# Patient Record
Sex: MALE | Race: WHITE | Employment: OTHER | URBAN - NONMETROPOLITAN AREA
[De-identification: names, ages, dates, MRNs, and addresses within clinical notes are randomized per-mention and may not be internally consistent; named-entity substitution may affect disease eponyms.]

---

## 2017-02-06 PROCEDURE — 88305 TISSUE EXAM BY PATHOLOGIST: CPT | Performed by: SURGERY

## 2017-02-07 ENCOUNTER — OFFICE VISIT (OUTPATIENT)
Dept: CARDIOLOGY | Age: 62
End: 2017-02-07
Payer: MEDICARE

## 2017-02-07 VITALS
WEIGHT: 185 LBS | HEIGHT: 66 IN | DIASTOLIC BLOOD PRESSURE: 60 MMHG | HEART RATE: 92 BPM | SYSTOLIC BLOOD PRESSURE: 118 MMHG | BODY MASS INDEX: 29.73 KG/M2

## 2017-02-07 DIAGNOSIS — Z95.5 HISTORY OF CORONARY ARTERY STENT PLACEMENT: ICD-10-CM

## 2017-02-07 DIAGNOSIS — Z95.1 S/P CABG (CORONARY ARTERY BYPASS GRAFT): ICD-10-CM

## 2017-02-07 DIAGNOSIS — I25.10 CORONARY ARTERY DISEASE INVOLVING NATIVE CORONARY ARTERY OF NATIVE HEART WITHOUT ANGINA PECTORIS: Primary | ICD-10-CM

## 2017-02-07 DIAGNOSIS — Z72.0 TOBACCO ABUSE: ICD-10-CM

## 2017-02-07 DIAGNOSIS — I10 ESSENTIAL HYPERTENSION: ICD-10-CM

## 2017-02-07 PROCEDURE — G8419 CALC BMI OUT NRM PARAM NOF/U: HCPCS | Performed by: CLINICAL NURSE SPECIALIST

## 2017-02-07 PROCEDURE — 4004F PT TOBACCO SCREEN RCVD TLK: CPT | Performed by: CLINICAL NURSE SPECIALIST

## 2017-02-07 PROCEDURE — 99214 OFFICE O/P EST MOD 30 MIN: CPT | Performed by: CLINICAL NURSE SPECIALIST

## 2017-02-07 PROCEDURE — 3017F COLORECTAL CA SCREEN DOC REV: CPT | Performed by: CLINICAL NURSE SPECIALIST

## 2017-02-07 PROCEDURE — G8427 DOCREV CUR MEDS BY ELIG CLIN: HCPCS | Performed by: CLINICAL NURSE SPECIALIST

## 2017-02-07 PROCEDURE — G8599 NO ASA/ANTIPLAT THER USE RNG: HCPCS | Performed by: CLINICAL NURSE SPECIALIST

## 2017-02-07 PROCEDURE — 93000 ELECTROCARDIOGRAM COMPLETE: CPT | Performed by: CLINICAL NURSE SPECIALIST

## 2017-02-07 PROCEDURE — 99406 BEHAV CHNG SMOKING 3-10 MIN: CPT | Performed by: CLINICAL NURSE SPECIALIST

## 2017-02-07 PROCEDURE — G8484 FLU IMMUNIZE NO ADMIN: HCPCS | Performed by: CLINICAL NURSE SPECIALIST

## 2017-02-07 RX ORDER — OMEPRAZOLE 20 MG/1
20 CAPSULE, DELAYED RELEASE ORAL DAILY
COMMUNITY
End: 2018-02-07 | Stop reason: ALTCHOICE

## 2017-02-07 RX ORDER — SUCRALFATE 1 G/1
1 TABLET ORAL 4 TIMES DAILY
COMMUNITY
End: 2018-02-07 | Stop reason: ALTCHOICE

## 2017-02-07 RX ORDER — BACLOFEN 20 MG/1
20 TABLET ORAL 2 TIMES DAILY
COMMUNITY
End: 2018-02-07 | Stop reason: ALTCHOICE

## 2017-02-07 RX ORDER — VARENICLINE TARTRATE 25 MG
KIT ORAL
Qty: 1 EACH | Refills: 0 | Status: SHIPPED | OUTPATIENT
Start: 2017-02-07 | End: 2018-02-07 | Stop reason: ALTCHOICE

## 2017-02-07 RX ORDER — VARENICLINE TARTRATE 1 MG/1
1 TABLET, FILM COATED ORAL 2 TIMES DAILY
Qty: 60 TABLET | Refills: 3 | Status: SHIPPED | OUTPATIENT
Start: 2017-02-07 | End: 2018-02-07 | Stop reason: ALTCHOICE

## 2017-02-07 ASSESSMENT — ENCOUNTER SYMPTOMS
HEARTBURN: 0
ORTHOPNEA: 0
NAUSEA: 0
ABDOMINAL PAIN: 1
VOMITING: 0
SHORTNESS OF BREATH: 1
BLURRED VISION: 0
BLOOD IN STOOL: 0
COUGH: 0

## 2017-08-07 ENCOUNTER — TELEPHONE (OUTPATIENT)
Dept: CARDIOLOGY | Age: 62
End: 2017-08-07

## 2017-08-27 PROCEDURE — 88305 TISSUE EXAM BY PATHOLOGIST: CPT | Performed by: SURGERY

## 2017-08-27 PROCEDURE — 88342 IMHCHEM/IMCYTCHM 1ST ANTB: CPT | Performed by: SURGERY

## 2017-08-28 ENCOUNTER — LAB REQUISITION (OUTPATIENT)
Dept: LAB | Facility: HOSPITAL | Age: 62
End: 2017-08-28

## 2017-08-28 DIAGNOSIS — Z00.00 ENCOUNTER FOR GENERAL ADULT MEDICAL EXAMINATION WITHOUT ABNORMAL FINDINGS: ICD-10-CM

## 2017-09-05 ENCOUNTER — TELEPHONE (OUTPATIENT)
Dept: CARDIOLOGY | Age: 62
End: 2017-09-05

## 2017-09-05 LAB
CYTO UR: NORMAL
LAB AP CASE REPORT: NORMAL
LAB AP CLINICAL INFORMATION: NORMAL
Lab: NORMAL
PATH REPORT.FINAL DX SPEC: NORMAL
PATH REPORT.GROSS SPEC: NORMAL

## 2017-09-19 ENCOUNTER — HOSPITAL ENCOUNTER (OUTPATIENT)
Age: 62
Setting detail: SPECIMEN
End: 2017-09-19
Payer: MEDICARE

## 2017-09-24 ENCOUNTER — HOSPITAL ENCOUNTER (EMERGENCY)
Age: 62
Discharge: HOME | End: 2017-09-24
Payer: MEDICARE

## 2017-09-24 ENCOUNTER — APPOINTMENT (OUTPATIENT)
Dept: GENERAL RADIOLOGY | Age: 62
End: 2017-09-24
Payer: MEDICARE

## 2017-09-24 VITALS
BODY MASS INDEX: 24.11 KG/M2 | DIASTOLIC BLOOD PRESSURE: 86 MMHG | WEIGHT: 150 LBS | TEMPERATURE: 98.2 F | HEIGHT: 66 IN | HEART RATE: 88 BPM | RESPIRATION RATE: 17 BRPM | OXYGEN SATURATION: 95 % | SYSTOLIC BLOOD PRESSURE: 139 MMHG

## 2017-09-24 DIAGNOSIS — K59.00 CONSTIPATION, UNSPECIFIED CONSTIPATION TYPE: Primary | ICD-10-CM

## 2017-09-24 LAB
ALBUMIN SERPL-MCNC: 4.1 G/DL (ref 3.5–5.2)
ALP BLD-CCNC: 136 U/L (ref 40–130)
ALT SERPL-CCNC: 52 U/L (ref 5–41)
ANION GAP SERPL CALCULATED.3IONS-SCNC: 14 MMOL/L (ref 7–19)
AST SERPL-CCNC: 42 U/L (ref 5–40)
BILIRUB SERPL-MCNC: 0.5 MG/DL (ref 0.2–1.2)
BUN BLDV-MCNC: 14 MG/DL (ref 8–23)
CALCIUM SERPL-MCNC: 9.5 MG/DL (ref 8.8–10.2)
CHLORIDE BLD-SCNC: 98 MMOL/L (ref 98–111)
CO2: 25 MMOL/L (ref 22–29)
CREAT SERPL-MCNC: 0.7 MG/DL (ref 0.5–1.2)
GFR NON-AFRICAN AMERICAN: >60
GLUCOSE BLD-MCNC: 126 MG/DL (ref 74–109)
HCT VFR BLD CALC: 47.4 % (ref 42–52)
HEMOGLOBIN: 16.1 G/DL (ref 14–18)
MCH RBC QN AUTO: 29.4 PG (ref 27–31)
MCHC RBC AUTO-ENTMCNC: 34 G/DL (ref 33–37)
MCV RBC AUTO: 86.7 FL (ref 80–94)
PDW BLD-RTO: 12.1 % (ref 11.5–14.5)
PLATELET # BLD: 266 K/UL (ref 130–400)
PMV BLD AUTO: 10.4 FL (ref 9.4–12.4)
POTASSIUM SERPL-SCNC: 4.1 MMOL/L (ref 3.5–5)
RBC # BLD: 5.47 M/UL (ref 4.7–6.1)
SODIUM BLD-SCNC: 137 MMOL/L (ref 136–145)
TOTAL PROTEIN: 8 G/DL (ref 6.6–8.7)
WBC # BLD: 7.7 K/UL (ref 4.8–10.8)

## 2017-09-24 PROCEDURE — 80053 COMPREHEN METABOLIC PANEL: CPT

## 2017-09-24 PROCEDURE — 74022 RADEX COMPL AQT ABD SERIES: CPT

## 2017-09-24 PROCEDURE — 99283 EMERGENCY DEPT VISIT LOW MDM: CPT | Performed by: NURSE PRACTITIONER

## 2017-09-24 PROCEDURE — 85027 COMPLETE CBC AUTOMATED: CPT

## 2017-09-24 PROCEDURE — 99284 EMERGENCY DEPT VISIT MOD MDM: CPT

## 2017-09-24 PROCEDURE — 36415 COLL VENOUS BLD VENIPUNCTURE: CPT

## 2017-09-24 PROCEDURE — 6360000002 HC RX W HCPCS: Performed by: NURSE PRACTITIONER

## 2017-09-24 RX ORDER — POLYETHYLENE GLYCOL 3350 17 G/17G
17 POWDER, FOR SOLUTION ORAL DAILY
Qty: 510 G | Refills: 0 | Status: SHIPPED | OUTPATIENT
Start: 2017-09-24 | End: 2017-10-24

## 2017-09-24 RX ORDER — DOCUSATE SODIUM 100 MG/1
100 CAPSULE, LIQUID FILLED ORAL 2 TIMES DAILY
Qty: 30 CAPSULE | Refills: 0 | Status: SHIPPED | OUTPATIENT
Start: 2017-09-24 | End: 2018-02-07 | Stop reason: ALTCHOICE

## 2017-09-24 RX ADMIN — METHYLNALTREXONE BROMIDE 12 MG: 12 INJECTION, SOLUTION SUBCUTANEOUS at 20:21

## 2017-09-24 ASSESSMENT — ENCOUNTER SYMPTOMS
ABDOMINAL PAIN: 1
RESPIRATORY NEGATIVE: 1
CONSTIPATION: 1

## 2017-09-24 ASSESSMENT — PAIN SCALES - GENERAL: PAINLEVEL_OUTOF10: 10

## 2017-09-25 PROBLEM — C10.9 OROPHARYNGEAL CANCER (HCC): Status: ACTIVE | Noted: 2017-08-27

## 2017-09-28 ENCOUNTER — HOSPITAL ENCOUNTER (OUTPATIENT)
Dept: RADIATION ONCOLOGY | Facility: HOSPITAL | Age: 62
Setting detail: RADIATION/ONCOLOGY SERIES
End: 2017-09-28

## 2017-09-28 ENCOUNTER — CONSULT (OUTPATIENT)
Dept: RADIATION ONCOLOGY | Facility: HOSPITAL | Age: 62
End: 2017-09-28

## 2017-09-28 VITALS
SYSTOLIC BLOOD PRESSURE: 120 MMHG | BODY MASS INDEX: 24.91 KG/M2 | WEIGHT: 155 LBS | DIASTOLIC BLOOD PRESSURE: 76 MMHG | HEIGHT: 66 IN

## 2017-09-28 DIAGNOSIS — Z71.9 ENCOUNTER FOR CONSULTATION: ICD-10-CM

## 2017-09-28 DIAGNOSIS — K22.719 BARRETT'S ESOPHAGUS WITH DYSPLASIA: ICD-10-CM

## 2017-09-28 DIAGNOSIS — C10.9 OROPHARYNGEAL CANCER (HCC): Primary | ICD-10-CM

## 2017-09-28 DIAGNOSIS — F17.200 CURRENT SMOKER: ICD-10-CM

## 2017-09-28 PROBLEM — N50.89 PERINEAL MASS IN MALE: Status: ACTIVE | Noted: 2017-09-28

## 2017-09-28 PROBLEM — K08.9 POOR DENTITION: Status: ACTIVE | Noted: 2017-09-28

## 2017-09-28 PROCEDURE — G0463 HOSPITAL OUTPT CLINIC VISIT: HCPCS | Performed by: RADIOLOGY

## 2017-09-28 RX ORDER — TAMSULOSIN HYDROCHLORIDE 0.4 MG/1
1 CAPSULE ORAL NIGHTLY
COMMUNITY
End: 2018-10-11

## 2017-09-28 RX ORDER — ASPIRIN 81 MG/1
81 TABLET, CHEWABLE ORAL DAILY
COMMUNITY
End: 2017-11-03

## 2017-09-28 RX ORDER — SUCRALFATE 1 G/1
1 TABLET ORAL 4 TIMES DAILY
COMMUNITY
End: 2017-12-04

## 2017-09-28 RX ORDER — BACLOFEN 20 MG/1
20 TABLET ORAL 3 TIMES DAILY
COMMUNITY
End: 2017-12-04

## 2017-09-28 RX ORDER — OMEPRAZOLE 20 MG/1
20 CAPSULE, DELAYED RELEASE ORAL DAILY
COMMUNITY
End: 2017-12-04

## 2017-09-28 RX ORDER — METOPROLOL TARTRATE 50 MG/1
50 TABLET, FILM COATED ORAL 2 TIMES DAILY
COMMUNITY
End: 2017-12-04

## 2017-09-28 RX ORDER — INSULIN GLARGINE 100 [IU]/ML
25 INJECTION, SOLUTION SUBCUTANEOUS EVERY MORNING
COMMUNITY
End: 2018-10-11

## 2017-09-28 RX ORDER — FUROSEMIDE 40 MG/1
40 TABLET ORAL 2 TIMES DAILY
COMMUNITY
End: 2017-12-04

## 2017-09-28 RX ORDER — ZOLPIDEM TARTRATE 10 MG/1
10 TABLET ORAL NIGHTLY PRN
COMMUNITY
End: 2018-10-11

## 2017-09-28 RX ORDER — PRASUGREL 10 MG/1
10 TABLET, FILM COATED ORAL DAILY
COMMUNITY
End: 2017-09-28

## 2017-09-28 RX ORDER — LISINOPRIL 10 MG/1
10 TABLET ORAL DAILY
COMMUNITY
End: 2017-12-04

## 2017-09-28 RX ORDER — OXYCODONE AND ACETAMINOPHEN 10; 325 MG/1; MG/1
1 TABLET ORAL EVERY 6 HOURS PRN
COMMUNITY
End: 2017-10-02 | Stop reason: SDUPTHER

## 2017-09-28 RX ORDER — NITROGLYCERIN 400 UG/1
1 SPRAY ORAL
COMMUNITY
End: 2018-05-18 | Stop reason: HOSPADM

## 2017-09-29 ENCOUNTER — HOSPITAL ENCOUNTER (OUTPATIENT)
Dept: NUCLEAR MEDICINE | Age: 62
Discharge: HOME | End: 2017-09-29
Payer: MEDICARE

## 2017-09-29 ENCOUNTER — HOSPITAL ENCOUNTER (OUTPATIENT)
Dept: MRI IMAGING | Age: 62
Discharge: HOME | End: 2017-09-29
Payer: MEDICARE

## 2017-09-29 DIAGNOSIS — C10.9 OROPHARYNGEAL CARCINOMA (HCC): ICD-10-CM

## 2017-09-29 DIAGNOSIS — C10.9 MALIGNANT NEOPLASM OF OROPHARYNX (HCC): ICD-10-CM

## 2017-09-29 LAB
GLUCOSE BLD-MCNC: 78 MG/DL (ref 70–99)
PERFORMED ON: NORMAL

## 2017-09-29 PROCEDURE — 70543 MRI ORBT/FAC/NCK W/O &W/DYE: CPT

## 2017-09-29 PROCEDURE — 78815 PET IMAGE W/CT SKULL-THIGH: CPT

## 2017-09-29 PROCEDURE — 3430000000 HC RX DIAGNOSTIC RADIOPHARMACEUTICAL: Performed by: INTERNAL MEDICINE

## 2017-09-29 PROCEDURE — 6360000004 HC RX CONTRAST MEDICATION: Performed by: INTERNAL MEDICINE

## 2017-09-29 PROCEDURE — A9552 F18 FDG: HCPCS | Performed by: INTERNAL MEDICINE

## 2017-09-29 PROCEDURE — 82948 REAGENT STRIP/BLOOD GLUCOSE: CPT

## 2017-09-29 PROCEDURE — A9577 INJ MULTIHANCE: HCPCS | Performed by: INTERNAL MEDICINE

## 2017-09-29 RX ORDER — FLUDEOXYGLUCOSE F 18 200 MCI/ML
10 INJECTION, SOLUTION INTRAVENOUS
Status: COMPLETED | OUTPATIENT
Start: 2017-09-29 | End: 2017-09-29

## 2017-09-29 RX ADMIN — GADOBENATE DIMEGLUMINE 15 ML: 529 INJECTION, SOLUTION INTRAVENOUS at 11:55

## 2017-09-29 RX ADMIN — FLUDEOXYGLUCOSE F 18 10 MILLICURIE: 200 INJECTION, SOLUTION INTRAVENOUS at 14:42

## 2017-10-02 ENCOUNTER — OFFICE VISIT (OUTPATIENT)
Dept: OTOLARYNGOLOGY | Facility: CLINIC | Age: 62
End: 2017-10-02

## 2017-10-02 VITALS
WEIGHT: 152 LBS | BODY MASS INDEX: 24.43 KG/M2 | DIASTOLIC BLOOD PRESSURE: 88 MMHG | SYSTOLIC BLOOD PRESSURE: 143 MMHG | HEART RATE: 96 BPM | TEMPERATURE: 97.5 F | HEIGHT: 66 IN

## 2017-10-02 DIAGNOSIS — F17.200 CURRENT SMOKER: ICD-10-CM

## 2017-10-02 DIAGNOSIS — C10.9 OROPHARYNGEAL CANCER (HCC): Primary | ICD-10-CM

## 2017-10-02 DIAGNOSIS — Z72.0 TOBACCO USE: ICD-10-CM

## 2017-10-02 DIAGNOSIS — K08.9 POOR DENTITION: ICD-10-CM

## 2017-10-02 PROCEDURE — 31575 DIAGNOSTIC LARYNGOSCOPY: CPT | Performed by: OTOLARYNGOLOGY

## 2017-10-02 PROCEDURE — 99203 OFFICE O/P NEW LOW 30 MIN: CPT | Performed by: OTOLARYNGOLOGY

## 2017-10-02 RX ORDER — OXYCODONE AND ACETAMINOPHEN 10; 325 MG/1; MG/1
1 TABLET ORAL EVERY 6 HOURS PRN
Qty: 40 TABLET | Refills: 0 | Status: SHIPPED | OUTPATIENT
Start: 2017-10-02 | End: 2017-11-01 | Stop reason: SDUPTHER

## 2017-10-02 RX ORDER — PRASUGREL 10 MG/1
TABLET, FILM COATED ORAL
COMMUNITY
Start: 2016-10-27 | End: 2017-10-04 | Stop reason: SINTOL

## 2017-10-02 NOTE — PROGRESS NOTES
Patient Intake Note    Review of Systems  Review of Systems   Constitutional: Positive for appetite change.   HENT:        See HPI   Eyes: Negative.    Respiratory: Positive for cough and choking.    Cardiovascular: Negative.    Gastrointestinal: Negative.    Endocrine: Negative.    Allergic/Immunologic: Negative.    Neurological: Positive for headaches.   Hematological: Bruises/bleeds easily.   Psychiatric/Behavioral: Negative.          Sanjana Sagastume MA  10/2/2017  3:09 PM

## 2017-10-02 NOTE — PROGRESS NOTES
"Patient Care Team:  Christian Castellon MD as PCP - General (Internal Medicine)  Sin Alarcon MD as Referring Physician (General Practice)  Alber Justin MD as Consulting Physician (Otolaryngology)  Kriss Dominguez MD as Referring Physician (Hematology and Oncology)  Hadley Marie III, MD as Consulting Physician (Radiation Oncology)    Chief Complaint   Patient presents with   • Mouth Lesions     SCC pharyngeal       Subjective   HPI   Sameer Tavarez is a  62 y.o. male who complains of pain. The symptoms are localized to the right ear and oropharynx. The patient has had severe symptoms. The symptoms have been relatively constant for the last 3 months. The patient  reports that he has been smoking Cigarettes.  He has a 52.00 pack-year smoking history. His smokeless tobacco use includes Snuff.. He has a history of acid reflux symptoms. He is being treated for acid reflux. There have been no factors that have improved the symptoms. He had an EGD on 2/6/17 by Dr Patel with biopsies showing Barretts esophagus and reflux esophagitis. He had continued dysphagia and underwent another EGD and an oropharyngeal biopsy on 8/27/17 with the oropharyngeal biosies showing \"at least squamous cell carcinoma in situ\". CT scanning showed \"oropharyngeal asymmety without overt enhancing mass\" on 8/28/17.    Review of Systems   Constitutional: Positive for appetite change.   HENT: Positive for ear pain and sore throat.         See HPI   Eyes: Negative.    Respiratory: Positive for cough and choking. Negative for stridor.    Cardiovascular: Negative.    Gastrointestinal: Negative.    Endocrine: Negative.    Allergic/Immunologic: Negative.    Neurological: Positive for headaches.   Hematological: Bruises/bleeds easily.   Psychiatric/Behavioral: Negative.        Past History:  Past Medical History:   Diagnosis Date   • Coronary artery disease    • Diabetes mellitus    • GERD (gastroesophageal reflux disease)    • " Hypertension    • Oropharyngeal cancer 08/27/2017   • Severe esophageal dysplasia      Past Surgical History:   Procedure Laterality Date   • CHOLECYSTECTOMY     • CORONARY ARTERY BYPASS GRAFT     • HERNIA REPAIR       Family History   Problem Relation Age of Onset   • Stroke Mother    • Heart disease Father    • Heart disease Brother    • Cancer Brother      Social History   Substance Use Topics   • Smoking status: Current Every Day Smoker     Packs/day: 1.00     Years: 52.00     Types: Cigarettes   • Smokeless tobacco: Current User     Types: Snuff      Comment: I've tried and tried   • Alcohol use Yes      Comment: occasionally       Current Outpatient Prescriptions:   •  aspirin 81 MG chewable tablet, Chew 81 mg Daily., Disp: , Rfl:   •  baclofen (LIORESAL) 20 MG tablet, Take 20 mg by mouth 3 (Three) Times a Day., Disp: , Rfl:   •  furosemide (LASIX) 40 MG tablet, Take 40 mg by mouth 2 (Two) Times a Day., Disp: , Rfl:   •  insulin glargine (LANTUS) 100 UNIT/ML injection, Inject 70 Units under the skin Daily., Disp: , Rfl:   •  insulin lispro (humaLOG) 100 UNIT/ML injection, Inject  under the skin 3 (Three) Times a Day Before Meals. Sliding scale, Disp: , Rfl:   •  lisinopril (PRINIVIL,ZESTRIL) 10 MG tablet, Take 10 mg by mouth Daily., Disp: , Rfl:   •  metFORMIN (GLUCOPHAGE) 1000 MG tablet, Take  by mouth., Disp: , Rfl:   •  metoprolol tartrate (LOPRESSOR) 50 MG tablet, Take 50 mg by mouth 2 (Two) Times a Day., Disp: , Rfl:   •  nitroglycerin (NITROLINGUAL) 0.4 MG/SPRAY spray, Place 1 spray under the tongue Every 5 (Five) Minutes As Needed for Chest Pain., Disp: , Rfl:   •  omeprazole (priLOSEC) 20 MG capsule, Take 20 mg by mouth Daily., Disp: , Rfl:   •  oxyCODONE-acetaminophen (PERCOCET)  MG per tablet, Take 1 tablet by mouth Every 6 (Six) Hours As Needed for Moderate Pain ., Disp: , Rfl:   •  sucralfate (CARAFATE) 1 g tablet, Take 1 g by mouth 4 (Four) Times a Day., Disp: , Rfl:   •  tamsulosin (FLOMAX)  0.4 MG capsule 24 hr capsule, Take 1 capsule by mouth Every Night., Disp: , Rfl:   •  zolpidem (AMBIEN) 10 MG tablet, Take 10 mg by mouth At Night As Needed for Sleep., Disp: , Rfl:   •  prasugrel (EFFIENT) 10 MG tablet, Take  by mouth., Disp: , Rfl: (patient has not been taking Effient for the last 3 months or so)  Allergies:  Codeine    Objective     Vital Signs:  Temp:  [97.5 °F (36.4 °C)] 97.5 °F (36.4 °C)  Heart Rate:  [96] 96  BP: (143)/(88) 143/88    Physical Exam  CONSTITUTIONAL: well nourished, well-developed, alert, oriented for age, in no acute distress  heavy tobacco odor present,  COMMUNICATION AND VOICE: mild muffled voice present,  HEAD: normocephalic, no lesions, atraumatic, no tenderness, no masses   FACE: appearance normal, no lesions, no tenderness, no deformities, facial motion symmetric  SALIVARY GLANDS: parotid glands with no tenderness, no swelling, no masses, submandibular glands with normal size, nontender  EYES: ocular motility normal, eyelids normal, orbits normal, no proptosis, conjunctiva normal , pupils equal, round  HEARING: response to conversational voice normal bilaterally   EXTERNAL EARS: auricles without lesions  EXTERNAL EAR CANALS: normal ear canals without stenosis or significant cerumen  TYMPANIC MEMBRANES: tympanic membrane appearance normal, no lesions, no perforation, normal mobility, no fluid  EXTERNAL NOSE: structure normal, no tenderness on palpation, no nasal discharge, no lesions, no evidence of trauma, nostrils patent  INTRANASAL EXAM: nasal mucosa normal, vestibule within normal limits, inferior turbinate normal,  nasal septum without overt anterior deviation  NASOPHARYNX: nasopharyngeal mucosa, adenoids within normal limits  LIPS: structure normal, no tenderness on palpation, no lesions, no evidence of trauma  TEETH: diffusely carious  GUMS: gingivae healthy  ORAL MUCOSA: oral mucosa normal, no mucosal lesions  FLOOR OF MOUTH: Warthin’s duct patent, mucosa  normal  TONGUE: right base of tongue fullness and firmness to palpation extending from the tonsillar fossa to the midline of the base of tongue  PALATE: soft and hard palates with normal mucosa and structure  OROPHARYNX: there is a large ulcerated mass of the right tonsillar fossa that has mild exudate and is tender and firm to palpation  HYPOPHARYNX: hypopharyngeal mucosa normal  LARYNX: thick secretions, epiglottis and arytenoid cartilage within normal limits, vocal cord mucosa normal with normal mobility   NECK: neck appearance normal, no masses or tenderness  THYROID: no overt thyromegaly, no tenderness, nodules or mass present on palpation, position midline   LYMPH NODES: no lymphadenopathy  CHEST/RESPIRATORY: respiratory effort normal, normal breath sounds  CARDIOVASCULAR: rate and rhythm normal, extremities without cyanosis or edema, no overt jugulovenous distension present  NEUROLOGIC/PSYCHIATRIC: oriented appropriately for age, mood normal, affect appropriate, cranial nerves intact grossly unless specifically mentioned above     Procedure Note    Anesthesia: topical 4% tetracaine and oxymetazoline mix    Endoscopy Type: Flexible Laryngoscopy    Indications for Procedure: Lesion partially identified by mirror examination - needing further exam    Procedure Details:    The patient was placed in the sitting position.  After topical anesthesia and decongestion, the 4 mm laryngoscope was passed.  The nasal cavities, nasopharynx, oropharynx, hypopharynx, and larynx were all examined.  Vocal cords were examined during respiration and phonation.  The following findings were noted:    Findings: as per above    Condition:  Stable.  Patient tolerated procedure well.    Complications:  None    RESULTS REVIEW:    I have personally reviewed the patient's mri of the neck. There is a large mass extending from the tonsillar fossa to the midline of the base of tongue.  It has anterior extension into the lingual musculature  of the tongue.  I do not see any lymphadenopathy.  Bulky enhancing right oropharyngeal soft tissue mass measuring approximately 6.7 x 4.6 x 4.6 cm. Involvement of the right side of the base of the tongue.  I have personally reviewed the patient's PET scan.  There is intense uptake at the area of the mass seen on the MRI scan.   PATHOLOGY:  Oropharynx, biopsy: At least squamous cell carcinoma in situ.    I discussed this case with Dr. Marie who agrees this is an obvious invasive squamous cell carcinoma clinically.  I discussed the pathology with Dr. Kassy Handley who really looked at the slides but still was unable to call this an invasive carcinoma.                  Assessment   1. Oropharyngeal cancer    2. Poor dentition    3. Current smoker    4. 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  However, the biopsies were superficial and they cannot: Invasive carcinoma.  Medical oncology requires a definitive diagnosis of invasion.  Therefore I will put him on the schedule for later this week to have a biopsy.  I am concerned about the size of this mass and that the anesthesia may aggravate obstruction and therefore we had a long discussion on the possibility of tracheostomy.    Lex report run    The patient/family was instructed on the proper use of scheduled prescription drugs including their impact on driving and work safety and their potential effects during pregnancy. The potential for overdose and the appropriate response to an overdose was covered as well as their safe storage and proper disposal. The website www.[place] or [place]health which contains other materials in this regard was offered      New Medications Ordered This Visit   Medications   • oxyCODONE-acetaminophen (PERCOCET)  MG per tablet     Sig: Take 1 tablet by mouth Every 6 (Six) Hours As Needed for Moderate  Pain .     Dispense:  40 tablet     Refill:  0     -----SURGERY SCHEDULING:-----  Schedule direct laryngoscopy with biopsy     ---INFORMED CONSENT DISCUSSION:---  DIRECT LARYNGOSCOPY WITH BIOPSY: The risks and benefits were explained including but not limited to pain, bleeding, infection, (including possible mediastinitis), the risks of the general anesthesia, pain, temporary or permanent hoarseness, airway loss, possible need for tracheostomy and/or tooth injury. Questions were answered. No guarantees were made or implied.       ---PREOPERATIVE WORKUP:---  CBC  CMP  Chest X-Ray  EKG     Follow up  postoperatively    Alber Justin MD  10/02/17  4:44 PM

## 2017-10-02 NOTE — PATIENT INSTRUCTIONS
IF YOU SMOKE OR USE TOBACCO PLEASE READ THE FOLLOWING:    Why is smoking bad for me?  Smoking increases the risk of heart disease, lung disease, vascular disease, stroke, and cancer.     If you smoke, STOP!    If you would like more information on quitting smoking, please visit the [employer] website: [email or web address]   This link will provide additional resources including the QUIT line and the Beat the Pack support groups.     For more information:    Quit Now [facility]  1-800-QUIT-NOW  https://[place].quitlogix.org/en-[place]/

## 2017-10-04 ENCOUNTER — APPOINTMENT (OUTPATIENT)
Dept: PREADMISSION TESTING | Facility: HOSPITAL | Age: 62
End: 2017-10-04

## 2017-10-04 ENCOUNTER — HOSPITAL ENCOUNTER (OUTPATIENT)
Dept: GENERAL RADIOLOGY | Facility: HOSPITAL | Age: 62
Discharge: HOME | End: 2017-10-04
Admitting: OTOLARYNGOLOGY

## 2017-10-04 VITALS
DIASTOLIC BLOOD PRESSURE: 98 MMHG | RESPIRATION RATE: 17 BRPM | OXYGEN SATURATION: 99 % | HEIGHT: 66 IN | SYSTOLIC BLOOD PRESSURE: 169 MMHG | WEIGHT: 153.6 LBS | HEART RATE: 103 BPM | BODY MASS INDEX: 24.68 KG/M2

## 2017-10-04 DIAGNOSIS — C10.9 OROPHARYNGEAL CANCER (HCC): ICD-10-CM

## 2017-10-04 LAB
ALBUMIN SERPL-MCNC: 4.6 G/DL (ref 3.5–5)
ALBUMIN/GLOB SERPL: 1.3 G/DL (ref 1.1–2.5)
ALP SERPL-CCNC: 172 U/L (ref 24–120)
ALT SERPL W P-5'-P-CCNC: 109 U/L (ref 0–54)
ANION GAP SERPL CALCULATED.3IONS-SCNC: 16 MMOL/L (ref 4–13)
AST SERPL-CCNC: 66 U/L (ref 7–45)
BILIRUB SERPL-MCNC: 0.7 MG/DL (ref 0.1–1)
BUN BLD-MCNC: 10 MG/DL (ref 5–21)
BUN/CREAT SERPL: 15.2 (ref 7–25)
CALCIUM SPEC-SCNC: 9.4 MG/DL (ref 8.4–10.4)
CHLORIDE SERPL-SCNC: 100 MMOL/L (ref 98–110)
CO2 SERPL-SCNC: 23 MMOL/L (ref 24–31)
CREAT BLD-MCNC: 0.66 MG/DL (ref 0.5–1.4)
DEPRECATED RDW RBC AUTO: 39.2 FL (ref 40–54)
ERYTHROCYTE [DISTWIDTH] IN BLOOD BY AUTOMATED COUNT: 12.7 % (ref 12–15)
GFR SERPL CREATININE-BSD FRML MDRD: 122 ML/MIN/1.73
GLOBULIN UR ELPH-MCNC: 3.6 GM/DL
GLUCOSE BLD-MCNC: 210 MG/DL (ref 70–100)
HCT VFR BLD AUTO: 41.9 % (ref 40–52)
HGB BLD-MCNC: 14.4 G/DL (ref 14–18)
MCH RBC QN AUTO: 29.1 PG (ref 28–32)
MCHC RBC AUTO-ENTMCNC: 34.4 G/DL (ref 33–36)
MCV RBC AUTO: 84.8 FL (ref 82–95)
PLATELET # BLD AUTO: 221 10*3/MM3 (ref 130–400)
PMV BLD AUTO: 11.5 FL (ref 6–12)
POTASSIUM BLD-SCNC: 4.1 MMOL/L (ref 3.5–5.3)
PROT SERPL-MCNC: 8.2 G/DL (ref 6.3–8.7)
RBC # BLD AUTO: 4.94 10*6/MM3 (ref 4.8–5.9)
SODIUM BLD-SCNC: 139 MMOL/L (ref 135–145)
WBC NRBC COR # BLD: 8.98 10*3/MM3 (ref 4.8–10.8)

## 2017-10-04 PROCEDURE — 93010 ELECTROCARDIOGRAM REPORT: CPT | Performed by: INTERNAL MEDICINE

## 2017-10-04 RX ORDER — FEXOFENADINE HCL AND PSEUDOEPHEDRINE HCI 60; 120 MG/1; MG/1
1 TABLET, EXTENDED RELEASE ORAL 2 TIMES DAILY
COMMUNITY
End: 2017-12-04

## 2017-10-04 NOTE — DISCHARGE INSTRUCTIONS
DAY OF SURGERY INSTRUCTIONS        YOUR SURGEON: dr janneth flaherty    PROCEDURE: right direct laryngoscopy with biopsy    DATE OF SURGERY: October 5, 2017    ARRIVAL TIME: AS DIRECTED BY OFFICE    DAY OF SURGERY TAKE ONLY THESE MEDICATIONS: metoprolol              BEFORE YOU COME TO THE HOSPITAL  (Pre-op instructions)  • Do not eat, drink, smoke or chew gum after midnight the night before surgery.  This also includes no mints.  • Morning of surgery take only the medicines you have been instructed with a sip of water unless otherwise instructed  by your physician.  • Do not shave, wear makeup or dark nail polish.  • Remove all jewelry including rings.  • Leave anything you consider valuable at home.  • Leave your suitcase in the car until after your surgery.  • Bring the following with you if applicable:  o Picture ID and insurance, Medicare or Medicaid cards  o Co-pay/deductible required by insurance (cash, check, credit card)  o Copy of advance directive, living will or power-of- documents if not brought to PAT  o CPAP or BIPAP mask and tubing  o Relaxation aids (MP3 player, book, magazine)  • On the day of surgery check in at registration located at the main entrance of the hospital.       Outpatient Surgery Guidelines, Adult  Outpatient procedures are those for which the person having the procedure is allowed to go home the same day as the procedure. Various procedures are done on an outpatient basis. You should follow some general guidelines if you will be having an outpatient procedure.  LET YOUR HEALTH CARE PROVIDER KNOW ABOUT:  · Any allergies you have.  · All medicines you are taking, including vitamins, herbs, eye drops, creams, and over-the-counter medicines.  · Previous problems you or members of your family have had with the use of anesthetics.  · Any blood disorders you have.  · Previous surgeries you have had.  · Medical conditions you have.  RISKS AND COMPLICATIONS  Your health care provider  will discuss possible risks and complications with you before surgery. Common risks and complications include:    · Problems due to the use of anesthetics.  · Blood loss and replacement (does not apply to minor surgical procedures).  · Temporary increase in pain due to surgery.  · Uncorrected pain or problems that the surgery was meant to correct.  · Infection.  · New damage.  BEFORE THE PROCEDURE  · Ask your health care provider about changing or stopping your regular medicines. You may need to stop taking certain medicines in the days or weeks before the procedure.  · Stop smoking at least 2 weeks before surgery. This lowers your risk for complications during and after surgery. Ask your health care provider for help with this if needed.  · Eat your usual meals and a light supper the day before surgery. Continue fluid intake. Do not drink alcohol.  · Do not eat or drink after midnight the night before your surgery.   · Arrange for someone to take you home and to stay with you for 24 hours after the procedure. Medicine given for your procedure may affect your ability to drive or to care for yourself.  · Call your health care provider's office if you develop an illness or problem that may prevent you from safely having your procedure.  AFTER THE PROCEDURE  After surgery, you will be taken to a recovery area, where your progress will be monitored. If there are no complications, you will be allowed to go home when you are awake, stable, and taking fluids well. You may have numbness around the surgical site. Healing will take some time. You will have tenderness at the surgical site and may have some swelling and bruising. You may also have some nausea.  HOME CARE INSTRUCTIONS  · Do not drive for 24 hours, or as directed by your health care provider. Do not drive while taking prescription pain medicines.  · Do not drink alcohol for 24 hours.  · Do not make important decisions or sign legal documents for 24 hours.  · You  may resume a normal diet and activities as directed.  · Do not lift anything heavier than 10 pounds (4.5 kg) or play contact sports until your health care provider says it is okay.  · Change your bandages (dressings) as directed.  · Only take over-the-counter or prescription medicines as directed by your health care provider.  · Follow up with your health care provider as directed.  SEEK MEDICAL CARE IF:  · You have increased bleeding (more than a small spot) from the surgical site.  · You have redness, swelling, or increasing pain in the wound.  · You see pus coming from the wound.  · You have a fever.  · You notice a bad smell coming from the wound or dressing.  · You feel lightheaded or faint.  · You develop a rash.  · You have trouble breathing.  · You develop allergies.  MAKE SURE YOU:  · Understand these instructions.  · Will watch your condition.  · Will get help right away if you are not doing well or get worse.     This information is not intended to replace advice given to you by your health care provider. Make sure you discuss any questions you have with your health care provider.     Document Released: 09/12/2002 Document Revised: 05/03/2016 Document Reviewed: 05/22/2014  [employer] Interactive Patient Education ©2016 [employer]       Fall Prevention in Hospitals, Adult  As a hospital patient, your condition and the treatments you receive can increase your risk for falls. Some additional risk factors for falls in a hospital include:  · Being in an unfamiliar environment.  · Being on bed rest.  · Your surgery.  · Taking certain medicines.  · Your tubing requirements, such as intravenous (IV) therapy or catheters.  It is important that you learn how to decrease fall risks while at the hospital. Below are important tips that can help prevent falls.  SAFETY TIPS FOR PREVENTING FALLS  Talk about your risk of falling.  · Ask your health care provider why you are at risk for falling. Is it your medicine,  illness, tubing placement, or something else?  · Make a plan with your health care provider to keep you safe from falls.  · Ask your health care provider or pharmacist about side effects of your medicines. Some medicines can make you dizzy or affect your coordination.  Ask for help.  · Ask for help before getting out of bed. You may need to press your call button.  · Ask for assistance in getting safely to the toilet.  · Ask for a walker or cane to be put at your bedside. Ask that most of the side rails on your bed be placed up before your health care provider leaves the room.  · Ask family or friends to sit with you.  · Ask for things that are out of your reach, such as your glasses, hearing aids, telephone, bedside table, or call button.  Follow these tips to avoid falling:  · Stay lying or seated, rather than standing, while waiting for help.  · Wear rubber-soled slippers or shoes whenever you walk in the hospital.  · Avoid quick, sudden movements.  ¨ Change positions slowly.  ¨ Sit on the side of your bed before standing.  ¨ Stand up slowly and wait before you start to walk.  · Let your health care provider know if there is a spill on the floor.  · Pay careful attention to the medical equipment, electrical cords, and tubes around you.  · When you need help, use your call button by your bed or in the bathroom. Wait for one of your health care providers to help you.  · If you feel dizzy or unsure of your footing, return to bed and wait for assistance.  · Avoid being distracted by the TV, telephone, or another person in your room.  · Do not lean or support yourself on rolling objects, such as IV poles or bedside tables.     This information is not intended to replace advice given to you by your health care provider. Make sure you discuss any questions you have with your health care provider.     Document Released: 12/15/2001 Document Revised: 01/08/2016 Document Reviewed: 08/25/2013  [employer] Patient  Education ©2016 [employer]       Surgical Site Infections FAQs  What is a Surgical Site Infection (SSI)?  A surgical site infection is an infection that occurs after surgery in the part of the body where the surgery took place. Most patients who have surgery do not develop an infection. However, infections develop in about 1 to 3 out of every 100 patients who have surgery.  Some of the common symptoms of a surgical site infection are:  · Redness and pain around the area where you had surgery  · Drainage of cloudy fluid from your surgical wound  · Fever  Can SSIs be treated?  Yes. Most surgical site infections can be treated with antibiotics. The antibiotic given to you depends on the bacteria (germs) causing the infection. Sometimes patients with SSIs also need another surgery to treat the infection.  What are some of the things that hospitals are doing to prevent SSIs?  To prevent SSIs, doctors, nurses, and other healthcare providers:  · Clean their hands and arms up to their elbows with an antiseptic agent just before the surgery.  · Clean their hands with soap and water or an alcohol-based hand rub before and after caring for each patient.  · May remove some of your hair immediately before your surgery using electric clippers if the hair is in the same area where the procedure will occur. They should not shave you with a razor.  · Wear special hair covers, masks, gowns, and gloves during surgery to keep the surgery area clean.  · Give you antibiotics before your surgery starts. In most cases, you should get antibiotics within 60 minutes before the surgery starts and the antibiotics should be stopped within 24 hours after surgery.  · Clean the skin at the site of your surgery with a special soap that kills germs.  What can I do to help prevent SSIs?  Before your surgery:  · Tell your doctor about other medical problems you may have. Health problems such as allergies, diabetes, and obesity could affect your  surgery and your treatment.  · Quit smoking. Patients who smoke get more infections. Talk to your doctor about how you can quit before your surgery.  · Do not shave near where you will have surgery. Shaving with a razor can irritate your skin and make it easier to develop an infection.  At the time of your surgery:  · Speak up if someone tries to shave you with a razor before surgery. Ask why you need to be shaved and talk with your surgeon if you have any concerns.  · Ask if you will get antibiotics before surgery.  After your surgery:  · Make sure that your healthcare providers clean their hands before examining you, either with soap and water or an alcohol-based hand rub.  · If you do not see your providers clean their hands, please ask them to do so.  · Family and friends who visit you should not touch the surgical wound or dressings.  · Family and friends should clean their hands with soap and water or an alcohol-based hand rub before and after visiting you. If you do not see them clean their hands, ask them to clean their hands.  What do I need to do when I go home from the hospital?  · Before you go home, your doctor or nurse should explain everything you need to know about taking care of your wound. Make sure you understand how to care for your wound before you leave the hospital.  · Always clean your hands before and after caring for your wound.  · Before you go home, make sure you know who to contact if you have questions or problems after you get home.  · If you have any symptoms of an infection, such as redness and pain at the surgery site, drainage, or fever, call your doctor immediately.  If you have additional questions, please ask your doctor or nurse.  Developed and co-sponsored by The Society for Healthcare Epidemiology of [place] (SHEA); Infectious Diseases Society of [place] (IDSA); American Hospital Association; Association for Professionals in Infection Control and Epidemiology (APIC); [facility]  for Disease Control and Prevention (CDC); and The Joint Commission.     This information is not intended to replace advice given to you by your health care provider. Make sure you discuss any questions you have with your health care provider.     Document Released: 12/23/2014 Document Revised: 01/08/2016 Document Reviewed: 03/02/2016  [employer] Interactive Patient Education ©2016 [employer]     PATIENT/FAMILY/RESPONSIBLE PARTY VERBALIZES UNDERSTANDING OF ABOVE EDUCATION.  COPY OF PAIN SCALE GIVEN AND REVIEWED WITH VERBALIZED UNDERSTANDING.

## 2017-10-05 ENCOUNTER — ANESTHESIA (OUTPATIENT)
Dept: PERIOP | Facility: HOSPITAL | Age: 62
End: 2017-10-05

## 2017-10-05 ENCOUNTER — APPOINTMENT (OUTPATIENT)
Dept: GENERAL RADIOLOGY | Facility: HOSPITAL | Age: 62
End: 2017-10-05

## 2017-10-05 ENCOUNTER — ANESTHESIA EVENT (OUTPATIENT)
Dept: PERIOP | Facility: HOSPITAL | Age: 62
End: 2017-10-05

## 2017-10-05 ENCOUNTER — HOSPITAL ENCOUNTER (INPATIENT)
Facility: HOSPITAL | Age: 62
LOS: 8 days | Discharge: HOME HEALTH | End: 2017-10-13
Attending: OTOLARYNGOLOGY | Admitting: OTOLARYNGOLOGY

## 2017-10-05 DIAGNOSIS — Z72.0 TOBACCO USE: ICD-10-CM

## 2017-10-05 DIAGNOSIS — K08.9 POOR DENTITION: ICD-10-CM

## 2017-10-05 DIAGNOSIS — Z74.09 IMPAIRED MOBILITY: Primary | ICD-10-CM

## 2017-10-05 DIAGNOSIS — F17.200 CURRENT SMOKER: ICD-10-CM

## 2017-10-05 DIAGNOSIS — R49.1 VOICE ABSENCE: ICD-10-CM

## 2017-10-05 DIAGNOSIS — C10.9 OROPHARYNGEAL CANCER (HCC): ICD-10-CM

## 2017-10-05 LAB
GLUCOSE BLDC GLUCOMTR-MCNC: 175 MG/DL (ref 70–130)
GLUCOSE BLDC GLUCOMTR-MCNC: 60 MG/DL (ref 70–130)
GLUCOSE BLDC GLUCOMTR-MCNC: 62 MG/DL (ref 70–130)
GLUCOSE BLDC GLUCOMTR-MCNC: 65 MG/DL (ref 70–130)
GLUCOSE BLDC GLUCOMTR-MCNC: 67 MG/DL (ref 70–130)
GLUCOSE BLDC GLUCOMTR-MCNC: 78 MG/DL (ref 70–130)

## 2017-10-05 PROCEDURE — 88342 IMHCHEM/IMCYTCHM 1ST ANTB: CPT | Performed by: OTOLARYNGOLOGY

## 2017-10-05 PROCEDURE — 25010000002 HYDROMORPHONE PER 4 MG: Performed by: OTOLARYNGOLOGY

## 2017-10-05 PROCEDURE — 25010000002 FENTANYL CITRATE (PF) 100 MCG/2ML SOLUTION: Performed by: ANESTHESIOLOGY

## 2017-10-05 PROCEDURE — 25810000003 SODIUM CHLORIDE 0.9 % WITH KCL 20 MEQ 20-0.9 MEQ/L-% SOLUTION: Performed by: OTOLARYNGOLOGY

## 2017-10-05 PROCEDURE — 25010000002 PROPOFOL 10 MG/ML EMULSION: Performed by: NURSE ANESTHETIST, CERTIFIED REGISTERED

## 2017-10-05 PROCEDURE — 71010 HC CHEST PA OR AP: CPT

## 2017-10-05 PROCEDURE — 0CBM8ZX EXCISION OF PHARYNX, VIA NATURAL OR ARTIFICIAL OPENING ENDOSCOPIC, DIAGNOSTIC: ICD-10-PCS | Performed by: OTOLARYNGOLOGY

## 2017-10-05 PROCEDURE — 88305 TISSUE EXAM BY PATHOLOGIST: CPT | Performed by: OTOLARYNGOLOGY

## 2017-10-05 PROCEDURE — 25010000002 MIDAZOLAM PER 1 MG: Performed by: NURSE ANESTHETIST, CERTIFIED REGISTERED

## 2017-10-05 PROCEDURE — 25010000002 FENTANYL CITRATE (PF) 100 MCG/2ML SOLUTION: Performed by: NURSE ANESTHETIST, CERTIFIED REGISTERED

## 2017-10-05 PROCEDURE — 31600 PLANNED TRACHEOSTOMY: CPT | Performed by: OTOLARYNGOLOGY

## 2017-10-05 PROCEDURE — 25010000003 CEFAZOLIN PER 500 MG: Performed by: OTOLARYNGOLOGY

## 2017-10-05 PROCEDURE — 0B110F4 BYPASS TRACHEA TO CUTANEOUS WITH TRACHEOSTOMY DEVICE, OPEN APPROACH: ICD-10-PCS | Performed by: OTOLARYNGOLOGY

## 2017-10-05 PROCEDURE — 82962 GLUCOSE BLOOD TEST: CPT

## 2017-10-05 PROCEDURE — 94799 UNLISTED PULMONARY SVC/PX: CPT

## 2017-10-05 RX ORDER — DIPHENHYDRAMINE HCL 25 MG
25 CAPSULE ORAL EVERY 6 HOURS PRN
Status: DISCONTINUED | OUTPATIENT
Start: 2017-10-05 | End: 2017-10-11

## 2017-10-05 RX ORDER — ASPIRIN 81 MG/1
81 TABLET, CHEWABLE ORAL DAILY
Status: DISCONTINUED | OUTPATIENT
Start: 2017-10-05 | End: 2017-10-11

## 2017-10-05 RX ORDER — ZOLPIDEM TARTRATE 5 MG/1
10 TABLET ORAL NIGHTLY PRN
Status: DISCONTINUED | OUTPATIENT
Start: 2017-10-05 | End: 2017-10-13 | Stop reason: HOSPADM

## 2017-10-05 RX ORDER — LIDOCAINE HYDROCHLORIDE AND EPINEPHRINE 10; 10 MG/ML; UG/ML
INJECTION, SOLUTION INFILTRATION; PERINEURAL AS NEEDED
Status: DISCONTINUED | OUTPATIENT
Start: 2017-10-05 | End: 2017-10-05 | Stop reason: HOSPADM

## 2017-10-05 RX ORDER — PANTOPRAZOLE SODIUM 40 MG/1
40 TABLET, DELAYED RELEASE ORAL EVERY MORNING
Status: DISCONTINUED | OUTPATIENT
Start: 2017-10-06 | End: 2017-10-08

## 2017-10-05 RX ORDER — KETAMINE HYDROCHLORIDE 50 MG/ML
INJECTION, SOLUTION, CONCENTRATE INTRAMUSCULAR; INTRAVENOUS AS NEEDED
Status: DISCONTINUED | OUTPATIENT
Start: 2017-10-05 | End: 2017-10-05 | Stop reason: SURG

## 2017-10-05 RX ORDER — GLYCOPYRROLATE 0.2 MG/ML
INJECTION INTRAMUSCULAR; INTRAVENOUS AS NEEDED
Status: DISCONTINUED | OUTPATIENT
Start: 2017-10-05 | End: 2017-10-05 | Stop reason: SURG

## 2017-10-05 RX ORDER — NITROGLYCERIN 400 UG/1
1 SPRAY ORAL
Status: DISCONTINUED | OUTPATIENT
Start: 2017-10-05 | End: 2017-10-13 | Stop reason: HOSPADM

## 2017-10-05 RX ORDER — NALOXONE HCL 0.4 MG/ML
0.4 VIAL (ML) INJECTION AS NEEDED
Status: DISCONTINUED | OUTPATIENT
Start: 2017-10-05 | End: 2017-10-05 | Stop reason: HOSPADM

## 2017-10-05 RX ORDER — MORPHINE SULFATE 2 MG/ML
2 INJECTION, SOLUTION INTRAMUSCULAR; INTRAVENOUS
Status: DISCONTINUED | OUTPATIENT
Start: 2017-10-05 | End: 2017-10-05 | Stop reason: HOSPADM

## 2017-10-05 RX ORDER — SUCRALFATE 1 G/1
1 TABLET ORAL
Status: DISCONTINUED | OUTPATIENT
Start: 2017-10-05 | End: 2017-10-09

## 2017-10-05 RX ORDER — NALOXONE HCL 0.4 MG/ML
0.1 VIAL (ML) INJECTION
Status: DISCONTINUED | OUTPATIENT
Start: 2017-10-05 | End: 2017-10-06

## 2017-10-05 RX ORDER — SODIUM CHLORIDE, SODIUM LACTATE, POTASSIUM CHLORIDE, CALCIUM CHLORIDE 600; 310; 30; 20 MG/100ML; MG/100ML; MG/100ML; MG/100ML
9 INJECTION, SOLUTION INTRAVENOUS CONTINUOUS
Status: DISCONTINUED | OUTPATIENT
Start: 2017-10-05 | End: 2017-10-05 | Stop reason: HOSPADM

## 2017-10-05 RX ORDER — TAMSULOSIN HYDROCHLORIDE 0.4 MG/1
0.4 CAPSULE ORAL NIGHTLY
Status: DISCONTINUED | OUTPATIENT
Start: 2017-10-05 | End: 2017-10-13 | Stop reason: HOSPADM

## 2017-10-05 RX ORDER — PROPOFOL 10 MG/ML
VIAL (ML) INTRAVENOUS AS NEEDED
Status: DISCONTINUED | OUTPATIENT
Start: 2017-10-05 | End: 2017-10-05 | Stop reason: SURG

## 2017-10-05 RX ORDER — IPRATROPIUM BROMIDE AND ALBUTEROL SULFATE 2.5; .5 MG/3ML; MG/3ML
3 SOLUTION RESPIRATORY (INHALATION) ONCE AS NEEDED
Status: DISCONTINUED | OUTPATIENT
Start: 2017-10-05 | End: 2017-10-05 | Stop reason: HOSPADM

## 2017-10-05 RX ORDER — FENTANYL CITRATE 50 UG/ML
25 INJECTION, SOLUTION INTRAMUSCULAR; INTRAVENOUS
Status: DISCONTINUED | OUTPATIENT
Start: 2017-10-05 | End: 2017-10-05 | Stop reason: HOSPADM

## 2017-10-05 RX ORDER — NICOTINE POLACRILEX 4 MG
15 LOZENGE BUCCAL
Status: DISCONTINUED | OUTPATIENT
Start: 2017-10-05 | End: 2017-10-13 | Stop reason: HOSPADM

## 2017-10-05 RX ORDER — OXYCODONE AND ACETAMINOPHEN 10; 325 MG/1; MG/1
1 TABLET ORAL EVERY 6 HOURS PRN
Status: DISCONTINUED | OUTPATIENT
Start: 2017-10-05 | End: 2017-10-06

## 2017-10-05 RX ORDER — DEXTROSE MONOHYDRATE 25 G/50ML
25 INJECTION, SOLUTION INTRAVENOUS
Status: DISCONTINUED | OUTPATIENT
Start: 2017-10-05 | End: 2017-10-13 | Stop reason: HOSPADM

## 2017-10-05 RX ORDER — MIDAZOLAM HYDROCHLORIDE 1 MG/ML
INJECTION INTRAMUSCULAR; INTRAVENOUS AS NEEDED
Status: DISCONTINUED | OUTPATIENT
Start: 2017-10-05 | End: 2017-10-05 | Stop reason: SURG

## 2017-10-05 RX ORDER — BACLOFEN 10 MG/1
20 TABLET ORAL 3 TIMES DAILY
Status: DISCONTINUED | OUTPATIENT
Start: 2017-10-05 | End: 2017-10-13 | Stop reason: HOSPADM

## 2017-10-05 RX ORDER — OXYMETAZOLINE HYDROCHLORIDE 0.05 G/100ML
SPRAY NASAL AS NEEDED
Status: DISCONTINUED | OUTPATIENT
Start: 2017-10-05 | End: 2017-10-05 | Stop reason: HOSPADM

## 2017-10-05 RX ORDER — LIDOCAINE HYDROCHLORIDE 40 MG/ML
5 INJECTION, SOLUTION RETROBULBAR; TOPICAL ONCE
Status: COMPLETED | OUTPATIENT
Start: 2017-10-05 | End: 2017-10-05

## 2017-10-05 RX ORDER — SODIUM CHLORIDE AND POTASSIUM CHLORIDE 150; 900 MG/100ML; MG/100ML
100 INJECTION, SOLUTION INTRAVENOUS CONTINUOUS
Status: DISCONTINUED | OUTPATIENT
Start: 2017-10-05 | End: 2017-10-05 | Stop reason: SDUPTHER

## 2017-10-05 RX ORDER — MEPERIDINE HYDROCHLORIDE 25 MG/ML
12.5 INJECTION INTRAMUSCULAR; INTRAVENOUS; SUBCUTANEOUS
Status: DISCONTINUED | OUTPATIENT
Start: 2017-10-05 | End: 2017-10-05 | Stop reason: HOSPADM

## 2017-10-05 RX ORDER — SODIUM CHLORIDE 0.9 % (FLUSH) 0.9 %
3 SYRINGE (ML) INJECTION AS NEEDED
Status: DISCONTINUED | OUTPATIENT
Start: 2017-10-05 | End: 2017-10-05 | Stop reason: HOSPADM

## 2017-10-05 RX ORDER — SODIUM CHLORIDE 0.9 % (FLUSH) 0.9 %
1-10 SYRINGE (ML) INJECTION AS NEEDED
Status: DISCONTINUED | OUTPATIENT
Start: 2017-10-05 | End: 2017-10-05 | Stop reason: HOSPADM

## 2017-10-05 RX ORDER — DEXTROSE, SODIUM CHLORIDE, AND POTASSIUM CHLORIDE 5; .45; .15 G/100ML; G/100ML; G/100ML
100 INJECTION INTRAVENOUS CONTINUOUS
Status: DISCONTINUED | OUTPATIENT
Start: 2017-10-05 | End: 2017-10-05 | Stop reason: SDUPTHER

## 2017-10-05 RX ORDER — ONDANSETRON 2 MG/ML
4 INJECTION INTRAMUSCULAR; INTRAVENOUS ONCE AS NEEDED
Status: DISCONTINUED | OUTPATIENT
Start: 2017-10-05 | End: 2017-10-05 | Stop reason: HOSPADM

## 2017-10-05 RX ORDER — METOPROLOL TARTRATE 50 MG/1
50 TABLET, FILM COATED ORAL 2 TIMES DAILY
Status: DISCONTINUED | OUTPATIENT
Start: 2017-10-05 | End: 2017-10-13 | Stop reason: HOSPADM

## 2017-10-05 RX ORDER — VASOPRESSIN 20 U/ML
INJECTION PARENTERAL AS NEEDED
Status: DISCONTINUED | OUTPATIENT
Start: 2017-10-05 | End: 2017-10-05 | Stop reason: SURG

## 2017-10-05 RX ORDER — DEXTROSE, SODIUM CHLORIDE, AND POTASSIUM CHLORIDE 5; .45; .15 G/100ML; G/100ML; G/100ML
75 INJECTION INTRAVENOUS CONTINUOUS
Status: DISCONTINUED | OUTPATIENT
Start: 2017-10-05 | End: 2017-10-06

## 2017-10-05 RX ORDER — ONDANSETRON 2 MG/ML
4 INJECTION INTRAMUSCULAR; INTRAVENOUS ONCE AS NEEDED
Status: COMPLETED | OUTPATIENT
Start: 2017-10-05 | End: 2017-10-11

## 2017-10-05 RX ORDER — LABETALOL HYDROCHLORIDE 5 MG/ML
5 INJECTION, SOLUTION INTRAVENOUS
Status: DISCONTINUED | OUTPATIENT
Start: 2017-10-05 | End: 2017-10-05 | Stop reason: HOSPADM

## 2017-10-05 RX ORDER — PHENYLEPHRINE HCL IN 0.9% NACL 0.8MG/10ML
SYRINGE (ML) INTRAVENOUS AS NEEDED
Status: DISCONTINUED | OUTPATIENT
Start: 2017-10-05 | End: 2017-10-05 | Stop reason: SURG

## 2017-10-05 RX ORDER — FUROSEMIDE 40 MG/1
40 TABLET ORAL 2 TIMES DAILY
Status: DISCONTINUED | OUTPATIENT
Start: 2017-10-05 | End: 2017-10-13 | Stop reason: HOSPADM

## 2017-10-05 RX ORDER — LISINOPRIL 10 MG/1
10 TABLET ORAL DAILY
Status: DISCONTINUED | OUTPATIENT
Start: 2017-10-05 | End: 2017-10-10

## 2017-10-05 RX ORDER — FENTANYL CITRATE 50 UG/ML
INJECTION, SOLUTION INTRAMUSCULAR; INTRAVENOUS AS NEEDED
Status: DISCONTINUED | OUTPATIENT
Start: 2017-10-05 | End: 2017-10-05 | Stop reason: SURG

## 2017-10-05 RX ORDER — HYDRALAZINE HYDROCHLORIDE 20 MG/ML
5 INJECTION INTRAMUSCULAR; INTRAVENOUS
Status: DISCONTINUED | OUTPATIENT
Start: 2017-10-05 | End: 2017-10-05 | Stop reason: HOSPADM

## 2017-10-05 RX ORDER — DEXTROSE MONOHYDRATE 25 G/50ML
12.5 INJECTION, SOLUTION INTRAVENOUS AS NEEDED
Status: DISCONTINUED | OUTPATIENT
Start: 2017-10-05 | End: 2017-10-05 | Stop reason: HOSPADM

## 2017-10-05 RX ORDER — DIPHENHYDRAMINE HYDROCHLORIDE 50 MG/ML
12.5 INJECTION INTRAMUSCULAR; INTRAVENOUS
Status: DISCONTINUED | OUTPATIENT
Start: 2017-10-05 | End: 2017-10-05 | Stop reason: HOSPADM

## 2017-10-05 RX ORDER — MAGNESIUM HYDROXIDE 1200 MG/15ML
LIQUID ORAL AS NEEDED
Status: DISCONTINUED | OUTPATIENT
Start: 2017-10-05 | End: 2017-10-05 | Stop reason: HOSPADM

## 2017-10-05 RX ORDER — SODIUM CHLORIDE, SODIUM LACTATE, POTASSIUM CHLORIDE, CALCIUM CHLORIDE 600; 310; 30; 20 MG/100ML; MG/100ML; MG/100ML; MG/100ML
1000 INJECTION, SOLUTION INTRAVENOUS CONTINUOUS
Status: DISCONTINUED | OUTPATIENT
Start: 2017-10-05 | End: 2017-10-05 | Stop reason: HOSPADM

## 2017-10-05 RX ADMIN — FENTANYL CITRATE 25 MCG: 50 INJECTION, SOLUTION INTRAMUSCULAR; INTRAVENOUS at 11:51

## 2017-10-05 RX ADMIN — HYDROMORPHONE HYDROCHLORIDE 1 MG: 1 INJECTION, SOLUTION INTRAMUSCULAR; INTRAVENOUS; SUBCUTANEOUS at 17:02

## 2017-10-05 RX ADMIN — MIDAZOLAM HYDROCHLORIDE 2 MG: 1 INJECTION, SOLUTION INTRAMUSCULAR; INTRAVENOUS at 13:05

## 2017-10-05 RX ADMIN — FENTANYL CITRATE 100 MCG: 50 INJECTION, SOLUTION INTRAMUSCULAR; INTRAVENOUS at 13:13

## 2017-10-05 RX ADMIN — POTASSIUM CHLORIDE AND SODIUM CHLORIDE 100 ML/HR: 900; 150 INJECTION, SOLUTION INTRAVENOUS at 18:05

## 2017-10-05 RX ADMIN — POTASSIUM CHLORIDE, DEXTROSE MONOHYDRATE AND SODIUM CHLORIDE 75 ML/HR: 150; 5; 450 INJECTION, SOLUTION INTRAVENOUS at 20:46

## 2017-10-05 RX ADMIN — HYDROMORPHONE HYDROCHLORIDE 1 MG: 1 INJECTION, SOLUTION INTRAMUSCULAR; INTRAVENOUS; SUBCUTANEOUS at 20:43

## 2017-10-05 RX ADMIN — SODIUM CHLORIDE, POTASSIUM CHLORIDE, SODIUM LACTATE AND CALCIUM CHLORIDE 1000 ML: 600; 310; 30; 20 INJECTION, SOLUTION INTRAVENOUS at 09:25

## 2017-10-05 RX ADMIN — Medication 160 MCG: at 13:18

## 2017-10-05 RX ADMIN — LIDOCAINE HYDROCHLORIDE 0.5 ML: 10 INJECTION, SOLUTION EPIDURAL; INFILTRATION; INTRACAUDAL; PERINEURAL at 09:25

## 2017-10-05 RX ADMIN — FENTANYL CITRATE 25 MCG: 50 INJECTION, SOLUTION INTRAMUSCULAR; INTRAVENOUS at 11:39

## 2017-10-05 RX ADMIN — SODIUM CHLORIDE, POTASSIUM CHLORIDE, SODIUM LACTATE AND CALCIUM CHLORIDE 9 ML/HR: 600; 310; 30; 20 INJECTION, SOLUTION INTRAVENOUS at 14:04

## 2017-10-05 RX ADMIN — VASOPRESSIN 2 UNITS: 20 INJECTION INTRAVENOUS at 13:24

## 2017-10-05 RX ADMIN — DEXTROSE 15 G: 15 GEL ORAL at 19:55

## 2017-10-05 RX ADMIN — KETAMINE HYDROCHLORIDE 50 MG: 50 INJECTION, SOLUTION, CONCENTRATE INTRAMUSCULAR; INTRAVENOUS at 13:05

## 2017-10-05 RX ADMIN — CEFAZOLIN SODIUM 2 G: 2 SOLUTION INTRAVENOUS at 18:05

## 2017-10-05 RX ADMIN — Medication 160 MCG: at 13:23

## 2017-10-05 RX ADMIN — GLYCOPYRROLATE 0.2 MG: 0.2 INJECTION, SOLUTION INTRAMUSCULAR; INTRAVENOUS at 12:43

## 2017-10-05 RX ADMIN — LIDOCAINE HYDROCHLORIDE 5 ML: 40 INJECTION, SOLUTION RETROBULBAR; TOPICAL at 11:52

## 2017-10-05 RX ADMIN — PROPOFOL 200 MG: 10 INJECTION, EMULSION INTRAVENOUS at 13:12

## 2017-10-05 NOTE — PLAN OF CARE
Problem: Patient Care Overview (Adult)  Goal: Plan of Care Review  Outcome: Ongoing (interventions implemented as appropriate)    10/05/17 3813   Coping/Psychosocial Response Interventions   Plan Of Care Reviewed With patient;spouse   Patient Care Overview   Progress improving   Outcome Evaluation   Outcome Summary/Follow up Plan Good O2 sat. c/o pain, dilaudid given.          Problem: Perioperative Period (Adult)  Goal: Signs and Symptoms of Listed Potential Problems Will be Absent or Manageable (Perioperative Period)  Outcome: Ongoing (interventions implemented as appropriate)

## 2017-10-05 NOTE — ANESTHESIA PROCEDURE NOTES
Airway    Final Airway Details  Final airway type: surgical airway  Successful airway: tracheostomy

## 2017-10-05 NOTE — PLAN OF CARE
Problem: Perioperative Period (Adult)  Goal: Signs and Symptoms of Listed Potential Problems Will be Absent or Manageable (Perioperative Period)  Outcome: Ongoing (interventions implemented as appropriate)    10/05/17 5575   Perioperative Period   Problems Assessed (Perioperative Period) pain;perioperative injury;infection;situational response   Problems Present (Perioperative Period) situational response;pain

## 2017-10-05 NOTE — INTERVAL H&P NOTE
H&P updated. The patient was examined and the following changes are noted:        I discussed the case with oncology and radiation oncology as well as oral surgery.  There is some concern both from myself and oral surgery about the stability of his airway especially with the upcoming dental extraction and potential other procedures including a port and if needed a G-tube.  Due to these concerns, I discussed with Mr. Rosales the option of plan tracheostomy to keep during this operation but also during the treatment to maintain a good patent airway.  I have in great detail gone over the ins and outs of tracheostomy and he is agreeable to proceed with this as a planned procedure.  Therefore, we will proceed with awake tracheostomy first and then do the direct laryngoscopy with biopsy.  I discussed that he will need to be in the hospital for several days for the recovery.  I discussed that I will be away starting tomorrow on vacation and will discussed the case with Dr. Neal who is covering in my absence.  In the hospital, we will consult oncology and also consider planned PEG tube placement due to his severe dysphagia with tumor.

## 2017-10-05 NOTE — PLAN OF CARE
Problem: Patient Care Overview (Adult)  Goal: Plan of Care Review  Outcome: Ongoing (interventions implemented as appropriate)    10/05/17 1105   Coping/Psychosocial Response Interventions   Plan Of Care Reviewed With patient   Patient Care Overview   Progress no change

## 2017-10-05 NOTE — CONSULTS
"[facility]  Initial Inpatient Consult Note    Referring Provider: Alber Justin MD    Reason for Consultation: nutrition, dysphagia    Subjective     History of present illness:         62-year-old male with recent diagnosis of squamous cell cancer of the oropharynx.  Gastroenterology has been asked to see the patient for possible PEG tube placement.  He had a tracheostomy today by Dr. Justin with additional biopsies of the oropharyngeal mass.  He has already seen Dr. Dominguez as well as Dr. Marie, and have discussed chemotherapy as well as radiation treatments.  However he will need to have dental extraction before starting treatment.  He has an appointment in [place] 10/18/2017 to discuss dental extraction.  He has history of Nissen fundoplication one year ago by Dr. Patel.  He also has history of Recio's esophagus and had ablation of Recio's by Dr. Patel one year ago as well.  Last upper endoscopy was 08/27/2017 by Dr. Patel, the wife states that he had \"ulcers\".  Has been on Protonix as well as Carafate.  I do not have these reports available.    Past Medical History:  Past Medical History:   Diagnosis Date   • Arthritis    • Coronary artery disease    • Depression    • Diabetes mellitus    • Enlarged prostate    • GERD (gastroesophageal reflux disease)    • History of transfusion    • Hypertension    • Oropharyngeal cancer 08/27/2017   • Seizure     diabetic   • Severe esophageal dysplasia    • Stroke    • TB (pulmonary tuberculosis) 2004       Past Surgical History:  Past Surgical History:   Procedure Laterality Date   • CARDIAC SURGERY     • CHOLECYSTECTOMY     • CORONARY ARTERY BYPASS GRAFT     • FRACTURE SURGERY     • HERNIA REPAIR     • NISSEN FUNDOPLICATION LAPAROSCOPIC     • SKIN BIOPSY     • TESTICLE SURGERY      tubes implanted for drainage   • TONSILLECTOMY          Social History:   Social History   Substance Use Topics   • Smoking status: Current Every Day " Smoker     Packs/day: 1.00     Years: 52.00     Types: Cigarettes   • Smokeless tobacco: Current User     Types: Snuff      Comment: I've tried and tried   • Alcohol use Yes      Comment: occasionally        Family History:  Family History   Problem Relation Age of Onset   • Stroke Mother    • Heart disease Father    • Heart disease Brother    • Cancer Brother        Home Meds:  Prescriptions Prior to Admission   Medication Sig Dispense Refill Last Dose   • aspirin 81 MG chewable tablet Chew 81 mg Daily.   10/4/2017 at 1200   • fexofenadine-pseudoephedrine (ALLEGRA-D)  MG per 12 hr tablet Take 1 tablet by mouth 2 (Two) Times a Day.   10/4/2017 at 0900   • furosemide (LASIX) 40 MG tablet Take 40 mg by mouth 2 (Two) Times a Day.   10/4/2017 at 0900   • insulin glargine (LANTUS) 100 UNIT/ML injection Inject 70 Units under the skin Daily.   10/5/2017 at 0700   • lisinopril (PRINIVIL,ZESTRIL) 10 MG tablet Take 10 mg by mouth Daily.   10/4/2017 at 1430   • metoprolol tartrate (LOPRESSOR) 50 MG tablet Take 50 mg by mouth 2 (Two) Times a Day.   10/4/2017 at 1430   • omeprazole (priLOSEC) 20 MG capsule Take 20 mg by mouth Daily.   Past Week at Unknown time   • oxyCODONE-acetaminophen (PERCOCET)  MG per tablet Take 1 tablet by mouth Every 6 (Six) Hours As Needed for Moderate Pain . 40 tablet 0 10/5/2017 at 0400   • tamsulosin (FLOMAX) 0.4 MG capsule 24 hr capsule Take 1 capsule by mouth Every Night.   10/4/2017 at 1430   • zolpidem (AMBIEN) 10 MG tablet Take 10 mg by mouth At Night As Needed for Sleep.   10/4/2017 at 2000   • baclofen (LIORESAL) 20 MG tablet Take 20 mg by mouth 3 (Three) Times a Day.   More than a month at Unknown time   • nitroglycerin (NITROLINGUAL) 0.4 MG/SPRAY spray Place 1 spray under the tongue Every 5 (Five) Minutes As Needed for Chest Pain.   More than a month at Unknown time   • sucralfate (CARAFATE) 1 g tablet Take 1 g by mouth 4 (Four) Times a Day.   Taking       Current Meds:  Current  Facility-Administered Medications   Medication Dose Route Frequency Provider Last Rate Last Dose   • aspirin chewable tablet 81 mg  81 mg Oral Daily Alber Justin MD       • baclofen (LIORESAL) tablet 20 mg  20 mg Oral TID Alber Justin MD       • ceFAZolin (ANCEF) IVPB (duplex) 2 g  2 g Intravenous Q8H Alber Justin MD       • dextrose (D50W) solution 25 g  25 g Intravenous Q15 Min PRN Alber Justin MD       • dextrose (GLUTOSE) oral gel 15 g  15 g Oral Q15 Min PRN Alber Justin MD       • diphenhydrAMINE (BENADRYL) capsule 25 mg  25 mg Oral Q6H PRN Alber Justin MD       • furosemide (LASIX) tablet 40 mg  40 mg Oral BID Alber Justin MD       • glucagon (human recombinant) (GLUCAGEN DIAGNOSTIC) injection 1 mg  1 mg Subcutaneous Q15 Min PRN Alber Justin MD       • HYDROmorphone (DILAUDID) injection 1 mg  1 mg Intravenous Q4H PRN Alber Justin MD        And   • naloxone (NARCAN) injection 0.1 mg  0.1 mg Intravenous Q5 Min PRN Alber Justin MD       • Influenza Vac Subunit Quad (FLUCELVAX) injection 0.5 mL  0.5 mL Intramuscular During Hospitalization Alber Justin MD       • insulin lispro (humaLOG) injection 0-9 Units  0-9 Units Subcutaneous 4x Daily With Meals & Nightly Alber Justin MD       • lisinopril (PRINIVIL,ZESTRIL) tablet 10 mg  10 mg Oral Daily Alber Justin MD       • metoprolol tartrate (LOPRESSOR) tablet 50 mg  50 mg Oral BID Alber Justin MD       • nitroglycerin (NITROLINGUAL) 0.4 MG/SPRAY spray 1 spray  1 spray Sublingual Q5 Min PRN Alber Justin MD       • ondansetron (ZOFRAN) injection 4 mg  4 mg Intravenous Once PRN Alber Justin MD       • oxyCODONE-acetaminophen (PERCOCET)  MG per tablet 1 tablet  1 tablet Oral Q6H PRN Alber Justin MD       • [START ON 10/6/2017] pantoprazole (PROTONIX) EC tablet 40 mg  40 mg Oral QAM Alber Justin MD       • sodium chloride 0.9  % with KCl 20 mEq/L infusion  100 mL/hr Intravenous Continuous Alber Justin MD       • sucralfate (CARAFATE) tablet 1 g  1 g Oral 4x Daily AC & at Bedtime Alber Justin MD       • tamsulosin (FLOMAX) 24 hr capsule 0.4 mg  0.4 mg Oral Nightly Alber Justin MD       • zolpidem (AMBIEN) tablet 10 mg  10 mg Oral Nightly PRN Alber Justin MD           Allergies:  Allergies   Allergen Reactions   • Codeine Hives       Review of Systems   Review of Systems   Constitutional: Negative for chills and fever.   HENT: Positive for trouble swallowing.    Respiratory: Negative for cough, shortness of breath and wheezing.    Cardiovascular: Negative for chest pain and palpitations.   Gastrointestinal: Negative for abdominal distention, abdominal pain, anal bleeding, blood in stool, constipation, diarrhea, nausea, rectal pain and vomiting.        Objective     Vital Signs  Temp:  [97.5 °F (36.4 °C)-98.7 °F (37.1 °C)] 97.7 °F (36.5 °C)  Heart Rate:  [] 76  Resp:  [10-20] 20  BP: ()/(54-78) 134/78    Physical Exam:   Physical Exam   Constitutional: He appears well-developed and well-nourished. No distress.   Eyes: No scleral icterus.   Neck:   Trach noted.    Cardiovascular: Normal rate, regular rhythm and normal heart sounds.    Pulmonary/Chest: Effort normal and breath sounds normal.   Abdominal: Soft. Bowel sounds are normal. He exhibits no distension and no mass. There is no tenderness. There is no rebound and no guarding.   Musculoskeletal: He exhibits no edema.   Neurological: He is alert.   He is awake and alert. Communicating by writing notes   Skin: Skin is warm and dry.   Psychiatric: He has a normal mood and affect. His behavior is normal.   Vitals reviewed.      Results Review:   I reviewed the patient's new clinical results.  I reviewed the patient's new imaging results and agree with the interpretation.  I reviewed the patient's other test results and agree with the  interpretation    Lab Results (most recent)     Procedure Component Value Units Date/Time    POC Glucose Fingerstick [[id number]]  (Abnormal) Collected:  10/05/17 0921    Specimen:  Blood Updated:  10/05/17 0932     Glucose 175 (H) mg/dL       : [id number] Good AshleynMeter ID: [id number]       POC Glucose Fingerstick [[id number]]  (Normal) Collected:  10/05/17 1357    Specimen:  Blood Updated:  10/05/17 1410     Glucose 78 mg/dL       : [id number] Arthur Ortizeter ID: [id number]             Radiology:  Imaging Results (last 24 hours)     ** No results found for the last 24 hours. **            Assessment/Plan     Active Problems:    Oropharyngeal cancer      Gastroenterology consult to see the patient for possible PEG tube placement.  However with history of Nissen fundoplication that is a contraindication for PEG tube placement.  I have discussed this with the family.  Have also discussed with the nursing staff and they are going to contact Dr. Justin.  Feeding tube would have to be surgically placed due to history of Nissen fundoplication.  We will defer surgical consult to Dr. Justin.       I discussed the patients findings and my recommendations with patient and family.       EMR Dragon/transcription disclaimer:  Much of this encounter note is electronic transcription/translation of spoken language to printed text.  The electronic translation of spoken language may be erroneous, or at times, nonsensical words or phrases may be inadvertently transcribed.  Although I have reviewed the note for such errors, some may still exist.    Anna Marie Neal APRN 4:42 PM    LAURY Celeste  10/05/17  4:47 PM

## 2017-10-05 NOTE — ANESTHESIA PREPROCEDURE EVALUATION
Anesthesia Evaluation     Patient summary reviewed   NPO Solid Status: > 8 hours       Airway   Mallampati: IV  TM distance: >3 FB  Neck ROM: limited  Dental    (+) poor dentition        Pulmonary    (+) a smoker,   (-) COPD, asthma, sleep apnea  Cardiovascular   Exercise tolerance: good (4-7 METS)    ECG reviewed  Patient on routine beta blocker and Beta blocker given within 24 hours of surgery    (+) hypertension, CAD, CABG (2009), cardiac stents (10/2016)   (-) pacemaker, angina      Neuro/Psych  (-) seizures, TIA, CVA  GI/Hepatic/Renal/Endo    (+)  GERD, diabetes mellitus using insulin,   (-) liver disease, no renal disease    Musculoskeletal     Abdominal    Substance History      OB/GYN          Other      history of cancer (OP cancer )                                    Anesthesia Plan    ASA 3     general   (Awake trach then geta)  intravenous induction   Anesthetic plan and risks discussed with patient.

## 2017-10-05 NOTE — OP NOTE
TRACHEOSTOMY  AND DIRECT LARYNGOSCOPY WITH BIOPSY Procedure Note  Sameer Tavarez  10/5/2017    Pre-op Diagnosis:   Oropharyngeal cancer [C10.9]    Post-op Diagnosis:     Post-Op Diagnosis Codes:     * Oropharyngeal cancer [C10.9]     * Airway compromise [J98.8]    Procedure/CPT® Codes:  AK TRACHEOSTOMY, PLANNED [88214]  AK LARYNGOSCOPY,DIRCT,OP,BIOPSY [77633]    Procedure(s):  Awake tracheostomy; direct laryngoscopy with biopsy of the right tonsil and base of tongue    Surgeon(s):  Alber Justin MD    Anesthesia:   General    Staff:   Circulator: Jerica Lees RN  Scrub Person: Jaylyn Benito; Hussein Khoury    Estimated Blood Loss:   minimal    Specimens:                Right tonsil and base of tongue biopsies      Drains:   None      Findings:   There is a large ulcerative lesion that was in the oral pharynx.  It extended from the right lateral aspect of the uvula and extended to the posterior rim of the soft palate extending to the tonsillar fossa and deeply penetrating into it.  It involved the right base of tongue and extended at least to the midline of the base of tongue.  It extended forward into the lingual tongue muscular approximately 2 cm.                  Complications:   none    Reason for the Operation:  Sameer Tavarez is a 62 y.o. male who presented to my office after a recent EGD and biopsy of the oropharynx showed a growth in the oral pharynx.  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  Further workup showed a large 7 cm oropharyngeal mass at the tongue and tonsillar area.  This lit up brightly on PET scanning.  Further biopsies were requested to get the diagnosis of an invasive squamous cell carcinoma.  I felt that due to the patient's symptoms of choking and inability to lay flat as well as the size and magnitude of the lesion on examination, that his airway was at risk.  Therefore we discussed awake tracheostomy as a planned procedure to  allow for safety during this procedure and upcoming procedures to further workup is carcinoma.  Risks benefits and alternatives were discussed and he wished to proceed with the procedure    Procedure Description:  The patient was taken back to the operating room and placed in a supine position.  A time out was performed to confirm the patient and the procedure. Due to the degree of obstruction of the patients airway, it was felt that intubation would pose too great of risk for airway loss. Therefore, the tracheostomy was performed under local anesthesia until the airway was secured.  A shoulder roll was placed and the site was marked and injected with 1:100,000 parts epinephrine. The patient was prepped and draped with the usual manner and the operation was begun. A horizontal incision was made 1-2 fingerbredths above the sternal notch and taken down to the subcutaneous tissue. A small amount of subcutaneous fat was removed with a bovie cautery to help with the exposure. Retraction was accomplished with dura hooks. Dissection was carried down to the strap muscles which were divided at midline. The thyroid was divided with the cautery..  The anterior face of the trachea was exposed. The trachea was entered through the third and fourth cartilaginous interspace A Vinny inferiorly based cartilaginous flap was created with a sissors. This was secured to the dermis of the lower skin edge with a 3-0 vicryl suture. Hemostasis was acheived with bipolar cautery. The endotracheal tube was then backed up to just above the tracheostomy site by the anesthesia team. Then, a #6 cuffed Shiley tracheotomy tube was placed in the tracheal opening without difficulty. The anesthesia circut was then attatched to the tracheotomy tube and proper ventilation as acheived. The dura hooks were removed. The tracheotomy tube was then secured with 2-0 silk sutures and a trachotomy tie was placed. A sponge dressing was placed under the tracheotomy  tube. The table was turned 90° to facilitate axis to the head.  The patient was prepped and draped in the standard laryngoscopy fashion.  A Nahum-Espinoza mouthgag was inserted to expose the oral pharynx.  The above-mentioned findings were noted.  I then removed the Nahum-Espinoza mouthgag and used direct laryngoscopy to evaluate the deeper tissue.  A tooth guard was placed to protect the teeth.  Direct laryngoscopy was carried out with the anterior commissure laryngoscope systematically examining all the structures of the oropharynx, supraglottic larynx, true vocal cords, and hypopharynx. There was a large oropharyngeal lesion as described above.  Biopsies were then taken with cup forceps of the right tonsillar fossa and the right base of tongue.  These were sent in formalin with several deep and superficial bites for permanent section.  Afrin-soaked pledgets were placed for hemostasis.  The patient was then turned over to the anesthesia team in a stable condition.   The patient was then turned over to the anesthesia team and allowed to wake from anesthesia.       Alber Justin MD     Date: 10/5/2017  Time: 3:41 PM

## 2017-10-05 NOTE — PLAN OF CARE
Problem: Patient Care Overview (Adult)  Goal: Plan of Care Review  Outcome: Ongoing (interventions implemented as appropriate)    10/05/17 1420   Coping/Psychosocial Response Interventions   Plan Of Care Reviewed With patient   Patient Care Overview   Progress improving   Outcome Evaluation   Outcome Summary/Follow up Plan met pacu dc criteria         Problem: Perioperative Period (Adult)  Goal: Signs and Symptoms of Listed Potential Problems Will be Absent or Manageable (Perioperative Period)  Outcome: Ongoing (interventions implemented as appropriate)

## 2017-10-05 NOTE — ANESTHESIA POSTPROCEDURE EVALUATION
Patient: Sameer Tavarez    Procedure Summary     Date Anesthesia Start Anesthesia Stop Room / Location    10/05/17 [id number]  [facility] OR 03 / [facility]       Procedure Diagnosis Surgeon Provider    Awake tracheostomy; DIRECT LARYNGOSCOPY WITH BIOPSY (N/A Neck) Oropharyngeal cancer  (Oropharyngeal cancer [C10.9]) MD JUANITA Metcalf CRNA          Anesthesia Type: general  Last vitals  BP   104/55 (10/05/17 1423)    Temp   97.7 °F (36.5 °C) (10/05/17 1500)    Pulse   84 (10/05/17 1509)   Resp   20 (10/05/17 1509)    SpO2   99 % (10/05/17 1509)      Post Anesthesia Care and Evaluation    Patient location during evaluation: PACU  Patient participation: complete - patient participated  Level of consciousness: awake and alert  Pain management: adequate  Airway patency: patent  Anesthetic complications: No anesthetic complications    Cardiovascular status: acceptable  Respiratory status: acceptable  Hydration status: acceptable

## 2017-10-06 ENCOUNTER — ANESTHESIA (OUTPATIENT)
Dept: PERIOP | Facility: HOSPITAL | Age: 62
End: 2017-10-06

## 2017-10-06 ENCOUNTER — APPOINTMENT (OUTPATIENT)
Dept: GENERAL RADIOLOGY | Facility: HOSPITAL | Age: 62
End: 2017-10-06

## 2017-10-06 ENCOUNTER — ANESTHESIA EVENT (OUTPATIENT)
Dept: PERIOP | Facility: HOSPITAL | Age: 62
End: 2017-10-06

## 2017-10-06 PROBLEM — Z93.0 TRACHEOSTOMY DEPENDENCE (HCC): Status: ACTIVE | Noted: 2017-10-06

## 2017-10-06 PROBLEM — G89.18 POSTOPERATIVE PAIN: Status: ACTIVE | Noted: 2017-10-06

## 2017-10-06 LAB
ANION GAP SERPL CALCULATED.3IONS-SCNC: 10 MMOL/L (ref 4–13)
BUN BLD-MCNC: 10 MG/DL (ref 5–21)
BUN/CREAT SERPL: 17.5 (ref 7–25)
CALCIUM SPEC-SCNC: 9 MG/DL (ref 8.4–10.4)
CHLORIDE SERPL-SCNC: 103 MMOL/L (ref 98–110)
CO2 SERPL-SCNC: 26 MMOL/L (ref 24–31)
CREAT BLD-MCNC: 0.57 MG/DL (ref 0.5–1.4)
DEPRECATED RDW RBC AUTO: 39.5 FL (ref 40–54)
ERYTHROCYTE [DISTWIDTH] IN BLOOD BY AUTOMATED COUNT: 12.7 % (ref 12–15)
GFR SERPL CREATININE-BSD FRML MDRD: 145 ML/MIN/1.73
GLUCOSE BLD-MCNC: 114 MG/DL (ref 70–100)
GLUCOSE BLDC GLUCOMTR-MCNC: 142 MG/DL (ref 70–130)
GLUCOSE BLDC GLUCOMTR-MCNC: 156 MG/DL (ref 70–130)
GLUCOSE BLDC GLUCOMTR-MCNC: 158 MG/DL (ref 70–130)
GLUCOSE BLDC GLUCOMTR-MCNC: 169 MG/DL (ref 70–130)
HBA1C MFR BLD: 8.7 %
HCT VFR BLD AUTO: 39.8 % (ref 40–52)
HGB BLD-MCNC: 14.1 G/DL (ref 14–18)
MCH RBC QN AUTO: 30.3 PG (ref 28–32)
MCHC RBC AUTO-ENTMCNC: 35.4 G/DL (ref 33–36)
MCV RBC AUTO: 85.4 FL (ref 82–95)
PLATELET # BLD AUTO: 177 10*3/MM3 (ref 130–400)
PMV BLD AUTO: 11.5 FL (ref 6–12)
POTASSIUM BLD-SCNC: 4 MMOL/L (ref 3.5–5.3)
RBC # BLD AUTO: 4.66 10*6/MM3 (ref 4.8–5.9)
SODIUM BLD-SCNC: 139 MMOL/L (ref 135–145)
TSH SERPL DL<=0.05 MIU/L-ACNC: 0.16 MIU/ML (ref 0.47–4.68)
WBC NRBC COR # BLD: 10.37 10*3/MM3 (ref 4.8–10.8)

## 2017-10-06 PROCEDURE — 25010000002 HEPARIN LOCK FLUSH 10 UNIT/ML SOLUTION: Performed by: SPECIALIST

## 2017-10-06 PROCEDURE — 25010000002 FENTANYL CITRATE (PF) 100 MCG/2ML SOLUTION: Performed by: NURSE ANESTHETIST, CERTIFIED REGISTERED

## 2017-10-06 PROCEDURE — 99232 SBSQ HOSP IP/OBS MODERATE 35: CPT | Performed by: NURSE PRACTITIONER

## 2017-10-06 PROCEDURE — 25010000003 CEFAZOLIN PER 500 MG: Performed by: SPECIALIST

## 2017-10-06 PROCEDURE — 25010000002 LORAZEPAM PER 2 MG: Performed by: FAMILY MEDICINE

## 2017-10-06 PROCEDURE — 25010000002 ONDANSETRON PER 1 MG: Performed by: NURSE ANESTHETIST, CERTIFIED REGISTERED

## 2017-10-06 PROCEDURE — 82962 GLUCOSE BLOOD TEST: CPT

## 2017-10-06 PROCEDURE — 25010000002 HYDROMORPHONE PER 4 MG: Performed by: OTOLARYNGOLOGY

## 2017-10-06 PROCEDURE — 25010000002 NEOSTIGMINE PER 0.5 MG: Performed by: NURSE ANESTHETIST, CERTIFIED REGISTERED

## 2017-10-06 PROCEDURE — 0JH63WZ INSERTION OF TOTALLY IMPLANTABLE VASCULAR ACCESS DEVICE INTO CHEST SUBCUTANEOUS TISSUE AND FASCIA, PERCUTANEOUS APPROACH: ICD-10-PCS | Performed by: SPECIALIST

## 2017-10-06 PROCEDURE — 25010000003 CEFAZOLIN PER 500 MG: Performed by: OTOLARYNGOLOGY

## 2017-10-06 PROCEDURE — 25010000002 HYDROMORPHONE PER 4 MG: Performed by: FAMILY MEDICINE

## 2017-10-06 PROCEDURE — 0DH60UZ INSERTION OF FEEDING DEVICE INTO STOMACH, OPEN APPROACH: ICD-10-PCS | Performed by: SPECIALIST

## 2017-10-06 PROCEDURE — 84443 ASSAY THYROID STIM HORMONE: CPT | Performed by: INTERNAL MEDICINE

## 2017-10-06 PROCEDURE — 94799 UNLISTED PULMONARY SVC/PX: CPT

## 2017-10-06 PROCEDURE — 83036 HEMOGLOBIN GLYCOSYLATED A1C: CPT | Performed by: INTERNAL MEDICINE

## 2017-10-06 PROCEDURE — 80048 BASIC METABOLIC PNL TOTAL CA: CPT | Performed by: INTERNAL MEDICINE

## 2017-10-06 PROCEDURE — 63710000001 INSULIN LISPRO (HUMAN) PER 5 UNITS: Performed by: OTOLARYNGOLOGY

## 2017-10-06 PROCEDURE — 76000 FLUOROSCOPY <1 HR PHYS/QHP: CPT

## 2017-10-06 PROCEDURE — 71010 HC CHEST PA OR AP: CPT

## 2017-10-06 PROCEDURE — 02HV33Z INSERTION OF INFUSION DEVICE INTO SUPERIOR VENA CAVA, PERCUTANEOUS APPROACH: ICD-10-PCS | Performed by: SPECIALIST

## 2017-10-06 PROCEDURE — C1788 PORT, INDWELLING, IMP: HCPCS | Performed by: SPECIALIST

## 2017-10-06 PROCEDURE — 25010000002 PROPOFOL 10 MG/ML EMULSION: Performed by: NURSE ANESTHETIST, CERTIFIED REGISTERED

## 2017-10-06 PROCEDURE — 85027 COMPLETE CBC AUTOMATED: CPT | Performed by: INTERNAL MEDICINE

## 2017-10-06 DEVICE — POWERPORT M.R.I. IMPLANTABLE PORT WITH ATTACHABLE 9.6F OPEN-ENDED SINGLE-LUMEN VENOUS CATHETER INTERMEDIATE KIT (WITH SUTURE PLUGS)
Type: IMPLANTABLE DEVICE | Status: FUNCTIONAL
Brand: POWERPORT M.R.I.

## 2017-10-06 RX ORDER — MEPERIDINE HYDROCHLORIDE 25 MG/ML
12.5 INJECTION INTRAMUSCULAR; INTRAVENOUS; SUBCUTANEOUS
Status: DISCONTINUED | OUTPATIENT
Start: 2017-10-06 | End: 2017-10-06 | Stop reason: HOSPADM

## 2017-10-06 RX ORDER — IPRATROPIUM BROMIDE AND ALBUTEROL SULFATE 2.5; .5 MG/3ML; MG/3ML
3 SOLUTION RESPIRATORY (INHALATION) EVERY 4 HOURS PRN
Status: DISCONTINUED | OUTPATIENT
Start: 2017-10-06 | End: 2017-10-13 | Stop reason: HOSPADM

## 2017-10-06 RX ORDER — ONDANSETRON 2 MG/ML
INJECTION INTRAMUSCULAR; INTRAVENOUS AS NEEDED
Status: DISCONTINUED | OUTPATIENT
Start: 2017-10-06 | End: 2017-10-06 | Stop reason: SURG

## 2017-10-06 RX ORDER — METOCLOPRAMIDE HYDROCHLORIDE 5 MG/ML
5 INJECTION INTRAMUSCULAR; INTRAVENOUS
Status: DISCONTINUED | OUTPATIENT
Start: 2017-10-06 | End: 2017-10-06 | Stop reason: HOSPADM

## 2017-10-06 RX ORDER — IPRATROPIUM BROMIDE AND ALBUTEROL SULFATE 2.5; .5 MG/3ML; MG/3ML
3 SOLUTION RESPIRATORY (INHALATION) ONCE
Status: COMPLETED | OUTPATIENT
Start: 2017-10-06 | End: 2017-10-06

## 2017-10-06 RX ORDER — SODIUM CHLORIDE, SODIUM LACTATE, POTASSIUM CHLORIDE, CALCIUM CHLORIDE 600; 310; 30; 20 MG/100ML; MG/100ML; MG/100ML; MG/100ML
100 INJECTION, SOLUTION INTRAVENOUS CONTINUOUS
Status: DISCONTINUED | OUTPATIENT
Start: 2017-10-06 | End: 2017-10-06

## 2017-10-06 RX ORDER — GLYCOPYRROLATE 0.2 MG/ML
INJECTION INTRAMUSCULAR; INTRAVENOUS AS NEEDED
Status: DISCONTINUED | OUTPATIENT
Start: 2017-10-06 | End: 2017-10-06 | Stop reason: SURG

## 2017-10-06 RX ORDER — PROPOFOL 10 MG/ML
VIAL (ML) INTRAVENOUS AS NEEDED
Status: DISCONTINUED | OUTPATIENT
Start: 2017-10-06 | End: 2017-10-06 | Stop reason: SURG

## 2017-10-06 RX ORDER — SODIUM CHLORIDE 9 MG/ML
75 INJECTION, SOLUTION INTRAVENOUS CONTINUOUS
Status: DISCONTINUED | OUTPATIENT
Start: 2017-10-06 | End: 2017-10-09

## 2017-10-06 RX ORDER — ONDANSETRON 2 MG/ML
4 INJECTION INTRAMUSCULAR; INTRAVENOUS AS NEEDED
Status: DISCONTINUED | OUTPATIENT
Start: 2017-10-06 | End: 2017-10-06 | Stop reason: HOSPADM

## 2017-10-06 RX ORDER — NALOXONE HCL 0.4 MG/ML
0.04 VIAL (ML) INJECTION AS NEEDED
Status: DISCONTINUED | OUTPATIENT
Start: 2017-10-06 | End: 2017-10-06 | Stop reason: HOSPADM

## 2017-10-06 RX ORDER — ONDANSETRON 2 MG/ML
4 INJECTION INTRAMUSCULAR; INTRAVENOUS EVERY 4 HOURS PRN
Status: DISCONTINUED | OUTPATIENT
Start: 2017-10-06 | End: 2017-10-13 | Stop reason: HOSPADM

## 2017-10-06 RX ORDER — VASOPRESSIN 20 U/ML
INJECTION PARENTERAL AS NEEDED
Status: DISCONTINUED | OUTPATIENT
Start: 2017-10-06 | End: 2017-10-06 | Stop reason: SURG

## 2017-10-06 RX ORDER — SODIUM CHLORIDE 0.9 % (FLUSH) 0.9 %
1-10 SYRINGE (ML) INJECTION AS NEEDED
Status: DISCONTINUED | OUTPATIENT
Start: 2017-10-06 | End: 2017-10-06 | Stop reason: HOSPADM

## 2017-10-06 RX ORDER — LIDOCAINE HYDROCHLORIDE AND EPINEPHRINE 10; 10 MG/ML; UG/ML
INJECTION, SOLUTION INFILTRATION; PERINEURAL AS NEEDED
Status: DISCONTINUED | OUTPATIENT
Start: 2017-10-06 | End: 2017-10-06 | Stop reason: HOSPADM

## 2017-10-06 RX ORDER — IPRATROPIUM BROMIDE AND ALBUTEROL SULFATE 2.5; .5 MG/3ML; MG/3ML
3 SOLUTION RESPIRATORY (INHALATION) ONCE AS NEEDED
Status: DISCONTINUED | OUTPATIENT
Start: 2017-10-06 | End: 2017-10-06 | Stop reason: HOSPADM

## 2017-10-06 RX ORDER — FENTANYL CITRATE 50 UG/ML
INJECTION, SOLUTION INTRAMUSCULAR; INTRAVENOUS AS NEEDED
Status: DISCONTINUED | OUTPATIENT
Start: 2017-10-06 | End: 2017-10-06 | Stop reason: SURG

## 2017-10-06 RX ORDER — HYDRALAZINE HYDROCHLORIDE 20 MG/ML
5 INJECTION INTRAMUSCULAR; INTRAVENOUS
Status: DISCONTINUED | OUTPATIENT
Start: 2017-10-06 | End: 2017-10-06 | Stop reason: HOSPADM

## 2017-10-06 RX ORDER — METOPROLOL TARTRATE 5 MG/5ML
2 INJECTION INTRAVENOUS ONCE AS NEEDED
Status: COMPLETED | OUTPATIENT
Start: 2017-10-06 | End: 2017-10-06

## 2017-10-06 RX ORDER — NALOXONE HCL 0.4 MG/ML
0.1 VIAL (ML) INJECTION
Status: DISCONTINUED | OUTPATIENT
Start: 2017-10-06 | End: 2017-10-10

## 2017-10-06 RX ORDER — LORAZEPAM 2 MG/ML
1 INJECTION INTRAMUSCULAR EVERY 6 HOURS PRN
Status: DISCONTINUED | OUTPATIENT
Start: 2017-10-06 | End: 2017-10-13 | Stop reason: HOSPADM

## 2017-10-06 RX ORDER — MAGNESIUM HYDROXIDE 1200 MG/15ML
LIQUID ORAL AS NEEDED
Status: DISCONTINUED | OUTPATIENT
Start: 2017-10-06 | End: 2017-10-06 | Stop reason: HOSPADM

## 2017-10-06 RX ORDER — HEPARIN SODIUM,PORCINE 10 UNIT/ML
VIAL (ML) INTRAVENOUS AS NEEDED
Status: DISCONTINUED | OUTPATIENT
Start: 2017-10-06 | End: 2017-10-06 | Stop reason: HOSPADM

## 2017-10-06 RX ORDER — MORPHINE SULFATE 2 MG/ML
2 INJECTION, SOLUTION INTRAMUSCULAR; INTRAVENOUS AS NEEDED
Status: DISCONTINUED | OUTPATIENT
Start: 2017-10-06 | End: 2017-10-06

## 2017-10-06 RX ORDER — FLUMAZENIL 0.1 MG/ML
0.2 INJECTION INTRAVENOUS AS NEEDED
Status: DISCONTINUED | OUTPATIENT
Start: 2017-10-06 | End: 2017-10-06 | Stop reason: HOSPADM

## 2017-10-06 RX ORDER — ROCURONIUM BROMIDE 10 MG/ML
INJECTION, SOLUTION INTRAVENOUS AS NEEDED
Status: DISCONTINUED | OUTPATIENT
Start: 2017-10-06 | End: 2017-10-06 | Stop reason: SURG

## 2017-10-06 RX ORDER — LABETALOL HYDROCHLORIDE 5 MG/ML
5 INJECTION, SOLUTION INTRAVENOUS
Status: DISCONTINUED | OUTPATIENT
Start: 2017-10-06 | End: 2017-10-06 | Stop reason: HOSPADM

## 2017-10-06 RX ADMIN — INSULIN LISPRO 2 UNITS: 100 INJECTION, SOLUTION INTRAVENOUS; SUBCUTANEOUS at 21:17

## 2017-10-06 RX ADMIN — CEFAZOLIN SODIUM 2 G: 2 SOLUTION INTRAVENOUS at 13:45

## 2017-10-06 RX ADMIN — FENTANYL CITRATE 100 MCG: 50 INJECTION, SOLUTION INTRAMUSCULAR; INTRAVENOUS at 13:50

## 2017-10-06 RX ADMIN — Medication 3 MG: at 14:37

## 2017-10-06 RX ADMIN — PROPOFOL 100 MG: 10 INJECTION, EMULSION INTRAVENOUS at 13:38

## 2017-10-06 RX ADMIN — HYDROMORPHONE HYDROCHLORIDE 1 MG: 1 INJECTION, SOLUTION INTRAMUSCULAR; INTRAVENOUS; SUBCUTANEOUS at 16:32

## 2017-10-06 RX ADMIN — CEFAZOLIN SODIUM 2 G: 2 SOLUTION INTRAVENOUS at 01:08

## 2017-10-06 RX ADMIN — HYDROMORPHONE HYDROCHLORIDE 1 MG: 1 INJECTION, SOLUTION INTRAMUSCULAR; INTRAVENOUS; SUBCUTANEOUS at 20:32

## 2017-10-06 RX ADMIN — GLYCOPYRROLATE 0.2 MG: 0.2 INJECTION, SOLUTION INTRAMUSCULAR; INTRAVENOUS at 14:37

## 2017-10-06 RX ADMIN — ROCURONIUM BROMIDE 15 MG: 10 INJECTION INTRAVENOUS at 13:49

## 2017-10-06 RX ADMIN — IPRATROPIUM BROMIDE AND ALBUTEROL SULFATE 3 ML: .5; 3 SOLUTION RESPIRATORY (INHALATION) at 12:35

## 2017-10-06 RX ADMIN — SODIUM CHLORIDE, POTASSIUM CHLORIDE, SODIUM LACTATE AND CALCIUM CHLORIDE 100 ML/HR: 600; 310; 30; 20 INJECTION, SOLUTION INTRAVENOUS at 12:32

## 2017-10-06 RX ADMIN — HYDROMORPHONE HYDROCHLORIDE 1 MG: 1 INJECTION, SOLUTION INTRAMUSCULAR; INTRAVENOUS; SUBCUTANEOUS at 01:32

## 2017-10-06 RX ADMIN — INSULIN LISPRO 2 UNITS: 100 INJECTION, SOLUTION INTRAVENOUS; SUBCUTANEOUS at 11:42

## 2017-10-06 RX ADMIN — POTASSIUM CHLORIDE, DEXTROSE MONOHYDRATE AND SODIUM CHLORIDE 75 ML/HR: 150; 5; 450 INJECTION, SOLUTION INTRAVENOUS at 09:51

## 2017-10-06 RX ADMIN — INSULIN LISPRO 2 UNITS: 100 INJECTION, SOLUTION INTRAVENOUS; SUBCUTANEOUS at 08:37

## 2017-10-06 RX ADMIN — ROCURONIUM BROMIDE 15 MG: 10 INJECTION INTRAVENOUS at 14:16

## 2017-10-06 RX ADMIN — VASOPRESSIN 1 UNITS: 20 INJECTION INTRAVENOUS at 13:53

## 2017-10-06 RX ADMIN — SODIUM CHLORIDE 75 ML/HR: 9 INJECTION, SOLUTION INTRAVENOUS at 16:18

## 2017-10-06 RX ADMIN — METOPROLOL TARTRATE 2 MG: 5 INJECTION INTRAVENOUS at 12:35

## 2017-10-06 RX ADMIN — HYDROMORPHONE HYDROCHLORIDE 1 MG: 1 INJECTION, SOLUTION INTRAMUSCULAR; INTRAVENOUS; SUBCUTANEOUS at 05:01

## 2017-10-06 RX ADMIN — HYDROMORPHONE HYDROCHLORIDE 1 MG: 1 INJECTION, SOLUTION INTRAMUSCULAR; INTRAVENOUS; SUBCUTANEOUS at 08:55

## 2017-10-06 RX ADMIN — FENTANYL CITRATE 100 MCG: 50 INJECTION, SOLUTION INTRAMUSCULAR; INTRAVENOUS at 14:15

## 2017-10-06 RX ADMIN — LORAZEPAM 1 MG: 2 INJECTION INTRAMUSCULAR; INTRAVENOUS at 21:36

## 2017-10-06 RX ADMIN — SODIUM CHLORIDE 75 ML/HR: 9 INJECTION, SOLUTION INTRAVENOUS at 11:33

## 2017-10-06 RX ADMIN — LORAZEPAM 1 MG: 2 INJECTION INTRAMUSCULAR; INTRAVENOUS at 11:34

## 2017-10-06 RX ADMIN — ONDANSETRON HYDROCHLORIDE 4 MG: 2 SOLUTION INTRAMUSCULAR; INTRAVENOUS at 13:50

## 2017-10-06 NOTE — CONSULTS
Jayne Phillips MD FACS Consult Note    Referring Provider: Alber Justin MD    Patient Care Team:  Christian Castellon MD as PCP - General (Internal Medicine)  Sin Alarcon MD as Referring Physician (General Practice)  Alber Justin MD as Consulting Physician (Otolaryngology)  Kriss Dominguez MD as Referring Physician (Hematology and Oncology)  Hadley Marie III, MD as Consulting Physician (Radiation Oncology)    Chief complaint request for port and g tube placement    Subjective .     History of present illness:  The patient is a 62-year-old male who has been diagnosed with oropharyngeal cancer.  Surgical consultation has been requested for gastrostomy tube placement and port placement.    Review of Systems  All systems were reviewed and negative for    Constitution:chills, fevers, night sweats and weight loss, weight gain  Eyes:  double vision, blurriness and loss of vision  ENT:  earaches, hearing loss and hoarseness  Respiratory:  cough, dry, cough, productive, hemoptysis and shortness of air  Cardiovascular:  chest pressure / pain, at rest, chest pressure / pain, on exertion, irregular pulse and palpitations  Gastrointestinal: bright red blood per rectum, change in bowel habits, constipation, diarrhea, heartburn, hematemesis, melena, nausea, pain and vomiting  Genitourinary:  difficulty / inability to void, pain, blood in urine and painful urination  Integument:  itching, rash, redness and swelling  Breast:  lump / mass, nipple discharge and pain  Hematologic / Lymphatic: easy bruising and lymphadenopathy  Musculoskeletal: joint pain, muscle pain and muscle weakness  Neurological: dizziness, loss of consciousness, numbness, vertigo and weakness  Behavioral/Psych: anxiety and depression  Endocrine: diabetes, thyroid disorder      History  Past Medical History:   Diagnosis Date   • Arthritis    • Coronary artery disease    • Depression    • Diabetes mellitus    • Enlarged prostate     • GERD (gastroesophageal reflux disease)    • History of transfusion    • Hypertension    • Oropharyngeal cancer 08/27/2017   • Seizure     diabetic   • Severe esophageal dysplasia    • Stroke    • TB (pulmonary tuberculosis) 2004   ,   Past Surgical History:   Procedure Laterality Date   • CARDIAC SURGERY     • CHOLECYSTECTOMY     • CORONARY ARTERY BYPASS GRAFT     • FRACTURE SURGERY     • HERNIA REPAIR     • NISSEN FUNDOPLICATION LAPAROSCOPIC     • SKIN BIOPSY     • TESTICLE SURGERY      tubes implanted for drainage   • TONSILLECTOMY     • TRACHEOSTOMY N/A 10/5/2017    Procedure: Awake tracheostomy; DIRECT LARYNGOSCOPY WITH BIOPSY;  Surgeon: Alber Justin MD;  Location: [facility];  Service:    ,   Family History   Problem Relation Age of Onset   • Stroke Mother    • Heart disease Father    • Heart disease Brother    • Cancer Brother    ,   Social History   Substance Use Topics   • Smoking status: Current Every Day Smoker     Packs/day: 1.00     Years: 52.00     Types: Cigarettes   • Smokeless tobacco: Current User     Types: Snuff      Comment: I've tried and tried   • Alcohol use Yes      Comment: occasionally   ,   Prescriptions Prior to Admission   Medication Sig Dispense Refill Last Dose   • aspirin 81 MG chewable tablet Chew 81 mg Daily.   10/4/2017 at 1200   • fexofenadine-pseudoephedrine (ALLEGRA-D)  MG per 12 hr tablet Take 1 tablet by mouth 2 (Two) Times a Day.   10/4/2017 at 0900   • furosemide (LASIX) 40 MG tablet Take 40 mg by mouth 2 (Two) Times a Day.   10/4/2017 at 0900   • insulin glargine (LANTUS) 100 UNIT/ML injection Inject 70 Units under the skin Daily.   10/5/2017 at 0700   • lisinopril (PRINIVIL,ZESTRIL) 10 MG tablet Take 10 mg by mouth Daily.   10/4/2017 at 1430   • metoprolol tartrate (LOPRESSOR) 50 MG tablet Take 50 mg by mouth 2 (Two) Times a Day.   10/4/2017 at 1430   • omeprazole (priLOSEC) 20 MG capsule Take 20 mg by mouth Daily.   Past Week at Unknown time   •  oxyCODONE-acetaminophen (PERCOCET)  MG per tablet Take 1 tablet by mouth Every 6 (Six) Hours As Needed for Moderate Pain . 40 tablet 0 10/5/2017 at 0400   • tamsulosin (FLOMAX) 0.4 MG capsule 24 hr capsule Take 1 capsule by mouth Every Night.   10/4/2017 at 1430   • zolpidem (AMBIEN) 10 MG tablet Take 10 mg by mouth At Night As Needed for Sleep.   10/4/2017 at 2000   • baclofen (LIORESAL) 20 MG tablet Take 20 mg by mouth 3 (Three) Times a Day.   More than a month at Unknown time   • nitroglycerin (NITROLINGUAL) 0.4 MG/SPRAY spray Place 1 spray under the tongue Every 5 (Five) Minutes As Needed for Chest Pain.   More than a month at Unknown time   • sucralfate (CARAFATE) 1 g tablet Take 1 g by mouth 4 (Four) Times a Day.   Taking    and Allergies:  Codeine    Current Facility-Administered Medications:   •  aspirin chewable tablet 81 mg, 81 mg, Oral, Daily, Alber Justin MD  •  baclofen (LIORESAL) tablet 20 mg, 20 mg, Oral, TID, Alber Justin MD  •  dextrose (D50W) solution 25 g, 25 g, Intravenous, Q15 Min PRN, Alber Justin MD  •  dextrose (GLUTOSE) oral gel 15 g, 15 g, Oral, Q15 Min PRN, Alber Justin MD, 15 g at 10/1955  •  diphenhydrAMINE (BENADRYL) capsule 25 mg, 25 mg, Oral, Q6H PRN, Alber Justin MD  •  furosemide (LASIX) tablet 40 mg, 40 mg, Oral, BID, Alber Justin MD  •  glucagon (human recombinant) (GLUCAGEN DIAGNOSTIC) injection 1 mg, 1 mg, Subcutaneous, Q15 Min PRN, Alber Justin MD  •  HYDROmorphone (DILAUDID) injection 1 mg, 1 mg, Intravenous, Q3H PRN **AND** naloxone (NARCAN) injection 0.1 mg, 0.1 mg, Intravenous, Q5 Min PRN, Curt Juarez DO  •  Influenza Vac Subunit Quad (FLUCELVAX) injection 0.5 mL, 0.5 mL, Intramuscular, During Hospitalization, Alber Justin MD  •  insulin lispro (humaLOG) injection 0-9 Units, 0-9 Units, Subcutaneous, 4x Daily With Meals & Nightly, Alber Justin MD, 2 Units at 10/06/17 0837  •   ipratropium-albuterol (DUO-NEB) nebulizer solution 3 mL, 3 mL, Nebulization, Q4H PRN, Fly Stanford MD  •  lisinopril (PRINIVIL,ZESTRIL) tablet 10 mg, 10 mg, Oral, Daily, Alber Justin MD  •  LORazepam (ATIVAN) injection 1 mg, 1 mg, Intravenous, Q6H PRN, Curt Juarez DO  •  metoprolol tartrate (LOPRESSOR) tablet 50 mg, 50 mg, Oral, BID, Alber Justin MD  •  nitroglycerin (NITROLINGUAL) 0.4 MG/SPRAY spray 1 spray, 1 spray, Sublingual, Q5 Min PRN, Alber Justin MD  •  ondansetron (ZOFRAN) injection 4 mg, 4 mg, Intravenous, Once PRN, Alber Justin MD  •  oxyCODONE-acetaminophen (PERCOCET)  MG per tablet 1 tablet, 1 tablet, Oral, Q6H PRN, Alber Justin MD  •  pantoprazole (PROTONIX) EC tablet 40 mg, 40 mg, Oral, QAM, Alber Justin MD  •  sodium chloride 0.9 % infusion, 75 mL/hr, Intravenous, Continuous, Curt Juarez DO  •  sucralfate (CARAFATE) tablet 1 g, 1 g, Oral, 4x Daily AC & at Bedtime, Alber Justin MD  •  tamsulosin (FLOMAX) 24 hr capsule 0.4 mg, 0.4 mg, Oral, Nightly, Alber Justin MD  •  zolpidem (AMBIEN) tablet 10 mg, 10 mg, Oral, Nightly PRN, Alber Justin MD    Objective     Vital Signs   Temp:  [97.5 °F (36.4 °C)-98.2 °F (36.8 °C)] 98.2 °F (36.8 °C)  Heart Rate:  [] 80  Resp:  [10-20] 16  BP: ()/(54-98) 151/98    Physical Exam:  General appearance - alert, well appearing, and in no distress  Mental status - alert, oriented to person, place, and time  Neck - supple, no significant adenopathy  Chest - clear to auscultation, no wheezes, rales or rhonchi, symmetric air entry  Heart - normal rate, regular rhythm, normal S1, S2, no murmurs, rubs, clicks or gallops  Abdomen - soft, nontender, nondistended, no masses or organomegaly  Neurological - alert, oriented, normal speech, no focal findings or movement disorder noted  Musculoskeletal - no joint tenderness, deformity or swelling  Extremities - peripheral pulses  normal, no pedal edema, no clubbing or cyanosis    Results Review:    Lab Results (last 24 hours)     Procedure Component Value Units Date/Time    POC Glucose Fingerstick [[id number]]  (Normal) Collected:  10/05/17 1357    Specimen:  Blood Updated:  10/05/17 1410     Glucose 78 mg/dL       : [id number] Arthur BethMeter ID: [id number]       POC Glucose Fingerstick [[id number]]  (Abnormal) Collected:  10/05/17 1821    Specimen:  Blood Updated:  10/05/17 1842     Glucose 65 (L) mg/dL       : [id number] Santos ChelseyMeter ID: [id number]       POC Glucose Fingerstick [[id number]]  (Abnormal) Collected:  10/05/17 1949    Specimen:  Blood Updated:  10/05/17 2010     Glucose 60 (L) mg/dL       : [id number] Flores MalloryMeter ID: [id number]       POC Glucose Fingerstick [[id number]]  (Abnormal) Collected:  10/05/17 2019    Specimen:  Blood Updated:  10/05/17 2031     Glucose 62 (L) mg/dL       : [id number] Zac AmandaMeter ID: [id number]       POC Glucose Fingerstick [[id number]]  (Abnormal) Collected:  10/05/17 2338    Specimen:  Blood Updated:  10/05/17 2351     Glucose 67 (L) mg/dL       : [id number] Flores MalloryMeter ID: [id number]       CBC (No Diff) [[id number]]  (Abnormal) Collected:  10/06/17 0243    Specimen:  Blood Updated:  10/06/17 0305     WBC 10.37 10*3/mm3      RBC 4.66 (L) 10*6/mm3      Hemoglobin 14.1 g/dL      Hematocrit 39.8 (L) %      MCV 85.4 fL      MCH 30.3 pg      MCHC 35.4 g/dL      RDW 12.7 %      RDW-SD 39.5 (L) fl      MPV 11.5 fL      Platelets 177 10*3/mm3     Basic Metabolic Panel [[id number]]  (Abnormal) Collected:  10/06/17 0243    Specimen:  Blood Updated:  10/06/17 0311     Glucose 114 (H) mg/dL      BUN 10 mg/dL      Creatinine 0.57 mg/dL      Sodium 139 mmol/L      Potassium 4.0 mmol/L      Chloride 103 mmol/L      CO2 26.0 mmol/L      Calcium 9.0 mg/dL      eGFR Non African Amer 145 mL/min/1.73      BUN/Creatinine Ratio 17.5     Anion Gap 10.0 mmol/L     Narrative:        GFR Normal >60  Chronic Kidney Disease <60  Kidney Failure <15    TSH [[id number]]  (Abnormal) Collected:  10/06/17 0243    Specimen:  Blood Updated:  10/06/17 0341     TSH 0.161 (L) mIU/mL     Hemoglobin A1c [[id number]] Collected:  10/06/17 0243    Specimen:  Blood Updated:  10/06/17 0543     Hemoglobin A1C 8.7 %     Narrative:       Less than 6.0           Non-Diabetic Range  6.0-7.0                 ADA Therapeutic Target  Greater than 7.0        Action Suggested    Tissue Pathology Exam - Tissue, Tonsil, Right [[id number]] Collected:  10/05/17 1324    Specimen:  Tissue from Tonsil, Right; Tissue from Tongue Updated:  10/06/17 0825    POC Glucose Fingerstick [[id number]]  (Abnormal) Collected:  10/06/17 0827    Specimen:  Blood Updated:  10/06/17 0904     Glucose 156 (H) mg/dL       : [id number] Pedraza CarrieMeter ID: [id number]           Imaging Results (last 24 hours)     Procedure Component Value Units Date/Time    XR Chest 1 View [[id number]] Collected:  10/05/17 1853     Updated:  10/05/17 1857    Narrative:       HISTORY: Trach placement     CXR: Frontal view the chest performed.     COMPARISON: 10/04/2017     FINDINGS: Tracheostomy tube is in place. The tip is positioned  appropriately above the bola. There is evidence of prior mediastinal  surgery. The left coronary artery stents. There is a diminished level of  inspiration. Prominent pulmonary vessels likely due to vascular  crowding. Mild venous congestion considered. No pleural effusion or  pneumothorax is visualized.       Impression:       1. Appropriate positioning of the tracheostomy tube.  2. Diminished level of inspiration with probable vascular crowding. Mild  venous congestion considered. Left basilar atelectasis.  3. Prior mediastinal surgery. Coronary artery stents.  This report was finalized on 10/05/2017 18:54 by Dr. Christen Obrien MD.                Assessment/Plan       Oropharyngeal cancer;  The patient will undergo open  gastrostomy tube placement and port placement.  The risks, benefits, complications, and possible alternatives were discussed with the patient who agreed to proceed.      Jayne Phillips MD  10/06/17  11:25 AM

## 2017-10-06 NOTE — PLAN OF CARE
Problem: Patient Care Overview (Adult)  Goal: Plan of Care Review  Outcome: Ongoing (interventions implemented as appropriate)    10/06/17 1201   Coping/Psychosocial Response Interventions   Plan Of Care Reviewed With patient;spouse   Patient Care Overview   Progress no change   Outcome Evaluation   Outcome Summary/Follow up Plan Pt NPO, await peg tube placement for enteral nutrition. Recommend Glucerna 1.2 at 20 ml/hr for 12 hrs then advance by 10 ml/hr every 6 hrs as tolerated to goal rate of 75 ml/hr. Auto flush of 25 ml/hr. Status post peg placmeent per protocol. cont to follow         Problem: Feeding Dysfunction/Swallowing Impairment (Pediatric)  Goal: Identify Related Risk Factors and Signs and Symptoms  Outcome: Ongoing (interventions implemented as appropriate)

## 2017-10-06 NOTE — DISCHARGE PLACEMENT REQUEST
"To: [facility] (Attn: Kika)    From: Chelsi Yvon [phone]        Sameer Sosa (62 y.o. Male)     Date of Birth Social Security Number Address Home Phone MRN    1955  [id number] [phone] [id number]    Taoism Marital Status          Alevism        Admission Date Admission Type Admitting Provider Attending Provider Department, Room/Bed    10/5/17 Elective Alber Justin MD Resser, John Randall, MD [facility] INTENSIVE CARE, I007/1    Discharge Date Discharge Disposition Discharge Destination                      Attending Provider: Alber Justin MD     Allergies:  Codeine    Isolation:  None   Infection:  None   Code Status:  FULL    Ht:  66\" (167.6 cm)   Wt:  168 lb 6.4 oz (76.4 kg)    Admission Cmt:  None   Principal Problem:  None                Active Insurance as of 10/5/2017     Primary Coverage     Payor Plan Insurance Group Employer/Plan Group    MEDICARE MEDICARE A & B      Payor Plan Address Payor Plan Phone Number Effective From Effective To    [address] [phone] 1/1/2008     [place] [address]       Subscriber Name Subscriber Birth Date Member ID       SAMEER SOSA 1955 [id number]                 Emergency Contacts      (Rel.) Home Phone Work Phone Mobile Phone    Janett Duffy (Spouse) [phone] -- --               History & Physical      Alber Justin MD at 10/2/2017  3:00 PM          Patient Care Team:  Christian Castellon MD as PCP - General (Internal Medicine)  Sin Alarcon MD as Referring Physician (General Practice)  Alber Justin MD as Consulting Physician (Otolaryngology)  Kriss Dominguez MD as Referring Physician (Hematology and Oncology)  Hadley Marie III, MD as Consulting Physician (Radiation Oncology)    Chief Complaint   Patient presents with   • Mouth Lesions     SCC pharyngeal       Subjective   HPI   Sameer Sosa is a  62 y.o. male who complains of " "pain. The symptoms are localized to the right ear and oropharynx. The patient has had severe symptoms. The symptoms have been relatively constant for the last 3 months. The patient  reports that he has been smoking Cigarettes.  He has a 52.00 pack-year smoking history. His smokeless tobacco use includes Snuff.. He has a history of acid reflux symptoms. He is being treated for acid reflux. There have been no factors that have improved the symptoms. He had an EGD on 2/6/17 by Dr Patel with biopsies showing Barretts esophagus and reflux esophagitis. He had continued dysphagia and underwent another EGD and an oropharyngeal biopsy on 8/27/17 with the oropharyngeal biosies showing \"at least squamous cell carcinoma in situ\". CT scanning showed \"oropharyngeal asymmety without overt enhancing mass\" on 8/28/17.    Review of Systems   Constitutional: Positive for appetite change.   HENT: Positive for ear pain and sore throat.         See HPI   Eyes: Negative.    Respiratory: Positive for cough and choking. Negative for stridor.    Cardiovascular: Negative.    Gastrointestinal: Negative.    Endocrine: Negative.    Allergic/Immunologic: Negative.    Neurological: Positive for headaches.   Hematological: Bruises/bleeds easily.   Psychiatric/Behavioral: Negative.        Past History:  Past Medical History:   Diagnosis Date   • Coronary artery disease    • Diabetes mellitus    • GERD (gastroesophageal reflux disease)    • Hypertension    • Oropharyngeal cancer 08/27/2017   • Severe esophageal dysplasia      Past Surgical History:   Procedure Laterality Date   • CHOLECYSTECTOMY     • CORONARY ARTERY BYPASS GRAFT     • HERNIA REPAIR       Family History   Problem Relation Age of Onset   • Stroke Mother    • Heart disease Father    • Heart disease Brother    • Cancer Brother      Social History   Substance Use Topics   • Smoking status: Current Every Day Smoker     Packs/day: 1.00     Years: 52.00     Types: Cigarettes   • Smokeless " tobacco: Current User     Types: Snuff      Comment: I've tried and tried   • Alcohol use Yes      Comment: occasionally       Current Outpatient Prescriptions:   •  aspirin 81 MG chewable tablet, Chew 81 mg Daily., Disp: , Rfl:   •  baclofen (LIORESAL) 20 MG tablet, Take 20 mg by mouth 3 (Three) Times a Day., Disp: , Rfl:   •  furosemide (LASIX) 40 MG tablet, Take 40 mg by mouth 2 (Two) Times a Day., Disp: , Rfl:   •  insulin glargine (LANTUS) 100 UNIT/ML injection, Inject 70 Units under the skin Daily., Disp: , Rfl:   •  insulin lispro (humaLOG) 100 UNIT/ML injection, Inject  under the skin 3 (Three) Times a Day Before Meals. Sliding scale, Disp: , Rfl:   •  lisinopril (PRINIVIL,ZESTRIL) 10 MG tablet, Take 10 mg by mouth Daily., Disp: , Rfl:   •  metFORMIN (GLUCOPHAGE) 1000 MG tablet, Take  by mouth., Disp: , Rfl:   •  metoprolol tartrate (LOPRESSOR) 50 MG tablet, Take 50 mg by mouth 2 (Two) Times a Day., Disp: , Rfl:   •  nitroglycerin (NITROLINGUAL) 0.4 MG/SPRAY spray, Place 1 spray under the tongue Every 5 (Five) Minutes As Needed for Chest Pain., Disp: , Rfl:   •  omeprazole (priLOSEC) 20 MG capsule, Take 20 mg by mouth Daily., Disp: , Rfl:   •  oxyCODONE-acetaminophen (PERCOCET)  MG per tablet, Take 1 tablet by mouth Every 6 (Six) Hours As Needed for Moderate Pain ., Disp: , Rfl:   •  sucralfate (CARAFATE) 1 g tablet, Take 1 g by mouth 4 (Four) Times a Day., Disp: , Rfl:   •  tamsulosin (FLOMAX) 0.4 MG capsule 24 hr capsule, Take 1 capsule by mouth Every Night., Disp: , Rfl:   •  zolpidem (AMBIEN) 10 MG tablet, Take 10 mg by mouth At Night As Needed for Sleep., Disp: , Rfl:   •  prasugrel (EFFIENT) 10 MG tablet, Take  by mouth., Disp: , Rfl: (patient has not been taking Effient for the last 3 months or so)  Allergies:  Codeine    Objective     Vital Signs:  Temp:  [97.5 °F (36.4 °C)] 97.5 °F (36.4 °C)  Heart Rate:  [96] 96  BP: (143)/(88) 143/88    Physical Exam  CONSTITUTIONAL: well nourished,  well-developed, alert, oriented for age, in no acute distress  heavy tobacco odor present,  COMMUNICATION AND VOICE: mild muffled voice present,  HEAD: normocephalic, no lesions, atraumatic, no tenderness, no masses   FACE: appearance normal, no lesions, no tenderness, no deformities, facial motion symmetric  SALIVARY GLANDS: parotid glands with no tenderness, no swelling, no masses, submandibular glands with normal size, nontender  EYES: ocular motility normal, eyelids normal, orbits normal, no proptosis, conjunctiva normal , pupils equal, round  HEARING: response to conversational voice normal bilaterally   EXTERNAL EARS: auricles without lesions  EXTERNAL EAR CANALS: normal ear canals without stenosis or significant cerumen  TYMPANIC MEMBRANES: tympanic membrane appearance normal, no lesions, no perforation, normal mobility, no fluid  EXTERNAL NOSE: structure normal, no tenderness on palpation, no nasal discharge, no lesions, no evidence of trauma, nostrils patent  INTRANASAL EXAM: nasal mucosa normal, vestibule within normal limits, inferior turbinate normal,  nasal septum without overt anterior deviation  NASOPHARYNX: nasopharyngeal mucosa, adenoids within normal limits  LIPS: structure normal, no tenderness on palpation, no lesions, no evidence of trauma  TEETH: diffusely carious  GUMS: gingivae healthy  ORAL MUCOSA: oral mucosa normal, no mucosal lesions  FLOOR OF MOUTH: Warthin’s duct patent, mucosa normal  TONGUE: right base of tongue fullness and firmness to palpation extending from the tonsillar fossa to the midline of the base of tongue  PALATE: soft and hard palates with normal mucosa and structure  OROPHARYNX: there is a large ulcerated mass of the right tonsillar fossa that has mild exudate and is tender and firm to palpation  HYPOPHARYNX: hypopharyngeal mucosa normal  LARYNX: thick secretions, epiglottis and arytenoid cartilage within normal limits, vocal cord mucosa normal with normal mobility    NECK: neck appearance normal, no masses or tenderness  THYROID: no overt thyromegaly, no tenderness, nodules or mass present on palpation, position midline   LYMPH NODES: no lymphadenopathy  CHEST/RESPIRATORY: respiratory effort normal, normal breath sounds  CARDIOVASCULAR: rate and rhythm normal, extremities without cyanosis or edema, no overt jugulovenous distension present  NEUROLOGIC/PSYCHIATRIC: oriented appropriately for age, mood normal, affect appropriate, cranial nerves intact grossly unless specifically mentioned above     Procedure Note    Anesthesia: topical 4% tetracaine and oxymetazoline mix    Endoscopy Type: Flexible Laryngoscopy    Indications for Procedure: Lesion partially identified by mirror examination - needing further exam    Procedure Details:    The patient was placed in the sitting position.  After topical anesthesia and decongestion, the 4 mm laryngoscope was passed.  The nasal cavities, nasopharynx, oropharynx, hypopharynx, and larynx were all examined.  Vocal cords were examined during respiration and phonation.  The following findings were noted:    Findings: as per above    Condition:  Stable.  Patient tolerated procedure well.    Complications:  None    RESULTS REVIEW:    I have personally reviewed the patient's mri of the neck. There is a large mass extending from the tonsillar fossa to the midline of the base of tongue.  It has anterior extension into the lingual musculature of the tongue.  I do not see any lymphadenopathy.  Bulky enhancing right oropharyngeal soft tissue mass measuring approximately 6.7 x 4.6 x 4.6 cm. Involvement of the right side of the base of the tongue.  I have personally reviewed the patient's PET scan.  There is intense uptake at the area of the mass seen on the MRI scan.   PATHOLOGY:  Oropharynx, biopsy: At least squamous cell carcinoma in situ.    I discussed this case with Dr. Marie who agrees this is an obvious invasive squamous cell carcinoma  clinically.  I discussed the pathology with Dr. Kassy Handley who really looked at the slides but still was unable to call this an invasive carcinoma.                  Assessment   1. Oropharyngeal cancer    2. Poor dentition    3. Current smoker    4. 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  However, the biopsies were superficial and they cannot: Invasive carcinoma.  Medical oncology requires a definitive diagnosis of invasion.  Therefore I will put him on the schedule for later this week to have a biopsy.  I am concerned about the size of this mass and that the anesthesia may aggravate obstruction and therefore we had a long discussion on the possibility of tracheostomy.    Lex report run    The patient/family was instructed on the proper use of scheduled prescription drugs including their impact on driving and work safety and their potential effects during pregnancy. The potential for overdose and the appropriate response to an overdose was covered as well as their safe storage and proper disposal. The website www.[place] or [place].gov/health which contains other materials in this regard was offered      New Medications Ordered This Visit   Medications   • oxyCODONE-acetaminophen (PERCOCET)  MG per tablet     Sig: Take 1 tablet by mouth Every 6 (Six) Hours As Needed for Moderate Pain .     Dispense:  40 tablet     Refill:  0     -----SURGERY SCHEDULING:-----  Schedule direct laryngoscopy with biopsy     ---INFORMED CONSENT DISCUSSION:---  DIRECT LARYNGOSCOPY WITH BIOPSY: The risks and benefits were explained including but not limited to pain, bleeding, infection, (including possible mediastinitis), the risks of the general anesthesia, pain, temporary or permanent hoarseness, airway loss, possible need for tracheostomy and/or tooth injury. Questions were answered. No guarantees  were made or implied.       ---PREOPERATIVE WORKUP:---  CBC  CMP  Chest X-Ray  EKG     Follow up  postoperatively    Alber Justin MD  10/02/17  4:44 PM       Electronically signed by Alber Justin MD at 10/2/2017  5:00 PM      Alber Justin MD at 10/5/2017 12:34 PM            H&P updated. The patient was examined and the following changes are noted:        I discussed the case with oncology and radiation oncology as well as oral surgery.  There is some concern both from myself and oral surgery about the stability of his airway especially with the upcoming dental extraction and potential other procedures including a port and if needed a G-tube.  Due to these concerns, I discussed with Mr. Rosales the option of plan tracheostomy to keep during this operation but also during the treatment to maintain a good patent airway.  I have in great detail gone over the ins and outs of tracheostomy and he is agreeable to proceed with this as a planned procedure.  Therefore, we will proceed with awake tracheostomy first and then do the direct laryngoscopy with biopsy.  I discussed that he will need to be in the hospital for several days for the recovery.  I discussed that I will be away starting tomorrow on vacation and will discussed the case with Dr. Neal who is covering in my absence.  In the hospital, we will consult oncology and also consider planned PEG tube placement due to his severe dysphagia with tumor.         Electronically signed by Alber Justin MD at 10/5/2017 12:37 PM    Source Note             Patient Care Team:  Christian Castellon MD as PCP - General (Internal Medicine)  Sin Alarcon MD as Referring Physician (General Practice)  Alber Justin MD as Consulting Physician (Otolaryngology)  Kriss Dominguez MD as Referring Physician (Hematology and Oncology)  Hadley Marie III, MD as Consulting Physician (Radiation Oncology)    Chief Complaint   Patient presents with   •  "Mouth Lesions     SCC pharyngeal       Subjective   HPI   Sameer Tavarez is a  62 y.o. male who complains of pain. The symptoms are localized to the right ear and oropharynx. The patient has had severe symptoms. The symptoms have been relatively constant for the last 3 months. The patient  reports that he has been smoking Cigarettes.  He has a 52.00 pack-year smoking history. His smokeless tobacco use includes Snuff.. He has a history of acid reflux symptoms. He is being treated for acid reflux. There have been no factors that have improved the symptoms. He had an EGD on 2/6/17 by Dr Patel with biopsies showing Barretts esophagus and reflux esophagitis. He had continued dysphagia and underwent another EGD and an oropharyngeal biopsy on 8/27/17 with the oropharyngeal biosies showing \"at least squamous cell carcinoma in situ\". CT scanning showed \"oropharyngeal asymmety without overt enhancing mass\" on 8/28/17.    Review of Systems   Constitutional: Positive for appetite change.   HENT: Positive for ear pain and sore throat.         See HPI   Eyes: Negative.    Respiratory: Positive for cough and choking. Negative for stridor.    Cardiovascular: Negative.    Gastrointestinal: Negative.    Endocrine: Negative.    Allergic/Immunologic: Negative.    Neurological: Positive for headaches.   Hematological: Bruises/bleeds easily.   Psychiatric/Behavioral: Negative.        Past History:  Past Medical History:   Diagnosis Date   • Coronary artery disease    • Diabetes mellitus    • GERD (gastroesophageal reflux disease)    • Hypertension    • Oropharyngeal cancer 08/27/2017   • Severe esophageal dysplasia      Past Surgical History:   Procedure Laterality Date   • CHOLECYSTECTOMY     • CORONARY ARTERY BYPASS GRAFT     • HERNIA REPAIR       Family History   Problem Relation Age of Onset   • Stroke Mother    • Heart disease Father    • Heart disease Brother    • Cancer Brother      Social History   Substance Use Topics   • " Smoking status: Current Every Day Smoker     Packs/day: 1.00     Years: 52.00     Types: Cigarettes   • Smokeless tobacco: Current User     Types: Snuff      Comment: I've tried and tried   • Alcohol use Yes      Comment: occasionally       Current Outpatient Prescriptions:   •  aspirin 81 MG chewable tablet, Chew 81 mg Daily., Disp: , Rfl:   •  baclofen (LIORESAL) 20 MG tablet, Take 20 mg by mouth 3 (Three) Times a Day., Disp: , Rfl:   •  furosemide (LASIX) 40 MG tablet, Take 40 mg by mouth 2 (Two) Times a Day., Disp: , Rfl:   •  insulin glargine (LANTUS) 100 UNIT/ML injection, Inject 70 Units under the skin Daily., Disp: , Rfl:   •  insulin lispro (humaLOG) 100 UNIT/ML injection, Inject  under the skin 3 (Three) Times a Day Before Meals. Sliding scale, Disp: , Rfl:   •  lisinopril (PRINIVIL,ZESTRIL) 10 MG tablet, Take 10 mg by mouth Daily., Disp: , Rfl:   •  metFORMIN (GLUCOPHAGE) 1000 MG tablet, Take  by mouth., Disp: , Rfl:   •  metoprolol tartrate (LOPRESSOR) 50 MG tablet, Take 50 mg by mouth 2 (Two) Times a Day., Disp: , Rfl:   •  nitroglycerin (NITROLINGUAL) 0.4 MG/SPRAY spray, Place 1 spray under the tongue Every 5 (Five) Minutes As Needed for Chest Pain., Disp: , Rfl:   •  omeprazole (priLOSEC) 20 MG capsule, Take 20 mg by mouth Daily., Disp: , Rfl:   •  oxyCODONE-acetaminophen (PERCOCET)  MG per tablet, Take 1 tablet by mouth Every 6 (Six) Hours As Needed for Moderate Pain ., Disp: , Rfl:   •  sucralfate (CARAFATE) 1 g tablet, Take 1 g by mouth 4 (Four) Times a Day., Disp: , Rfl:   •  tamsulosin (FLOMAX) 0.4 MG capsule 24 hr capsule, Take 1 capsule by mouth Every Night., Disp: , Rfl:   •  zolpidem (AMBIEN) 10 MG tablet, Take 10 mg by mouth At Night As Needed for Sleep., Disp: , Rfl:   •  prasugrel (EFFIENT) 10 MG tablet, Take  by mouth., Disp: , Rfl: (patient has not been taking Effient for the last 3 months or so)  Allergies:  Codeine    Objective     Vital Signs:  Temp:  [97.5 °F (36.4 °C)] 97.5 °F  (36.4 °C)  Heart Rate:  [96] 96  BP: (143)/(88) 143/88    Physical Exam  CONSTITUTIONAL: well nourished, well-developed, alert, oriented for age, in no acute distress  heavy tobacco odor present,  COMMUNICATION AND VOICE: mild muffled voice present,  HEAD: normocephalic, no lesions, atraumatic, no tenderness, no masses   FACE: appearance normal, no lesions, no tenderness, no deformities, facial motion symmetric  SALIVARY GLANDS: parotid glands with no tenderness, no swelling, no masses, submandibular glands with normal size, nontender  EYES: ocular motility normal, eyelids normal, orbits normal, no proptosis, conjunctiva normal , pupils equal, round  HEARING: response to conversational voice normal bilaterally   EXTERNAL EARS: auricles without lesions  EXTERNAL EAR CANALS: normal ear canals without stenosis or significant cerumen  TYMPANIC MEMBRANES: tympanic membrane appearance normal, no lesions, no perforation, normal mobility, no fluid  EXTERNAL NOSE: structure normal, no tenderness on palpation, no nasal discharge, no lesions, no evidence of trauma, nostrils patent  INTRANASAL EXAM: nasal mucosa normal, vestibule within normal limits, inferior turbinate normal,  nasal septum without overt anterior deviation  NASOPHARYNX: nasopharyngeal mucosa, adenoids within normal limits  LIPS: structure normal, no tenderness on palpation, no lesions, no evidence of trauma  TEETH: diffusely carious  GUMS: gingivae healthy  ORAL MUCOSA: oral mucosa normal, no mucosal lesions  FLOOR OF MOUTH: Warthin’s duct patent, mucosa normal  TONGUE: right base of tongue fullness and firmness to palpation extending from the tonsillar fossa to the midline of the base of tongue  PALATE: soft and hard palates with normal mucosa and structure  OROPHARYNX: there is a large ulcerated mass of the right tonsillar fossa that has mild exudate and is tender and firm to palpation  HYPOPHARYNX: hypopharyngeal mucosa normal  LARYNX: thick secretions,  epiglottis and arytenoid cartilage within normal limits, vocal cord mucosa normal with normal mobility   NECK: neck appearance normal, no masses or tenderness  THYROID: no overt thyromegaly, no tenderness, nodules or mass present on palpation, position midline   LYMPH NODES: no lymphadenopathy  CHEST/RESPIRATORY: respiratory effort normal, normal breath sounds  CARDIOVASCULAR: rate and rhythm normal, extremities without cyanosis or edema, no overt jugulovenous distension present  NEUROLOGIC/PSYCHIATRIC: oriented appropriately for age, mood normal, affect appropriate, cranial nerves intact grossly unless specifically mentioned above     Procedure Note    Anesthesia: topical 4% tetracaine and oxymetazoline mix    Endoscopy Type: Flexible Laryngoscopy    Indications for Procedure: Lesion partially identified by mirror examination - needing further exam    Procedure Details:    The patient was placed in the sitting position.  After topical anesthesia and decongestion, the 4 mm laryngoscope was passed.  The nasal cavities, nasopharynx, oropharynx, hypopharynx, and larynx were all examined.  Vocal cords were examined during respiration and phonation.  The following findings were noted:    Findings: as per above    Condition:  Stable.  Patient tolerated procedure well.    Complications:  None    RESULTS REVIEW:    I have personally reviewed the patient's mri of the neck. There is a large mass extending from the tonsillar fossa to the midline of the base of tongue.  It has anterior extension into the lingual musculature of the tongue.  I do not see any lymphadenopathy.  Bulky enhancing right oropharyngeal soft tissue mass measuring approximately 6.7 x 4.6 x 4.6 cm. Involvement of the right side of the base of the tongue.  I have personally reviewed the patient's PET scan.  There is intense uptake at the area of the mass seen on the MRI scan.   PATHOLOGY:  Oropharynx, biopsy: At least squamous cell carcinoma in situ.    I  discussed this case with Dr. Marie who agrees this is an obvious invasive squamous cell carcinoma clinically.  I discussed the pathology with Dr. Kassy Handley who really looked at the slides but still was unable to call this an invasive carcinoma.                  Assessment   1. Oropharyngeal cancer    2. Poor dentition    3. Current smoker    4. 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  However, the biopsies were superficial and they cannot: Invasive carcinoma.  Medical oncology requires a definitive diagnosis of invasion.  Therefore I will put him on the schedule for later this week to have a biopsy.  I am concerned about the size of this mass and that the anesthesia may aggravate obstruction and therefore we had a long discussion on the possibility of tracheostomy.    Lex report run    The patient/family was instructed on the proper use of scheduled prescription drugs including their impact on driving and work safety and their potential effects during pregnancy. The potential for overdose and the appropriate response to an overdose was covered as well as their safe storage and proper disposal. The website www.[place].gov or www.[place].gov/health which contains other materials in this regard was offered      New Medications Ordered This Visit   Medications   • oxyCODONE-acetaminophen (PERCOCET)  MG per tablet     Sig: Take 1 tablet by mouth Every 6 (Six) Hours As Needed for Moderate Pain .     Dispense:  40 tablet     Refill:  0     -----SURGERY SCHEDULING:-----  Schedule direct laryngoscopy with biopsy     ---INFORMED CONSENT DISCUSSION:---  DIRECT LARYNGOSCOPY WITH BIOPSY: The risks and benefits were explained including but not limited to pain, bleeding, infection, (including possible mediastinitis), the risks of the general anesthesia, pain, temporary or permanent hoarseness,  airway loss, possible need for tracheostomy and/or tooth injury. Questions were answered. No guarantees were made or implied.       ---PREOPERATIVE WORKUP:---  CBC  CMP  Chest X-Ray  EKG     Follow up  postoperatively    Alber Justin MD  10/02/17  4:44 PM        Electronically signed by Alber Justin MD at 10/2/2017  5:00 PM                 Operative/Procedure Notes (all)      Alber Justin MD at 10/5/2017  3:41 PM  Version 1 of 1         TRACHEOSTOMY  AND DIRECT LARYNGOSCOPY WITH BIOPSY Procedure Note  Sameer Tavarez  10/5/2017    Pre-op Diagnosis:   Oropharyngeal cancer [C10.9]    Post-op Diagnosis:     Post-Op Diagnosis Codes:     * Oropharyngeal cancer [C10.9]     * Airway compromise [J98.8]    Procedure/CPT® Codes:  NE TRACHEOSTOMY, PLANNED [[id number]]  NE LARYNGOSCOPY,DIRCT,OP,BIOPSY [[id number]]    Procedure(s):  Awake tracheostomy; direct laryngoscopy with biopsy of the right tonsil and base of tongue    Surgeon(s):  Alber Justin MD    Anesthesia:   General    Staff:   Circulator: Jerica Lees RN  Scrub Person: Jaylyn Benito; Hussein Khoury    Estimated Blood Loss:   minimal    Specimens:                Right tonsil and base of tongue biopsies      Drains:   None      Findings:   There is a large ulcerative lesion that was in the oral pharynx.  It extended from the right lateral aspect of the uvula and extended to the posterior rim of the soft palate extending to the tonsillar fossa and deeply penetrating into it.  It involved the right base of tongue and extended at least to the midline of the base of tongue.  It extended forward into the lingual tongue muscular approximately 2 cm.                  Complications:   none    Reason for the Operation:  Sameer Tavarez is a 62 y.o. male who presented to my office after a recent EGD and biopsy of the oropharynx showed a growth in the oral pharynx.  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   Further workup showed a large 7 cm oropharyngeal mass at the tongue and tonsillar area.  This lit up brightly on PET scanning.  Further biopsies were requested to get the diagnosis of an invasive squamous cell carcinoma.  I felt that due to the patient's symptoms of choking and inability to lay flat as well as the size and magnitude of the lesion on examination, that his airway was at risk.  Therefore we discussed awake tracheostomy as a planned procedure to allow for safety during this procedure and upcoming procedures to further workup is carcinoma.  Risks benefits and alternatives were discussed and he wished to proceed with the procedure    Procedure Description:  The patient was taken back to the operating room and placed in a supine position.  A time out was performed to confirm the patient and the procedure. Due to the degree of obstruction of the patients airway, it was felt that intubation would pose too great of risk for airway loss. Therefore, the tracheostomy was performed under local anesthesia until the airway was secured.  A shoulder roll was placed and the site was marked and injected with 1:100,000 parts epinephrine. The patient was prepped and draped with the usual manner and the operation was begun. A horizontal incision was made 1-2 fingerbredths above the sternal notch and taken down to the subcutaneous tissue. A small amount of subcutaneous fat was removed with a bovie cautery to help with the exposure. Retraction was accomplished with dura hooks. Dissection was carried down to the strap muscles which were divided at midline. The thyroid was divided with the cautery..  The anterior face of the trachea was exposed. The trachea was entered through the third and fourth cartilaginous interspace A Vinny inferiorly based cartilaginous flap was created with a sissors. This was secured to the dermis of the lower skin edge with a 3-0 vicryl suture. Hemostasis was acheived with bipolar cautery. The  endotracheal tube was then backed up to just above the tracheostomy site by the anesthesia team. Then, a #6 cuffed Shiley tracheotomy tube was placed in the tracheal opening without difficulty. The anesthesia circut was then attatched to the tracheotomy tube and proper ventilation as acheived. The dura hooks were removed. The tracheotomy tube was then secured with 2-0 silk sutures and a trachotomy tie was placed. A sponge dressing was placed under the tracheotomy tube. The table was turned 90° to facilitate axis to the head.  The patient was prepped and draped in the standard laryngoscopy fashion.  A Nahum-Espinoza mouthgag was inserted to expose the oral pharynx.  The above-mentioned findings were noted.  I then removed the Nahum-Espinoza mouthgag and used direct laryngoscopy to evaluate the deeper tissue.  A tooth guard was placed to protect the teeth.  Direct laryngoscopy was carried out with the anterior commissure laryngoscope systematically examining all the structures of the oropharynx, supraglottic larynx, true vocal cords, and hypopharynx. There was a large oropharyngeal lesion as described above.  Biopsies were then taken with cup forceps of the right tonsillar fossa and the right base of tongue.  These were sent in formalin with several deep and superficial bites for permanent section.  Afrin-soaked pledgets were placed for hemostasis.  The patient was then turned over to the anesthesia team in a stable condition.   The patient was then turned over to the anesthesia team and allowed to wake from anesthesia.       Alber Justin MD     Date: 10/5/2017  Time: 3:41 PM       Electronically signed by Alber Justin MD at 10/5/2017  3:48 PM              Inpatient Consult to Case Management  [[id number]] (Order [id number])   Consult    Date: 10/5/2017   Department: [facility] INTENSIVE CARE   Released By/Authorizing: Alber Justin MD (auto-released)         Order History   Inpatient     Date/Time Action Taken User Additional Information     10/05/17 1353 Release Alber Justin MD (auto-released) From Order: [id number]       Order Details      Frequency Duration Priority Order Class     Once 1  occurrence Routine Hospital Performed       Comments      Patient will need 14 Lebanese suction catheters, bedside portable suction, humidified trach collar, 7 inner cannulas per week specified for trach size and fenestration, O2 per trach collar if indicated.       Order Questions      Question Answer Comment     Reason for Consult? evaluate for home health and transition for home with tracheostomy care/management        Consult Order Info      ID Description Priority Start Date Start Time     [id number] Inpatient Consult to Case Management  Routine 10/05/2017  1:53 PM         Provider Specialty Referred to     ______________________________________ _____________________________________       Acknowledgement Info      For At Acknowledged By Acknowledged On     Placing Order 10/05/17 1354 Jo Ann Graham RN 10/05/17 1521       Reprint Order Requisition      Inpatient Consult to Case Management  (Order #[id number]) on 10/5/17       Encounter      View Encounter           Order Provider Info          Office phone Pager E-mail     Ordering User Alber Justin [phone] --     Authorizing Provider Alber Justin [phone] -- --     Attending Provider Alber Justin [phone] -- --       Order Transmittal Tracking      Inpatient Consult to Case Management  (Order #[id number]) on 10/5/17

## 2017-10-06 NOTE — PROGRESS NOTES
[facility] Medicine Services  INPATIENT PROGRESS NOTE    Length of Stay: 1  Date of Admission: 10/5/2017  Primary Care Physician: Christian Castellon MD    Subjective     Chief Complaint:     Medical management    HPI     The patient seems to be doing as expected after trach placement.  The patient continues to have secretions that prevent trach cuff deflation.  Dr. Jayne Phillips has been consulted for PEG tube placement.  Patient has been mildly hypoglycemic without symptoms.  His long-acting insulin is on hold and he is currently on a sliding scale insulin regimen.  Potassium is within normal limits and the patient is currently on D5 half normal saline.  The patient is a Type II diabetic, likely with significant insulin resistance and continuous infusion of D5 half normal saline may, in fact, result in recurrent hypoglycemia after initial glucose correction by feedback suppression of gluconeogenesis.  I will change his IV fluids to normal saline and continue when necessary sliding scale insulin.  The patient has agreed to PEG and port placement.      Review of Systems   Unable to obtain secondary to presence of trach  All pertinent negatives and positives are as above. All other systems have been reviewed and are negative unless otherwise stated.     Objective    Temp:  [97.5 °F (36.4 °C)-98.2 °F (36.8 °C)] 98.2 °F (36.8 °C)  Heart Rate:  [] 82  Resp:  [10-20] 17  BP: ()/(54-92) 141/90    Lab Results (last 24 hours)     Procedure Component Value Units Date/Time    POC Glucose Fingerstick [[id number]]  (Normal) Collected:  10/05/17 1357    Specimen:  Blood Updated:  10/05/17 1410     Glucose 78 mg/dL       : [id number] Arthur Packer ID: [id number]       POC Glucose Fingerstick [[id number]]  (Abnormal) Collected:  10/05/17 1821    Specimen:  Blood Updated:  10/05/17 1842     Glucose 65 (L) mg/dL       : [id number] Santos CherryMetdev ID: [id number]       POC  Glucose Fingerstick [[id number]]  (Abnormal) Collected:  10/05/17 1949    Specimen:  Blood Updated:  10/05/17 2010     Glucose 60 (L) mg/dL       : [id number] Flores MalloryMeter ID: [id number]       POC Glucose Fingerstick [[id number]]  (Abnormal) Collected:  10/05/17 2019    Specimen:  Blood Updated:  10/05/17 2031     Glucose 62 (L) mg/dL       : [id number] Zac AmandaMeter ID: [id number]       POC Glucose Fingerstick [[id number]]  (Abnormal) Collected:  10/05/17 2338    Specimen:  Blood Updated:  10/05/17 2351     Glucose 67 (L) mg/dL       : [id number] Flores MalloryMeter ID: [id number]       CBC (No Diff) [[id number]]  (Abnormal) Collected:  10/06/17 0243    Specimen:  Blood Updated:  10/06/17 0305     WBC 10.37 10*3/mm3      RBC 4.66 (L) 10*6/mm3      Hemoglobin 14.1 g/dL      Hematocrit 39.8 (L) %      MCV 85.4 fL      MCH 30.3 pg      MCHC 35.4 g/dL      RDW 12.7 %      RDW-SD 39.5 (L) fl      MPV 11.5 fL      Platelets 177 10*3/mm3     Basic Metabolic Panel [[id number]]  (Abnormal) Collected:  10/06/17 0243    Specimen:  Blood Updated:  10/06/17 0311     Glucose 114 (H) mg/dL      BUN 10 mg/dL      Creatinine 0.57 mg/dL      Sodium 139 mmol/L      Potassium 4.0 mmol/L      Chloride 103 mmol/L      CO2 26.0 mmol/L      Calcium 9.0 mg/dL      eGFR Non African Amer 145 mL/min/1.73      BUN/Creatinine Ratio 17.5     Anion Gap 10.0 mmol/L     TSH [[id number]]  (Abnormal) Collected:  10/06/17 0243    Specimen:  Blood Updated:  10/06/17 0341     TSH 0.161 (L) mIU/mL     Hemoglobin A1c [[id number]] Collected:  10/06/17 0243    Specimen:  Blood Updated:  10/06/17 0543     Hemoglobin A1C 8.7 %     Tissue Pathology Exam - Tissue, Tonsil, Right [[id number]] Collected:  10/05/17 1324    Specimen:  Tissue from Tonsil, Right; Tissue from Tongue Updated:  10/06/17 0825    POC Glucose Fingerstick [[id number]]  (Abnormal) Collected:  10/06/17 0827    Specimen:  Blood Updated:  10/06/17 0904     Glucose 156 (H) mg/dL        : [id number] Milo Cooper ID: [id number]             Imaging Results (last 24 hours)     Procedure Component Value Units Date/Time    XR Chest 1 View [[id number]] Collected:  10/05/17 1853     Updated:  10/05/17 1857    Narrative:       HISTORY: Trach placement     CXR: Frontal view the chest performed.     COMPARISON: 10/04/2017     FINDINGS: Tracheostomy tube is in place. The tip is positioned  appropriately above the bola. There is evidence of prior mediastinal  surgery. The left coronary artery stents. There is a diminished level of  inspiration. Prominent pulmonary vessels likely due to vascular  crowding. Mild venous congestion considered. No pleural effusion or  pneumothorax is visualized.       Impression:       1. Appropriate positioning of the tracheostomy tube.  2. Diminished level of inspiration with probable vascular crowding. Mild  venous congestion considered. Left basilar atelectasis.  3. Prior mediastinal surgery. Coronary artery stents.  This report was finalized on 10/05/2017 18:54 by Dr. Christen Obrien MD.             Intake/Output Summary (Last 24 hours) at 10/06/17 1009  Last data filed at 10/06/17 0800   Gross per 24 hour   Intake             1847 ml   Output             1025 ml   Net              822 ml       Physical Exam  Constitutional: He is oriented to person, place, and time. No distress.   HENT:   Head: Normocephalic and atraumatic.   Nose: Nose normal.   Trach is in place.   Eyes: Right eye exhibits no discharge. Left eye exhibits no discharge. No scleral icterus.   Neck: Neck supple.   Cardiovascular: Normal rate, regular rhythm and normal heart sounds.  Exam reveals no gallop and no friction rub.    No murmur heard.  Pulmonary/Chest: Effort normal and breath sounds normal. No stridor. No respiratory distress. He has no wheezes. He has no rales.   Abdominal: Soft. Bowel sounds are normal. He exhibits no distension. There is no tenderness. There is no guarding.    Musculoskeletal: He exhibits no edema, tenderness or deformity.   Neurological: He is alert and oriented to person, place, and time. No cranial nerve deficit. He exhibits normal muscle tone. Coordination normal.   Skin: Skin is warm and dry. No rash noted. He is not diaphoretic. No erythema. No pallor.   Psychiatric: He has a normal mood and affect. His behavior is normal. Judgment and thought content normal.     Results Review:  I have reviewed the labs, radiology results, and diagnostic studies since my last progress note and made treatment changes reflective of the results.   I have reviewed the current medications.    Assessment/Plan     Hospital Problem List     Oropharyngeal cancer    Tracheostomy dependence    Postoperative pain          PLAN:  Discontinue D5 half normal saline in favor of normal saline at 75 mL per hour.  Surgery has been consulted for evaluation and PEG tube placement    Curt Juarez DO   10/06/17   10:09 AM

## 2017-10-06 NOTE — CONSULTS
[facility] Medicine Consult    Date of Admission: 10/5/2017    Date of Consult: 10/06/17    Primary Care Physician: Christian Castellon MD    Referring Physician: Alber Justin MD    Reason for Consultation:  Medical management    History of Present Illness:  Patient is a 62-year-old  male who was admitted to [facility] earlier today to undergo trach placement due to dysphagia secondary to oropharyngeal cancer.  The hospitalist service has been asked to assist with medical management.  Earlier in the evening the patient was found to be hypoglycemic.  He has a known history of diabetes mellitus.  Presently he is awake and alert.  He has no specific complaints.  A trach is in place however he is able to communicate by writing notes.  His wife is present at the bedside.      Review of Systems:  Cannot be performed due to the patient's status.      Past Medical History:  has a past medical history of Arthritis; Coronary artery disease; Depression; Diabetes mellitus; Enlarged prostate; GERD (gastroesophageal reflux disease); History of transfusion; Hypertension; Oropharyngeal cancer (08/27/2017); Seizure; Severe esophageal dysplasia; Stroke; and TB (pulmonary tuberculosis) (2004).      Past Surgical History:  has a past surgical history that includes Coronary artery bypass graft; Hernia repair; Cholecystectomy; Laparoscopic Nissen fundoplication; Testicle surgery; Skin biopsy; Fracture surgery; Cardiac surgery; and Tonsillectomy.      Allergies:   Allergies   Allergen Reactions   • Codeine Hives         Social History:  The patient is a resident of [place], [place].  He is .  He has no children.  He is disabled.  He has a long history of smoking.  He drinks alcohol on occasions but not regularly.  He has no history of illicit drug use.  He is Christian.      Family History:  The patient has a brother and sister are both reported to be in apparent good health.   He has little contact with his brother.  His father is  due to heart disease.  His mother is  due to heart disease and she also had a history of alcohol abuse.    Code Status and Healthcare Surrogate:  The patient is a full code and his wife will serve as a surrogate for healthcare matters should such become necessary.      Medications: Scheduled Meds:  aspirin 81 mg Oral Daily   baclofen 20 mg Oral TID   ceFAZolin 2 g Intravenous Q8H   furosemide 40 mg Oral BID   insulin lispro 0-9 Units Subcutaneous 4x Daily With Meals & Nightly   lisinopril 10 mg Oral Daily   metoprolol tartrate 50 mg Oral BID   pantoprazole 40 mg Oral QAM   sucralfate 1 g Oral 4x Daily AC & at Bedtime   tamsulosin 0.4 mg Oral Nightly     Continuous Infusions:  dextrose 5 % and sodium chloride 0.45 % with KCl 20 mEq/L 75 mL/hr Last Rate: 75 mL/hr (10/05/17 2046)     PRN Meds:.dextrose  •  dextrose  •  diphenhydrAMINE  •  glucagon (human recombinant)  •  HYDROmorphone **AND** naloxone  •  influenza vaccine  •  nitroglycerin  •  ondansetron  •  oxyCODONE-acetaminophen  •  zolpidem      Examination:   Temp:  [97.5 °F (36.4 °C)-98.7 °F (37.1 °C)] 98.2 °F (36.8 °C)  Heart Rate:  [] 73  Resp:  [10-20] 20  BP: ()/(54-84) 136/84    Physical Exam   Constitutional: He is oriented to person, place, and time. No distress.   HENT:   Head: Normocephalic and atraumatic.   Nose: Nose normal.   Trach is in place.   Eyes: Right eye exhibits no discharge. Left eye exhibits no discharge. No scleral icterus.   Neck: Neck supple.   Cardiovascular: Normal rate, regular rhythm and normal heart sounds.  Exam reveals no gallop and no friction rub.    No murmur heard.  Pulmonary/Chest: Effort normal and breath sounds normal. No stridor. No respiratory distress. He has no wheezes. He has no rales.   Abdominal: Soft. Bowel sounds are normal. He exhibits no distension. There is no tenderness. There is no guarding.   Musculoskeletal: He exhibits no  edema, tenderness or deformity.   Neurological: He is alert and oriented to person, place, and time. No cranial nerve deficit. He exhibits normal muscle tone. Coordination normal.   Skin: Skin is warm and dry. No rash noted. He is not diaphoretic. No erythema. No pallor.   Psychiatric: He has a normal mood and affect. His behavior is normal. Judgment and thought content normal.         Labs:  [unfilled]    Lab Results   Component Value Date    GLUCOSE 210 (H) 10/04/2017    BUN 10 10/04/2017    CREATININE 0.66 10/04/2017    EGFRIFNONA 122 10/04/2017    BCR 15.2 10/04/2017    K 4.1 10/04/2017    CO2 23.0 (L) 10/04/2017    CALCIUM 9.4 10/04/2017    ALBUMIN 4.60 10/04/2017    LABIL2 1.3 10/04/2017    AST 66 (H) 10/04/2017     (H) 10/04/2017       Impression:  Hypertension  Hyperlipidemia  Diabetes mellitus type II  Hypoglycemia  Dysphagia / Odynophagia  Oral pharyngeal cancer (s/p Trach 10/5)  COPD             Plan:                                                                                                                      The patient will be continued on his present medications at the current dose and schedule.     His IV fluids will be transitioned to D5 half-normal saline with 20 mEq per liter of potassium chloride infused at 75 mL per hour.    I will obtain follow-up laboratory studies the morning.      The nursing staff may call should they have any questions or concerns.    Fly Stanford MD  10/06/17  12:05 AM

## 2017-10-06 NOTE — DISCHARGE PLACEMENT REQUEST
"To: Alta PONCE  (attn: Nirmala)    From: Chelsi Yvon [phone]          Sameer Sosa (62 y.o. Male)     Date of Birth Social Security Number Address Home Phone MRN    1955  [id number] [facility] [phone] [id number]    Episcopalian Marital Status          Taoist        Admission Date Admission Type Admitting Provider Attending Provider Department, Room/Bed    10/5/17 Elective Alber Justin MD Resser, John Randall, MD [facility] INTENSIVE CARE, I007/1    Discharge Date Discharge Disposition Discharge Destination                      Attending Provider: Alber Justin MD     Allergies:  Codeine    Isolation:  None   Infection:  None   Code Status:  FULL    Ht:  66\" (167.6 cm)   Wt:  168 lb 6.4 oz (76.4 kg)    Admission Cmt:  None   Principal Problem:  None                Active Insurance as of 10/5/2017     Primary Coverage     Payor Plan Insurance Group Employer/Plan Group    MEDICARE MEDICARE A & B      Payor Plan Address Payor Plan Phone Number Effective From Effective To    [address] [phone] 1/1/2008     [place] [address]       Subscriber Name Subscriber Birth Date Member ID       SAMEER SOSA 1955 [id number]                 Emergency Contacts      (Rel.) Home Phone Work Phone Mobile Phone    Janett Duffy (Spouse) [phone] -- --               History & Physical      Alber Justin MD at 10/2/2017  3:00 PM          Patient Care Team:  Christian Castellon MD as PCP - General (Internal Medicine)  Sin Alarcon MD as Referring Physician (General Practice)  Alber Justin MD as Consulting Physician (Otolaryngology)  Kriss Dominguez MD as Referring Physician (Hematology and Oncology)  Hadley Marie III, MD as Consulting Physician (Radiation Oncology)    Chief Complaint   Patient presents with   • Mouth Lesions     SCC pharyngeal       Subjective   HPI   Sameer Sosa is a  62 y.o. male who " "complains of pain. The symptoms are localized to the right ear and oropharynx. The patient has had severe symptoms. The symptoms have been relatively constant for the last 3 months. The patient  reports that he has been smoking Cigarettes.  He has a 52.00 pack-year smoking history. His smokeless tobacco use includes Snuff.. He has a history of acid reflux symptoms. He is being treated for acid reflux. There have been no factors that have improved the symptoms. He had an EGD on 2/6/17 by Dr Patel with biopsies showing Barretts esophagus and reflux esophagitis. He had continued dysphagia and underwent another EGD and an oropharyngeal biopsy on 8/27/17 with the oropharyngeal biosies showing \"at least squamous cell carcinoma in situ\". CT scanning showed \"oropharyngeal asymmety without overt enhancing mass\" on 8/28/17.    Review of Systems   Constitutional: Positive for appetite change.   HENT: Positive for ear pain and sore throat.         See HPI   Eyes: Negative.    Respiratory: Positive for cough and choking. Negative for stridor.    Cardiovascular: Negative.    Gastrointestinal: Negative.    Endocrine: Negative.    Allergic/Immunologic: Negative.    Neurological: Positive for headaches.   Hematological: Bruises/bleeds easily.   Psychiatric/Behavioral: Negative.        Past History:  Past Medical History:   Diagnosis Date   • Coronary artery disease    • Diabetes mellitus    • GERD (gastroesophageal reflux disease)    • Hypertension    • Oropharyngeal cancer 08/27/2017   • Severe esophageal dysplasia      Past Surgical History:   Procedure Laterality Date   • CHOLECYSTECTOMY     • CORONARY ARTERY BYPASS GRAFT     • HERNIA REPAIR       Family History   Problem Relation Age of Onset   • Stroke Mother    • Heart disease Father    • Heart disease Brother    • Cancer Brother      Social History   Substance Use Topics   • Smoking status: Current Every Day Smoker     Packs/day: 1.00     Years: 52.00     Types: Cigarettes "   • Smokeless tobacco: Current User     Types: Snuff      Comment: I've tried and tried   • Alcohol use Yes      Comment: occasionally       Current Outpatient Prescriptions:   •  aspirin 81 MG chewable tablet, Chew 81 mg Daily., Disp: , Rfl:   •  baclofen (LIORESAL) 20 MG tablet, Take 20 mg by mouth 3 (Three) Times a Day., Disp: , Rfl:   •  furosemide (LASIX) 40 MG tablet, Take 40 mg by mouth 2 (Two) Times a Day., Disp: , Rfl:   •  insulin glargine (LANTUS) 100 UNIT/ML injection, Inject 70 Units under the skin Daily., Disp: , Rfl:   •  insulin lispro (humaLOG) 100 UNIT/ML injection, Inject  under the skin 3 (Three) Times a Day Before Meals. Sliding scale, Disp: , Rfl:   •  lisinopril (PRINIVIL,ZESTRIL) 10 MG tablet, Take 10 mg by mouth Daily., Disp: , Rfl:   •  metFORMIN (GLUCOPHAGE) 1000 MG tablet, Take  by mouth., Disp: , Rfl:   •  metoprolol tartrate (LOPRESSOR) 50 MG tablet, Take 50 mg by mouth 2 (Two) Times a Day., Disp: , Rfl:   •  nitroglycerin (NITROLINGUAL) 0.4 MG/SPRAY spray, Place 1 spray under the tongue Every 5 (Five) Minutes As Needed for Chest Pain., Disp: , Rfl:   •  omeprazole (priLOSEC) 20 MG capsule, Take 20 mg by mouth Daily., Disp: , Rfl:   •  oxyCODONE-acetaminophen (PERCOCET)  MG per tablet, Take 1 tablet by mouth Every 6 (Six) Hours As Needed for Moderate Pain ., Disp: , Rfl:   •  sucralfate (CARAFATE) 1 g tablet, Take 1 g by mouth 4 (Four) Times a Day., Disp: , Rfl:   •  tamsulosin (FLOMAX) 0.4 MG capsule 24 hr capsule, Take 1 capsule by mouth Every Night., Disp: , Rfl:   •  zolpidem (AMBIEN) 10 MG tablet, Take 10 mg by mouth At Night As Needed for Sleep., Disp: , Rfl:   •  prasugrel (EFFIENT) 10 MG tablet, Take  by mouth., Disp: , Rfl: (patient has not been taking Effient for the last 3 months or so)  Allergies:  Codeine    Objective     Vital Signs:  Temp:  [97.5 °F (36.4 °C)] 97.5 °F (36.4 °C)  Heart Rate:  [96] 96  BP: (143)/(88) 143/88    Physical Exam  CONSTITUTIONAL: well  nourished, well-developed, alert, oriented for age, in no acute distress  heavy tobacco odor present,  COMMUNICATION AND VOICE: mild muffled voice present,  HEAD: normocephalic, no lesions, atraumatic, no tenderness, no masses   FACE: appearance normal, no lesions, no tenderness, no deformities, facial motion symmetric  SALIVARY GLANDS: parotid glands with no tenderness, no swelling, no masses, submandibular glands with normal size, nontender  EYES: ocular motility normal, eyelids normal, orbits normal, no proptosis, conjunctiva normal , pupils equal, round  HEARING: response to conversational voice normal bilaterally   EXTERNAL EARS: auricles without lesions  EXTERNAL EAR CANALS: normal ear canals without stenosis or significant cerumen  TYMPANIC MEMBRANES: tympanic membrane appearance normal, no lesions, no perforation, normal mobility, no fluid  EXTERNAL NOSE: structure normal, no tenderness on palpation, no nasal discharge, no lesions, no evidence of trauma, nostrils patent  INTRANASAL EXAM: nasal mucosa normal, vestibule within normal limits, inferior turbinate normal,  nasal septum without overt anterior deviation  NASOPHARYNX: nasopharyngeal mucosa, adenoids within normal limits  LIPS: structure normal, no tenderness on palpation, no lesions, no evidence of trauma  TEETH: diffusely carious  GUMS: gingivae healthy  ORAL MUCOSA: oral mucosa normal, no mucosal lesions  FLOOR OF MOUTH: Warthin’s duct patent, mucosa normal  TONGUE: right base of tongue fullness and firmness to palpation extending from the tonsillar fossa to the midline of the base of tongue  PALATE: soft and hard palates with normal mucosa and structure  OROPHARYNX: there is a large ulcerated mass of the right tonsillar fossa that has mild exudate and is tender and firm to palpation  HYPOPHARYNX: hypopharyngeal mucosa normal  LARYNX: thick secretions, epiglottis and arytenoid cartilage within normal limits, vocal cord mucosa normal with normal  mobility   NECK: neck appearance normal, no masses or tenderness  THYROID: no overt thyromegaly, no tenderness, nodules or mass present on palpation, position midline   LYMPH NODES: no lymphadenopathy  CHEST/RESPIRATORY: respiratory effort normal, normal breath sounds  CARDIOVASCULAR: rate and rhythm normal, extremities without cyanosis or edema, no overt jugulovenous distension present  NEUROLOGIC/PSYCHIATRIC: oriented appropriately for age, mood normal, affect appropriate, cranial nerves intact grossly unless specifically mentioned above     Procedure Note    Anesthesia: topical 4% tetracaine and oxymetazoline mix    Endoscopy Type: Flexible Laryngoscopy    Indications for Procedure: Lesion partially identified by mirror examination - needing further exam    Procedure Details:    The patient was placed in the sitting position.  After topical anesthesia and decongestion, the 4 mm laryngoscope was passed.  The nasal cavities, nasopharynx, oropharynx, hypopharynx, and larynx were all examined.  Vocal cords were examined during respiration and phonation.  The following findings were noted:    Findings: as per above    Condition:  Stable.  Patient tolerated procedure well.    Complications:  None    RESULTS REVIEW:    I have personally reviewed the patient's mri of the neck. There is a large mass extending from the tonsillar fossa to the midline of the base of tongue.  It has anterior extension into the lingual musculature of the tongue.  I do not see any lymphadenopathy.  Bulky enhancing right oropharyngeal soft tissue mass measuring approximately 6.7 x 4.6 x 4.6 cm. Involvement of the right side of the base of the tongue.  I have personally reviewed the patient's PET scan.  There is intense uptake at the area of the mass seen on the MRI scan.   PATHOLOGY:  Oropharynx, biopsy: At least squamous cell carcinoma in situ.    I discussed this case with Dr. Marie who agrees this is an obvious invasive squamous cell  carcinoma clinically.  I discussed the pathology with Dr. Kassy Handley who really looked at the slides but still was unable to call this an invasive carcinoma.                  Assessment   1. Oropharyngeal cancer    2. Poor dentition    3. Current smoker    4. 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  However, the biopsies were superficial and they cannot: Invasive carcinoma.  Medical oncology requires a definitive diagnosis of invasion.  Therefore I will put him on the schedule for later this week to have a biopsy.  I am concerned about the size of this mass and that the anesthesia may aggravate obstruction and therefore we had a long discussion on the possibility of tracheostomy.    Lex report run    The patient/family was instructed on the proper use of scheduled prescription drugs including their impact on driving and work safety and their potential effects during pregnancy. The potential for overdose and the appropriate response to an overdose was covered as well as their safe storage and proper disposal. The website www.[employer].[place].gov or [place].gov/health which contains other materials in this regard was offered      New Medications Ordered This Visit   Medications   • oxyCODONE-acetaminophen (PERCOCET)  MG per tablet     Sig: Take 1 tablet by mouth Every 6 (Six) Hours As Needed for Moderate Pain .     Dispense:  40 tablet     Refill:  0     -----SURGERY SCHEDULING:-----  Schedule direct laryngoscopy with biopsy     ---INFORMED CONSENT DISCUSSION:---  DIRECT LARYNGOSCOPY WITH BIOPSY: The risks and benefits were explained including but not limited to pain, bleeding, infection, (including possible mediastinitis), the risks of the general anesthesia, pain, temporary or permanent hoarseness, airway loss, possible need for tracheostomy and/or tooth injury. Questions were answered. No  guarantees were made or implied.       ---PREOPERATIVE WORKUP:---  CBC  CMP  Chest X-Ray  EKG     Follow up  postoperatively    Alber Justin MD  10/02/17  4:44 PM       Electronically signed by Alber Justin MD at 10/2/2017  5:00 PM      Alber Justin MD at 10/5/2017 12:34 PM            H&P updated. The patient was examined and the following changes are noted:        I discussed the case with oncology and radiation oncology as well as oral surgery.  There is some concern both from myself and oral surgery about the stability of his airway especially with the upcoming dental extraction and potential other procedures including a port and if needed a G-tube.  Due to these concerns, I discussed with Mr. Rosales the option of plan tracheostomy to keep during this operation but also during the treatment to maintain a good patent airway.  I have in great detail gone over the ins and outs of tracheostomy and he is agreeable to proceed with this as a planned procedure.  Therefore, we will proceed with awake tracheostomy first and then do the direct laryngoscopy with biopsy.  I discussed that he will need to be in the hospital for several days for the recovery.  I discussed that I will be away starting tomorrow on vacation and will discussed the case with Dr. Neal who is covering in my absence.  In the hospital, we will consult oncology and also consider planned PEG tube placement due to his severe dysphagia with tumor.         Electronically signed by Alber Justin MD at 10/5/2017 12:37 PM    Source Note             Patient Care Team:  Christian Castellon MD as PCP - General (Internal Medicine)  Sin Alarcon MD as Referring Physician (General Practice)  Alber Justin MD as Consulting Physician (Otolaryngology)  Kriss Dominguez MD as Referring Physician (Hematology and Oncology)  Hadley Marie III, MD as Consulting Physician (Radiation Oncology)    Chief Complaint   Patient  "presents with   • Mouth Lesions     SCC pharyngeal       Subjective   HPI   Sameer Tavarez is a  62 y.o. male who complains of pain. The symptoms are localized to the right ear and oropharynx. The patient has had severe symptoms. The symptoms have been relatively constant for the last 3 months. The patient  reports that he has been smoking Cigarettes.  He has a 52.00 pack-year smoking history. His smokeless tobacco use includes Snuff.. He has a history of acid reflux symptoms. He is being treated for acid reflux. There have been no factors that have improved the symptoms. He had an EGD on 2/6/17 by Dr Patel with biopsies showing Barretts esophagus and reflux esophagitis. He had continued dysphagia and underwent another EGD and an oropharyngeal biopsy on 8/27/17 with the oropharyngeal biosies showing \"at least squamous cell carcinoma in situ\". CT scanning showed \"oropharyngeal asymmety without overt enhancing mass\" on 8/28/17.    Review of Systems   Constitutional: Positive for appetite change.   HENT: Positive for ear pain and sore throat.         See HPI   Eyes: Negative.    Respiratory: Positive for cough and choking. Negative for stridor.    Cardiovascular: Negative.    Gastrointestinal: Negative.    Endocrine: Negative.    Allergic/Immunologic: Negative.    Neurological: Positive for headaches.   Hematological: Bruises/bleeds easily.   Psychiatric/Behavioral: Negative.        Past History:  Past Medical History:   Diagnosis Date   • Coronary artery disease    • Diabetes mellitus    • GERD (gastroesophageal reflux disease)    • Hypertension    • Oropharyngeal cancer 08/27/2017   • Severe esophageal dysplasia      Past Surgical History:   Procedure Laterality Date   • CHOLECYSTECTOMY     • CORONARY ARTERY BYPASS GRAFT     • HERNIA REPAIR       Family History   Problem Relation Age of Onset   • Stroke Mother    • Heart disease Father    • Heart disease Brother    • Cancer Brother      Social History   Substance " Use Topics   • Smoking status: Current Every Day Smoker     Packs/day: 1.00     Years: 52.00     Types: Cigarettes   • Smokeless tobacco: Current User     Types: Snuff      Comment: I've tried and tried   • Alcohol use Yes      Comment: occasionally       Current Outpatient Prescriptions:   •  aspirin 81 MG chewable tablet, Chew 81 mg Daily., Disp: , Rfl:   •  baclofen (LIORESAL) 20 MG tablet, Take 20 mg by mouth 3 (Three) Times a Day., Disp: , Rfl:   •  furosemide (LASIX) 40 MG tablet, Take 40 mg by mouth 2 (Two) Times a Day., Disp: , Rfl:   •  insulin glargine (LANTUS) 100 UNIT/ML injection, Inject 70 Units under the skin Daily., Disp: , Rfl:   •  insulin lispro (humaLOG) 100 UNIT/ML injection, Inject  under the skin 3 (Three) Times a Day Before Meals. Sliding scale, Disp: , Rfl:   •  lisinopril (PRINIVIL,ZESTRIL) 10 MG tablet, Take 10 mg by mouth Daily., Disp: , Rfl:   •  metFORMIN (GLUCOPHAGE) 1000 MG tablet, Take  by mouth., Disp: , Rfl:   •  metoprolol tartrate (LOPRESSOR) 50 MG tablet, Take 50 mg by mouth 2 (Two) Times a Day., Disp: , Rfl:   •  nitroglycerin (NITROLINGUAL) 0.4 MG/SPRAY spray, Place 1 spray under the tongue Every 5 (Five) Minutes As Needed for Chest Pain., Disp: , Rfl:   •  omeprazole (priLOSEC) 20 MG capsule, Take 20 mg by mouth Daily., Disp: , Rfl:   •  oxyCODONE-acetaminophen (PERCOCET)  MG per tablet, Take 1 tablet by mouth Every 6 (Six) Hours As Needed for Moderate Pain ., Disp: , Rfl:   •  sucralfate (CARAFATE) 1 g tablet, Take 1 g by mouth 4 (Four) Times a Day., Disp: , Rfl:   •  tamsulosin (FLOMAX) 0.4 MG capsule 24 hr capsule, Take 1 capsule by mouth Every Night., Disp: , Rfl:   •  zolpidem (AMBIEN) 10 MG tablet, Take 10 mg by mouth At Night As Needed for Sleep., Disp: , Rfl:   •  prasugrel (EFFIENT) 10 MG tablet, Take  by mouth., Disp: , Rfl: (patient has not been taking Effient for the last 3 months or so)  Allergies:  Codeine    Objective     Vital Signs:  Temp:  [97.5 °F  (36.4 °C)] 97.5 °F (36.4 °C)  Heart Rate:  [96] 96  BP: (143)/(88) 143/88    Physical Exam  CONSTITUTIONAL: well nourished, well-developed, alert, oriented for age, in no acute distress  heavy tobacco odor present,  COMMUNICATION AND VOICE: mild muffled voice present,  HEAD: normocephalic, no lesions, atraumatic, no tenderness, no masses   FACE: appearance normal, no lesions, no tenderness, no deformities, facial motion symmetric  SALIVARY GLANDS: parotid glands with no tenderness, no swelling, no masses, submandibular glands with normal size, nontender  EYES: ocular motility normal, eyelids normal, orbits normal, no proptosis, conjunctiva normal , pupils equal, round  HEARING: response to conversational voice normal bilaterally   EXTERNAL EARS: auricles without lesions  EXTERNAL EAR CANALS: normal ear canals without stenosis or significant cerumen  TYMPANIC MEMBRANES: tympanic membrane appearance normal, no lesions, no perforation, normal mobility, no fluid  EXTERNAL NOSE: structure normal, no tenderness on palpation, no nasal discharge, no lesions, no evidence of trauma, nostrils patent  INTRANASAL EXAM: nasal mucosa normal, vestibule within normal limits, inferior turbinate normal,  nasal septum without overt anterior deviation  NASOPHARYNX: nasopharyngeal mucosa, adenoids within normal limits  LIPS: structure normal, no tenderness on palpation, no lesions, no evidence of trauma  TEETH: diffusely carious  GUMS: gingivae healthy  ORAL MUCOSA: oral mucosa normal, no mucosal lesions  FLOOR OF MOUTH: Warthin’s duct patent, mucosa normal  TONGUE: right base of tongue fullness and firmness to palpation extending from the tonsillar fossa to the midline of the base of tongue  PALATE: soft and hard palates with normal mucosa and structure  OROPHARYNX: there is a large ulcerated mass of the right tonsillar fossa that has mild exudate and is tender and firm to palpation  HYPOPHARYNX: hypopharyngeal mucosa normal  LARYNX:  thick secretions, epiglottis and arytenoid cartilage within normal limits, vocal cord mucosa normal with normal mobility   NECK: neck appearance normal, no masses or tenderness  THYROID: no overt thyromegaly, no tenderness, nodules or mass present on palpation, position midline   LYMPH NODES: no lymphadenopathy  CHEST/RESPIRATORY: respiratory effort normal, normal breath sounds  CARDIOVASCULAR: rate and rhythm normal, extremities without cyanosis or edema, no overt jugulovenous distension present  NEUROLOGIC/PSYCHIATRIC: oriented appropriately for age, mood normal, affect appropriate, cranial nerves intact grossly unless specifically mentioned above     Procedure Note    Anesthesia: topical 4% tetracaine and oxymetazoline mix    Endoscopy Type: Flexible Laryngoscopy    Indications for Procedure: Lesion partially identified by mirror examination - needing further exam    Procedure Details:    The patient was placed in the sitting position.  After topical anesthesia and decongestion, the 4 mm laryngoscope was passed.  The nasal cavities, nasopharynx, oropharynx, hypopharynx, and larynx were all examined.  Vocal cords were examined during respiration and phonation.  The following findings were noted:    Findings: as per above    Condition:  Stable.  Patient tolerated procedure well.    Complications:  None    RESULTS REVIEW:    I have personally reviewed the patient's mri of the neck. There is a large mass extending from the tonsillar fossa to the midline of the base of tongue.  It has anterior extension into the lingual musculature of the tongue.  I do not see any lymphadenopathy.  Bulky enhancing right oropharyngeal soft tissue mass measuring approximately 6.7 x 4.6 x 4.6 cm. Involvement of the right side of the base of the tongue.  I have personally reviewed the patient's PET scan.  There is intense uptake at the area of the mass seen on the MRI scan.   PATHOLOGY:  Oropharynx, biopsy: At least squamous cell  carcinoma in situ.    I discussed this case with Dr. aMrie who agrees this is an obvious invasive squamous cell carcinoma clinically.  I discussed the pathology with Dr. Kassy Handley who really looked at the slides but still was unable to call this an invasive carcinoma.                  Assessment   1. Oropharyngeal cancer    2. Poor dentition    3. Current smoker    4. 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  However, the biopsies were superficial and they cannot: Invasive carcinoma.  Medical oncology requires a definitive diagnosis of invasion.  Therefore I will put him on the schedule for later this week to have a biopsy.  I am concerned about the size of this mass and that the anesthesia may aggravate obstruction and therefore we had a long discussion on the possibility of tracheostomy.    Lex report run    The patient/family was instructed on the proper use of scheduled prescription drugs including their impact on driving and work safety and their potential effects during pregnancy. The potential for overdose and the appropriate response to an overdose was covered as well as their safe storage and proper disposal. The website www.[employer].[place].gov or [place].gov/health which contains other materials in this regard was offered      New Medications Ordered This Visit   Medications   • oxyCODONE-acetaminophen (PERCOCET)  MG per tablet     Sig: Take 1 tablet by mouth Every 6 (Six) Hours As Needed for Moderate Pain .     Dispense:  40 tablet     Refill:  0     -----SURGERY SCHEDULING:-----  Schedule direct laryngoscopy with biopsy     ---INFORMED CONSENT DISCUSSION:---  DIRECT LARYNGOSCOPY WITH BIOPSY: The risks and benefits were explained including but not limited to pain, bleeding, infection, (including possible mediastinitis), the risks of the general anesthesia, pain, temporary or  permanent hoarseness, airway loss, possible need for tracheostomy and/or tooth injury. Questions were answered. No guarantees were made or implied.       ---PREOPERATIVE WORKUP:---  CBC  CMP  Chest X-Ray  EKG     Follow up  postoperatively    Alber Justin MD  10/02/17  4:44 PM        Electronically signed by Alber Justin MD at 10/2/2017  5:00 PM              Hospital Medications (active)       Dose Frequency Start End    aspirin chewable tablet 81 mg 81 mg Daily 10/5/2017     Sig - Route: Chew 1 tablet Daily. - Oral    baclofen (LIORESAL) tablet 20 mg 20 mg 3 Times Daily 10/5/2017     Sig - Route: Take 2 tablets by mouth 3 (Three) Times a Day. - Oral    ceFAZolin (ANCEF) IVPB (duplex) 2 g 2 g Every 8 Hours 10/5/2017 10/6/2017    Sig - Route: Infuse 2,000 mg into a venous catheter Every 8 (Eight) Hours. - Intravenous    dextrose (D50W) solution 25 g 25 g Every 15 Minutes PRN 10/5/2017     Sig - Route: Infuse 50 mL into a venous catheter Every 15 (Fifteen) Minutes As Needed for Low Blood Sugar (Blood Sugar Less Than 70, Patient Has IV Access - Unresponsive, NPO or Unable To Safely Swallow). - Intravenous    dextrose (GLUTOSE) oral gel 15 g 15 g Every 15 Minutes PRN 10/5/2017     Sig - Route: Take 15 g by mouth Every 15 (Fifteen) Minutes As Needed for Low Blood Sugar (Blood Sugar Less Than 70, Patient Alert, Is Not NPO & Can Safely Swallow). - Oral    dextrose 5 % and sodium chloride 0.45 % with KCl 20 mEq/L infusion 75 mL/hr Continuous 10/5/2017     Sig - Route: Infuse 75 mL/hr into a venous catheter Continuous. - Intravenous    diphenhydrAMINE (BENADRYL) capsule 25 mg 25 mg Every 6 Hours PRN 10/5/2017     Sig - Route: Take 1 capsule by mouth Every 6 (Six) Hours As Needed for Itching or Sleep. - Oral    furosemide (LASIX) tablet 40 mg 40 mg 2 Times Daily 10/5/2017     Sig - Route: Take 1 tablet by mouth 2 (Two) Times a Day. - Oral    glucagon (human recombinant) (GLUCAGEN DIAGNOSTIC) injection 1 mg 1  "mg Every 15 Minutes PRN 10/5/2017     Sig - Route: Inject 1 mg under the skin Every 15 (Fifteen) Minutes As Needed (Blood Glucose Less Than 70 - Patient Without IV Access - Unresponsive, NPO or Unable To Safely Swallow). - Subcutaneous    HYDROmorphone (DILAUDID) injection 1 mg 1 mg Every 4 Hours PRN 10/5/2017 10/15/2017    Sig - Route: Infuse 1 mL into a venous catheter Every 4 (Four) Hours As Needed for Severe Pain . - Intravenous    Linked Group 1:  \"And\" Linked Group Details        Influenza Vac Subunit Quad (FLUCELVAX) injection 0.5 mL 0.5 mL During Hospitalization 10/5/2017     Sig - Route: Inject 0.5 mL into the shoulder, thigh, or buttocks During Hospitalization for Immunization. - Intramuscular    Cosign for Ordering: Accepted by Alber Justin MD on 10/5/2017  3:30 PM    insulin lispro (humaLOG) injection 0-9 Units 0-9 Units 4 Times Daily With Meals & Nightly 10/5/2017     Sig - Route: Inject 0-9 Units under the skin 4 (Four) Times a Day With Meals & at Bedtime. - Subcutaneous    ipratropium-albuterol (DUO-NEB) nebulizer solution 3 mL 3 mL Every 4 Hours PRN 10/6/2017     Sig - Route: Take 3 mL by nebulization Every 4 (Four) Hours As Needed for Wheezing or Shortness of Air. - Nebulization    lidocaine (XYLOCAINE) 4 % nebulizer solution 5 mL 5 mL Once 10/5/2017 10/5/2017    Sig - Route: Take 5 mL by nebulization 1 (One) Time. - Nebulization    lisinopril (PRINIVIL,ZESTRIL) tablet 10 mg 10 mg Daily 10/5/2017     Sig - Route: Take 1 tablet by mouth Daily. - Oral    metoprolol tartrate (LOPRESSOR) tablet 50 mg 50 mg 2 Times Daily 10/5/2017     Sig - Route: Take 1 tablet by mouth 2 (Two) Times a Day. - Oral    naloxone (NARCAN) injection 0.1 mg 0.1 mg Every 5 Minutes PRN 10/5/2017     Sig - Route: Infuse 0.25 mL into a venous catheter Every 5 (Five) Minutes As Needed for Respiratory Depression. - Intravenous    Linked Group 1:  \"And\" Linked Group Details        nitroglycerin (NITROLINGUAL) 0.4 MG/SPRAY " spray 1 spray 1 spray Every 5 Minutes PRN 10/5/2017     Sig - Route: Place 1 spray under the tongue Every 5 (Five) Minutes As Needed for Chest Pain. - Sublingual    ondansetron (ZOFRAN) injection 4 mg 4 mg Once As Needed 10/5/2017     Sig - Route: Infuse 2 mL into a venous catheter 1 (One) Time As Needed for Nausea or Vomiting (If Phenergan Ineffective). - Intravenous    oxyCODONE-acetaminophen (PERCOCET)  MG per tablet 1 tablet 1 tablet Every 6 Hours PRN 10/5/2017     Sig - Route: Take 1 tablet by mouth Every 6 (Six) Hours As Needed for Moderate Pain . - Oral    pantoprazole (PROTONIX) EC tablet 40 mg 40 mg Every Morning 10/6/2017     Sig - Route: Take 1 tablet by mouth Every Morning. - Oral    sucralfate (CARAFATE) tablet 1 g 1 g 4 Times Daily Before Meals & Nightly 10/5/2017     Sig - Route: Take 1 tablet by mouth 4 (Four) Times a Day Before Meals & at Bedtime. - Oral    tamsulosin (FLOMAX) 24 hr capsule 0.4 mg 0.4 mg Nightly 10/5/2017     Sig - Route: Take 1 capsule by mouth Every Night. - Oral    zolpidem (AMBIEN) tablet 10 mg 10 mg Nightly PRN 10/5/2017     Sig - Route: Take 2 tablets by mouth At Night As Needed for Sleep. - Oral    atropine sulfate injection 0.5 mg (Discontinued) 0.5 mg Once As Needed 10/5/2017 10/5/2017    Sig - Route: Infuse 5 mL into a venous catheter 1 (One) Time As Needed for Bradycardia (symptomatic bradycardia (hypotension, dizziness, confusion). Notify attending anesthesiologist.). - Intravenous    Reason for Discontinue: Patient Transfer    buffered lidocaine syringe 0.5 mL (Discontinued) 0.5 mL Once As Needed 10/5/2017 10/5/2017    Sig - Route: Inject 0.5 mL as directed 1 (One) Time As Needed (IV Start). - Injection    Reason for Discontinue: Patient Transfer    dextrose (D50W) solution 12.5 g (Discontinued) 12.5 g As Needed 10/5/2017 10/5/2017    Sig - Route: Infuse 25 mL into a venous catheter As Needed for Low Blood Sugar (for blood sugar less than 60). - Intravenous     Reason for Discontinue: Patient Transfer    dextrose 5 % and sodium chloride 0.45 % with KCl 20 mEq/L infusion (Discontinued) 100 mL/hr Continuous 10/5/2017 10/5/2017    Sig - Route: Infuse 100 mL/hr into a venous catheter Continuous. - Intravenous    Reason for Discontinue: Duplicate order    diphenhydrAMINE (BENADRYL) injection 12.5 mg (Discontinued) 12.5 mg Every 15 Minutes PRN 10/5/2017 10/5/2017    Sig - Route: Infuse 0.25 mL into a venous catheter Every 15 (Fifteen) Minutes As Needed for Itching (May repeat x 1). - Intravenous    Reason for Discontinue: Patient Transfer    fentaNYL citrate (PF) (SUBLIMAZE) injection 25 mcg (Discontinued) 25 mcg Every 5 Minutes PRN 10/5/2017 10/5/2017    Sig - Route: Infuse 0.5 mL into a venous catheter Every 5 (Five) Minutes As Needed for Severe Pain . - Intravenous    Reason for Discontinue: Patient Transfer    FentaNYL Citrate (PF) (SUBLIMAZE) injection (Discontinued)  As Needed 10/5/2017 10/5/2017    Sig: As Needed for Severe Pain .    Reason for Discontinue: Anesthesia Stop    glycopyrrolate (ROBINUL) injection (Discontinued)  As Needed 10/5/2017 10/5/2017    Sig - Route: Infuse  into a venous catheter As Needed for excess secretions. - Intravenous    Reason for Discontinue: Anesthesia Stop    hydrALAZINE (APRESOLINE) injection 5 mg (Discontinued) 5 mg Every 10 Minutes PRN 10/5/2017 10/5/2017    Sig - Route: Infuse 0.25 mL into a venous catheter Every 10 (Ten) Minutes As Needed for High Blood Pressure (for systolic blood pressure greater than 180 mmHg or diastolic blood pressure greater than 105 mmHg and heart rate less than 60). - Intravenous    Reason for Discontinue: Patient Transfer    HYDROmorphone (DILAUDID) injection 0.5 mg (Discontinued) 0.5 mg Every 5 Minutes PRN 10/5/2017 10/5/2017    Sig - Route: Infuse 0.5 mL into a venous catheter Every 5 (Five) Minutes As Needed for Severe Pain . - Intravenous    Reason for Discontinue: Patient Transfer     ipratropium-albuterol (DUO-NEB) nebulizer solution 3 mL (Discontinued) 3 mL Once As Needed 10/5/2017 10/5/2017    Sig - Route: Take 3 mL by nebulization 1 (One) Time As Needed for Wheezing or Shortness of Air (bronchospasm). - Nebulization    Reason for Discontinue: Patient Transfer    ketamine (KETALAR) injection (Discontinued)  As Needed 10/5/2017 10/5/2017    Sig: As Needed.    Reason for Discontinue: Anesthesia Stop    labetalol (NORMODYNE,TRANDATE) injection 5 mg (Discontinued) 5 mg Every 5 Minutes PRN 10/5/2017 10/5/2017    Sig - Route: Infuse 1 mL into a venous catheter Every 5 (Five) Minutes As Needed for High Blood Pressure (for systolic blood pressure greater than 180 mmHg or diastolic blood pressure greater than 105 mmHg). - Intravenous    Reason for Discontinue: Patient Transfer    lactated ringers infusion 1,000 mL (Discontinued) 1,000 mL Continuous 10/5/2017 10/5/2017    Sig - Route: Infuse 1,000 mL into a venous catheter Continuous. - Intravenous    Reason for Discontinue: Patient Transfer    lactated ringers infusion (Discontinued) 9 mL/hr Continuous 10/5/2017 10/5/2017    Sig - Route: Infuse 9 mL/hr into a venous catheter Continuous. - Intravenous    Reason for Discontinue: Patient Transfer    lidocaine-EPINEPHrine (XYLOCAINE W/EPI) 1 %-1:905618 injection (Discontinued)  As Needed 10/5/2017 10/5/2017    Sig: As Needed.    Reason for Discontinue: Patient Discharge    meperidine (DEMEROL) injection 12.5 mg (Discontinued) 12.5 mg Every 5 Minutes PRN 10/5/2017 10/5/2017    Sig - Route: Infuse 0.5 mL into a venous catheter Every 5 (Five) Minutes As Needed for Shivering (May repeat x 1). - Intravenous    Reason for Discontinue: Patient Transfer    midazolam (VERSED) injection (Discontinued)  As Needed 10/5/2017 10/5/2017    Sig - Route: Infuse  into a venous catheter As Needed. - Intravenous    Reason for Discontinue: Anesthesia Stop    Morphine sulfate (PF) injection 2 mg (Discontinued) 2 mg Every 5  Minutes PRN 10/5/2017 10/5/2017    Sig - Route: Infuse 1 mL into a venous catheter Every 5 (Five) Minutes As Needed for Moderate Pain . - Intravenous    Reason for Discontinue: Patient Transfer    naloxone (NARCAN) injection 0.4 mg (Discontinued) 0.4 mg As Needed 10/5/2017 10/5/2017    Sig - Route: Infuse 1 mL into a venous catheter As Needed for Opioid Reversal (unresponsiveness, decrease oxygen saturation). - Intravenous    Reason for Discontinue: Patient Transfer    ondansetron (ZOFRAN) injection 4 mg (Discontinued) 4 mg Once As Needed 10/5/2017 10/5/2017    Sig - Route: Infuse 2 mL into a venous catheter 1 (One) Time As Needed for Nausea or Vomiting. - Intravenous    Reason for Discontinue: Patient Transfer    oxymetazoline (AFRIN) nasal spray (Discontinued)  As Needed 10/5/2017 10/5/2017    Sig: As Needed.    Reason for Discontinue: Patient Discharge    Phenylephrine HCl-NaCl (PF) 0.8-0.9 MG/10ML-% syringe solution prefilled syringe (Discontinued)  As Needed 10/5/2017 10/5/2017    Sig - Route: Infuse  into a venous catheter As Needed. - Intravenous    Reason for Discontinue: Anesthesia Stop    Propofol (DIPRIVAN) injection (Discontinued)  As Needed 10/5/2017 10/5/2017    Sig: As Needed.    Reason for Discontinue: Anesthesia Stop    sodium chloride (NS) irrigation solution (Discontinued)  As Needed 10/5/2017 10/5/2017    Sig: As Needed.    Reason for Discontinue: Patient Discharge    sodium chloride 0.9 % flush 1-10 mL (Discontinued) 1-10 mL As Needed 10/5/2017 10/5/2017    Sig - Route: Infuse 1-10 mL into a venous catheter As Needed for Line Care. - Intravenous    Reason for Discontinue: Patient Transfer    sodium chloride 0.9 % flush 3 mL (Discontinued) 3 mL As Needed 10/5/2017 10/5/2017    Sig - Route: Infuse 3 mL into a venous catheter As Needed for Line Care. - Intravenous    Reason for Discontinue: Patient Transfer    sodium chloride 0.9 % with KCl 20 mEq/L infusion (Discontinued) 100 mL/hr Continuous  10/5/2017 10/5/2017    Sig - Route: Infuse 100 mL/hr into a venous catheter Continuous. - Intravenous    Reason for Discontinue: Duplicate order    Vasopressin solution (Discontinued)  As Needed 10/5/2017 10/5/2017    Sig - Route: Infuse  into a venous catheter As Needed. 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.9]    Post-op Diagnosis:     Post-Op Diagnosis Codes:     * Oropharyngeal cancer [C10.9]     * Airway compromise [J98.8]    Procedure/CPT® Codes:  DE TRACHEOSTOMY, PLANNED [28491]  DE LARYNGOSCOPY,DIRCT,OP,BIOPSY [33441]    Procedure(s):  Awake tracheostomy; direct laryngoscopy with biopsy of the right tonsil and base of tongue    Surgeon(s):  Alber Justin MD    Anesthesia:   General    Staff:   Circulator: Jerica Lees RN  Scrub Person: Jaylyn Benito; Hussein Khoury    Estimated Blood Loss:   minimal    Specimens:                Right tonsil and base of tongue biopsies      Drains:   None      Findings:   There is a large ulcerative lesion that was in the oral pharynx.  It extended from the right lateral aspect of the uvula and extended to the posterior rim of the soft palate extending to the tonsillar fossa and deeply penetrating into it.  It involved the right base of tongue and extended at least to the midline of the base of tongue.  It extended forward into the lingual tongue muscular approximately 2 cm.                  Complications:   none    Reason for the Operation:  Sameer Tavarez is a 62 y.o. male who presented to my office after a recent EGD and biopsy of the oropharynx showed a growth in the oral pharynx.  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  Further workup  showed a large 7 cm oropharyngeal mass at the tongue and tonsillar area.  This lit up brightly on PET scanning.  Further biopsies were requested to get the diagnosis of an invasive squamous cell carcinoma.  I felt that due to the patient's symptoms of choking and inability to lay flat as well as the size and magnitude of the lesion on examination, that his airway was at risk.  Therefore we discussed awake tracheostomy as a planned procedure to allow for safety during this procedure and upcoming procedures to further workup is carcinoma.  Risks benefits and alternatives were discussed and he wished to proceed with the procedure    Procedure Description:  The patient was taken back to the operating room and placed in a supine position.  A time out was performed to confirm the patient and the procedure. Due to the degree of obstruction of the patients airway, it was felt that intubation would pose too great of risk for airway loss. Therefore, the tracheostomy was performed under local anesthesia until the airway was secured.  A shoulder roll was placed and the site was marked and injected with 1:100,000 parts epinephrine. The patient was prepped and draped with the usual manner and the operation was begun. A horizontal incision was made 1-2 fingerbredths above the sternal notch and taken down to the subcutaneous tissue. A small amount of subcutaneous fat was removed with a bovie cautery to help with the exposure. Retraction was accomplished with dura hooks. Dissection was carried down to the strap muscles which were divided at midline. The thyroid was divided with the cautery..  The anterior face of the trachea was exposed. The trachea was entered through the third and fourth cartilaginous interspace A Vinny inferiorly based cartilaginous flap was created with a sissors. This was secured to the dermis of the lower skin edge with a 3-0 vicryl suture. Hemostasis was acheived with bipolar cautery. The endotracheal tube  was then backed up to just above the tracheostomy site by the anesthesia team. Then, a #6 cuffed Shiley tracheotomy tube was placed in the tracheal opening without difficulty. The anesthesia circut was then attatched to the tracheotomy tube and proper ventilation as acheived. The dura hooks were removed. The tracheotomy tube was then secured with 2-0 silk sutures and a trachotomy tie was placed. A sponge dressing was placed under the tracheotomy tube. The table was turned 90° to facilitate axis to the head.  The patient was prepped and draped in the standard laryngoscopy fashion.  A Nahum-Espinoza mouthgag was inserted to expose the oral pharynx.  The above-mentioned findings were noted.  I then removed the Nahum-Espinoza mouthgag and used direct laryngoscopy to evaluate the deeper tissue.  A tooth guard was placed to protect the teeth.  Direct laryngoscopy was carried out with the anterior commissure laryngoscope systematically examining all the structures of the oropharynx, supraglottic larynx, true vocal cords, and hypopharynx. There was a large oropharyngeal lesion as described above.  Biopsies were then taken with cup forceps of the right tonsillar fossa and the right base of tongue.  These were sent in formalin with several deep and superficial bites for permanent section.  Afrin-soaked pledgets were placed for hemostasis.  The patient was then turned over to the anesthesia team in a stable condition.   The patient was then turned over to the anesthesia team and allowed to wake from anesthesia.       Alber Justin MD     Date: 10/5/2017  Time: 3:41 PM       Electronically signed by Alber Justin MD at 10/5/2017  3:48 PM          Inpatient Consult to Case Management  [[id number]] (Order [id number])   Consult    Date: 10/5/2017   Department: [facility] INTENSIVE CARE   Released By/Authorizing: Alber Justin MD (auto-released)         Order History  Inpatient     Date/Time  Action Taken User Additional Information     10/05/17 1353 Release Alber Justin MD (auto-released) From Order: [id number]       Order Details      Frequency Duration Priority Order Class     Once 1  occurrence Routine Hospital Performed       Comments      Patient will need 14 Irish suction catheters, bedside portable suction, humidified trach collar, 7 inner cannulas per week specified for trach size and fenestration, O2 per trach collar if indicated.       Order Questions      Question Answer Comment     Reason for Consult? evaluate for home health and transition for home with tracheostomy care/management        Consult Order Info      ID Description Priority Start Date Start Time     [id number] Inpatient Consult to Case Management  Routine 10/05/2017  1:53 PM         Provider Specialty Referred to     ______________________________________ _____________________________________       Acknowledgement Info      For At Acknowledged By Acknowledged On     Placing Order 10/05/17 135 Jo Ann Graham RN 10/05/17 152       Reprint Order Requisition      Inpatient Consult to Case Management  (Order #[id number]) on 10/5/17       Encounter      View Encounter           Order Provider Info          Office phone Pager E-mail     Ordering User Alber Justin [phone]     Authorizing Provider Alber Justin [phone] --     Attending Provider Alber Justin [phone] -- --       Order Transmittal Tracking      Inpatient Consult to Case Management  (Order #[id number]) on 10/5/17                Physician Progress Notes (all)      LAURY Wheeler at 10/6/2017  9:11 AM  Version 1 of 1         ENT  [address] (covering for Dr. Justin)  Hospital Progress Note:       LOS: 1 day   Patient Care Team:  Christian Castellon MD as PCP - General (Internal Medicine)  Sin Alarcon MD as Referring Physician (General  Practice)  Alber Justin MD as Consulting Physician (Otolaryngology)  Kriss Dominguez MD as Referring Physician (Hematology and Oncology)  Hadley Marie III, MD as Consulting Physician (Radiation Oncology)    Active consulting complaint: Pain    Subjective     Interval History:     Status of active consulting problem:     History taken from: patient    Review of Systems:    Review of Systems  C/o severe pain    Objective     Vital Signs  Temp:  [97.5 °F (36.4 °C)-98.2 °F (36.8 °C)] 98.2 °F (36.8 °C)  Heart Rate:  [] 78  Resp:  [10-20] 18  BP: ()/(54-92) 152/84    Physical Exam:   Physical Exam     CONSTITUTIONAL: well nourished, alert, oriented, in no acute distress     HEAD: normocephalic, no lesions, atraumatic, no tenderness, no masses     FACE: appearance normal, no lesions, no tenderness, no deformities, facial motion symmetric      EYES: ocular motility normal, eyelids normal, orbits normal, no proptosis, conjunctiva normal , pupils equal, round     EARS:  Hearing: response to conversational voice normal bilaterally   External Ears: auricles without lesions    NOSE:  External Nose: structure normal, no tenderness on palpation, no nasal discharge, no lesions, no evidence of trauma, nostrils patent     ORAL:  Lips: upper and lower lips without lesion   Teeth: dentition within normal limits for age   Gums: gingivae healthy   Oral Mucosa: oral mucosa normal, no mucosal lesions   Floor of Mouth: Warthin’s duct patent, mucosa normal  Tongue: lingual mucosa normal without lesions, normal tongue mobility   Palate: soft and hard palates with normal mucosa and structure  Oropharynx: oropharyngeal mucosa normal    NECK: trach appliance intact with sutures cuff inflated attempted to deflate patient became anxious and upset, mild old blood tinged sputum    THYROID: no overt thyromegaly, no tenderness, nodules or mass present on palpation, position midline     LYMPH NODES: no  lymphadenopathy    CHEST/RESPIRATORY: respiratory effort normal, normal breath sounds     CARDIOVASCULAR: rate and rhythm normal, extremities without cyanosis or edema      NEUROLOGIC/PSYCHIATRIC: oriented to time, place and person, mood normal, affect appropriate, CN II-XII intact grossly    Results Review:       Lab Results (last 24 hours)     Procedure Component Value Units Date/Time    POC Glucose Fingerstick [[id number]]  (Abnormal) Collected:  10/05/17 0921    Specimen:  Blood Updated:  10/05/17 0932     Glucose 175 (H) mg/dL       : [id number] Good DawnMeter ID: [id number]       POC Glucose Fingerstick [[id number]]  (Normal) Collected:  10/05/17 1357    Specimen:  Blood Updated:  10/05/17 1410     Glucose 78 mg/dL       : [id number] Arthur BethMeter ID: [id number]       POC Glucose Fingerstick [[id number]]  (Abnormal) Collected:  10/05/17 1821    Specimen:  Blood Updated:  10/05/17 1842     Glucose 65 (L) mg/dL       : [id number] Santos ArrietaseyMeter ID: [id number]       POC Glucose Fingerstick [[id number]]  (Abnormal) Collected:  10/05/17 1949    Specimen:  Blood Updated:  10/05/17 2010     Glucose 60 (L) mg/dL       : [id number] Mark MalloryMeter ID: [id number]       POC Glucose Fingerstick [[id number]]  (Abnormal) Collected:  10/05/17 2019    Specimen:  Blood Updated:  10/05/17 2031     Glucose 62 (L) mg/dL       : [id number] Zac ReichandaMeter ID: [id number]       POC Glucose Fingerstick [[id number]]  (Abnormal) Collected:  10/05/17 2338    Specimen:  Blood Updated:  10/05/17 2351     Glucose 67 (L) mg/dL       : [id number] Flores MalloryMeter ID: [id number]       CBC (No Diff) [[id number]]  (Abnormal) Collected:  10/06/17 0243    Specimen:  Blood Updated:  10/06/17 0305     WBC 10.37 10*3/mm3      RBC 4.66 (L) 10*6/mm3      Hemoglobin 14.1 g/dL      Hematocrit 39.8 (L) %      MCV 85.4 fL      MCH 30.3 pg      MCHC 35.4 g/dL      RDW 12.7 %      RDW-SD 39.5 (L) fl      MPV 11.5 fL       Platelets 177 10*3/mm3     Basic Metabolic Panel [[id number]]  (Abnormal) Collected:  10/06/17 0243    Specimen:  Blood Updated:  10/06/17 0311     Glucose 114 (H) mg/dL      BUN 10 mg/dL      Creatinine 0.57 mg/dL      Sodium 139 mmol/L      Potassium 4.0 mmol/L      Chloride 103 mmol/L      CO2 26.0 mmol/L      Calcium 9.0 mg/dL      eGFR Non African Amer 145 mL/min/1.73      BUN/Creatinine Ratio 17.5     Anion Gap 10.0 mmol/L     Narrative:       GFR Normal >60  Chronic Kidney Disease <60  Kidney Failure <15    TSH [[id number]]  (Abnormal) Collected:  10/06/17 0243    Specimen:  Blood Updated:  10/06/17 0341     TSH 0.161 (L) mIU/mL     Hemoglobin A1c [[id number]] Collected:  10/06/17 0243    Specimen:  Blood Updated:  10/06/17 0543     Hemoglobin A1C 8.7 %     Narrative:       Less than 6.0           Non-Diabetic Range  6.0-7.0                 ADA Therapeutic Target  Greater than 7.0        Action Suggested    Tissue Pathology Exam - Tissue, Tonsil, Right [[id number]] Collected:  10/05/17 1324    Specimen:  Tissue from Tonsil, Right; Tissue from Tongue Updated:  10/06/17 0825    POC Glucose Fingerstick [[id number]]  (Abnormal) Collected:  10/06/17 0827    Specimen:  Blood Updated:  10/06/17 0904     Glucose 156 (H) mg/dL       : [id number] Pedraza [facility]ter ID: [id number]           Imaging Results (last 24 hours)     Procedure Component Value Units Date/Time    XR Chest 1 View [[id number]] Collected:  10/05/17 1853     Updated:  10/05/17 1857    Narrative:       HISTORY: Trach placement     CXR: Frontal view the chest performed.     COMPARISON: 10/04/2017     FINDINGS: Tracheostomy tube is in place. The tip is positioned  appropriately above the bola. There is evidence of prior mediastinal  surgery. The left coronary artery stents. There is a diminished level of  inspiration. Prominent pulmonary vessels likely due to vascular  crowding. Mild venous congestion considered. No pleural effusion or  pneumothorax  is visualized.       Impression:       1. Appropriate positioning of the tracheostomy tube.  2. Diminished level of inspiration with probable vascular crowding. Mild  venous congestion considered. Left basilar atelectasis.  3. Prior mediastinal surgery. Coronary artery stents.  This report was finalized on 10/05/2017 18:54 by Dr. Christen Obrien MD.          Medication Review:     Current Facility-Administered Medications   Medication Dose Route Frequency Provider Last Rate Last Dose   • aspirin chewable tablet 81 mg  81 mg Oral Daily Alber Justin MD       • baclofen (LIORESAL) tablet 20 mg  20 mg Oral TID Alber Justin MD       • dextrose (D50W) solution 25 g  25 g Intravenous Q15 Min PRN Alber Justin MD       • dextrose (GLUTOSE) oral gel 15 g  15 g Oral Q15 Min PRN Alber Justin MD   15 g at 10/1955   • dextrose 5 % and sodium chloride 0.45 % with KCl 20 mEq/L infusion  75 mL/hr Intravenous Continuous Fly Stanford MD 75 mL/hr at 10/05/17 2046 75 mL/hr at 10/05/17 2046   • diphenhydrAMINE (BENADRYL) capsule 25 mg  25 mg Oral Q6H PRN Alber Justin MD       • furosemide (LASIX) tablet 40 mg  40 mg Oral BID Alber Justin MD       • glucagon (human recombinant) (GLUCAGEN DIAGNOSTIC) injection 1 mg  1 mg Subcutaneous Q15 Min PRN Alber Justin MD       • HYDROmorphone (DILAUDID) injection 1 mg  1 mg Intravenous Q4H PRN Alber Justin MD   1 mg at 10/06/17 0855    And   • naloxone (NARCAN) injection 0.1 mg  0.1 mg Intravenous Q5 Min PRN Alber Justin MD       • Influenza Vac Subunit Quad (FLUCELVAX) injection 0.5 mL  0.5 mL Intramuscular During Hospitalization Alber Justin MD       • insulin lispro (humaLOG) injection 0-9 Units  0-9 Units Subcutaneous 4x Daily With Meals & Nightly Alber Justin MD   2 Units at 10/06/17 0837   • ipratropium-albuterol (DUO-NEB) nebulizer solution 3 mL  3 mL Nebulization Q4H PRN Fly Stanford MD       •  lisinopril (PRINIVIL,ZESTRIL) tablet 10 mg  10 mg Oral Daily Alber Justin MD       • metoprolol tartrate (LOPRESSOR) tablet 50 mg  50 mg Oral BID Alber Justin MD       • nitroglycerin (NITROLINGUAL) 0.4 MG/SPRAY spray 1 spray  1 spray Sublingual Q5 Min PRN Alber Justin MD       • ondansetron (ZOFRAN) injection 4 mg  4 mg Intravenous Once PRN Alber Justin MD       • oxyCODONE-acetaminophen (PERCOCET)  MG per tablet 1 tablet  1 tablet Oral Q6H PRN Alber Justin MD       • pantoprazole (PROTONIX) EC tablet 40 mg  40 mg Oral QAM Alber Justin MD       • sucralfate (CARAFATE) tablet 1 g  1 g Oral 4x Daily AC & at Bedtime Alber Justin MD       • tamsulosin (FLOMAX) 24 hr capsule 0.4 mg  0.4 mg Oral Nightly Alber Justin MD       • zolpidem (AMBIEN) tablet 10 mg  10 mg Oral Nightly PRN Alber Justin MD           Assessment/Plan     Active Problems:    Oropharyngeal cancer    Tracheostomy dependence    Postoperative pain      Plan:  Will discuss with Dr. Jayne Phillips plans/date for PEG tube.  Will attempt with SLP at a later time and patient is more relaxed, consideration of deflating cuff and attempting speech.  He has too much secretions at this time.        LAURY Causey  10/06/17  9:17 AM       Electronically signed by LAURY Wheeler at 10/6/2017  9:17 AM

## 2017-10-06 NOTE — ANESTHESIA PREPROCEDURE EVALUATION
Anesthesia Evaluation     Patient summary reviewed   NPO Solid Status: > 8 hours       Airway   Mallampati: IV  TM distance: >3 FB  Neck ROM: limited  Dental    (+) poor dentition    Comment: Trach in place      Pulmonary - normal exam   (+) a smoker,   (-) COPD, asthma, sleep apnea    ROS comment: Trach in place  Cardiovascular   Exercise tolerance: good (4-7 METS)    ECG reviewed  Patient on routine beta blocker and Beta blocker not taken-may be given intraoperatively    (+) hypertension, CAD, CABG, cardiac stents (10/2016)   (-) pacemaker, angina    PE comment: PVCs    Neuro/Psych  (-) seizures, TIA, CVA  GI/Hepatic/Renal/Endo    (+)  GERD, diabetes mellitus using insulin,   (-) liver disease, no renal disease    Musculoskeletal     Abdominal    Substance History      OB/GYN          Other      history of cancer (OP cancer, SCC)                                    Anesthesia Plan    ASA 4     general   (Patient received ativan at 1130 and is sedated at bedside. History obtained from chart review and bedside RN.)  intravenous induction

## 2017-10-06 NOTE — SIGNIFICANT NOTE
Received order for Passy Patric valve, however will wait for confirmation from ENT on whether to complete eval yet or not as pt's trach was placed less than 24 hours ago and respiratory reports pt's cuff has not yet been deflated.     10/06/17 0825   Time Calculation- SLP   SLP Start Time 0813   SLP Stop Time 0824   SLP Time Calculation (min) 11 min   SLP Non-Billable Time (min) 11 min   SLP Received On 10/06/17   Roseann Garcia CCC-SLP 10/6/2017 8:27 AM

## 2017-10-06 NOTE — PLAN OF CARE
Problem: Patient Care Overview (Adult)  Goal: Plan of Care Review  Outcome: Ongoing (interventions implemented as appropriate)    10/06/17 1510   Coping/Psychosocial Response Interventions   Plan Of Care Reviewed With patient   Patient Care Overview   Progress no change   Outcome Evaluation   Outcome Summary/Follow up Plan vital signs stable, pacu ischarge critera met         Problem: Perioperative Period (Adult)  Goal: Signs and Symptoms of Listed Potential Problems Will be Absent or Manageable (Perioperative Period)  Outcome: Ongoing (interventions implemented as appropriate)

## 2017-10-06 NOTE — CONSULTS
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 Justin    History Obtained From:  PATIENT and CHART    HISTORY OF PRESENT ILLNESS:    Mr Sameer Tavarez is a 62 years old male with a recent finding of an oropharyngeal lesion consistent squamous cell carcinoma in situ.  He is presently admitted after having a laryngoscopy with biopsy of the right tonsil and base of tongue and tracheostomy placement on 10/5/2017 by Dr. Justin. The tracheostomy was placed due to concern of the stability of the airway, especially with the upcoming expected dental extractions and potential placement G-tube and Port-A-Cath.    Diagnosis  Squamous cell carcinoma in situ. 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 He had recent complains of weight loss of about 15 pounds and difficulty swallowing.  8/27/2017-he underwent an upper endoscopy for follow-up on a history of Recio's esophagus. A oropharynx lesion was noted and biopsy was consistent with at least squamous cell carcinoma in situ. Status of invasion was indeterminate. P 16 was positive. He was referred to ENT.   9/13/2017-CT scan of the chest/abdomen/pelvis contrast showed a 1.1 cm lymph node adjacent to the distal esophagus. 2 small pulmonary nodules measuring 4 mm and 4.2 mm in diameter located in the right lower lobe and at right middle lobe respectively. Multiple solid noncalcified nodules smaller than 6 mm in diameter. A 4.9 x 2.5 x 3.3 cm abnormal soft tissue appearance in the right perineum of unknown significance. In addition, 1 x 1.5 cm sclerotic lesion in the left iliac wing laterally to the  sacroiliac joints. 7 mm sclerosis the left iliac lateral to the left SI joint. Sclerosis on the medial side of the right sacroiliac joints.   9/19/2017- he was first seen by us. He needs to be seen by ENT and have a biopsy repeated. Referral to Dr. Hadley Marie.   9/29/2017- PET scan, skull base to mid thigh revealed abnormal metabolic activity in th.  No other regions of abnormal metabolic activity were identified.  9/29/2017- MRI orbital face, neck revealed large right oropharyngeal mass measuring 6.7 x 4.6 x 4.6 cm with involvement of the right side of the base of the tongue.  Invasion of the superior margin of the right submandibular gland is noted.  No bony involvement is noted.  Scattered left jugular lymph nodes measure up to 1.2 x 0.6 cm.    Perineal mass-unknow etiology. PET scan to interrogate metabolic activity. We'll follow-up on this.    Multiple pulmonary nodules- according to radiology they are noncalcified nodules less than 6 mm and therefore no need for follow-up.    Smoking cessation counseling-  we have talked about the importance of quitting. Specifically, we discussed about the risks related to tobacco including but not limited to risk of several types of cancer, cardiovascular disease, stroke, bad dentition, financial loss and so on. I advised the patient quitting. I offered the patient resources such as nicotine patches or medications to help with the craving. The patient is contemplative at this time. We will follow-up on this during the next visit and continue to encourage on quitting this habit.     Past Medical History:   Past Medical History:   Diagnosis Date   • Arthritis    • Coronary artery disease    • Depression    • Diabetes mellitus    • Enlarged prostate    • GERD (gastroesophageal reflux disease)    • History of transfusion    • Hypertension    • Oropharyngeal cancer 08/27/2017   • Seizure     diabetic   • Severe esophageal dysplasia    • Stroke    • TB (pulmonary tuberculosis)  2004     Past Surgical History:   Past Surgical History:   Procedure Laterality Date   • CARDIAC SURGERY     • CHOLECYSTECTOMY     • CORONARY ARTERY BYPASS GRAFT     • FRACTURE SURGERY     • HERNIA REPAIR     • NISSEN FUNDOPLICATION LAPAROSCOPIC     • SKIN BIOPSY     • TESTICLE SURGERY      tubes implanted for drainage   • TONSILLECTOMY     • TRACHEOSTOMY N/A 10/5/2017    Procedure: Awake tracheostomy; DIRECT LARYNGOSCOPY WITH BIOPSY;  Surgeon: Alber Justin MD;  Location: [facility];  Service:      Social History:   Social History     Social History   • Marital status:      Spouse name: N/A   • Number of children: N/A   • Years of education: N/A     Occupational History   •       Disabled     Social History Main Topics   • Smoking status: Current Every Day Smoker     Packs/day: 1.00     Years: 52.00     Types: Cigarettes   • Smokeless tobacco: Current User     Types: Snuff      Comment: I've tried and tried   • Alcohol use Yes      Comment: occasionally   • Drug use: No   • Sexual activity: Defer     Other Topics Concern   • Not on file     Social History Narrative     Family History:   Family History   Problem Relation Age of Onset   • Stroke Mother    • Heart disease Father    • Heart disease Brother    • Cancer Brother        Review of Systems   Constitutional: Positive for fatigue. Negative for activity change, appetite change, chills, diaphoresis and fever.   HENT: Positive for sore throat and trouble swallowing. Negative for congestion, ear discharge, ear pain, facial swelling, hearing loss, mouth sores, nosebleeds, postnasal drip, rhinorrhea, sinus pressure, tinnitus and voice change.    Eyes: Negative for photophobia, pain, discharge and visual disturbance.   Respiratory: Positive for cough. Negative for apnea, chest tightness, shortness of breath, wheezing and stridor.    Cardiovascular: Negative for chest pain, palpitations and leg swelling.   Gastrointestinal: Negative for  abdominal distention, abdominal pain, blood in stool, constipation, diarrhea, nausea, rectal pain and vomiting.   Endocrine: Negative for cold intolerance, heat intolerance, polydipsia, polyphagia and polyuria.   Genitourinary: Negative for difficulty urinating, dysuria, flank pain, frequency, hematuria and urgency.   Musculoskeletal: Negative for arthralgias, back pain, gait problem, joint swelling and myalgias.   Skin: Negative for color change, pallor, rash and wound.   Allergic/Immunologic: Negative for environmental allergies, food allergies and immunocompromised state.   Neurological: Positive for weakness. Negative for dizziness, tremors, seizures, syncope, speech difficulty, light-headedness, numbness and headaches.   Hematological: Negative for adenopathy. Does not bruise/bleed easily.   Psychiatric/Behavioral: Negative for agitation, confusion, hallucinations, sleep disturbance and suicidal ideas. The patient is not nervous/anxious.           Medications:    Current Facility-Administered Medications   Medication Dose Route Frequency Provider Last Rate Last Dose   • aspirin chewable tablet 81 mg  81 mg Oral Daily Alber Justin MD       • baclofen (LIORESAL) tablet 20 mg  20 mg Oral TID Alber Justin MD       • dextrose (D50W) solution 25 g  25 g Intravenous Q15 Min PRN Alber Justin MD       • dextrose (GLUTOSE) oral gel 15 g  15 g Oral Q15 Min PRN Alber Justin MD   15 g at 10/1955   • dextrose 5 % and sodium chloride 0.45 % with KCl 20 mEq/L infusion  75 mL/hr Intravenous Continuous Fly Stanford MD 75 mL/hr at 10/06/17 0951 75 mL/hr at 10/06/17 0951   • diphenhydrAMINE (BENADRYL) capsule 25 mg  25 mg Oral Q6H PRN Alber Justin MD       • furosemide (LASIX) tablet 40 mg  40 mg Oral BID Alber Justin MD       • glucagon (human recombinant) (GLUCAGEN DIAGNOSTIC) injection 1 mg  1 mg Subcutaneous Q15 Min PRN Alber Justin MD       • HYDROmorphone  (DILAUDID) injection 1 mg  1 mg Intravenous Q4H PRN Alber Justin MD   1 mg at 10/06/17 0855    And   • naloxone (NARCAN) injection 0.1 mg  0.1 mg Intravenous Q5 Min PRN Alber Justin MD       • Influenza Vac Subunit Quad (FLUCELVAX) injection 0.5 mL  0.5 mL Intramuscular During Hospitalization Alber Justin MD       • insulin lispro (humaLOG) injection 0-9 Units  0-9 Units Subcutaneous 4x Daily With Meals & Nightly Alber Justin MD   2 Units at 10/06/17 0837   • ipratropium-albuterol (DUO-NEB) nebulizer solution 3 mL  3 mL Nebulization Q4H PRN Fly Stanford MD       • lisinopril (PRINIVIL,ZESTRIL) tablet 10 mg  10 mg Oral Daily Alber Justin MD       • metoprolol tartrate (LOPRESSOR) tablet 50 mg  50 mg Oral BID Alber Justin MD       • nitroglycerin (NITROLINGUAL) 0.4 MG/SPRAY spray 1 spray  1 spray Sublingual Q5 Min PRN Alber Justin MD       • ondansetron (ZOFRAN) injection 4 mg  4 mg Intravenous Once PRN Alber Justin MD       • oxyCODONE-acetaminophen (PERCOCET)  MG per tablet 1 tablet  1 tablet Oral Q6H PRN Alber Justin MD       • pantoprazole (PROTONIX) EC tablet 40 mg  40 mg Oral QAM Alber Justin MD       • sucralfate (CARAFATE) tablet 1 g  1 g Oral 4x Daily AC & at Bedtime Alber Justin MD       • tamsulosin (FLOMAX) 24 hr capsule 0.4 mg  0.4 mg Oral Nightly Alber Justin MD       • zolpidem (AMBIEN) tablet 10 mg  10 mg Oral Nightly PRN Alber Justin MD           ALLERGIES:    Allergies   Allergen Reactions   • Codeine Hives       Objective      Vitals:    10/06/17 0711 10/06/17 0800 10/06/17 0805 10/06/17 0905   BP:   152/84 141/90   BP Location:       Patient Position:       Pulse: 90 79 78 82   Resp: 18 18  17   Temp:  98.2 °F (36.8 °C)     TempSrc:  Axillary     SpO2: 100% 99% 100% 98%   Weight:       Height:           Lab Results:    Lab Results (last 72 hours)     Procedure Component Value Units  Date/Time    POC Glucose Fingerstick [[id number]]  (Abnormal) Collected:  10/05/17 0921    Specimen:  Blood Updated:  10/05/17 0932     Glucose 175 (H) mg/dL       : [id number] Funk DawnMeter ID: [id number]       POC Glucose Fingerstick [[id number]]  (Normal) Collected:  10/05/17 1357    Specimen:  Blood Updated:  10/05/17 1410     Glucose 78 mg/dL       : [id number] Arthur BethMeter ID: [id number]       POC Glucose Fingerstick [[id number]]  (Abnormal) Collected:  10/05/17 1821    Specimen:  Blood Updated:  10/05/17 1842     Glucose 65 (L) mg/dL       : [id number] Santos ChelseyMeter ID: [id number]       POC Glucose Fingerstick [[id number]]  (Abnormal) Collected:  10/05/17 1949    Specimen:  Blood Updated:  10/05/17 2010     Glucose 60 (L) mg/dL       : [id number] Flores MalloryMeter ID: [id number]       POC Glucose Fingerstick [[id number]]  (Abnormal) Collected:  10/05/17 2019    Specimen:  Blood Updated:  10/05/17 2031     Glucose 62 (L) mg/dL       : [id number] Zac AmandaMeter ID: [id number]       POC Glucose Fingerstick [[id number]]  (Abnormal) Collected:  10/05/17 2338    Specimen:  Blood Updated:  10/05/17 2351     Glucose 67 (L) mg/dL       : [id number] Flores MalloryMeter ID: [id number]       CBC (No Diff) [[id number]]  (Abnormal) Collected:  10/06/17 0243    Specimen:  Blood Updated:  10/06/17 0305     WBC 10.37 10*3/mm3      RBC 4.66 (L) 10*6/mm3      Hemoglobin 14.1 g/dL      Hematocrit 39.8 (L) %      MCV 85.4 fL      MCH 30.3 pg      MCHC 35.4 g/dL      RDW 12.7 %      RDW-SD 39.5 (L) fl      MPV 11.5 fL      Platelets 177 10*3/mm3     Basic Metabolic Panel [[id number]]  (Abnormal) Collected:  10/06/17 0243    Specimen:  Blood Updated:  10/06/17 0311     Glucose 114 (H) mg/dL      BUN 10 mg/dL      Creatinine 0.57 mg/dL      Sodium 139 mmol/L      Potassium 4.0 mmol/L      Chloride 103 mmol/L      CO2 26.0 mmol/L      Calcium 9.0 mg/dL      eGFR Non African Amer 145 mL/min/1.73       BUN/Creatinine Ratio 17.5     Anion Gap 10.0 mmol/L     Narrative:       GFR Normal >60  Chronic Kidney Disease <60  Kidney Failure <15    TSH [[id number]]  (Abnormal) Collected:  10/06/17 0243    Specimen:  Blood Updated:  10/06/17 0341     TSH 0.161 (L) mIU/mL     Hemoglobin A1c [[id number]] Collected:  10/06/17 0243    Specimen:  Blood Updated:  10/06/17 0543     Hemoglobin A1C 8.7 %     Narrative:       Less than 6.0           Non-Diabetic Range  6.0-7.0                 ADA Therapeutic Target  Greater than 7.0        Action Suggested          Radiology Results:    Imaging Results (last 72 hours)     Procedure Component Value Units Date/Time    XR Chest 1 View [[id number]] Collected:  10/05/17 1853     Updated:  10/05/17 1857    Narrative:       HISTORY: Trach placement     CXR: Frontal view the chest performed.     COMPARISON: 10/04/2017     FINDINGS: Tracheostomy tube is in place. The tip is positioned  appropriately above the bola. There is evidence of prior mediastinal  surgery. The left coronary artery stents. There is a diminished level of  inspiration. Prominent pulmonary vessels likely due to vascular  crowding. Mild venous congestion considered. No pleural effusion or  pneumothorax is visualized.       Impression:       1. Appropriate positioning of the tracheostomy tube.  2. Diminished level of inspiration with probable vascular crowding. Mild  venous congestion considered. Left basilar atelectasis.  3. Prior mediastinal surgery. Coronary artery stents.  This report was finalized on 10/05/2017 18:54 by Dr. Christen Obrien MD.            Physical Exam   Constitutional: He is oriented to person, place, and time. Vital signs are normal. He is cooperative. He appears ill.   HENT:   Head: Normocephalic and atraumatic.   Nose: Nose normal.   Mouth/Throat: Oropharynx is clear and moist. Mucous membranes are dry.   right base of tongue fullness    Eyes: Conjunctivae, EOM and lids are normal. Pupils are equal,  round, and reactive to light.   Neck: Trachea normal and normal range of motion. Neck supple.   tracheostomy   Cardiovascular: Normal rate, regular rhythm and normal heart sounds.    Abdominal: Soft. Normal appearance and bowel sounds are normal.   Musculoskeletal: Normal range of motion.   Lymphadenopathy:     He has no cervical adenopathy.     He has no axillary adenopathy.        Left: No supraclavicular adenopathy present.   Neurological: He is alert and oriented to person, place, and time. He has normal strength. No cranial nerve deficit or sensory deficit.   Skin: Skin is warm, dry and intact. No petechiae and no rash noted. No cyanosis or erythema. Nails show no clubbing.   Psychiatric: He has a normal mood and affect. His behavior is normal. Judgment and thought content normal. Cognition and memory are normal.   Communicates with writing   Nursing note and vitals reviewed.        Active Problems:    Oropharyngeal cancer    Tracheostomy dependence    Postoperative pain     ASSESSMENT/PLAN:    Sameer has a recent finding of an oropharyngeal lesion consistent squamous cell carcinoma in situ.  He is presently admitted after having a laryngoscopy with biopsy of the right tonsil and base of tongue and tracheostomy placement on 10/5/2017 by Dr. Justin. The tracheostomy was placed due to concern of the stability of the airway, especially with the upcoming expected dental extractions and potential placement G-tube and Port-A-Cath.      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  - MRI of neck on 9/29/2017 revealed right oropharyngeal soft tissue mass measuring approximately 6.7 x 4.6 4.6 cm with involvement of the right side of  the base of tongue and invasion of the superior margin of the right submandibular gland.    2. Dysphagia secondary to tumor  - considering PEG placement     Disposition: Continue with current plan of care. Encouraged placement of PEG tube during this hospitalization due to nutritional deficit and upcoming anticipated treatment. Anticipating teeth extraction prior to initiation of radiation therapy. Await pathology results for further oncology recommendations.    LAURY Keith    10/6/2017    9:54 AM      The above documentation occurred with a 30 minute face to face interview and review of medical records per LAURY Keith. Time spent on this consultation was 45 minutes.     Thank you for this consultation and opportunity to participate in this patient's care.    Agrees to surgical Gtube placement  Will notify Dr Phillips     I personally saw and examined this patient, performing a face-to-face diagnostic evaluation with plan of care reviewed and developed with  JEAN Keith and nursing staff.   I have addended and/or modified the above history of present illness, physical examination, and assessment and plan to reflect my findings and impressions.   Essential elements of the care plan were discussed with  JEAN Keith .   Agree with findings and assessment/plan as documented above.  Questions were encouraged, asked and answered to their understanding and satisfaction.    Sni Tompkins MD  10/6/2017 10:49 AM

## 2017-10-06 NOTE — PROGRESS NOTES
Adult Nutrition  Assessment/PES    Patient Name:  Sameer Tavarez  YOB: 1955  MRN: [id number]  Admit Date:  10/5/2017    Assessment Date:  10/6/2017    Comments:  Pt NPO. To have peg tube placed for enteral ntn and route for medication. When appropriate recommend Glucerna 1.2 at 20 ml/hr for 12 hrs, then advance as tolerated by 10 ml/hr every 6 hrs to goal rate of 75 ml/hr. Auto flush of 25 ml/hr per pump.           Reason for Assessment       10/06/17 0930    Reason for Assessment    Reason For Assessment/Visit identified at risk by screening criteria    Identified At Risk By Screening Criteria MST SCORE 2+;unintentional loss of 10 lbs or more in the past 2 mos;reduced oral intake over the last month    Gastrointestinal GERD/Reflux    Oncology Head/neck cancer   Oropharyngeal cancer    Pulmonary/Critical Care COPD;Other (comment)   s/p new trach with laryngeoscopy bx this admission    Substance Use Tobacco              Nutrition/Diet History       10/06/17 1139    Nutrition/Diet History    Supplemental Drinks/Foods/Additives pt agreeable with peg tube placement.    Reported/Observed By Patient;Family    Appetite Fair   pt and wife report pt has had about 40 lbs wt loss            Anthropometrics       10/06/17 1141    Usual Body Weight (UBW)    Usual Body Weight 86.2 kg (190 lb)    % Usual Body Weight Malnutrition 80-90% - mild deficit   88% UBW    Weight Loss 18.1 kg (40 lb)    % Weight Loss  11.5 %    Weight Loss Time Frame past few months per family      10/06/17 0932    Anthropometrics    RD Documented Current Weight  76.2 kg (168 lb)    Body Mass Index (BMI)    BMI Grade 25 - 29.9 - overweight            Labs/Tests/Procedures/Meds       10/06/17 0932    Labs/Tests/Procedures/Meds    Labs/Tests Review Reviewed;Glucose;Hgb A1C;ALT;AST    Medication Review Reviewed, pertinent;Diuretic;Prokinetic Agent    Significant Vitals reviewed            Physical Findings       10/06/17 0933    Physical  Findings/Assessment    Additional Documentation Physical Appearance (Group)    Physical Appearance    Overall Physical Appearance on oxygen therapy    Skin surgical wound   Duane score 16, surgical incision to neck s/p trach            Estimated/Assessed Needs       10/06/17 0934    Calculation Measurements    Weight Used For Calculations 76.2 kg (168 lb)    Estimated/Assessed Energy Needs    Energy Need Method Kcal/kg    kcal/kg 30    30 Kcal/Kg (kcal) 2286.12    Estimated Kcal Range  7116-1307 kcal/day    Estimated/Assessed Protein Needs    Weight Used for Protein Calculation 64.4 kg (142 lb)    Protein (gm/kg) 1.2    1.2 Gm Protein (gm) 77.29    Estimated Protein Range 75-85 gm/day    Estimated/Assessed Fluid Needs    Fluid Need Method --   6484-4637 ml/day   25ml/kg            Nutrition Prescription Ordered       10/06/17 0936    Nutrition Prescription PO    Current PO Diet NPO            Evaluation of Received Nutrient/Fluid Intake       10/06/17 0937    Evaluation of Received Nutrient/Fluid Intake    Nutrition Delivered Fluid Evaluation    Fluid Intake Evaluation    IV Fluid (mL) 1503   <24 hr intake    Total Fluid Intake (mL) 1503    PO Evaluation    % PO Intake NPO        Problem/Interventions:        Problem 1       10/06/17 0937    Nutrition Diagnoses Problem 1    Problem 1 Needs Alternate Route    Etiology (related to) Medical Diagnosis    Endocrine DM    Gastrointestinal GERD/Reflux;Other (comment)   sore throat, difficulty swallowing per reveiw of records    Oncology Head/neck cancer   Oropharygeal cancer, sp larngeoscopy bx this admission    Pulmonary/Critical Care COPD   s/p new trach    Substance Use Tobacco    Signs/Symptoms (evidenced by) Report of Mnimal PO Intake;NPO;Report/Observation;Unintended Weight Change   may benefit from AMONS if unable to tolerate oral diet    Unintended Weight Change Loss    Number of Pounds Lost 40 lbs    Weight loss time period past few months    Reported/Observed By  Patient;Family    Appetite Fair    Other Comment pt to have peg tube placed per surgery                Intervention Goal       10/06/17 0940    Intervention Goal    General Meet nutritional needs for age/condition;Nutrition support treatment;Disease management/therapy;Reduce/improve symptoms    PO Meet estimated needs    TF/PN Inititiate TF/PN   if unable to start PO diet and tolerate    Weight No significant weight loss            Nutrition Intervention       10/06/17 0940    Nutrition Intervention    RD/Tech Action Follow Tx progress;Care plan reviewd;Recommend/ordered    Recommended/Ordered EN            Nutrition Prescription       10/06/17 0941    Nutrition Prescription EN    Product Glucerna 1.2 amy    TF Delivery Method Continuous    Continuous TF Goal Rate (mL/hr) 75 mL/hr    Continuous TF Starting Rate (mL/hr) 20 mL/hr    Continuous TF Goal Volume (mL) 1650 mL    Continuous TF Starting Volume (mL) 240 mL   20 ml/hr for 12 hrs, then increase as tolerated to goal rate    Water flush (mL)  25 mL    Water Flush Frequency Per hour    New EN Prescription Ordered? No, recommended    EN to Supply    Kcal/Day 1980 Kcal/Day    Kcal/Kg 26 Kcal/Kg    Kcal/Kg Weight Method Actual weight    Protein (gm/day) 99 gm/day    Meet Estimated Kcal Need (%) 90 %    Meet Estimated Protein Need (%) 132 %    TF Free H2O (mL) 1328 mL    Total Free H2O (mL/day) 1878 mL/day    Fiber Per Day (gm/day) 26 gm/day      10/06/17 0940    Nutrition Prescription EN    Enteral Prescription Enteral begin/change;Enteral to supply            Education/Evaluation       10/06/17 0946    Monitor/Evaluation    Monitor Per protocol   d/c ntn needs not known, cont to follow        Electronically signed by:  Tessy Zuluaga MS,RDN,LD  10/06/17 11:47 AM

## 2017-10-06 NOTE — NURSING NOTE
Called the [facility] department of dentistry and explained in detail the patients need for dental extractions. Patient requested prior to surgery that I assist him with  Resources  To obtain needed help in order to prepare for dental extractions to have his chemo theraphy and radiation started as soon as possible. After making arrangements for  A oral surgery consultation on October 18,2017 at 2:30 pm at the [facility] school of Dentistry .  informed that appointment has been made for the patient. . Dr. Justin also informed that this program   Provides assistance to patients that need help and that the fee is minimal. A copy of all the information given to .

## 2017-10-06 NOTE — PLAN OF CARE
Problem: Patient Care Overview (Adult)  Goal: Plan of Care Review  Outcome: Ongoing (interventions implemented as appropriate)    10/06/17 0609   Coping/Psychosocial Response Interventions   Plan Of Care Reviewed With patient;spouse   Patient Care Overview   Progress no change   Outcome Evaluation   Outcome Summary/Follow up Plan O2 sats good. Gave dilaudid every 4 hours for pain. Secrestion from trach are bloody. Adequate urine output.        Goal: Adult Individualization and Mutuality  Outcome: Ongoing (interventions implemented as appropriate)  Goal: Discharge Needs Assessment  Outcome: Ongoing (interventions implemented as appropriate)    Problem: Perioperative Period (Adult)  Goal: Signs and Symptoms of Listed Potential Problems Will be Absent or Manageable (Perioperative Period)  Outcome: Ongoing (interventions implemented as appropriate)    Problem: Fall Risk (Adult)  Goal: Identify Related Risk Factors and Signs and Symptoms  Outcome: Ongoing (interventions implemented as appropriate)  Goal: Absence of Falls  Outcome: Ongoing (interventions implemented as appropriate)    Problem: Feeding Dysfunction/Swallowing Impairment (Pediatric)  Goal: Identify Related Risk Factors and Signs and Symptoms  Outcome: Ongoing (interventions implemented as appropriate)  Goal: Reduced Risk of Aspiration  Outcome: Ongoing (interventions implemented as appropriate)  Goal: Adequate Nutrition and Hydration  Outcome: Ongoing (interventions implemented as appropriate)

## 2017-10-06 NOTE — PROGRESS NOTES
ENT  [address] (covering for Dr. Justin)  Hospital Progress Note:       LOS: 1 day   Patient Care Team:  Christian Castellon MD as PCP - General (Internal Medicine)  Sin Alarcon MD as Referring Physician (General Practice)  Alber Justin MD as Consulting Physician (Otolaryngology)  Kriss Dominguez MD as Referring Physician (Hematology and Oncology)  Hadley Marie III, MD as Consulting Physician (Radiation Oncology)    Active consulting complaint: Pain    Subjective     Interval History:     Status of active consulting problem:     History taken from: patient    Review of Systems:    Review of Systems  C/o severe pain    Objective     Vital Signs  Temp:  [97.5 °F (36.4 °C)-98.2 °F (36.8 °C)] 98.2 °F (36.8 °C)  Heart Rate:  [] 78  Resp:  [10-20] 18  BP: ()/(54-92) 152/84    Physical Exam:   Physical Exam     CONSTITUTIONAL: well nourished, alert, oriented, in no acute distress     HEAD: normocephalic, no lesions, atraumatic, no tenderness, no masses     FACE: appearance normal, no lesions, no tenderness, no deformities, facial motion symmetric      EYES: ocular motility normal, eyelids normal, orbits normal, no proptosis, conjunctiva normal , pupils equal, round     EARS:  Hearing: response to conversational voice normal bilaterally   External Ears: auricles without lesions    NOSE:  External Nose: structure normal, no tenderness on palpation, no nasal discharge, no lesions, no evidence of trauma, nostrils patent     ORAL:  Lips: upper and lower lips without lesion   Teeth: dentition within normal limits for age   Gums: gingivae healthy   Oral Mucosa: oral mucosa normal, no mucosal lesions   Floor of Mouth: Warthin’s duct patent, mucosa normal  Tongue: lingual mucosa normal without lesions, normal tongue mobility   Palate: soft and hard palates with normal mucosa and structure  Oropharynx: oropharyngeal mucosa normal    NECK: trach appliance intact with sutures cuff  inflated attempted to deflate patient became anxious and upset, mild old blood tinged sputum    THYROID: no overt thyromegaly, no tenderness, nodules or mass present on palpation, position midline     LYMPH NODES: no lymphadenopathy    CHEST/RESPIRATORY: respiratory effort normal, normal breath sounds     CARDIOVASCULAR: rate and rhythm normal, extremities without cyanosis or edema      NEUROLOGIC/PSYCHIATRIC: oriented to time, place and person, mood normal, affect appropriate, CN II-XII intact grossly    Results Review:       Lab Results (last 24 hours)     Procedure Component Value Units Date/Time    POC Glucose Fingerstick [[id number]]  (Abnormal) Collected:  10/05/17 0921    Specimen:  Blood Updated:  10/05/17 0932     Glucose 175 (H) mg/dL       : [id number] Funk DawnMeter ID: [id number]       POC Glucose Fingerstick [[id number]]  (Normal) Collected:  10/05/17 1357    Specimen:  Blood Updated:  10/05/17 1410     Glucose 78 mg/dL       : [id number] Erb BethMeter ID: [id number]       POC Glucose Fingerstick [[id number]]  (Abnormal) Collected:  10/05/17 1821    Specimen:  Blood Updated:  10/05/17 1842     Glucose 65 (L) mg/dL       : [id number] Santos ChelseyMeter ID: [id number]       POC Glucose Fingerstick [[id number]]  (Abnormal) Collected:  10/05/17 1949    Specimen:  Blood Updated:  10/05/17 2010     Glucose 60 (L) mg/dL       : [id number] Flores MalloryMeter ID: [id number]       POC Glucose Fingerstick [[id number]]  (Abnormal) Collected:  10/05/17 2019    Specimen:  Blood Updated:  10/05/17 2031     Glucose 62 (L) mg/dL       : [id number] Zac AmandaMeter ID: [id number]       POC Glucose Fingerstick [[id number]]  (Abnormal) Collected:  10/05/17 2338    Specimen:  Blood Updated:  10/05/17 2351     Glucose 67 (L) mg/dL       : [id number] Flores MalloryMeter ID: [id number]       CBC (No Diff) [[id number]]  (Abnormal) Collected:  10/06/17 0243    Specimen:  Blood Updated:  10/06/17 0305      WBC 10.37 10*3/mm3      RBC 4.66 (L) 10*6/mm3      Hemoglobin 14.1 g/dL      Hematocrit 39.8 (L) %      MCV 85.4 fL      MCH 30.3 pg      MCHC 35.4 g/dL      RDW 12.7 %      RDW-SD 39.5 (L) fl      MPV 11.5 fL      Platelets 177 10*3/mm3     Basic Metabolic Panel [[id number]]  (Abnormal) Collected:  10/06/17 0243    Specimen:  Blood Updated:  10/06/17 0311     Glucose 114 (H) mg/dL      BUN 10 mg/dL      Creatinine 0.57 mg/dL      Sodium 139 mmol/L      Potassium 4.0 mmol/L      Chloride 103 mmol/L      CO2 26.0 mmol/L      Calcium 9.0 mg/dL      eGFR Non African Amer 145 mL/min/1.73      BUN/Creatinine Ratio 17.5     Anion Gap 10.0 mmol/L     Narrative:       GFR Normal >60  Chronic Kidney Disease <60  Kidney Failure <15    TSH [[id number]]  (Abnormal) Collected:  10/06/17 0243    Specimen:  Blood Updated:  10/06/17 0341     TSH 0.161 (L) mIU/mL     Hemoglobin A1c [[id number]] Collected:  10/06/17 0243    Specimen:  Blood Updated:  10/06/17 0543     Hemoglobin A1C 8.7 %     Narrative:       Less than 6.0           Non-Diabetic Range  6.0-7.0                 ADA Therapeutic Target  Greater than 7.0        Action Suggested    Tissue Pathology Exam - Tissue, Tonsil, Right [[id number]] Collected:  10/05/17 1324    Specimen:  Tissue from Tonsil, Right; Tissue from Tongue Updated:  10/06/17 0825    POC Glucose Fingerstick [[id number]]  (Abnormal) Collected:  10/06/17 0827    Specimen:  Blood Updated:  10/06/17 0904     Glucose 156 (H) mg/dL       : [id number] Cooper ID: [id number]           Imaging Results (last 24 hours)     Procedure Component Value Units Date/Time    XR Chest 1 View [[id number]] Collected:  10/05/17 1853     Updated:  10/05/17 1857    Narrative:       HISTORY: Trach placement     CXR: Frontal view the chest performed.     COMPARISON: 10/04/2017     FINDINGS: Tracheostomy tube is in place. The tip is positioned  appropriately above the bola. There is evidence of prior  mediastinal  surgery. The left coronary artery stents. There is a diminished level of  inspiration. Prominent pulmonary vessels likely due to vascular  crowding. Mild venous congestion considered. No pleural effusion or  pneumothorax is visualized.       Impression:       1. Appropriate positioning of the tracheostomy tube.  2. Diminished level of inspiration with probable vascular crowding. Mild  venous congestion considered. Left basilar atelectasis.  3. Prior mediastinal surgery. Coronary artery stents.  This report was finalized on 10/05/2017 18:54 by Dr. Christen Obrien MD.          Medication Review:     Current Facility-Administered Medications   Medication Dose Route Frequency Provider Last Rate Last Dose   • aspirin chewable tablet 81 mg  81 mg Oral Daily Alber Justin MD       • baclofen (LIORESAL) tablet 20 mg  20 mg Oral TID Alber Justin MD       • dextrose (D50W) solution 25 g  25 g Intravenous Q15 Min PRN Alber Justin MD       • dextrose (GLUTOSE) oral gel 15 g  15 g Oral Q15 Min PRN Alber Justin MD   15 g at 10/1955   • dextrose 5 % and sodium chloride 0.45 % with KCl 20 mEq/L infusion  75 mL/hr Intravenous Continuous Fly Stanford MD 75 mL/hr at 10/05/17 2046 75 mL/hr at 10/05/17 2046   • diphenhydrAMINE (BENADRYL) capsule 25 mg  25 mg Oral Q6H PRN Alber Justin MD       • furosemide (LASIX) tablet 40 mg  40 mg Oral BID Alber Justin MD       • glucagon (human recombinant) (GLUCAGEN DIAGNOSTIC) injection 1 mg  1 mg Subcutaneous Q15 Min PRN Alber Justin MD       • HYDROmorphone (DILAUDID) injection 1 mg  1 mg Intravenous Q4H PRN Alber Justin MD   1 mg at 10/06/17 0855    And   • naloxone (NARCAN) injection 0.1 mg  0.1 mg Intravenous Q5 Min PRN Alber Justin MD       • Influenza Vac Subunit Quad (FLUCELVAX) injection 0.5 mL  0.5 mL Intramuscular During Hospitalization Alber Justin MD       • insulin lispro (humaLOG)  injection 0-9 Units  0-9 Units Subcutaneous 4x Daily With Meals & Nightly Alber Justin MD   2 Units at 10/06/17 0837   • ipratropium-albuterol (DUO-NEB) nebulizer solution 3 mL  3 mL Nebulization Q4H PRN Fly Stanford MD       • lisinopril (PRINIVIL,ZESTRIL) tablet 10 mg  10 mg Oral Daily Alber Justin MD       • metoprolol tartrate (LOPRESSOR) tablet 50 mg  50 mg Oral BID Alber Justin MD       • nitroglycerin (NITROLINGUAL) 0.4 MG/SPRAY spray 1 spray  1 spray Sublingual Q5 Min PRN Alber Justin MD       • ondansetron (ZOFRAN) injection 4 mg  4 mg Intravenous Once PRN Alber Justin MD       • oxyCODONE-acetaminophen (PERCOCET)  MG per tablet 1 tablet  1 tablet Oral Q6H PRN Alber Justin MD       • pantoprazole (PROTONIX) EC tablet 40 mg  40 mg Oral QAM Alber Justin MD       • sucralfate (CARAFATE) tablet 1 g  1 g Oral 4x Daily AC & at Bedtime Alber Justin MD       • tamsulosin (FLOMAX) 24 hr capsule 0.4 mg  0.4 mg Oral Nightly Alber Justin MD       • zolpidem (AMBIEN) tablet 10 mg  10 mg Oral Nightly PRN Alber Justin MD           Assessment/Plan     Active Problems:    Oropharyngeal cancer    Tracheostomy dependence    Postoperative pain      Plan:  Will discuss with Dr. Jayne Phillips plans/date for PEG tube.  Will attempt with SLP at a later time and patient is more relaxed, consideration of deflating cuff and attempting speech.  He has too much secretions at this time.        Prachi Reardon, APRN  10/06/17  9:17 AM

## 2017-10-06 NOTE — PROGRESS NOTES
Discharge Planning Assessment  [facility]     Patient Name: Sameer Tavarez  MRN: [id number]  Today's Date: 10/6/2017    Admit Date: 10/5/2017          Discharge Needs Assessment       10/06/17 0958    Living Environment    Lives With spouse    Living Arrangements mobile home    Transportation Available car;family or friend will provide    Living Environment    Provides Primary Care For no one    Quality Of Family Relationships supportive    Able to Return to Prior Living Arrangements yes    Discharge Needs Assessment    Concerns To Be Addressed care coordination/care conferences;adjustment to diagnosis/illness concerns;discharge planning concerns    Readmission Within The Last 30 Days no previous admission in last 30 days    Equipment Needed After Discharge oxygen;trach supplies    Discharge Facility/Level Of Care Needs home with home health    Current Discharge Risk chronically ill    Discharge Planning Comments Patient resides at home with spouse.  Patient will return home with spouse at discharge.  Patient has a PCP.  Tracheostomy supply orders sent to [facility] [phone] (phone).  Final order will need to be clarified upon discharge regarding specifics on supplies.  Need for home O2 will need to be determined 24-48 hours prior to discharge.  Patient's HH was arranged with [facility].  Patient's wife states she prefers [facility] as she is employed there.  Will need to notify [facility] of patient's discharge [phone].  Will continue to follow and coordinate discharge.            Discharge Plan     None        Discharge Placement     No information found                Demographic Summary     None            Functional Status     None            Psychosocial     None            Abuse/Neglect     None            Legal     None            Substance Abuse     None            Patient Forms     None          VLADISLAV Maravilla

## 2017-10-06 NOTE — ANESTHESIA POSTPROCEDURE EVALUATION
"Patient: Sameer Tavarez    Procedure Summary     Date Anesthesia Start Anesthesia Stop Room / Location    10/06/17 1134 9904  [facility] OR 09 / [facility] OR       Procedure Diagnosis Surgeon Provider    GASTROSTOMY TUBE REPLACEMENT, port placement (N/A Abdomen); INSERTION VENOUS ACCESS DEVICE (Left Chin to Nipples) Oropharyngeal cancer  (Oropharyngeal cancer [C10.9]) MD Je Escoto CRNA          Anesthesia Type: general  Last vitals  BP   128/71 (10/06/17 1513)    Temp   98.3 °F (36.8 °C) (10/06/17 1453)    Pulse   89 (10/06/17 1513)   Resp   16 (10/06/17 1513)    SpO2   96 % (10/06/17 1513)      Post Anesthesia Care and Evaluation    Patient location during evaluation: PACU  Patient participation: complete - patient participated  Level of consciousness: awake and alert  Pain management: adequate  Airway patency: patent  Anesthetic complications: No anesthetic complications  PONV Status: none  Cardiovascular status: acceptable and hemodynamically stable  Respiratory status: acceptable  Hydration status: acceptable    Comments: Blood pressure 128/71, pulse 89, temperature 98.3 °F (36.8 °C), temperature source Temporal Artery , resp. rate 16, height 66\" (167.6 cm), weight 168 lb 6.4 oz (76.4 kg), SpO2 96 %.    Patient discharged from PACU based upon Jon score. Please see RN notes for further details      "

## 2017-10-06 NOTE — OP NOTE
Preoperative diagnosis:  Oropharyngeal carcinoma  Postoperative diagnosis:  Same  Procedure:  Placement of single lumen power port; open gastrostomy tube placement  Surgeon:  Jayne Phillips MD  Anesthesia:  Get  Ebl:  Minimal  Ivf:  See anesthesia notes  Indications:  The patient is a 62-year-old male who has been diagnosed with oropharyngel carcinoma.  Surgical consultation has been requested for port placement and G tube placement.  The risks, benefits, complications, and possible alternatives were discussed with the patient who agreed to proceed.  Description of procedure:  The patient was laid supine.  The chest and abdomen were prepped and draped.  Venous blood was aspirated from the left subclavian vein.  A 0.035J wire was placed into the vein thru the needle.  Under fluoro, the wire was seen to be in the svc.  The needle was removed.  A pocket was created for placement of the port.  The skin at the wire exit site was slightly enlarged with 11 blade. The tubing was tunneled from the wire to pocket site.  The tubing was sized and anchored to the port with the locking device.  The port was anchored to the subcutaneous tissues with 3-0 prolene x 2.  A dilator thru a break away sheath was placed into the left subclavian vein over the wire.  The wire and the dilator were removed.  The tubing was placed in the the vein thru the breakaway sheath as the sheath was broken away.  Under fluoro, the tip of the tubing was seen to be in the svc.  Venous blood was aspirated from the port and the port was then flushed with heparinized saline.  The skin was closed with 3-0 and 4-0 vicryl.  A midline epigastric incision was created.  The anterior wall of the body of the stomach was grasped with anaid clamps.  Two pursestring sutures were placed with 2-0 silk.  A 22F TANJA tube was placed thru the abdominal wall in the left upper quadrant.  bovie was used to create a gastrotomy and the tube was placed into the stomach.  The  balloon was inflated and the pursestring sutures were tied.  The stomach was anchored to the deep surfaced of the abdominal wall with 2-0 silk.  The fascia was closed with #1 loop PDS x 2.  The skin was closed with 3-0 and 4-0 vicryl.  The sponge, needle, and instrument counts were correct.  Findings;  22F TANJA tube  Complications:  None  Disposition:  Good to pacu

## 2017-10-06 NOTE — PLAN OF CARE
Problem: Patient Care Overview (Adult)  Goal: Plan of Care Review  Outcome: Ongoing (interventions implemented as appropriate)    10/06/17 1248   Coping/Psychosocial Response Interventions   Plan Of Care Reviewed With patient   Patient Care Overview   Progress no change         Problem: Perioperative Period (Adult)  Goal: Signs and Symptoms of Listed Potential Problems Will be Absent or Manageable (Perioperative Period)  Outcome: Ongoing (interventions implemented as appropriate)    10/06/17 1248   Perioperative Period   Problems Assessed (Perioperative Period) hypothermia;physiologic stress response;situational response   Problems Present (Perioperative Period) none

## 2017-10-06 NOTE — PROGRESS NOTES
Malnutrition Severity Assessment    Patient Name:  Sameer Tavarez  YOB: 1955  MRN: [id number]  Admit Date:  10/5/2017    Patient meets criteria for : Moderate malnutrition    Comments: Please attest if in agreement.     Malnutrition Type: Chronic Illness Malnutrition     Malnutrition Type (last 8 hours)      Malnutrition Severity Assessment       10/06/17 1149    Malnutrition Severity Assessment    Malnutrition Type Chronic Illness Malnutrition      10/06/17 1149    Physical Signs of Malnutrition (Chronic)    Muscle Wasting Mild    Fat Loss Mild    Secondary Physical Signs Present (comment)   bilateral muscle wasting;temporal slight depression of temporalis muscle       10/06/17 1149    Weight Status (Chronic)    BMI --   BMI 27    %IBW Mild (<90%)   116% IBW    %UBW Mild (86-90%)   88% of UBW    Weight Loss Mod (10% / 6 mo)   11.5% wt loss past few months per pt's family and pt      10/06/17 1149    Energy Intake Status (Chronic)    Energy Intake Mod (<75% / > or equal to 1 mo)      10/06/17 1149    Criteria Met (Must meet criteria for severity in at least 2 of these categories: M Wasting, Fat Loss, Fluid, Secondary Signs, Wt. Status, Intake)    Patient meets criteria for  Moderate malnutrition          Electronically signed by:  Tessy Zuluaga MS,RDN,LD  10/06/17 11:58 AM

## 2017-10-07 ENCOUNTER — APPOINTMENT (OUTPATIENT)
Dept: GENERAL RADIOLOGY | Facility: HOSPITAL | Age: 62
End: 2017-10-07

## 2017-10-07 LAB
ANION GAP SERPL CALCULATED.3IONS-SCNC: 12 MMOL/L (ref 4–13)
BUN BLD-MCNC: 10 MG/DL (ref 5–21)
BUN/CREAT SERPL: 18.2 (ref 7–25)
CALCIUM SPEC-SCNC: 8.9 MG/DL (ref 8.4–10.4)
CHLORIDE SERPL-SCNC: 99 MMOL/L (ref 98–110)
CO2 SERPL-SCNC: 22 MMOL/L (ref 24–31)
CREAT BLD-MCNC: 0.55 MG/DL (ref 0.5–1.4)
DEPRECATED RDW RBC AUTO: 40.3 FL (ref 40–54)
ERYTHROCYTE [DISTWIDTH] IN BLOOD BY AUTOMATED COUNT: 12.9 % (ref 12–15)
GFR SERPL CREATININE-BSD FRML MDRD: >150 ML/MIN/1.73
GLUCOSE BLD-MCNC: 151 MG/DL (ref 70–100)
GLUCOSE BLDC GLUCOMTR-MCNC: 137 MG/DL (ref 70–130)
GLUCOSE BLDC GLUCOMTR-MCNC: 151 MG/DL (ref 70–130)
GLUCOSE BLDC GLUCOMTR-MCNC: 158 MG/DL (ref 70–130)
GLUCOSE BLDC GLUCOMTR-MCNC: 176 MG/DL (ref 70–130)
HCT VFR BLD AUTO: 40 % (ref 40–52)
HGB BLD-MCNC: 13.9 G/DL (ref 14–18)
MCH RBC QN AUTO: 29.9 PG (ref 28–32)
MCHC RBC AUTO-ENTMCNC: 34.8 G/DL (ref 33–36)
MCV RBC AUTO: 86 FL (ref 82–95)
PLATELET # BLD AUTO: 186 10*3/MM3 (ref 130–400)
PMV BLD AUTO: 12.3 FL (ref 6–12)
POTASSIUM BLD-SCNC: 4.3 MMOL/L (ref 3.5–5.3)
RBC # BLD AUTO: 4.65 10*6/MM3 (ref 4.8–5.9)
SODIUM BLD-SCNC: 133 MMOL/L (ref 135–145)
WBC NRBC COR # BLD: 13.13 10*3/MM3 (ref 4.8–10.8)

## 2017-10-07 PROCEDURE — G8978 MOBILITY CURRENT STATUS: HCPCS

## 2017-10-07 PROCEDURE — 94799 UNLISTED PULMONARY SVC/PX: CPT

## 2017-10-07 PROCEDURE — 71010 HC CHEST PA OR AP: CPT

## 2017-10-07 PROCEDURE — 80048 BASIC METABOLIC PNL TOTAL CA: CPT | Performed by: SPECIALIST

## 2017-10-07 PROCEDURE — 25010000002 ENOXAPARIN PER 10 MG: Performed by: FAMILY MEDICINE

## 2017-10-07 PROCEDURE — 85027 COMPLETE CBC AUTOMATED: CPT | Performed by: SPECIALIST

## 2017-10-07 PROCEDURE — 63710000001 INSULIN LISPRO (HUMAN) PER 5 UNITS: Performed by: OTOLARYNGOLOGY

## 2017-10-07 PROCEDURE — 25010000002 HYDROMORPHONE PER 4 MG: Performed by: FAMILY MEDICINE

## 2017-10-07 PROCEDURE — 82962 GLUCOSE BLOOD TEST: CPT

## 2017-10-07 PROCEDURE — 25010000002 LORAZEPAM PER 2 MG: Performed by: FAMILY MEDICINE

## 2017-10-07 PROCEDURE — 97162 PT EVAL MOD COMPLEX 30 MIN: CPT

## 2017-10-07 PROCEDURE — 99232 SBSQ HOSP IP/OBS MODERATE 35: CPT | Performed by: OTOLARYNGOLOGY

## 2017-10-07 PROCEDURE — G8979 MOBILITY GOAL STATUS: HCPCS

## 2017-10-07 RX ORDER — OXYCODONE AND ACETAMINOPHEN 7.5; 325 MG/1; MG/1
1 TABLET ORAL EVERY 4 HOURS PRN
Status: DISCONTINUED | OUTPATIENT
Start: 2017-10-07 | End: 2017-10-13 | Stop reason: HOSPADM

## 2017-10-07 RX ADMIN — HYDROMORPHONE HYDROCHLORIDE 1 MG: 1 INJECTION, SOLUTION INTRAMUSCULAR; INTRAVENOUS; SUBCUTANEOUS at 07:56

## 2017-10-07 RX ADMIN — INSULIN LISPRO 2 UNITS: 100 INJECTION, SOLUTION INTRAVENOUS; SUBCUTANEOUS at 17:34

## 2017-10-07 RX ADMIN — FUROSEMIDE 40 MG: 40 TABLET ORAL at 17:16

## 2017-10-07 RX ADMIN — SODIUM CHLORIDE 75 ML/HR: 9 INJECTION, SOLUTION INTRAVENOUS at 05:55

## 2017-10-07 RX ADMIN — LORAZEPAM 1 MG: 2 INJECTION INTRAMUSCULAR; INTRAVENOUS at 20:40

## 2017-10-07 RX ADMIN — ENOXAPARIN SODIUM 40 MG: 40 INJECTION SUBCUTANEOUS at 11:22

## 2017-10-07 RX ADMIN — HYDROMORPHONE HYDROCHLORIDE 1 MG: 1 INJECTION, SOLUTION INTRAMUSCULAR; INTRAVENOUS; SUBCUTANEOUS at 11:22

## 2017-10-07 RX ADMIN — LORAZEPAM 1 MG: 2 INJECTION INTRAMUSCULAR; INTRAVENOUS at 11:47

## 2017-10-07 RX ADMIN — LISINOPRIL 10 MG: 10 TABLET ORAL at 08:01

## 2017-10-07 RX ADMIN — OXYCODONE HYDROCHLORIDE AND ACETAMINOPHEN 1 TABLET: 7.5; 325 TABLET ORAL at 17:17

## 2017-10-07 RX ADMIN — BACLOFEN 20 MG: 10 TABLET ORAL at 17:16

## 2017-10-07 RX ADMIN — SUCRALFATE 1 G: 1 TABLET ORAL at 11:47

## 2017-10-07 RX ADMIN — METOPROLOL TARTRATE 50 MG: 50 TABLET, FILM COATED ORAL at 08:01

## 2017-10-07 RX ADMIN — SUCRALFATE 1 G: 1 TABLET ORAL at 17:17

## 2017-10-07 RX ADMIN — SODIUM CHLORIDE 75 ML/HR: 9 INJECTION, SOLUTION INTRAVENOUS at 20:45

## 2017-10-07 RX ADMIN — HYDROMORPHONE HYDROCHLORIDE 1 MG: 1 INJECTION, SOLUTION INTRAMUSCULAR; INTRAVENOUS; SUBCUTANEOUS at 03:53

## 2017-10-07 RX ADMIN — FUROSEMIDE 40 MG: 40 TABLET ORAL at 08:01

## 2017-10-07 RX ADMIN — HYDROMORPHONE HYDROCHLORIDE 1 MG: 1 INJECTION, SOLUTION INTRAMUSCULAR; INTRAVENOUS; SUBCUTANEOUS at 00:17

## 2017-10-07 RX ADMIN — INSULIN LISPRO 2 UNITS: 100 INJECTION, SOLUTION INTRAVENOUS; SUBCUTANEOUS at 08:10

## 2017-10-07 RX ADMIN — METOPROLOL TARTRATE 50 MG: 50 TABLET, FILM COATED ORAL at 17:16

## 2017-10-07 RX ADMIN — Medication 81 MG: at 08:01

## 2017-10-07 RX ADMIN — BACLOFEN 20 MG: 10 TABLET ORAL at 08:01

## 2017-10-07 RX ADMIN — SUCRALFATE 1 G: 1 TABLET ORAL at 20:40

## 2017-10-07 RX ADMIN — TAMSULOSIN HYDROCHLORIDE 0.4 MG: 0.4 CAPSULE ORAL at 20:40

## 2017-10-07 RX ADMIN — SUCRALFATE 1 G: 1 TABLET ORAL at 06:34

## 2017-10-07 RX ADMIN — INSULIN LISPRO 2 UNITS: 100 INJECTION, SOLUTION INTRAVENOUS; SUBCUTANEOUS at 11:59

## 2017-10-07 RX ADMIN — BACLOFEN 20 MG: 10 TABLET ORAL at 20:40

## 2017-10-07 RX ADMIN — PANTOPRAZOLE SODIUM 40 MG: 40 TABLET, DELAYED RELEASE ORAL at 06:34

## 2017-10-07 NOTE — PROGRESS NOTES
[facility] Medicine Services  INPATIENT PROGRESS NOTE    Length of Stay: 2  Date of Admission: 10/5/2017  Primary Care Physician: Christian Castellon MD    Subjective     Chief Complaint:     Medical management    HPI     There has been no further hypoglycemia since Iv fluids were changed.  Chemotherapy port and PEG tube have been placed without complication.  Patient is currently medically stable.  Sodium slightly low at 133 white blood cell count 13,100 with no anemia noted. He has refused SCDs.  TEDs in place. Will start SQ Lovenox.  The patient is tolerating medications through PEG tube appropriately.  IV dilaudid is not lasting longer than 1-2 hours so I will discontinue Dilaudid for moderate pain and start with Percocet 7.5 every 4 hours when necessary pain.      Review of Systems     All pertinent negatives and positives are as above. All other systems have been reviewed and are negative unless otherwise stated.     Objective    Temp:  [98.3 °F (36.8 °C)-99.2 °F (37.3 °C)] 98.7 °F (37.1 °C)  Heart Rate:  [] 77  Resp:  [12-26] 18  BP: (124-213)/() 124/80    Lab Results (last 24 hours)     Procedure Component Value Units Date/Time    POC Glucose Fingerstick [[id number]]  (Abnormal) Collected:  10/06/17 1136    Specimen:  Blood Updated:  10/06/17 1158     Glucose 169 (H) mg/dL       : [id number] Milo CarrieMeter ID: [id number]       POC Glucose Fingerstick [[id number]]  (Abnormal) Collected:  10/06/17 1823    Specimen:  Blood Updated:  10/06/17 1838     Glucose 142 (H) mg/dL       : [id number] Ildarkamp CarrieMeter ID: [id number]       POC Glucose Fingerstick [[id number]]  (Abnormal) Collected:  10/06/17 2108    Specimen:  Blood Updated:  10/06/17 2119     Glucose 158 (H) mg/dL       : [id number] David KeenitlinMeter ID: [id number]       CBC (No Diff) [[id number]]  (Abnormal) Collected:  10/07/17 0225    Specimen:  Blood Updated:  10/07/17 0314     WBC  13.13 (H) 10*3/mm3      RBC 4.65 (L) 10*6/mm3      Hemoglobin 13.9 (L) g/dL      Hematocrit 40.0 %      MCV 86.0 fL      MCH 29.9 pg      MCHC 34.8 g/dL      RDW 12.9 %      RDW-SD 40.3 fl      MPV 12.3 (H) fL      Platelets 186 10*3/mm3     Basic Metabolic Panel [[id number]]  (Abnormal) Collected:  10/07/17 0225    Specimen:  Blood Updated:  10/07/17 0319     Glucose 151 (H) mg/dL      BUN 10 mg/dL      Creatinine 0.55 mg/dL      Sodium 133 (L) mmol/L      Potassium 4.3 mmol/L      Chloride 99 mmol/L      CO2 22.0 (L) mmol/L      Calcium 8.9 mg/dL      eGFR Non African Amer >150 mL/min/1.73      BUN/Creatinine Ratio 18.2     Anion Gap 12.0 mmol/L     POC Glucose Fingerstick [[id number]]  (Abnormal) Collected:  10/07/17 0742    Specimen:  Blood Updated:  10/07/17 0750     Glucose 176 (H) mg/dL       : [id number] Joleen KristinMeter ID: [id number]             Imaging Results (last 24 hours)     Procedure Component Value Units Date/Time    XR Chest 1 View [[id number]] Collected:  10/06/17 1519     Updated:  10/06/17 1552    Narrative:       EXAMINATION:  XR CHEST 1 -  10/6/2017 2:50 PM EDT     HISTORY: Port catheter placement.     COMPARISON: 10/05/2017.     FINDINGS:  There is a new port catheter on the left extending to the  superior vena cava. On the spot images, no complication is seen. There  has been prior median sternotomy. The patient is intubated.     FLUOROSCOPY TIME: The fluoroscopy time was not recorded.       Impression:       Spot images obtained during port catheter placement. No  complication seen.        This report was finalized on 10/06/2017 15:21 by Dr. Maynor Rizo MD.    FL C Arm During Surgery [[id number]] Collected:  10/06/17 1519     Updated:  10/06/17 1552    Narrative:       EXAMINATION:  XR CHEST 1 VW-  10/6/2017 2:50 PM EDT     HISTORY: Port catheter placement.     COMPARISON: 10/05/2017.     FINDINGS:  There is a new port catheter on the left extending to the  superior vena cava. On  the spot images, no complication is seen. There  has been prior median sternotomy. The patient is intubated.     FLUOROSCOPY TIME: The fluoroscopy time was not recorded.       Impression:       Spot images obtained during port catheter placement. No  complication seen.        This report was finalized on 10/06/2017 15:21 by Dr. Maynor Rizo MD.    XR Chest 1 View [[id number]] Collected:  10/07/17 1035     Updated:  10/07/17 1039    Narrative:       EXAMINATION: XR CHEST 1 VW-     10/7/2017 4:15 AM EDT     HISTORY: port placement; C10.9-Malignant neoplasm of oropharynx,  unspecified; K08.9-Disorder of teeth and supporting structures,  unspecified; F17.200-Nicotine dependence, unspecified, uncomplicated;  Z72.0-Tobacco use.     One view chest x-ray compared with 2 days ago.     Interval placement of a left subclavian port which has a normal  appearance with the tip extending into the lower SVC near the cavoatrial  junction.     Mild chronic lung changes with hyperaeration and mild basilar  atelectasis.     Stable heart size.     Unchanged tracheostomy tube.     Summary:  1. Normal appearance of the left subclavian port.  This report was finalized on 10/07/2017 10:36 by Dr. Juan Miguel Colón MD.             Intake/Output Summary (Last 24 hours) at 10/07/17 1041  Last data filed at 10/07/17 0800   Gross per 24 hour   Intake           1778.1 ml   Output             1825 ml   Net            -46.9 ml       Physical Exam  Constitutional: He is oriented to person, place, and time. No distress.   HENT:   Head: Normocephalic and atraumatic.   Nose: Nose normal.   Trach is in place.   Eyes: Right eye exhibits no discharge. Left eye exhibits no discharge. No scleral icterus.   Neck: Neck supple.   Cardiovascular: Normal rate, regular rhythm and normal heart sounds.  Exam reveals no gallop and no friction rub.    No murmur heard.  Pulmonary/Chest: Effort normal and breath sounds normal. No stridor. No respiratory distress. He has no  wheezes. He has no rales.  Left subclavian port in place.  Abdominal: Soft. Bowel sounds are normal. He exhibits no distension. There is no tenderness. There is no guarding.  PEG tube is in place in the upper mid abdomen.   Musculoskeletal: He exhibits no edema, tenderness or deformity.   Neurological: He is alert and oriented to person, place, and time. No cranial nerve deficit. He exhibits normal muscle tone. Coordination normal.   Skin: Skin is warm and dry. No rash noted. He is not diaphoretic. No erythema. No pallor.   Psychiatric: He has a normal mood and affect. His behavior is normal. Judgment and thought content normal.     Results Review:  I have reviewed the labs, radiology results, and diagnostic studies since my last progress note and made treatment changes reflective of the results.   I have reviewed the current medications.    Assessment/Plan     Hospital Problem List     Oropharyngeal cancer    Tracheostomy dependence    Postoperative pain      Hypertension  Hyperlipidemia  Diabetes mellitus type II  Hypoglycemia  Dysphagia / Odynophagia  Oropharyngeal cancer (s/p Trach 10/5)  COPD    PLAN:  Continues sliding scale insulin as ordered.  Increase activity per physical therapy and nursing staff  Start Lovenox 40 mg subcutaneous daily  Percocet 7.5 mg every 4 hours when necessary pain per PEG tube  Discontinue Dilaudid for moderate pain    Curt Juarez DO   10/07/17   10:41 AM

## 2017-10-07 NOTE — PROGRESS NOTES
Pt Name: Sameer Tavarez  MRN: [id number]  [id number]  YOB: 1955  Date of evaluation: 10/7/2017    Chief Compliant: Communicating via hand writing.  No acute distress.    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  He is presently admitted after having a laryngoscopy with biopsy of the right tonsil and base of tongue and tracheostomy placement on 10/5/2017 by Dr. Justin. The tracheostomy was placed due to concern of the stability of the airway, especially with the upcoming expected dental extractions and potential placement G-tube and Port-A-Cath.     Diagnosis  · Squamous cell carcinoma in situ. 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 He had recent complains of weight loss of about 15 pounds and difficulty swallowing.  · 8/27/2017-he underwent an upper endoscopy for follow-up on a history of Recio's esophagus. A oropharynx lesion was noted and biopsy was consistent with at least squamous cell carcinoma in situ. Status of invasion was indeterminate. P 16 was positive. He was referred to ENT.   · 9/13/2017-CT scan of the chest/abdomen/pelvis contrast showed a 1.1 cm lymph node adjacent to the distal esophagus. 2 small pulmonary nodules measuring 4 mm and 4.2 mm in diameter located in the right lower lobe and at right middle lobe respectively. Multiple solid noncalcified nodules smaller than 6 mm in diameter. A 4.9 x 2.5 x 3.3 cm abnormal soft tissue appearance in the right perineum of unknown significance. In addition, 1 x 1.5 cm sclerotic lesion in the left iliac wing laterally to the sacroiliac joints. 7 mm sclerosis the left iliac lateral to the left SI joint. Sclerosis on the medial side of the  right sacroiliac joints.   · 9/19/2017- he was first seen by us. He needs to be seen by ENT and have a biopsy repeated. Referral to Dr. Hadley Marie.   · 9/29/2017- PET scan, skull base to mid thigh revealed abnormal metabolic activity in th.  No other regions of abnormal metabolic activity were identified.  · 9/29/2017- MRI orbital face, neck revealed large right oropharyngeal mass measuring 6.7 x 4.6 x 4.6 cm with involvement of the right side of the base of the tongue.  Invasion of the superior margin of the right submandibular gland is noted.  No bony involvement is noted.  Scattered left jugular lymph nodes measure up to 1.2 x 0.6 cm.     Perineal mass-unknow etiology. PET scan to interrogate metabolic activity. We'll follow-up on this.     Multiple pulmonary nodules- according to radiology they are noncalcified nodules less than 6 mm and therefore no need for follow-up.     Smoking cessation counseling-  we have talked about the importance of quitting. Specifically, we discussed about the risks related to tobacco including but not limited to risk of several types of cancer, cardiovascular disease, stroke, bad dentition, financial loss and so on. I advised the patient quitting. I offered the patient resources such as nicotine patches or medications to help with the craving. The patient is contemplative at this time. We will follow-up on this during the next visit and continue to encourage on quitting this habit.          Past Medical History:   Past Medical History:   Diagnosis Date   • Arthritis    • Coronary artery disease    • Depression    • Diabetes mellitus    • Enlarged prostate    • GERD (gastroesophageal reflux disease)    • History of transfusion    • Hypertension    • Oropharyngeal cancer 08/27/2017   • Seizure     diabetic   • Severe esophageal dysplasia    • Stroke    • TB (pulmonary tuberculosis) 2004     Past Surgical History:   Past Surgical History:   Procedure Laterality Date   • CARDIAC  SURGERY     • CHOLECYSTECTOMY     • CORONARY ARTERY BYPASS GRAFT     • FRACTURE SURGERY     • GTUBE REPLACEMENT N/A 10/6/2017    Procedure: GASTROSTOMY TUBE REPLACEMENT, port placement;  Surgeon: Jayne Phillips MD;  Location: [facility] OR;  Service:    • HERNIA REPAIR     • NISSEN FUNDOPLICATION LAPAROSCOPIC     • DC INSJ TUNNELED CVC W/O SUBQ PORT/ AGE 5 YR/> Left 10/6/2017    Procedure: INSERTION VENOUS ACCESS DEVICE;  Surgeon: Jayne Phillips MD;  Location: [facility] OR;  Service: General   • SKIN BIOPSY     • TESTICLE SURGERY      tubes implanted for drainage   • TONSILLECTOMY     • TRACHEOSTOMY N/A 10/5/2017    Procedure: Awake tracheostomy; DIRECT LARYNGOSCOPY WITH BIOPSY;  Surgeon: Alber Justin MD;  Location: [facility] OR;  Service:      Social History:   Social History     Social History   • Marital status:      Spouse name: N/A   • Number of children: N/A   • Years of education: N/A     Occupational History   •       Disabled     Social History Main Topics   • Smoking status: Current Every Day Smoker     Packs/day: 1.00     Years: 52.00     Types: Cigarettes   • Smokeless tobacco: Current User     Types: Snuff      Comment: I've tried and tried   • Alcohol use Yes      Comment: occasionally   • Drug use: No   • Sexual activity: Defer     Other Topics Concern   • Not on file     Social History Narrative     Family History:   Family History   Problem Relation Age of Onset   • Stroke Mother    • Heart disease Father    • Heart disease Brother    • Cancer Brother        Review of Systems   Constitutional: Positive for fatigue. Negative for activity change, appetite change, chills, diaphoresis and fever.   HENT: Positive for sore throat and trouble swallowing. Negative for congestion, ear discharge, ear pain, facial swelling, hearing loss, mouth sores, nosebleeds, postnasal drip, rhinorrhea, sinus pressure, tinnitus and voice change.    Eyes: Negative for photophobia, pain, discharge and  visual disturbance.   Respiratory: Positive for cough. Negative for apnea, chest tightness, shortness of breath, wheezing and stridor.    Cardiovascular: Negative for chest pain, palpitations and leg swelling.   Gastrointestinal: Negative for abdominal distention, abdominal pain, blood in stool, constipation, diarrhea, nausea, rectal pain and vomiting.   Endocrine: Negative for cold intolerance, heat intolerance, polydipsia, polyphagia and polyuria.   Genitourinary: Negative for difficulty urinating, dysuria, flank pain, frequency, hematuria and urgency.   Musculoskeletal: Negative for arthralgias, back pain, gait problem, joint swelling and myalgias.   Skin: Negative for color change, pallor, rash and wound.   Allergic/Immunologic: Negative for environmental allergies, food allergies and immunocompromised state.   Neurological: Positive for weakness. Negative for dizziness, tremors, seizures, syncope, speech difficulty, light-headedness, numbness and headaches.   Hematological: Negative for adenopathy. Does not bruise/bleed easily.   Psychiatric/Behavioral: Negative for agitation, confusion, hallucinations, sleep disturbance and suicidal ideas. The patient is not nervous/anxious.           Objective      Vitals:    10/07/17 0801 10/07/17 0805 10/07/17 0835 10/07/17 0905   BP: 153/82 153/82 156/85 142/83   BP Location:       Patient Position:       Pulse: 101 100 101 88   Resp: 18      Temp: 98.7 °F (37.1 °C)      TempSrc: Oral      SpO2:  95% 94% 91%   Weight:       Height:         Lab Results:    Lab Results (last 72 hours)     Procedure Component Value Units Date/Time    POC Glucose Fingerstick [[id number]]  (Abnormal) Collected:  10/05/17 0921    Specimen:  Blood Updated:  10/05/17 0932     Glucose 175 (H) mg/dL       : [id number] Porraseter ID: [id number]       POC Glucose Fingerstick [[id number]]  (Normal) Collected:  10/05/17 1357    Specimen:  Blood Updated:  10/05/17 1410     Glucose 78 mg/dL        : [id number] Erb BethMeter ID: [id number]       POC Glucose Fingerstick [[id number]]  (Abnormal) Collected:  10/05/17 1821    Specimen:  Blood Updated:  10/05/17 1842     Glucose 65 (L) mg/dL       : [id number] Santos ChelseyMeter ID: [id number]       POC Glucose Fingerstick [[id number]]  (Abnormal) Collected:  10/05/17 1949    Specimen:  Blood Updated:  10/05/17 2010     Glucose 60 (L) mg/dL       : [id number] Flores MalloryMeter ID: [id number]       POC Glucose Fingerstick [[id number]]  (Abnormal) Collected:  10/05/17 2019    Specimen:  Blood Updated:  10/05/17 2031     Glucose 62 (L) mg/dL       : [id number] Frank AmandaMeter ID: [id number]       POC Glucose Fingerstick [[id number]]  (Abnormal) Collected:  10/05/17 2338    Specimen:  Blood Updated:  10/05/17 2351     Glucose 67 (L) mg/dL       : [id number] Flores MalloryMeter ID: [id number]       CBC (No Diff) [[id number]]  (Abnormal) Collected:  10/06/17 0243    Specimen:  Blood Updated:  10/06/17 0305     WBC 10.37 10*3/mm3      RBC 4.66 (L) 10*6/mm3      Hemoglobin 14.1 g/dL      Hematocrit 39.8 (L) %      MCV 85.4 fL      MCH 30.3 pg      MCHC 35.4 g/dL      RDW 12.7 %      RDW-SD 39.5 (L) fl      MPV 11.5 fL      Platelets 177 10*3/mm3     Basic Metabolic Panel [[id number]]  (Abnormal) Collected:  10/06/17 0243    Specimen:  Blood Updated:  10/06/17 0311     Glucose 114 (H) mg/dL      BUN 10 mg/dL      Creatinine 0.57 mg/dL      Sodium 139 mmol/L      Potassium 4.0 mmol/L      Chloride 103 mmol/L      CO2 26.0 mmol/L      Calcium 9.0 mg/dL      eGFR Non African Amer 145 mL/min/1.73      BUN/Creatinine Ratio 17.5     Anion Gap 10.0 mmol/L     Narrative:       GFR Normal >60  Chronic Kidney Disease <60  Kidney Failure <15    TSH [[id number]]  (Abnormal) Collected:  10/06/17 0243    Specimen:  Blood Updated:  10/06/17 0341     TSH 0.161 (L) mIU/mL     Hemoglobin A1c [[id number]] Collected:  10/06/17 0243    Specimen:  Blood Updated:  10/06/17  0543     Hemoglobin A1C 8.7 %     Narrative:       Less than 6.0           Non-Diabetic Range  6.0-7.0                 ADA Therapeutic Target  Greater than 7.0        Action Suggested    Tissue Pathology Exam - Tissue, Tonsil, Right [[id number]] Collected:  10/05/17 1324    Specimen:  Tissue from Tonsil, Right; Tissue from Tongue Updated:  10/06/17 0825    POC Glucose Fingerstick [[id number]]  (Abnormal) Collected:  10/06/17 0827    Specimen:  Blood Updated:  10/06/17 0904     Glucose 156 (H) mg/dL       : [id number] Leroy BrothersrAppnique CarrieMeter ID: [id number]       POC Glucose Fingerstick [[id number]]  (Abnormal) Collected:  10/06/17 1136    Specimen:  Blood Updated:  10/06/17 1158     Glucose 169 (H) mg/dL       : [id number] Leroy BrothersrAppnique CarrieMeter ID: [id number]       POC Glucose Fingerstick [[id number]]  (Abnormal) Collected:  10/06/17 1823    Specimen:  Blood Updated:  10/06/17 1838     Glucose 142 (H) mg/dL       : [id number] Leroy BrothersrAppnique CarrieMeter ID: [id number]       POC Glucose Fingerstick [[id number]]  (Abnormal) Collected:  10/06/17 2108    Specimen:  Blood Updated:  10/06/17 2119     Glucose 158 (H) mg/dL       : [id number] David BettslinMeter ID: [id number]       CBC (No Diff) [[id number]]  (Abnormal) Collected:  10/07/17 0225    Specimen:  Blood Updated:  10/07/17 0314     WBC 13.13 (H) 10*3/mm3      RBC 4.65 (L) 10*6/mm3      Hemoglobin 13.9 (L) g/dL      Hematocrit 40.0 %      MCV 86.0 fL      MCH 29.9 pg      MCHC 34.8 g/dL      RDW 12.9 %      RDW-SD 40.3 fl      MPV 12.3 (H) fL      Platelets 186 10*3/mm3     Basic Metabolic Panel [[id number]]  (Abnormal) Collected:  10/07/17 0225    Specimen:  Blood Updated:  10/07/17 0319     Glucose 151 (H) mg/dL      BUN 10 mg/dL      Creatinine 0.55 mg/dL      Sodium 133 (L) mmol/L      Potassium 4.3 mmol/L      Chloride 99 mmol/L      CO2 22.0 (L) mmol/L      Calcium 8.9 mg/dL      eGFR Non African Amer >150 mL/min/1.73      BUN/Creatinine Ratio 18.2      Anion Gap 12.0 mmol/L     Narrative:       GFR Normal >60  Chronic Kidney Disease <60  Kidney Failure <15    POC Glucose Fingerstick [[id number]]  (Abnormal) Collected:  10/07/17 0742    Specimen:  Blood Updated:  10/07/17 0750     Glucose 176 (H) mg/dL       : [id number] Arzolaer ID: [id number]             Radiology Results:    Imaging Results (last 72 hours)     Procedure Component Value Units Date/Time    XR Chest 1 View [[id number]] Collected:  10/05/17 1853     Updated:  10/05/17 1857    Narrative:       HISTORY: Trach placement     CXR: Frontal view the chest performed.     COMPARISON: 10/04/2017     FINDINGS: Tracheostomy tube is in place. The tip is positioned  appropriately above the bola. There is evidence of prior mediastinal  surgery. The left coronary artery stents. There is a diminished level of  inspiration. Prominent pulmonary vessels likely due to vascular  crowding. Mild venous congestion considered. No pleural effusion or  pneumothorax is visualized.       Impression:       1. Appropriate positioning of the tracheostomy tube.  2. Diminished level of inspiration with probable vascular crowding. Mild  venous congestion considered. Left basilar atelectasis.  3. Prior mediastinal surgery. Coronary artery stents.  This report was finalized on 10/05/2017 18:54 by Dr. Christen Obrien MD.    XR Chest 1 View [[id number]] Collected:  10/06/17 1519     Updated:  10/06/17 1552    Narrative:       EXAMINATION:  XR CHEST 1 VW-  10/6/2017 2:50 PM EDT     HISTORY: Port catheter placement.     COMPARISON: 10/05/2017.     FINDINGS:  There is a new port catheter on the left extending to the  superior vena cava. On the spot images, no complication is seen. There  has been prior median sternotomy. The patient is intubated.     FLUOROSCOPY TIME: The fluoroscopy time was not recorded.       Impression:       Spot images obtained during port catheter placement. No  complication seen.        This report was  finalized on 10/06/2017 15:21 by Dr. Maynor Rizo MD.    FL C Arm During Surgery [[id number]] Collected:  10/06/17 1519     Updated:  10/06/17 1552    Narrative:       EXAMINATION:  XR CHEST 1 VW-  10/6/2017 2:50 PM EDT     HISTORY: Port catheter placement.     COMPARISON: 10/05/2017.     FINDINGS:  There is a new port catheter on the left extending to the  superior vena cava. On the spot images, no complication is seen. There  has been prior median sternotomy. The patient is intubated.     FLUOROSCOPY TIME: The fluoroscopy time was not recorded.       Impression:       Spot images obtained during port catheter placement. No  complication seen.        This report was finalized on 10/06/2017 15:21 by Dr. Maynor Rizo MD.    XR Chest 1 View [[id number]] Updated:  10/07/17 0352            Physical Exam   Constitutional: Vital signs are normal. He appears well-developed. He is cooperative.   HENT:   Head: Normocephalic and atraumatic.   Nose: Nose normal.   Mouth/Throat: Oropharynx is clear and moist. Mucous membranes are dry.   right base of tongue fullness     Eyes: Conjunctivae, EOM and lids are normal. Pupils are equal, round, and reactive to light.   Neck: Trachea normal and normal range of motion. Neck supple.   tracheostomy   Cardiovascular: Normal rate, regular rhythm and normal heart sounds.    Abdominal: Soft. Normal appearance and bowel sounds are normal.   Musculoskeletal: Normal range of motion.   Lymphadenopathy:     He has no cervical adenopathy.     He has no axillary adenopathy.        Left: No supraclavicular adenopathy present.   Neurological: He is alert. He has normal strength. No cranial nerve deficit or sensory deficit.   Skin: Skin is warm, dry and intact. No petechiae and no rash noted. No cyanosis or erythema. Nails show no clubbing.   Port to left chest wall and PEG to LUQ   Psychiatric: He has a normal mood and affect. His speech is normal and behavior is normal. Judgment and thought  content normal. Cognition and memory are normal.         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  - MRI of neck on 9/29/2017 revealed right oropharyngeal soft tissue mass measuring approximately 6.7 x 4.6 4.6 cm with involvement of the right side of the base of tongue and invasion of the superior margin of the right submandibular gland.    - Dr. Darin Neal will be evaluating options for full mouth extraction Monday with oral surgery locally.     2. Dysphagia secondary to tumor  - PEG placement on 10/6/2017 by Dr. Jayne Phillips     Disposition: Continue with current plan of care. Anticipating teeth extraction prior to initiation of radiation therapy.   Await pathology results for further oncology recommendations.        Prachi Bonilla, LAURY    10/7/2017    9:48 AM     I will follow-up on pathology and reevaluate Mr. Tavarez on 10/09/2017.  Call over the next day or so if needed.  I will be available.  I did discuss all of the plans outlined in the chart with Mr. Tavarez and his wife.      I personally saw and examined this patient, performing a face-to-face diagnostic evaluation with plan of care reviewed and developed with  JEAN Keith and nursing staff.   I have addended and/or modified the above history of present illness, physical examination, and assessment and plan to reflect my findings and impressions.   Essential elements of the care plan were discussed with  JEAN Keith .   Agree with findings and assessment/plan as documented above.  Questions were encouraged, asked and answered to their understanding and satisfaction.    Sin  Shawn Tompkins MD  10/7/2017 10:53 AM

## 2017-10-07 NOTE — PROGRESS NOTES
[facility]  ENT PROGRESS NOTES  10/7/2017      Patient Identification:  Name: Sameer Tavarez  Age: 62 y.o.  Sex: male  :  1955  MRN: [id number]                     Date of Admission: 10/5/2017      CC:    Trach    Subjective   Doing well with no new complaints.  Status post G-tube per Dr. Phillips yesterday.  He is refused to tolerate his SCDs and is being managed by the hospitalist service with Lovenox.  He is scheduled to get out of bed per PT today.  He denies significant pain presently.  HISTORY   HPI, ROS, PMFSHx reviewed:   No changes       Objective     PE:    Temp:  [98.3 °F (36.8 °C)-99.2 °F (37.3 °C)] 98.7 °F (37.1 °C)  Heart Rate:  [] 88  Resp:  [12-26] 18  BP: (125-213)/() 142/83   Body mass index is 27.13 kg/(m^2).     General appearance: ill-appearing.   Ability to Communicate: normal means of communication, clear voice, normal  Hearing   [unfilled]   Ears - bilateral TM's and external ear canals normal.   Nasal exam - normal and patent, no erythema, discharge or polyps.   Oropharyngeal exam - As previously noted in direct laryngoscopy no large right oropharyngeal mass.   Neck exam - supple, no significant adenopathy.  #6 cuffed tracheostomy tube in place.  The wound looks good there is minimal bloody drainage.     CVS exam: normal rate, regular rhythm, normal S1, S2, no murmurs, rubs, clicks or gallops.   Chest: rales noted which are slight and minimally greater on the right than the left.  There are no significant rhonchi.   No lymphadenopathy in the anterior or posterior neck, supraclavicular, axillary or inguinal areas. No hepato-splenomegaly noted.   Neurological exam reveals not examined.    DATA      MEDICATIONS   I have reviewed the current MAR.     LABS AND IMAGING      I have reviewed the labs and imaging data since the patient was last seen.       Assessment     ASSESSMENT     Active Problems:    Oropharyngeal cancer    Tracheostomy dependence    Postoperative  pain      Doing well presently      Plan     PLAN     No significant changes  Out of bed, increasing ambulation  We will need to evaluate options for full mouth extraction Monday when oral surgery is available to discuss options of having this done in [place] versus traveling for this procedure.          Darin Neal MD  10/7/2017  9:46 AM

## 2017-10-07 NOTE — PLAN OF CARE
Problem: Patient Care Overview (Adult)  Goal: Plan of Care Review  Outcome: Ongoing (interventions implemented as appropriate)    10/07/17 0551   Coping/Psychosocial Response Interventions   Plan Of Care Reviewed With patient   Patient Care Overview   Progress improving   Outcome Evaluation   Outcome Summary/Follow up Plan When patient is in bed Vital signs remain stable, BP stays below 160 systolic. When patient gets out of bed, BP jumps to 170s systolic and Heart rate gets up to 120s-130s. Patient was bladder scanned at 0400, over a liter was in the bladder. Patient was in/out catheterized, 1075 mL was obtained.       Goal: Adult Individualization and Mutuality  Outcome: Ongoing (interventions implemented as appropriate)  Goal: Discharge Needs Assessment  Outcome: Ongoing (interventions implemented as appropriate)    Problem: Perioperative Period (Adult)  Goal: Signs and Symptoms of Listed Potential Problems Will be Absent or Manageable (Perioperative Period)  Outcome: Ongoing (interventions implemented as appropriate)    Problem: Fall Risk (Adult)  Goal: Identify Related Risk Factors and Signs and Symptoms  Outcome: Ongoing (interventions implemented as appropriate)  Goal: Absence of Falls  Outcome: Ongoing (interventions implemented as appropriate)    Problem: Feeding Dysfunction/Swallowing Impairment (Pediatric)  Goal: Identify Related Risk Factors and Signs and Symptoms  Outcome: Ongoing (interventions implemented as appropriate)  Goal: Reduced Risk of Aspiration  Outcome: Ongoing (interventions implemented as appropriate)  Goal: Adequate Nutrition and Hydration  Outcome: Ongoing (interventions implemented as appropriate)

## 2017-10-07 NOTE — PLAN OF CARE
Problem: Patient Care Overview (Adult)  Goal: Plan of Care Review  Outcome: Ongoing (interventions implemented as appropriate)    10/07/17 5719   Coping/Psychosocial Response Interventions   Plan Of Care Reviewed With patient;spouse   Patient Care Overview   Progress improving   Outcome Evaluation   Outcome Summary/Follow up Plan Patient has very flat affect and seems to be depressed. Encouraged increasing activity. Little pain relief from IV dilaudid. Percocet added this shift. Resting between care.       Goal: Adult Individualization and Mutuality  Outcome: Ongoing (interventions implemented as appropriate)  Goal: Discharge Needs Assessment  Outcome: Ongoing (interventions implemented as appropriate)    Problem: Perioperative Period (Adult)  Goal: Signs and Symptoms of Listed Potential Problems Will be Absent or Manageable (Perioperative Period)  Outcome: Ongoing (interventions implemented as appropriate)    Problem: Fall Risk (Adult)  Goal: Identify Related Risk Factors and Signs and Symptoms  Outcome: Outcome(s) achieved Date Met:  10/07/17  Goal: Absence of Falls  Outcome: Ongoing (interventions implemented as appropriate)    Problem: Feeding Dysfunction/Swallowing Impairment (Pediatric)  Goal: Identify Related Risk Factors and Signs and Symptoms  Outcome: Outcome(s) achieved Date Met:  10/07/17  Goal: Reduced Risk of Aspiration  Outcome: Ongoing (interventions implemented as appropriate)  Goal: Adequate Nutrition and Hydration  Outcome: Ongoing (interventions implemented as appropriate)

## 2017-10-07 NOTE — PROGRESS NOTES
LOS: 2 days   Patient Care Team:  Christian Castellon MD as PCP - General (Internal Medicine)  Sin Alarcon MD as Referring Physician (General Practice)  Alber Justin MD as Consulting Physician (Otolaryngology)  Kriss Dominguez MD as Referring Physician (Hematology and Oncology)  Hadley Marie III, MD as Consulting Physician (Radiation Oncology)    Chief Complaint:  Postoperative day #2 following placement of a port and also G-tube.  As well as tracheostomy   Subjective     Subjective     G-tube site is clean and dry no sign of any infection the port has a slight hematoma on the inferior aspect of this also some edema of his left hand mainly the hand not  of the arm.    Objective      Objective     Vital Signs  Temp:  [98.3 °F (36.8 °C)-99.2 °F (37.3 °C)] 98.7 °F (37.1 °C)  Heart Rate:  [] 77  Resp:  [12-26] 18  BP: (124-213)/() 124/80    Intake & Output (last 3 days)       10/04 0701 - 10/05 0700 10/05 0701 - 10/06 0700 10/06 0701 - 10/07 0700 10/07 0701 - 10/08 0700    I.V. (mL/kg)  1503 (19.7) 1836.1 (24.1) 286 (3.8)    Total Intake(mL/kg)  1503 (19.7) 1836.1 (24.1) 286 (3.8)    Urine (mL/kg/hr)  775 2075 (1.1)     Total Output   775 2075      Net   +728 -238.9 +286                  Physical Exam:     General Appearance:    Alert, cooperative, in no acute distress   Lungs:     Clear to auscultation,respirations regular, even and                  unlabored    Heart:    Regular rhythm and normal rate, normal S1 and S2, no            murmur, no gallop, no rub, no click   Chest Wall:    No abnormalities observed   Abdomen:    Well-healed incision in the upper abdomen with a G-tube in place without problems the port is in place in the left deltopectoral groove with a slight hematoma present and also some edema of his left hand.        Results Review:     I reviewed the patient's new clinical results.  I reviewed the patient's new imaging results and agree with the  interpretation.      Results from last 7 days  Lab Units 10/07/17  0225 10/06/17  0243 10/04/17  1417   WBC 10*3/mm3 13.13* 10.37 8.98   HEMOGLOBIN g/dL 13.9* 14.1 14.4   HEMATOCRIT % 40.0 39.8* 41.9   PLATELETS 10*3/mm3 186 177 221          Results from last 7 days  Lab Units 10/07/17  0225 10/06/17  0243 10/04/17  1417   SODIUM mmol/L 133* 139 139   POTASSIUM mmol/L 4.3 4.0 4.1   CHLORIDE mmol/L 99 103 100   CO2 mmol/L 22.0* 26.0 23.0*   BUN mg/dL 10 10 10   CREATININE mg/dL 0.55 0.57 0.66   CALCIUM mg/dL 8.9 9.0 9.4   BILIRUBIN mg/dL  --   --  0.7   ALK PHOS U/L  --   --  172*   ALT (SGPT) U/L  --   --  109*   AST (SGOT) U/L  --   --  66*   GLUCOSE mg/dL 151* 114* 210*       Assessment/Plan     Assessment/Plan     Active Problems:    Oropharyngeal cancer    Tracheostomy dependence    Postoperative pain      Currently we will elevate his left hand on pillows, okay with me to get to the floor, follow-up his hand and also the hematoma on Sunday and Monday.    Redd Alfaro MD  10/07/17  11:39 AM      Time: time spent with patient 15 minutes     EMR Dragon/Transcription disclaimer: Much of this encounter note is an electronic transcription/translation of spoken language to printed text. The electronic translation of spoken language may permit erroneous, or at times, nonsensical words or phrases to be inadvertently transcribed; although I have reviewed the note for such errors, some may still exist.

## 2017-10-07 NOTE — PLAN OF CARE
Problem: Patient Care Overview (Adult)  Goal: Plan of Care Review  Outcome: Ongoing (interventions implemented as appropriate)    10/07/17 1104   Coping/Psychosocial Response Interventions   Plan Of Care Reviewed With patient   Outcome Evaluation   Outcome Summary/Follow up Plan PT eval completed. Pt. required min assist to get OOB and to stand. Once standing pt. urinated a small amount in the urinal and completed standing activities such as marching, side stepping, and forward/backward walking. PT will work to progress pt's functional mobility, balance, and strength. Anticipate pt. will d/c home with spouse.          Problem: Inpatient Physical Therapy  Goal: Bed Mobility Goal LTG- PT  Outcome: Ongoing (interventions implemented as appropriate)    10/07/17 1104   Bed Mobility PT LTG   Bed Mobility PT LTG, Date Established 10/07/17   Bed Mobility PT LTG, Time to Achieve by discharge   Bed Mobility PT LTG, Activity Type all bed mobility   Bed Mobility PT LTG, [place] Level supervision required   Bed Mobility PT Goal LTG, Assist Device bed rails       Goal: Transfer Training Goal 1 LTG- PT  Outcome: Ongoing (interventions implemented as appropriate)    10/07/17 1104   Transfer Training PT LTG   Transfer Training PT LTG, Date Established 10/07/17   Transfer Training PT LTG, Time to Achieve by discharge   Transfer Training PT LTG, Activity Type bed to chair /chair to bed;sit to stand/stand to sit   Transfer Training PT LTG, [place] Level supervision required   Transfer Training PT LTG, Assist Device cane, straight       Goal: Gait Training Goal LTG- PT  Outcome: Ongoing (interventions implemented as appropriate)    10/07/17 1104   Gait Training PT LTG   Gait Training Goal PT LTG, Date Established 10/07/17   Gait Training Goal PT LTG, Time to Achieve by discharge   Gait Training Goal PT LTG, [place] Level supervision required   Gait Training Goal PT LTG, Assist Device cane, straight   Gait Training Goal PT  LTG, Distance to Achieve 150       Goal: Stair Training Goal LTG- PT  Outcome: Ongoing (interventions implemented as appropriate)    10/07/17 1104   Stair Training PT LTG   Stair Training Goal PT LTG, Date Established 10/07/17   Stair Training Goal PT LTG, Time to Achieve by discharge   Stair Training Goal PT LTG, Number of Steps 4   Stair Training Goal PT LTG, [place] Level supervision required

## 2017-10-07 NOTE — THERAPY EVALUATION
Acute Care - Physical Therapy Initial Evaluation  [facility]     Patient Name: Sameer Tavarez  : 1955  MRN: [id number]  Today's Date: 10/7/2017   Onset of Illness/Injury or Date of Surgery Date: 10/05/17  Date of Referral to PT: 10/06/17  Referring Physician: Dr. Juarez      Admit Date: 10/5/2017     Visit Dx:    ICD-10-CM ICD-9-CM   1. Impaired mobility Z74.09 799.89   2. Oropharyngeal cancer C10.9 146.9   3. Poor dentition K08.9 525.9   4. Current smoker F17.200 305.1   5. Tobacco use Z72.0 305.1     Patient Active Problem List   Diagnosis   • Oropharyngeal cancer   • Poor dentition   • Current smoker   • Perineal mass in male   • Recio's esophagus with dysplasia   • Encounter for consultation   • Tracheostomy dependence   • Postoperative pain     Past Medical History:   Diagnosis Date   • Arthritis    • Coronary artery disease    • Depression    • Diabetes mellitus    • Enlarged prostate    • GERD (gastroesophageal reflux disease)    • History of transfusion    • Hypertension    • Oropharyngeal cancer 2017   • Seizure     diabetic   • Severe esophageal dysplasia    • Stroke    • TB (pulmonary tuberculosis)      Past Surgical History:   Procedure Laterality Date   • CARDIAC SURGERY     • CHOLECYSTECTOMY     • CORONARY ARTERY BYPASS GRAFT     • FRACTURE SURGERY     • GTUBE REPLACEMENT N/A 10/6/2017    Procedure: GASTROSTOMY TUBE REPLACEMENT, port placement;  Surgeon: Jayne Phillips MD;  Location: [facility];  Service:    • HERNIA REPAIR     • NISSEN FUNDOPLICATION LAPAROSCOPIC     • CA INSJ TUNNELED CVC W/O SUBQ PORT/ AGE 5 YR/> Left 10/6/2017    Procedure: INSERTION VENOUS ACCESS DEVICE;  Surgeon: Jayne Phillips MD;  Location: [facility];  Service: General   • SKIN BIOPSY     • TESTICLE SURGERY      tubes implanted for drainage   • TONSILLECTOMY     • TRACHEOSTOMY N/A 10/5/2017    Procedure: Awake tracheostomy; DIRECT LARYNGOSCOPY WITH BIOPSY;  Surgeon: Alber Justin MD;  Location:   [facility];  Service:           PT ASSESSMENT (last 72 hours)      PT Evaluation       10/07/17 1104 10/06/17 0958    Rehab Evaluation    Document Type evaluation   See MAR  -JOANN     Subjective Information agree to therapy;complains of;weakness;fatigue;pain  -JOANN     General Information    Patient Profile Review yes  -JOANN     Onset of Illness/Injury or Date of Surgery Date 10/05/17  -JOANN     Referring Physician Dr. Juarez  -OJANN     General Observations Fowlers in bed, sleeping. Wife in room.  -JOANN     Pertinent History Of Current Problem Admit for trach. Dx: Oropharyngeal cancer. s/p 10/6 port placement, G-tube placement. s/p 10/5 tracheostomy, laryngoscopy & biopsy R tonsil & base of tongue. 10/7 cxr.   -JOANN     Precautions/Limitations fall precautions;oxygen therapy device and L/min   trach, G-tube, port  -JOANN     Prior Level of Function independent:;all household mobility;ADL's;dressing;bathing  -JOANN     Equipment Currently Used at Home cane, straight  -JOANN     Plans/Goals Discussed With patient;spouse/S.O.;agreed upon  -JOANN     Risks Reviewed patient:;spouse/S.O.:;LOB;nausea/vomiting;dizziness;increased discomfort;change in vital signs;lines disloged  -JOANN     Benefits Reviewed patient:;spouse/S.O.:;improve function;increase independence;increase strength;increase balance;decrease pain;increase knowledge;improve skin integrity  -JOANN     Barriers to Rehab medically complex  -JOANN     Living Environment    Lives With spouse  -JOANN spouse  -CE    Living Arrangements mobile home  -JOANN mobile home  -CE    Home Accessibility bed and bath on same level;stairs to enter home  -JOANN     Number of Stairs to Enter Home 4  -JOANN     Transportation Available  car;family or friend will provide  -CE    Clinical Impression    Date of Referral to PT 10/06/17  -JOANN     PT Diagnosis impaired mobility, weakness  -JOANN     Functional Level At Time Of Evaluation Min assist bed mobility and to stand  -JOANN     Patient/Family Goals Statement Return home  -JOANN      Criteria for Skilled Therapeutic Interventions Met yes;treatment indicated  -JOANN     Impairments Found (describe specific impairments) gait, locomotion, and balance  -JOANN     Rehab Potential good, to achieve stated therapy goals  -JOANN     Predicted Duration of Therapy Intervention (days/wks) until d/c  -JOANN     Vital Signs    Pre Systolic BP Rehab 130  -JOANN     Pre Treatment Diastolic BP 70  -JOANN     Pretreatment Heart Rate (beats/min) 83  -JOANN     Posttreatment Heart Rate (beats/min) 87  -JOANN     Pre SpO2 (%) 97  -JOANN     O2 Delivery Pre Treatment supplemental O2   trach  -JOANN     Post SpO2 (%) 96  -JOANN     O2 Delivery Post Treatment supplemental O2  -JOANN     Pre Patient Position Supine  -JOANN     Intra Patient Position Standing  -JOANN     Post Patient Position Supine  -JOANN     Pain Assessment    Pain Assessment 0-10  -JOANN     Pain Score 10  -JOANN     Pain Type Acute pain;Surgical pain  -JOANN     Pain Location Generalized  -JOANN     Pain Intervention(s) Medication (See MAR);Repositioned;Ambulation/increased activity  -JOANN     Response to Interventions tolerated  -JOANN     Vision Assessment/Intervention    Visual Impairment WFL   per pt  -JOANN     Cognitive Assessment/Intervention    Current Cognitive/Communication Assessment impaired   trach, will whisper and write on paper to communicate  -JOANN     Orientation Status oriented to;person;unable/difficult to assess   trach makes communication difficult  -JOANN     Follows Commands/Answers Questions able to follow multi-step instructions;100% of the time;needs cueing  -JOANN     Personal Safety decreased awareness, need for assist;decreased awareness, need for safety  -JOANN     Personal Safety Interventions fall prevention program maintained;gait belt;muscle strengthening facilitated;nonskid shoes/slippers when out of bed  -JOANN     ROM (Range of Motion)    General ROM Detail B shoulder AROM impaired ~ 50%, B elbow/hand AROM WFL. B LE AROM WFL  -JOANN     MMT (Manual Muscle Testing)    General MMT Assessment  Detail B UE/LE functionally 4-/5  -JOANN     Bed Mobility, Assessment/Treatment    Bed Mobility, Assistive Device head of bed elevated  -JOANN     Bed Mob, Supine to Sit, [place] verbal cues required;minimum assist (75% patient effort)  -JOANN     Bed Mob, Sit to Supine, [place] verbal cues required;minimum assist (75% patient effort)  -JOANN     Bed Mobility, Impairments strength decreased;impaired balance;pain  -JOANN     Transfer Assessment/Treatment    Transfers, Sit-Stand [place] verbal cues required;minimum assist (75% patient effort);upper extremity support  -JOANN     Transfers, Stand-Sit [place] verbal cues required;minimum assist (75% patient effort);upper extremity support  -JOANN     Transfer, Impairments strength decreased;impaired balance;pain  -JOANN     Motor Skills/Interventions    Additional Documentation Balance Skills Training (Group)  -JOANN     Balance Skills Training    Sitting-Level of Assistance Contact guard  -JOANN     Sitting-Balance Support Right upper extremity supported;Left upper extremity supported;Feet supported  -JOANN     Standing-Level of Assistance Minimum assistance  -JOANN     Static Standing Balance Support Right upper extremity supported  -JOANN     Gait Balance-Level of Assistance Minimum assistance  -JOANN     Gait Balance Support Right upper extremity supported  -JOANN     Gait Balance Activities side-stepping;backwards   Marching, few steps as trach tubing allowed  -JOANN     Positioning and Restraints    Pre-Treatment Position in bed  -JOANN     Post Treatment Position bed  -JOANN     In Bed fowlers;call light within reach;encouraged to call for assist;with family/caregiver;with nsg  -JOANN       10/05/17 1514 10/04/17 1436    General Information    Equipment Currently Used at Home cane, straight  -CP     Living Environment    Lives With  spouse  -    Living Arrangements  mobile home  -    Home Accessibility  no concerns  -    Transportation Available  car;family or friend will provide  -      User  Key  (r) = Recorded By, (t) = Taken By, (c) = Cosigned By    Initials Name Provider Type    JOANN Montoya, PT DPT Physical Therapist    RINA Graham, RN Registered Nurse    PAULINO Rea, RN Registered Nurse    ANSHUL RodriguezW           Physical Therapy Education     Title: PT OT SLP Therapies (Active)     Topic: Physical Therapy (Active)     Point: Mobility training (Done)    Learning Progress Summary    Learner Readiness Method Response Comment Documented by Status   Patient Acceptance E VU,NR Educated pt./spouse on progression of PT POC and benefits of activity JOANN 10/07/17 1104 Done   Significant Other Acceptance E VU,NR Educated pt./spouse on progression of PT POC and benefits of activity JOANN 10/07/17 1104 Done                      User Key     Initials Effective Dates Name Provider Type Discipline     08/02/16 -  Josué Montoya, PT DPT Physical Therapist PT                PT Recommendation and Plan  Anticipated Discharge Disposition: home with assist, home with home health, home with /7 care  Planned Therapy Interventions: bed mobility training, transfer training, gait training, balance training, home exercise program, patient/family education, postural re-education, stair training, strengthening  PT Frequency: daily, 2 times/day  Plan of Care Review  Plan Of Care Reviewed With: patient  Outcome Summary/Follow up Plan: PT eval completed. Pt. required min assist to get OOB and to stand. Once standing pt. urinated a small amount in the urinal and completed standing activities such as marching, side stepping, and forward/backward walking. PT will work to progress pt's functional mobility, balance, and strength. Anticipate pt. will d/c home with spouse.           IP PT Goals       10/07/17 1104          Bed Mobility PT LTG    Bed Mobility PT LTG, Date Established 10/07/17  -JOANN      Bed Mobility PT LTG, Time to Achieve by discharge  -JOANN      Bed Mobility PT LTG,  Activity Type all bed mobility  -JOANN      Bed Mobility PT LTG, [place] Level supervision required  -JOANN      Bed Mobility PT Goal  LTG, Assist Device bed rails  -JOANN      Transfer Training PT LTG    Transfer Training PT LTG, Date Established 10/07/17  -JOANN      Transfer Training PT LTG, Time to Achieve by discharge  -JOANN      Transfer Training PT LTG, Activity Type bed to chair /chair to bed;sit to stand/stand to sit  -JOANN      Transfer Training PT LTG, [place] Level supervision required  -JOANN      Transfer Training PT LTG, Assist Device cane, straight  -JOANN      Gait Training PT LTG    Gait Training Goal PT LTG, Date Established 10/07/17  -JOANN      Gait Training Goal PT LTG, Time to Achieve by discharge  -JOANN      Gait Training Goal PT LTG, [place] Level supervision required  -JOANN      Gait Training Goal PT LTG, Assist Device cane, straight  -JOANN      Gait Training Goal PT LTG, Distance to Achieve 150  -JOANN      Stair Training PT LTG    Stair Training Goal PT LTG, Date Established 10/07/17  -JOANN      Stair Training Goal PT LTG, Time to Achieve by discharge  -JOANN      Stair Training Goal PT LTG, Number of Steps 4  -JOANN      Stair Training Goal PT LTG, [place] Level supervision required  -JOANN        User Key  (r) = Recorded By, (t) = Taken By, (c) = Cosigned By    Initials Name Provider Type    JOANN Montoya, PT DPT Physical Therapist                Outcome Measures       10/07/17 1104          How much help from another person do you currently need...    Turning from your back to your side while in flat bed without using bedrails? 3  -JOANN      Moving from lying on back to sitting on the side of a flat bed without bedrails? 3  -JOANN      Moving to and from a bed to a chair (including a wheelchair)? 3  -JOANN      Standing up from a chair using your arms (e.g., wheelchair, bedside chair)? 3  -JOANN      Climbing 3-5 steps with a railing? 2  -JOANN      To walk in hospital room? 2  -JOANN      AM-PAC 6 Clicks Score 16  -JOANN       Functional Assessment    Outcome Measure Options AM-PAC 6 Clicks Basic Mobility (PT)  -JOANN        User Key  (r) = Recorded By, (t) = Taken By, (c) = Cosigned By    Initials Name Provider Type    JOANN Montoya, KARLA DPT Physical Therapist           Time Calculation:         PT Charges       10/07/17 1251          Time Calculation    Start Time 1104  -JOANN      Stop Time 1147  -JOANN      Time Calculation (min) 43 min  -JOANN      PT Received On 10/07/17  -JOANN      PT Goal Re-Cert Due Date 10/17/17  -JOANN        User Key  (r) = Recorded By, (t) = Taken By, (c) = Cosigned By    Initials Name Provider Type    JOANN Montoya, PT DPT Physical Therapist          Therapy Charges for Today     Code Description Service Date Service Provider Modifiers Qty    12427754430 HC PT MOBILITY CURRENT 10/7/2017 Josué Montoya, PT DPT GP, CK 1    45995919698 HC PT MOBILITY PROJECTED 10/7/2017 Josué Montoya, PT DPT GP, CI 1    63495804866 HC PT EVAL MOD COMPLEXITY 3 10/7/2017 Josué Montoya, PT DPT GP 1          PT G-Codes  Outcome Measure Options: AM-PAC 6 Clicks Basic Mobility (PT)  Score: 16  Functional Limitation: Mobility: Walking and moving around  Mobility: Walking and Moving Around Current Status (): At least 40 percent but less than 60 percent impaired, limited or restricted  Mobility: Walking and Moving Around Goal Status (): At least 1 percent but less than 20 percent impaired, limited or restricted      Josué Montoya PT DPT  10/7/2017

## 2017-10-08 LAB
ANION GAP SERPL CALCULATED.3IONS-SCNC: 12 MMOL/L (ref 4–13)
BUN BLD-MCNC: 12 MG/DL (ref 5–21)
BUN/CREAT SERPL: 24 (ref 7–25)
CALCIUM SPEC-SCNC: 8.5 MG/DL (ref 8.4–10.4)
CHLORIDE SERPL-SCNC: 100 MMOL/L (ref 98–110)
CO2 SERPL-SCNC: 25 MMOL/L (ref 24–31)
CREAT BLD-MCNC: 0.5 MG/DL (ref 0.5–1.4)
DEPRECATED RDW RBC AUTO: 38.6 FL (ref 40–54)
ERYTHROCYTE [DISTWIDTH] IN BLOOD BY AUTOMATED COUNT: 12.5 % (ref 12–15)
GFR SERPL CREATININE-BSD FRML MDRD: >150 ML/MIN/1.73
GLUCOSE BLD-MCNC: 168 MG/DL (ref 70–100)
GLUCOSE BLDC GLUCOMTR-MCNC: 148 MG/DL (ref 70–130)
GLUCOSE BLDC GLUCOMTR-MCNC: 166 MG/DL (ref 70–130)
GLUCOSE BLDC GLUCOMTR-MCNC: 169 MG/DL (ref 70–130)
GLUCOSE BLDC GLUCOMTR-MCNC: 171 MG/DL (ref 70–130)
HCT VFR BLD AUTO: 34.7 % (ref 40–52)
HGB BLD-MCNC: 12.2 G/DL (ref 14–18)
MCH RBC QN AUTO: 29.9 PG (ref 28–32)
MCHC RBC AUTO-ENTMCNC: 35.2 G/DL (ref 33–36)
MCV RBC AUTO: 85 FL (ref 82–95)
PLATELET # BLD AUTO: 180 10*3/MM3 (ref 130–400)
PMV BLD AUTO: 11.5 FL (ref 6–12)
POTASSIUM BLD-SCNC: 3.6 MMOL/L (ref 3.5–5.3)
RBC # BLD AUTO: 4.08 10*6/MM3 (ref 4.8–5.9)
SODIUM BLD-SCNC: 137 MMOL/L (ref 135–145)
WBC NRBC COR # BLD: 10.8 10*3/MM3 (ref 4.8–10.8)

## 2017-10-08 PROCEDURE — 85027 COMPLETE CBC AUTOMATED: CPT | Performed by: SPECIALIST

## 2017-10-08 PROCEDURE — 82962 GLUCOSE BLOOD TEST: CPT

## 2017-10-08 PROCEDURE — 63710000001 INSULIN LISPRO (HUMAN) PER 5 UNITS: Performed by: OTOLARYNGOLOGY

## 2017-10-08 PROCEDURE — 25010000002 ENOXAPARIN PER 10 MG: Performed by: FAMILY MEDICINE

## 2017-10-08 PROCEDURE — 80048 BASIC METABOLIC PNL TOTAL CA: CPT | Performed by: SPECIALIST

## 2017-10-08 PROCEDURE — 99232 SBSQ HOSP IP/OBS MODERATE 35: CPT | Performed by: OTOLARYNGOLOGY

## 2017-10-08 PROCEDURE — 25010000002 HYDROMORPHONE PER 4 MG: Performed by: FAMILY MEDICINE

## 2017-10-08 RX ORDER — FAMOTIDINE 40 MG/5ML
20 POWDER, FOR SUSPENSION ORAL 2 TIMES DAILY
Status: DISCONTINUED | OUTPATIENT
Start: 2017-10-08 | End: 2017-10-13 | Stop reason: HOSPADM

## 2017-10-08 RX ORDER — POTASSIUM CHLORIDE 20MEQ/15ML
20 LIQUID (ML) ORAL DAILY
Status: DISCONTINUED | OUTPATIENT
Start: 2017-10-08 | End: 2017-10-13 | Stop reason: HOSPADM

## 2017-10-08 RX ORDER — ACETAMINOPHEN 325 MG/1
650 TABLET ORAL EVERY 6 HOURS PRN
Status: DISCONTINUED | OUTPATIENT
Start: 2017-10-08 | End: 2017-10-13 | Stop reason: HOSPADM

## 2017-10-08 RX ADMIN — METOPROLOL TARTRATE 50 MG: 50 TABLET, FILM COATED ORAL at 08:25

## 2017-10-08 RX ADMIN — FAMOTIDINE 20 MG: 40 POWDER, FOR SUSPENSION ORAL at 18:00

## 2017-10-08 RX ADMIN — TAMSULOSIN HYDROCHLORIDE 0.4 MG: 0.4 CAPSULE ORAL at 20:08

## 2017-10-08 RX ADMIN — ACETAMINOPHEN 650 MG: 325 TABLET ORAL at 20:07

## 2017-10-08 RX ADMIN — Medication 81 MG: at 08:25

## 2017-10-08 RX ADMIN — INSULIN LISPRO 2 UNITS: 100 INJECTION, SOLUTION INTRAVENOUS; SUBCUTANEOUS at 18:21

## 2017-10-08 RX ADMIN — BACLOFEN 20 MG: 10 TABLET ORAL at 08:25

## 2017-10-08 RX ADMIN — BACLOFEN 20 MG: 10 TABLET ORAL at 17:59

## 2017-10-08 RX ADMIN — INSULIN LISPRO 2 UNITS: 100 INJECTION, SOLUTION INTRAVENOUS; SUBCUTANEOUS at 21:41

## 2017-10-08 RX ADMIN — HYDROMORPHONE HYDROCHLORIDE 1 MG: 1 INJECTION, SOLUTION INTRAMUSCULAR; INTRAVENOUS; SUBCUTANEOUS at 21:33

## 2017-10-08 RX ADMIN — OXYCODONE HYDROCHLORIDE AND ACETAMINOPHEN 1 TABLET: 7.5; 325 TABLET ORAL at 03:22

## 2017-10-08 RX ADMIN — SUCRALFATE 1 G: 1 TABLET ORAL at 08:25

## 2017-10-08 RX ADMIN — POTASSIUM CHLORIDE 20 MEQ: 20 SOLUTION ORAL at 11:48

## 2017-10-08 RX ADMIN — LISINOPRIL 10 MG: 10 TABLET ORAL at 08:25

## 2017-10-08 RX ADMIN — SUCRALFATE 1 G: 1 TABLET ORAL at 18:01

## 2017-10-08 RX ADMIN — SODIUM CHLORIDE 75 ML/HR: 9 INJECTION, SOLUTION INTRAVENOUS at 10:27

## 2017-10-08 RX ADMIN — BACLOFEN 20 MG: 10 TABLET ORAL at 20:08

## 2017-10-08 RX ADMIN — INSULIN LISPRO 2 UNITS: 100 INJECTION, SOLUTION INTRAVENOUS; SUBCUTANEOUS at 12:37

## 2017-10-08 RX ADMIN — SUCRALFATE 1 G: 1 TABLET ORAL at 11:49

## 2017-10-08 RX ADMIN — FUROSEMIDE 40 MG: 40 TABLET ORAL at 17:59

## 2017-10-08 RX ADMIN — OXYCODONE HYDROCHLORIDE AND ACETAMINOPHEN 1 TABLET: 7.5; 325 TABLET ORAL at 08:30

## 2017-10-08 RX ADMIN — HYDROMORPHONE HYDROCHLORIDE 1 MG: 1 INJECTION, SOLUTION INTRAMUSCULAR; INTRAVENOUS; SUBCUTANEOUS at 12:37

## 2017-10-08 RX ADMIN — FUROSEMIDE 40 MG: 40 TABLET ORAL at 08:25

## 2017-10-08 RX ADMIN — OXYCODONE HYDROCHLORIDE AND ACETAMINOPHEN 1 TABLET: 7.5; 325 TABLET ORAL at 20:07

## 2017-10-08 RX ADMIN — ENOXAPARIN SODIUM 40 MG: 40 INJECTION SUBCUTANEOUS at 11:48

## 2017-10-08 RX ADMIN — METHYLNALTREXONE BROMIDE 8 MG: 12 INJECTION, SOLUTION SUBCUTANEOUS at 11:48

## 2017-10-08 RX ADMIN — SUCRALFATE 1 G: 1 TABLET ORAL at 20:07

## 2017-10-08 RX ADMIN — METOPROLOL TARTRATE 50 MG: 50 TABLET, FILM COATED ORAL at 17:59

## 2017-10-08 NOTE — PROGRESS NOTES
LOS: 3 days   Patient Care Team:  Christian Castellon MD as PCP - General (Internal Medicine)  Sin Alarcon MD as Referring Physician (General Practice)  Alber Justin MD as Consulting Physician (Otolaryngology)  Kriss Dominguez MD as Referring Physician (Hematology and Oncology)  Hadley Marie III, MD as Consulting Physician (Radiation Oncology)    Chief Complaint:  Oropharyngeal carcinoma status post G-tube and also placement of a single lumen port.    Subjective     Subjective postoperative day #3 status post placement of a G-tube and port as well as tracheostomy he is having some secretions the port is still slightly swollen inferiorly the arm on the left is less edematous the G-tube site is clean and dry without problems  Objective      Objective     Vital Signs  Temp:  [99 °F (37.2 °C)-99.9 °F (37.7 °C)] 99.5 °F (37.5 °C)  Heart Rate:  [] 99  BP: (115-160)/(65-86) 141/76    Intake & Output (last 3 days)       10/05 0701 - 10/06 0700 10/06 0701 - 10/07 0700 10/07 0701 - 10/08 0700 10/08 0701 - 10/09 0700    I.V. (mL/kg) 1503 (19.7) 1836.1 (24.1) 1871.6 (25.9)     Other   270     Total Intake(mL/kg) 1503 (19.7) 1836.1 (24.1) 2141.6 (29.7)     Urine (mL/kg/hr) 775 2075 (1.1) 3275 (1.9)     Total Output 775 2075 3275      Net +728 -238.9 -1133.4                    Physical Exam:     General Appearance:    Alert, cooperative, in no acute distress   Lungs:     Clear to auscultation,respirations regular, even and                  unlaboredPort is present and has some mild bruising present there having a difficult time finding IV access and we will start accessing the port     Heart:    Regular rhythm and normal rate, normal S1 and S2, no            murmur, no gallop, no rub, no click   Chest Wall:    No abnormalities observed   Abdomen:     Flat abdomen, occasional bowel sounds, G-tube site is clean and dry without problems without drainage.          Results Review:     I reviewed the  patient's new clinical results.  I reviewed the patient's new imaging results and agree with the interpretation.      Results from last 7 days  Lab Units 10/07/17  0225 10/06/17  0243 10/04/17  1417   WBC 10*3/mm3 13.13* 10.37 8.98   HEMOGLOBIN g/dL 13.9* 14.1 14.4   HEMATOCRIT % 40.0 39.8* 41.9   PLATELETS 10*3/mm3 186 177 221          Results from last 7 days  Lab Units 10/07/17  0225 10/06/17  0243 10/04/17  1417   SODIUM mmol/L 133* 139 139   POTASSIUM mmol/L 4.3 4.0 4.1   CHLORIDE mmol/L 99 103 100   CO2 mmol/L 22.0* 26.0 23.0*   BUN mg/dL 10 10 10   CREATININE mg/dL 0.55 0.57 0.66   CALCIUM mg/dL 8.9 9.0 9.4   BILIRUBIN mg/dL  --   --  0.7   ALK PHOS U/L  --   --  172*   ALT (SGPT) U/L  --   --  109*   AST (SGOT) U/L  --   --  66*   GLUCOSE mg/dL 151* 114* 210*       Assessment/Plan     Assessment/Plan     Active Problems:    Oropharyngeal cancer    Tracheostomy dependence    Postoperative pain      Okay with me to transfer to the floor, also okay to access the port and begin IV treatments to the port as well as activation and use of the G-tube      Redd Alfaro MD  10/08/17  8:10 AM      Time: time spent with patient 15 minutes     EMR Dragon/Transcription disclaimer: Much of this encounter note is an electronic transcription/translation of spoken language to printed text. The electronic translation of spoken language may permit erroneous, or at times, nonsensical words or phrases to be inadvertently transcribed; although I have reviewed the note for such errors, some may still exist.

## 2017-10-08 NOTE — PLAN OF CARE
Problem: Patient Care Overview (Adult)  Goal: Plan of Care Review    10/08/17 0547   Coping/Psychosocial Response Interventions   Plan Of Care Reviewed With patient   Patient Care Overview   Progress no change         Problem: Fall Risk (Adult)  Goal: Absence of Falls  Outcome: Ongoing (interventions implemented as appropriate)    Problem: Feeding Dysfunction/Swallowing Impairment (Pediatric)  Goal: Reduced Risk of Aspiration  Outcome: Ongoing (interventions implemented as appropriate)  Goal: Adequate Nutrition and Hydration  Outcome: Ongoing (interventions implemented as appropriate)

## 2017-10-08 NOTE — PLAN OF CARE
Problem: Patient Care Overview (Adult)  Goal: Plan of Care Review  Outcome: Ongoing (interventions implemented as appropriate)  Goal: Adult Individualization and Mutuality  Outcome: Ongoing (interventions implemented as appropriate)  Goal: Discharge Needs Assessment  Outcome: Ongoing (interventions implemented as appropriate)    Problem: Perioperative Period (Adult)  Goal: Signs and Symptoms of Listed Potential Problems Will be Absent or Manageable (Perioperative Period)  Outcome: Ongoing (interventions implemented as appropriate)    Problem: Fall Risk (Adult)  Goal: Absence of Falls  Outcome: Ongoing (interventions implemented as appropriate)    Problem: Feeding Dysfunction/Swallowing Impairment (Pediatric)  Goal: Reduced Risk of Aspiration  Outcome: Ongoing (interventions implemented as appropriate)  Goal: Adequate Nutrition and Hydration  Outcome: Ongoing (interventions implemented as appropriate)

## 2017-10-08 NOTE — PROGRESS NOTES
[facility]  ENT PROGRESS NOTES  10/8/2017      Patient Identification:  Name: Sameer Tavarez  Age: 62 y.o.  Sex: male  :  1955  MRN: [id number]                     Date of Admission: 10/5/2017      CC:      Subjective   Doing well - no new complaints today.  Status post G-tube per Dr. Phillips POD 2.  He has still refused SCDs and is being managed by the hospitalist service with Lovenox.  He denies significant pain presently.  Low-grade temp last night with slightly increased secretions.    HISTORY   HPI, ROS, PMFSHx reviewed:   No changes       Objective     PE:    Temp:  [98.3 °F (36.8 °C)-99.9 °F (37.7 °C)] 98.9 °F (37.2 °C)  Heart Rate:  [] 78  BP: (115-160)/(66-90) 148/76   Body mass index is 25.7 kg/(m^2).     General appearance: ill-appearing.   Ability to Communicate: Trach in place, communicating primarily by writing Ears - bilateral TM's and external ear canals normal.   Nasal exam - normal and patent, no erythema, discharge or polyps.   Oropharyngeal exam - As previously noted in direct laryngoscopy no large right oropharyngeal mass..   Neck exam - supple, no significant adenopathy. #6 cuffed tracheostomy tube in place.  The wound looks good there is minimal bloody drainage.     CVS exam: normal rate, regular rhythm, normal S1, S2, no murmurs, rubs, clicks or gallops.   Chest: rales noted which are slight and minimally greater on the right than the left.  There are no significant rhonchi.  Rales slightly worse than yesterday.   No lymphadenopathy in the anterior or posterior neck, supraclavicular, axillary or inguinal areas. No hepato-splenomegaly noted.   Neurological exam reveals not examined.    DATA   CBC (No Diff)   Order: [id number]   Status:  Final result   Visible to patient:  No (Not Released)      Ref Range & Units 9:30 AM     WBC 4.80 - 10.80 10*3/mm3 10.80   RBC 4.80 - 5.90 10*6/mm3 4.08 (L)   Hemoglobin 14.0 - 18.0 g/dL 12.2 (L)   Hematocrit 40.0 - 52.0 % 34.7 (L)   MCV  82.0 - 95.0 fL 85.0   MCH 28.0 - 32.0 pg 29.9   MCHC 33.0 - 36.0 g/dL 35.2   RDW 12.0 - 15.0 % 12.5   RDW-SD 40.0 - 54.0 fl 38.6 (L)   MPV 6.0 - 12.0 fL 11.5   Platelets 130 - 400 10*3/mm3 180   Resulting [facility]      Specimen Collected: 10/08/17  9:30 AM   Last Resulted: 10/08/17  9:45 AM            Basic Metabolic Panel   Order: [id number]   Status:  Final result   Visible to patient:  No (Not Released)      Newer results are available. Click to view them now.            Ref Range & Units 9:30 AM     Glucose 70 - 100 mg/dL 168 (H)   BUN 5 - 21 mg/dL 12   Creatinine 0.50 - 1.40 mg/dL 0.50   Sodium 135 - 145 mmol/L 137   Potassium 3.5 - 5.3 mmol/L 3.6   Chloride 98 - 110 mmol/L 100   CO2 24.0 - 31.0 mmol/L 25.0   Calcium 8.4 - 10.4 mg/dL 8.5   eGFR Non African Amer >60 mL/min/1.73 >150   BUN/Creatinine Ratio 7.0 - 25.0 24.0   Anion Gap 4.0 - 13.0 mmol/L 12.0   Resulting [facility]   Narrative   GFR Normal >60  Chronic Kidney Disease <60  Kidney Failure <15      Specimen Collected: 10/08/17  9:30 AM   Last Resulted: 10/08/17  9:58 AM                   MEDICATIONS   I have reviewed the current MAR.     LABS AND IMAGING      I have reviewed the labs and imaging data since the patient was last seen.       Assessment     ASSESSMENT     Active Problems:    Oropharyngeal cancer    Tracheostomy dependence    Postoperative pain  Probable atelectasis    As above no significant changes noted      Plan     PLAN       Will continue to increase ambulation  Out of bed to chair today  Possible discharge from unit tomorrow  Chest x-ray in a.m.-with normal white count and only a LG temp: not overly concerned at this point regarding possible pneumonia  Evaluate possibilities for full mouth extraction hopefully tomorrow          Darin Neal MD  10/8/2017  12:21 PM

## 2017-10-08 NOTE — PROGRESS NOTES
[facility] Medicine Services  INPATIENT PROGRESS NOTE    Length of Stay: 3  Date of Admission: 10/5/2017  Primary Care Physician: Christian Castellon MD    Subjective     Chief Complaint:     Medical management    HPI     Blood sugar is well controlled with sliding scale insulin. CBC is pending.  Pain is well controlled with pain medications through PEG tube.  He slept well last evening.  Vital signs are stable.  Maximum temperature over the past 24 hours 99.9°.      Review of Systems     All pertinent negatives and positives are as above. All other systems have been reviewed and are negative unless otherwise stated.     Objective    Temp:  [99 °F (37.2 °C)-99.9 °F (37.7 °C)] 99.5 °F (37.5 °C)  Heart Rate:  [] 84  BP: (115-160)/(65-90) 153/75    Lab Results (last 24 hours)     Procedure Component Value Units Date/Time    POC Glucose Fingerstick [[id number]]  (Abnormal) Collected:  10/07/17 1146    Specimen:  Blood Updated:  10/07/17 1201     Glucose 158 (H) mg/dL       : [id number] Jaden MaldonadoieMeter ID: [id number]       POC Glucose Fingerstick [[id number]]  (Abnormal) Collected:  10/07/17 1718    Specimen:  Blood Updated:  10/07/17 1729     Glucose 151 (H) mg/dL       : [id number] Jaden DonaldMeter ID: [id number]       POC Glucose Fingerstick [[id number]]  (Abnormal) Collected:  10/07/17 2119    Specimen:  Blood Updated:  10/07/17 2140     Glucose 137 (H) mg/dL       : [id number] Oniel Parker [facility]: [id number]       POC Glucose Fingerstick [[id number]]  (Abnormal) Collected:  10/08/17 0831    Specimen:  Blood Updated:  10/08/17 0853     Glucose 148 (H) mg/dL       : [id number] Jaden DonaldMeter ID: [id number]       Basic Metabolic Panel [[id number]] Collected:  10/08/17 0930    Specimen:  Blood Updated:  10/08/17 0938    CBC (No Diff) [[id number]] Collected:  10/08/17 0930    Specimen:  Blood Updated:  10/08/17 0939          Imaging Results (last 24 hours)      Procedure Component Value Units Date/Time    XR Chest 1 View [[id number]] Collected:  10/07/17 1035     Updated:  10/07/17 1039    Narrative:       EXAMINATION: XR CHEST 1 VW-     10/7/2017 4:15 AM EDT     HISTORY: port placement; C10.9-Malignant neoplasm of oropharynx,  unspecified; K08.9-Disorder of teeth and supporting structures,  unspecified; F17.200-Nicotine dependence, unspecified, uncomplicated;  Z72.0-Tobacco use.     One view chest x-ray compared with 2 days ago.     Interval placement of a left subclavian port which has a normal  appearance with the tip extending into the lower SVC near the cavoatrial  junction.     Mild chronic lung changes with hyperaeration and mild basilar  atelectasis.     Stable heart size.     Unchanged tracheostomy tube.     Summary:  1. Normal appearance of the left subclavian port.  This report was finalized on 10/07/2017 10:36 by Dr. Juan Miguel Colón MD.             Intake/Output Summary (Last 24 hours) at 10/08/17 0943  Last data filed at 10/08/17 0519   Gross per 24 hour   Intake           1855.6 ml   Output             3275 ml   Net          -1419.4 ml       Physical Exam    Constitutional: He is oriented to person, place, and time. No distress.   HENT:   Head: Normocephalic and atraumatic.   Nose: Nose normal.   Trach is in place.   Eyes: Right eye exhibits no discharge. Left eye exhibits no discharge. No scleral icterus.   Neck: Neck supple.   Cardiovascular: Normal rate, regular rhythm and normal heart sounds.  Exam reveals no gallop and no friction rub.    No murmur heard.  Pulmonary/Chest: Effort normal and breath sounds normal. No stridor. No respiratory distress. He has no wheezes. He has no rales.  Left subclavian port in place.  Abdominal: Soft. Bowel sounds are normal. He exhibits no distension. There is no tenderness. There is no guarding.  PEG tube is in place in the upper mid abdomen.   Musculoskeletal: He exhibits no edema, tenderness or deformity.   Neurological:  He is alert and oriented to person, place, and time. No cranial nerve deficit. He exhibits normal muscle tone. Coordination normal.   Skin: Skin is warm and dry. No rash noted. He is not diaphoretic. No erythema. No pallor.   Psychiatric: He has a normal mood and affect. His behavior is normal. Judgment and thought content normal.     Results Review:  I have reviewed the labs, radiology results, and diagnostic studies since my last progress note and made treatment changes reflective of the results.   I have reviewed the current medications.    Assessment/Plan     Hospital Problem List     Oropharyngeal cancer    Tracheostomy dependence    Postoperative pain      Hypertension  Hyperlipidemia  Diabetes mellitus type II  Hypoglycemia  Dysphagia / Odynophagia  Oropharyngeal cancer   COPD  S/P trach placement 10/5  S/P port and PEG placement 10/6    PLAN:  OK for transfer to the floor.   Continue sliding scale insulin.  Continue physical therapy    Curt Juarez DO   10/08/17   9:43 AM

## 2017-10-09 ENCOUNTER — APPOINTMENT (OUTPATIENT)
Dept: GENERAL RADIOLOGY | Facility: HOSPITAL | Age: 62
End: 2017-10-09

## 2017-10-09 ENCOUNTER — PREP FOR SURGERY (OUTPATIENT)
Dept: OTHER | Facility: HOSPITAL | Age: 62
End: 2017-10-09

## 2017-10-09 DIAGNOSIS — C10.9 OROPHARYNGEAL CANCER (HCC): Primary | ICD-10-CM

## 2017-10-09 LAB
ANION GAP SERPL CALCULATED.3IONS-SCNC: 9 MMOL/L (ref 4–13)
BUN BLD-MCNC: 13 MG/DL (ref 5–21)
BUN/CREAT SERPL: 28.3 (ref 7–25)
CALCIUM SPEC-SCNC: 8.1 MG/DL (ref 8.4–10.4)
CHLORIDE SERPL-SCNC: 102 MMOL/L (ref 98–110)
CO2 SERPL-SCNC: 27 MMOL/L (ref 24–31)
CREAT BLD-MCNC: 0.46 MG/DL (ref 0.5–1.4)
CYTO UR: NORMAL
DEPRECATED RDW RBC AUTO: 39 FL (ref 40–54)
ERYTHROCYTE [DISTWIDTH] IN BLOOD BY AUTOMATED COUNT: 12.7 % (ref 12–15)
GFR SERPL CREATININE-BSD FRML MDRD: >150 ML/MIN/1.73
GLUCOSE BLD-MCNC: 177 MG/DL (ref 70–100)
GLUCOSE BLDC GLUCOMTR-MCNC: 135 MG/DL (ref 70–130)
GLUCOSE BLDC GLUCOMTR-MCNC: 166 MG/DL (ref 70–130)
GLUCOSE BLDC GLUCOMTR-MCNC: 176 MG/DL (ref 70–130)
HCT VFR BLD AUTO: 32.9 % (ref 40–52)
HGB BLD-MCNC: 11.6 G/DL (ref 14–18)
LAB AP CASE REPORT: NORMAL
LAB AP SYNOPTIC CHECKLIST: NORMAL
Lab: NORMAL
MCH RBC QN AUTO: 29.8 PG (ref 28–32)
MCHC RBC AUTO-ENTMCNC: 35.3 G/DL (ref 33–36)
MCV RBC AUTO: 84.6 FL (ref 82–95)
PATH REPORT.FINAL DX SPEC: NORMAL
PATH REPORT.GROSS SPEC: NORMAL
PLATELET # BLD AUTO: 188 10*3/MM3 (ref 130–400)
PMV BLD AUTO: 11.3 FL (ref 6–12)
POTASSIUM BLD-SCNC: 3.4 MMOL/L (ref 3.5–5.3)
RBC # BLD AUTO: 3.89 10*6/MM3 (ref 4.8–5.9)
SODIUM BLD-SCNC: 138 MMOL/L (ref 135–145)
WBC NRBC COR # BLD: 9.03 10*3/MM3 (ref 4.8–10.8)

## 2017-10-09 PROCEDURE — G9174 SPEECH LANG CURRENT STATUS: HCPCS

## 2017-10-09 PROCEDURE — 82962 GLUCOSE BLOOD TEST: CPT

## 2017-10-09 PROCEDURE — 25010000002 ENOXAPARIN PER 10 MG: Performed by: FAMILY MEDICINE

## 2017-10-09 PROCEDURE — 94760 N-INVAS EAR/PLS OXIMETRY 1: CPT

## 2017-10-09 PROCEDURE — 97110 THERAPEUTIC EXERCISES: CPT

## 2017-10-09 PROCEDURE — G9175 SPEECH LANG GOAL STATUS: HCPCS

## 2017-10-09 PROCEDURE — 99232 SBSQ HOSP IP/OBS MODERATE 35: CPT | Performed by: PHYSICIAN ASSISTANT

## 2017-10-09 PROCEDURE — 80048 BASIC METABOLIC PNL TOTAL CA: CPT | Performed by: SPECIALIST

## 2017-10-09 PROCEDURE — 85027 COMPLETE CBC AUTOMATED: CPT | Performed by: SPECIALIST

## 2017-10-09 PROCEDURE — 94799 UNLISTED PULMONARY SVC/PX: CPT

## 2017-10-09 PROCEDURE — 63710000001 INSULIN LISPRO (HUMAN) PER 5 UNITS: Performed by: INTERNAL MEDICINE

## 2017-10-09 PROCEDURE — L8501 TRACHEOSTOMY SPEAKING VALVE: HCPCS

## 2017-10-09 PROCEDURE — 94640 AIRWAY INHALATION TREATMENT: CPT

## 2017-10-09 PROCEDURE — 71010 HC CHEST PA OR AP: CPT

## 2017-10-09 PROCEDURE — 97116 GAIT TRAINING THERAPY: CPT

## 2017-10-09 PROCEDURE — 25010000002 HYDROMORPHONE PER 4 MG: Performed by: FAMILY MEDICINE

## 2017-10-09 PROCEDURE — 92597 ORAL SPEECH DEVICE EVAL: CPT

## 2017-10-09 RX ORDER — MAGNESIUM CARB/ALUMINUM HYDROX 105-160MG
296 TABLET,CHEWABLE ORAL ONCE
Status: COMPLETED | OUTPATIENT
Start: 2017-10-09 | End: 2017-10-09

## 2017-10-09 RX ORDER — POTASSIUM CHLORIDE 1.5 G/1.77G
40 POWDER, FOR SOLUTION ORAL ONCE
Status: COMPLETED | OUTPATIENT
Start: 2017-10-09 | End: 2017-10-09

## 2017-10-09 RX ORDER — IPRATROPIUM BROMIDE AND ALBUTEROL SULFATE 2.5; .5 MG/3ML; MG/3ML
3 SOLUTION RESPIRATORY (INHALATION)
Status: DISCONTINUED | OUTPATIENT
Start: 2017-10-09 | End: 2017-10-13 | Stop reason: HOSPADM

## 2017-10-09 RX ORDER — SUCRALFATE ORAL 1 G/10ML
1 SUSPENSION ORAL EVERY 6 HOURS SCHEDULED
Status: DISCONTINUED | OUTPATIENT
Start: 2017-10-09 | End: 2017-10-13 | Stop reason: HOSPADM

## 2017-10-09 RX ADMIN — METOPROLOL TARTRATE 50 MG: 50 TABLET, FILM COATED ORAL at 09:17

## 2017-10-09 RX ADMIN — FAMOTIDINE 20 MG: 40 POWDER, FOR SUSPENSION ORAL at 17:26

## 2017-10-09 RX ADMIN — HYDROMORPHONE HYDROCHLORIDE 1 MG: 1 INJECTION, SOLUTION INTRAMUSCULAR; INTRAVENOUS; SUBCUTANEOUS at 11:29

## 2017-10-09 RX ADMIN — POTASSIUM CHLORIDE 40 MEQ: 1.5 POWDER, FOR SOLUTION ORAL at 09:19

## 2017-10-09 RX ADMIN — HYDROMORPHONE HYDROCHLORIDE 1 MG: 1 INJECTION, SOLUTION INTRAMUSCULAR; INTRAVENOUS; SUBCUTANEOUS at 17:36

## 2017-10-09 RX ADMIN — OXYCODONE HYDROCHLORIDE AND ACETAMINOPHEN 1 TABLET: 7.5; 325 TABLET ORAL at 09:18

## 2017-10-09 RX ADMIN — HYDROMORPHONE HYDROCHLORIDE 1 MG: 1 INJECTION, SOLUTION INTRAMUSCULAR; INTRAVENOUS; SUBCUTANEOUS at 07:03

## 2017-10-09 RX ADMIN — Medication 81 MG: at 09:16

## 2017-10-09 RX ADMIN — INSULIN LISPRO 2 UNITS: 100 INJECTION, SOLUTION INTRAVENOUS; SUBCUTANEOUS at 15:12

## 2017-10-09 RX ADMIN — HYDROMORPHONE HYDROCHLORIDE 1 MG: 1 INJECTION, SOLUTION INTRAMUSCULAR; INTRAVENOUS; SUBCUTANEOUS at 03:35

## 2017-10-09 RX ADMIN — OXYCODONE HYDROCHLORIDE AND ACETAMINOPHEN 1 TABLET: 7.5; 325 TABLET ORAL at 15:13

## 2017-10-09 RX ADMIN — TAMSULOSIN HYDROCHLORIDE 0.4 MG: 0.4 CAPSULE ORAL at 21:35

## 2017-10-09 RX ADMIN — IPRATROPIUM BROMIDE AND ALBUTEROL SULFATE 3 ML: 2.5; .5 SOLUTION RESPIRATORY (INHALATION) at 11:05

## 2017-10-09 RX ADMIN — BACLOFEN 20 MG: 10 TABLET ORAL at 21:35

## 2017-10-09 RX ADMIN — BACLOFEN 20 MG: 10 TABLET ORAL at 17:26

## 2017-10-09 RX ADMIN — SUCRALFATE 1 G: 1 SUSPENSION ORAL at 17:26

## 2017-10-09 RX ADMIN — ENOXAPARIN SODIUM 40 MG: 40 INJECTION SUBCUTANEOUS at 11:29

## 2017-10-09 RX ADMIN — FUROSEMIDE 40 MG: 40 TABLET ORAL at 17:26

## 2017-10-09 RX ADMIN — HYDROMORPHONE HYDROCHLORIDE 1 MG: 1 INJECTION, SOLUTION INTRAMUSCULAR; INTRAVENOUS; SUBCUTANEOUS at 21:35

## 2017-10-09 RX ADMIN — SODIUM CHLORIDE 75 ML/HR: 9 INJECTION, SOLUTION INTRAVENOUS at 03:35

## 2017-10-09 RX ADMIN — FUROSEMIDE 40 MG: 40 TABLET ORAL at 09:17

## 2017-10-09 RX ADMIN — ZOLPIDEM TARTRATE 10 MG: 5 TABLET, COATED ORAL at 21:35

## 2017-10-09 RX ADMIN — IPRATROPIUM BROMIDE AND ALBUTEROL SULFATE 3 ML: 2.5; .5 SOLUTION RESPIRATORY (INHALATION) at 08:10

## 2017-10-09 RX ADMIN — FAMOTIDINE 20 MG: 40 POWDER, FOR SUSPENSION ORAL at 09:19

## 2017-10-09 RX ADMIN — LISINOPRIL 10 MG: 10 TABLET ORAL at 09:18

## 2017-10-09 RX ADMIN — IPRATROPIUM BROMIDE AND ALBUTEROL SULFATE 3 ML: 2.5; .5 SOLUTION RESPIRATORY (INHALATION) at 15:19

## 2017-10-09 RX ADMIN — SUCRALFATE 1 G: 1 SUSPENSION ORAL at 11:29

## 2017-10-09 RX ADMIN — Medication 296 ML: at 09:39

## 2017-10-09 RX ADMIN — HYDROMORPHONE HYDROCHLORIDE 1 MG: 1 INJECTION, SOLUTION INTRAMUSCULAR; INTRAVENOUS; SUBCUTANEOUS at 00:12

## 2017-10-09 RX ADMIN — METOPROLOL TARTRATE 50 MG: 50 TABLET, FILM COATED ORAL at 17:26

## 2017-10-09 RX ADMIN — IPRATROPIUM BROMIDE AND ALBUTEROL SULFATE 3 ML: 2.5; .5 SOLUTION RESPIRATORY (INHALATION) at 20:13

## 2017-10-09 RX ADMIN — POTASSIUM CHLORIDE 20 MEQ: 20 SOLUTION ORAL at 09:19

## 2017-10-09 RX ADMIN — BACLOFEN 20 MG: 10 TABLET ORAL at 09:17

## 2017-10-09 NOTE — PROGRESS NOTES
Continued Stay Note   Es     Patient Name: Sameer Tavarez  MRN: [id number]  Today's Date: 10/9/2017    Admit Date: 10/5/2017          Discharge Plan       10/09/17 0833    Case Management/Social Work Plan    Plan Home with HH    Patient/Family In Agreement With Plan yes    Additional Comments Patient remains in ICU this am.  Patient now has PEG tube.  Will need physician's orders to arrange home feedings.  Patient's HH is arranged with [facility] .  Patient's trach supplies are arranged with [facility], who will need specific order of size, etc. at discharge.  Will follow and set up home feedings.              Discharge Codes     None            VLADISLAV Maravilla

## 2017-10-09 NOTE — PLAN OF CARE
Problem: Patient Care Overview (Adult)  Goal: Plan of Care Review  Outcome: Ongoing (interventions implemented as appropriate)    10/09/17 1028   Coping/Psychosocial Response Interventions   Plan Of Care Reviewed With patient;caregiver   Outcome Evaluation   Outcome Summary/Follow up Plan [place] valve evaluation completed. Pt's oxygen saturation remained at 97 - 98% however the valve remained in place for only 1 to 2 minutes at a time. His vocal quality was strained and he had increased work of breathing with valve placement. The valve was expelled 1x due to backpressure. Pt does not yet tolerate valve placement. Recommend PMV with SLP only. Pt is also currently NPO and receiving nutrition via PEG. If a swallow evaluation is desired, please have MD place order.         Problem: Inpatient SLP  Goal: SLP Additional Goal 1  Outcome: Ongoing (interventions implemented as appropriate)    10/09/17 1028   SLP- Additional Goal 1   Additional Goal 1- SLP, Date Established 10/09/17   Additional Goal 1- SLP, Time to Achieve by discharge   Additional Goal 1- SLP, Activity Level Patient will tolerate PMV placement for 15 minutes with no s/s of distress, pt will exhibit audible speech with PMV placement at 3 feet with 90% accuracy, pt will independently place and remove valve.   Additional Goal 1- SLP, Outcome goal ongoing

## 2017-10-09 NOTE — PLAN OF CARE
Problem: Patient Care Overview (Adult)  Goal: Plan of Care Review  Outcome: Ongoing (interventions implemented as appropriate)    10/09/17 0905   Coping/Psychosocial Response Interventions   Plan Of Care Reviewed With patient   Patient Care Overview   Progress progress toward functional goals as expected   Outcome Evaluation   Outcome Summary/Follow up Plan Pt. has good bed mobility and transfers. Amb with cane and CGA for 400'. Decreased wgt shift to L. Emphasized to shift wgt. Will benefit from balance activities.

## 2017-10-09 NOTE — THERAPY EVALUATION
Acute Care - Speech Language Pathology Initial Evaluation  [facility]     Patient Name: Sameer Tavarez  : 1955  MRN: [id number]  Today's Date: 10/9/2017  Onset of Illness/Injury or Date of Surgery Date: 10/05/17            Admit Date: 10/5/2017  Speaking Valve Assessment:  Sameer Tavarez was seen on 10/09/17 for evaluation for Speaking Valve Placement. Referral reason: difficulty communicating, difficulty being heard and Inefficient method of Communication. The patient was not orally intubated. Tracheostomy tube was placed on 10/05/2017. Type of trach tube placed: Shiley  cuffed trach, size 6.  The patient currently is suctioned at the trach.  Supplemental Oxygen is provided by Trach Collar  Current methods of communication include mouthing, gestures and writing. Communications attempts are successful.      Physiologic measures:   Baseline During Intervention After Intervention   Heart Rate      Respiratory Rate      Oxygen Saturation         Articulation skills: within normal limits.   The patient was assessed using Finger Occlusion and Trach Speaking Valve.     Phonation with Occlusion Audible at 1'   Vocal Quality on Phonation Noticeably Hoarse      The intervention was not tolerated. The  following contributing factors were noted: [place] Secretions   PMV with SLP only  Roseann Garcia CCC-SLP 10/9/2017 10:35 AM     Visit Dx:    ICD-10-CM ICD-9-CM   1. Impaired mobility Z74.09 799.89   2. Oropharyngeal cancer C10.9 146.9   3. Poor dentition K08.9 525.9   4. Current smoker F17.200 305.1   5. Tobacco use Z72.0 305.1   6. 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   • Recio's esophagus with dysplasia   • Encounter for consultation   • Tracheostomy dependence   • Postoperative pain     Past Medical History:   Diagnosis Date   • Arthritis    • Coronary artery disease    • Depression    •  Diabetes mellitus    • Enlarged prostate    • GERD (gastroesophageal reflux disease)    • History of transfusion    • Hypertension    • Oropharyngeal cancer 08/27/2017   • Seizure     diabetic   • Severe esophageal dysplasia    • Stroke    • TB (pulmonary tuberculosis) 2004     Past Surgical History:   Procedure Laterality Date   • CARDIAC SURGERY     • CHOLECYSTECTOMY     • CORONARY ARTERY BYPASS GRAFT     • FRACTURE SURGERY     • GTUBE REPLACEMENT N/A 10/6/2017    Procedure: GASTROSTOMY TUBE REPLACEMENT, port placement;  Surgeon: Jayne Phillips MD;  Location:  [facility];  Service:    • HERNIA REPAIR     • NISSEN FUNDOPLICATION LAPAROSCOPIC     • VT INSJ TUNNELED CVC W/O SUBQ PORT/ AGE 5 YR/> Left 10/6/2017    Procedure: INSERTION VENOUS ACCESS DEVICE;  Surgeon: Jayne Phillips MD;  Location:  [facility];  Service: General   • SKIN BIOPSY     • TESTICLE SURGERY      tubes implanted for drainage   • TONSILLECTOMY     • TRACHEOSTOMY N/A 10/5/2017    Procedure: Awake tracheostomy; DIRECT LARYNGOSCOPY WITH BIOPSY;  Surgeon: Alber Justin MD;  Location:  [facility] OR;  Service:           SLP EVALUATION (last 72 hours)      SLP Evaluation       10/09/17 0946                Rehab Evaluation    Document Type evaluation  -MB        Subjective Information agree to therapy;complains of;pain  -MB        General Information    Patient Profile Review yes  -MB        Onset of Illness/Injury 10/05/17  -MB        Pertinent History Of Current Problem Oropharyngeal cancer, tracheostomy 10/5/17, PEG 10/6/17  -MB        Current Diet Limitations NPO  -MB        Precautions/Limitations, Vision WFL  -MB        Precautions/Limitations, Hearing WFL  -MB        Prior Level of Function- Communication functional in all spheres  -MB        Prior Level of Function- Swallowing no diet consistency restrictions;other (comment)   but was having difficulty with swallowing  -MB        Plans/Goals Discussed With patient  -MB        Barriers to  Rehab medically complex  -MB        Clinical Impression    SLP Diagnosis Voice absence d/t trach  -MB        Patient's Goals For Discharge patient did not state  -MB        Criteria for Skilled Therapeutic Interventions Met skilled criteria for speech language intervention met  -MB        Rehab Potential good, to achieve stated therapy goals  -MB        Therapy Frequency 3-5 times/wk  -MB        Predicted Duration of Therapy Intervention (days/wks) until discharge  -MB        Expected Duration of Therapy Session (min) 15-30 minutes  -MB        Anticipated Discharge Disposition home with home health  -MB        Cognitive Assessment/Intervention    Current Cognitive/Communication Assessment impaired  -MB        Follows Commands/Answers Questions 100% of the time  -MB        Communication Assessment/Intervention    Additional Documentation Trach/Speaking Valve Asses/Intervention (Group)  -MB        Trach/Speaking Valve Assessment    [place] Speaking Valve Type PMV-2001 (Purple)  -MB        Articulation Skills with Trach/Valve within normal limits  -MB        Phonation with Occlusion audible at 1'  -MB        Vocal Quality on Phonation vocal quality is noticeably hoarse  -MB        Type of Intervention finger occlusion;tracheostomy speaking valve  -MB        Response to Intervention not tolerated  -MB        PMV Placement Recommendations Speech therapy only  -MB        Communication Treatment Objective and Progress    Additional 1 Comm Treatment Objectives Additional 1  -MB        Additional 1 Communication Treatment    Additional 1 Comm Treatment Objective Patient will tolerate PMV placement for 15 minutes with no s/s of distress, pt will exhibit audible speech with PMV placement at 3 feet with 90% accuracy, pt will independently place and remove valve.  -MB        Status: Additional 1 New  -MB          User Key  (r) = Recorded By, (t) = Taken By, (c) = Cosigned By    Initials Name Effective Dates    ANGELIQUE ELLIS  Garcia, CCC-SLP 08/02/16 -            EDUCATION  The patient has been educated in the following areas:   Speaking Valve.    SLP Recommendation and Plan  SLP Diagnosis: Voice absence d/t trach     Rehab Potential: good, to achieve stated therapy goals  Criteria for Skilled Therapeutic Interventions Met: skilled criteria for speech language intervention met  Anticipated Discharge Disposition: home with home health     Therapy Frequency: 3-5 times/wk  Predicted Duration of Therapy Intervention (days/wks): until discharge  Expected Duration of Therapy Session (min): 15-30 minutes    Plan of Care Review  Plan Of Care Reviewed With: patient, caregiver  Outcome Summary/Follow up Plan: [place] valve evaluation completed. Pt's oxygen saturation remained at 97 - 98% however the valve remained in place for only 1 to 2 minutes at a time. His vocal quality was strained and he had increased work of breathing with valve placement. The valve was expelled 1x due to backpressure. Pt does not yet tolerate valve placement. Recommend PMV with SLP only. Pt is also currently NPO and receiving nutrition via PEG. If a swallow evaluation is desired, please have MD place order.          IP SLP Goals       10/09/17 1028          SLP- Additional Goal 1    Additional Goal 1- SLP, Date Established 10/09/17  -MB      Additional Goal 1- SLP, Time to Achieve by discharge  -MB      Additional Goal 1- SLP, Activity Level Patient will tolerate PMV placement for 15 minutes with no s/s of distress, pt will exhibit audible speech with PMV placement at 3 feet with 90% accuracy, pt will independently place and remove valve.  -MB      Additional Goal 1- SLP, Outcome goal ongoing  -MB        User Key  (r) = Recorded By, (t) = Taken By, (c) = Cosigned By    Initials Name Provider Type    ANGELIQUE Garcia CCC-SLP Speech and Language Pathologist             SLP Outcome Measures (last 72 hours)      SLP Outcome Measures       10/09/17 1000          SLP  Outcome Measures    Outcome Measure Used? Adult NOMS  -MB      FCM Scores    FCM Chosen Voice Following Tracheostomy  -MB      Voice Following Tracheostomy FCM Score 3  -MB        User Key  (r) = Recorded By, (t) = Taken By, (c) = Cosigned By    Initials Name Effective Dates    EARL Etienne 08/02/16 -           Time Calculation:         Time Calculation- SLP       10/09/17 1034          Time Calculation- SLP    SLP Start Time 0946  -MB      SLP Stop Time 1034  -MB      SLP Time Calculation (min) 48 min  -MB      SLP Received On 10/09/17  -MB      SLP Goal Re-Cert Due Date 10/19/17  -MB        User Key  (r) = Recorded By, (t) = Taken By, (c) = Cosigned By    Initials Name Provider Type    EARL Etienne Speech and Language Pathologist          Therapy Charges for Today     Code Description Service Date Service Provider Modifiers Qty    71676396947 HC ST OTHER FUNCT LIMIT CURRENT 10/9/2017 EARL Dash GN, CL 1    04191219886 HC ST OTHER FUNCT LIMIT PROJECTED 10/9/2017 EARL Dash GN, CK 1    03172209312 HC ST EVALUATION FIT VOICE PROSTH 3 10/9/2017 EARL Dash  1    81727075248 HC VALVE TRACH PMV SERIES 10/9/2017 EARL Dash  1             ADULT NOMS (last 72 hours)      Adult NOMS       10/09/17 1000                FCM Scores    FCM Chosen Voice Following Tracheostomy  -MB        Voice Following Tracheostomy FCM Score 3  -MB          User Key  (r) = Recorded By, (t) = Taken By, (c) = Cosigned By    Initials Name Effective Dates    EARL Etienne 08/02/16 -         SLP G-Codes  Functional Limitations: Other Speech Language Pathology (Voice following tracheostomy)  Other Speech-Language Pathology Functional Limitation Current Status (): At least 60 percent but less than 80 percent impaired, limited or restricted  Other Speech-Language Pathology Functional Limitation Goal Status (): At least 40 percent  but less than 60 percent impaired, limited or restricted      Roseann Garcia, ALON-SLP  10/9/2017

## 2017-10-09 NOTE — PROGRESS NOTES
Pt Name: Sameer Tavarez  MRN: [id number]  [id number]  YOB: 1955  Date of evaluation: 10/9/2017    Chief Compliant: Communicating via hand writing.  No acute distress.    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  He is presently admitted after having a laryngoscopy with biopsy of the right tonsil and base of tongue and tracheostomy placement on 10/5/2017 by Dr. Justin. The tracheostomy was placed due to concern of the stability of the airway, especially with the upcoming expected dental extractions and potential placement G-tube and Port-A-Cath.     Diagnosis  · Squamous cell carcinoma in situ. 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 He had recent complains of weight loss of about 15 pounds and difficulty swallowing.  · 8/27/2017-he underwent an upper endoscopy for follow-up on a history of Recio's esophagus. A oropharynx lesion was noted and biopsy was consistent with at least squamous cell carcinoma in situ. Status of invasion was indeterminate. P 16 was positive. He was referred to ENT.   · 9/13/2017-CT scan of the chest/abdomen/pelvis contrast showed a 1.1 cm lymph node adjacent to the distal esophagus. 2 small pulmonary nodules measuring 4 mm and 4.2 mm in diameter located in the right lower lobe and at right middle lobe respectively. Multiple solid noncalcified nodules smaller than 6 mm in diameter. A 4.9 x 2.5 x 3.3 cm abnormal soft tissue appearance in the right perineum of unknown significance. In addition, 1 x 1.5 cm sclerotic lesion in the left iliac wing laterally to the sacroiliac joints. 7 mm sclerosis the left iliac lateral to the left SI joint. Sclerosis on the medial side of the  right sacroiliac joints.   · 9/19/2017- he was first seen by us. He needs to be seen by ENT and have a biopsy repeated. Referral to Dr. Hadley Marie.   · 9/29/2017- PET scan, skull base to mid thigh revealed abnormal metabolic activity in th.  No other regions of abnormal metabolic activity were identified.  · 9/29/2017- MRI orbital face, neck revealed large right oropharyngeal mass measuring 6.7 x 4.6 x 4.6 cm with involvement of the right side of the base of the tongue.  Invasion of the superior margin of the right submandibular gland is noted.  No bony involvement is noted.  Scattered left jugular lymph nodes measure up to 1.2 x 0.6 cm.     Perineal mass-unknow etiology. PET scan to interrogate metabolic activity. We'll follow-up on this.     Multiple pulmonary nodules- according to radiology they are noncalcified nodules less than 6 mm and therefore no need for follow-up.     Smoking cessation counseling-  we have talked about the importance of quitting. Specifically, we discussed about the risks related to tobacco including but not limited to risk of several types of cancer, cardiovascular disease, stroke, bad dentition, financial loss and so on. I advised the patient quitting. I offered the patient resources such as nicotine patches or medications to help with the craving. The patient is contemplative at this time. We will follow-up on this during the next visit and continue to encourage on quitting this habit.          Past Medical History:   Past Medical History:   Diagnosis Date   • Arthritis    • Coronary artery disease    • Depression    • Diabetes mellitus    • Enlarged prostate    • GERD (gastroesophageal reflux disease)    • History of transfusion    • Hypertension    • Oropharyngeal cancer 08/27/2017   • Seizure     diabetic   • Severe esophageal dysplasia    • Stroke    • TB (pulmonary tuberculosis) 2004     Past Surgical History:   Past Surgical History:   Procedure Laterality Date   • CARDIAC  SURGERY     • CHOLECYSTECTOMY     • CORONARY ARTERY BYPASS GRAFT     • FRACTURE SURGERY     • GTUBE REPLACEMENT N/A 10/6/2017    Procedure: GASTROSTOMY TUBE REPLACEMENT, port placement;  Surgeon: Jayne Phillips MD;  Location: [facility] OR;  Service:    • HERNIA REPAIR     • NISSEN FUNDOPLICATION LAPAROSCOPIC     • OH INSJ TUNNELED CVC W/O SUBQ PORT/ AGE 5 YR/> Left 10/6/2017    Procedure: INSERTION VENOUS ACCESS DEVICE;  Surgeon: Jayne Phillips MD;  Location: [facility] OR;  Service: General   • SKIN BIOPSY     • TESTICLE SURGERY      tubes implanted for drainage   • TONSILLECTOMY     • TRACHEOSTOMY N/A 10/5/2017    Procedure: Awake tracheostomy; DIRECT LARYNGOSCOPY WITH BIOPSY;  Surgeon: Alber Justin MD;  Location: [facility] OR;  Service:      Social History:   Social History     Social History   • Marital status:      Spouse name: N/A   • Number of children: N/A   • Years of education: N/A     Occupational History   •       Disabled     Social History Main Topics   • Smoking status: Current Every Day Smoker     Packs/day: 1.00     Years: 52.00     Types: Cigarettes   • Smokeless tobacco: Current User     Types: Snuff      Comment: I've tried and tried   • Alcohol use Yes      Comment: occasionally   • Drug use: No   • Sexual activity: Defer     Other Topics Concern   • Not on file     Social History Narrative     Family History:   Family History   Problem Relation Age of Onset   • Stroke Mother    • Heart disease Father    • Heart disease Brother    • Cancer Brother        Review of Systems   Constitutional: Positive for fatigue. Negative for activity change, appetite change, chills, diaphoresis and fever.   HENT: Positive for sore throat and trouble swallowing. Negative for congestion, ear discharge, ear pain, facial swelling, hearing loss, mouth sores, nosebleeds, postnasal drip, rhinorrhea, sinus pressure, tinnitus and voice change.    Eyes: Negative for photophobia, pain, discharge and  visual disturbance.   Respiratory: Positive for cough. Negative for apnea, chest tightness, shortness of breath, wheezing and stridor.    Cardiovascular: Negative for chest pain, palpitations and leg swelling.   Gastrointestinal: Negative for abdominal distention, abdominal pain, blood in stool, constipation, diarrhea, nausea, rectal pain and vomiting.   Endocrine: Negative for cold intolerance, heat intolerance, polydipsia, polyphagia and polyuria.   Genitourinary: Negative for difficulty urinating, dysuria, flank pain, frequency, hematuria and urgency.   Musculoskeletal: Negative for arthralgias, back pain, gait problem, joint swelling and myalgias.   Skin: Negative for color change, pallor, rash and wound.   Allergic/Immunologic: Negative for environmental allergies, food allergies and immunocompromised state.   Neurological: Positive for weakness. Negative for dizziness, tremors, seizures, syncope, speech difficulty, light-headedness, numbness and headaches.   Hematological: Negative for adenopathy. Does not bruise/bleed easily.   Psychiatric/Behavioral: Negative for agitation, confusion, hallucinations, sleep disturbance and suicidal ideas. The patient is not nervous/anxious.           Objective      Vitals:    10/09/17 0503 10/09/17 0602 10/09/17 0702 10/09/17 0810   BP: 116/80 113/69 120/76    BP Location:       Patient Position:       Pulse: 92 84 91 86   Resp:   21 20   Temp:       TempSrc:       SpO2: 96% 96% 97% 95%   Weight:       Height:         Lab Results:    Lab Results (last 72 hours)     Procedure Component Value Units Date/Time    POC Glucose Fingerstick [[id number]]  (Abnormal) Collected:  10/05/17 0921    Specimen:  Blood Updated:  10/05/17 0932     Glucose 175 (H) mg/dL       : [id number] Good DawAlhaji ID: [id number]       POC Glucose Fingerstick [[id number]]  (Normal) Collected:  10/05/17 1357    Specimen:  Blood Updated:  10/05/17 1410     Glucose 78 mg/dL       : [id number] Arthur  JenniferhMeter ID: [id number]       POC Glucose Fingerstick [[id number]]  (Abnormal) Collected:  10/05/17 1821    Specimen:  Blood Updated:  10/05/17 1842     Glucose 65 (L) mg/dL       : [id number] Santos ChelseyMeter ID: [id number]       POC Glucose Fingerstick [[id number]]  (Abnormal) Collected:  10/05/17 1949    Specimen:  Blood Updated:  10/05/17 2010     Glucose 60 (L) mg/dL       : [id number] Flores MalloryMeter ID: [id number]       POC Glucose Fingerstick [[id number]]  (Abnormal) Collected:  10/05/17 2019    Specimen:  Blood Updated:  10/05/17 2031     Glucose 62 (L) mg/dL       : [id number] Frank AmandaMeter ID: [id number]       POC Glucose Fingerstick [[id number]]  (Abnormal) Collected:  10/05/17 2338    Specimen:  Blood Updated:  10/05/17 2351     Glucose 67 (L) mg/dL       : [id number] Flores MalloryMeter ID: [id number]       CBC (No Diff) [[id number]]  (Abnormal) Collected:  10/06/17 0243    Specimen:  Blood Updated:  10/06/17 0305     WBC 10.37 10*3/mm3      RBC 4.66 (L) 10*6/mm3      Hemoglobin 14.1 g/dL      Hematocrit 39.8 (L) %      MCV 85.4 fL      MCH 30.3 pg      MCHC 35.4 g/dL      RDW 12.7 %      RDW-SD 39.5 (L) fl      MPV 11.5 fL      Platelets 177 10*3/mm3     Basic Metabolic Panel [[id number]]  (Abnormal) Collected:  10/06/17 0243    Specimen:  Blood Updated:  10/06/17 0311     Glucose 114 (H) mg/dL      BUN 10 mg/dL      Creatinine 0.57 mg/dL      Sodium 139 mmol/L      Potassium 4.0 mmol/L      Chloride 103 mmol/L      CO2 26.0 mmol/L      Calcium 9.0 mg/dL      eGFR Non African Amer 145 mL/min/1.73      BUN/Creatinine Ratio 17.5     Anion Gap 10.0 mmol/L     Narrative:       GFR Normal >60  Chronic Kidney Disease <60  Kidney Failure <15    TSH [[id number]]  (Abnormal) Collected:  10/06/17 0243    Specimen:  Blood Updated:  10/06/17 0341     TSH 0.161 (L) mIU/mL     Hemoglobin A1c [[id number]] Collected:  10/06/17 0243    Specimen:  Blood Updated:  10/06/17 0543     Hemoglobin A1C  8.7 %     Narrative:       Less than 6.0           Non-Diabetic Range  6.0-7.0                 ADA Therapeutic Target  Greater than 7.0        Action Suggested    Tissue Pathology Exam - Tissue, Tonsil, Right [[id number]] Collected:  10/05/17 1324    Specimen:  Tissue from Tonsil, Right; Tissue from Tongue Updated:  10/06/17 0825    POC Glucose Fingerstick [[id number]]  (Abnormal) Collected:  10/06/17 0827    Specimen:  Blood Updated:  10/06/17 0904     Glucose 156 (H) mg/dL       : [id number] Emerald LogicdimasBalm InnovationseMeter ID: [id number]       POC Glucose Fingerstick [[id number]]  (Abnormal) Collected:  10/06/17 1136    Specimen:  Blood Updated:  10/06/17 1158     Glucose 169 (H) mg/dL       : [id number] Emerald LogicrBalm InnovationseMeter ID: [id number]       POC Glucose Fingerstick [[id number]]  (Abnormal) Collected:  10/06/17 1823    Specimen:  Blood Updated:  10/06/17 1838     Glucose 142 (H) mg/dL       : [id number] Emerald LogicrBalm InnovationseMeter ID: [id number]       POC Glucose Fingerstick [[id number]]  (Abnormal) Collected:  10/06/17 2108    Specimen:  Blood Updated:  10/06/17 2119     Glucose 158 (H) mg/dL       : [id number] David BettslinMeter ID: [id number]       CBC (No Diff) [[id number]]  (Abnormal) Collected:  10/07/17 0225    Specimen:  Blood Updated:  10/07/17 0314     WBC 13.13 (H) 10*3/mm3      RBC 4.65 (L) 10*6/mm3      Hemoglobin 13.9 (L) g/dL      Hematocrit 40.0 %      MCV 86.0 fL      MCH 29.9 pg      MCHC 34.8 g/dL      RDW 12.9 %      RDW-SD 40.3 fl      MPV 12.3 (H) fL      Platelets 186 10*3/mm3     Basic Metabolic Panel [[id number]]  (Abnormal) Collected:  10/07/17 0225    Specimen:  Blood Updated:  10/07/17 0319     Glucose 151 (H) mg/dL      BUN 10 mg/dL      Creatinine 0.55 mg/dL      Sodium 133 (L) mmol/L      Potassium 4.3 mmol/L      Chloride 99 mmol/L      CO2 22.0 (L) mmol/L      Calcium 8.9 mg/dL      eGFR Non African Amer >150 mL/min/1.73      BUN/Creatinine Ratio 18.2     Anion Gap 12.0 mmol/L      Narrative:       GFR Normal >60  Chronic Kidney Disease <60  Kidney Failure <15    POC Glucose Fingerstick [[id number]]  (Abnormal) Collected:  10/07/17 0742    Specimen:  Blood Updated:  10/07/17 0750     Glucose 176 (H) mg/dL       : [id number] Groves ID: [id number]             Radiology Results:    Imaging Results (last 72 hours)     Procedure Component Value Units Date/Time    XR Chest 1 View [[id number]] Collected:  10/05/17 1853     Updated:  10/05/17 1857    Narrative:       HISTORY: Trach placement     CXR: Frontal view the chest performed.     COMPARISON: 10/04/2017     FINDINGS: Tracheostomy tube is in place. The tip is positioned  appropriately above the bola. There is evidence of prior mediastinal  surgery. The left coronary artery stents. There is a diminished level of  inspiration. Prominent pulmonary vessels likely due to vascular  crowding. Mild venous congestion considered. No pleural effusion or  pneumothorax is visualized.       Impression:       1. Appropriate positioning of the tracheostomy tube.  2. Diminished level of inspiration with probable vascular crowding. Mild  venous congestion considered. Left basilar atelectasis.  3. Prior mediastinal surgery. Coronary artery stents.  This report was finalized on 10/05/2017 18:54 by Dr. Christen Obrien MD.    XR Chest 1 View [[id number]] Collected:  10/06/17 1519     Updated:  10/06/17 1552    Narrative:       EXAMINATION:  XR CHEST 1 VW-  10/6/2017 2:50 PM EDT     HISTORY: Port catheter placement.     COMPARISON: 10/05/2017.     FINDINGS:  There is a new port catheter on the left extending to the  superior vena cava. On the spot images, no complication is seen. There  has been prior median sternotomy. The patient is intubated.     FLUOROSCOPY TIME: The fluoroscopy time was not recorded.       Impression:       Spot images obtained during port catheter placement. No  complication seen.        This report was finalized on 10/06/2017  15:21 by Dr. Maynor Rizo MD.    FL C Arm During Surgery [[id number]] Collected:  10/06/17 1519     Updated:  10/06/17 1552    Narrative:       EXAMINATION:  XR CHEST 1 VW-  10/6/2017 2:50 PM EDT     HISTORY: Port catheter placement.     COMPARISON: 10/05/2017.     FINDINGS:  There is a new port catheter on the left extending to the  superior vena cava. On the spot images, no complication is seen. There  has been prior median sternotomy. The patient is intubated.     FLUOROSCOPY TIME: The fluoroscopy time was not recorded.       Impression:       Spot images obtained during port catheter placement. No  complication seen.        This report was finalized on 10/06/2017 15:21 by Dr. Maynor Rizo MD.    XR Chest 1 View [[id number]] Updated:  10/07/17 0352            Physical Exam   Constitutional: Vital signs are normal. He appears well-developed. He is cooperative.   HENT:   Head: Normocephalic and atraumatic.   Nose: Nose normal.   Mouth/Throat: Oropharynx is clear and moist. Mucous membranes are dry.   right base of tongue fullness     Eyes: Conjunctivae, EOM and lids are normal. Pupils are equal, round, and reactive to light.   Neck: Trachea normal and normal range of motion. Neck supple.   tracheostomy   Cardiovascular: Normal rate, regular rhythm and normal heart sounds.    Abdominal: Soft. Normal appearance and bowel sounds are normal.   Musculoskeletal: Normal range of motion.   Lymphadenopathy:     He has no cervical adenopathy.     He has no axillary adenopathy.        Left: No supraclavicular adenopathy present.   Neurological: He is alert. He has normal strength. No cranial nerve deficit or sensory deficit.   Skin: Skin is warm, dry and intact. No petechiae and no rash noted. No cyanosis or erythema. Nails show no clubbing.   Port to left chest wall and PEG to LUQ   Psychiatric: He has a normal mood and affect. His speech is normal and behavior is normal. Judgment and thought content normal. Cognition and  memory are normal.     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 Jayne Phillips    - PET scan on 9/29/2017 revealed only metabolic activity in the base of the ttongue on the right extending into the palatine tonsil with an SUV of 8.8.  - MRI of neck on 9/29/2017 revealed right oropharyngeal soft tissue mass measuring approximately 6.7 x 4.6 4.6 cm with involvement of the right side of the base of tongue and invasion of the superior margin of the right submandibular gland.    - Dr. Darin Neal will be evaluating options for full mouth extraction Monday with oral surgery locally.     2. Dysphagia secondary to tumor  - PEG placement on 10/6/2017 by Dr. Jayne Phillips     Disposition:   Continue with current plan of care.   Anticipating teeth extraction prior to initiation of radiation therapy. Per ENT, Discussed with Jori Howe PAC  Await pathology results for further oncology recommendations. Still Pending this AM      Sin Tompkins MD    10/9/2017    8:50 AM

## 2017-10-09 NOTE — PROGRESS NOTES
Jayne Phillips MD FACS  Progress Note     LOS: 4 days   Patient Care Team:  Christian Castellon MD as PCP - General (Internal Medicine)  Sin Alarcon MD as Referring Physician (General Practice)  Alber Justin MD as Consulting Physician (Otolaryngology)  Kriss Dominguez MD as Referring Physician (Hematology and Oncology)  Hadley Marie III, MD as Consulting Physician (Radiation Oncology)      Subjective     Interval History:      febrile over pm.  Complains of pain all over.  No nausea      Objective     Vital Signs  Temp:  [98.3 °F (36.8 °C)-101.7 °F (38.7 °C)] 99.7 °F (37.6 °C)  Heart Rate:  [68-96] 84  Resp:  [21] 21  BP: (106-160)/() 113/69    Physical Exam:  General appearance - alert, well appearing, and in no distress  Abdomen - port infusing.  abd soft, nondistended, clean, G tube intake without drainage      Results Review:    Lab Results (last 24 hours)     Procedure Component Value Units Date/Time    POC Glucose Fingerstick [[id number]]  (Abnormal) Collected:  10/08/17 0831    Specimen:  Blood Updated:  10/08/17 0853     Glucose 148 (H) mg/dL       : [id number] Jaden DonaldMeter ID: [id number]       CBC (No Diff) [[id number]]  (Abnormal) Collected:  10/08/17 0930    Specimen:  Blood Updated:  10/08/17 0945     WBC 10.80 10*3/mm3      RBC 4.08 (L) 10*6/mm3      Hemoglobin 12.2 (L) g/dL      Hematocrit 34.7 (L) %      MCV 85.0 fL      MCH 29.9 pg      MCHC 35.2 g/dL      RDW 12.5 %      RDW-SD 38.6 (L) fl      MPV 11.5 fL      Platelets 180 10*3/mm3     Basic Metabolic Panel [[id number]]  (Abnormal) Collected:  10/08/17 0930    Specimen:  Blood Updated:  10/08/17 0958     Glucose 168 (H) mg/dL      BUN 12 mg/dL      Creatinine 0.50 mg/dL      Sodium 137 mmol/L      Potassium 3.6 mmol/L      Chloride 100 mmol/L      CO2 25.0 mmol/L      Calcium 8.5 mg/dL      eGFR Non African Amer >150 mL/min/1.73      BUN/Creatinine Ratio 24.0     Anion Gap 12.0 mmol/L     Narrative:       GFR  Normal >60  Chronic Kidney Disease <60  Kidney Failure <15    POC Glucose Fingerstick [[id number]]  (Abnormal) Collected:  10/08/17 1150    Specimen:  Blood Updated:  10/08/17 1201     Glucose 166 (H) mg/dL       : [id number] Jaden MaldonadoieMeter ID: [id number]       POC Glucose Fingerstick [[id number]]  (Abnormal) Collected:  10/08/17 1817    Specimen:  Blood Updated:  10/08/17 1834     Glucose 171 (H) mg/dL       : [id number] Idris VivianMeter ID: [id number]       POC Glucose Fingerstick [[id number]]  (Abnormal) Collected:  10/08/17 2138    Specimen:  Blood Updated:  10/08/17 2150     Glucose 169 (H) mg/dL       : [id number] Oniel Parker CMeter ID: [id number]       CBC (No Diff) [[id number]]  (Abnormal) Collected:  10/09/17 0554    Specimen:  Blood Updated:  10/09/17 0609     WBC 9.03 10*3/mm3      RBC 3.89 (L) 10*6/mm3      Hemoglobin 11.6 (L) g/dL      Hematocrit 32.9 (L) %      MCV 84.6 fL      MCH 29.8 pg      MCHC 35.3 g/dL      RDW 12.7 %      RDW-SD 39.0 (L) fl      MPV 11.3 fL      Platelets 188 10*3/mm3     Basic Metabolic Panel [[id number]]  (Abnormal) Collected:  10/09/17 0554    Specimen:  Blood Updated:  10/09/17 0629     Glucose 177 (H) mg/dL      BUN 13 mg/dL      Creatinine 0.46 (L) mg/dL      Sodium 138 mmol/L      Potassium 3.4 (L) mmol/L      Chloride 102 mmol/L      CO2 27.0 mmol/L      Calcium 8.1 (L) mg/dL      eGFR Non African Amer >150 mL/min/1.73      BUN/Creatinine Ratio 28.3 (H)     Anion Gap 9.0 mmol/L     Narrative:       GFR Normal >60  Chronic Kidney Disease <60  Kidney Failure <15        Imaging Results (last 24 hours)     Procedure Component Value Units Date/Time    XR Chest 1 View [[id number]] Collected:  10/09/17 0701     Updated:  10/09/17 0706    Narrative:       EXAMINATION:   XR CHEST 1 VW-  10/9/2017 7:01 AM CDT     HISTORY: Low-grade temp     Single view the chest obtained. Opacification right lung base is noted.  Is may be either pneumonia or pulmonary edema. This is a  change from  October 7. Tracheostomy tube is present. Cardiac silhouettes upper  limits of normal. Wires are present. Median sternotomy.     IMPRESSION increase in opacification of the right lung base.  Differential considerations include pulmonary edema or pneumonia  This report was finalized on 10/09/2017 07:03 by Dr. Best Spence MD.            Assessment/Plan       Continue tube feeds as tolerated.  Add water flushes.  Add nebs for pulmonary toilet.  Increase activity.  Give Mgcitrate as no BM and hx chronic constipation.  Ok to floor from surgical standpoint.      Jayne Phillips MD  10/09/17  7:22 AM

## 2017-10-09 NOTE — PLAN OF CARE
Problem: Fall Risk (Adult)  Goal: Absence of Falls  Outcome: Ongoing (interventions implemented as appropriate)  This patient has been up with PT, walking around the unit.   Then he has had several trips to the commode for bms. He has stayed alert/oriented. He has required regular pain medications. Vital signs have been wnl.  He still has thick secretions from his trach. Tube feeding is at goal with no residuals.     10/09/17 3150   Fall Risk (Adult)   Absence of Falls making progress toward outcome         Problem: Feeding Dysfunction/Swallowing Impairment (Pediatric)  Goal: Reduced Risk of Aspiration  Outcome: Ongoing (interventions implemented as appropriate)  Goal: Adequate Nutrition and Hydration  Outcome: Ongoing (interventions implemented as appropriate)

## 2017-10-09 NOTE — PROGRESS NOTES
[facility] Medicine Services  INPATIENT PROGRESS NOTE    Length of Stay: 4  Date of Admission: 10/5/2017  Primary Care Physician: Christian Castellon MD    Subjective   Chief Complaint: medical management for HTN, DM  HPI   Had a one time fever last night.     No other events or new complaints.     Review of Systems   All pertinent negatives and positives are as above. All other systems have been reviewed and are negative unless otherwise stated.     Objective    Temp:  [98.7 °F (37.1 °C)-101.7 °F (38.7 °C)] 99.7 °F (37.6 °C)  Heart Rate:  [68-94] 86  Resp:  [20-21] 20  BP: (106-160)/() 120/76  Physical Exam   Constitutional: He is oriented to person, place, and time. He appears well-developed and well-nourished.   Up in bed. Writes to communicate at this point. Seen with his nurse, Leatha.    HENT:   Head: Normocephalic and atraumatic.   Eyes: Conjunctivae and EOM are normal. Pupils are equal, round, and reactive to light.   Neck: Neck supple. No JVD present.   Trach with secretions.    Cardiovascular: Normal rate, regular rhythm, normal heart sounds and intact distal pulses.  Exam reveals no gallop and no friction rub.    No murmur heard.  Left infraclavicular Port.    Pulmonary/Chest: Effort normal and breath sounds normal. No respiratory distress. He has no wheezes. He has no rales. He exhibits no tenderness.   Abdominal: Soft. Bowel sounds are normal. He exhibits no distension. There is no tenderness. There is no rebound and no guarding.   PEG.    Musculoskeletal: Normal range of motion. He exhibits no edema, tenderness or deformity.   Neurological: He is alert and oriented to person, place, and time. He displays normal reflexes. No cranial nerve deficit. He exhibits normal muscle tone.   Skin: Skin is warm and dry. No rash noted.   Psychiatric:   Flat.      Results Review:  I have reviewed the labs, radiology results, and diagnostic studies.    Laboratory Data:     Results  from last 7 days  Lab Units 10/09/17  0554 10/08/17  0930 10/07/17  0225   WBC 10*3/mm3 9.03 10.80 13.13*   HEMOGLOBIN g/dL 11.6* 12.2* 13.9*   HEMATOCRIT % 32.9* 34.7* 40.0   PLATELETS 10*3/mm3 188 180 186       Results from last 7 days  Lab Units 10/09/17  0554 10/08/17  0930 10/07/17  0225  10/04/17  1417   SODIUM mmol/L 138 137 133*  < > 139   POTASSIUM mmol/L 3.4* 3.6 4.3  < > 4.1   CHLORIDE mmol/L 102 100 99  < > 100   CO2 mmol/L 27.0 25.0 22.0*  < > 23.0*   BUN mg/dL 13 12 10  < > 10   CREATININE mg/dL 0.46* 0.50 0.55  < > 0.66   CALCIUM mg/dL 8.1* 8.5 8.9  < > 9.4   BILIRUBIN mg/dL  --   --   --   --  0.7   ALK PHOS U/L  --   --   --   --  172*   ALT (SGPT) U/L  --   --   --   --  109*   AST (SGOT) U/L  --   --   --   --  66*   GLUCOSE mg/dL 177* 168* 151*  < > 210*   < > = values in this interval not displayed.    Most recent Accu-Cheks 137, 166, 169, 177, 176.    Radiology Data:   Imaging Results (last 24 hours)     Procedure Component Value Units Date/Time    XR Chest 1 View [[id number]] Collected:  10/09/17 0701     Updated:  10/09/17 0706    Narrative:       EXAMINATION:   XR CHEST 1 VW-  10/9/2017 7:01 AM CDT     HISTORY: Low-grade temp     Single view the chest obtained. Opacification right lung base is noted.  Is may be either pneumonia or pulmonary edema. This is a change from  October 7. Tracheostomy tube is present. Cardiac silhouettes upper  limits of normal. Wires are present. Median sternotomy.     IMPRESSION increase in opacification of the right lung base.  Differential considerations include pulmonary edema or pneumonia  This report was finalized on 10/09/2017 07:03 by Dr. Best Spence MD.        I have reviewed the patient current medications.     Assessment/Plan   Assessment:   1. Oropharyngeal cancer, squamous cell.  2. Status post tracheostomy placement on 10/5, Dr. Justin.   3. Status post Port and PEG placement on 10/6, Dr. Phillips.   4. Hypertension  5. Hyperlipidemia  6. Type II  diabetes mellitus with hemoglobin A1c of 8.7.    7. Tobacco abuse.  8. Chronic obstructive pulmonary disease.    Plan:   He was febrile last night.  This was a one-time issue.  I would defer any further septic workup her antibiotics to the primary.    He will eventually be for treatment with Dr. Dominguez and Dr. Marie. I discussed the case with Dr. Tompkins on the unit this morning.     His blood pressure is generally acceptable.    His Lantus has recently been on hold given his transitioned to tube feeding.  He will likely require reinitiation of this at some point.  Continue Accu-Cheks and sliding scale insulin for now.    To floor today.     Discharge Planning: Per ENT.    Alber Jain DO   10/09/17   8:41 AM

## 2017-10-09 NOTE — PROGRESS NOTES
[facility]  ENT PROGRESS NOTES  10/9/2017      Patient Identification:  Name: Sameer Tavarez  Age: 62 y.o.  Sex: male  :  1955  MRN: [id number]                     Date of Admission: 10/5/2017      CC:      Subjective   Doing well - having some pain. Trach stable with secretions he has been able to cough up. The patient continued to have a low grade fever. Chest x-ray shows increased right lower lobe opacification.     HISTORY   HPI, ROS, PMFSHx reviewed:   No changes       Objective     PE:    Temp:  [98.7 °F (37.1 °C)-101.7 °F (38.7 °C)] 99.7 °F (37.6 °C)  Heart Rate:  [68-94] 86  Resp:  [20-21] 20  BP: (106-160)/() 120/76   Body mass index is 24.18 kg/(m^2).     General appearance: ill-appearing.   Ability to Communicate: Trach in place, communicating primarily by writing Ears - bilateral TM's and external ear canals normal.   Nasal exam - normal and patent, no erythema, discharge or polyps.      Neck exam - supple, no significant adenopathy. #6 cuffed tracheostomy tube in place.  The wound looks good there is secretions.     CVS exam: normal rate, regular rhythm, normal S1, S2, no murmurs, rubs, clicks or gallops.   Chest: rales noted which are slight and minimally greater on the right than the left.  There are no significant rhonchi.  Rales slightly worse than yesterday.   No lymphadenopathy in the anterior or posterior neck, supraclavicular, axillary or inguinal areas. No hepato-splenomegaly noted.   Neurological exam reveals not examined.    DATA   CBC (No Diff)   Order: [id number]   Status:  Final result   Visible to patient:  No (Not Released)      Ref Range & Units 9:30 AM     WBC 4.80 - 10.80 10*3/mm3 10.80   RBC 4.80 - 5.90 10*6/mm3 4.08 (L)   Hemoglobin 14.0 - 18.0 g/dL 12.2 (L)   Hematocrit 40.0 - 52.0 % 34.7 (L)   MCV 82.0 - 95.0 fL 85.0   MCH 28.0 - 32.0 pg 29.9   MCHC 33.0 - 36.0 g/dL 35.2   RDW 12.0 - 15.0 % 12.5   RDW-SD 40.0 - 54.0 fl 38.6 (L)   MPV 6.0 - 12.0 fL 11.5    Platelets 130 - 400 10*3/mm3 180   Resulting [facility]      Specimen Collected: 10/08/17  9:30 AM   Last Resulted: 10/08/17  9:45 AM            Basic Metabolic Panel   Order: [id number]   Status:  Final result   Visible to patient:  No (Not Released)      Newer results are available. Click to view them now.            Ref Range & Units 9:30 AM     Glucose 70 - 100 mg/dL 168 (H)   BUN 5 - 21 mg/dL 12   Creatinine 0.50 - 1.40 mg/dL 0.50   Sodium 135 - 145 mmol/L 137   Potassium 3.5 - 5.3 mmol/L 3.6   Chloride 98 - 110 mmol/L 100   CO2 24.0 - 31.0 mmol/L 25.0   Calcium 8.4 - 10.4 mg/dL 8.5   eGFR Non African Amer >60 mL/min/1.73 >150   BUN/Creatinine Ratio 7.0 - 25.0 24.0   Anion Gap 4.0 - 13.0 mmol/L 12.0   Resulting [facility]   Narrative   GFR Normal >60  Chronic Kidney Disease <60  Kidney Failure <15      Specimen Collected: 10/08/17  9:30 AM   Last Resulted: 10/08/17  9:58 AM                   MEDICATIONS   I have reviewed the current MAR.     LABS AND IMAGING      I have reviewed the labs and imaging data since the patient was last seen.       Assessment     ASSESSMENT     Active Problems:    Oropharyngeal cancer    Tracheostomy dependence    Postoperative pain  Probable atelectasis    As above no significant changes noted      Plan     PLAN       Will continue to increase ambulation  Out of bed to chair today  OK to transfer to floor from ENT standpoint if hospitalist agrees  Chest x-ray in a.m.-with normal white count and only a LG temp: will let hospitalist know.  Evaluate possibilities for full mouth extraction today  Will remove trach sutures tomorrow.          JEWEL Guardado  10/9/2017  8:48 AM

## 2017-10-09 NOTE — PLAN OF CARE
Problem: Patient Care Overview (Adult)  Goal: Plan of Care Review    10/09/17 0629   Coping/Psychosocial Response Interventions   Plan Of Care Reviewed With patient   Patient Care Overview   Progress improving         Problem: Fall Risk (Adult)  Goal: Absence of Falls  Outcome: Ongoing (interventions implemented as appropriate)    Problem: Feeding Dysfunction/Swallowing Impairment (Pediatric)  Goal: Reduced Risk of Aspiration  Outcome: Ongoing (interventions implemented as appropriate)  Goal: Adequate Nutrition and Hydration  Outcome: Ongoing (interventions implemented as appropriate)

## 2017-10-10 ENCOUNTER — PREP FOR SURGERY (OUTPATIENT)
Dept: OTHER | Facility: HOSPITAL | Age: 62
End: 2017-10-10

## 2017-10-10 LAB
ANION GAP SERPL CALCULATED.3IONS-SCNC: 6 MMOL/L (ref 4–13)
BUN BLD-MCNC: 18 MG/DL (ref 5–21)
BUN/CREAT SERPL: 35.3 (ref 7–25)
CALCIUM SPEC-SCNC: 8.5 MG/DL (ref 8.4–10.4)
CHLORIDE SERPL-SCNC: 102 MMOL/L (ref 98–110)
CO2 SERPL-SCNC: 30 MMOL/L (ref 24–31)
CREAT BLD-MCNC: 0.51 MG/DL (ref 0.5–1.4)
DEPRECATED RDW RBC AUTO: 40.1 FL (ref 40–54)
ERYTHROCYTE [DISTWIDTH] IN BLOOD BY AUTOMATED COUNT: 12.7 % (ref 12–15)
GFR SERPL CREATININE-BSD FRML MDRD: >150 ML/MIN/1.73
GLUCOSE BLD-MCNC: 145 MG/DL (ref 70–100)
GLUCOSE BLDC GLUCOMTR-MCNC: 141 MG/DL (ref 70–130)
GLUCOSE BLDC GLUCOMTR-MCNC: 142 MG/DL (ref 70–130)
GLUCOSE BLDC GLUCOMTR-MCNC: 150 MG/DL (ref 70–130)
GLUCOSE BLDC GLUCOMTR-MCNC: 155 MG/DL (ref 70–130)
HCT VFR BLD AUTO: 32.4 % (ref 40–52)
HGB BLD-MCNC: 11.1 G/DL (ref 14–18)
MCH RBC QN AUTO: 29.5 PG (ref 28–32)
MCHC RBC AUTO-ENTMCNC: 34.3 G/DL (ref 33–36)
MCV RBC AUTO: 86.2 FL (ref 82–95)
PLATELET # BLD AUTO: 209 10*3/MM3 (ref 130–400)
PMV BLD AUTO: 11.3 FL (ref 6–12)
POTASSIUM BLD-SCNC: 3.9 MMOL/L (ref 3.5–5.3)
RBC # BLD AUTO: 3.76 10*6/MM3 (ref 4.8–5.9)
SODIUM BLD-SCNC: 138 MMOL/L (ref 135–145)
WBC NRBC COR # BLD: 9.96 10*3/MM3 (ref 4.8–10.8)

## 2017-10-10 PROCEDURE — 25010000002 ENOXAPARIN PER 10 MG: Performed by: FAMILY MEDICINE

## 2017-10-10 PROCEDURE — 25010000002 HYDROMORPHONE PER 4 MG: Performed by: FAMILY MEDICINE

## 2017-10-10 PROCEDURE — 94799 UNLISTED PULMONARY SVC/PX: CPT

## 2017-10-10 PROCEDURE — 85027 COMPLETE CBC AUTOMATED: CPT | Performed by: SPECIALIST

## 2017-10-10 PROCEDURE — 63710000001 INSULIN DETEMIR PER 5 UNITS: Performed by: INTERNAL MEDICINE

## 2017-10-10 PROCEDURE — 80048 BASIC METABOLIC PNL TOTAL CA: CPT | Performed by: SPECIALIST

## 2017-10-10 PROCEDURE — 99232 SBSQ HOSP IP/OBS MODERATE 35: CPT | Performed by: PHYSICIAN ASSISTANT

## 2017-10-10 PROCEDURE — 63710000001 INSULIN LISPRO (HUMAN) PER 5 UNITS: Performed by: INTERNAL MEDICINE

## 2017-10-10 PROCEDURE — 82962 GLUCOSE BLOOD TEST: CPT

## 2017-10-10 RX ORDER — LISINOPRIL 5 MG/1
5 TABLET ORAL DAILY
Status: DISCONTINUED | OUTPATIENT
Start: 2017-10-11 | End: 2017-10-13

## 2017-10-10 RX ORDER — NALOXONE HCL 0.4 MG/ML
0.1 VIAL (ML) INJECTION
Status: DISCONTINUED | OUTPATIENT
Start: 2017-10-10 | End: 2017-10-13 | Stop reason: HOSPADM

## 2017-10-10 RX ORDER — CARBAMAZEPINE 100 MG/1
100 TABLET, CHEWABLE ORAL EVERY 12 HOURS SCHEDULED
Status: DISCONTINUED | OUTPATIENT
Start: 2017-10-10 | End: 2017-10-12

## 2017-10-10 RX ADMIN — FUROSEMIDE 40 MG: 40 TABLET ORAL at 09:15

## 2017-10-10 RX ADMIN — SUCRALFATE 1 G: 1 SUSPENSION ORAL at 18:35

## 2017-10-10 RX ADMIN — HYDROMORPHONE HYDROCHLORIDE 1 MG: 1 INJECTION, SOLUTION INTRAMUSCULAR; INTRAVENOUS; SUBCUTANEOUS at 14:09

## 2017-10-10 RX ADMIN — Medication 81 MG: at 09:15

## 2017-10-10 RX ADMIN — HYDROMORPHONE HYDROCHLORIDE 1 MG: 1 INJECTION, SOLUTION INTRAMUSCULAR; INTRAVENOUS; SUBCUTANEOUS at 03:30

## 2017-10-10 RX ADMIN — BACLOFEN 20 MG: 10 TABLET ORAL at 16:29

## 2017-10-10 RX ADMIN — HYDROMORPHONE HYDROCHLORIDE 1 MG: 1 INJECTION, SOLUTION INTRAMUSCULAR; INTRAVENOUS; SUBCUTANEOUS at 22:22

## 2017-10-10 RX ADMIN — BACLOFEN 20 MG: 10 TABLET ORAL at 21:22

## 2017-10-10 RX ADMIN — HYDROMORPHONE HYDROCHLORIDE 1 MG: 1 INJECTION, SOLUTION INTRAMUSCULAR; INTRAVENOUS; SUBCUTANEOUS at 10:14

## 2017-10-10 RX ADMIN — LISINOPRIL 10 MG: 10 TABLET ORAL at 09:15

## 2017-10-10 RX ADMIN — OXYCODONE HYDROCHLORIDE AND ACETAMINOPHEN 1 TABLET: 7.5; 325 TABLET ORAL at 12:12

## 2017-10-10 RX ADMIN — HYDROMORPHONE HYDROCHLORIDE 1 MG: 1 INJECTION, SOLUTION INTRAMUSCULAR; INTRAVENOUS; SUBCUTANEOUS at 00:52

## 2017-10-10 RX ADMIN — SUCRALFATE 1 G: 1 SUSPENSION ORAL at 06:14

## 2017-10-10 RX ADMIN — INSULIN LISPRO 2 UNITS: 100 INJECTION, SOLUTION INTRAVENOUS; SUBCUTANEOUS at 21:21

## 2017-10-10 RX ADMIN — BACLOFEN 20 MG: 10 TABLET ORAL at 09:15

## 2017-10-10 RX ADMIN — IPRATROPIUM BROMIDE AND ALBUTEROL SULFATE 3 ML: 2.5; .5 SOLUTION RESPIRATORY (INHALATION) at 15:27

## 2017-10-10 RX ADMIN — FUROSEMIDE 40 MG: 40 TABLET ORAL at 18:35

## 2017-10-10 RX ADMIN — CARBAMAZEPINE 100 MG: 100 TABLET, CHEWABLE ORAL at 12:12

## 2017-10-10 RX ADMIN — IPRATROPIUM BROMIDE AND ALBUTEROL SULFATE 3 ML: 2.5; .5 SOLUTION RESPIRATORY (INHALATION) at 07:00

## 2017-10-10 RX ADMIN — POTASSIUM CHLORIDE 20 MEQ: 20 SOLUTION ORAL at 09:16

## 2017-10-10 RX ADMIN — FAMOTIDINE 20 MG: 40 POWDER, FOR SUSPENSION ORAL at 09:16

## 2017-10-10 RX ADMIN — OXYCODONE HYDROCHLORIDE AND ACETAMINOPHEN 1 TABLET: 7.5; 325 TABLET ORAL at 06:14

## 2017-10-10 RX ADMIN — METOPROLOL TARTRATE 50 MG: 50 TABLET, FILM COATED ORAL at 09:15

## 2017-10-10 RX ADMIN — OXYCODONE HYDROCHLORIDE AND ACETAMINOPHEN 1 TABLET: 7.5; 325 TABLET ORAL at 16:28

## 2017-10-10 RX ADMIN — HYDROMORPHONE HYDROCHLORIDE 1 MG: 1 INJECTION, SOLUTION INTRAMUSCULAR; INTRAVENOUS; SUBCUTANEOUS at 20:00

## 2017-10-10 RX ADMIN — FAMOTIDINE 20 MG: 40 POWDER, FOR SUSPENSION ORAL at 18:35

## 2017-10-10 RX ADMIN — METOPROLOL TARTRATE 50 MG: 50 TABLET, FILM COATED ORAL at 18:35

## 2017-10-10 RX ADMIN — ENOXAPARIN SODIUM 40 MG: 40 INJECTION SUBCUTANEOUS at 12:12

## 2017-10-10 RX ADMIN — HYDROMORPHONE HYDROCHLORIDE 1 MG: 1 INJECTION, SOLUTION INTRAMUSCULAR; INTRAVENOUS; SUBCUTANEOUS at 07:05

## 2017-10-10 RX ADMIN — SUCRALFATE 1 G: 1 SUSPENSION ORAL at 12:12

## 2017-10-10 RX ADMIN — CARBAMAZEPINE 100 MG: 100 TABLET, CHEWABLE ORAL at 21:22

## 2017-10-10 RX ADMIN — SUCRALFATE 1 G: 1 SUSPENSION ORAL at 00:54

## 2017-10-10 RX ADMIN — IPRATROPIUM BROMIDE AND ALBUTEROL SULFATE 3 ML: 2.5; .5 SOLUTION RESPIRATORY (INHALATION) at 10:54

## 2017-10-10 RX ADMIN — INSULIN LISPRO 2 UNITS: 100 INJECTION, SOLUTION INTRAVENOUS; SUBCUTANEOUS at 09:16

## 2017-10-10 RX ADMIN — INSULIN DETEMIR 10 UNITS: 100 INJECTION, SOLUTION SUBCUTANEOUS at 21:22

## 2017-10-10 NOTE — PLAN OF CARE
Problem: Patient Care Overview (Adult)  Goal: Plan of Care Review  Outcome: Ongoing (interventions implemented as appropriate)    10/10/17 1505   Coping/Psychosocial Response Interventions   Plan Of Care Reviewed With patient   Patient Care Overview   Progress no change   Outcome Evaluation   Outcome Summary/Follow up Plan Tube feeding rate at 30 cc/hr at this time. Goal rate of 60. Stop TF at midnight; NPO after midnight for tooth extraction tomorrow. O2 currently on 4L NC. Patient currently refuses trach collar. Medicated for pain.       Goal: Adult Individualization and Mutuality  Outcome: Ongoing (interventions implemented as appropriate)    10/10/17 1505   Individualization   Patient Specific Preferences prefers dilaudid over percocet   Patient Specific Goals reduce pain   Patient Specific Interventions dilaudid for pain, tube feeds   Mutuality/Individual Preferences   What Anxieties, Fears or Concerns Do You Have About Your Health or Care? none   What Questions Do You Have About Your Health or Care? none   What Information Would Help Us Give You More Personalized Care? none       Goal: Discharge Needs Assessment  Outcome: Ongoing (interventions implemented as appropriate)    Problem: Perioperative Period (Adult)  Goal: Signs and Symptoms of Listed Potential Problems Will be Absent or Manageable (Perioperative Period)  Outcome: Ongoing (interventions implemented as appropriate)    10/10/17 1505   Perioperative Period   Problems Assessed (Perioperative Period) all   Problems Present (Perioperative Period) pain;hypoxia/hypoxemia         Problem: Fall Risk (Adult)  Goal: Absence of Falls  Outcome: Ongoing (interventions implemented as appropriate)    10/10/17 1505   Fall Risk (Adult)   Absence of Falls achieves outcome         Problem: Feeding Dysfunction/Swallowing Impairment (Pediatric)  Goal: Reduced Risk of Aspiration  Outcome: Ongoing (interventions implemented as appropriate)    10/10/17 1505   Feeding  Dysfunction/Swallowing Impairment (Pediatric)   Reduced Risk of Aspiration making progress toward outcome       Goal: Adequate Nutrition and Hydration  Outcome: Ongoing (interventions implemented as appropriate)    10/10/17 1505   Feeding Dysfunction/Swallowing Impairment (Pediatric)   Adequate Nutrition and Hydration making progress toward outcome

## 2017-10-10 NOTE — INTERVAL H&P NOTE
H&P reviewed. The patient was examined and there are no changes to the H&P.     Risks benefits and options regarding full mouth extraction were discussed with the patient and his wife.  They understand risks benefits and options and wished to proceed.

## 2017-10-10 NOTE — PROGRESS NOTES
PROGRESS NOTE  Patient name: Sameer Tavarez  Patient : 1955  VISIT # [id number]  MR #[id number]    SUBJECTIVE:  He is resting okay.  He had loose stools last night after mag citrate and declined to take his tube feedings.    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  He is presently admitted after having a laryngoscopy with biopsy of the right tonsil and base of tongue and tracheostomy placement on 10/5/2017 by Dr. Justin. The tracheostomy was placed due to concern of the stability of the airway, especially with the upcoming expected dental extractions and potential placement G-tube and Port-A-Cath.      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 He had recent complains of weight loss of about 15 pounds and difficulty swallowing.  · 2017-he underwent an upper endoscopy for follow-up on a history of Recio's esophagus. A oropharynx lesion was noted and biopsy was consistent with at least squamous cell carcinoma in situ. Status of invasion was indeterminate. P 16 was positive. He was referred to ENT.   · 2017-CT scan of the chest/abdomen/pelvis contrast showed a 1.1 cm lymph node adjacent to the distal esophagus. 2 small pulmonary nodules measuring 4 mm and 4.2 mm in diameter located in the right lower lobe and at right middle lobe respectively. Multiple solid noncalcified nodules smaller than 6 mm in diameter. A 4.9 x 2.5 x 3.3 cm abnormal soft tissue appearance in the right perineum of unknown significance. In addition, 1 x 1.5 cm sclerotic lesion in the left iliac wing laterally to the sacroiliac joints. 7 mm  sclerosis the left iliac lateral to the left SI joint. Sclerosis on the medial side of the right sacroiliac joints.   · 9/19/2017- he was first seen by us. He needs to be seen by ENT and have a biopsy repeated. Referral to Dr. Hadley Marie.   · 9/29/2017- PET scan, skull base to mid thigh revealed abnormal metabolic activity in th.  No other regions of abnormal metabolic activity were identified.  · 9/29/2017- MRI orbital face, neck revealed large right oropharyngeal mass measuring 6.7 x 4.6 x 4.6 cm with involvement of the right side of the base of the tongue.  Invasion of the superior margin of the right submandibular gland is noted.  No bony involvement is noted.  Scattered left jugular lymph nodes measure up to 1.2 x 0.6 cm.      Perineal mass-unknow etiology. PET scan to interrogate metabolic activity. We'll follow-up on this.      Multiple pulmonary nodules- according to radiology they are noncalcified nodules less than 6 mm and therefore no need for follow-up.      Smoking cessation counseling-  we have talked about the importance of quitting. Specifically, we discussed about the risks related to tobacco including but not limited to risk of several types of cancer, cardiovascular disease, stroke, bad dentition, financial loss and so on. I advised the patient quitting. I offered the patient resources such as nicotine patches or medications to help with the craving. The patient is contemplative at this time. We will follow-up on this during the next visit and continue to encourage on quitting this habit.     Laryngoscopy with biopsy of the right tonsil and base of tongue on 10/5/2017- Path consistent with invasive moderately differentiated squamous cell carcinoma that is p16 positive by IHC  - S/P tracheostomy on 10/5/2017  - Port placement to left subclavian on 10/6/2017    OBJECTIVE:    Vitals:    10/10/17 0535   BP: 102/52   Pulse: 91   Resp: 20   Temp: 99.3 °F (37.4 °C)   SpO2: 95%       Intake/Output  Summary (Last 24 hours) at 10/10/17 0638  Last data filed at 10/10/17 0600   Gross per 24 hour   Intake             1252 ml   Output              325 ml   Net              927 ml       PHYSICAL EXAM:   CONSTITUTIONAL: Alert, appropriate, no acute distress  EYES: Non icteric, EOM intact, pupils equal round   ENT: Mucus membranes moist, no oral pharyngeal lesions   NECK: Trach in place  CHEST/LUNGS: CTA bilaterally, normal respiratory effort   CARDIOVASCULAR: RRR, no murmurs  ABDOMEN: soft non-tender, active bowel sounds, no HSM, PEG tube looks okay  EXTREMITIES: warm, moves all fours  SKIN: warm, dry with no rashes or lesions, port left chest wall  LYMPH: No cervical, clavicular, axillary, or inguinal lymphadenopathy  NEUROLOGIC: follows commands, non focal   PSYCH: mood and affect appropriate    CBC    Results from last 7 days  Lab Units 10/10/17  0307 10/09/17  0554 10/08/17  0930   WBC 10*3/mm3 9.96 9.03 10.80   HEMOGLOBIN g/dL 11.1* 11.6* 12.2*   HEMATOCRIT % 32.4* 32.9* 34.7*   PLATELETS 10*3/mm3 209 188 180         Lab Results   Component Value Date     10/10/2017    K 3.9 10/10/2017     10/10/2017    CO2 30.0 10/10/2017    BUN 18 10/10/2017    CREATININE 0.51 10/10/2017    GLUCOSE 145 (H) 10/10/2017    CALCIUM 8.5 10/10/2017    BILITOT 0.7 10/04/2017    ALKPHOS 172 (H) 10/04/2017    AST 66 (H) 10/04/2017     (H) 10/04/2017    GLOB 3.6 10/04/2017       No results found for: INR, PROTIME    Cultures:    No results found for: BLOODCX  No components found for: URINCX    ASSESSMENT/PLAN:  1.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  - MRI of neck on 9/29/2017 revealed right oropharyngeal soft tissue mass measuring approximately 6.7 x 4.6 4.6 cm with involvement of the right side of the base of tongue and invasion of the superior margin of the right submandibular gland.     - Dr. Darin Neal will be evaluating options for full mouth extraction either Wednesday or Thursday per nursing staff     2. Dysphagia secondary to tumor  - PEG placement on 10/6/2017 by Dr. Jayne Phillips  - declined tube feedings last night due to loose stools      Disposition: Continue with current plan of care. Anticipating teeth extraction prior to initiation of radiation therapy.     We will discuss timing of chemotherapy/radiation therapy with JEWEL Gonzales    10/10/17  6:38 AM      Discussed with Dr FRANCISCA Neal. Hopefully it will be possible to get the total dental extractions so we can proceed with treatment.    I personally saw and examined this patient, performing a face-to-face diagnostic evaluation with plan of care reviewed and developed with  Juan Diego Marcum PA-C and nursing staff.   I have addended and/or modified the above history of present illness, physical examination, and assessment and plan to reflect my findings and impressions.   Essential elements of the care plan were discussed with  Juan Diego Marcum PA-C .   Agree with findings and assessment/plan as documented above.   Questions were encouraged, asked and answered to their understanding and satisfaction.    Sin Tompkins MD  10/10/2017 8:14 AM

## 2017-10-10 NOTE — PROGRESS NOTES
[facility]  ENT PROGRESS NOTES  10/10/2017      Patient Identification:  Name: Sameer Tavarez  Age: 62 y.o.  Sex: male  :  1955  MRN: [id number]                     Date of Admission: 10/5/2017      CC:      Subjective   Doing well - having some pain. Trach stable with secretions he has been able to cough up. The patient did have a fever last night and is back to normal now.     HISTORY   HPI, ROS, PMFSHx reviewed:   No changes       Objective     PE:    Temp:  [97.7 °F (36.5 °C)-99.3 °F (37.4 °C)] 98.6 °F (37 °C)  Heart Rate:  [73-91] 76  Resp:  [18-21] 18  BP: ()/(52-76) 111/58   Body mass index is 30.51 kg/(m^2).     General appearance: ill-appearing.   Ability to Communicate: Trach in place with trach collar and sutures, sutures removed today, communicating primarily by writing      Ears - bilateral TM's and external ear canals normal.   Nasal exam - normal and patent, no erythema, discharge or polyps.      Neck exam - supple, no significant adenopathy. #6 cuffed tracheostomy tube in place.  The wound looks good there is secretions.     CVS exam: normal rate, regular rhythm, normal S1, S2, no murmurs, rubs, clicks or gallops.   Chest: CTA bilaterally, normal respiratory effort     No lymphadenopathy in the anterior or posterior neck, supraclavicular, axillary or inguinal areas. No hepato-splenomegaly noted.   Neurological exam reveals not examined.    DATA   CBC (No Diff)   Order: [id number]   Status:  Final result   Visible to patient:  No (Not Released)      Ref Range & Units 9:30 AM     WBC 4.80 - 10.80 10*3/mm3 10.80   RBC 4.80 - 5.90 10*6/mm3 4.08 (L)   Hemoglobin 14.0 - 18.0 g/dL 12.2 (L)   Hematocrit 40.0 - 52.0 % 34.7 (L)   MCV 82.0 - 95.0 fL 85.0   MCH 28.0 - 32.0 pg 29.9   MCHC 33.0 - 36.0 g/dL 35.2   RDW 12.0 - 15.0 % 12.5   RDW-SD 40.0 - 54.0 fl 38.6 (L)   MPV 6.0 - 12.0 fL 11.5   Platelets 130 - 400 10*3/mm3 180   Resulting Agency   [facility]      Specimen Collected: 10/08/17   9:30 AM   Last Resulted: 10/08/17  9:45 AM            Basic Metabolic Panel   Order: [id number]   Status:  Final result   Visible to patient:  No (Not Released)      Newer results are available. Click to view them now.            Ref Range & Units 9:30 AM     Glucose 70 - 100 mg/dL 168 (H)   BUN 5 - 21 mg/dL 12   Creatinine 0.50 - 1.40 mg/dL 0.50   Sodium 135 - 145 mmol/L 137   Potassium 3.5 - 5.3 mmol/L 3.6   Chloride 98 - 110 mmol/L 100   CO2 24.0 - 31.0 mmol/L 25.0   Calcium 8.4 - 10.4 mg/dL 8.5   eGFR Non African Amer >60 mL/min/1.73 >150   BUN/Creatinine Ratio 7.0 - 25.0 24.0   Anion Gap 4.0 - 13.0 mmol/L 12.0   Resulting Agency   [facility]   Narrative   GFR Normal >60  Chronic Kidney Disease <60  Kidney Failure <15      Specimen Collected: 10/08/17  9:30 AM   Last Resulted: 10/08/17  9:58 AM                   MEDICATIONS   I have reviewed the current MAR.     LABS AND IMAGING      I have reviewed the labs and imaging data since the patient was last seen.       Assessment     ASSESSMENT     Active Problems:    Oropharyngeal cancer    Tracheostomy dependence    Postoperative pain  Probable atelectasis    As above no significant changes noted      Plan     PLAN       Will continue to increase ambulation  Out of bed to chair today  Requested transfer to           JEWEL Guardado  10/10/2017  9:02 AM

## 2017-10-10 NOTE — PROGRESS NOTES
[facility] Medicine Services  INPATIENT PROGRESS NOTE    Length of Stay: 5  Date of Admission: 10/5/2017  Primary Care Physician: Christian Castellon MD    Subjective   Chief Complaint: medical management for HTN, DM  HPI   He complains of pain in the left of side of his face and left temple.  This has been going on for several months according to his wife.  He describes it as a sharp, searing pain. He also has hyperesthesia to this area.  He may have problems with trigeminal neuralgia.     Review of Systems   All pertinent negatives and positives are as above. All other systems have been reviewed and are negative unless otherwise stated.     Objective    Temp:  [97.8 °F (36.6 °C)-99.3 °F (37.4 °C)] 98.6 °F (37 °C)  Heart Rate:  [73-91] 85  Resp:  [18-20] 18  BP: ()/(52-76) 111/58  Physical Exam  Constitutional: He is oriented to person, place, and time. He appears well-developed and well-nourished.   Up in bed. His wife is present with him.  Writes to communicate at this point.     Head: Normocephalic and atraumatic.   Eyes: Conjunctivae and EOM are normal. Pupils are equal, round, and reactive to light.   Neck: Neck supple. No JVD present. Trach with improved secretions.    Cardiovascular: Normal rate, regular rhythm, normal heart sounds and intact distal pulses.  Exam reveals no gallop and no friction rub.  No murmur heard. Left infraclavicular Port.    Pulmonary/Chest: Effort normal and breath sounds normal. No respiratory distress. He has no wheezes. He has no rales. He exhibits no tenderness.   Abdominal: Soft. Bowel sounds are normal. He exhibits no distension. There is no tenderness. There is no rebound and no guarding. PEG.    Musculoskeletal: Normal range of motion. He exhibits no edema, tenderness or deformity.   Neurological: He is alert and oriented to person, place, and time. He displays normal reflexes. No cranial nerve deficit. He exhibits normal muscle tone.    Skin: Skin is warm and dry. No rash noted.   Psychiatric: Flat.      Results Review:  I have reviewed the labs, radiology results, and diagnostic studies.    Laboratory Data:     Results from last 7 days  Lab Units 10/10/17  0307 10/09/17  0554 10/08/17  0930   WBC 10*3/mm3 9.96 9.03 10.80   HEMOGLOBIN g/dL 11.1* 11.6* 12.2*   HEMATOCRIT % 32.4* 32.9* 34.7*   PLATELETS 10*3/mm3 209 188 180       Results from last 7 days  Lab Units 10/10/17  0307 10/09/17  0554 10/08/17  0930  10/04/17  1417   SODIUM mmol/L 138 138 137  < > 139   POTASSIUM mmol/L 3.9 3.4* 3.6  < > 4.1   CHLORIDE mmol/L 102 102 100  < > 100   CO2 mmol/L 30.0 27.0 25.0  < > 23.0*   BUN mg/dL 18 13 12  < > 10   CREATININE mg/dL 0.51 0.46* 0.50  < > 0.66   CALCIUM mg/dL 8.5 8.1* 8.5  < > 9.4   BILIRUBIN mg/dL  --   --   --   --  0.7   ALK PHOS U/L  --   --   --   --  172*   ALT (SGPT) U/L  --   --   --   --  109*   AST (SGOT) U/L  --   --   --   --  66*   GLUCOSE mg/dL 145* 177* 168*  < > 210*   < > = values in this interval not displayed.    Most recent glucoses are 177, 166, 135, 145, 150.    I have reviewed the patient current medications.     Assessment/Plan   Assessment:   1. Oropharyngeal cancer, squamous cell.  2. Status post tracheostomy placement on 10/5, Dr. Justin.   3. Status post Port and PEG placement on 10/6, Dr. Phillips.   4. Hypertension  5. Hyperlipidemia  6. Type II diabetes mellitus with hemoglobin A1c of 8.7.    7. Tobacco abuse.  8. Chronic obstructive pulmonary disease.  9. Hypokalemia, replaced.   10. Possible trigeminal neuralgia.      Plan:   No further fevers. This was one-time issue. Continue to monitor.     He will eventually be for treatment with Dr. Dominguez and Dr. Marie. I discussed the case with Dr. Tompkins on the unit yesterday. He and Dr. Neal are looking into having the patient's teeth pulled before discharge.       His blood pressure is generally acceptable.I am actually going to decrease his lisinopril.     His  Lantus has recently been on hold given his transitioned to tube feeding. Restart Levemir at 10 units nightly. He will likely require reinitiation of this at some point.  Continue Accu-Cheks and sliding scale insulin for now.    Check ESR for the possibility of temporal arteritis.  Start on Tegretol empirically as his symptoms sound more consistent with trigeminal neuralgia.     Discharge Planning: Per ENT.    Alber Jain,    10/10/17   11:22 AM

## 2017-10-10 NOTE — PLAN OF CARE
Problem: Patient Care Overview (Adult)  Goal: Plan of Care Review  Outcome: Ongoing (interventions implemented as appropriate)    10/10/17 0430   Coping/Psychosocial Response Interventions   Plan Of Care Reviewed With patient   Patient Care Overview   Progress no change   Outcome Evaluation   Outcome Summary/Follow up Plan VSS. NSR 74-87 on tele. C/o right sided facial pain, medicated with PRN medication with relief obtained. Trach care performed. Oral care performed. Suctioned multiple times throughout shift with copious creamy yellow secretiions. Pt remains on 8L with 30% humidified O2. Pt refused tube feeding last night r/t multiple bowel movements yesterday on 10/09. Safety maintained. Will cont to monitor.         Problem: Perioperative Period (Adult)  Goal: Signs and Symptoms of Listed Potential Problems Will be Absent or Manageable (Perioperative Period)  Outcome: Ongoing (interventions implemented as appropriate)    Problem: Fall Risk (Adult)  Goal: Absence of Falls  Outcome: Ongoing (interventions implemented as appropriate)

## 2017-10-11 ENCOUNTER — ANESTHESIA EVENT (OUTPATIENT)
Dept: PERIOP | Facility: HOSPITAL | Age: 62
End: 2017-10-11

## 2017-10-11 ENCOUNTER — ANESTHESIA (OUTPATIENT)
Dept: PERIOP | Facility: HOSPITAL | Age: 62
End: 2017-10-11

## 2017-10-11 LAB
ANION GAP SERPL CALCULATED.3IONS-SCNC: 6 MMOL/L (ref 4–13)
BACTERIA UR QL AUTO: ABNORMAL /HPF
BILIRUB UR QL STRIP: NEGATIVE
BUN BLD-MCNC: 16 MG/DL (ref 5–21)
BUN/CREAT SERPL: 30.8 (ref 7–25)
CALCIUM SPEC-SCNC: 8.3 MG/DL (ref 8.4–10.4)
CHLORIDE SERPL-SCNC: 101 MMOL/L (ref 98–110)
CLARITY UR: CLEAR
CO2 SERPL-SCNC: 33 MMOL/L (ref 24–31)
COLOR UR: ABNORMAL
CREAT BLD-MCNC: 0.52 MG/DL (ref 0.5–1.4)
DEPRECATED RDW RBC AUTO: 40.5 FL (ref 40–54)
ERYTHROCYTE [DISTWIDTH] IN BLOOD BY AUTOMATED COUNT: 12.8 % (ref 12–15)
ERYTHROCYTE [SEDIMENTATION RATE] IN BLOOD: 43 MM/HR (ref 0–15)
GFR SERPL CREATININE-BSD FRML MDRD: >150 ML/MIN/1.73
GLUCOSE BLD-MCNC: 110 MG/DL (ref 70–100)
GLUCOSE BLDC GLUCOMTR-MCNC: 106 MG/DL (ref 70–130)
GLUCOSE BLDC GLUCOMTR-MCNC: 110 MG/DL (ref 70–130)
GLUCOSE BLDC GLUCOMTR-MCNC: 118 MG/DL (ref 70–130)
GLUCOSE BLDC GLUCOMTR-MCNC: 130 MG/DL (ref 70–130)
GLUCOSE BLDC GLUCOMTR-MCNC: 130 MG/DL (ref 70–130)
GLUCOSE UR STRIP-MCNC: ABNORMAL MG/DL
HCT VFR BLD AUTO: 33.1 % (ref 40–52)
HGB BLD-MCNC: 11.3 G/DL (ref 14–18)
HGB UR QL STRIP.AUTO: NEGATIVE
HYALINE CASTS UR QL AUTO: ABNORMAL /LPF
KETONES UR QL STRIP: ABNORMAL
LEUKOCYTE ESTERASE UR QL STRIP.AUTO: ABNORMAL
MCH RBC QN AUTO: 29.6 PG (ref 28–32)
MCHC RBC AUTO-ENTMCNC: 34.1 G/DL (ref 33–36)
MCV RBC AUTO: 86.6 FL (ref 82–95)
NITRITE UR QL STRIP: NEGATIVE
PH UR STRIP.AUTO: 6.5 [PH] (ref 5–8)
PLATELET # BLD AUTO: 234 10*3/MM3 (ref 130–400)
PMV BLD AUTO: 11.3 FL (ref 6–12)
POTASSIUM BLD-SCNC: 3.7 MMOL/L (ref 3.5–5.3)
PROT UR QL STRIP: NEGATIVE
RBC # BLD AUTO: 3.82 10*6/MM3 (ref 4.8–5.9)
RBC # UR: ABNORMAL /HPF
REF LAB TEST METHOD: ABNORMAL
SODIUM BLD-SCNC: 140 MMOL/L (ref 135–145)
SP GR UR STRIP: 1.03 (ref 1–1.03)
SQUAMOUS #/AREA URNS HPF: ABNORMAL /HPF
UROBILINOGEN UR QL STRIP: ABNORMAL
WBC NRBC COR # BLD: 9.91 10*3/MM3 (ref 4.8–10.8)
WBC UR QL AUTO: ABNORMAL /HPF

## 2017-10-11 PROCEDURE — 31535 LARYNGOSCOPY W/BIOPSY: CPT | Performed by: OTOLARYNGOLOGY

## 2017-10-11 PROCEDURE — 94799 UNLISTED PULMONARY SVC/PX: CPT

## 2017-10-11 PROCEDURE — 25010000002 FENTANYL CITRATE (PF) 100 MCG/2ML SOLUTION: Performed by: NURSE ANESTHETIST, CERTIFIED REGISTERED

## 2017-10-11 PROCEDURE — 0CDXXZ2 EXTRACTION OF LOWER TOOTH, ALL, EXTERNAL APPROACH: ICD-10-PCS | Performed by: OTOLARYNGOLOGY

## 2017-10-11 PROCEDURE — 81001 URINALYSIS AUTO W/SCOPE: CPT | Performed by: INTERNAL MEDICINE

## 2017-10-11 PROCEDURE — 85027 COMPLETE CBC AUTOMATED: CPT | Performed by: SPECIALIST

## 2017-10-11 PROCEDURE — 25010000002 PROPOFOL 10 MG/ML EMULSION: Performed by: NURSE ANESTHETIST, CERTIFIED REGISTERED

## 2017-10-11 PROCEDURE — 82962 GLUCOSE BLOOD TEST: CPT

## 2017-10-11 PROCEDURE — 25010000002 ONDANSETRON PER 1 MG: Performed by: ANESTHESIOLOGY

## 2017-10-11 PROCEDURE — 92507 TX SP LANG VOICE COMM INDIV: CPT | Performed by: SPEECH-LANGUAGE PATHOLOGIST

## 2017-10-11 PROCEDURE — 25010000002 ONDANSETRON PER 1 MG: Performed by: OTOLARYNGOLOGY

## 2017-10-11 PROCEDURE — 0CDWXZ2 EXTRACTION OF UPPER TOOTH, ALL, EXTERNAL APPROACH: ICD-10-PCS | Performed by: OTOLARYNGOLOGY

## 2017-10-11 PROCEDURE — 63710000001 INSULIN DETEMIR PER 5 UNITS: Performed by: INTERNAL MEDICINE

## 2017-10-11 PROCEDURE — 85651 RBC SED RATE NONAUTOMATED: CPT | Performed by: INTERNAL MEDICINE

## 2017-10-11 PROCEDURE — 80048 BASIC METABOLIC PNL TOTAL CA: CPT | Performed by: SPECIALIST

## 2017-10-11 PROCEDURE — 25010000002 HYDROMORPHONE PER 4 MG: Performed by: FAMILY MEDICINE

## 2017-10-11 RX ORDER — SODIUM CHLORIDE 0.9 % (FLUSH) 0.9 %
1-10 SYRINGE (ML) INJECTION AS NEEDED
Status: DISCONTINUED | OUTPATIENT
Start: 2017-10-11 | End: 2017-10-11 | Stop reason: HOSPADM

## 2017-10-11 RX ORDER — ONDANSETRON 2 MG/ML
4 INJECTION INTRAMUSCULAR; INTRAVENOUS AS NEEDED
Status: DISCONTINUED | OUTPATIENT
Start: 2017-10-11 | End: 2017-10-11 | Stop reason: HOSPADM

## 2017-10-11 RX ORDER — FENTANYL CITRATE 50 UG/ML
INJECTION, SOLUTION INTRAMUSCULAR; INTRAVENOUS AS NEEDED
Status: DISCONTINUED | OUTPATIENT
Start: 2017-10-11 | End: 2017-10-11 | Stop reason: SURG

## 2017-10-11 RX ORDER — FLUMAZENIL 0.1 MG/ML
0.2 INJECTION INTRAVENOUS AS NEEDED
Status: CANCELLED | OUTPATIENT
Start: 2017-10-11

## 2017-10-11 RX ORDER — SODIUM CHLORIDE, SODIUM LACTATE, POTASSIUM CHLORIDE, CALCIUM CHLORIDE 600; 310; 30; 20 MG/100ML; MG/100ML; MG/100ML; MG/100ML
100 INJECTION, SOLUTION INTRAVENOUS CONTINUOUS
Status: CANCELLED | OUTPATIENT
Start: 2017-10-11

## 2017-10-11 RX ORDER — LIDOCAINE HYDROCHLORIDE 20 MG/ML
INJECTION, SOLUTION INFILTRATION; PERINEURAL AS NEEDED
Status: DISCONTINUED | OUTPATIENT
Start: 2017-10-11 | End: 2017-10-11 | Stop reason: SURG

## 2017-10-11 RX ORDER — CLINDAMYCIN PHOSPHATE 600 MG/50ML
600 INJECTION INTRAVENOUS
Status: COMPLETED | OUTPATIENT
Start: 2017-10-11 | End: 2017-10-11

## 2017-10-11 RX ORDER — NALOXONE HCL 0.4 MG/ML
0.04 VIAL (ML) INJECTION AS NEEDED
Status: DISCONTINUED | OUTPATIENT
Start: 2017-10-11 | End: 2017-10-11 | Stop reason: HOSPADM

## 2017-10-11 RX ORDER — CHLORHEXIDINE GLUCONATE 0.12 MG/ML
15 RINSE ORAL EVERY 12 HOURS SCHEDULED
Status: DISCONTINUED | OUTPATIENT
Start: 2017-10-12 | End: 2017-10-13 | Stop reason: HOSPADM

## 2017-10-11 RX ORDER — ONDANSETRON 2 MG/ML
4 INJECTION INTRAMUSCULAR; INTRAVENOUS AS NEEDED
Status: CANCELLED | OUTPATIENT
Start: 2017-10-11 | End: 2017-10-11

## 2017-10-11 RX ORDER — SODIUM CHLORIDE 0.9 % (FLUSH) 0.9 %
1-10 SYRINGE (ML) INJECTION AS NEEDED
Status: CANCELLED | OUTPATIENT
Start: 2017-10-11

## 2017-10-11 RX ORDER — PROPOFOL 10 MG/ML
VIAL (ML) INTRAVENOUS AS NEEDED
Status: DISCONTINUED | OUTPATIENT
Start: 2017-10-11 | End: 2017-10-11 | Stop reason: SURG

## 2017-10-11 RX ORDER — ASPIRIN 81 MG/1
81 TABLET, CHEWABLE ORAL DAILY
Status: DISCONTINUED | OUTPATIENT
Start: 2017-10-12 | End: 2017-10-13 | Stop reason: HOSPADM

## 2017-10-11 RX ORDER — LABETALOL HYDROCHLORIDE 5 MG/ML
5 INJECTION, SOLUTION INTRAVENOUS
Status: DISCONTINUED | OUTPATIENT
Start: 2017-10-11 | End: 2017-10-11 | Stop reason: HOSPADM

## 2017-10-11 RX ORDER — MORPHINE SULFATE 2 MG/ML
2 INJECTION, SOLUTION INTRAMUSCULAR; INTRAVENOUS AS NEEDED
Status: DISCONTINUED | OUTPATIENT
Start: 2017-10-11 | End: 2017-10-11 | Stop reason: HOSPADM

## 2017-10-11 RX ORDER — MEPERIDINE HYDROCHLORIDE 25 MG/ML
12.5 INJECTION INTRAMUSCULAR; INTRAVENOUS; SUBCUTANEOUS
Status: CANCELLED | OUTPATIENT
Start: 2017-10-11 | End: 2017-10-12

## 2017-10-11 RX ORDER — LIDOCAINE HYDROCHLORIDE AND EPINEPHRINE BITARTRATE 20; .01 MG/ML; MG/ML
INJECTION, SOLUTION SUBCUTANEOUS AS NEEDED
Status: DISCONTINUED | OUTPATIENT
Start: 2017-10-11 | End: 2017-10-11 | Stop reason: HOSPADM

## 2017-10-11 RX ORDER — FLUMAZENIL 0.1 MG/ML
0.2 INJECTION INTRAVENOUS AS NEEDED
Status: DISCONTINUED | OUTPATIENT
Start: 2017-10-11 | End: 2017-10-11 | Stop reason: HOSPADM

## 2017-10-11 RX ORDER — NALOXONE HCL 0.4 MG/ML
0.04 VIAL (ML) INJECTION AS NEEDED
Status: CANCELLED | OUTPATIENT
Start: 2017-10-11

## 2017-10-11 RX ORDER — MORPHINE SULFATE 2 MG/ML
2 INJECTION, SOLUTION INTRAMUSCULAR; INTRAVENOUS AS NEEDED
Status: CANCELLED | OUTPATIENT
Start: 2017-10-11 | End: 2017-10-11

## 2017-10-11 RX ORDER — HYDRALAZINE HYDROCHLORIDE 20 MG/ML
5 INJECTION INTRAMUSCULAR; INTRAVENOUS
Status: DISCONTINUED | OUTPATIENT
Start: 2017-10-11 | End: 2017-10-11 | Stop reason: HOSPADM

## 2017-10-11 RX ORDER — MEPERIDINE HYDROCHLORIDE 25 MG/ML
12.5 INJECTION INTRAMUSCULAR; INTRAVENOUS; SUBCUTANEOUS
Status: DISCONTINUED | OUTPATIENT
Start: 2017-10-11 | End: 2017-10-11 | Stop reason: HOSPADM

## 2017-10-11 RX ORDER — CLINDAMYCIN PHOSPHATE 600 MG/50ML
600 INJECTION INTRAVENOUS EVERY 8 HOURS
Status: COMPLETED | OUTPATIENT
Start: 2017-10-11 | End: 2017-10-12

## 2017-10-11 RX ORDER — IPRATROPIUM BROMIDE AND ALBUTEROL SULFATE 2.5; .5 MG/3ML; MG/3ML
3 SOLUTION RESPIRATORY (INHALATION) ONCE AS NEEDED
Status: DISCONTINUED | OUTPATIENT
Start: 2017-10-11 | End: 2017-10-11 | Stop reason: HOSPADM

## 2017-10-11 RX ORDER — IPRATROPIUM BROMIDE AND ALBUTEROL SULFATE 2.5; .5 MG/3ML; MG/3ML
3 SOLUTION RESPIRATORY (INHALATION) ONCE AS NEEDED
Status: CANCELLED | OUTPATIENT
Start: 2017-10-11

## 2017-10-11 RX ORDER — METOCLOPRAMIDE HYDROCHLORIDE 5 MG/ML
5 INJECTION INTRAMUSCULAR; INTRAVENOUS
Status: DISCONTINUED | OUTPATIENT
Start: 2017-10-11 | End: 2017-10-11 | Stop reason: HOSPADM

## 2017-10-11 RX ORDER — METOCLOPRAMIDE HYDROCHLORIDE 5 MG/ML
5 INJECTION INTRAMUSCULAR; INTRAVENOUS
Status: CANCELLED | OUTPATIENT
Start: 2017-10-11

## 2017-10-11 RX ORDER — SODIUM CHLORIDE, SODIUM LACTATE, POTASSIUM CHLORIDE, CALCIUM CHLORIDE 600; 310; 30; 20 MG/100ML; MG/100ML; MG/100ML; MG/100ML
100 INJECTION, SOLUTION INTRAVENOUS CONTINUOUS
Status: DISCONTINUED | OUTPATIENT
Start: 2017-10-11 | End: 2017-10-11

## 2017-10-11 RX ORDER — LABETALOL HYDROCHLORIDE 5 MG/ML
5 INJECTION, SOLUTION INTRAVENOUS
Status: CANCELLED | OUTPATIENT
Start: 2017-10-11

## 2017-10-11 RX ORDER — HYDRALAZINE HYDROCHLORIDE 20 MG/ML
5 INJECTION INTRAMUSCULAR; INTRAVENOUS
Status: CANCELLED | OUTPATIENT
Start: 2017-10-11

## 2017-10-11 RX ORDER — CLOTRIMAZOLE 10 MG/1
10 LOZENGE ORAL; TOPICAL
Status: DISCONTINUED | OUTPATIENT
Start: 2017-10-11 | End: 2017-10-13 | Stop reason: HOSPADM

## 2017-10-11 RX ADMIN — CLINDAMYCIN PHOSPHATE 600 MG: 12 INJECTION, SOLUTION INTRAVENOUS at 13:25

## 2017-10-11 RX ADMIN — PROPOFOL 50 MG: 10 INJECTION, EMULSION INTRAVENOUS at 13:35

## 2017-10-11 RX ADMIN — METOPROLOL TARTRATE 50 MG: 50 TABLET, FILM COATED ORAL at 17:14

## 2017-10-11 RX ADMIN — HYDROMORPHONE HYDROCHLORIDE 1 MG: 1 INJECTION, SOLUTION INTRAMUSCULAR; INTRAVENOUS; SUBCUTANEOUS at 21:49

## 2017-10-11 RX ADMIN — FENTANYL CITRATE 50 MCG: 50 INJECTION, SOLUTION INTRAMUSCULAR; INTRAVENOUS at 13:55

## 2017-10-11 RX ADMIN — SUCRALFATE 1 G: 1 SUSPENSION ORAL at 00:29

## 2017-10-11 RX ADMIN — ONDANSETRON HYDROCHLORIDE 4 MG: 2 SOLUTION INTRAMUSCULAR; INTRAVENOUS at 14:15

## 2017-10-11 RX ADMIN — SUCRALFATE 1 G: 1 SUSPENSION ORAL at 17:14

## 2017-10-11 RX ADMIN — LIDOCAINE HYDROCHLORIDE 100 MG: 20 INJECTION, SOLUTION INFILTRATION; PERINEURAL at 13:35

## 2017-10-11 RX ADMIN — FUROSEMIDE 40 MG: 40 TABLET ORAL at 17:14

## 2017-10-11 RX ADMIN — BACLOFEN 20 MG: 10 TABLET ORAL at 17:14

## 2017-10-11 RX ADMIN — CLINDAMYCIN PHOSPHATE 600 MG: 12 INJECTION, SOLUTION INTRAVENOUS at 21:05

## 2017-10-11 RX ADMIN — METOPROLOL TARTRATE 50 MG: 50 TABLET, FILM COATED ORAL at 10:09

## 2017-10-11 RX ADMIN — HYDROMORPHONE HYDROCHLORIDE 1 MG: 1 INJECTION, SOLUTION INTRAMUSCULAR; INTRAVENOUS; SUBCUTANEOUS at 04:20

## 2017-10-11 RX ADMIN — CLOTRIMAZOLE 10 MG: 10 LOZENGE ORAL; TOPICAL at 10:37

## 2017-10-11 RX ADMIN — HYDROMORPHONE HYDROCHLORIDE 1 MG: 1 INJECTION, SOLUTION INTRAMUSCULAR; INTRAVENOUS; SUBCUTANEOUS at 00:40

## 2017-10-11 RX ADMIN — INSULIN DETEMIR 10 UNITS: 100 INJECTION, SOLUTION SUBCUTANEOUS at 21:20

## 2017-10-11 RX ADMIN — IPRATROPIUM BROMIDE AND ALBUTEROL SULFATE 3 ML: 2.5; .5 SOLUTION RESPIRATORY (INHALATION) at 11:13

## 2017-10-11 RX ADMIN — HYDROMORPHONE HYDROCHLORIDE 1 MG: 1 INJECTION, SOLUTION INTRAMUSCULAR; INTRAVENOUS; SUBCUTANEOUS at 10:37

## 2017-10-11 RX ADMIN — TAMSULOSIN HYDROCHLORIDE 0.4 MG: 0.4 CAPSULE ORAL at 21:05

## 2017-10-11 RX ADMIN — IPRATROPIUM BROMIDE AND ALBUTEROL SULFATE 3 ML: 2.5; .5 SOLUTION RESPIRATORY (INHALATION) at 07:34

## 2017-10-11 RX ADMIN — HYDROMORPHONE HYDROCHLORIDE 1 MG: 1 INJECTION, SOLUTION INTRAMUSCULAR; INTRAVENOUS; SUBCUTANEOUS at 06:28

## 2017-10-11 RX ADMIN — FAMOTIDINE 20 MG: 40 POWDER, FOR SUSPENSION ORAL at 17:14

## 2017-10-11 RX ADMIN — BACLOFEN 20 MG: 10 TABLET ORAL at 21:05

## 2017-10-11 RX ADMIN — HYDROMORPHONE HYDROCHLORIDE 1 MG: 1 INJECTION, SOLUTION INTRAMUSCULAR; INTRAVENOUS; SUBCUTANEOUS at 18:50

## 2017-10-11 RX ADMIN — CARBAMAZEPINE 100 MG: 100 TABLET, CHEWABLE ORAL at 21:05

## 2017-10-11 RX ADMIN — SODIUM CHLORIDE, POTASSIUM CHLORIDE, SODIUM LACTATE AND CALCIUM CHLORIDE 100 ML/HR: 600; 310; 30; 20 INJECTION, SOLUTION INTRAVENOUS at 13:19

## 2017-10-11 RX ADMIN — HYDROMORPHONE HYDROCHLORIDE 1 MG: 1 INJECTION, SOLUTION INTRAMUSCULAR; INTRAVENOUS; SUBCUTANEOUS at 16:14

## 2017-10-11 RX ADMIN — FENTANYL CITRATE 50 MCG: 50 INJECTION, SOLUTION INTRAMUSCULAR; INTRAVENOUS at 13:44

## 2017-10-11 RX ADMIN — ONDANSETRON 4 MG: 2 INJECTION, SOLUTION INTRAMUSCULAR; INTRAVENOUS at 15:13

## 2017-10-11 RX ADMIN — HYDROMORPHONE HYDROCHLORIDE 1 MG: 1 INJECTION, SOLUTION INTRAMUSCULAR; INTRAVENOUS; SUBCUTANEOUS at 12:37

## 2017-10-11 RX ADMIN — HYDROMORPHONE HYDROCHLORIDE 1 MG: 1 INJECTION, SOLUTION INTRAMUSCULAR; INTRAVENOUS; SUBCUTANEOUS at 08:32

## 2017-10-11 RX ADMIN — IPRATROPIUM BROMIDE AND ALBUTEROL SULFATE 3 ML: 2.5; .5 SOLUTION RESPIRATORY (INHALATION) at 19:22

## 2017-10-11 NOTE — PLAN OF CARE
Problem: Patient Care Overview (Adult)  Goal: Plan of Care Review  Outcome: Ongoing (interventions implemented as appropriate)    10/11/17 1506   Coping/Psychosocial Response Interventions   Plan Of Care Reviewed With patient   Patient Care Overview   Progress progress toward functional goals as expected   Outcome Evaluation   Outcome Summary/Follow up Plan Sl nausea . States pain is tolerable. AAO. VSS. Mets dc criteria.         Problem: Perioperative Period (Adult)  Goal: Signs and Symptoms of Listed Potential Problems Will be Absent or Manageable (Perioperative Period)  Outcome: Ongoing (interventions implemented as appropriate)

## 2017-10-11 NOTE — PLAN OF CARE
Problem: Patient Care Overview (Adult)  Goal: Plan of Care Review  Outcome: Ongoing (interventions implemented as appropriate)    10/10/17 1505 10/10/17 2000 10/11/17 0324   Coping/Psychosocial Response Interventions   Plan Of Care Reviewed With --  patient --    Patient Care Overview   Progress no change --  --    Outcome Evaluation   Outcome Summary/Follow up Plan --  --  Tube feeding rate at 40cc/hr at this time. Goal rate of 60cc/hr. Tube feeding increased to 50cc/hr tolerating well. At 0000 tube feeding stopped pt NPO for tooth extraction that is scheduled for today. Pt has left trach collar on this shift. Accu checks q6hrs. Medicated for pain as ordered. Will continue to monitor.       Goal: Adult Individualization and Mutuality  Outcome: Ongoing (interventions implemented as appropriate)    10/10/17 1505   Individualization   Patient Specific Preferences prefers dilaudid over percocet   Patient Specific Goals reduce pain   Patient Specific Interventions dilaudid for pain, tube feeds   Mutuality/Individual Preferences   What Anxieties, Fears or Concerns Do You Have About Your Health or Care? none   What Questions Do You Have About Your Health or Care? none   What Information Would Help Us Give You More Personalized Care? none       Goal: Discharge Needs Assessment  Outcome: Ongoing (interventions implemented as appropriate)    10/06/17 0958 10/07/17 1104 10/10/17 1505   Current Health   Anticipated Changes Related to Illness --  --  none   Discharge Needs Assessment   Concerns To Be Addressed care coordination/care conferences;adjustment to diagnosis/illness concerns;discharge planning concerns --  --    Readmission Within The Last 30 Days no previous admission in last 30 days --  --    Equipment Needed After Discharge oxygen;trach supplies --  --    Discharge Facility/Level Of Care Needs home with home health --  --    Current Discharge Risk chronically ill --  --    Discharge Planning Comments Patient  resides at home with spouse. Patient will return home with spouse at discharge. Patient has a PCP. Tracheostomy supply orders sent to [facility] [phone] (phone). Final order will need to be clarified upon discharge regarding specifics on supplies. Need for home O2 will need to be determined 24-48 hours prior to discharge. Patient's HH was arranged with [facility]. Patient's wife states she prefers [facility] as she is employed there. Will need to notify [facility] of patient's discharge [phone]. Will continue to follow and coordinate discharge. --  --    Living Environment   Transportation Available car;family or friend will provide --  --    Self-Care   Equipment Currently Used at Home --  cane, straight --          Problem: Perioperative Period (Adult)  Goal: Signs and Symptoms of Listed Potential Problems Will be Absent or Manageable (Perioperative Period)  Outcome: Ongoing (interventions implemented as appropriate)    10/10/17 1505   Perioperative Period   Problems Assessed (Perioperative Period) all   Problems Present (Perioperative Period) pain;hypoxia/hypoxemia         Problem: Fall Risk (Adult)  Goal: Absence of Falls  Outcome: Ongoing (interventions implemented as appropriate)    10/11/17 0324   Fall Risk (Adult)   Absence of Falls making progress toward outcome         Problem: Feeding Dysfunction/Swallowing Impairment (Pediatric)  Goal: Reduced Risk of Aspiration  Outcome: Ongoing (interventions implemented as appropriate)    10/11/17 0324   Feeding Dysfunction/Swallowing Impairment (Pediatric)   Reduced Risk of Aspiration making progress toward outcome       Goal: Adequate Nutrition and Hydration  Outcome: Ongoing (interventions implemented as appropriate)    10/11/17 0324   Feeding Dysfunction/Swallowing Impairment (Pediatric)   Adequate Nutrition and Hydration making progress toward outcome         10/11/17 0324   Feeding Dysfunction/Swallowing Impairment (Pediatric)   Adequate Nutrition and  Hydration making progress toward outcome

## 2017-10-11 NOTE — PROGRESS NOTES
[facility] Medicine Services  INPATIENT PROGRESS NOTE    Length of Stay: 6  Date of Admission: 10/5/2017  Primary Care Physician: Christian Castellon MD    Subjective   Chief Complaint:  medical management for HTN, DM  HPI   He and his wife are concerned about the odor from his mouth and trach. I will treat for thrush and Dr. Neal to be made aware as well.     He thinks that the pain in the right side of his face and scalp is improved after Tegretol.    For dental extractions today.     Review of Systems   All pertinent negatives and positives are as above. All other systems have been reviewed and are negative unless otherwise stated.     Objective    Temp:  [97.8 °F (36.6 °C)-99 °F (37.2 °C)] 98.4 °F (36.9 °C)  Heart Rate:  [71-90] 71  Resp:  [18-20] 18  BP: (110-132)/(62-69) 132/69  Physical Exam  Constitutional: He is oriented to person, place, and time. He appears well-developed and well-nourished.   Up in bed. His wife is present with him.  Writes to communicate at this point. Seen and discussed with his nurse, Verónica.   Head: Normocephalic and atraumatic. Strong odor from breath. Tounge coated and thrush evident.   Eyes: Conjunctivae and EOM are normal. Pupils are equal, round, and reactive to light.   Neck: Neck supple. No JVD present. Trach with improved secretions.    Cardiovascular: Normal rate, regular rhythm, normal heart sounds and intact distal pulses.  Exam reveals no gallop and no friction rub.  No murmur heard. Left infraclavicular Port.    Pulmonary/Chest: Effort normal and breath sounds normal. No respiratory distress. He has no wheezes. He has no rales. He exhibits no tenderness.   Abdominal: Soft. Bowel sounds are normal. He exhibits no distension. There is no tenderness. There is no rebound and no guarding. PEG.    Musculoskeletal: Normal range of motion. He exhibits no edema, tenderness or deformity.   Neurological: He is alert and oriented to person, place, and  time. He displays normal reflexes. No cranial nerve deficit. He exhibits normal muscle tone.   Skin: Skin is warm and dry. No rash noted.   Psychiatric: Flat.      Results Review:  I have reviewed the labs, radiology results, and diagnostic studies.    Laboratory Data:     Results from last 7 days  Lab Units 10/11/17  0452 10/10/17  0307 10/09/17  0554   WBC 10*3/mm3 9.91 9.96 9.03   HEMOGLOBIN g/dL 11.3* 11.1* 11.6*   HEMATOCRIT % 33.1* 32.4* 32.9*   PLATELETS 10*3/mm3 234 209 188       Results from last 7 days  Lab Units 10/11/17  0452 10/10/17  0307 10/09/17  0554  10/04/17  1417   SODIUM mmol/L 140 138 138  < > 139   POTASSIUM mmol/L 3.7 3.9 3.4*  < > 4.1   CHLORIDE mmol/L 101 102 102  < > 100   CO2 mmol/L 33.0* 30.0 27.0  < > 23.0*   BUN mg/dL 16 18 13  < > 10   CREATININE mg/dL 0.52 0.51 0.46*  < > 0.66   CALCIUM mg/dL 8.3* 8.5 8.1*  < > 9.4   BILIRUBIN mg/dL  --   --   --   --  0.7   ALK PHOS U/L  --   --   --   --  172*   ALT (SGPT) U/L  --   --   --   --  109*   AST (SGOT) U/L  --   --   --   --  66*   GLUCOSE mg/dL 110* 145* 177*  < > 210*   < > = values in this interval not displayed.    Most recent glucoses are 141, 155, 118, 110, 106.    Erythrocyte sedimentation rate is 43.    I have reviewed the patient current medications.     Assessment/Plan   Assessment:   1. Oropharyngeal cancer, squamous cell.  2. Status post tracheostomy placement on 10/5, Dr. Justin.   3. Status post Port and PEG placement on 10/6, Dr. Phillips.   4. Hypertension.  5. Hyperlipidemia.  6. Type II diabetes mellitus with hemoglobin A1c of 8.7.    7. Tobacco abuse.  8. Chronic obstructive pulmonary disease.  9. Hypokalemia, replaced.   10. Possible trigeminal neuralgia.   11. Oral candidiasis.       Plan:   No fever since the evening of 10/8. This was one-time issue.      He will eventually be for treatment with Dr. Dominguez and Dr. Marie. I discussed the case with Dr. Tompkins on the unit yesterday. He and Dr. Neal are looking  into having the patient's teeth pulled before discharge and this may happen today.        His blood pressure is generally acceptable. I actually decreased his lisinopril on 10/10.      Restarted Levemir at 10 units nightly and will continue at that dose for now.  He typically takes much more insulin than this, however, I feel that his insulin requirement will be much less now that he is on tube feeding.  Continue Accu-Cheks and sliding scale insulin for now.     Started on Tegretol on 10/10 as his symptoms sound most consistent with trigeminal neuralgia. Checked ESR for the possibility of temporal arteritis and it was only 43.        Clotrimazole siddharth and oral care.     Discharge Planning: Per ENT.     Alber Jain DO   10/11/17   9:42 AM

## 2017-10-11 NOTE — ANESTHESIA PREPROCEDURE EVALUATION
Anesthesia Evaluation     Patient summary reviewed   NPO Solid Status: > 8 hours       Airway   Mallampati: IV  TM distance: >3 FB  Neck ROM: limited  Dental    (+) poor dentition    Comment: Trach in place      Pulmonary - normal exam   (+) a smoker,   (-) COPD, asthma, sleep apnea    ROS comment: Trach in place  Cardiovascular   Exercise tolerance: good (4-7 METS)    ECG reviewed  Patient on routine beta blocker and Beta blocker not taken-may be given intraoperatively    (+) hypertension, CAD, CABG, cardiac stents (10/2016)   (-) pacemaker, angina    PE comment: PVCs    Neuro/Psych  (-) seizures, TIA, CVA  GI/Hepatic/Renal/Endo    (+)  GERD, diabetes mellitus using insulin,   (-) liver disease, no renal disease    Musculoskeletal     Abdominal    Substance History      OB/GYN          Other      history of cancer (OP cancer, SCC)                                    Anesthesia Plan    ASA 3     general   (Pt returns for DL with biopsy and teeth extraction. Trach in place. )  intravenous induction   Anesthetic plan and risks discussed with patient.

## 2017-10-11 NOTE — PLAN OF CARE
Problem: Patient Care Overview (Adult)  Goal: Plan of Care Review  Outcome: Ongoing (interventions implemented as appropriate)    10/11/17 1315   Coping/Psychosocial Response Interventions   Plan Of Care Reviewed With patient   Patient Care Overview   Progress no change         Problem: Perioperative Period (Adult)  Goal: Signs and Symptoms of Listed Potential Problems Will be Absent or Manageable (Perioperative Period)  Outcome: Ongoing (interventions implemented as appropriate)

## 2017-10-11 NOTE — THERAPY TREATMENT NOTE
Acute Care - Speech Language Pathology Treatment Note  [facility]     Patient Name: Sameer Tavarez  : 1955  MRN: [id number]  Today's Date: 10/11/2017         Admit Date: 10/5/2017  Patient attempted PMV but unable to exhale and became anxious. Discussed how PMV works, possible toleration with down size or cuffless. Patient for surgery today. Has PEG tube. Not eating by mouth. Possible radiation to start soon. Unsure when trach will be changed. Patient effectively writes to communicate.  Maribel Johnson, MS CCC-SLP 10/11/2017 1:02 PM     Visit Dx:      ICD-10-CM ICD-9-CM   1. Impaired mobility Z74.09 799.89   2. Oropharyngeal cancer C10.9 146.9   3. Poor dentition K08.9 525.9   4. Current smoker F17.200 305.1   5. Tobacco use Z72.0 305.1   6. 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   • Recio's esophagus with dysplasia   • Encounter for consultation   • Tracheostomy dependence   • Postoperative pain              Adult Rehabilitation Note       10/11/17 1015 10/10/17 1158 10/10/17 1034    Rehab Assessment/Intervention    Discipline  physical therapy assistant  -PIO physical therapy assistant  -PIO    Document Type   therapy note (daily note)  -PIO    Treatment Not Performed, Comment  with nsg  -PIO with nsg  -PIO    Recorded by  [PIO] Shani Hollins PTA [PIO] Shani Hollins PTA    Additional 1 Communication Treatment    Additional 1 Progress 0%  -KW      Comments: Additional 1 Patient attempted PMV but unable to exhale and became anxious.  Discussed how PMV works, possible toleration with down size or cuffless.  Patient for surgery today.  Has PEG tube.  Not eating by mouth.  Possible radiation to start soon.  Unsure when trach will be changed.  Patient effectively writes to communicate.  -KW      Recorded by [KW] Maribel Johnson MS CCC-SLP        10/09/17 1300 10/09/17 0826       Rehab  Assessment/Intervention    Discipline  physical therapy assistant  -PIO     Document Type  therapy note (daily note)  -PIO     Subjective Information  agree to therapy;complains of;pain  -PIO     Treatment Not Performed, Comment bathroom last 30 minutes!  -PIO      Precautions/Limitations  fall precautions;oxygen therapy device and L/min   trach,peg,  -PIO     Recorded by [PIO] Shani Hollins PTA [PIO] Shani Hollins PTA     Vital Signs    Pre Systolic BP Rehab  109  -PIO     Pre Treatment Diastolic BP  85  -PIO     Pretreatment Heart Rate (beats/min)  87  -PIO     Posttreatment Heart Rate (beats/min)  89  -PIO     Pre SpO2 (%)  96  -PIO     O2 Delivery Pre Treatment  supplemental O2   trach,10lpm  -PIO     Post SpO2 (%)  91  -PIO     O2 Delivery Post Treatment  supplemental O2   trach,10lpm  -PIO     Pre Patient Position  Supine  -PIO     Post Patient Position  Supine  -PIO     Recorded by  [PIO] Shani Hollins PTA     Pain Assessment    Pain Assessment  0-10  -PIO     Pain Score  6  -PIO     Pain Location  Generalized  -PIO     Pain Intervention(s)  Medication (See MAR);Ambulation/increased activity  -PIO     Response to Interventions  tolerated  -PIO     Recorded by  [PIO] Shani Hollins PTA     Bed Mobility, Assessment/Treatment    Bed Mob, Supine to Sit, [place]  verbal cues required;contact guard assist  -PIO     Bed Mob, Sit to Supine, [place]  contact guard assist  -PIO     Recorded by  [PIO] Shani Hollins PTA     Transfer Assessment/Treatment    Transfers, Sit-Stand [place]  verbal cues required;contact guard assist  -PIO     Transfers, Stand-Sit [place]  contact guard assist  -PIO     Transfer, Impairments  impaired balance  -PIO     Recorded by  [PIO] Shani Hollins PTA     Gait Assessment/Treatment    Gait, [place] Level  contact guard assist  -PIO     Gait, Assistive Device  straight cane  -PIO     Gait, Distance (Feet)  400  -PIO     Gait, Gait Pattern Analysis  swing-through gait  -PIO      Gait, Gait Deviations  jose m decreased;leans to the right;step length decreased;weight-shifting ability decreased  -PIO     Gait, Safety Issues  step length decreased;weight-shifting ability decreased;supplemental O2  -PIO     Gait, Impairments  strength decreased;impaired balance  -PIO     Gait, Comment  R lean, decreased time on L  -PIO     Recorded by  [PIO] Shani Hollins PTA     Balance Skills Training    Gait Balance-Level of Assistance  Contact guard  -PIO     Gait Balance Activities  --   emphasized wgt shifting to L.  -PIO     Recorded by  [PIO] Shani Hollins PTA     Therapy Exercises    Bilateral Lower Extremities  AROM:;20 reps;sitting;ankle pumps/circles;hip abduction/adduction;hip flexion;LAQ   all 2 sets of 10  -PIO     Recorded by  [PIO] Shani Hollins PTA     Positioning and Restraints    Pre-Treatment Position  in bed  -PIO     Post Treatment Position  bed   refused to sit in chair  -PIO     In Bed  notified nsg;supine;call light within reach;encouraged to call for assist;patient within staff view;side rails up x3;RUE elevated;LUE elevated  -PIO     Recorded by  [PIO] Shani Hollins PTA       User Key  (r) = Recorded By, (t) = Taken By, (c) = Cosigned By    Initials Name Effective Dates    PIO Sahni Hollins PTA 08/02/16 -     KW Maribel Johnson MS [facility]-SLP 08/02/16 -               IP SLP Goals       10/11/17 1258 10/09/17 1028       SLP- Additional Goal 1    Additional Goal 1- SLP, Date Established  10/09/17  -MB     Additional Goal 1- SLP, Time to Achieve  by discharge  -MB     Additional Goal 1- SLP, Activity Level  Patient will tolerate PMV placement for 15 minutes with no s/s of distress, pt will exhibit audible speech with PMV placement at 3 feet with 90% accuracy, pt will independently place and remove valve.  -MB     Additional Goal 1- SLP, Date Goal Reviewed 10/11/17  -KW      Additional Goal 1- SLP, Outcome  goal ongoing  -MB       User Key  (r) = Recorded By, (t) = Taken By, (c) =  Cosigned By    Initials Name Provider Type    ANGELIQUE Garcia CCC-SLP Speech and Language Pathologist    DAGOBERTO Johnson MS CCC-RADHA Speech and Language Pathologist          EDUCATION  The patient has been educated in the following areas:   Communication Impairment.    SLP Recommendation and Plan                               Plan of Care Review  Plan Of Care Reviewed With: patient  Progress: improving  Outcome Summary/Follow up Plan: Patient attempted PMV but unable to exhale and became anxious. Discussed how PMV works, possible toleration with down size or cuffless. Patient for surgery today. Has PEG tube. Not eating by mouth. Possible radiation to start soon. Unsure when trach will be changed. Patient effectively writes to communicate.         SLP Outcome Measures (last 72 hours)      SLP Outcome Measures       10/09/17 1000          SLP Outcome Measures    Outcome Measure Used? Adult NOMS  -MB      FCM Scores    FCM Chosen Voice Following Tracheostomy  -MB      Voice Following Tracheostomy FCM Score 3  -MB        User Key  (r) = Recorded By, (t) = Taken By, (c) = Cosigned By    Initials Name Effective Dates    EARL Etienne 08/02/16 -           Time Calculation:         Time Calculation- SLP       10/11/17 1300          Time Calculation- SLP    SLP Start Time 1015  -KW      SLP Stop Time 1045  -KW      SLP Time Calculation (min) 30 min  -KW      SLP Received On 10/11/17  -KW        User Key  (r) = Recorded By, (t) = Taken By, (c) = Cosigned By    Initials Name Provider Type    DAGOBERTO Johnson MS CCC-SLP Speech and Language Pathologist          Therapy Charges for Today     Code Description Service Date Service Provider Modifiers Qty    [id number]  ST TREATMENT SPEECH 2 10/11/2017 Maribel Johnson MS CCC-SLP GN 1          SLP G-Codes  Functional Limitations: Other Speech Language Pathology (Voice following tracheostomy)  Other Speech-Language Pathology Functional Limitation Current  Status (): At least 60 percent but less than 80 percent impaired, limited or restricted  Other Speech-Language Pathology Functional Limitation Goal Status (): At least 40 percent but less than 60 percent impaired, limited or restricted    Maribel Johnson MS CCC-SLP  10/11/2017

## 2017-10-11 NOTE — OP NOTE
OPERATIVE NOTE  10/11/2017    NAME: Sameer Tavarez    YOB: 1955  MRN: [id number]    PRE-OPERATIVE DIAGNOSIS:    Oropharyngeal cancer [C10.9]    POST-OPERATIVE DIAGNOSIS:   Post-Op Diagnosis Codes:     * Oropharyngeal cancer [C10.9]    PROCEDURE PERFORMED:   Full mouth extraction  Direct laryngoscopy      SURGEON:   Dr. Les Neal MD    ASSISTANT(S):   None    ANESTHESIA:   General Anesthesia via Endotracheal Tube    INDICATIONS: The patient is a 62 y.o. male with Oropharyngeal cancer [C10.9]    PROCEDURE:  The patient was brought to the operating room, given General Anesthesia via Endotracheal Tube, and prepped and draped in the usual manner.     2% Xylocaine with 1 100,000 epinephrine times a total of 10 mL was injected into the maxillary vestibular region as well as the mandibular bilateral inferior alveolar region.  Following this a #15 blade was utilized to make an incision extending from tooth #3) tooth #13 mucoperiosteal flap was then reflected an EKG and individual dental unit was luxated and removed that being numbers 368-1112 and 13.  Following the serum small maxillary exostoses were removed utilizing wrong tears.  Individual was evaluated and bite-block was removed and a and a sulcular releasing incision was made from the distal of tooth number 18 to the distal of #30.    Level collected each individual dental unit was luxated and removed utilizing forceps.  Following the several small exostosis were removed.  Utilized primary closure consisted of 3-0 chromic interlocking suture both the maxillary arch and mandibular arch.  Patient was thoroughly cleaned throat pack which had been placed initiation of the surgery was removed and the procedure was terminated     SPECIMENS:  A: Teeth    COMPLICATIONS: NONE    ESTIMATED BLOOD LOSS:  Minimal    Darin Neal MD  10/11/2017

## 2017-10-11 NOTE — ANESTHESIA POSTPROCEDURE EVALUATION
"Patient: Sameer Tavarez    Procedure Summary     Date Anesthesia Start Anesthesia Stop Room / Location    10/11/17 3295 0980  [facility] OR 02 / [facility] OR       Procedure Diagnosis Surgeon Provider    DIRECT LARYNGOSCOPY WITH BIOPSY (N/A ); TOOTH EXTRACTION (N/A Mouth) Oropharyngeal cancer  (Oropharyngeal cancer [C10.9]) MD Je Eason CRNA          Anesthesia Type: general  Last vitals  BP        Temp        Pulse       Resp        SpO2          Post Anesthesia Care and Evaluation    Patient location during evaluation: PACU  Patient participation: complete - patient participated  Level of consciousness: awake and alert  Pain score: 0  Pain management: adequate  Airway patency: patent  Anesthetic complications: No anesthetic complications  PONV Status: none  Cardiovascular status: acceptable and stable  Respiratory status: acceptable  Hydration status: acceptable    Comments: Blood pressure 131/67, pulse 86, temperature 99.7 °F (37.6 °C), temperature source Temporal Artery , resp. rate 21, height 66\" (167.6 cm), weight 189 lb (85.7 kg), SpO2 92 %.        "

## 2017-10-11 NOTE — PROGRESS NOTES
PROGRESS NOTE  Patient name: Sameer Tavarez  Patient : 1955  VISIT # [id number]  MR #[id number]    SUBJECTIVE:  He is resting okay.  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  He is presently admitted after having a laryngoscopy with biopsy of the right tonsil and base of tongue and tracheostomy placement on 10/5/2017 by Dr. Justin. The tracheostomy was placed due to concern of the stability of the airway, especially with the upcoming expected dental extractions and potential placement G-tube and Port-A-Cath.      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 He had recent complains of weight loss of about 15 pounds and difficulty swallowing.  · 2017-he underwent an upper endoscopy for follow-up on a history of Recio's esophagus. A oropharynx lesion was noted and biopsy was consistent with at least squamous cell carcinoma in situ. Status of invasion was indeterminate. P 16 was positive. He was referred to ENT.   · 2017-CT scan of the chest/abdomen/pelvis contrast showed a 1.1 cm lymph node adjacent to the distal esophagus. 2 small pulmonary nodules measuring 4 mm and 4.2 mm in diameter located in the right lower lobe and at right middle lobe respectively. Multiple solid noncalcified nodules smaller than 6 mm in diameter. A 4.9 x 2.5 x 3.3 cm abnormal soft tissue appearance in the right perineum of unknown significance. In addition, 1 x 1.5 cm sclerotic lesion in the left iliac wing laterally to the sacroiliac joints. 7 mm sclerosis the left iliac lateral to  the left SI joint. Sclerosis on the medial side of the right sacroiliac joints.   · 9/19/2017- he was first seen by us. He needs to be seen by ENT and have a biopsy repeated. Referral to Dr. Hadley Marie.   · 9/29/2017- PET scan, skull base to mid thigh revealed abnormal metabolic activity in th.  No other regions of abnormal metabolic activity were identified.  · 9/29/2017- MRI orbital face, neck revealed large right oropharyngeal mass measuring 6.7 x 4.6 x 4.6 cm with involvement of the right side of the base of the tongue.  Invasion of the superior margin of the right submandibular gland is noted.  No bony involvement is noted.  Scattered left jugular lymph nodes measure up to 1.2 x 0.6 cm.      Perineal mass-unknow etiology. PET scan to interrogate metabolic activity. We'll follow-up on this.      Multiple pulmonary nodules- according to radiology they are noncalcified nodules less than 6 mm and therefore no need for follow-up.      Smoking cessation counseling-  we have talked about the importance of quitting. Specifically, we discussed about the risks related to tobacco including but not limited to risk of several types of cancer, cardiovascular disease, stroke, bad dentition, financial loss and so on. I advised the patient quitting. I offered the patient resources such as nicotine patches or medications to help with the craving. The patient is contemplative at this time. We will follow-up on this during the next visit and continue to encourage on quitting this habit.     Laryngoscopy with biopsy of the right tonsil and base of tongue on 10/5/2017- Path consistent with invasive moderately differentiated squamous cell carcinoma that is p16 positive by IHC  - S/P tracheostomy on 10/5/2017  - Port placement to left subclavian on 10/6/2017    OBJECTIVE:    Vitals:    10/11/17 0354   BP: 125/68   Pulse: 73   Resp: 18   Temp: 97.8 °F (36.6 °C)   SpO2: 100%       Intake/Output Summary (Last 24 hours) at 10/11/17  0701  Last data filed at 10/11/17 0536   Gross per 24 hour   Intake              625 ml   Output              675 ml   Net              -50 ml       PHYSICAL EXAM:   CONSTITUTIONAL: Alert, appropriate, no acute distress  EYES: Non icteric, EOM intact, pupils equal round   ENT: Mucus membranes moist, no oral pharyngeal lesions   NECK: Trach in place  CHEST/LUNGS: CTA bilaterally, normal respiratory effort   CARDIOVASCULAR: RRR, no murmurs  ABDOMEN: soft non-tender, active bowel sounds, no HSM, PEG tube looks okay  EXTREMITIES: warm, moves all fours  SKIN: warm, dry with no rashes or lesions, port left chest wall  LYMPH: No cervical, clavicular, axillary, or inguinal lymphadenopathy  NEUROLOGIC: follows commands, non focal   PSYCH: mood and affect appropriate    CBC    Results from last 7 days  Lab Units 10/11/17  0452 10/10/17  0307 10/09/17  0554   WBC 10*3/mm3 9.91 9.96 9.03   HEMOGLOBIN g/dL 11.3* 11.1* 11.6*   HEMATOCRIT % 33.1* 32.4* 32.9*   PLATELETS 10*3/mm3 234 209 188         Lab Results   Component Value Date     10/11/2017    K 3.7 10/11/2017     10/11/2017    CO2 33.0 (H) 10/11/2017    BUN 16 10/11/2017    CREATININE 0.52 10/11/2017    GLUCOSE 110 (H) 10/11/2017    CALCIUM 8.3 (L) 10/11/2017    BILITOT 0.7 10/04/2017    ALKPHOS 172 (H) 10/04/2017    AST 66 (H) 10/04/2017     (H) 10/04/2017    GLOB 3.6 10/04/2017       No results found for: INR, PROTIME    Cultures:    No results found for: BLOODCX  No components found for: URINCX    ASSESSMENT/PLAN:  1.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  - MRI of neck on 9/29/2017 revealed right oropharyngeal soft tissue mass measuring approximately 6.7 x 4.6 4.6 cm with involvement of the right side of the base of tongue and invasion of the superior margin of the right submandibular gland.     - Dr. Darin Neal to perform full mouth extraction today at 2 PM     2. Dysphagia secondary to tumor  - PEG placement on 10/6/2017 by Dr. Jayne Phillips  - He tolerated tube feedings yesterday.  On hold since midnight for planned dental extraction today      Disposition: Continue with current plan of care. Anticipating teeth extraction today prior to initiation of radiation therapy.     We will discuss timing of chemotherapy/radiation therapy with JEWEL Gonzales    10/11/17  7:01 AM      I personally saw and examined this patient, performing a face-to-face diagnostic evaluation with plan of care reviewed and developed with  Juan Diego Marcum PA-C and nursing staff.   I have addended and/or modified the above history of present illness, physical examination, and assessment and plan to reflect my findings and impressions.   Essential elements of the care plan were discussed with  Juan Diego Marcum PA-C .   Agree with findings and assessment/plan as documented above.   Questions were encouraged, asked and answered to their understanding and satisfaction.    Sin Tompkins MD  10/11/2017 8:04 AM

## 2017-10-11 NOTE — PLAN OF CARE
Problem: Patient Care Overview (Adult)  Goal: Plan of Care Review  Outcome: Ongoing (interventions implemented as appropriate)    10/11/17 1634   Coping/Psychosocial Response Interventions   Plan Of Care Reviewed With patient   Patient Care Overview   Progress no change   Outcome Evaluation   Outcome Summary/Follow up Plan Patient went for tooth extraction today. Moderate sanguinous drainage. Gauze changed prn saturation. Medicated with dilaudid q 2 hours for head and throat pain. Trach collar 30%. #6 shiley trach at bedside for scheduled trach change tomorrow per order. No respiratory distress.        Goal: Adult Individualization and Mutuality  Outcome: Ongoing (interventions implemented as appropriate)    10/11/17 1634   Individualization   Patient Specific Preferences dilaudid q 2 hours   Patient Specific Goals control pain   Patient Specific Interventions trach care, pain med q 2 prn   Mutuality/Individual Preferences   What Anxieties, Fears or Concerns Do You Have About Your Health or Care? none   What Questions Do You Have About Your Health or Care? none   What Information Would Help Us Give You More Personalized Care? none       Goal: Discharge Needs Assessment  Outcome: Ongoing (interventions implemented as appropriate)    10/06/17 0958 10/07/17 1104 10/10/17 1505   Current Health   Anticipated Changes Related to Illness --  --  none   Discharge Needs Assessment   Concerns To Be Addressed care coordination/care conferences;adjustment to diagnosis/illness concerns;discharge planning concerns --  --    Readmission Within The Last 30 Days no previous admission in last 30 days --  --    Equipment Needed After Discharge oxygen;trach supplies --  --    Discharge Facility/Level Of Care Needs home with home health --  --    Current Discharge Risk chronically ill --  --    Living Environment   Transportation Available car;family or friend will provide --  --    Self-Care   Equipment Currently Used at Home --  cane,  straight --          Problem: Perioperative Period (Adult)  Goal: Signs and Symptoms of Listed Potential Problems Will be Absent or Manageable (Perioperative Period)  Outcome: Ongoing (interventions implemented as appropriate)    10/11/17 1634   Perioperative Period   Problems Assessed (Perioperative Period) all   Problems Present (Perioperative Period) pain         Problem: Fall Risk (Adult)  Goal: Absence of Falls  Outcome: Ongoing (interventions implemented as appropriate)    10/11/17 1634   Fall Risk (Adult)   Absence of Falls achieves outcome         Problem: Feeding Dysfunction/Swallowing Impairment (Pediatric)  Goal: Reduced Risk of Aspiration  Outcome: Ongoing (interventions implemented as appropriate)    10/11/17 1634   Feeding Dysfunction/Swallowing Impairment (Pediatric)   Reduced Risk of Aspiration making progress toward outcome       Goal: Adequate Nutrition and Hydration  Outcome: Ongoing (interventions implemented as appropriate)    10/11/17 1634   Feeding Dysfunction/Swallowing Impairment (Pediatric)   Adequate Nutrition and Hydration making progress toward outcome

## 2017-10-11 NOTE — PLAN OF CARE
Problem: Patient Care Overview (Adult)  Goal: Plan of Care Review  Outcome: Ongoing (interventions implemented as appropriate)    10/11/17 1223   Coping/Psychosocial Response Interventions   Plan Of Care Reviewed With patient   Patient Care Overview   Progress improving   Outcome Evaluation   Outcome Summary/Follow up Plan PT tx completed. Pt eager to work with therapy.Pt c.o weakness and generalized pain at a 9/10. Pt required supervision for bed mobility and SBA for transfers. Per pt request pt ambulate 500 x2 however required min assist at times due to LOB. Pt required constant cues for safety. Pt will continue to tobenefit from skilled PT o improve endurance and overall mobility.

## 2017-10-11 NOTE — THERAPY TREATMENT NOTE
Acute Care - Physical Therapy Treatment Note  [facility]     Patient Name: Sameer Tavarez  : 1955  MRN: [id number]  Today's Date: 10/11/2017  Onset of Illness/Injury or Date of Surgery Date: 10/05/17  Date of Referral to PT: 10/06/17  Referring Physician: Dr. Juarez    Admit Date: 10/5/2017    Visit Dx:    ICD-10-CM ICD-9-CM   1. Impaired mobility Z74.09 799.89   2. Oropharyngeal cancer C10.9 146.9   3. Poor dentition K08.9 525.9   4. Current smoker F17.200 305.1   5. Tobacco use Z72.0 305.1   6. 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   • Recio's esophagus with dysplasia   • Encounter for consultation   • Tracheostomy dependence   • Postoperative pain               Adult Rehabilitation Note       10/11/17 1140 10/11/17 1015 10/10/17 1158    Rehab Assessment/Intervention    Discipline physical therapy assistant  -TR  physical therapy assistant  -PIO    Document Type therapy note (daily note)  -TR      Subjective Information agree to therapy;complains of;weakness;fatigue;pain  -TR      Patient Effort, Rehab Treatment good  -TR      Treatment Not Performed, Comment   with nsg  -PIO    Precautions/Limitations fall precautions;oxygen therapy device and L/min   Trach, Peg   -TR      Recorded by [TR] Sarina Uriostegui PTA  [PIO] Shani Hollins PTA    Vital Signs    Intra SpO2 (%) 98  -TR      O2 Delivery Intra Treatment supplemental O2   10 L O2/NC  -TR      Recorded by [TR] Sarina Uriostegui PTA      Pain Assessment    Pain Assessment 0-10  -TR      Pain Score 9  -TR      Post Pain Score 9  -TR      Pain Type Acute pain;Chronic pain  -TR      Pain Location Generalized  -TR      Pain Descriptors Aching  -TR      Pain Frequency Constant/continuous  -TR      Pain Intervention(s) Ambulation/increased activity  -TR      Response to Interventions tolerated  -TR      Recorded by [TR] Sarina  Ligia Uriostegui PTA      Cognitive Assessment/Intervention    Current Cognitive/Communication Assessment functional  -TR      Orientation Status oriented to;person;place;time  -TR      Follows Commands/Answers Questions able to follow multi-step instructions;100% of the time  -TR      Personal Safety decreased awareness, need for assist;decreased awareness, need for safety  -TR      Personal Safety Interventions fall prevention program maintained;gait belt;nonskid shoes/slippers when out of bed  -TR      Recorded by [TR] Sarina Uriostegui PTA      Additional 1 Communication Treatment    Additional 1 Progress  0%  -KW     Comments: Additional 1  Patient attempted PMV but unable to exhale and became anxious.  Discussed how PMV works, possible toleration with down size or cuffless.  Patient for surgery today.  Has PEG tube.  Not eating by mouth.  Possible radiation to start soon.  Unsure when trach will be changed.  Patient effectively writes to communicate.  -KW     Recorded by  [KW] Maribel Johnson MS CCC-SLP     Bed Mobility, Assessment/Treatment    Bed Mobility, Assistive Device bed rails;head of bed elevated  -TR      Bed Mob, Supine to Sit, [place] supervision required  -TR      Bed Mob, Sit to Supine, [place] supervision required  -TR      Bed Mobility, Impairments strength decreased  -TR      Recorded by [TR] Sarina Uriostegui PTA      Transfer Assessment/Treatment    Transfers, Sit-Stand [place] verbal cues required;stand by assist  -TR      Transfers, Stand-Sit [place] verbal cues required;stand by assist  -TR      Transfer, Impairments strength decreased  -TR      Recorded by [TR] Sarina Uriostegui PTA      Gait Assessment/Treatment    Gait, [place] Level verbal cues required;contact guard assist;minimum assist (75% patient effort)  -TR      Gait, Assistive Device straight cane  -TR      Gait, Distance (Feet) 500   X2  -TR      Gait, Gait Pattern Analysis swing-through gait   -TR      Gait, Gait Deviations jose m decreased;bilateral:;step length decreased;narrow base  -TR      Gait, Safety Issues step length decreased;balance decreased during turns  -TR      Gait, Impairments strength decreased;impaired balance  -TR      Gait, Comment 3 LOB requiring min assist, pt requesting to walk this far, no SOB or signs f increased fatigue  -TR      Recorded by [TR] Sarina Uriostegui PTA      Balance Skills Training    Sitting-Level of Assistance Close supervision  -TR      Sitting-Balance Support Feet supported  -TR      Standing-Level of Assistance Contact guard  -TR      Static Standing Balance Support assistive device  -TR      Gait Balance-Level of Assistance Contact guard;Minimum assistance  -TR      Gait Balance Support assistive device  -TR      Recorded by [TR] Sarina Uriostegui PTA      Positioning and Restraints    Pre-Treatment Position in bed  -TR      Post Treatment Position bed  -TR      In Bed fowlers;call light within reach;encouraged to call for assist;with family/caregiver;side rails up x2;notified nsg  -TR      Recorded by [TR] Sarina Uriostegui PTA        10/10/17 1034 10/09/17 1300 10/09/17 0826    Rehab Assessment/Intervention    Discipline physical therapy assistant  -PIO  physical therapy assistant  -PIO    Document Type therapy note (daily note)  -PIO  therapy note (daily note)  -PIO    Subjective Information   agree to therapy;complains of;pain  -PIO    Treatment Not Performed, Comment with nsg  -PIO bathroom last 30 minutes!  -PIO     Precautions/Limitations   fall precautions;oxygen therapy device and L/min   trach,peg,  -PIO    Recorded by [PIO] Shani Hollins PTA [PIO] Shani Hollins PTA [PIO] Shani Hollins PTA    Vital Signs    Pre Systolic BP Rehab   109  -PIO    Pre Treatment Diastolic BP   85  -PIO    Pretreatment Heart Rate (beats/min)   87  -PIO    Posttreatment Heart Rate (beats/min)   89  -PIO    Pre SpO2 (%)   96  -PIO    O2 Delivery Pre Treatment    supplemental O2   trach,10lpm  -PIO    Post SpO2 (%)   91  -PIO    O2 Delivery Post Treatment   supplemental O2   trach,10lpm  -PIO    Pre Patient Position   Supine  -PIO    Post Patient Position   Supine  -PIO    Recorded by   [PIO] Shani Hollins PTA    Pain Assessment    Pain Assessment   0-10  -PIO    Pain Score   6  -PIO    Pain Location   Generalized  -PIO    Pain Intervention(s)   Medication (See MAR);Ambulation/increased activity  -PIO    Response to Interventions   tolerated  -PIO    Recorded by   [PIO] Shani Hollins PTA    Bed Mobility, Assessment/Treatment    Bed Mob, Supine to Sit, [place]   verbal cues required;contact guard assist  -PIO    Bed Mob, Sit to Supine, [place]   contact guard assist  -PIO    Recorded by   [PIO] Shani Hollins PTA    Transfer Assessment/Treatment    Transfers, Sit-Stand [place]   verbal cues required;contact guard assist  -PIO    Transfers, Stand-Sit [place]   contact guard assist  -PIO    Transfer, Impairments   impaired balance  -PIO    Recorded by   [PIO] Shani Hollins PTA    Gait Assessment/Treatment    Gait, [place] Level   contact guard assist  -PIO    Gait, Assistive Device   straight cane  -PIO    Gait, Distance (Feet)   400  -PIO    Gait, Gait Pattern Analysis   swing-through gait  -PIO    Gait, Gait Deviations   jose m decreased;leans to the right;step length decreased;weight-shifting ability decreased  -PIO    Gait, Safety Issues   step length decreased;weight-shifting ability decreased;supplemental O2  -PIO    Gait, Impairments   strength decreased;impaired balance  -POI    Gait, Comment   R lean, decreased time on L  -PIO    Recorded by   [PIO] Shani Hollins PTA    Balance Skills Training    Gait Balance-Level of Assistance   Contact guard  -PIO    Gait Balance Activities   --   emphasized wgt shifting to L.  -PIO    Recorded by   [PIO] Shani Hollins PTA    Therapy Exercises    Bilateral Lower Extremities   AROM:;20 reps;sitting;ankle  pumps/circles;hip abduction/adduction;hip flexion;LAQ   all 2 sets of 10  -PIO    Recorded by   [PIO] Shani Hollins PTA    Positioning and Restraints    Pre-Treatment Position   in bed  -PIO    Post Treatment Position   bed   refused to sit in chair  -PIO    In Bed   notified nsg;supine;call light within reach;encouraged to call for assist;patient within staff view;side rails up x3;RUE elevated;LUE elevated  -PIO    Recorded by   [PIO] Shani Hollins PTA      User Key  (r) = Recorded By, (t) = Taken By, (c) = Cosigned By    Initials Name Effective Dates    PIO Shani Hollins, PTA 08/02/16 -     KW Maribel Johnson, MS CCC-SLP 08/02/16 -     TR Sarina Uriostegui, PTA 08/02/16 -                 IP PT Goals       10/07/17 1104          Bed Mobility PT LTG    Bed Mobility PT LTG, Date Established 10/07/17  -JOANN      Bed Mobility PT LTG, Time to Achieve by discharge  -JOANN      Bed Mobility PT LTG, Activity Type all bed mobility  -JOANN      Bed Mobility PT LTG, [place] Level supervision required  -JOANN      Bed Mobility PT Goal  LTG, Assist Device bed rails  -JOANN      Transfer Training PT LTG    Transfer Training PT LTG, Date Established 10/07/17  -JOANN      Transfer Training PT LTG, Time to Achieve by discharge  -JOANN      Transfer Training PT LTG, Activity Type bed to chair /chair to bed;sit to stand/stand to sit  -JOANN      Transfer Training PT LTG, [place] Level supervision required  -JOANN      Transfer Training PT LTG, Assist Device cane, straight  -JOANN      Gait Training PT LTG    Gait Training Goal PT LTG, Date Established 10/07/17  -JOANN      Gait Training Goal PT LTG, Time to Achieve by discharge  -JOANN      Gait Training Goal PT LTG, [place] Level supervision required  -JOANN      Gait Training Goal PT LTG, Assist Device cane, straight  -JOANN      Gait Training Goal PT LTG, Distance to Achieve 150  -JOANN      Stair Training PT LTG    Stair Training Goal PT LTG, Date Established 10/07/17  -JOANN      Stair Training Goal PT  LTG, Time to Achieve by discharge  -JOANN      Stair Training Goal PT LTG, Number of Steps 4  -JOANN      Stair Training Goal PT LTG, [place] Level supervision required  -JOANN        User Key  (r) = Recorded By, (t) = Taken By, (c) = Cosigned By    Initials Name Provider Type    JOANN Montoya, PT DPT Physical Therapist          Physical Therapy Education     Title: PT OT SLP Therapies (Active)     Topic: Physical Therapy (Active)     Point: Mobility training (Done)    Learning Progress Summary    Learner Readiness Method Response Comment Documented by Status   Patient Acceptance E VU,DU BOA, POC, safety with gait,, balance training for gait TR 10/11/17 1332 Done    Acceptance E,D NR Wgt shifting during ambulation PIO 10/09/17 0904 Active    Acceptance E VU  NT 10/08/17 1620 Done    Acceptance E VU,NR Educated pt./spouse on progression of PT POC and benefits of activity JOANN 10/07/17 1104 Done   Family Acceptance E VU  NT 10/08/17 1620 Done   Significant Other Acceptance E VU,NR Educated pt./spouse on progression of PT POC and benefits of activity JOANN 10/07/17 1104 Done                      User Key     Initials Effective Dates Name Provider Type Discipline    JOANN 08/02/16 -  Josué Montoya, PT DPT Physical Therapist PT    PIO 08/02/16 -  Shani Hollins, PAULINO Physical Therapy Assistant PT    NT 08/02/16 -  Shabana Stanley, RN Registered Nurse Nurse    TR 08/02/16 -  Sarina Uriostegui, PAULINO Physical Therapy Assistant PT                    PT Recommendation and Plan  Anticipated Discharge Disposition: home with assist, home with home health, home with /7 care  Planned Therapy Interventions: bed mobility training, transfer training, gait training, balance training, home exercise program, patient/family education, postural re-education, stair training, strengthening  PT Frequency: daily, 2 times/day  Plan of Care Review  Plan Of Care Reviewed With: patient  Progress: improving  Outcome Summary/Follow up Plan: PT tx  completed. Pt eager to work with therapy.Pt c.o weakness and generalized pain at a 9/10. Pt required supervision for bed mobility and SBA for transfers. Per pt request pt ambulate 500 x2 however required min assist at times due to LOB. Pt required constant cues for safety. Pt will continue to ebenfit from skilld PT o imptove endurance andoevrallmobility.          Outcome Measures       10/11/17 1140 10/09/17 0900       How much help from another person do you currently need...    Turning from your back to your side while in flat bed without using bedrails? 3  -TR 3  -PIO     Moving from lying on back to sitting on the side of a flat bed without bedrails? 3  -TR 3  -PIO     Moving to and from a bed to a chair (including a wheelchair)? 3  -TR 3  -PIO     Standing up from a chair using your arms (e.g., wheelchair, bedside chair)? 3  -TR 3  -PIO     Climbing 3-5 steps with a railing? 3  -TR 3  -PIO     To walk in hospital room? 3  -TR 3  -PIO     AM-PAC 6 Clicks Score 18  -TR 18  -PIO     Functional Assessment    Outcome Measure Options AM-PAC 6 Clicks Basic Mobility (PT)  -TR        User Key  (r) = Recorded By, (t) = Taken By, (c) = Cosigned By    Initials Name Provider Type    PIO Hollins PTA Physical Therapy Assistant    JL Uriostegui PTA Physical Therapy Assistant           Time Calculation:         PT Charges       10/11/17 1140          Time Calculation    Start Time 1140  -TR      Stop Time 1223  -TR      Time Calculation (min) 43 min  -TR      PT Received On 10/11/17  -TR      PT Goal Re-Cert Due Date 10/17/17  -TR      Time Calculation- PT    Total Timed Code Minutes- PT 43 minute(s)  -TR        User Key  (r) = Recorded By, (t) = Taken By, (c) = Cosigned By    Initials Name Provider Type    JL Uriostegui PTA Physical Therapy Assistant              PT G-Codes  Outcome Measure Options: AM-PAC 6 Clicks Basic Mobility (PT)  Score: 16  Functional Limitation: Mobility: Walking and moving  around  Mobility: Walking and Moving Around Current Status (): At least 40 percent but less than 60 percent impaired, limited or restricted  Mobility: Walking and Moving Around Goal Status (): At least 1 percent but less than 20 percent impaired, limited or restricted    Sarina Uriostegui, PTA  10/11/2017

## 2017-10-11 NOTE — PLAN OF CARE
Problem: Patient Care Overview (Adult)  Goal: Plan of Care Review  Outcome: Ongoing (interventions implemented as appropriate)    10/11/17 1258   Coping/Psychosocial Response Interventions   Plan Of Care Reviewed With patient   Patient Care Overview   Progress improving   Outcome Evaluation   Outcome Summary/Follow up Plan Patient attempted PMV but unable to exhale and became anxious. Discussed how PMV works, possible toleration with down size or cuffless. Patient for surgery today. Has PEG tube. Not eating by mouth. Possible radiation to start soon. Unsure when trach will be changed. Patient effectively writes to communicate.         Problem: Inpatient SLP  Goal: SLP Additional Goal 1  Outcome: Ongoing (interventions implemented as appropriate)    10/09/17 1028 10/11/17 1258   SLP- Additional Goal 1   Additional Goal 1- SLP, Date Established 10/09/17 --    Additional Goal 1- SLP, Time to Achieve by discharge --    Additional Goal 1- SLP, Activity Level Patient will tolerate PMV placement for 15 minutes with no s/s of distress, pt will exhibit audible speech with PMV placement at 3 feet with 90% accuracy, pt will independently place and remove valve. --    Additional Goal 1- SLP, Date Goal Reviewed --  10/11/17   Additional Goal 1- SLP, Outcome goal ongoing --

## 2017-10-12 LAB
GLUCOSE BLDC GLUCOMTR-MCNC: 135 MG/DL (ref 70–130)
GLUCOSE BLDC GLUCOMTR-MCNC: 144 MG/DL (ref 70–130)
GLUCOSE BLDC GLUCOMTR-MCNC: 183 MG/DL (ref 70–130)
GLUCOSE BLDC GLUCOMTR-MCNC: 202 MG/DL (ref 70–130)

## 2017-10-12 PROCEDURE — 99232 SBSQ HOSP IP/OBS MODERATE 35: CPT | Performed by: PHYSICIAN ASSISTANT

## 2017-10-12 PROCEDURE — 94799 UNLISTED PULMONARY SVC/PX: CPT

## 2017-10-12 PROCEDURE — 97110 THERAPEUTIC EXERCISES: CPT

## 2017-10-12 PROCEDURE — 63710000001 INSULIN LISPRO (HUMAN) PER 5 UNITS: Performed by: INTERNAL MEDICINE

## 2017-10-12 PROCEDURE — 92507 TX SP LANG VOICE COMM INDIV: CPT | Performed by: SPEECH-LANGUAGE PATHOLOGIST

## 2017-10-12 PROCEDURE — 97116 GAIT TRAINING THERAPY: CPT

## 2017-10-12 PROCEDURE — 63710000001 INSULIN DETEMIR PER 5 UNITS: Performed by: INTERNAL MEDICINE

## 2017-10-12 PROCEDURE — 25010000002 HYDROMORPHONE PER 4 MG: Performed by: FAMILY MEDICINE

## 2017-10-12 PROCEDURE — 82962 GLUCOSE BLOOD TEST: CPT

## 2017-10-12 RX ORDER — GUAR GUM
1 PACKET (EA) ORAL 2 TIMES DAILY
Status: DISCONTINUED | OUTPATIENT
Start: 2017-10-12 | End: 2017-10-13 | Stop reason: HOSPADM

## 2017-10-12 RX ORDER — CARBAMAZEPINE 100 MG/1
200 TABLET, CHEWABLE ORAL EVERY 12 HOURS SCHEDULED
Status: DISCONTINUED | OUTPATIENT
Start: 2017-10-12 | End: 2017-10-13 | Stop reason: HOSPADM

## 2017-10-12 RX ADMIN — SUCRALFATE 1 G: 1 SUSPENSION ORAL at 11:12

## 2017-10-12 RX ADMIN — CARBAMAZEPINE 200 MG: 100 TABLET, CHEWABLE ORAL at 20:43

## 2017-10-12 RX ADMIN — OXYCODONE HYDROCHLORIDE AND ACETAMINOPHEN 1 TABLET: 7.5; 325 TABLET ORAL at 18:04

## 2017-10-12 RX ADMIN — LISINOPRIL 5 MG: 5 TABLET ORAL at 09:17

## 2017-10-12 RX ADMIN — FUROSEMIDE 40 MG: 40 TABLET ORAL at 18:04

## 2017-10-12 RX ADMIN — BACLOFEN 20 MG: 10 TABLET ORAL at 18:04

## 2017-10-12 RX ADMIN — CHLORHEXIDINE GLUCONATE 15 ML: 1.2 RINSE ORAL at 09:16

## 2017-10-12 RX ADMIN — CARBAMAZEPINE 100 MG: 100 TABLET, CHEWABLE ORAL at 09:17

## 2017-10-12 RX ADMIN — HYDROMORPHONE HYDROCHLORIDE 1 MG: 1 INJECTION, SOLUTION INTRAMUSCULAR; INTRAVENOUS; SUBCUTANEOUS at 00:03

## 2017-10-12 RX ADMIN — HYDROMORPHONE HYDROCHLORIDE 1 MG: 1 INJECTION, SOLUTION INTRAMUSCULAR; INTRAVENOUS; SUBCUTANEOUS at 02:15

## 2017-10-12 RX ADMIN — INSULIN LISPRO 4 UNITS: 100 INJECTION, SOLUTION INTRAVENOUS; SUBCUTANEOUS at 09:16

## 2017-10-12 RX ADMIN — CLOTRIMAZOLE 10 MG: 10 LOZENGE ORAL; TOPICAL at 18:04

## 2017-10-12 RX ADMIN — HYDROMORPHONE HYDROCHLORIDE 1 MG: 1 INJECTION, SOLUTION INTRAMUSCULAR; INTRAVENOUS; SUBCUTANEOUS at 05:24

## 2017-10-12 RX ADMIN — SUCRALFATE 1 G: 1 SUSPENSION ORAL at 18:05

## 2017-10-12 RX ADMIN — CLINDAMYCIN PHOSPHATE 600 MG: 12 INJECTION, SOLUTION INTRAVENOUS at 13:02

## 2017-10-12 RX ADMIN — HYDROMORPHONE HYDROCHLORIDE 1 MG: 1 INJECTION, SOLUTION INTRAMUSCULAR; INTRAVENOUS; SUBCUTANEOUS at 20:15

## 2017-10-12 RX ADMIN — FAMOTIDINE 20 MG: 40 POWDER, FOR SUSPENSION ORAL at 09:16

## 2017-10-12 RX ADMIN — METOPROLOL TARTRATE 50 MG: 50 TABLET, FILM COATED ORAL at 18:05

## 2017-10-12 RX ADMIN — Medication 81 MG: at 09:17

## 2017-10-12 RX ADMIN — METOPROLOL TARTRATE 50 MG: 50 TABLET, FILM COATED ORAL at 09:16

## 2017-10-12 RX ADMIN — HYDROMORPHONE HYDROCHLORIDE 1 MG: 1 INJECTION, SOLUTION INTRAMUSCULAR; INTRAVENOUS; SUBCUTANEOUS at 07:28

## 2017-10-12 RX ADMIN — Medication 1 PACKET: at 18:09

## 2017-10-12 RX ADMIN — IPRATROPIUM BROMIDE AND ALBUTEROL SULFATE 3 ML: 2.5; .5 SOLUTION RESPIRATORY (INHALATION) at 20:11

## 2017-10-12 RX ADMIN — CLOTRIMAZOLE 10 MG: 10 LOZENGE ORAL; TOPICAL at 11:12

## 2017-10-12 RX ADMIN — METHYLNALTREXONE BROMIDE 8 MG: 12 INJECTION, SOLUTION SUBCUTANEOUS at 09:16

## 2017-10-12 RX ADMIN — HYDROMORPHONE HYDROCHLORIDE 1 MG: 1 INJECTION, SOLUTION INTRAMUSCULAR; INTRAVENOUS; SUBCUTANEOUS at 14:52

## 2017-10-12 RX ADMIN — CLINDAMYCIN PHOSPHATE 600 MG: 12 INJECTION, SOLUTION INTRAVENOUS at 05:13

## 2017-10-12 RX ADMIN — BACLOFEN 20 MG: 10 TABLET ORAL at 20:44

## 2017-10-12 RX ADMIN — INSULIN DETEMIR 12 UNITS: 100 INJECTION, SOLUTION SUBCUTANEOUS at 20:43

## 2017-10-12 RX ADMIN — FAMOTIDINE 20 MG: 40 POWDER, FOR SUSPENSION ORAL at 18:04

## 2017-10-12 RX ADMIN — INSULIN LISPRO 2 UNITS: 100 INJECTION, SOLUTION INTRAVENOUS; SUBCUTANEOUS at 18:04

## 2017-10-12 RX ADMIN — IPRATROPIUM BROMIDE AND ALBUTEROL SULFATE 3 ML: 2.5; .5 SOLUTION RESPIRATORY (INHALATION) at 10:54

## 2017-10-12 RX ADMIN — IPRATROPIUM BROMIDE AND ALBUTEROL SULFATE 3 ML: 2.5; .5 SOLUTION RESPIRATORY (INHALATION) at 07:08

## 2017-10-12 RX ADMIN — HYDROMORPHONE HYDROCHLORIDE 1 MG: 1 INJECTION, SOLUTION INTRAMUSCULAR; INTRAVENOUS; SUBCUTANEOUS at 09:53

## 2017-10-12 RX ADMIN — OXYCODONE HYDROCHLORIDE AND ACETAMINOPHEN 1 TABLET: 7.5; 325 TABLET ORAL at 11:12

## 2017-10-12 RX ADMIN — IPRATROPIUM BROMIDE AND ALBUTEROL SULFATE 3 ML: 2.5; .5 SOLUTION RESPIRATORY (INHALATION) at 14:32

## 2017-10-12 RX ADMIN — FUROSEMIDE 40 MG: 40 TABLET ORAL at 09:16

## 2017-10-12 RX ADMIN — CLOTRIMAZOLE 10 MG: 10 LOZENGE ORAL; TOPICAL at 13:02

## 2017-10-12 RX ADMIN — SUCRALFATE 1 G: 1 SUSPENSION ORAL at 05:13

## 2017-10-12 RX ADMIN — SUCRALFATE 1 G: 1 SUSPENSION ORAL at 00:03

## 2017-10-12 RX ADMIN — TAMSULOSIN HYDROCHLORIDE 0.4 MG: 0.4 CAPSULE ORAL at 20:43

## 2017-10-12 RX ADMIN — BACLOFEN 20 MG: 10 TABLET ORAL at 09:17

## 2017-10-12 RX ADMIN — POTASSIUM CHLORIDE 20 MEQ: 20 SOLUTION ORAL at 09:16

## 2017-10-12 NOTE — PROGRESS NOTES
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  He is presently admitted after having a laryngoscopy with biopsy of the right tonsil and base of tongue and tracheostomy placement on 10/5/2017 by Dr. Justin. The tracheostomy was placed due to concern of the stability of the airway, especially with the upcoming expected dental extractions and potential placement G-tube and Port-A-Cath.      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 He had recent complains of weight loss of about 15 pounds and difficulty swallowing.  · 2017-he underwent an upper endoscopy for follow-up on a history of Recio's esophagus. A oropharynx lesion was noted and biopsy was consistent with at least squamous cell carcinoma in situ. Status of invasion was indeterminate. P 16 was positive. He was referred to ENT.   · 2017-CT scan of the chest/abdomen/pelvis contrast showed a 1.1 cm lymph node adjacent to the distal esophagus. 2 small pulmonary nodules measuring 4 mm and 4.2 mm in diameter located in the right lower lobe and at right middle lobe respectively. Multiple solid noncalcified nodules smaller than 6 mm in diameter. A 4.9 x 2.5 x 3.3 cm abnormal soft tissue appearance in the right perineum of unknown significance. In addition, 1 x 1.5 cm sclerotic lesion in the left iliac wing laterally to the sacroiliac joints. 7 mm sclerosis the left iliac lateral to the left SI joint. Sclerosis on the  medial side of the right sacroiliac joints.   · 9/19/2017- he was first seen by us. He needs to be seen by ENT and have a biopsy repeated. Referral to Dr. Hadley Marie.   · 9/29/2017- PET scan, skull base to mid thigh revealed abnormal metabolic activity in th.  No other regions of abnormal metabolic activity were identified.  · 9/29/2017- MRI orbital face, neck revealed large right oropharyngeal mass measuring 6.7 x 4.6 x 4.6 cm with involvement of the right side of the base of the tongue.  Invasion of the superior margin of the right submandibular gland is noted.  No bony involvement is noted.  Scattered left jugular lymph nodes measure up to 1.2 x 0.6 cm.      Perineal mass-unknow etiology.     PET scan on 9/29/2017 - only metabolic activity in the base of the tongue on the right extending into the palatine tonsil with an SUV of 8.8.    MRI of neck on 9/29/2017 - right oropharyngeal soft tissue mass measuring approximately 6.7 x 4.6 4.6 cm with involvement of the right side of the base of tongue and invasion of the superior margin of the right submandibular gland.      Multiple pulmonary nodules- according to radiology they are noncalcified nodules less than 6 mm and therefore no need for follow-up.      Smoking cessation counseling-  we have talked about the importance of quitting. Specifically, we discussed about the risks related to tobacco including but not limited to risk of several types of cancer, cardiovascular disease, stroke, bad dentition, financial loss and so on. I advised the patient quitting. I offered the patient resources such as nicotine patches or medications to help with the craving. The patient is contemplative at this time. We will follow-up on this during the next visit and continue to encourage on quitting this habit.     Laryngoscopy with biopsy of the right tonsil and base of tongue on 10/5/2017- Path consistent with invasive moderately differentiated squamous cell carcinoma that is p16  positive by IHC  - S/P tracheostomy on 10/5/2017  - Port placement to left subclavian on 10/6/2017  - PEG placement on 10/6/2017 by Dr. Jayne Phillips    - Dr. Darin Neal performed full dental extraction 10/11/2017      OBJECTIVE:    Vitals:    10/12/17 0406   BP: 114/61   Pulse: 84   Resp: 18   Temp: 98.5 °F (36.9 °C)   SpO2: 92%       Intake/Output Summary (Last 24 hours) at 10/12/17 0703  Last data filed at 10/12/17 0543   Gross per 24 hour   Intake             1637 ml   Output             1225 ml   Net              412 ml       PHYSICAL EXAM:   CONSTITUTIONAL: Alert, appropriate, no acute distress  EYES: Non icteric, EOM intact, pupils equal round   ENT: No overt bleeding  NECK: Trach in place  CHEST/LUNGS: CTA bilaterally, normal respiratory effort   CARDIOVASCULAR: RRR, no murmurs  ABDOMEN: soft non-tender, active bowel sounds, no HSM, PEG tube looks okay  EXTREMITIES: warm, moves all fours  SKIN: warm, dry with no rashes or lesions, port left chest wall  NEUROLOGIC: follows commands, non focal   PSYCH: mood and affect appropriate    CBC    Results from last 7 days  Lab Units 10/11/17  0452 10/10/17  0307 10/09/17  0554   WBC 10*3/mm3 9.91 9.96 9.03   HEMOGLOBIN g/dL 11.3* 11.1* 11.6*   HEMATOCRIT % 33.1* 32.4* 32.9*   PLATELETS 10*3/mm3 234 209 188         Lab Results   Component Value Date     10/11/2017    K 3.7 10/11/2017     10/11/2017    CO2 33.0 (H) 10/11/2017    BUN 16 10/11/2017    CREATININE 0.52 10/11/2017    GLUCOSE 110 (H) 10/11/2017    CALCIUM 8.3 (L) 10/11/2017    BILITOT 0.7 10/04/2017    ALKPHOS 172 (H) 10/04/2017    AST 66 (H) 10/04/2017     (H) 10/04/2017    GLOB 3.6 10/04/2017       No results found for: INR, PROTIME    Cultures:    No results found for: BLOODCX  No components found for: URINCX    ASSESSMENT/PLAN:  1.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  - MRI of neck on 9/29/2017 revealed right oropharyngeal soft tissue mass measuring approximately 6.7 x 4.6 4.6 cm with involvement of the right side of the base of tongue and invasion of the superior margin of the right submandibular gland.     - Dr. Darin Neal performed full dental extraction 10/11/2017     2. Dysphagia secondary to tumor  - PEG placement on 10/6/2017 by Dr. Jayne Phillips  - He is tolerating tube feedings.      Disposition: Continue with current plan of care.     Okay with us to discharge.  He has follow-up arranged for 10/16 at 1 PM to start chemotherapy at the office.      JEWEL Pacheco    10/12/17  7:03 AM    Discussed with Dr FRANCISCA Neal  Discussed all events of hospitalization with Dr Dominguez yesterday  He will start weekly Carbo/Taxol when Sameer is stable as an outpatient.  XRT to follow down the road     Will sign off - Call if needed     I personally saw and examined this patient, performing a face-to-face diagnostic evaluation with plan of care reviewed and developed with  Juan Diego Marcum PA-C and nursing staff.   I have addended and/or modified the above history of present illness, physical examination, and assessment and plan to reflect my findings and impressions.   Essential elements of the care plan were discussed with  Juan Diego Marcum PA-C .   Agree with findings and assessment/plan as documented above.   Questions were encouraged, asked and answered to their understanding and satisfaction.    Sin Tompkins MD  10/12/2017 8:04 AM

## 2017-10-12 NOTE — THERAPY TREATMENT NOTE
Acute Care - Speech Language Pathology Treatment Note  [facility]     Patient Name: Sameer Tavarez  : 1955  MRN: [id number]  Today's Date: 10/12/2017         Admit Date: 10/5/2017  Patient tolerated PMV for 30 minutes. Some decreased voicing but overall improved during time with valve on. Patinet able to remove independently but had difficulty placing. Trach now shiley non cuffed size 6. He is aware to wear PMV only with SLP or MD for now. He is aware to not wear with sleeping or breathing treatment.  Maribel Johnson, MS CCC-SLP 10/12/2017 10:28 AM    Visit Dx:      ICD-10-CM ICD-9-CM   1. Impaired mobility Z74.09 799.89   2. Oropharyngeal cancer C10.9 146.9   3. Poor dentition K08.9 525.9   4. Current smoker F17.200 305.1   5. Tobacco use Z72.0 305.1   6. 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   • Recio's esophagus with dysplasia   • Encounter for consultation   • Tracheostomy dependence   • Postoperative pain              Adult Rehabilitation Note       10/12/17 1000 10/11/17 1140 10/11/17 1015    Rehab Assessment/Intervention    Discipline speech language pathologist  -KW physical therapy assistant  -TR     Document Type therapy note (daily note)  -KW therapy note (daily note)  -TR     Subjective Information no complaints  -KW agree to therapy;complains of;weakness;fatigue;pain  -TR     Patient Effort, Rehab Treatment excellent  -KW good  -TR     Precautions/Limitations  fall precautions;oxygen therapy device and L/min   Trach, Peg   -TR     Recorded by [KW] Maribel Johnson, MS CCC-SLP [TR] Sarina Uriostegui PTA     Vital Signs    Intra SpO2 (%)  98  -TR     O2 Delivery Intra Treatment  supplemental O2   10 L O2/NC  -TR     Recorded by  [TR] Sarina Uriostegui PTA     Pain Assessment    Pain Assessment  0-10  -TR     Pain Score  9  -TR     Post Pain Score  9  -TR     Pain Type   Acute pain;Chronic pain  -TR     Pain Location  Generalized  -TR     Pain Descriptors  Aching  -TR     Pain Frequency  Constant/continuous  -TR     Pain Intervention(s)  Ambulation/increased activity  -TR     Response to Interventions  tolerated  -TR     Recorded by  [TR] Sarina Uriostegui PTA     Cognitive Assessment/Intervention    Current Cognitive/Communication Assessment  functional  -TR     Orientation Status  oriented to;person;place;time  -TR     Follows Commands/Answers Questions  able to follow multi-step instructions;100% of the time  -TR     Personal Safety  decreased awareness, need for assist;decreased awareness, need for safety  -TR     Personal Safety Interventions  fall prevention program maintained;gait belt;nonskid shoes/slippers when out of bed  -TR     Recorded by  [TR] Sarina Uriostegui PTA     Additional 1 Communication Treatment    Additional 1 Progress 50%  -KW  0%  -KW    Comments: Additional 1 Patient tolerated PMV for 30 minutes.  Some decreased voicing but overall improved during time with valve on.  Patinet able to remove independently but had difficulty placing.  Trach now shiley non cuffed size 6.  He is aware to wear PMV only with SLP or MD for now. He is aware to not wear with sleeping or breathing treatment.  -KW  Patient attempted PMV but unable to exhale and became anxious.  Discussed how PMV works, possible toleration with down size or cuffless.  Patient for surgery today.  Has PEG tube.  Not eating by mouth.  Possible radiation to start soon.  Unsure when trach will be changed.  Patient effectively writes to communicate.  -KW    Recorded by [KW] Maribel Johnson MS CCC-SLP  [KW] Maribel Johnson MS CCC-SLP    Bed Mobility, Assessment/Treatment    Bed Mobility, Assistive Device  bed rails;head of bed elevated  -TR     Bed Mob, Supine to Sit, [place]  supervision required  -TR     Bed Mob, Sit to Supine, [place]  supervision required  -TR     Bed Mobility,  Impairments  strength decreased  -TR     Recorded by  [TR] Sarina Uriostegui PTA     Transfer Assessment/Treatment    Transfers, Sit-Stand [place]  verbal cues required;stand by assist  -TR     Transfers, Stand-Sit [place]  verbal cues required;stand by assist  -TR     Transfer, Impairments  strength decreased  -TR     Recorded by  [TR] Sarina Uriostegui PTA     Gait Assessment/Treatment    Gait, [place] Level  verbal cues required;contact guard assist;minimum assist (75% patient effort)  -TR     Gait, Assistive Device  straight cane  -TR     Gait, Distance (Feet)  500   X2  -TR     Gait, Gait Pattern Analysis  swing-through gait  -TR     Gait, Gait Deviations  jose m decreased;bilateral:;step length decreased;narrow base  -TR     Gait, Safety Issues  step length decreased;balance decreased during turns  -TR     Gait, Impairments  strength decreased;impaired balance  -TR     Gait, Comment  3 LOB requiring min assist, pt requesting to walk this far, no SOB or signs f increased fatigue  -TR     Recorded by  [TR] Sarina Uriostegui PTA     Balance Skills Training    Sitting-Level of Assistance  Close supervision  -TR     Sitting-Balance Support  Feet supported  -TR     Standing-Level of Assistance  Contact guard  -TR     Static Standing Balance Support  assistive device  -TR     Gait Balance-Level of Assistance  Contact guard;Minimum assistance  -TR     Gait Balance Support  assistive device  -TR     Recorded by  [TR] Sarina Uriostegui PTA     Positioning and Restraints    Pre-Treatment Position  in bed  -TR     Post Treatment Position  bed  -TR     In Bed  fowlers;call light within reach;encouraged to call for assist;with family/caregiver;side rails up x2;notified nsg  -TR     Recorded by  [TR] Sarina Uriostegui PTA       10/10/17 1158 10/10/17 1034 10/09/17 1300    Rehab Assessment/Intervention    Discipline physical therapy assistant  -PIO physical therapy assistant  -PIO     Document  Type  therapy note (daily note)  -PIO     Treatment Not Performed, Comment with nsg  -PIO with nsg  -PIO bathroom last 30 minutes!  -PIO    Recorded by [PIO] Shani Hollins, PTA [PIO] Shani Hollins, PTA [PIO] Shani Hollins, PTA      User Key  (r) = Recorded By, (t) = Taken By, (c) = Cosigned By    Initials Name Effective Dates    PIO Shani Hollins, PTA 08/02/16 -     KW Maribel Johnson, MS CCC-SLP 08/02/16 -     JL Uriostegui, PTA 08/02/16 -               IP SLP Goals       10/12/17 1026 10/11/17 1258 10/09/17 1028    SLP- Additional Goal 1    Additional Goal 1- SLP, Date Established   10/09/17  -MB    Additional Goal 1- SLP, Time to Achieve   by discharge  -MB    Additional Goal 1- SLP, Activity Level   Patient will tolerate PMV placement for 15 minutes with no s/s of distress, pt will exhibit audible speech with PMV placement at 3 feet with 90% accuracy, pt will independently place and remove valve.  -MB    Additional Goal 1- SLP, Date Goal Reviewed 10/12/17  -KW 10/11/17  -KW     Additional Goal 1- SLP, Outcome   goal ongoing  -MB      User Key  (r) = Recorded By, (t) = Taken By, (c) = Cosigned By    Initials Name Provider Type    ANGELIQUE Garcia, CCC-SLP Speech and Language Pathologist    DAGOBERTO Johnson, MS CCC-SLP Speech and Language Pathologist          EDUCATION  The patient has been educated in the following areas:   Communication Impairment.    SLP Recommendation and Plan                               Plan of Care Review  Plan Of Care Reviewed With: patient  Progress: improving  Outcome Summary/Follow up Plan: Patient tolerated PMV for 30 minutes. Some decreased voicing but overall improved during time with valve on. Patinet able to remove independently but had difficulty placing. Trach now shiley non cuffed size 6. He is aware to wear PMV only with SLP or MD for now. He is aware to not wear with sleeping or breathing treatment.          Time Calculation:         Time Calculation- SLP        10/12/17 1027          Time Calculation- SLP    SLP Start Time 1000  -KW      SLP Stop Time 1030  -KW      SLP Time Calculation (min) 30 min  -KW      SLP Received On 10/12/17  -KW        User Key  (r) = Recorded By, (t) = Taken By, (c) = Cosigned By    Initials Name Provider Type    MS EARL Morales Speech and Language Pathologist          Therapy Charges for Today     Code Description Service Date Service Provider Modifiers Qty    07096703909 HC ST TREATMENT SPEECH 2 10/11/2017 MS EARL Zambrano GN 1    [id number]  ST TREATMENT SPEECH 2 10/12/2017 MS EARL Zambrano GN 1          SLP G-Codes  Functional Limitations: Other Speech Language Pathology (Voice following tracheostomy)  Other Speech-Language Pathology Functional Limitation Current Status (): At least 60 percent but less than 80 percent impaired, limited or restricted  Other Speech-Language Pathology Functional Limitation Goal Status (): At least 40 percent but less than 60 percent impaired, limited or restricted    MS EARL Newton  10/12/2017

## 2017-10-12 NOTE — PROGRESS NOTES
[facility] Medicine Services  INPATIENT PROGRESS NOTE    Length of Stay: 7  Date of Admission: 10/5/2017  Primary Care Physician: Christian Castellon MD    Subjective   Chief Complaint: medical management for HTN, DM  HPI   He had his dental extractions yesterday afternoon with Dr. Neal and Dr. Goddard.     His headache is doing some better.     Still quite a bit of problems with secretions.     Review of Systems   All pertinent negatives and positives are as above. All other systems have been reviewed and are negative unless otherwise stated.     Objective    Temp:  [97.6 °F (36.4 °C)-99.7 °F (37.6 °C)] 97.6 °F (36.4 °C)  Heart Rate:  [70-95] 70  Resp:  [16-23] 16  BP: ()/() 104/53  Physical Exam  Constitutional: He is oriented to person, place, and time. He appears well-developed and well-nourished.   Up in bed. His wife is present with him.  Writes to communicate at this point. Discussed with his nurse, Tashia.   Head: Normocephalic and atraumatic. Strong odor from breath. Tounge coating looks better.    Eyes: Conjunctivae and EOM are normal. Pupils are equal, round, and reactive to light.   Neck: Neck supple. No JVD present. Trach with improved secretions.    Cardiovascular: Normal rate, regular rhythm, normal heart sounds and intact distal pulses.  Exam reveals no gallop and no friction rub.  No murmur heard. Left infraclavicular Port.    Pulmonary/Chest: Effort normal and breath sounds normal. No respiratory distress. He has no wheezes. He has no rales. He exhibits no tenderness.   Abdominal: Soft. Bowel sounds are normal. He exhibits no distension. There is no tenderness. There is no rebound and no guarding. PEG.    Musculoskeletal: Normal range of motion. He exhibits no edema, tenderness or deformity.   Neurological: He is alert and oriented to person, place, and time. He displays normal reflexes. No cranial nerve deficit. He exhibits normal muscle tone.   Skin: Skin  is warm and dry. No rash noted.   Psychiatric: Flat.      Results Review:  I have reviewed the labs, radiology results, and diagnostic studies.    Laboratory Data:   No new results.     Most recent Accu-Cheks 110, 138, 138, 144, 202.    I have reviewed the patient current medications.     Assessment/Plan   Assessment:   1. Oropharyngeal cancer, squamous cell.  2. Status post tracheostomy placement on 10/5, Dr. Justin.   3. Status post Port and PEG placement on 10/6, Dr. Phillips.   4. Hypertension.  5. Hyperlipidemia.  6. Type II diabetes mellitus with hemoglobin A1c of 8.7.    7. Tobacco abuse.  8. Chronic obstructive pulmonary disease.  9. Hypokalemia, replaced.   10. Possible trigeminal neuralgia.   11. Oral candidiasis.       Plan:   No fever since the evening of 10/8. This was one-time issue.      He will eventually be for treatment with Dr. Dominguez and Dr. Marie. I discussed the case with Dr. Tompkins on the unit on 10/10.     Dr. Neal and Dr. Goddard completed his dental extractions yesterday.        His blood pressure is generally acceptable. I actually decreased his lisinopril on 10/10.      Restarted Levemir at 10 units nightly recently. Increase to 12 units tonight. He typically takes much more insulin than this, however, I feel that his insulin requirement will be much less now that he is on tube feeding.  Continue Accu-Cheks and sliding scale insulin for now.      Started on Tegretol on 10/10 as his symptoms sound most consistent with trigeminal neuralgia.  I will increase the dose today. As an outpatient if he is finding benefit with this medication, then primary care can continue to titrate it; however, if no benefit is felt, then it will need to be stopped.  Checked ESR for the possibility of temporal arteritis and it was only 43.        Clotrimazole siddharth and oral care.      Discharge Planning: Per ENT.  Okay for home whenever from my standpoint.    Alber Jain,    10/12/17   2:06 PM

## 2017-10-12 NOTE — THERAPY TREATMENT NOTE
Acute Care - Physical Therapy Treatment Note  [facility]     Patient Name: Sameer Tavarez  : 1955  MRN: [id number]  Today's Date: 10/12/2017  Onset of Illness/Injury or Date of Surgery Date: 10/05/17  Date of Referral to PT: 10/06/17  Referring Physician: Dr. Juarez    Admit Date: 10/5/2017    Visit Dx:    ICD-10-CM ICD-9-CM   1. Impaired mobility Z74.09 799.89   2. Oropharyngeal cancer C10.9 146.9   3. Poor dentition K08.9 525.9   4. Current smoker F17.200 305.1   5. Tobacco use Z72.0 305.1   6. 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   • Recio's esophagus with dysplasia   • Encounter for consultation   • Tracheostomy dependence   • Postoperative pain               Adult Rehabilitation Note       10/12/17 1128 10/12/17 1027 10/12/17 1000    Rehab Assessment/Intervention    Discipline physical therapy assistant  - --  - speech language pathologist  -KW    Document Type therapy note (daily note)  - --  - therapy note (daily note)  -KW    Subjective Information no complaints  -  no complaints  -KW    Patient Effort, Rehab Treatment   excellent  -KW    Precautions/Limitations fall precautions;oxygen therapy device and L/min   trach/PEG  -      Recorded by [] Emmie Gonzales PTA [] Emmie Gonzales PTA [] Maribel Johnson MS CCC-SLP    Vital Signs    O2 Delivery Pre Treatment supplemental O2   trach 10L  -AH      Intra SpO2 (%) 92  -      O2 Delivery Intra Treatment supplemental O2   trach 10L  -AH      Recorded by [] Emmie Gonzales PTA      Pain Assessment    Pain Assessment No/denies pain  -      Recorded by [] Emmie Gonzales PTA      Additional 1 Communication Treatment    Additional 1 Progress   50%  -KW    Comments: Additional 1   Patient tolerated PMV for 30 minutes.  Some decreased voicing but overall improved during time with valve on.  [place] able to  remove independently but had difficulty placing.  Trach now shiley non cuffed size 6.  He is aware to wear PMV only with SLP or MD for now. He is aware to not wear with sleeping or breathing treatment.  -KW    Recorded by   [KW] Maribel Johnson MS CCC-[facility]    Bed Mobility, Assessment/Treatment    Bed Mob, Supine to Sit, [place] supervision required  -      Bed Mob, Sit to Supine, [place] supervision required  -AH      Recorded by [AH] Emmie Gonzales PTA      Transfer Assessment/Treatment    Transfers, Sit-Stand [place] stand by assist  -      Transfers, Stand-Sit [place] stand by assist  -      Recorded by [] Emmie Gonzales PTA      Gait Assessment/Treatment    Gait, [place] Level verbal cues required;stand by assist;contact guard assist  -      Gait, Assistive Device straight cane  -      Gait, Distance (Feet) 300   300 x 5  -      Gait, Gait Deviations jose m decreased  -      Gait, Comment few LOB cga to recover, pt tends to have LOB when turning his head and looking into rooms  -      Recorded by [] Emmie Gonzales PTA      Positioning and Restraints    Pre-Treatment Position in bed  -      Post Treatment Position bs  -      On BS commode notified nsg;sitting;call light within reach;encouraged to call for assist;with family/caregiver  -      Recorded by [] Emmie Gonzales PTA        10/11/17 1140 10/11/17 1015 10/10/17 1158    Rehab Assessment/Intervention    Discipline physical therapy assistant  -TR  physical therapy assistant  -PIO    Document Type therapy note (daily note)  -TR      Subjective Information agree to therapy;complains of;weakness;fatigue;pain  -TR      Patient Effort, Rehab Treatment good  -TR      Treatment Not Performed, Comment   with nsg  -PIO    Precautions/Limitations fall precautions;oxygen therapy device and L/min   Trach, Peg   -TR      Recorded by [TR] Sarina Uriostegui PTA  [PIO] Shani Hollins PTA    Vital Signs    Intra  SpO2 (%) 98  -TR      O2 Delivery Intra Treatment supplemental O2   10 L O2/NC  -TR      Recorded by [TR] Sarina Uriostegui PTA      Pain Assessment    Pain Assessment 0-10  -TR      Pain Score 9  -TR      Post Pain Score 9  -TR      Pain Type Acute pain;Chronic pain  -TR      Pain Location Generalized  -TR      Pain Descriptors Aching  -TR      Pain Frequency Constant/continuous  -TR      Pain Intervention(s) Ambulation/increased activity  -TR      Response to Interventions tolerated  -TR      Recorded by [TR] Sarina Uriostegui PTA      Cognitive Assessment/Intervention    Current Cognitive/Communication Assessment functional  -TR      Orientation Status oriented to;person;place;time  -TR      Follows Commands/Answers Questions able to follow multi-step instructions;100% of the time  -TR      Personal Safety decreased awareness, need for assist;decreased awareness, need for safety  -TR      Personal Safety Interventions fall prevention program maintained;gait belt;nonskid shoes/slippers when out of bed  -TR      Recorded by [TR] Sarina Uriostegui PTA      Additional 1 Communication Treatment    Additional 1 Progress  0%  -KW     Comments: Additional 1  Patient attempted PMV but unable to exhale and became anxious.  Discussed how PMV works, possible toleration with down size or cuffless.  Patient for surgery today.  Has PEG tube.  Not eating by mouth.  Possible radiation to start soon.  Unsure when trach will be changed.  Patient effectively writes to communicate.  -KW     Recorded by  [KW] Maribel Johnson MS CCC-SLP     Bed Mobility, Assessment/Treatment    Bed Mobility, Assistive Device bed rails;head of bed elevated  -TR      Bed Mob, Supine to Sit, [place] supervision required  -TR      Bed Mob, Sit to Supine, [place] supervision required  -TR      Bed Mobility, Impairments strength decreased  -TR      Recorded by [TR] Sarina Uriostegui PTA      Transfer Assessment/Treatment    Transfers,  Sit-Stand [place] verbal cues required;stand by assist  -TR      Transfers, Stand-Sit [place] verbal cues required;stand by assist  -TR      Transfer, Impairments strength decreased  -TR      Recorded by [TR] Sarina Uriostegui PTA      Gait Assessment/Treatment    Gait, [place] Level verbal cues required;contact guard assist;minimum assist (75% patient effort)  -TR      Gait, Assistive Device straight cane  -TR      Gait, Distance (Feet) 500   X2  -TR      Gait, Gait Pattern Analysis swing-through gait  -TR      Gait, Gait Deviations jose m decreased;bilateral:;step length decreased;narrow base  -TR      Gait, Safety Issues step length decreased;balance decreased during turns  -TR      Gait, Impairments strength decreased;impaired balance  -TR      Gait, Comment 3 LOB requiring min assist, pt requesting to walk this far, no SOB or signs f increased fatigue  -TR      Recorded by [TR] Sarina Uriostegui PTA      Balance Skills Training    Sitting-Level of Assistance Close supervision  -TR      Sitting-Balance Support Feet supported  -TR      Standing-Level of Assistance Contact guard  -TR      Static Standing Balance Support assistive device  -TR      Gait Balance-Level of Assistance Contact guard;Minimum assistance  -TR      Gait Balance Support assistive device  -TR      Recorded by [TR] Sarina Uriostegui PTA      Positioning and Restraints    Pre-Treatment Position in bed  -TR      Post Treatment Position bed  -TR      In Bed fowlers;call light within reach;encouraged to call for assist;with family/caregiver;side rails up x2;notified stoney GARCIA      Recorded by [TR] Sarina Uriostegui PTA        10/10/17 1034          Rehab Assessment/Intervention    Discipline physical therapy assistant  -PIO      Document Type therapy note (daily note)  -PIO      Treatment Not Performed, Comment with nsg  -PIO      Recorded by [PIO] Shani Hollins PTA        User Key  (r) = Recorded By, (t) = Taken By, (c)  = Cosigned By    Initials Name Effective Dates     Emmie Gonzales, PTA 08/02/16 -     PIO Shani Hollins, PTA 08/02/16 -     KW Maribel Johnson, MS CCC-SLP 08/02/16 -     TR Sarina Uriostegui, PTA 08/02/16 -                 IP PT Goals       10/07/17 1104          Bed Mobility PT LTG    Bed Mobility PT LTG, Date Established 10/07/17  -JOANN      Bed Mobility PT LTG, Time to Achieve by discharge  -JOANN      Bed Mobility PT LTG, Activity Type all bed mobility  -JOANN      Bed Mobility PT LTG, [place] Level supervision required  -JOANN      Bed Mobility PT Goal  LTG, Assist Device bed rails  -JOANN      Transfer Training PT LTG    Transfer Training PT LTG, Date Established 10/07/17  -JOANN      Transfer Training PT LTG, Time to Achieve by discharge  -JOANN      Transfer Training PT LTG, Activity Type bed to chair /chair to bed;sit to stand/stand to sit  -JOANN      Transfer Training PT LTG, [place] Level supervision required  -JOANN      Transfer Training PT LTG, Assist Device cane, straight  -JOANN      Gait Training PT LTG    Gait Training Goal PT LTG, Date Established 10/07/17  -JOANN      Gait Training Goal PT LTG, Time to Achieve by discharge  -JOANN      Gait Training Goal PT LTG, [place] Level supervision required  -JOANN      Gait Training Goal PT LTG, Assist Device cane, straight  -JOANN      Gait Training Goal PT LTG, Distance to Achieve 150  -JOANN      Stair Training PT LTG    Stair Training Goal PT LTG, Date Established 10/07/17  -JOANN      Stair Training Goal PT LTG, Time to Achieve by discharge  -JOANN      Stair Training Goal PT LTG, Number of Steps 4  -JOANN      Stair Training Goal PT LTG, [place] Level supervision required  -JOANN        User Key  (r) = Recorded By, (t) = Taken By, (c) = Cosigned By    Initials Name Provider Type    JOANN Montoya, PT DPT Physical Therapist          Physical Therapy Education     Title: PT OT SLP Therapies (Active)     Topic: Physical Therapy (Active)     Point: Mobility training (Done)     Learning Progress Summary    Learner Readiness Method Response Comment Documented by Status   Patient Acceptance E VU safety with gait  10/12/17 1315 Done    Acceptance E VU,DU BOA, POC, safety with gait,, balance training for gait  10/11/17 1332 Done    Acceptance E,D NR Wgt shifting during ambulation  10/09/17 0904 Active    Acceptance E VU  NT 10/08/17 1620 Done    Acceptance E VU,NR Educated pt./spouse on progression of PT POC and benefits of activity JOANN 10/07/17 1104 Done   Family Acceptance E VU  NT 10/08/17 1620 Done   Significant Other Acceptance E VU,NR Educated pt./spouse on progression of PT POC and benefits of activity JOANN 10/07/17 1104 Done                      User Key     Initials Effective Dates Name Provider Type Discipline     08/02/16 -  Emmie Gonzales, PTA Physical Therapy Assistant PT     08/02/16 -  Josué Montoya, PT DPT Physical Therapist PT     08/02/16 -  Shani Hollins, PTA Physical Therapy Assistant PT    NT 08/02/16 -  Shabana Stanley, RN Registered Nurse Nurse     08/02/16 -  Sarina Uriostegui, PAULINO Physical Therapy Assistant PT                    PT Recommendation and Plan  Anticipated Discharge Disposition: home with assist, home with home health, home with 24/7 care  Planned Therapy Interventions: bed mobility training, transfer training, gait training, balance training, home exercise program, patient/family education, postural re-education, stair training, strengthening  PT Frequency: daily, 2 times/day  Plan of Care Review  Plan Of Care Reviewed With: patient  Progress: progress toward functional goals as expected  Outcome Summary/Follow up Plan: pt with frequent BM's this am. Pt trans sba, amb 300 feet x 5 with cane cga-sba with 02 @ 10 L trach. Pt tends to lose his balance when he turns his head while looking into rooms.           Outcome Measures       10/12/17 1300 10/11/17 1140       How much help from another person do you currently need...    Turning from  your back to your side while in flat bed without using bedrails? 4  - 3  -TR     Moving from lying on back to sitting on the side of a flat bed without bedrails? 4  - 3  -TR     Moving to and from a bed to a chair (including a wheelchair)? 4  - 3  -TR     Standing up from a chair using your arms (e.g., wheelchair, bedside chair)? 3  - 3  -TR     Climbing 3-5 steps with a railing? 3  - 3  -TR     To walk in hospital room? 3  -AH 3  -TR     AM-PAC 6 Clicks Score 21  - 18  -TR     Functional Assessment    Outcome Measure Options AM-PAC 6 Clicks Basic Mobility (PT)  -AH AM-PAC 6 Clicks Basic Mobility (PT)  -TR       User Key  (r) = Recorded By, (t) = Taken By, (c) = Cosigned By    Initials Name Provider Type     Emmie Gonzales PTA Physical Therapy Assistant    TR Sarina Uriostegui PTA Physical Therapy Assistant           Time Calculation:         PT Charges       10/12/17 1320          Time Calculation    Start Time 1128  -      Stop Time 1221  -      Time Calculation (min) 53 min  -      PT Received On 10/12/17  -      PT Goal Re-Cert Due Date 10/17/17  -      Time Calculation- PT    Total Timed Code Minutes- PT 53 minute(s)  -        User Key  (r) = Recorded By, (t) = Taken By, (c) = Cosigned By    Initials Name Provider Type     Emmie Gonzales PTA Physical Therapy Assistant          Therapy Charges for Today     Code Description Service Date Service Provider Modifiers Qty    16541263021 HC GAIT TRAINING EA 15 MIN 10/12/2017 Emmie Gonzales PTA GP 4          PT G-Codes  Outcome Measure Options: AM-PAC 6 Clicks Basic Mobility (PT)  Score: 16  Functional Limitation: Mobility: Walking and moving around  Mobility: Walking and Moving Around Current Status (): At least 40 percent but less than 60 percent impaired, limited or restricted  Mobility: Walking and Moving Around Goal Status (): At least 1 percent but less than 20 percent impaired, limited or restricted    Emmie Gonzales  PTA  10/12/2017

## 2017-10-12 NOTE — PROGRESS NOTES
"Adult Nutrition  Assessment/PES    Patient Name:  Sameer Tavarez  YOB: 1955  MRN: [id number]  Admit Date:  10/5/2017    Assessment Date:  10/12/2017    Addendum (10/13/17) Pt not appropriate for standard enteral nutrition formula trial given his HgbA1c 8.7 %, medical history of diabetes at risk for hyperglycemia and will need a diabetic enteral formula.    Comments:  Consult to adjust tube feeds from continuous to Bolus tube feeds. Recommend Glucerna 1.2 150 ml first bolus, then gradually increase by 50 ml of feeding each bolus until 300 ml tolerated, six times per day. Flush with water 120 ml each bolus.   Discussed with pt he may also benefit from a gravity bag if he has any difficulty with administering the volume of product in daily.           Reason for Assessment       10/12/17 1453    Reason for Assessment    Reason For Assessment/Visit nurse/nurse practitioner consult   adjust pt to Bolus tube feeds              Anthropometrics       10/12/17 1454    Anthropometrics    RD Documented Current Weight  85.3 kg (188 lb)    Body Mass Index (BMI)    BMI Grade 30 - 34.9- obesity - grade I            Labs/Tests/Procedures/Meds       10/12/17 1454    Labs/Tests/Procedures/Meds    Labs/Tests Review Reviewed;Glucose;Hgb Hct   hgbA1c 8.7 on 10/6/17    Medication Review Reviewed, pertinent;Prokinetic Agent;Diuretic;Insulin;Other (comment)   carafate    Significant Vitals reviewed            Physical Findings       10/12/17 1455    Physical Appearance    Gastrointestinal --   loose stool noted on 10/9/17    Tubes peg tube            Estimated/Assessed Needs       10/12/17 1456    Calculation Measurements    Weight Used For Calculations 72.1 kg (159 lb)   adjusted body wt    Height Used for Calculations 1.676 m (5' 6\")    Estimated/Assessed Energy Needs    kcal/kg 30    30 Kcal/Kg (kcal) 2163.66    Estimated Kcal Range  3489-6542 kcal/day            Nutrition Prescription Ordered       10/12/17 0949    " Nutrition Prescription PO    Current PO Diet NPO    Nutrition Prescription EN    Enteral Route PEG    Product Glucerna 1.2 amy    TF Delivery Method Continuous    Continuous TF Goal Rate (mL/hr) 75 mL/hr    Continuous TF Current Rate (mL/hr) 60 mL/hr    Continuous TF Goal Volume (mL) 1650 mL    Continuous TF Current Volume (mL) 474 mL   past 4 days    Water Flush Frequency Per hour          Problem/Interventions:        Problem 1       10/12/17 1651    Nutrition Diagnoses Problem 1    Problem 1 Needs Alternate Route    Etiology (related to) Medical Diagnosis    Gastrointestinal --   s/p peg tube placement    Signs/Symptoms (evidenced by) NPO;EN Intake Delivery;Report/Observation    Reported/Observed By Patient;RN    Appetite --   Pt to have TF adjusted from continuous to bolus feeds    Other Comment pt currently has g tube for feeds                Intervention Goal       10/12/17 1652    Intervention Goal    General Nutrition support treatment;Disease management/therapy;Reduce/improve symptoms    PO Meet estimated needs    TF/PN Adjust TF/PN    Weight No significant weight loss            Nutrition Intervention       10/12/17 1440    Nutrition Intervention    Recommended/Ordered EN            Nutrition Prescription       10/12/17 1440    Nutrition Prescription EN    Enteral Prescription Enteral begin/change;Enteral to supply    Product Glucerna 1.2 amy    Modulars Fiber (3 gm/pkt)    Fiber (3 gm/pkt) 1 packet    Fiber frequency 2 times a day    TF Delivery Method Bolus    TF Bolus Goal Volume (mL) 300 mL    TF Bolus Starting Volume (mL) 150 mL    TF Bolus Frequency 6 times a day    Water flush (mL)  120 mL    Water Flush Frequency Every feeding    New EN Prescription Ordered? Yes    EN to Supply    Kcal/Day 2200 Kcal/Day    Kcal/Kg 25.7 Kcal/Kg    Kcal/Kg Weight Method Actual weight    Protein (gm/day) 109 gm/day    Meet Estimated Kcal Need (%) 100 %    Meet Estimated Protein Need (%) 129 %    TF Free H2O (mL) 4343  mL    Total Free H2O (mL/day) 2193 mL/day    Fiber Per Day (gm/day) 35 gm/day   29 gm w/ TF, plus bene fiber bid            Education/Evaluation       10/12/17 1652    Education    Education Education topics    Education Topics TF instruction   Provided pt with written instruction on tube feeding administration discused recommended bolus feeding volume has RD name and contact information for any questions. Also suggested pt may bene fit from gravity bags for parital feeds if needed.    Monitor/Evaluation    Monitor Per protocol      10/12/17 1442    Monitor/Evaluation    Monitor --   Pt discharge needs: s/p peg tube placement adjust TF to bolus. cont to follow        Electronically signed by:  Tessy Zuluaga MS,RDN,LD  10/12/17 4:55 PM

## 2017-10-12 NOTE — PLAN OF CARE
Problem: Patient Care Overview (Adult)  Goal: Plan of Care Review  Outcome: Ongoing (interventions implemented as appropriate)    10/12/17 1316   Coping/Psychosocial Response Interventions   Plan Of Care Reviewed With patient   Patient Care Overview   Progress progress toward functional goals as expected   Outcome Evaluation   Outcome Summary/Follow up Plan pt with frequent BM's this am. Pt trans sba, amb 300 feet x 5 with cane cga-sba with 02 @ 10 L trach. Pt tends to lose his balance when he turns his head while looking into rooms.

## 2017-10-12 NOTE — PLAN OF CARE
Problem: Patient Care Overview (Adult)  Goal: Plan of Care Review  Outcome: Ongoing (interventions implemented as appropriate)    10/11/17 1634   Coping/Psychosocial Response Interventions   Plan Of Care Reviewed With patient   Patient Care Overview   Progress no change   Outcome Evaluation   Outcome Summary/Follow up Plan Patient went for tooth extraction today. Moderate sanguinous drainage. Gauze changed prn saturation. Medicated with dilaudid q 2 hours for head and throat pain. Trach collar 30%. #6 shiley        Goal: Adult Individualization and Mutuality  Outcome: Ongoing (interventions implemented as appropriate)  Goal: Discharge Needs Assessment  Outcome: Ongoing (interventions implemented as appropriate)    Problem: Perioperative Period (Adult)  Goal: Signs and Symptoms of Listed Potential Problems Will be Absent or Manageable (Perioperative Period)  Outcome: Ongoing (interventions implemented as appropriate)    Problem: Fall Risk (Adult)  Goal: Absence of Falls  Outcome: Ongoing (interventions implemented as appropriate)    Problem: Feeding Dysfunction/Swallowing Impairment (Pediatric)  Goal: Reduced Risk of Aspiration  Outcome: Ongoing (interventions implemented as appropriate)  Goal: Adequate Nutrition and Hydration  Outcome: Ongoing (interventions implemented as appropriate)

## 2017-10-12 NOTE — PLAN OF CARE
Problem: Patient Care Overview (Adult)  Goal: Plan of Care Review  Outcome: Ongoing (interventions implemented as appropriate)  Jori HOLLOWAY with Dr. Justin changed out trach this am. #6 Shiley, uncuffed. Patient tolerated well. Trach care performed and new trach ties applied this am after new trach inserted. Pt is having some serosanguinous drainage form tracheostomy stoma. Obturator and extra trach with inner cannulas at . Patient is on 30% trach aerosol. Patient is able to cough up secretions. Thick serosanguinous secretions. Patient had teeth extracted yesterday. Mouth care provided this am and prn. Foul order and gum with scan amt of intermittent bleeding. Peg tube with sutures, no drainage form site. ABD incision with dried drainage. Steri-strips intact. ANDRA. Glucerna 1.2 infusing at goal rate of 60ml/hr with 25 ml/hr flush. No residual at last check. Patient reports severe head pain radiating to right side of ear, jaw and neck. PRN medications given. Patient was resultant to take oral medication and has been requesting IV medication. RN encouraged patient to try as he may be discharged tomorrow. Patient reports better pain control with po medication and will use IV medication for severe breakthrough pain or as needed. Patient ambulated >1500 feet with physical therapy today and Speech therapy trailed passy raza valve at BS today, patient tolerated well.     10/12/17 1347   Coping/Psychosocial Response Interventions   Plan Of Care Reviewed With patient   Patient Care Overview   Progress improving       Goal: Adult Individualization and Mutuality  Outcome: Ongoing (interventions implemented as appropriate)    10/12/17 1347   Individualization   Patient Specific Goals Improved pain control. D/C home with trach and feeding supplies available.    Patient Specific Interventions Trach care per MD order. PRN pain mediations given. [place] physical therapy. Suction as needed.        Goal: Discharge Needs  Assessment  Outcome: Ongoing (interventions implemented as appropriate)    Problem: Perioperative Period (Adult)  Goal: Signs and Symptoms of Listed Potential Problems Will be Absent or Manageable (Perioperative Period)  Outcome: Ongoing (interventions implemented as appropriate)    10/12/17 1347   Perioperative Period   Problems Assessed (Perioperative Period) all   Problems Present (Perioperative Period) pain;situational response         Problem: Fall Risk (Adult)  Goal: Absence of Falls  Outcome: Ongoing (interventions implemented as appropriate)    10/12/17 1347   Fall Risk (Adult)   Absence of Falls making progress toward outcome         Problem: Feeding Dysfunction/Swallowing Impairment (Pediatric)  Goal: Reduced Risk of Aspiration  Outcome: Ongoing (interventions implemented as appropriate)    10/12/17 1347   Feeding Dysfunction/Swallowing Impairment (Pediatric)   Reduced Risk of Aspiration making progress toward outcome       Goal: Adequate Nutrition and Hydration  Outcome: Ongoing (interventions implemented as appropriate)    10/12/17 1347   Feeding Dysfunction/Swallowing Impairment (Pediatric)   Adequate Nutrition and Hydration making progress toward outcome         Problem: Airway, Artificial (Adult)  Goal: Signs and Symptoms of Listed Potential Problems Will be Absent or Manageable (Airway, Artificial)  Outcome: Ongoing (interventions implemented as appropriate)    10/12/17 1347   Airway, Artificial   Problems Assessed (Artificial Airway) all   Problems Present (Artificial Airway) none

## 2017-10-12 NOTE — PLAN OF CARE
Problem: Patient Care Overview (Adult)  Goal: Plan of Care Review  Outcome: Ongoing (interventions implemented as appropriate)    10/12/17 1026   Coping/Psychosocial Response Interventions   Plan Of Care Reviewed With patient   Patient Care Overview   Progress improving   Outcome Evaluation   Outcome Summary/Follow up Plan Patient tolerated PMV for 30 minutes. Some decreased voicing but overall improved during time with valve on. Patinet able to remove independently but had difficulty placing. Trach now shiley non cuffed size 6. He is aware to wear PMV only with SLP or MD for now. He is aware to not wear with sleeping or breathing treatment.         Problem: Inpatient SLP  Goal: SLP Additional Goal 1  Outcome: Ongoing (interventions implemented as appropriate)    10/09/17 1028 10/12/17 1026   SLP- Additional Goal 1   Additional Goal 1- SLP, Date Established 10/09/17 --    Additional Goal 1- SLP, Time to Achieve by discharge --    Additional Goal 1- SLP, Activity Level Patient will tolerate PMV placement for 15 minutes with no s/s of distress, pt will exhibit audible speech with PMV placement at 3 feet with 90% accuracy, pt will independently place and remove valve. --    Additional Goal 1- SLP, Date Goal Reviewed --  10/12/17   Additional Goal 1- SLP, Outcome goal ongoing --

## 2017-10-12 NOTE — PROGRESS NOTES
[facility]  ENT PROGRESS NOTES  10/12/2017      Patient Identification:  Name: Sameer Tavarez  Age: 62 y.o.  Sex: male  :  1955  MRN: [id number]                     Date of Admission: 10/5/2017      CC:      Subjective   Doing well - having some pain that is well controlled. Patient wants to know when he can eat or drink and would like a soft drink. Trach in place and minimal oral cavity complaints status post full dental extraction.    HISTORY   HPI, ROS, PMFSHx reviewed:   No changes       Objective     PE:    Temp:  [98.1 °F (36.7 °C)-99.7 °F (37.6 °C)] 98.2 °F (36.8 °C)  Heart Rate:  [71-95] 83  Resp:  [16-23] 16  BP: ()/() 107/50   Body mass index is 30.36 kg/(m^2).     General appearance: ill-appearing.   Ability to Communicate: Trach in place with mild bloody, mucoid secretions, communicating primarily by writing, but able to voice with finger occlusion over trach.      Ears - bilateral TM's and external ear canals normal.   Nasal exam - normal and patent, no erythema, discharge or polyps.      Neck exam - supple, no significant adenopathy. Trach changed from Shiley #6 cuffed/nonfenestrated to Shiley #6 noncuffed/nonfenestrated trach without difficulty at bedside. .  The wound looks good there is secretions.     CVS exam: normal rate, regular rhythm, normal S1, S2, no murmurs, rubs, clicks or gallops.   Chest: CTA bilaterally, normal respiratory effort     No lymphadenopathy in the anterior or posterior neck, supraclavicular, axillary or inguinal areas. No hepato-splenomegaly noted.   Neurological exam reveals not examined.    DATA   CBC (No Diff)   Order: [id number]   Status:  Final result   Visible to patient:  No (Not Released)      Ref Range & Units 9:30 AM     WBC 4.80 - 10.80 10*3/mm3 10.80   RBC 4.80 - 5.90 10*6/mm3 4.08 (L)   Hemoglobin 14.0 - 18.0 g/dL 12.2 (L)   Hematocrit 40.0 - 52.0 % 34.7 (L)   MCV 82.0 - 95.0 fL 85.0   MCH 28.0 - 32.0 pg 29.9   MCHC 33.0 - 36.0 g/dL  35.2   RDW 12.0 - 15.0 % 12.5   RDW-SD 40.0 - 54.0 fl 38.6 (L)   MPV 6.0 - 12.0 fL 11.5   Platelets 130 - 400 10*3/mm3 180   Resulting [facility] PAD LAB      Specimen Collected: 10/08/17  9:30 AM   Last Resulted: 10/08/17  9:45 AM            Basic Metabolic Panel   Order: [id number]   Status:  Final result   Visible to patient:  No (Not Released)      Newer results are available. Click to view them now.            Ref Range & Units 9:30 AM     Glucose 70 - 100 mg/dL 168 (H)   BUN 5 - 21 mg/dL 12   Creatinine 0.50 - 1.40 mg/dL 0.50   Sodium 135 - 145 mmol/L 137   Potassium 3.5 - 5.3 mmol/L 3.6   Chloride 98 - 110 mmol/L 100   CO2 24.0 - 31.0 mmol/L 25.0   Calcium 8.4 - 10.4 mg/dL 8.5   eGFR Non African Amer >60 mL/min/1.73 >150   BUN/Creatinine Ratio 7.0 - 25.0 24.0   Anion Gap 4.0 - 13.0 mmol/L 12.0   Resulting [facility]   Narrative   GFR Normal >60  Chronic Kidney Disease <60  Kidney Failure <15      Specimen Collected: 10/08/17  9:30 AM   Last Resulted: 10/08/17  9:58 AM                   MEDICATIONS   I have reviewed the current MAR.     LABS AND IMAGING      I have reviewed the labs and imaging data since the patient was last seen.       Assessment     ASSESSMENT     Active Problems:    Oropharyngeal cancer    Tracheostomy dependence    Postoperative pain  Probable atelectasis    As above no significant changes noted      Plan     PLAN       Will continue to increase ambulation  Out of bed to chair today  Dental extraction performed yesterday  Trach changed today at bedside  Need to make sure social work is in the process of setting up home health and supplies and speech therapy performs evaluation and PMV as planning to discharge home as soon as this is done.          JEWEL Guardado  10/12/2017  8:33 AM

## 2017-10-13 VITALS
TEMPERATURE: 98.9 F | HEART RATE: 72 BPM | HEIGHT: 66 IN | OXYGEN SATURATION: 99 % | BODY MASS INDEX: 30.23 KG/M2 | WEIGHT: 188.1 LBS | RESPIRATION RATE: 18 BRPM | SYSTOLIC BLOOD PRESSURE: 123 MMHG | DIASTOLIC BLOOD PRESSURE: 60 MMHG

## 2017-10-13 LAB
GLUCOSE BLDC GLUCOMTR-MCNC: 152 MG/DL (ref 70–130)
GLUCOSE BLDC GLUCOMTR-MCNC: 165 MG/DL (ref 70–130)
GLUCOSE BLDC GLUCOMTR-MCNC: 205 MG/DL (ref 70–130)

## 2017-10-13 PROCEDURE — 82962 GLUCOSE BLOOD TEST: CPT

## 2017-10-13 PROCEDURE — 92507 TX SP LANG VOICE COMM INDIV: CPT

## 2017-10-13 PROCEDURE — 94799 UNLISTED PULMONARY SVC/PX: CPT

## 2017-10-13 PROCEDURE — 99239 HOSP IP/OBS DSCHRG MGMT >30: CPT | Performed by: OTOLARYNGOLOGY

## 2017-10-13 PROCEDURE — 63710000001 INSULIN LISPRO (HUMAN) PER 5 UNITS: Performed by: INTERNAL MEDICINE

## 2017-10-13 RX ORDER — ACETAMINOPHEN 325 MG/1
650 TABLET ORAL EVERY 6 HOURS PRN
Qty: 90 TABLET | Refills: 5 | Status: SHIPPED | OUTPATIENT
Start: 2017-10-13 | End: 2017-11-12

## 2017-10-13 RX ORDER — CARBAMAZEPINE 100 MG/1
200 TABLET, CHEWABLE ORAL EVERY 12 HOURS SCHEDULED
Qty: 120 TABLET | Refills: 0 | Status: SHIPPED | OUTPATIENT
Start: 2017-10-13 | End: 2017-11-12

## 2017-10-13 RX ORDER — CHLORHEXIDINE GLUCONATE 0.12 MG/ML
15 RINSE ORAL EVERY 12 HOURS SCHEDULED
Qty: 300 ML | Refills: 0 | Status: SHIPPED | OUTPATIENT
Start: 2017-10-13 | End: 2017-10-23

## 2017-10-13 RX ORDER — GUAR GUM
1 PACKET (EA) ORAL 2 TIMES DAILY
Qty: 60 PACKET | Refills: 0 | Status: SHIPPED | OUTPATIENT
Start: 2017-10-13 | End: 2017-11-12

## 2017-10-13 RX ORDER — POTASSIUM CHLORIDE 20MEQ/15ML
20 LIQUID (ML) ORAL DAILY
Qty: 450 ML | Refills: 0 | Status: SHIPPED | OUTPATIENT
Start: 2017-10-13 | End: 2017-11-12

## 2017-10-13 RX ADMIN — IPRATROPIUM BROMIDE AND ALBUTEROL SULFATE 3 ML: 2.5; .5 SOLUTION RESPIRATORY (INHALATION) at 15:20

## 2017-10-13 RX ADMIN — CHLORHEXIDINE GLUCONATE 15 ML: 1.2 RINSE ORAL at 08:37

## 2017-10-13 RX ADMIN — SUCRALFATE 1 G: 1 SUSPENSION ORAL at 11:18

## 2017-10-13 RX ADMIN — OXYCODONE HYDROCHLORIDE AND ACETAMINOPHEN 1 TABLET: 7.5; 325 TABLET ORAL at 10:10

## 2017-10-13 RX ADMIN — CLOTRIMAZOLE 10 MG: 10 LOZENGE ORAL; TOPICAL at 10:10

## 2017-10-13 RX ADMIN — BACLOFEN 20 MG: 10 TABLET ORAL at 08:36

## 2017-10-13 RX ADMIN — OXYCODONE HYDROCHLORIDE AND ACETAMINOPHEN 1 TABLET: 7.5; 325 TABLET ORAL at 14:26

## 2017-10-13 RX ADMIN — OXYCODONE HYDROCHLORIDE AND ACETAMINOPHEN 1 TABLET: 7.5; 325 TABLET ORAL at 04:59

## 2017-10-13 RX ADMIN — CLOTRIMAZOLE 10 MG: 10 LOZENGE ORAL; TOPICAL at 14:26

## 2017-10-13 RX ADMIN — SUCRALFATE 1 G: 1 SUSPENSION ORAL at 06:22

## 2017-10-13 RX ADMIN — CARBAMAZEPINE 200 MG: 100 TABLET, CHEWABLE ORAL at 08:36

## 2017-10-13 RX ADMIN — Medication 81 MG: at 10:10

## 2017-10-13 RX ADMIN — FAMOTIDINE 20 MG: 40 POWDER, FOR SUSPENSION ORAL at 08:36

## 2017-10-13 RX ADMIN — INSULIN LISPRO 2 UNITS: 100 INJECTION, SOLUTION INTRAVENOUS; SUBCUTANEOUS at 00:38

## 2017-10-13 RX ADMIN — SUCRALFATE 1 G: 1 SUSPENSION ORAL at 00:38

## 2017-10-13 RX ADMIN — IPRATROPIUM BROMIDE AND ALBUTEROL SULFATE 3 ML: 2.5; .5 SOLUTION RESPIRATORY (INHALATION) at 06:45

## 2017-10-13 RX ADMIN — INSULIN LISPRO 4 UNITS: 100 INJECTION, SOLUTION INTRAVENOUS; SUBCUTANEOUS at 06:22

## 2017-10-13 RX ADMIN — Medication 1 PACKET: at 08:36

## 2017-10-13 RX ADMIN — FUROSEMIDE 40 MG: 40 TABLET ORAL at 08:36

## 2017-10-13 RX ADMIN — METOPROLOL TARTRATE 50 MG: 50 TABLET, FILM COATED ORAL at 08:36

## 2017-10-13 RX ADMIN — IPRATROPIUM BROMIDE AND ALBUTEROL SULFATE 3 ML: 2.5; .5 SOLUTION RESPIRATORY (INHALATION) at 11:30

## 2017-10-13 RX ADMIN — INSULIN LISPRO 2 UNITS: 100 INJECTION, SOLUTION INTRAVENOUS; SUBCUTANEOUS at 11:17

## 2017-10-13 RX ADMIN — POTASSIUM CHLORIDE 20 MEQ: 20 SOLUTION ORAL at 08:36

## 2017-10-13 NOTE — PROGRESS NOTES
Exercise Oximetry    Patient Name:Sameer Tavarez   MRN: [id number]   Date: 10/13/17             ROOM AIR BASELINE   SpO2% 94   Heart Rate 80   Blood Pressure      EXERCISE ON ROOM AIR SpO2% EXERCISE ON O2 @  LPM SpO2%   1 MINUTE 95 1 MINUTE    2 MINUTES 95 2 MINUTES    3 MINUTES 92 3 MINUTES    4 MINUTES 95 4 MINUTES    5 MINUTES 97 5 MINUTES    6 MINUTES 98 6 MINUTES               Distance Walked  2000 feet Distance Walked   Dyspnea (Shen Scale)   Dyspnea (Shen Scale)   Fatigue (Shen Scale)   Fatigue (Shen Scale)   SpO2% Post Exercise  98 SpO2% Post Exercise   HR Post Exercise  87 HR Post Exercise   Time to Recovery  <2 minutes Time to Recovery     Comments: Pt walked from his room, to ICU waiting room, down the 390's davalos, down the 360's davalos to , and back to his room. Pt did not have any issues with shortness of breath.

## 2017-10-13 NOTE — THERAPY TREATMENT NOTE
Acute Care - Speech Language Pathology Treatment Note  [facility]     Patient Name: Sameer Tavarez  : 1955  MRN: [id number]  Today's Date: 10/13/2017         Admit Date: 10/5/2017   Speech tx completed. Pt tolerated PMV for 20 minutes this date, yet continues to have breathy vocal quality with valve use. No breath stacking observed. 1x cough, at which time PMV was expelled from trach. Pt did not wish to re-attempt at that time. It must be noted that ST did attach PMV to flexible rubber cord around face plate to avoid breakage of valve or loss of valve. Pt and wife were educated on safe use strategies, as well as cleaning instructions for PMV. Pt was also educated on use of finger occlusion, which he demonstrated adequately. Pt was also provided information re: outpatient head and neck CA speech tx treatment for dysphagia, which ST feels pt would benefit from at this time and while receiving radiation tx. MD to order outpatient  head and neck CA services. Pt was provided verbal and printed information re: dysphagia exercises he can begin at this time to sustain current oropharyngeal musculature fx. ST to monitor. Thanks! Joann Parrish, ALON-SLP 10/13/2017 4:25 PM  Visit Dx:      ICD-10-CM ICD-9-CM   1. Impaired mobility Z74.09 799.89   2. Oropharyngeal cancer C10.9 146.9   3. Poor dentition K08.9 525.9   4. Current smoker F17.200 305.1   5. Tobacco use Z72.0 305.1   6. 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   • Recio's esophagus with dysplasia   • Encounter for consultation   • Tracheostomy dependence   • Postoperative pain              Adult Rehabilitation Note       10/13/17 1430 10/12/17 1543 10/12/17 1128    Rehab Assessment/Intervention    Discipline speech language pathologist  -TM physical therapy assistant  - physical therapy assistant  -    Document Type therapy note (daily  note)  -TM therapy note (daily note)  - therapy note (daily note)  -    Subjective Information agree to therapy;no complaints  -TM no complaints;agree to therapy  - no complaints  -    Patient Effort, Rehab Treatment adequate  -TM      Precautions/Limitations  fall precautions;oxygen therapy device and L/min   02 trach, peg  -AH fall precautions;oxygen therapy device and L/min   trach/PEG  -AH    Recorded by [] Joann Parrish CCC-SLP [] Emmie Gonzales PTA [] Emmie Gonzales PTA    Vital Signs    O2 Delivery Pre Treatment  supplemental O2  -AH supplemental O2   trach 10L  -AH    Intra SpO2 (%)  94  -AH 92  -AH    O2 Delivery Intra Treatment  supplemental O2   trach 30% 6L  -AH supplemental O2   trach 10L  -AH    Recorded by  [] Emmie Gonzales PTA [] Emmie Gonzales PTA    Pain Assessment    Pain Assessment  No/denies pain  - No/denies pain  -    Recorded by  [] Emmie Gonzales PTA [] Emmie Gonzales PTA    Additional 1 Communication Treatment    Additional 1 Progress 60%  -TM      Comments: Additional 1 Pt tolerated PMV for 20 minutes this date, yet continues to have breathy vocal quality with valve use. No breath stacking observed. 1x cough, at which time PMV was expelled from trach. Pt did not wish to re-attempt at that time. It must be noted that  did attach PMV to flexible rubber cord around face plate to avoid breakage of valve or loss of valve. Pt and wife were educated on safe use strategies, as well as cleaning instructions for PMV. Pt was also educated on use of finger occlusion, which he demonstrated adequately.   -TM      Recorded by [] Joann Parrish CCC-SLP      Bed Mobility, Assessment/Treatment    Bed Mob, Supine to Sit, [place]  conditional independence  - supervision required  -    Bed Mob, Sit to Supine, [place]  conditional independence  - supervision required  -    Recorded by  [] Emmie Gonzales PTA [] Emmie Gonzales PTA    Transfer  Assessment/Treatment    Transfers, Sit-Stand [place]  conditional independence  - stand by assist  -    Transfers, Stand-Sit [place]  conditional independence  - stand by assist  -    Transfers, Sit-Stand-Sit, Assist Device  straight cane  -     Toilet Transfer, [place]  conditional independence  -     Toilet Transfer, Assistive Device  straight cane  -     Recorded by  [] Emmie Gonzales PTA [] Emmie Gonzales PTA    Gait Assessment/Treatment    Gait, [place] Level  stand by assist  - verbal cues required;stand by assist;contact guard assist  -    Gait, Assistive Device  straight cane  - straight cane  -    Gait, Distance (Feet)  300   300 x 3  - 300   300 x 5  -    Gait, Gait Deviations   jose m decreased  -    Gait, Comment   few LOB cga to recover, pt tends to have LOB when turning his head and looking into rooms  -    Recorded by  [] Emmie Gonzales PTA [] Emmie Gonzales PTA    Stairs Assessment/Treatment    Number of Stairs  18  -     Stairs, Handrail Location  none  -     Stairs, [place] Level  contact guard assist;supervision required  -     Recorded by  [] Emmie Gonzales PTA     Motor Skills/Interventions    Additional Documentation  Balance Skills Training (Group)  -     Recorded by  [] Emmie Gonzales PTA     Balance Skills Training    Gait Balance-Level of Assistance  Close supervision;Contact guard  -     Gait Balance Support  No upper extremity supported  -     Gait Balance Activities  backwards;braiding / front;side-stepping;tandem  -     Recorded by  [] Emmie Gonzales PTA     Therapy Exercises    Bilateral Lower Extremities  AROM:;10 reps;standing;heel raises;hip abduction/adduction;hip extension;hip flexion   marching, jogging in place  -     Recorded by  [] Emmie Gonzales PTA     Positioning and Restraints    Pre-Treatment Position  in bed  - in bed  -    Post Treatment Position  bed  - bs  -    In  Bed  fowlers;call light within reach;encouraged to call for assist  -     On BS commode   notified nsg;sitting;call light within reach;encouraged to call for assist;with family/caregiver  -    Recorded by  [] Emmie Gonzales PTA [] Emmie Gonzales PTA      10/12/17 1027 10/12/17 1000 10/11/17 1140    Rehab Assessment/Intervention    Discipline --  - speech language pathologist  -KW physical therapy assistant  -TR    Document Type --  - therapy note (daily note)  - therapy note (daily note)  -TR    Subjective Information  no complaints  -KW agree to therapy;complains of;weakness;fatigue;pain  -TR    Patient Effort, Rehab Treatment  excellent  -KW good  -TR    Precautions/Limitations   fall precautions;oxygen therapy device and L/min   Trach, Peg   -TR    Recorded by [] Emmie Gonzales PTA [] Maribel Johnson MS CCC-SLP [TR] Sarina Uriostegui PTA    Vital Signs    Intra SpO2 (%)   98  -TR    O2 Delivery Intra Treatment   supplemental O2   10 L O2/NC  -TR    Recorded by   [TR] Sarina Uriostegui PTA    Pain Assessment    Pain Assessment   0-10  -TR    Pain Score   9  -TR    Post Pain Score   9  -TR    Pain Type   Acute pain;Chronic pain  -TR    Pain Location   Generalized  -TR    Pain Descriptors   Aching  -TR    Pain Frequency   Constant/continuous  -TR    Pain Intervention(s)   Ambulation/increased activity  -TR    Response to Interventions   tolerated  -TR    Recorded by   [TR] Sarina Uriostegui PTA    Cognitive Assessment/Intervention    Current Cognitive/Communication Assessment   functional  -TR    Orientation Status   oriented to;person;place;time  -TR    Follows Commands/Answers Questions   able to follow multi-step instructions;100% of the time  -TR    Personal Safety   decreased awareness, need for assist;decreased awareness, need for safety  -TR    Personal Safety Interventions   fall prevention program maintained;gait belt;nonskid shoes/slippers when out of bed  -TR    Recorded by    [TR] Sarina Uriostegui PTA    Additional 1 Communication Treatment    Additional 1 Progress  50%  -KW     Comments: Additional 1  Patient tolerated PMV for 30 minutes.  Some decreased voicing but overall improved during time with valve on.  Rena able to remove independently but had difficulty placing.  Trach now shiley non cuffed size 6.  He is aware to wear PMV only with SLP or MD for now. He is aware to not wear with sleeping or breathing treatment.  -KW     Recorded by  [KW] Maribel Johnson MS CCC-SLP     Bed Mobility, Assessment/Treatment    Bed Mobility, Assistive Device   bed rails;head of bed elevated  -TR    Bed Mob, Supine to Sit, [place]   supervision required  -TR    Bed Mob, Sit to Supine, [place]   supervision required  -TR    Bed Mobility, Impairments   strength decreased  -TR    Recorded by   [TR] Sarina Uriostegui PTA    Transfer Assessment/Treatment    Transfers, Sit-Stand [place]   verbal cues required;stand by assist  -TR    Transfers, Stand-Sit [place]   verbal cues required;stand by assist  -TR    Transfer, Impairments   strength decreased  -TR    Recorded by   [TR] Sarina Uriostegui PTA    Gait Assessment/Treatment    Gait, [place] Level   verbal cues required;contact guard assist;minimum assist (75% patient effort)  -TR    Gait, Assistive Device   straight cane  -TR    Gait, Distance (Feet)   500   X2  -TR    Gait, Gait Pattern Analysis   swing-through gait  -TR    Gait, Gait Deviations   jose m decreased;bilateral:;step length decreased;narrow base  -TR    Gait, Safety Issues   step length decreased;balance decreased during turns  -TR    Gait, Impairments   strength decreased;impaired balance  -TR    Gait, Comment   3 LOB requiring min assist, pt requesting to walk this far, no SOB or signs f increased fatigue  -TR    Recorded by   [TR] Sarina Uriostegui PTA    Balance Skills Training    Sitting-Level of Assistance   Close supervision  -TR     Sitting-Balance Support   Feet supported  -TR    Standing-Level of Assistance   Contact guard  -TR    Static Standing Balance Support   assistive device  -TR    Gait Balance-Level of Assistance   Contact guard;Minimum assistance  -TR    Gait Balance Support   assistive device  -TR    Recorded by   [TR] Sarina Uriostegui, PAULINO    Positioning and Restraints    Pre-Treatment Position   in bed  -TR    Post Treatment Position   bed  -TR    In Bed   fowlers;call light within reach;encouraged to call for assist;with family/caregiver;side rails up x2;notified nsg  -TR    Recorded by   [TR] Sarina Uriostegui, PTA      10/11/17 1015          Additional 1 Communication Treatment    Additional 1 Progress 0%  -KW      Comments: Additional 1 Patient attempted PMV but unable to exhale and became anxious.  Discussed how PMV works, possible toleration with down size or cuffless.  Patient for surgery today.  Has PEG tube.  Not eating by mouth.  Possible radiation to start soon.  Unsure when trach will be changed.  Patient effectively writes to communicate.  -KW      Recorded by [KW] Maribel Johnson MS CCC-SLP        User Key  (r) = Recorded By, (t) = Taken By, (c) = Cosigned By    Initials Name Effective Dates     Emmie Gonzales, PTA 08/02/16 -     TM Joann Parrish CCC-SLP 08/02/16 -     KW Maribel Johnson, MS CCC-SLP 08/02/16 -     TR Sarina Uriostegui, [facility] 08/02/16 -               IP SLP Goals       10/13/17 1621 10/12/17 1026 10/11/17 1258    SLP- Additional Goal 1    Additional Goal 1- SLP, Date Goal Reviewed 10/13/17  -TM 10/12/17  -KW 10/11/17  -KW      10/09/17 1028          SLP- Additional Goal 1    Additional Goal 1- SLP, Date Established 10/09/17  -MB      Additional Goal 1- SLP, Time to Achieve by discharge  -MB      Additional Goal 1- SLP, Activity Level Patient will tolerate PMV placement for 15 minutes with no s/s of distress, pt will exhibit audible speech with PMV placement at 3 feet with 90% accuracy, pt will  independently place and remove valve.  -MB      Additional Goal 1- SLP, Outcome goal ongoing  -MB        User Key  (r) = Recorded By, (t) = Taken By, (c) = Cosigned By    Initials Name Provider Type    ANGELIQUE Garcia, CCC-SLP Speech and Language Pathologist    RODOLFO Parrish, CCC-SLP Speech and Language Pathologist    KW Maribel Johnson, MS CCC-SLP Speech and Language Pathologist          EDUCATION  The patient has been educated in the following areas:   Dysphagia (Swallowing Impairment).    SLP Recommendation and Plan                               Plan of Care Review  Plan Of Care Reviewed With: patient, spouse  Progress: improving  Outcome Summary/Follow up Plan: Speech tx completed. Pt tolerated PMV for 20 minutes this date, yet continues to have breathy vocal quality with valve use. No breath stacking observed. 1x cough, at which time PMV was expelled from trach. Pt did not wish to re-attempt at that time. It must be noted that ST did attach PMV to flexible rubber cord around face plate to avoid breakage of valve or loss of valve. Pt and wife were educated on safe use strategies, as well as cleaning instructions for PMV. Pt was also educated on use of finger occlusion, which he demonstrated adequately. Pt was also provided information re: outpatient head and neck CA speech tx treatment for dysphagia, which ST feels pt would benefit from at this time and while receiving radiation tx. MD to order outpatient  head and neck CA services. Pt was provided verbal and printed information re: dysphagia exercises he can begin at this time to sustain current oropharyngeal musculature fx. ST to monitor.            Time Calculation:         Time Calculation- SLP       10/13/17 1624          Time Calculation- [facility]    SLP Start Time 1430  -      SLP Stop Time 1508  -      SLP Time Calculation (min) 38 min  -      SLP Received On 10/13/17  -        User Key  (r) = Recorded By, (t) = Taken By, (c) = Cosigned By     Initials Name Provider Type    TM EARL Tesfaye Speech and Language Pathologist          Therapy Charges for Today     Code Description Service Date Service Provider Modifiers Qty    [id number]  ST TREATMENT SPEECH 3 10/13/2017 EARL Tesfaye GN 1          SLP G-Codes  Functional Limitations: Other Speech Language Pathology (Voice following tracheostomy)  Other Speech-Language Pathology Functional Limitation Current Status (): At least 60 percent but less than 80 percent impaired, limited or restricted  Other Speech-Language Pathology Functional Limitation Goal Status (): At least 40 percent but less than 60 percent impaired, limited or restricted    EARL Masters  10/13/2017

## 2017-10-13 NOTE — PLAN OF CARE
Problem: Patient Care Overview (Adult)  Goal: Plan of Care Review  Outcome: Ongoing (interventions implemented as appropriate)    10/13/17 0539   Coping/Psychosocial Response Interventions   Plan Of Care Reviewed With patient   Patient Care Overview   Progress improving   Outcome Evaluation   Outcome Summary/Follow up Plan Pt cont to c/o pain, although he has slept most of this shift. Pt prefers IV dilaudid. Pt tolerating bolus feedings and trach care. Pt has 30% trach collar.       Goal: Adult Individualization and Mutuality  Outcome: Ongoing (interventions implemented as appropriate)  Goal: Discharge Needs Assessment  Outcome: Ongoing (interventions implemented as appropriate)    Problem: Perioperative Period (Adult)  Goal: Signs and Symptoms of Listed Potential Problems Will be Absent or Manageable (Perioperative Period)  Outcome: Ongoing (interventions implemented as appropriate)    Problem: Fall Risk (Adult)  Goal: Absence of Falls  Outcome: Ongoing (interventions implemented as appropriate)    Problem: Feeding Dysfunction/Swallowing Impairment (Pediatric)  Goal: Reduced Risk of Aspiration  Outcome: Ongoing (interventions implemented as appropriate)  Goal: Adequate Nutrition and Hydration  Outcome: Ongoing (interventions implemented as appropriate)    Problem: Airway, Artificial (Adult)  Goal: Signs and Symptoms of Listed Potential Problems Will be Absent or Manageable (Airway, Artificial)  Outcome: Ongoing (interventions implemented as appropriate)

## 2017-10-13 NOTE — TREATMENT PLAN
Sameer Corby, [birth year] is a patient with a trach and G-tube due to having laryngeal squamous cell carcinoma. He is being released home today and will need tube feeding supplies for greater than 90 days.    Jori Howe PA-C

## 2017-10-13 NOTE — PROGRESS NOTES
Continued Stay Note  [facility]     Patient Name: Sameer Tavarez  MRN: [id number]  Today's Date: 10/13/2017    Admit Date: 10/5/2017          Discharge Plan       10/13/17 165    Case Management/Social Work Plan    Plan HOME WITH [facility], PEG FEEDINGS FROM [facility] AND TRACH CARE FROM [facility]     Patient/Family In Agreement With Plan yes    Final Note    Final Note REC'D D/C ORDERS.  CONTACTED AND FAXED ORDERS TO [facility] TO SET UP HOME TRACH NEEDS; THEY WILL DELIVER TO PT'S HOME TODAY.  CONTACTED AND FAXED ORDERS TO [facility] AT [facility] FOR HOME PEG FEEDINGS; THEY WILL BE DELIVERED TO PT'S HOME TOMORROW.  [facility] WILL SEND ENOUGH FEEDING TO [facility]. UNTIL [facility] DELIVERS TOMORROW.  CONTACTED FRANCISCA MURRAY RN AND DWIGHT HUMPHREY RN AT [facility] TO ADVISE OF PT'S D/C TODAY.  SET UP [facility] TO TRANSPORT PT. HOME.  PT. ADVISED THAT HE HAS A SECONDARY INS THAT IS NOT ON FILE AT [facility].  CONTACTED PT'S WIFE AND ASKED HER TO BRING COPY OF CARD TO [facility] TO ADD TO PT'S FILE.  SHE AGREES.                Discharge Codes     None        Expected Discharge Date and Time     Expected Discharge Date Expected Discharge Time    Oct 13, 2017             CRIS Frasre

## 2017-10-13 NOTE — PLAN OF CARE
Problem: Patient Care Overview (Adult)  Goal: Plan of Care Review  Outcome: Ongoing (interventions implemented as appropriate)    10/13/17 1621   Coping/Psychosocial Response Interventions   Plan Of Care Reviewed With patient;spouse   Patient Care Overview   Progress improving   Outcome Evaluation   Outcome Summary/Follow up Plan Speech tx completed. Pt tolerated PMV for 20 minutes this date, yet continues to have breathy vocal quality with valve use. No breath stacking observed. 1x cough, at which time PMV was expelled from trach. Pt did not wish to re-attempt at that time. It must be noted that ST did attach PMV to flexible rubber cord around face plate to avoid breakage of valve or loss of valve. Pt and wife were educated on safe use strategies, as well as cleaning instructions for PMV. Pt was also educated on use of finger occlusion, which he demonstrated adequately. Pt was also provided information re: outpatient head and neck CA speech tx treatment for dysphagia, which ST feels pt would benefit from at this time and while receiving radiation tx. MD to order outpatient ST head and neck CA services. Pt was provided verbal and printed information re: dysphagia exercises he can begin at this time to sustain current oropharyngeal musculature fx. ST to monitor.          Problem: Inpatient SLP  Goal: SLP Additional Goal 1  Outcome: Ongoing (interventions implemented as appropriate)    10/09/17 1028 10/13/17 1621   SLP- Additional Goal 1   Additional Goal 1- SLP, Date Established 10/09/17 --    Additional Goal 1- SLP, Time to Achieve by discharge --    Additional Goal 1- SLP, Activity Level Patient will tolerate PMV placement for 15 minutes with no s/s of distress, pt will exhibit audible speech with PMV placement at 3 feet with 90% accuracy, pt will independently place and remove valve. --    Additional Goal 1- SLP, Date Goal Reviewed --  10/13/17   Additional Goal 1- SLP, Outcome goal ongoing --

## 2017-10-13 NOTE — DISCHARGE SUMMARY
DISCHARGE SUMMARY:     PATIENT NAME: Sameer Tavarez  ACCOUNT NUMBER: [id number]  ADMISSION DATE:  10/5/2017  DISCHARGE DATE:    ATTENDING PHYSICIAN: Alber Justin MD  CONDITION ON DISCHARGE: stable    ADMITTING DIAGNOSIS:   1. Impaired mobility    2. Oropharyngeal cancer    3. Poor dentition    4. Current smoker    5. Tobacco use    6. Voice absence        PROCEDURES:   direct laryngoscopy with biopsy and tracheostomy placement with full dental extraction    REASON FOR ADMISSION:   Sameer Tavarez is a 62 y.o. male who was admitted  after surgery.    HOSPITAL COURSE:   He underwent the procedure and was recovered and then sent to the floor for observation. 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 He was later admitted after having and awake tracheostomy and repeat direct laryngoscopy with biopsy on 10-5-17. The following day on 10-6-17 he had a G-tube placed. On 10-11-17 he had a full dental extraction and repeat direct laryngoscopy. The patient has a stable trach and symptoms and has been set up for treatment with chemotherapy and XRT. He has follow-up with Dr. Tompkins. The patient is getting home health and home supplies set up to hopefully be discharged later today.    DISCHARGE MEDICATIONS:   Sameer Tavarez   Home Medication Instructions CHARLENE:[id number]    Printed on:10/13/17 0836   Medication Information                      acetaminophen (TYLENOL) 325 MG tablet  Take 2 tablets by mouth Every 6 (Six) Hours As Needed for Mild Pain  or Fever for up to 30 days.             aspirin 81 MG chewable tablet  Chew 81 mg Daily.             baclofen (LIORESAL) 20 MG tablet  Take 20 mg by mouth 3 (Three) Times a Day.             carBAMazepine (TEGretol) 100 MG chewable tablet  2 tablets by Per G Tube route Every 12 (Twelve) Hours for 30 days.             chlorhexidine (PERIDEX) 0.12 % solution  Apply 15 mL to the mouth or throat Every 12 (Twelve) Hours  for 10 days.             fexofenadine-pseudoephedrine (ALLEGRA-D)  MG per 12 hr tablet  Take 1 tablet by mouth 2 (Two) Times a Day.             furosemide (LASIX) 40 MG tablet  Take 40 mg by mouth 2 (Two) Times a Day.             Guar Gum (NUTRISOURCE FIBER) pack pack  1 packet by Per G Tube route 2 (Two) Times a Day for 30 days.             insulin glargine (LANTUS) 100 UNIT/ML injection  Inject 70 Units under the skin Daily.             lisinopril (PRINIVIL,ZESTRIL) 10 MG tablet  Take 10 mg by mouth Daily.             metoprolol tartrate (LOPRESSOR) 50 MG tablet  Take 50 mg by mouth 2 (Two) Times a Day.             nitroglycerin (NITROLINGUAL) 0.4 MG/SPRAY spray  Place 1 spray under the tongue Every 5 (Five) Minutes As Needed for Chest Pain.             omeprazole (priLOSEC) 20 MG capsule  Take 20 mg by mouth Daily.             oxyCODONE-acetaminophen (PERCOCET)  MG per tablet  Take 1 tablet by mouth Every 6 (Six) Hours As Needed for Moderate Pain .             potassium chloride (KAYCIEL) 20 MEQ/15ML (10%) solution  15 mL by Per G Tube route Daily for 30 days.             sucralfate (CARAFATE) 1 g tablet  Take 1 g by mouth 4 (Four) Times a Day.             tamsulosin (FLOMAX) 0.4 MG capsule 24 hr capsule  Take 1 capsule by mouth Every Night.             zolpidem (AMBIEN) 10 MG tablet  Take 10 mg by mouth At Night As Needed for Sleep.                 DISCHARGE INSTRUCTIONS:  Follow-up as directed, trach care as directed, G-tube care and use as directed, port care as directed.       Your medication list      START taking these medications       Instructions Last Dose Given Next Dose Due    acetaminophen 325 MG tablet   Commonly known as:  TYLENOL        Take 2 tablets by mouth Every 6 (Six) Hours As Needed for Mild Pain  or Fever for up to 30 days.         carBAMazepine 100 MG chewable tablet   Commonly known as:  TEGretol        2 tablets by Per G Tube route Every 12 (Twelve) Hours for 30 days.          chlorhexidine 0.12 % solution   Commonly known as:  PERIDEX        Apply 15 mL to the mouth or throat Every 12 (Twelve) Hours for 10 days.         Nutrisource fiber pack pack        1 packet by Per G Tube route 2 (Two) Times a Day for 30 days.         potassium chloride 20 MEQ/15ML (10%) solution   Commonly known as:  KAYCIEL        15 mL by Per G Tube route Daily for 30 days.           CONTINUE taking these medications       Instructions Last Dose Given Next Dose Due    AMBIEN 10 MG tablet   Generic drug:  zolpidem              aspirin 81 MG chewable tablet              baclofen 20 MG tablet   Commonly known as:  LIORESAL              fexofenadine-pseudoephedrine  MG per 12 hr tablet   Commonly known as:  ALLEGRA-D              furosemide 40 MG tablet   Commonly known as:  LASIX              insulin glargine 100 UNIT/ML injection   Commonly known as:  LANTUS              lisinopril 10 MG tablet   Commonly known as:  PRINIVIL,ZESTRIL              metoprolol tartrate 50 MG tablet   Commonly known as:  LOPRESSOR              nitroglycerin 0.4 MG/SPRAY spray   Commonly known as:  NITROLINGUAL              omeprazole 20 MG capsule   Commonly known as:  priLOSEC              oxyCODONE-acetaminophen  MG per tablet   Commonly known as:  PERCOCET        Take 1 tablet by mouth Every 6 (Six) Hours As Needed for Moderate Pain .         sucralfate 1 g tablet   Commonly known as:  CARAFATE              tamsulosin 0.4 MG capsule 24 hr capsule   Commonly known as:  FLOMAX                   Where to Get Your Medications      These medications were sent to [facility] [address] [facility] [phone]  - [phone] [address] [address]     Phone:  [phone]    • acetaminophen 325 MG tablet   • carBAMazepine 100 MG chewable tablet   • chlorhexidine 0.12 % solution   • Nutrisource fiber pack pack   • potassium chloride 20 MEQ/15ML (10%) solution             FOLLOW  UP:  Follow up as previously scheduled with Dr. JUANITA Justin MD, FACS, FAAOA and Dr. Tompkins.      JEWEL Guardado  10/13/2017  8:36 AM

## 2017-10-14 NOTE — PLAN OF CARE
Problem: Inpatient Physical Therapy  Goal: Bed Mobility Goal LTG- PT  Outcome: Outcome(s) achieved Date Met:  10/14/17    10/07/17 1104 10/14/17 0845   Bed Mobility PT LTG   Bed Mobility PT LTG, Date Established 10/07/17 --    Bed Mobility PT LTG, Time to Achieve by discharge --    Bed Mobility PT LTG, Activity Type all bed mobility --    Bed Mobility PT LTG, [place] Level supervision required --    Bed Mobility PT Goal LTG, Assist Device bed rails --    Bed Mobility PT LTG, Date Goal Reviewed --  10/14/17   Bed Mobility PT LTG, Outcome --  goal met       Goal: Transfer Training Goal 1 LTG- PT  Outcome: Outcome(s) achieved Date Met:  10/14/17    10/07/17 1104 10/14/17 0845   Transfer Training PT LTG   Transfer Training PT LTG, Date Established 10/07/17 --    Transfer Training PT LTG, Time to Achieve by discharge --    Transfer Training PT LTG, Activity Type bed to chair /chair to bed;sit to stand/stand to sit --    Transfer Training PT LTG, [place] Level supervision required --    Transfer Training PT LTG, Assist Device cane, straight --    Transfer Training PT LTG, Date Goal Reviewed --  10/14/17   Transfer Training PT LTG, Outcome --  goal met       Goal: Gait Training Goal LTG- PT  Outcome: Outcome(s) achieved Date Met:  10/14/17    10/07/17 1104 10/14/17 0845   Gait Training PT LTG   Gait Training Goal PT LTG, Date Established 10/07/17 --    Gait Training Goal PT LTG, Time to Achieve by discharge --    Gait Training Goal PT LTG, [place] Level supervision required --    Gait Training Goal PT LTG, Assist Device cane, straight --    Gait Training Goal PT LTG, Distance to Achieve 150 --    Gait Training Goal PT LTG, Date Goal Reviewed --  10/14/17   Gait Training Goal PT LTG, Outcome --  goal met       Goal: Stair Training Goal LTG- PT  Outcome: Outcome(s) achieved Date Met:  10/14/17    10/07/17 1104 10/14/17 0845   Stair Training PT LTG   Stair Training Goal PT LTG, Date Established 10/07/17 --     Stair Training Goal PT LTG, Time to Achieve by discharge --    Stair Training Goal PT LTG, Number of Steps 4 --    Stair Training Goal PT LTG, [place] Level supervision required --    Stair Training Goal PT LTG, Date Goal Reviewed --  10/14/17   Stair Training Goal PT LTG, Outcome --  goal met

## 2017-10-14 NOTE — THERAPY DISCHARGE NOTE
Acute Care - Physical Therapy Discharge Summary  [facility]       Patient Name: Sameer Tavarez  : 1955  MRN: [id number]    Today's Date: 10/14/2017  Onset of Illness/Injury or Date of Surgery Date: 10/05/17    Date of Referral to PT: 10/06/17  Referring Physician: Dr. Juarez      Admit Date: 10/5/2017      PT Recommendation and Plan    Visit Dx:    ICD-10-CM ICD-9-CM   1. Impaired mobility Z74.09 799.89   2. Oropharyngeal cancer C10.9 146.9   3. Poor dentition K08.9 525.9   4. Current smoker F17.200 305.1   5. Tobacco use Z72.0 305.1   6. Voice absence R49.1 784.41             Outcome Measures       10/12/17 1300 10/11/17 1140       How much help from another person do you currently need...    Turning from your back to your side while in flat bed without using bedrails? 4  - 3  -TR     Moving from lying on back to sitting on the side of a flat bed without bedrails? 4  - 3  -TR     Moving to and from a bed to a chair (including a wheelchair)? 4  - 3  -TR     Standing up from a chair using your arms (e.g., wheelchair, bedside chair)? 3  - 3  -TR     Climbing 3-5 steps with a railing? 3  - 3  -TR     To walk in hospital room? 3  - 3  -TR     AM-PAC 6 Clicks Score 21  - 18  -TR     Functional Assessment    Outcome Measure Options AM-PAC 6 Clicks Basic Mobility (PT)  - AM-PAC 6 Clicks Basic Mobility (PT)  -TR       User Key  (r) = Recorded By, (t) = Taken By, (c) = Cosigned By    Initials Name Provider Type     Emmie Gonzales PTA Physical Therapy Assistant    TR Sarina Uriostegui PTA Physical Therapy Assistant                      IP PT Goals       10/14/17 0845 10/07/17 1104       Bed Mobility PT LTG    Bed Mobility PT LTG, Date Established  10/07/17  -JOANN     Bed Mobility PT LTG, Time to Achieve  by discharge  -JOANN     Bed Mobility PT LTG, Activity Type  all bed mobility  -JOANN     Bed Mobility PT LTG, [place] Level  supervision required  -JOANN     Bed Mobility PT Goal  LTG, Assist Device   bed rails  -JOANN     Bed Mobility PT LTG, Date Goal Reviewed 10/14/17  -      Bed Mobility PT LTG, Outcome goal met  -      Transfer Training PT LTG    Transfer Training PT LTG, Date Established  10/07/17  -     Transfer Training PT LTG, Time to Achieve  by discharge  -JOANN     Transfer Training PT LTG, Activity Type  bed to chair /chair to bed;sit to stand/stand to sit  -JOANN     Transfer Training PT LTG, [place] Level  supervision required  -JOANN     Transfer Training PT LTG, Assist Device  cane, straight  -JOANN     Transfer Training PT  LTG, Date Goal Reviewed 10/14/17  -      Transfer Training PT LTG, Outcome goal met  -      Gait Training PT LTG    Gait Training Goal PT LTG, Date Established  10/07/17  -JOANN     Gait Training Goal PT LTG, Time to Achieve  by discharge  -JOANN     Gait Training Goal PT LTG, [place] Level  supervision required  -JOANN     Gait Training Goal PT LTG, Assist Device  cane, straight  -JOANN     Gait Training Goal PT LTG, Distance to Achieve  150  -JOANN     Gait Training Goal PT LTG, Date Goal Reviewed 10/14/17  -      Gait Training Goal PT LTG, Outcome goal met  -      Stair Training PT LTG    Stair Training Goal PT LTG, Date Established  10/07/17  -     Stair Training Goal PT LTG, Time to Achieve  by discharge  -JOANN     Stair Training Goal PT LTG, Number of Steps  4  -JOANN     Stair Training Goal PT LTG, [place] Level  supervision required  -JOANN     Stair Training Goal PT LTG, Date Goal Reviewed 10/14/17  -      Stair Training Goal PT LTG, Outcome goal met  -        User Key  (r) = Recorded By, (t) = Taken By, (c) = Cosigned By    Initials Name Provider Type    SUSIE Gonzales, PTA Physical Therapy Assistant    JOANN Montoya, PT DPT Physical Therapist                     Emmie Gonzales, PTA   10/14/2017

## 2017-10-17 ENCOUNTER — TELEPHONE (OUTPATIENT)
Dept: OTOLARYNGOLOGY | Facility: CLINIC | Age: 62
End: 2017-10-17

## 2017-10-17 NOTE — THERAPY DISCHARGE NOTE
Acute Care - Speech Language Pathology Discharge Summary  [facility]       Patient Name: Sameer Tavarez  : 1955  MRN: [id number]    Today's Date: 10/17/2017  Onset of Illness/Injury or Date of Surgery Date: 10/05/17                Admit Date: 10/5/2017      SLP Recommendation and Plan  Continued tx for PMV toleration. Thanks! Joann Parrish CCC-SLP 10/17/2017 4:47 PM    Visit Dx:    ICD-10-CM ICD-9-CM   1. Impaired mobility Z74.09 799.89   2. Oropharyngeal cancer C10.9 146.9   3. Poor dentition K08.9 525.9   4. Current smoker F17.200 305.1   5. Tobacco use Z72.0 305.1   6. Voice absence R49.1 784.41                     IP SLP Goals       10/17/17 1646 10/13/17 1621 10/12/17 1026    SLP- Additional Goal 1    Additional Goal 1- SLP, Date Goal Reviewed  10/13/17  -TM 10/12/17  -KW    Additional Goal 1- SLP, Outcome goal partially met  -      Additional Goal 1- SLP, Reason Goal Not Met discharged from facility  -        10/11/17 1258 10/09/17 1028       SLP- Additional Goal 1    Additional Goal 1- SLP, Date Established  10/09/17  -MB     Additional Goal 1- SLP, Time to Achieve  by discharge  -MB     Additional Goal 1- SLP, Activity Level  Patient will tolerate PMV placement for 15 minutes with no s/s of distress, pt will exhibit audible speech with PMV placement at 3 feet with 90% accuracy, pt will independently place and remove valve.  -MB     Additional Goal 1- SLP, Date Goal Reviewed 10/11/17  -KW      Additional Goal 1- SLP, Outcome  goal ongoing  -MB       User Key  (r) = Recorded By, (t) = Taken By, (c) = Cosigned By    Initials Name Provider Type    ANGELIQUE Garcia, CCC-SLP Speech and Language Pathologist    TM Joann Parrish, CCC-SLP Speech and Language Pathologist    KW Maribel Johnson, MS CCC-SLP Speech and Language Pathologist                  SLP Discharge Summary  Anticipated Discharge Disposition: home  Reason for Discharge: Discharge from facility  Outcomes Achieved: Patient able to  partially acheive established goals  Discharge Destination: Home      Joann Parrish CCC-SLP  10/17/2017

## 2017-10-17 NOTE — TELEPHONE ENCOUNTER
Rec'd a message from Chapis salgado [facility]: she evaluated the patient and states he does not have a need for physical therapy at home

## 2017-10-17 NOTE — PLAN OF CARE
Problem: Inpatient SLP  Goal: SLP Additional Goal 1  Outcome: Unable to achieve outcome(s) by discharge Date Met:  10/17/17    10/09/17 1028 10/13/17 1621 10/17/17 1646   SLP- Additional Goal 1   Additional Goal 1- SLP, Date Established 10/09/17 --  --    Additional Goal 1- SLP, Time to Achieve by discharge --  --    Additional Goal 1- SLP, Activity Level Patient will tolerate PMV placement for 15 minutes with no s/s of distress, pt will exhibit audible speech with PMV placement at 3 feet with 90% accuracy, pt will independently place and remove valve. --  --    Additional Goal 1- SLP, Date Goal Reviewed --  10/13/17 --    Additional Goal 1- SLP, Outcome --  --  goal partially met   Additional Goal 1- SLP, Reason Goal Not Met --  --  discharged from facility

## 2017-10-23 ENCOUNTER — HOSPITAL ENCOUNTER (OUTPATIENT)
Dept: RADIATION ONCOLOGY | Facility: HOSPITAL | Age: 62
Setting detail: RADIATION/ONCOLOGY SERIES
End: 2017-10-23

## 2017-10-23 ENCOUNTER — HOSPITAL ENCOUNTER (OUTPATIENT)
Dept: RADIATION ONCOLOGY | Facility: HOSPITAL | Age: 62
Discharge: HOME | End: 2017-10-23

## 2017-10-23 ENCOUNTER — HOSPITAL ENCOUNTER (EMERGENCY)
Age: 62
Discharge: HOME | End: 2017-10-24
Payer: MEDICARE

## 2017-10-23 ENCOUNTER — OFFICE VISIT (OUTPATIENT)
Dept: RADIATION ONCOLOGY | Facility: HOSPITAL | Age: 62
End: 2017-10-23

## 2017-10-23 VITALS — BODY MASS INDEX: 23.4 KG/M2 | DIASTOLIC BLOOD PRESSURE: 60 MMHG | WEIGHT: 145 LBS | SYSTOLIC BLOOD PRESSURE: 96 MMHG

## 2017-10-23 DIAGNOSIS — Z93.1 S/P PERCUTANEOUS ENDOSCOPIC GASTROSTOMY (PEG) TUBE PLACEMENT (HCC): ICD-10-CM

## 2017-10-23 DIAGNOSIS — F17.200 CURRENT SMOKER: ICD-10-CM

## 2017-10-23 DIAGNOSIS — C10.9 OROPHARYNGEAL CANCER (HCC): Primary | ICD-10-CM

## 2017-10-23 DIAGNOSIS — K62.89 PAIN, RECTAL: ICD-10-CM

## 2017-10-23 DIAGNOSIS — K59.00 CONSTIPATION, UNSPECIFIED CONSTIPATION TYPE: Primary | ICD-10-CM

## 2017-10-23 DIAGNOSIS — Z71.89 ENCOUNTER TO DISCUSS PROCEDURE: ICD-10-CM

## 2017-10-23 DIAGNOSIS — K59.00 CONSTIPATION, ACUTE: ICD-10-CM

## 2017-10-23 DIAGNOSIS — Z93.0 TRACHEOSTOMY DEPENDENCE (HCC): ICD-10-CM

## 2017-10-23 PROCEDURE — 99283 EMERGENCY DEPT VISIT LOW MDM: CPT

## 2017-10-23 PROCEDURE — 77334 RADIATION TREATMENT AID(S): CPT | Performed by: RADIOLOGY

## 2017-10-23 RX ORDER — INSULIN GLARGINE 100 [IU]/ML
30 INJECTION, SOLUTION SUBCUTANEOUS EVERY MORNING
COMMUNITY

## 2017-10-23 RX ORDER — CARBAMAZEPINE 100 MG/5ML
SUSPENSION ORAL 2 TIMES DAILY
COMMUNITY
End: 2018-02-07 | Stop reason: ALTCHOICE

## 2017-10-23 ASSESSMENT — PAIN SCALES - GENERAL: PAINLEVEL_OUTOF10: 10

## 2017-10-23 NOTE — PROGRESS NOTES
[facility]  MD Chayo Lopez BSN, PA-C  ____________________________________________________________  [facility]  Department of Radiation Oncology  [address] [place] [id number]  Office:  [phone]  Fax: [phone]      DATE:   10/23/2017    PATIENT:   Samere Tavarez   1955                                 MEDICAL RECORD #:  [id number]    Reason for Visit:    1. Oropharyngeal cancer    2. Tracheostomy dependence    3. S/P percutaneous endoscopic gastrostomy (PEG) tube placement    4. Constipation, acute    5. Pain, rectal    6. Current smoker    7. Encounter to discuss procedure       BRIEF HISTORY:  Sameer Tavarez is a pleasant 62 y.o. patient that returns to our clinic today. He is here today to discuss MRI and PET scan on 9/29/2017 and for CT simulation to begin radiation therapy. He had his port placed and Peg Tube placement on 10/6/2017. He is here today with his wife. Chemo was initiated on 10/16/2017. He continues to follow closely with Dr. Dominguez. He complaints of rectal pain and constipation today.       Mr. 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 He underwent an upper endoscopy 8/27/2017 that revealed a oropharynx lesion, he has since  A biopsy was consistent with at least squamous cell carcinoma in situ. Status of invasion was indeterminate. P16 was positive.        9/13/2017 CT scan of Chest/Abdomen/Pelvis with contrast revealed:  § 1.1 cm lymph node adjacent to the distal esophagus.   § 2 small pulmonary nodules measuring 4 mm and 4.2 mm in diameter located in the right lower lobe and at right middle lobe.  § 4.9 x 2.5 x 3.3 cm abnormal soft tissue appearance in the right perineum of unknown significance.                § 1 x 1.5 cm sclerotic lesion in the left iliac wing laterally to the sacroiliac joints.  § 7 mm sclerosis the left iliac lateral to the left SI joint.                      9/19/2017 Follow up appt with  Dr. Dominguez-                        Referred to ENT and would like biopsy repeated. PET, CT scan, and MRI of the neck requested. Refer to . Follow up in 2 weeks.   9/29/2017 MRI Orbit Face Neck With and without Contrast   · Large right oropharyngeal mass compatible with the history of carcinoma  · Right Oropharyngeal soft tissue mass measuring 6.7 x 4.6 x 4.6 cm   · Tongue base and right submandibular gland involvement         9/29/2017 PET CT Skull Base to Mid Thigh   · Abnormal metabolic activity in the base of the tongue on the right extending in the right palatine. SUV is 8.8.     10/5/2017 Biopsy of the right superior tonsil and right base of tongue by   · Pathology of the tonsil and tongue revealed invasive moderate differentiated squamous cell carcinoma postitive for p16 by IHC.       10/06/2017 Peg Tube Placement   10/06/2017 Port Placement  · Left Subclavian Port-A-Cath      10/11/2017 Tooth Extraction    10/11/2017 Direct Laryngoscopy with Biopsy    10/16/2017 Initiation of systemic chemotherapy with Dr. Dominguez  · Carboplatin and taxol weekly, anticipating dose reduction of taxol once radiation is added to treatment plan.    ONCOLOGY HISTORY      Oropharyngeal cancer    8/28/2017 Initial Diagnosis     Oropharyngeal cancer         8/28/2017 Biopsy     Dr Patel ([facility]) - EGD with oropharynx biopsy:    Clinical Information       Pre-Op: Gerd/Hoarsness      Post-Op Esophageal ulcer,barretts oral lesion    Final Diagnosis   1.  Esophagus, endoscopic biopsy:  A.  Fragments of benign squamocolumnar junction mucosa and benign glandular mucosa demonstrating intestinal metaplasia (Recio's esophagus)  B.  Chronic active inflammation, moderate.  C.  No  dysplasia identified.     2.  Oropharynx, biopsy: At least squamous cell carcinoma in situ.     Comment: Status of invasion is indeterminate due to tangential sectioning of several of the tissue fragments.  Immunohistochemical stain for p16 is positive.     AJCC stage:pTX pNX                 8/28/2017 Imaging     [facility]  CT Soft tissue neck  No discrete enhancing mass  Soft tissue thickening right oropharynx and peritonsillar soft tissues compared to left.  Small lymph nodes may be inflammatory in nature         9/13/2017 Imaging     [facility]    CT Chest  Small hiatal hernia  1.1 cm lymph node adjacent to the distal esophagus.  Two small lung nodules.  For multiple solid noncalcified nodules smaller than 6 mm in diameter, no routine follow-up is recommended. (grade 2B; weak recommendation, moderate-quality evidence)  CT Abd Pelvis  Right perineal mass of unknown significance. Please correlate with physical exam.  Dilated small bowel with multiple air-fluid levels. Differential considerations include partial small bowel obstruction vs adynamic ileus.  Mild diverticulosis. Mild distended urinary bladder.         9/29/2017 Imaging     [facility]  MR Orbit Face Neck  1. Large right oropharyngeal mass compatible with the history of  carcinoma.  2. Tongue base and right submandibular gland involvement.    PET  1. Abnormal metabolic activity in the base of the tongue on the right  extending in the right palatine tonsil. SUV is 8.8. This is consistent  with neoplasm. No other regions of abnormal metabolic activity are  identified..           10/5/2017 Procedure     Tracheostomy    Path    Final Diagnosis   1.  Right superior tonsillar fossa, biopsy:  A.  Moderately differentiated squamous cell carcinoma, invasive.  B.  Immunohistochemical stain for p16 is positive.      AJCC stage: pTX pNX     2.  Right base of tongue, biopsy:  A.  Moderately differentiated squamous cell carcinoma, invasive.  B.   Immunohistochemical stain for p16 is positive.              10/6/2017 Procedure     Port placement  Gastrostomy tube placement         10/11/2017 Procedure     Teeth extraction          History obtained from  PATIENT, FAMILY and CHART     PAST MEDICAL HISTORY  Past Medical History:   Diagnosis Date   • Arthritis    • Coronary artery disease    • Depression    • Diabetes mellitus    • Enlarged prostate    • GERD (gastroesophageal reflux disease)    • History of transfusion    • Hypertension    • Oropharyngeal cancer 08/27/2017   • Seizure     diabetic   • Severe esophageal dysplasia    • Stroke    • TB (pulmonary tuberculosis) 2004      PAST SURGICAL HISTORY  Past Surgical History:   Procedure Laterality Date   • CARDIAC SURGERY     • CHOLECYSTECTOMY     • CORONARY ARTERY BYPASS GRAFT     • FRACTURE SURGERY     • GTUBE REPLACEMENT N/A 10/6/2017    Procedure: GASTROSTOMY TUBE REPLACEMENT, port placement;  Surgeon: Jayne Phillips MD;  Location:  [facility] OR;  Service:    • HERNIA REPAIR     • LARYNGOSCOPY N/A 10/11/2017    Procedure: DIRECT LARYNGOSCOPY WITH BIOPSY;  Surgeon: Darin Neal MD;  Location:  [facility] OR;  Service:    • NISSEN FUNDOPLICATION LAPAROSCOPIC     • MT DENTAL SURGERY PROCEDURE N/A 10/11/2017    Procedure: TOOTH EXTRACTION;  Surgeon: Darin Neal MD;  Location:  [facility] OR;  Service: ENT   • MT INSJ TUNNELED CVC W/O SUBQ PORT/ AGE 5 YR/> Left 10/6/2017    Procedure: INSERTION VENOUS ACCESS DEVICE;  Surgeon: Jayne Phillips MD;  Location: [facility] OR;  Service: General   • SKIN BIOPSY     • TESTICLE SURGERY      tubes implanted for drainage   • TONSILLECTOMY     • TRACHEOSTOMY N/A 10/5/2017    Procedure: Awake tracheostomy; DIRECT LARYNGOSCOPY WITH BIOPSY;  Surgeon: Alber Justin MD;  Location:  [facility] OR;  Service:       SOCIAL HISTORY  Social History   Substance Use Topics   • Smoking status: Current Every Day Smoker     Packs/day: 1.00     Years: 52.00     Types: Cigarettes   •  Smokeless tobacco: Current User     Types: Snuff      Comment: I've tried and tried   • Alcohol use Yes      Comment: occasionally      ALLERGIES   Codeine     MEDICATIONS  Current Outpatient Prescriptions   Medication Sig Dispense Refill   • carBAMazepine (TEGretol) 100 MG chewable tablet 2 tablets by Per G Tube route Every 12 (Twelve) Hours for 30 days. 120 tablet 0   • chlorhexidine (PERIDEX) 0.12 % solution Apply 15 mL to the mouth or throat Every 12 (Twelve) Hours for 10 days. 300 mL 0   • furosemide (LASIX) 40 MG tablet Take 40 mg by mouth 2 (Two) Times a Day.     • insulin glargine (LANTUS) 100 UNIT/ML injection Inject 70 Units under the skin Daily.     • lisinopril (PRINIVIL,ZESTRIL) 10 MG tablet Take 10 mg by mouth Daily.     • oxyCODONE-acetaminophen (PERCOCET)  MG per tablet Take 1 tablet by mouth Every 6 (Six) Hours As Needed for Moderate Pain . 40 tablet 0   • tamsulosin (FLOMAX) 0.4 MG capsule 24 hr capsule Take 1 capsule by mouth Every Night.     • zolpidem (AMBIEN) 10 MG tablet Take 10 mg by mouth At Night As Needed for Sleep.     • acetaminophen (TYLENOL) 325 MG tablet Take 2 tablets by mouth Every 6 (Six) Hours As Needed for Mild Pain  or Fever for up to 30 days. 90 tablet 5   • aspirin 81 MG chewable tablet Chew 81 mg Daily.     • baclofen (LIORESAL) 20 MG tablet Take 20 mg by mouth 3 (Three) Times a Day.     • fexofenadine-pseudoephedrine (ALLEGRA-D)  MG per 12 hr tablet Take 1 tablet by mouth 2 (Two) Times a Day.     • Guar Gum (NUTRISOURCE FIBER) pack pack 1 packet by Per G Tube route 2 (Two) Times a Day for 30 days. 60 packet 0   • metoprolol tartrate (LOPRESSOR) 50 MG tablet Take 50 mg by mouth 2 (Two) Times a Day.     • nitroglycerin (NITROLINGUAL) 0.4 MG/SPRAY spray Place 1 spray under the tongue Every 5 (Five) Minutes As Needed for Chest Pain.     • omeprazole (priLOSEC) 20 MG capsule Take 20 mg by mouth Daily.     • potassium chloride (KAYCIEL) 20 MEQ/15ML (10%) solution 15 mL  by Per G Tube route Daily for 30 days. 450 mL 0   • sucralfate (CARAFATE) 1 g tablet Take 1 g by mouth 4 (Four) Times a Day.       No current facility-administered medications for this visit.       The following portions of the patient's history were reviewed and updated as appropriate: allergies, current medications, past family history, past medical history, past social history, past surgical history and problem list.    REVIEW OF SYSTEMS   Review of Systems   Constitutional: Positive for fatigue. Negative for activity change, appetite change, chills, diaphoresis, fever and unexpected weight change.   HENT: Positive for dental problem (edentulous ), ear pain (right), sore throat, trouble swallowing and voice change. Negative for congestion, drooling, ear discharge, facial swelling, hearing loss, mouth sores, nosebleeds, postnasal drip, rhinorrhea, sinus pain, sinus pressure, sneezing and tinnitus.         Season allergies   Eyes: Negative.    Respiratory: Negative.    Cardiovascular: Negative.    Gastrointestinal: Negative.         G tube in place  6 cans Glucerna a day  2-3 cans of Boost a day  Also using water.  Having problems with meds with g tube   Endocrine: Negative.    Genitourinary:        On Flomax for problems voiding   Musculoskeletal: Negative.         Arthritis  Uses a cane   Skin: Negative.    Allergic/Immunologic: Negative.    Neurological: Negative.    Hematological: Negative.    Psychiatric/Behavioral: Negative.      PHYSICAL EXAM   VITAL SIGNS:   Vitals:    10/23/17 1027   BP: 96/60   Weight: 145 lb (65.8 kg)   Body mass index is 23.4 kg/(m^2).    General Appearance:    Alert, cooperative, no acute distress, appears stated age.   Vitals reviewed.   Head:    Normocephalic, without obvious abnormality, atraumatic   Eyes:    PERRL, conjunctiva clear       Nose:   Nares normal, septum midline, mucosa normal   Back:     Symmetric, no curvature, ROM normal, no CVA tenderness   Lungs:     Clear to  auscultation bilaterally, respirations unlabored   Heart:    Regular rate and rhythm, S1 and S2 normal     Abdomen:    Soft, non-tender, bowel sounds active all four quadrants,     no masses, no organomegaly   Genitalia:    deferred   Rectal:    negative without mass, lesions or tenderness.   Extremities:   Extremities normal, atraumatic, no cyanosis or edema   Skin:   Skin color, texture, turgor normal, no rashes or lesions   Neurologic:   Normal strength, sensation and reflexes throughout                     Psych exam:   alert,oriented, normal situational behavior        Clinical Quality Measures   Pain Documented PQRS #131 Pain severity quantified; pain present, followup plan documented.4 (Pain managed by RX medication.)    Care Plan: ADVANCED CARE PQRS #47 Care plan discussed, no care plan provided   Performance Status: ECOG (1) Restricted in physically strenuous activity, ambulatory and able to do work of light nature   TOBACCO SCREENING AND INTERVENTION  PQRS #226 Tobacco user, received tobacco cessation counseling. Current every day smoker 3-10 mintues spent counseling Will try to cut down    Medications Documented PQRS #130 Medications documented   WEIGHT SCREENING/BMI  Calculated BMI within normal parameteres & documented    ASSESSMENT AND PLAN  1. Oropharyngeal cancer    2. Tracheostomy dependence    3. S/P percutaneous endoscopic gastrostomy (PEG) tube placement    4. Constipation, acute    5. Pain, rectal    6. Current smoker    7. Encounter to discuss procedure         Orders Placed This Encounter   Procedures   • CT Radiation Therapy Planning     Standing Status:   Future     Standing Expiration Date:   10/23/2018     Order Specific Question:   Reason for Exam:     Answer:   oropharynx cancer          RECOMMENDATIONS:  Sameer Tavarez presents to our clinic today for re-evaluation after PET. I am recommending radiation therapy which will consist of 35 treatment fractions for total of 7000 cGy after  completion of dental extraction. Discussed with patient mouth discomfort-  comfort measures and treatment of, continue with Insure. Discussed with patient the need for a coloscopy to diagnose The patient verbalizes understanding of this discussion, voices no further questions and wishes to proceed with recommended therapy.     Patient complains of rectal discomfort, but after rectal exam no noted  fecal impaction at this time. Discussed with patient the need for a colonscopy to diagnose what is causing discomfort.  Patient reports appointment with Dr. Castellon today at 1500 and will request Dr. Alarcon do a colonoscopy.   I advised the patient of the risks in continuing to use tobacco, and I provided this patient with smoking cessation educational materials.  I also discussed how to quit smoking and the patient has expressed the willingness to quit.      Today, total time spent with this patient was 52  minutes and of that time, well over 50% was spent in counseling and coordination of care as follows: diagnosis, post-treatment coordination of care and radiation therapy in general  Chayo Taylor PA-C for Dr. Hadley Marie  10/23/2017  10:36 AM

## 2017-10-24 ENCOUNTER — APPOINTMENT (OUTPATIENT)
Dept: GENERAL RADIOLOGY | Age: 62
End: 2017-10-24
Payer: MEDICARE

## 2017-10-24 VITALS
WEIGHT: 145 LBS | OXYGEN SATURATION: 99 % | HEART RATE: 78 BPM | RESPIRATION RATE: 20 BRPM | DIASTOLIC BLOOD PRESSURE: 78 MMHG | SYSTOLIC BLOOD PRESSURE: 122 MMHG | TEMPERATURE: 98 F | BODY MASS INDEX: 23.3 KG/M2 | HEIGHT: 66 IN

## 2017-10-24 LAB
ALBUMIN SERPL-MCNC: 3.3 G/DL (ref 3.5–5.2)
ALP BLD-CCNC: 235 U/L (ref 40–130)
ALT SERPL-CCNC: 75 U/L (ref 5–41)
ANION GAP SERPL CALCULATED.3IONS-SCNC: 9 MMOL/L (ref 7–19)
AST SERPL-CCNC: 39 U/L (ref 5–40)
BASOPHILS ABSOLUTE: 0 K/UL (ref 0–0.2)
BASOPHILS RELATIVE PERCENT: 0.5 % (ref 0–1)
BILIRUB SERPL-MCNC: 0.3 MG/DL (ref 0.2–1.2)
BILIRUBIN URINE: NEGATIVE
BLOOD, URINE: NEGATIVE
BUN BLDV-MCNC: 21 MG/DL (ref 8–23)
CALCIUM SERPL-MCNC: 8.9 MG/DL (ref 8.8–10.2)
CHLORIDE BLD-SCNC: 93 MMOL/L (ref 98–111)
CLARITY: CLEAR
CO2: 29 MMOL/L (ref 22–29)
COLOR: YELLOW
CREAT SERPL-MCNC: 0.6 MG/DL (ref 0.5–1.2)
EOSINOPHILS ABSOLUTE: 0.1 K/UL (ref 0–0.6)
EOSINOPHILS RELATIVE PERCENT: 1.4 % (ref 0–5)
GFR NON-AFRICAN AMERICAN: >60
GLUCOSE BLD-MCNC: 201 MG/DL (ref 74–109)
GLUCOSE URINE: NEGATIVE MG/DL
HCT VFR BLD CALC: 32.3 % (ref 42–52)
HEMOGLOBIN: 10.9 G/DL (ref 14–18)
KETONES, URINE: NEGATIVE MG/DL
LEUKOCYTE ESTERASE, URINE: NEGATIVE
LYMPHOCYTES ABSOLUTE: 1.7 K/UL (ref 1.1–4.5)
LYMPHOCYTES RELATIVE PERCENT: 31.3 % (ref 20–40)
MCH RBC QN AUTO: 29.1 PG (ref 27–31)
MCHC RBC AUTO-ENTMCNC: 33.7 G/DL (ref 33–37)
MCV RBC AUTO: 86.4 FL (ref 80–94)
MONOCYTES ABSOLUTE: 0.8 K/UL (ref 0–0.9)
MONOCYTES RELATIVE PERCENT: 14.3 % (ref 0–10)
NEUTROPHILS ABSOLUTE: 2.9 K/UL (ref 1.5–7.5)
NEUTROPHILS RELATIVE PERCENT: 52.3 % (ref 50–65)
NITRITE, URINE: NEGATIVE
PDW BLD-RTO: 12 % (ref 11.5–14.5)
PH UA: 6.5
PLATELET # BLD: 391 K/UL (ref 130–400)
PMV BLD AUTO: 10.6 FL (ref 9.4–12.4)
POTASSIUM SERPL-SCNC: 4.4 MMOL/L (ref 3.5–5)
PROTEIN UA: NEGATIVE MG/DL
RBC # BLD: 3.74 M/UL (ref 4.7–6.1)
SODIUM BLD-SCNC: 131 MMOL/L (ref 136–145)
SPECIFIC GRAVITY UA: 1.01
TOTAL PROTEIN: 7.1 G/DL (ref 6.6–8.7)
UROBILINOGEN, URINE: 1 E.U./DL
WBC # BLD: 5.5 K/UL (ref 4.8–10.8)

## 2017-10-24 PROCEDURE — 99282 EMERGENCY DEPT VISIT SF MDM: CPT | Performed by: NURSE PRACTITIONER

## 2017-10-24 PROCEDURE — 80053 COMPREHEN METABOLIC PANEL: CPT

## 2017-10-24 PROCEDURE — 74022 RADEX COMPL AQT ABD SERIES: CPT

## 2017-10-24 PROCEDURE — 36415 COLL VENOUS BLD VENIPUNCTURE: CPT

## 2017-10-24 PROCEDURE — 96374 THER/PROPH/DIAG INJ IV PUSH: CPT

## 2017-10-24 PROCEDURE — 81003 URINALYSIS AUTO W/O SCOPE: CPT

## 2017-10-24 PROCEDURE — 85025 COMPLETE CBC W/AUTO DIFF WBC: CPT

## 2017-10-24 PROCEDURE — 6360000002 HC RX W HCPCS: Performed by: NURSE PRACTITIONER

## 2017-10-24 RX ORDER — HEPARIN SODIUM (PORCINE) LOCK FLUSH IV SOLN 100 UNIT/ML 100 UNIT/ML
300 SOLUTION INTRAVENOUS ONCE
Status: COMPLETED | OUTPATIENT
Start: 2017-10-24 | End: 2017-10-24

## 2017-10-24 RX ADMIN — SODIUM CHLORIDE, PRESERVATIVE FREE 300 UNITS: 5 INJECTION INTRAVENOUS at 03:12

## 2017-10-24 ASSESSMENT — ENCOUNTER SYMPTOMS
ABDOMINAL PAIN: 0
VOMITING: 0
COUGH: 0
TROUBLE SWALLOWING: 0
NAUSEA: 0
RHINORRHEA: 0
SHORTNESS OF BREATH: 0
SORE THROAT: 0
RECTAL PAIN: 1
CONSTIPATION: 0
DIARRHEA: 0

## 2017-10-24 ASSESSMENT — PAIN SCALES - GENERAL: PAINLEVEL_OUTOF10: 0

## 2017-10-24 NOTE — ED NOTES
ASSESSMENT: her with rectal pain urinary retention    PT ALERT/ORIENTED X4. PUPILS EQUAL/REACTIVE    SKIN:  WARM/DRY PINK CAPILLARY REFILL < 2SECS    CARDIAC:  S1 S2 NOTED     LUNGS: CLEAR UPPER AND LOWER LOBES, RESPIRATIONS EVEN/UNLABORED tach noted     ABDOMEN: BOWEL SOUNDS NOTED UPPER AND LOWER QUADRANTS                     SOFT AND NONTENDER. EXTREMITIES:  BILATERAL DP AND PT AND NO EDEMA NOTED. NO DISTRESS NOTED. SIDE RAILS UP AND CALL LIGHT IN REACH.      Elizabeth Prather RN  10/23/17 8761

## 2017-10-24 NOTE — ED PROVIDER NOTES
[address] [facility] EMERGENCY DEPT  eMERGENCY dEPARTMENT eNCOUnter      Pt Name: Domitila Brian  MRN: [id number]  [place] 1955  Date of evaluation: 10/23/2017  Provider: DORON Deleon    CHIEF COMPLAINT       Chief Complaint   Patient presents with    Rectal Pain     Pain has dx with throat cancer. Patient has been having rectal pain for couple of days. Pt told today by cancer doctor may be infected fissure .  Urinary Retention         HISTORY OF PRESENT ILLNESS   (Location/Symptom, Timing/Onset, Context/Setting, Quality, Duration, Modifying Factors, Severity)  Note limiting factors. Domitila Brian is a 58 y.o. male who presents to the emergency department with rectal pain. Pt states that he has had this pain for a while and it is getting worse. He states that the cancer doctor at [facility] done an exam today and he is refusing to have another exam unless he is knocked out. He discussed his rectal pain with Dr Dion Lóepz today and is seeing Dr Chioma Colunga at [facility] Friday for possible colonoscopy. He is here b/c the pain is so bad. He denies blood in his stool. He had a large BM on Friday. He is seeing Dr Mary Jane Lira and Dr Darrel Vega for tongue/oral cancer. HPI    Nursing Notes were reviewed. REVIEW OF SYSTEMS    (2-9 systems for level 4, 10 or more for level 5)     Review of Systems   Constitutional: Negative for activity change, appetite change, fatigue, fever and unexpected weight change. HENT: Negative for ear pain, rhinorrhea, sore throat and trouble swallowing. Eyes: Negative for visual disturbance. Respiratory: Negative for cough and shortness of breath. Cardiovascular: Negative for chest pain, palpitations and leg swelling. Gastrointestinal: Positive for rectal pain. Negative for abdominal pain, constipation, diarrhea, nausea and vomiting. Genitourinary: Negative for flank pain. Musculoskeletal: Negative for arthralgias, myalgias, neck pain and neck stiffness. Neurological: Negative for headaches. Psychiatric/Behavioral: Negative for decreased concentration and sleep disturbance. The patient is not nervous/anxious. A complete review of systems was performed and is negative except as noted above in the HPI. PAST MEDICAL HISTORY     Past Medical History:   Diagnosis Date    Gastelum's esophagus     CAD (coronary artery disease)     Chest pain     [facility]     DM2 (diabetes mellitus, type 2) ([facility]     GERD (gastroesophageal reflux disease)     History of cervical fracture     History of drug abuse     HTN (hypertension)     [facility]ure disorder [facility] [address]     Tobacco abuse          SURGICAL HISTORY       Past Surgical History:   Procedure Laterality Date    CARDIAC CATHETERIZATION  12/26/13  [facility]    EF over 60%    CORONARY ANGIOPLASTY WITH STENT PLACEMENT      x6 unsure of dates   Henri Earl CORONARY ARTERY BYPASS GRAFT  2008         CURRENT MEDICATIONS       Previous Medications    ASPIRIN 81 MG CHEWABLE TABLET    Take 1 tablet by mouth daily    BACLOFEN (LIORESAL) 20 MG TABLET    Take 20 mg by mouth 2 times daily    CARBAMAZEPINE (TEGRETOL) 100 MG/5ML SUSPENSION    Take by mouth 2 times daily    DOCUSATE SODIUM (COLACE) 100 MG CAPSULE    Take 1 capsule by mouth 2 times daily    FUROSEMIDE (LASIX) 40 MG TABLET    Take 40 mg by mouth daily. INSULIN GLARGINE (LANTUS) 100 UNIT/ML INJECTION VIAL    Inject 30 Units into the skin every morning    LISINOPRIL (PRINIVIL;ZESTRIL) 10 MG TABLET    Take 10 mg by mouth daily. METOPROLOL TARTRATE (LOPRESSOR) 50 MG TABLET    Take 1 tablet by mouth 2 times daily    NITROGLYCERIN (NITROSTAT) 0.4 MG SL TABLET    Place 1 tablet under the tongue every 5 minutes as needed for Chest pain.     OMEPRAZOLE (PRILOSEC) 20 MG DELAYED RELEASE CAPSULE    Take 20 mg by mouth daily    OXYCODONE-ACETAMINOPHEN (PERCOCET)  MG PER TABLET    Take 1 tablet by mouth every 8 hours as needed Genitourinary:   Genitourinary Comments: Refused rectal exam   Neurological: He is alert and oriented to person, place, and time. Skin: Skin is warm and dry. Psychiatric: He has a normal mood and affect. His behavior is normal. Judgment and thought content normal.       DIAGNOSTIC RESULTS     EKG: All EKG's are interpreted by the Emergency Department Physician who either signs or Co-signs this chart in the absence of a cardiologist.        RADIOLOGY:   Non-plain film images such as CT, Ultrasound and MRI are read by the radiologist. Plain radiographic images are visualized and preliminarily interpreted by the emergency physician with the below findings:    Constipation on abd series per Dr. Fraser Pain    Interpretation per the Radiologist below, if available at the time of this note:    XR Acute Abd Series Chest 1 VW    (Results Pending)         ED BEDSIDE ULTRASOUND:   Performed by ED Physician - none    LABS:  Labs Reviewed   COMPREHENSIVE METABOLIC PANEL - Abnormal; Notable for the following:        Result Value    Sodium 131 (*)     Chloride 93 (*)     Glucose 201 (*)     Alb 3.3 (*)     Alkaline Phosphatase 235 (*)     ALT 75 (*)     All other components within normal limits   CBC WITH AUTO DIFFERENTIAL - Abnormal; Notable for the following:     RBC 3.74 (*)     Hemoglobin 10.9 (*)     Hematocrit 32.3 (*)     Monocytes % 14.3 (*)     All other components within normal limits   URINALYSIS       All other labs were within normal range or not returned as of this dictation. EMERGENCY DEPARTMENT COURSE and DIFFERENTIAL DIAGNOSIS/MDM:   Vitals:    Vitals:    10/23/17 2338 10/24/17 0001 10/24/17 0100 10/24/17 0250   BP: 103/65 131/75 110/78 112/78   Pulse: 84  86 80   Resp: 18  18 18   Temp: 98.2 °F (36.8 °C)      TempSrc: Oral      SpO2: 98% 99% 99% 99%   Weight: 145 lb (65.8 kg)      Height: 5' 6\" (1.676 m)          MDM  Labs are normal, Abdominal x-ray shows constipation no signs of obstruction.  Patient's going to be discharged home to take citrate of magnesia. He has appointment with Dr. Saige Veliz on Friday for possible colonoscopy. CONSULTS:  None    PROCEDURES:  Unless otherwise noted below, none     Procedures    FINAL IMPRESSION      1.  Constipation, unspecified constipation type          DISPOSITION/PLAN   DISPOSITION Decision To Discharge    PATIENT REFERRED TO:  Layton Chiu MD  [address] [place], [place] [id number]      on Friday as scheduled      DISCHARGE MEDICATIONS:  New Prescriptions    No medications on file          (Please note that portions of this note were completed with a voice recognition program.  Efforts were made to edit the dictations but occasionally words are mis-transcribed.)    DORON Isaacs (electronically signed)  Attending Emergency Physician           DORON Isaacs  10/24/17 8602

## 2017-10-24 NOTE — ED NOTES
Informed pt that he will get a catheter for u/a. Pt states \"give me some water and i will try to pee. \"     Chuy Vergara RN  10/24/17 7247

## 2017-11-01 ENCOUNTER — LAB REQUISITION (OUTPATIENT)
Dept: LAB | Facility: HOSPITAL | Age: 62
End: 2017-11-01

## 2017-11-01 ENCOUNTER — TELEPHONE (OUTPATIENT)
Dept: OTOLARYNGOLOGY | Facility: CLINIC | Age: 62
End: 2017-11-01

## 2017-11-01 DIAGNOSIS — Z00.00 ENCOUNTER FOR GENERAL ADULT MEDICAL EXAMINATION WITHOUT ABNORMAL FINDINGS: ICD-10-CM

## 2017-11-01 PROCEDURE — 88305 TISSUE EXAM BY PATHOLOGIST: CPT | Performed by: SURGERY

## 2017-11-01 RX ORDER — OXYCODONE AND ACETAMINOPHEN 10; 325 MG/1; MG/1
1 TABLET ORAL EVERY 4 HOURS PRN
COMMUNITY
End: 2017-11-01

## 2017-11-01 RX ORDER — OXYCODONE AND ACETAMINOPHEN 10; 325 MG/1; MG/1
1 TABLET ORAL EVERY 6 HOURS PRN
Qty: 40 TABLET | Refills: 0 | Status: SHIPPED | OUTPATIENT
Start: 2017-11-01 | End: 2018-01-02 | Stop reason: SDUPTHER

## 2017-11-01 NOTE — TELEPHONE ENCOUNTER
Patient needed a refill on pain medication prior to appointment this week    Alber Justin MD  11/1/2017  1:27 PM

## 2017-11-03 ENCOUNTER — OFFICE VISIT (OUTPATIENT)
Dept: OTOLARYNGOLOGY | Facility: CLINIC | Age: 62
End: 2017-11-03

## 2017-11-03 VITALS
HEART RATE: 98 BPM | TEMPERATURE: 98.3 F | BODY MASS INDEX: 23.82 KG/M2 | HEIGHT: 66 IN | RESPIRATION RATE: 18 BRPM | OXYGEN SATURATION: 100 % | WEIGHT: 148.2 LBS | DIASTOLIC BLOOD PRESSURE: 78 MMHG | SYSTOLIC BLOOD PRESSURE: 159 MMHG

## 2017-11-03 DIAGNOSIS — Z93.0 TRACHEOSTOMY DEPENDENCE (HCC): ICD-10-CM

## 2017-11-03 DIAGNOSIS — C10.9 OROPHARYNGEAL CANCER (HCC): Primary | ICD-10-CM

## 2017-11-03 DIAGNOSIS — F17.200 CURRENT SMOKER: ICD-10-CM

## 2017-11-03 PROBLEM — K08.9 POOR DENTITION: Status: RESOLVED | Noted: 2017-09-28 | Resolved: 2017-11-03

## 2017-11-03 PROCEDURE — 99213 OFFICE O/P EST LOW 20 MIN: CPT | Performed by: NURSE PRACTITIONER

## 2017-11-03 RX ORDER — OXYCODONE AND ACETAMINOPHEN 7.5; 325 MG/1; MG/1
TABLET ORAL
COMMUNITY
Start: 2017-10-18 | End: 2017-11-22 | Stop reason: HOSPADM

## 2017-11-03 NOTE — PROGRESS NOTES
PRIMARY CARE PROVIDER: Christian Castellon MD  REFERRING PROVIDER: No ref. provider found    Chief Complaint   Patient presents with   • Follow-up     Trach / patient states of no issues        Subjective   History of Present Illness:  Sameer Tavarez is a  62 y.o. male  who presents for follow up s/p tracheostomy, direct laryngoscopy with biopsy, and  dental extraction during his recent hospitalization due to oropharyngeal carcinoma. He is tolerating his tracheostomy without problems and is voicing well with PMV. He has minimal secretions which are improved with changing the inner cannula. He is being followed closely by Dr. Dominguez and Dr. Marie. He is proceeding with chemotherapy and radiation.      Review of Systems:  Review of Systems   Constitutional: Positive for fatigue. Negative for chills and fever.   HENT: Positive for dental problem (edentulous) and voice change.    Respiratory: Negative for shortness of breath and stridor.    Allergic/Immunologic: Positive for immunocompromised state.   All other systems reviewed and are negative.      Past History:  Past Medical History:   Diagnosis Date   • Arthritis    • Coronary artery disease    • Depression    • Diabetes mellitus    • Enlarged prostate    • GERD (gastroesophageal reflux disease)    • History of transfusion    • Hypertension    • Oropharyngeal cancer 08/27/2017   • Seizure     diabetic   • Severe esophageal dysplasia    • Stroke    • TB (pulmonary tuberculosis) 2004     Past Surgical History:   Procedure Laterality Date   • CARDIAC SURGERY     • CHOLECYSTECTOMY     • CORONARY ARTERY BYPASS GRAFT     • FRACTURE SURGERY     • GTUBE REPLACEMENT N/A 10/6/2017    Procedure: GASTROSTOMY TUBE REPLACEMENT, port placement;  Surgeon: Jayne Phillips MD;  Location: [facility] OR;  Service:    • HERNIA REPAIR     • LARYNGOSCOPY N/A 10/11/2017    Procedure: DIRECT LARYNGOSCOPY WITH BIOPSY;  Surgeon: Darin Neal MD;  Location: [facility] OR;  Service:    • NISSEN  FUNDOPLICATION LAPAROSCOPIC     • MT DENTAL SURGERY PROCEDURE N/A 10/11/2017    Procedure: TOOTH EXTRACTION;  Surgeon: Darin Neal MD;  Location:  [facility] OR;  Service: ENT   • MT INSJ TUNNELED CVC W/O SUBQ PORT/ AGE 5 YR/> Left 10/6/2017    Procedure: INSERTION VENOUS ACCESS DEVICE;  Surgeon: Jayne Phillips MD;  Location:  [facility] OR;  Service: General   • SKIN BIOPSY     • TESTICLE SURGERY      tubes implanted for drainage   • TONSILLECTOMY     • TRACHEOSTOMY N/A 10/5/2017    Procedure: Awake tracheostomy; DIRECT LARYNGOSCOPY WITH BIOPSY;  Surgeon: Alber Justin MD;  Location:  [facility] OR;  Service:      Family History   Problem Relation Age of Onset   • Stroke Mother    • Heart disease Father    • Heart disease Brother    • Cancer Brother      Social History   Substance Use Topics   • Smoking status: Current Every Day Smoker     Packs/day: 1.00     Years: 52.00     Types: Cigarettes   • Smokeless tobacco: Current User     Types: Snuff      Comment: I've tried and tried   • Alcohol use Yes      Comment: occasionally     Allergies:  Codeine    Current Outpatient Prescriptions:   •  acetaminophen (TYLENOL) 325 MG tablet, Take 2 tablets by mouth Every 6 (Six) Hours As Needed for Mild Pain  or Fever for up to 30 days., Disp: 90 tablet, Rfl: 5  •  oxyCODONE-acetaminophen (PERCOCET)  MG per tablet, Take 1 tablet by mouth Every 6 (Six) Hours As Needed for Moderate Pain ., Disp: 40 tablet, Rfl: 0  •  oxyCODONE-acetaminophen (PERCOCET) 7.5-325 MG per tablet, , Disp: , Rfl:   •  tamsulosin (FLOMAX) 0.4 MG capsule 24 hr capsule, Take 1 capsule by mouth Every Night., Disp: , Rfl:   •  baclofen (LIORESAL) 20 MG tablet, Take 20 mg by mouth 3 (Three) Times a Day., Disp: , Rfl:   •  carBAMazepine (TEGretol) 100 MG chewable tablet, 2 tablets by Per G Tube route Every 12 (Twelve) Hours for 30 days., Disp: 120 tablet, Rfl: 0  •  fexofenadine-pseudoephedrine (ALLEGRA-D)  MG per 12 hr tablet, Take 1 tablet by  mouth 2 (Two) Times a Day., Disp: , Rfl:   •  FLUVIRIN suspension injection, ADM 0.5ML IM UTD, Disp: , Rfl: 0  •  furosemide (LASIX) 40 MG tablet, Take 40 mg by mouth 2 (Two) Times a Day., Disp: , Rfl:   •  Guar Gum (NUTRISOURCE FIBER) pack pack, 1 packet by Per G Tube route 2 (Two) Times a Day for 30 days., Disp: 60 packet, Rfl: 0  •  insulin glargine (LANTUS) 100 UNIT/ML injection, Inject 70 Units under the skin Daily., Disp: , Rfl:   •  lisinopril (PRINIVIL,ZESTRIL) 10 MG tablet, Take 10 mg by mouth Daily., Disp: , Rfl:   •  metoprolol tartrate (LOPRESSOR) 50 MG tablet, Take 50 mg by mouth 2 (Two) Times a Day., Disp: , Rfl:   •  nitroglycerin (NITROLINGUAL) 0.4 MG/SPRAY spray, Place 1 spray under the tongue Every 5 (Five) Minutes As Needed for Chest Pain., Disp: , Rfl:   •  omeprazole (priLOSEC) 20 MG capsule, Take 20 mg by mouth Daily., Disp: , Rfl:   •  potassium chloride (KAYCIEL) 20 MEQ/15ML (10%) solution, 15 mL by Per G Tube route Daily for 30 days., Disp: 450 mL, Rfl: 0  •  sucralfate (CARAFATE) 1 g tablet, Take 1 g by mouth 4 (Four) Times a Day., Disp: , Rfl:   •  zolpidem (AMBIEN) 10 MG tablet, Take 10 mg by mouth At Night As Needed for Sleep., Disp: , Rfl:       Objective     Vital Signs:  Temp:  [98.3 °F (36.8 °C)] 98.3 °F (36.8 °C)  Heart Rate:  [98] 98  Resp:  [18] 18  BP: (159)/(78) 159/78    Physical Exam:  Physical Exam  CONSTITUTIONAL: well-developed, alert, oriented, in no acute distress   COMMUNICATION AND VOICE: voicing well with PMV in place  HEAD: normocephalic, no lesions, atraumatic  FACE: appearance normal, no lesions, no tenderness, no deformities, facial motion symmetric  SALIVARY GLANDS: parotid glands with no tenderness, no swelling, no masses, submandibular glands with normal size, nontender  EYES: ocular motility normal, eyelids normal, orbits normal, no proptosis, conjunctiva normal , pupils equal, round   EARS:  Hearing: response to conversational voice normal bilaterally    External Ears: auricles without lesions  NOSE:  External Nose: structure normal, no tenderness on palpation, no nasal discharge, no lesions, no evidence of trauma, nostrils patent   ORAL:  Lips: upper and lower lips without lesion   Teeth: edentulous   Gums: gingivae healthy    NECK: trach secure and in place with PMV, tracheostomy stoma appears healthy  CHEST/RESPIRATORY: respiratory effort normal, normal breath sounds   CARDIOVASCULAR: rate and rhythm normal, extremities without cyanosis or edema    NEUROLOGIC/PSYCHIATRIC: oriented to time, place and person, mood normal, affect appropriate, CN II-XII intact grossly      Assessment   Assessment:  1. Oropharyngeal cancer    2. Tracheostomy dependence    3. Current smoker        Plan   Plan:    Continue with chemotherapy and radiation with close follow-up by Dr. Marie and Dr. Dominguez. Continue routine tracheostomy care as instructed. We will see him back in 2 months for tracheostomy tube change. He was instructed to bring the necessary tracheostomy supplies with him to his next appointment. Call for any problems or worsening symptoms.     The patient was counseled about the effects of smoking on the patients overall health. Risks of worsening of health, increased risks of cancer and poor surgical outcomes were discussed.     Return in about 2 months (around 1/3/2018).    My findings and recommendations were discussed and questions were answered.     Mily Beach, APRN

## 2017-11-03 NOTE — PROGRESS NOTES
I have seen and examined Sameer Tavarez and have reviewed the notes, assessments, and/or procedures and I concur with this documentation.    The patient is status post tracheostomy, direct laryngoscopy with biopsy, and eventual dental extraction with G-tube placement during his recent hospitalization.  He has done well with the tracheostomy and is currently using her Passy-Lentner valve.  He has had consultations with radiation oncology and hematology oncology and is preparing to proceed with treatment.  He has had no issues with his tracheostomy except for some occasions of retained secretions which has required changing the inner cannula.       Oropharyngeal cancer    8/28/2017 Initial Diagnosis     Oropharyngeal cancer         8/28/2017 Biopsy     Dr Patel ([facility]) - EGD with oropharynx biopsy:    Clinical Information       Pre-Op: Gerd/Hoarsness      Post-Op Esophageal ulcer,barretts oral lesion    Final Diagnosis   1.  Esophagus, endoscopic biopsy:  A.  Fragments of benign squamocolumnar junction mucosa and benign glandular mucosa demonstrating intestinal metaplasia (Recio's esophagus)  B.  Chronic active inflammation, moderate.  C.  No dysplasia identified.     2.  Oropharynx, biopsy: At least squamous cell carcinoma in situ.     Comment: Status of invasion is indeterminate due to tangential sectioning of several of the tissue fragments.  Immunohistochemical stain for p16 is positive.     AJCC stage:pTX pNX                 8/28/2017 Imaging     [facility]  CT Soft tissue neck  No discrete enhancing mass  Soft tissue thickening right oropharynx and peritonsillar soft tissues compared to left.  Small lymph nodes may be inflammatory in nature         9/13/2017 Imaging     [facility]    CT Chest  Small hiatal hernia  1.1 cm lymph node adjacent to the distal esophagus.  Two small lung nodules.  For multiple solid noncalcified nodules smaller than 6 mm in diameter, no routine  follow-up is recommended. (grade 2B; weak recommendation, moderate-quality evidence)  CT Abd Pelvis  Right perineal mass of unknown significance. Please correlate with physical exam.  Dilated small bowel with multiple air-fluid levels. Differential considerations include partial small bowel obstruction vs adynamic ileus.  Mild diverticulosis. Mild distended urinary bladder.         9/29/2017 Imaging     [facility]  MR Orbit Face Neck  1. Large right oropharyngeal mass compatible with the history of  carcinoma.  2. Tongue base and right submandibular gland involvement.    PET  1. Abnormal metabolic activity in the base of the tongue on the right  extending in the right palatine tonsil. SUV is 8.8. This is consistent  with neoplasm. No other regions of abnormal metabolic activity are  identified..           10/5/2017 Procedure     Tracheostomy with Direct Laryngoscopy and biopsy -JUANITA Justin MD     Path    Final Diagnosis   1.  Right superior tonsillar fossa, biopsy:  A.  Moderately differentiated squamous cell carcinoma, invasive.  B.  Immunohistochemical stain for p16 is positive.      AJCC stage: pTX pNX     2.  Right base of tongue, biopsy:  A.  Moderately differentiated squamous cell carcinoma, invasive.  B.  Immunohistochemical stain for p16 is positive.              10/6/2017 Procedure     Port placement  Gastrostomy tube placement         10/11/2017 Procedure     Teeth extraction- Les Goddard MD, DMD            His plan is to proceed with the planned chemotherapy and radiation therapy.  I discussed with him that if there is incomplete response to this therapy or if there is a recurrence after this therapy, we will need to consider referral to a university setting to consider surgical resection.  I have gone over tracheostomy care including changing the inner cannulas.  They are in understanding of this discussion.  We will see him back in 2 months to reevaluate his response to therapy and also to perform  tracheostomy tube change    Alber Justin MD  11/3/2017  1:15 PM

## 2017-11-06 ENCOUNTER — HOSPITAL ENCOUNTER (OUTPATIENT)
Dept: RADIATION ONCOLOGY | Facility: HOSPITAL | Age: 62
Setting detail: RADIATION/ONCOLOGY SERIES
End: 2017-11-06

## 2017-11-07 PROCEDURE — 77338 DESIGN MLC DEVICE FOR IMRT: CPT | Performed by: RADIOLOGY

## 2017-11-07 PROCEDURE — 77300 RADIATION THERAPY DOSE PLAN: CPT | Performed by: RADIOLOGY

## 2017-11-07 PROCEDURE — 77301 RADIOTHERAPY DOSE PLAN IMRT: CPT | Performed by: RADIOLOGY

## 2017-11-09 ENCOUNTER — HOSPITAL ENCOUNTER (OUTPATIENT)
Dept: RADIATION ONCOLOGY | Facility: HOSPITAL | Age: 62
Discharge: HOME | End: 2017-11-09

## 2017-11-09 PROCEDURE — 77386: CPT | Performed by: RADIOLOGY

## 2017-11-10 ENCOUNTER — HOSPITAL ENCOUNTER (OUTPATIENT)
Dept: RADIATION ONCOLOGY | Facility: HOSPITAL | Age: 62
Discharge: HOME | End: 2017-11-10

## 2017-11-10 PROCEDURE — 77386: CPT | Performed by: RADIOLOGY

## 2017-11-13 ENCOUNTER — HOSPITAL ENCOUNTER (OUTPATIENT)
Dept: RADIATION ONCOLOGY | Facility: HOSPITAL | Age: 62
Discharge: HOME | End: 2017-11-13

## 2017-11-13 PROCEDURE — 77386: CPT | Performed by: RADIOLOGY

## 2017-11-14 ENCOUNTER — HOSPITAL ENCOUNTER (OUTPATIENT)
Dept: RADIATION ONCOLOGY | Facility: HOSPITAL | Age: 62
Setting detail: RADIATION/ONCOLOGY SERIES
Discharge: HOME | End: 2017-11-14

## 2017-11-14 PROCEDURE — 77386: CPT | Performed by: RADIOLOGY

## 2017-11-15 ENCOUNTER — HOSPITAL ENCOUNTER (OUTPATIENT)
Dept: RADIATION ONCOLOGY | Facility: HOSPITAL | Age: 62
Setting detail: RADIATION/ONCOLOGY SERIES
Discharge: HOME | End: 2017-11-15

## 2017-11-15 ENCOUNTER — DOCUMENTATION (OUTPATIENT)
Dept: ONCOLOGY | Facility: HOSPITAL | Age: 62
End: 2017-11-15

## 2017-11-15 PROCEDURE — 77336 RADIATION PHYSICS CONSULT: CPT | Performed by: RADIOLOGY

## 2017-11-15 PROCEDURE — 77386: CPT | Performed by: RADIOLOGY

## 2017-11-15 NOTE — PROGRESS NOTES
Social work consultation requested due to distress level-patient reports that his biggest concern has been eating with trach and having special food ordered but that his dietician called on this date to let him know it would be available at his doorstep prior to him running out.   He reports that he has began smoking again and requests smoking cessation information which was given to patient on this date.  He reports that he quit for 18 days and became upset with sister and started smoking again.   He reports smoking approx 15 cigarettes a day.   He has other needs he wonder if this worker can help with; furniture, bathing equipment.   Instructed patient to consult and gave resources for [facility], [facility], [facility] for possible used furniture/a bed and then [employer] for a shower chair (the one he bought broke) and a handle to get into the shower.   Patient reports he likes to shop so he will look into these places and let this worker know if he needs further assistance.   He reports that he is tired a lot, has mood swings but was told this was related to the chemo.  Instructed patient that if he ever needed to talk to someone or needed other resources this worker was available for such emotional/mental health needs too.

## 2017-11-16 ENCOUNTER — HOSPITAL ENCOUNTER (OUTPATIENT)
Dept: RADIATION ONCOLOGY | Facility: HOSPITAL | Age: 62
Setting detail: RADIATION/ONCOLOGY SERIES
Discharge: HOME | End: 2017-11-16

## 2017-11-16 ENCOUNTER — APPOINTMENT (OUTPATIENT)
Dept: RADIATION ONCOLOGY | Facility: HOSPITAL | Age: 62
End: 2017-11-16

## 2017-11-16 DIAGNOSIS — C10.9 OROPHARYNGEAL CANCER (HCC): ICD-10-CM

## 2017-11-16 DIAGNOSIS — K08.9 POOR DENTITION: ICD-10-CM

## 2017-11-16 DIAGNOSIS — Z93.1 S/P PERCUTANEOUS ENDOSCOPIC GASTROSTOMY (PEG) TUBE PLACEMENT (HCC): Primary | ICD-10-CM

## 2017-11-16 PROCEDURE — 77386: CPT | Performed by: RADIOLOGY

## 2017-11-17 ENCOUNTER — EVALUATION (OUTPATIENT)
Dept: SPEECH THERAPY | Facility: HOSPITAL | Age: 62
End: 2017-11-17

## 2017-11-17 ENCOUNTER — HOSPITAL ENCOUNTER (OUTPATIENT)
Dept: RADIATION ONCOLOGY | Facility: HOSPITAL | Age: 62
Setting detail: RADIATION/ONCOLOGY SERIES
Discharge: HOME | End: 2017-11-17

## 2017-11-17 ENCOUNTER — NUTRITION (OUTPATIENT)
Dept: SPEECH THERAPY | Facility: HOSPITAL | Age: 62
End: 2017-11-17

## 2017-11-17 VITALS — HEIGHT: 66 IN

## 2017-11-17 DIAGNOSIS — R13.12 OROPHARYNGEAL DYSPHAGIA: Primary | ICD-10-CM

## 2017-11-17 PROCEDURE — G8996 SWALLOW CURRENT STATUS: HCPCS

## 2017-11-17 PROCEDURE — 92610 EVALUATE SWALLOWING FUNCTION: CPT

## 2017-11-17 PROCEDURE — 92526 ORAL FUNCTION THERAPY: CPT

## 2017-11-17 PROCEDURE — G8997 SWALLOW GOAL STATUS: HCPCS

## 2017-11-17 PROCEDURE — 77386: CPT | Performed by: RADIOLOGY

## 2017-11-17 NOTE — PROGRESS NOTES
Adult Outpatient Nutrition  Assessment/PES    Patient Name:  Sameer Tavarez  YOB: 1955  MRN: [id number]    Assessment Date:  11/17/2017    Comments: Pt tolerating soft to puree thin consistency foods at this time, most of nutrition intake per peg tube. Pt tolerating tube feeds at this time.           General Info       11/17/17 1605    Today's Session    Person(s) attending today's session Parent    General Information    How well do you speak English? well    Lives With spouse    Oncology patient? yes    Oncology Specific Assessment    Type of treatment Chemotherapy;Radiation    Frequency of treatment Weekly chemo on thursdays, Daily radiaiton treatment    Goal of treatment Currative    Reported symptoms Taste changes;Weight loss;Dry mouth            Physical Findings       11/17/17 1605    Physical Findings/Assessment    Additional Documentation Physical Appearance (Group)    Physical Appearance    Tubes peg tube              Nutritional Info/Activity       11/17/17 1609    Nutritional Information    Enter everything you can remember eating in the last 24 hours (1 day) able to eat soft/puree thin like consistency per pt. tolerates yogurt,applesauce,coffee decaf, pudding, cream of wheat thinned down, grits thin drinks 1 Boost high protein daily and uses peg tube with enteral product of Glucerna 1.2    Eating Environment    Eating environment Alone;Family            Home Nutrition Report       11/17/17 1610    Home Nutrition Report    Fluid/Beverage Intake Oral supplements used   uses high protein boost once per day by mouth, and peg tube    Diet --   soft/ pureed thin liquids    Nutrition Prescription EN    Enteral Route PEG    Product Glucerna 1.2 amy    TF Delivery Method Bolus    TF Bolus Goal Volume (mL) 300 mL    TF Bolus Current Volume (mL) 300 mL    TF Bolus Frequency 6 times a day    Water flush (mL)  120 mL    Water Flush Frequency Every feeding            Estimated/Assessed Needs        "11/17/17 1611    Calculation Measurements    Weight Used For Calculations 66.7 kg (147 lb)    Height Used for Calculations 1.676 m (5' 6\")    Estimated/Assessed Energy Needs    Energy Need Method Kcal/kg    kcal/kg 30    30 Kcal/Kg (kcal) 2000.37    Estimated Kcal Range  6461-5967 kcal/day    Estimated/Assessed Protein Needs    Weight Used for Protein Calculation 66.7 kg (147 lb)    Protein (gm/kg) 1.5    1.5 Gm Protein (gm) 100.02    Estimated Protein Range 100-110 gm/day    Estimated/Assessed Fluid Needs    Fluid Need Method --   4789-4904 ml/day   25ml/kg            Evaluation of Prescribed Nutrient/Fluid Intake       11/17/17 1613    Evaluation of Prescribed Nutrient/Fluid Intake    Nutrition Prescribed Calorie Evaluation;Protein Evaluation;Fluid Evaluation;Free Water Evaluation;Fiber Evaluation;RDI    Calories at Prescribed Goal    Enteral Calories (kcal) 2160    Total Calories (kcal) 2160    % Kcal Needs 108    Protein at Prescribed Goal    Enteral Protein (gm) 108    Total Protein (gm) 108    % Protein Needs 108    Free Water at Prescribed Goal    Free Water (mL) 1449 mL    Free Water Flushes (mL) 720 mL    Total Free Water Intake (mL) 2169    Fiber at Prescribed Goal    Fiber 28    Recommended Daily Intake at Prescribed Goal    RDI Met            Labs/Tests/Procedures/Meds       11/17/17 1614    Labs/Tests/Procedures/Meds    Labs/Tests Review Reviewed   no new labs available to assess    Medication Review Reviewed, pertinent;Laxative;Other (comment)   benefiber bid per peg tube, Mineral oil every other day    Significant Vitals reviewed            Malnutrition Severity Assessment       11/17/17 1616    Malnutrition Severity Assessment    Malnutrition Type Chronic Illness Malnutrition    Physical Signs of Malnutrition (Chronic)    Muscle Wasting Severe    Fat Loss Severe    Secondary Physical Signs Present (comment)   temporal muscle wasting present    Weight Status (Chronic)    BMI --   23.7    %IBW -- "   103% IBW    %UBW Mod (75-85%)   77% of UBW    Weight Loss Severe (>7.5% / 3 mo)   23.7% wt loss over past 3 months per pt    Energy Intake Status (Chronic)    Energy Intake Mod (<75% / > or equal to 1 mo)   modified diet/soft, peg tube for supplemental feedings    Criteria Met (Must meet criteria for severity in at least 2 of these categories: M Wasting, Fat Loss, Fluid, Secondary Signs, Wt. Status, Intake)    Patient meets criteria for  Severe malnutrition        Problem/Interventions:        Problem 1       11/17/17 1617    Nutrition Diagnoses Problem 1    Problem 1 Increased Nutrient Needs    Etiology (related to) Medical Diagnosis    Oncology Head/neck cancer   receiving chemo and radiaiton. Oropharyngeal cancer    Substance Use Tobacco    Signs/Symptoms (evidenced by) Report/Observation;SLP/Swallow eval    Reported/Observed By Patient;SLP    Other Comment pt tolerating thin liquids at this time, pt has been tolerating a soft to puree thin consistency diet orally and supplements with peg tube. Pt has trach as well.    Swallow eval status Done    Type of SLP Evaluation Bedside                    Intervention Goal       11/17/17 1619    Intervention Goal    General Maintain nutrition;Nutrition support treatment;Meet nutritional needs for age/condition;Reduce/improve symptoms;Disease management/therapy    PO Meet estimated needs;Tolerate PO    TF/PN Maintain TF/PN    Weight No significant weight loss              Electronically signed by:  Tessy Zuluaga  11/17/17 4:21 PM

## 2017-11-17 NOTE — THERAPY EVALUATION
Outpatient Speech Language Pathology   Adult Swallow Initial Evaluation       Patient Name: Sameer Tavarez  : 1955  MRN: [id number]  Today's Date: 2017      SPEECH-LANGUAGE PATHOLOGY EVALUATION - SWALLOW  Subjective: The patient was seen on this date for a Clinical Swallow evaluation.  Patient was alert and cooperative.    Objective: Textures given included thin liquid and puree consistency.  Assessment: Difficulties were noted with thin liquid and puree consistency. Pt only completed trials of pureed and thin liquids as he explained he was unable to toelrate solid foods and is completing a pureed diet at home. With pureed and thin consistencies he exhibited 1-2 multiple swallows at times, however no overt s/s of aspiration noted.  SLP Findings:  Patient presents with moderate oropharyngeal dysphagia, without esophageal component.   Recommendations: Diet Textures: thin liquid, puree consistency food.  Medications should be taken whole or crushed with puree. May have water and ice between meals after oral care, under staff or family supervision and with the recommended strategies for safe swallowing.   Recommended Strategies: Upright for PO. Oral care before breakfast, after all meals and PRN.  Other Recommended Evaluations: VFSS    Dysphagia therapy is recommended.   Cash Carr MS CCC-SLP 2017 4:58 PM       Visit Date: 2017   Patient Active Problem List   Diagnosis   • Oropharyngeal cancer   • Current smoker   • Perineal mass in male   • Recio's esophagus with dysplasia   • Tracheostomy dependence   • Postoperative pain   • S/P percutaneous endoscopic gastrostomy (PEG) tube placement   • Constipation, acute   • Pain, rectal        Past Medical History:   Diagnosis Date   • Arthritis    • Coronary artery disease    • Depression    • Diabetes mellitus    • Enlarged prostate    • GERD (gastroesophageal reflux disease)    • History of transfusion    • Hypertension    • Oropharyngeal cancer  08/27/2017   • Seizure     diabetic   • Severe esophageal dysplasia    • Stroke    • TB (pulmonary tuberculosis) 2004        Past Surgical History:   Procedure Laterality Date   • CARDIAC SURGERY     • CHOLECYSTECTOMY     • CORONARY ARTERY BYPASS GRAFT     • FRACTURE SURGERY     • GTUBE REPLACEMENT N/A 10/6/2017    Procedure: GASTROSTOMY TUBE REPLACEMENT, port placement;  Surgeon: Jayne Phillips MD;  Location:  [facility] OR;  Service:    • HERNIA REPAIR     • LARYNGOSCOPY N/A 10/11/2017    Procedure: DIRECT LARYNGOSCOPY WITH BIOPSY;  Surgeon: Darin Neal MD;  Location:  [facility] OR;  Service:    • NISSEN FUNDOPLICATION LAPAROSCOPIC     • NC DENTAL SURGERY PROCEDURE N/A 10/11/2017    Procedure: TOOTH EXTRACTION;  Surgeon: Darin Neal MD;  Location:  [facility] OR;  Service: ENT   • NC INSJ TUNNELED CVC W/O SUBQ PORT/ AGE 5 YR/> Left 10/6/2017    Procedure: INSERTION VENOUS ACCESS DEVICE;  Surgeon: Jayne Phillips MD;  Location:  [facility] OR;  Service: General   • SKIN BIOPSY     • TESTICLE SURGERY      tubes implanted for drainage   • TONSILLECTOMY     • TRACHEOSTOMY N/A 10/5/2017    Procedure: Awake tracheostomy; DIRECT LARYNGOSCOPY WITH BIOPSY;  Surgeon: Alber Justin MD;  Location:  [facility] OR;  Service:          Visit Dx:     ICD-10-CM ICD-9-CM   1. Oropharyngeal dysphagia R13.12 787.22                   Adult Dysphagia - 11/17/17 1518     Adult Dysphagia Background    Patient Description of Complaint Difficulty swallowing solids.  -CS    Date of Onset 11/17/2017  -CS    Symptoms Reported by Patient Coughing;Choking;Difficulty swallowing pills;Food gets stuck  -CS    Patient Report of Functional Impact Difficulty eating by mouth. Requires PEG tube.  -CS    For Additional Medications Please see list scanned into chart.  -CS    History Pertinent to Diagnosis Oropharyngeal cancer, Tracheostomy  -CS    Current Diet (Solids) Puree  -CS    Diet Level (Liquids) Thin Liquid  -CS    Non-Oral Feeding  Gastrostomy Tube  -CS    Oral Mech    Respiratory/Swallow Coordination Pattern Severely Impaired  -CS    Position During Evaluation Upright  -CS    Dentition Patient is edentulous  -CS    Oral Health Oral cavity is clean  -CS    Awareness/Control of Secretions Patient swallows saliva  -CS    Foods/Liquids Presented    Method of Administration Spoon;Cup  -CS    Oral Phase Impaired Physiology Observed    Oral Phase X  -CS    Pharyngeal Symptoms X  -CS    Summary Comments of Clinical Exam Pt only completed trials of pureed and thin liquids as he explained he was unable to toelrate solid foods and is completing a pureed diet at home. With pureed and thin consistencies he exhibited 1-2 multiple swallows at times, however no overt s/s of aspiration noted.  -CS    Results/Recs/Barriers/Education for Dysphagia    Factors Affecting Performance no difficulty participating in study  -CS    Learning Motivation moderate  -CS    Clinical Impression: Swallowing Moderate:;oropharyngeal phase dysphagia  -CS    Impact on Function risk for aspiration;risk for pneumonia  -CS    Recommendations for Diet/Nutirition alternative nutrition but with some oral intake as per below   Pt ok for a pureed diet with thin liquids. PEG  -CS    Swallowing Precautions upright position for at least 30 minutes after meals  -CS    Recommendations dysphagia therapy to address swallowing deficits  -CS    Frequency 1x a week  -CS    Duration 2-4 months  -CS    Estimated Length of Session 30 minutes  -CS      User Key  (r) = Recorded By, (t) = Taken By, (c) = Cosigned By    Initials Name Provider Type    CS Cash Carr MS CCC-SLP Speech and Language Pathologist                            OP SLP Education       11/17/17 4250    Education    Barriers to Learning No barriers identified  -CS    Education Provided Described results of evaluation;Patient requires further education on strategies, risks  -CS    Assessed Learning needs;Learning motivation;Learning  "preferences;Learning readiness  -CS    Learning Motivation Moderate  -CS    Learning Method Explanation  -CS    Teaching Response Verbalized understanding  -CS    Education Comments See flowsheet for details.  -CS      User Key  (r) = Recorded By, (t) = Taken By, (c) = Cosigned By    Initials Name Effective Dates    CS Cash Carr, MS [facility]-SLP 06/28/17 -                 SLP OP Goals       11/17/17 1655 11/17/17 1518    Goal Type Needed    Goal Type Needed  Dysphagia;Other Adult Goals  -CS    Subjective Comments    Subjective Comments  Alert and cooperative  -CS    Subjective Pain    Able to rate subjective pain?  yes  -CS    Pre-Treatment Pain Level  8  -CS    Dysphagia Goals    Dysphagia LTG's  Patient will safely consume the recommended diet without complications such as aspiration pneumonia  -CS    Other Goals    Other Adult Goal- 1  Pt will tolerate a pureed diet with thin liquids during and after completion of radiation treatments with no s/s of aspiration.  -CS    Status: Other Adult Goal- 1  New  -CS    Comments: Other Adult Goal- 1  Continue to address  -CS    Other Adult Goal- 2  Pt will maintain adequate nutrition and hydration via PO during and after radiation treatment.  -CS    Status: Other Adult Goal- 2  New  -CS    Comments: Other Adult Goal- 2  Continue to address  -CS    Other Adult Goal- 3  Pt will complete swallow exercises 3x a day with 90% accuracy.  -CS    Status: Other Adult Goal- 3  Progressing as expected;New  -CS    Comments: Other Adult Goal- 3  Pt completed lingual lateralization and elevation exercises with significantly decreased strength and ROM. He completed \"chug a lug\" task drinking 4oz of water without stopping w/o any overt s/s of aspiration.  -CS    Other Adult Goal- 4  Pt will continue to be assessed and education completed on effects of radiation treatments as warranted up until 2 months post treatment.  -CS    Status: Other Adult Goal- 4  New;Progressing as expected  -CS    " Comments: Other Adult Goal- 4  Educated on oral care as well as suggestions to treat xerostomia.  -CS    SLP Time Calculation    SLP Goal Re-Cert Due Date 02/09/18  -CS 02/09/18  -CS      User Key  (r) = Recorded By, (t) = Taken By, (c) = Cosigned By    Initials Name Provider Type    MALINDA Carr MS CCC-SLP Speech and Language Pathologist                OP SLP Assessment/Plan - 11/17/17 1653     SLP Assessment    Functional Problems Swallowing  -CS    Impact on Function: Swallowing Risk of malnourishment;Risk of aspiration  -CS    Clinical Impression: Swallowing Moderate:  -CS      User Key  (r) = Recorded By, (t) = Taken By, (c) = Cosigned By    Initials Name Provider Type    MALINDA Carr MS CCC-SLP Speech and Language Pathologist                    Time Calculation:   SLP Start Time: 1518  SLP Stop Time: 1656  SLP Time Calculation (min): 98 min        SLP G-Codes  SLP NOMS Used?: Yes  Functional Limitations: Swallowing  Swallow Current Status (): At least 40 percent but less than 60 percent impaired, limited or restricted  Swallow Goal Status (): At least 40 percent but less than 60 percent impaired, limited or restricted        Cash Carr MS CCC-SLP  11/17/2017

## 2017-11-19 ENCOUNTER — HOSPITAL ENCOUNTER (INPATIENT)
Facility: HOSPITAL | Age: 62
LOS: 2 days | Discharge: HOME | End: 2017-11-22
Attending: EMERGENCY MEDICINE | Admitting: INTERNAL MEDICINE

## 2017-11-19 ENCOUNTER — APPOINTMENT (OUTPATIENT)
Dept: GENERAL RADIOLOGY | Facility: HOSPITAL | Age: 62
End: 2017-11-19

## 2017-11-19 DIAGNOSIS — B37.0 OROPHARYNGEAL CANDIDIASIS: Primary | ICD-10-CM

## 2017-11-19 DIAGNOSIS — D84.9 IMMUNOCOMPROMISED STATE (HCC): ICD-10-CM

## 2017-11-19 DIAGNOSIS — B37.81 ESOPHAGEAL CANDIDIASIS (HCC): ICD-10-CM

## 2017-11-19 LAB
ANION GAP SERPL CALCULATED.3IONS-SCNC: 8 MMOL/L (ref 4–13)
BASOPHILS # BLD AUTO: 0.02 10*3/MM3 (ref 0–0.2)
BASOPHILS NFR BLD AUTO: 0.4 % (ref 0–2)
BUN BLD-MCNC: 19 MG/DL (ref 5–21)
BUN/CREAT SERPL: 43.2 (ref 7–25)
CALCIUM SPEC-SCNC: 8.7 MG/DL (ref 8.4–10.4)
CHLORIDE SERPL-SCNC: 99 MMOL/L (ref 98–110)
CO2 SERPL-SCNC: 27 MMOL/L (ref 24–31)
CREAT BLD-MCNC: 0.44 MG/DL (ref 0.5–1.4)
DEPRECATED RDW RBC AUTO: 46.6 FL (ref 40–54)
EOSINOPHIL # BLD AUTO: 0.06 10*3/MM3 (ref 0–0.7)
EOSINOPHIL NFR BLD AUTO: 1.1 % (ref 0–4)
ERYTHROCYTE [DISTWIDTH] IN BLOOD BY AUTOMATED COUNT: 15.4 % (ref 12–15)
GFR SERPL CREATININE-BSD FRML MDRD: >150 ML/MIN/1.73
GLUCOSE BLD-MCNC: 136 MG/DL (ref 70–100)
HCT VFR BLD AUTO: 32.1 % (ref 40–52)
HGB BLD-MCNC: 11.2 G/DL (ref 14–18)
IMM GRANULOCYTES # BLD: 0.01 10*3/MM3 (ref 0–0.03)
IMM GRANULOCYTES NFR BLD: 0.2 % (ref 0–5)
LYMPHOCYTES # BLD AUTO: 1.4 10*3/MM3 (ref 0.72–4.86)
LYMPHOCYTES NFR BLD AUTO: 24.9 % (ref 15–45)
MCH RBC QN AUTO: 30 PG (ref 28–32)
MCHC RBC AUTO-ENTMCNC: 34.9 G/DL (ref 33–36)
MCV RBC AUTO: 86.1 FL (ref 82–95)
MONOCYTES # BLD AUTO: 0.61 10*3/MM3 (ref 0.19–1.3)
MONOCYTES NFR BLD AUTO: 10.8 % (ref 4–12)
NEUTROPHILS # BLD AUTO: 3.53 10*3/MM3 (ref 1.87–8.4)
NEUTROPHILS NFR BLD AUTO: 62.6 % (ref 39–78)
NT-PROBNP SERPL-MCNC: 195 PG/ML (ref 0–900)
PLATELET # BLD AUTO: 181 10*3/MM3 (ref 130–400)
PMV BLD AUTO: 11.6 FL (ref 6–12)
POTASSIUM BLD-SCNC: 4.5 MMOL/L (ref 3.5–5.3)
RBC # BLD AUTO: 3.73 10*6/MM3 (ref 4.8–5.9)
SODIUM BLD-SCNC: 134 MMOL/L (ref 135–145)
WBC NRBC COR # BLD: 5.63 10*3/MM3 (ref 4.8–10.8)

## 2017-11-19 PROCEDURE — 87040 BLOOD CULTURE FOR BACTERIA: CPT | Performed by: EMERGENCY MEDICINE

## 2017-11-19 PROCEDURE — 93005 ELECTROCARDIOGRAM TRACING: CPT

## 2017-11-19 PROCEDURE — 93010 ELECTROCARDIOGRAM REPORT: CPT | Performed by: INTERNAL MEDICINE

## 2017-11-19 PROCEDURE — 71010 HC CHEST PA OR AP: CPT

## 2017-11-19 PROCEDURE — 94799 UNLISTED PULMONARY SVC/PX: CPT

## 2017-11-19 PROCEDURE — 85025 COMPLETE CBC W/AUTO DIFF WBC: CPT | Performed by: EMERGENCY MEDICINE

## 2017-11-19 PROCEDURE — 83880 ASSAY OF NATRIURETIC PEPTIDE: CPT | Performed by: EMERGENCY MEDICINE

## 2017-11-19 PROCEDURE — G0378 HOSPITAL OBSERVATION PER HR: HCPCS

## 2017-11-19 PROCEDURE — 25010000002 MORPHINE SULFATE (PF) 2 MG/ML SOLUTION: Performed by: EMERGENCY MEDICINE

## 2017-11-19 PROCEDURE — 25010000002 FLUCONAZOLE PER 200 MG: Performed by: EMERGENCY MEDICINE

## 2017-11-19 PROCEDURE — 80048 BASIC METABOLIC PNL TOTAL CA: CPT | Performed by: EMERGENCY MEDICINE

## 2017-11-19 PROCEDURE — 99285 EMERGENCY DEPT VISIT HI MDM: CPT

## 2017-11-19 RX ORDER — FLUCONAZOLE 2 MG/ML
400 INJECTION, SOLUTION INTRAVENOUS DAILY
Status: DISCONTINUED | OUTPATIENT
Start: 2017-11-20 | End: 2017-11-19

## 2017-11-19 RX ORDER — MORPHINE SULFATE 2 MG/ML
2 INJECTION, SOLUTION INTRAMUSCULAR; INTRAVENOUS ONCE
Status: COMPLETED | OUTPATIENT
Start: 2017-11-19 | End: 2017-11-19

## 2017-11-19 RX ORDER — SODIUM CHLORIDE 0.9 % (FLUSH) 0.9 %
10 SYRINGE (ML) INJECTION AS NEEDED
Status: DISCONTINUED | OUTPATIENT
Start: 2017-11-19 | End: 2017-11-22 | Stop reason: HOSPADM

## 2017-11-19 RX ORDER — FLUCONAZOLE 2 MG/ML
400 INJECTION, SOLUTION INTRAVENOUS ONCE
Status: COMPLETED | OUTPATIENT
Start: 2017-11-19 | End: 2017-11-20

## 2017-11-19 RX ADMIN — FLUCONAZOLE 400 MG: 2 INJECTION, SOLUTION INTRAVENOUS at 22:36

## 2017-11-19 RX ADMIN — Medication 10 ML: at 21:58

## 2017-11-19 RX ADMIN — SODIUM CHLORIDE 500 ML: 9 INJECTION, SOLUTION INTRAVENOUS at 21:55

## 2017-11-19 RX ADMIN — MORPHINE SULFATE 2 MG: 2 INJECTION, SOLUTION INTRAMUSCULAR; INTRAVENOUS at 21:57

## 2017-11-20 ENCOUNTER — HOSPITAL ENCOUNTER (OUTPATIENT)
Dept: RADIATION ONCOLOGY | Facility: HOSPITAL | Age: 62
Setting detail: RADIATION/ONCOLOGY SERIES
Discharge: HOME | End: 2017-11-20

## 2017-11-20 LAB
ALBUMIN SERPL-MCNC: 3.4 G/DL (ref 3.5–5)
ALBUMIN/GLOB SERPL: 1.1 G/DL (ref 1.1–2.5)
ALP SERPL-CCNC: 168 U/L (ref 24–120)
ALT SERPL W P-5'-P-CCNC: 100 U/L (ref 0–54)
ANION GAP SERPL CALCULATED.3IONS-SCNC: 7 MMOL/L (ref 4–13)
AST SERPL-CCNC: 126 U/L (ref 7–45)
BASOPHILS # BLD AUTO: 0.01 10*3/MM3 (ref 0–0.2)
BASOPHILS NFR BLD AUTO: 0.3 % (ref 0–2)
BILIRUB SERPL-MCNC: 0.5 MG/DL (ref 0.1–1)
BUN BLD-MCNC: 16 MG/DL (ref 5–21)
BUN/CREAT SERPL: 36.4 (ref 7–25)
CALCIUM SPEC-SCNC: 8.5 MG/DL (ref 8.4–10.4)
CHLORIDE SERPL-SCNC: 102 MMOL/L (ref 98–110)
CO2 SERPL-SCNC: 27 MMOL/L (ref 24–31)
CREAT BLD-MCNC: 0.44 MG/DL (ref 0.5–1.4)
DEPRECATED RDW RBC AUTO: 47.3 FL (ref 40–54)
EOSINOPHIL # BLD AUTO: 0.07 10*3/MM3 (ref 0–0.7)
EOSINOPHIL NFR BLD AUTO: 1.8 % (ref 0–4)
ERYTHROCYTE [DISTWIDTH] IN BLOOD BY AUTOMATED COUNT: 15.4 % (ref 12–15)
GFR SERPL CREATININE-BSD FRML MDRD: >150 ML/MIN/1.73
GLOBULIN UR ELPH-MCNC: 3 GM/DL
GLUCOSE BLD-MCNC: 148 MG/DL (ref 70–100)
GLUCOSE BLDC GLUCOMTR-MCNC: 119 MG/DL (ref 70–130)
GLUCOSE BLDC GLUCOMTR-MCNC: 132 MG/DL (ref 70–130)
GLUCOSE BLDC GLUCOMTR-MCNC: 139 MG/DL (ref 70–130)
GLUCOSE BLDC GLUCOMTR-MCNC: 150 MG/DL (ref 70–130)
GLUCOSE BLDC GLUCOMTR-MCNC: 207 MG/DL (ref 70–130)
HCT VFR BLD AUTO: 32.7 % (ref 40–52)
HGB BLD-MCNC: 11.1 G/DL (ref 14–18)
IMM GRANULOCYTES # BLD: 0.02 10*3/MM3 (ref 0–0.03)
IMM GRANULOCYTES NFR BLD: 0.5 % (ref 0–5)
LYMPHOCYTES # BLD AUTO: 0.93 10*3/MM3 (ref 0.72–4.86)
LYMPHOCYTES NFR BLD AUTO: 23.8 % (ref 15–45)
MAGNESIUM SERPL-MCNC: 1.8 MG/DL (ref 1.4–2.2)
MCH RBC QN AUTO: 29.4 PG (ref 28–32)
MCHC RBC AUTO-ENTMCNC: 33.9 G/DL (ref 33–36)
MCV RBC AUTO: 86.7 FL (ref 82–95)
MONOCYTES # BLD AUTO: 0.4 10*3/MM3 (ref 0.19–1.3)
MONOCYTES NFR BLD AUTO: 10.2 % (ref 4–12)
NEUTROPHILS # BLD AUTO: 2.48 10*3/MM3 (ref 1.87–8.4)
NEUTROPHILS NFR BLD AUTO: 63.4 % (ref 39–78)
PHOSPHATE SERPL-MCNC: 3.9 MG/DL (ref 2.5–4.5)
PLATELET # BLD AUTO: 152 10*3/MM3 (ref 130–400)
PMV BLD AUTO: 11.7 FL (ref 6–12)
POTASSIUM BLD-SCNC: 4.4 MMOL/L (ref 3.5–5.3)
PROT SERPL-MCNC: 6.4 G/DL (ref 6.3–8.7)
RBC # BLD AUTO: 3.77 10*6/MM3 (ref 4.8–5.9)
SODIUM BLD-SCNC: 136 MMOL/L (ref 135–145)
WBC NRBC COR # BLD: 3.91 10*3/MM3 (ref 4.8–10.8)

## 2017-11-20 PROCEDURE — 85025 COMPLETE CBC W/AUTO DIFF WBC: CPT | Performed by: INTERNAL MEDICINE

## 2017-11-20 PROCEDURE — 25010000002 ACYCLOVIR PER 5 MG: Performed by: PHYSICIAN ASSISTANT

## 2017-11-20 PROCEDURE — 25010000002 ENOXAPARIN PER 10 MG: Performed by: INTERNAL MEDICINE

## 2017-11-20 PROCEDURE — 63710000001 INSULIN DETEMIR PER 5 UNITS: Performed by: INTERNAL MEDICINE

## 2017-11-20 PROCEDURE — 83735 ASSAY OF MAGNESIUM: CPT | Performed by: INTERNAL MEDICINE

## 2017-11-20 PROCEDURE — 77386: CPT | Performed by: RADIOLOGY

## 2017-11-20 PROCEDURE — 25010000002 FLUCONAZOLE PER 200 MG: Performed by: INTERNAL MEDICINE

## 2017-11-20 PROCEDURE — 80053 COMPREHEN METABOLIC PANEL: CPT | Performed by: INTERNAL MEDICINE

## 2017-11-20 PROCEDURE — 82962 GLUCOSE BLOOD TEST: CPT

## 2017-11-20 PROCEDURE — 63710000001 INSULIN LISPRO (HUMAN) PER 5 UNITS: Performed by: INTERNAL MEDICINE

## 2017-11-20 PROCEDURE — 84100 ASSAY OF PHOSPHORUS: CPT | Performed by: INTERNAL MEDICINE

## 2017-11-20 PROCEDURE — 25810000003 SODIUM CHLORIDE 0.9 % WITH KCL 20 MEQ 20-0.9 MEQ/L-% SOLUTION: Performed by: INTERNAL MEDICINE

## 2017-11-20 PROCEDURE — 25010000002 HYDROMORPHONE PER 4 MG: Performed by: INTERNAL MEDICINE

## 2017-11-20 RX ORDER — TAMSULOSIN HYDROCHLORIDE 0.4 MG/1
0.4 CAPSULE ORAL NIGHTLY
Status: DISCONTINUED | OUTPATIENT
Start: 2017-11-20 | End: 2017-11-20 | Stop reason: ALTCHOICE

## 2017-11-20 RX ORDER — LISINOPRIL 10 MG/1
10 TABLET ORAL DAILY
Status: DISCONTINUED | OUTPATIENT
Start: 2017-11-20 | End: 2017-11-21

## 2017-11-20 RX ORDER — OXYCODONE AND ACETAMINOPHEN 7.5; 325 MG/1; MG/1
1 TABLET ORAL EVERY 4 HOURS PRN
Status: DISCONTINUED | OUTPATIENT
Start: 2017-11-20 | End: 2017-11-22 | Stop reason: HOSPADM

## 2017-11-20 RX ORDER — METOPROLOL TARTRATE 50 MG/1
50 TABLET, FILM COATED ORAL 2 TIMES DAILY
Status: DISCONTINUED | OUTPATIENT
Start: 2017-11-20 | End: 2017-11-22 | Stop reason: HOSPADM

## 2017-11-20 RX ORDER — CETIRIZINE HYDROCHLORIDE 10 MG/1
10 TABLET ORAL DAILY
Status: DISCONTINUED | OUTPATIENT
Start: 2017-11-20 | End: 2017-11-22 | Stop reason: HOSPADM

## 2017-11-20 RX ORDER — ZOLPIDEM TARTRATE 5 MG/1
10 TABLET ORAL NIGHTLY PRN
Status: DISCONTINUED | OUTPATIENT
Start: 2017-11-20 | End: 2017-11-22 | Stop reason: HOSPADM

## 2017-11-20 RX ORDER — CLOTRIMAZOLE 10 MG/1
10 LOZENGE ORAL; TOPICAL
Status: DISCONTINUED | OUTPATIENT
Start: 2017-11-20 | End: 2017-11-22 | Stop reason: HOSPADM

## 2017-11-20 RX ORDER — DEXTROSE MONOHYDRATE 25 G/50ML
25 INJECTION, SOLUTION INTRAVENOUS
Status: DISCONTINUED | OUTPATIENT
Start: 2017-11-20 | End: 2017-11-22 | Stop reason: HOSPADM

## 2017-11-20 RX ORDER — ACETAMINOPHEN 325 MG/1
650 TABLET ORAL EVERY 4 HOURS PRN
Status: DISCONTINUED | OUTPATIENT
Start: 2017-11-20 | End: 2017-11-22 | Stop reason: HOSPADM

## 2017-11-20 RX ORDER — CETIRIZINE HYDROCHLORIDE, PSEUDOEPHEDRINE HYDROCHLORIDE 5; 120 MG/1; MG/1
1 TABLET, FILM COATED, EXTENDED RELEASE ORAL 2 TIMES DAILY
Status: DISCONTINUED | OUTPATIENT
Start: 2017-11-20 | End: 2017-11-20 | Stop reason: SDUPTHER

## 2017-11-20 RX ORDER — FUROSEMIDE 40 MG/1
40 TABLET ORAL 2 TIMES DAILY
Status: DISCONTINUED | OUTPATIENT
Start: 2017-11-20 | End: 2017-11-20

## 2017-11-20 RX ORDER — OXYCODONE AND ACETAMINOPHEN 10; 325 MG/1; MG/1
1 TABLET ORAL EVERY 6 HOURS PRN
Status: DISCONTINUED | OUTPATIENT
Start: 2017-11-20 | End: 2017-11-22 | Stop reason: HOSPADM

## 2017-11-20 RX ORDER — SODIUM CHLORIDE 0.9 % (FLUSH) 0.9 %
1-10 SYRINGE (ML) INJECTION AS NEEDED
Status: DISCONTINUED | OUTPATIENT
Start: 2017-11-20 | End: 2017-11-22 | Stop reason: HOSPADM

## 2017-11-20 RX ORDER — SODIUM CHLORIDE AND POTASSIUM CHLORIDE 150; 900 MG/100ML; MG/100ML
75 INJECTION, SOLUTION INTRAVENOUS CONTINUOUS
Status: DISCONTINUED | OUTPATIENT
Start: 2017-11-20 | End: 2017-11-22 | Stop reason: HOSPADM

## 2017-11-20 RX ORDER — FLUCONAZOLE 2 MG/ML
200 INJECTION, SOLUTION INTRAVENOUS DAILY
Status: DISCONTINUED | OUTPATIENT
Start: 2017-11-20 | End: 2017-11-21

## 2017-11-20 RX ORDER — NICOTINE POLACRILEX 4 MG
15 LOZENGE BUCCAL
Status: DISCONTINUED | OUTPATIENT
Start: 2017-11-20 | End: 2017-11-22 | Stop reason: HOSPADM

## 2017-11-20 RX ORDER — ONDANSETRON 2 MG/ML
4 INJECTION INTRAMUSCULAR; INTRAVENOUS EVERY 6 HOURS PRN
Status: DISCONTINUED | OUTPATIENT
Start: 2017-11-20 | End: 2017-11-22 | Stop reason: HOSPADM

## 2017-11-20 RX ORDER — TERAZOSIN 1 MG/1
1 CAPSULE ORAL NIGHTLY
Status: DISCONTINUED | OUTPATIENT
Start: 2017-11-20 | End: 2017-11-22 | Stop reason: HOSPADM

## 2017-11-20 RX ORDER — NITROGLYCERIN 400 UG/1
1 SPRAY ORAL
Status: DISCONTINUED | OUTPATIENT
Start: 2017-11-20 | End: 2017-11-20 | Stop reason: SDUPTHER

## 2017-11-20 RX ORDER — PANTOPRAZOLE SODIUM 40 MG/1
40 TABLET, DELAYED RELEASE ORAL EVERY MORNING
Status: DISCONTINUED | OUTPATIENT
Start: 2017-11-20 | End: 2017-11-22 | Stop reason: HOSPADM

## 2017-11-20 RX ORDER — NALOXONE HCL 0.4 MG/ML
0.4 VIAL (ML) INJECTION
Status: DISCONTINUED | OUTPATIENT
Start: 2017-11-20 | End: 2017-11-22 | Stop reason: HOSPADM

## 2017-11-20 RX ORDER — LORAZEPAM 2 MG/ML
0.5 INJECTION INTRAMUSCULAR EVERY 6 HOURS PRN
Status: DISCONTINUED | OUTPATIENT
Start: 2017-11-20 | End: 2017-11-22 | Stop reason: HOSPADM

## 2017-11-20 RX ORDER — PSEUDOEPHEDRINE HCL 120 MG/1
120 TABLET, FILM COATED, EXTENDED RELEASE ORAL EVERY 12 HOURS SCHEDULED
Status: DISCONTINUED | OUTPATIENT
Start: 2017-11-20 | End: 2017-11-22 | Stop reason: HOSPADM

## 2017-11-20 RX ORDER — SUCRALFATE 1 G/1
1 TABLET ORAL 4 TIMES DAILY
Status: DISCONTINUED | OUTPATIENT
Start: 2017-11-20 | End: 2017-11-22 | Stop reason: HOSPADM

## 2017-11-20 RX ORDER — BACLOFEN 10 MG/1
20 TABLET ORAL 3 TIMES DAILY
Status: DISCONTINUED | OUTPATIENT
Start: 2017-11-20 | End: 2017-11-22 | Stop reason: HOSPADM

## 2017-11-20 RX ORDER — NITROGLYCERIN 0.4 MG/1
0.4 TABLET SUBLINGUAL
Status: DISCONTINUED | OUTPATIENT
Start: 2017-11-20 | End: 2017-11-22 | Stop reason: HOSPADM

## 2017-11-20 RX ADMIN — TERAZOSIN HYDROCHLORIDE 1 MG: 1 CAPSULE ORAL at 21:43

## 2017-11-20 RX ADMIN — CLOTRIMAZOLE 10 MG: 10 LOZENGE ORAL; TOPICAL at 17:10

## 2017-11-20 RX ADMIN — HYDROMORPHONE HYDROCHLORIDE 1 MG: 1 INJECTION, SOLUTION INTRAMUSCULAR; INTRAVENOUS; SUBCUTANEOUS at 13:11

## 2017-11-20 RX ADMIN — HYDROMORPHONE HYDROCHLORIDE 1 MG: 1 INJECTION, SOLUTION INTRAMUSCULAR; INTRAVENOUS; SUBCUTANEOUS at 17:07

## 2017-11-20 RX ADMIN — CLOTRIMAZOLE 10 MG: 10 LOZENGE ORAL; TOPICAL at 21:44

## 2017-11-20 RX ADMIN — LIDOCAINE HYDROCHLORIDE 30 ML: 20 SOLUTION ORAL; TOPICAL at 21:44

## 2017-11-20 RX ADMIN — HYDROMORPHONE HYDROCHLORIDE 1 MG: 1 INJECTION, SOLUTION INTRAMUSCULAR; INTRAVENOUS; SUBCUTANEOUS at 06:40

## 2017-11-20 RX ADMIN — CLOTRIMAZOLE 10 MG: 10 LOZENGE ORAL; TOPICAL at 14:33

## 2017-11-20 RX ADMIN — INSULIN LISPRO 5 UNITS: 100 INJECTION, SOLUTION INTRAVENOUS; SUBCUTANEOUS at 22:02

## 2017-11-20 RX ADMIN — ENOXAPARIN SODIUM 40 MG: 40 INJECTION SUBCUTANEOUS at 09:18

## 2017-11-20 RX ADMIN — INSULIN DETEMIR 30 UNITS: 100 INJECTION, SOLUTION SUBCUTANEOUS at 09:17

## 2017-11-20 RX ADMIN — POTASSIUM CHLORIDE AND SODIUM CHLORIDE 100 ML/HR: 900; 150 INJECTION, SOLUTION INTRAVENOUS at 04:00

## 2017-11-20 RX ADMIN — HYDROMORPHONE HYDROCHLORIDE 1 MG: 1 INJECTION, SOLUTION INTRAMUSCULAR; INTRAVENOUS; SUBCUTANEOUS at 20:51

## 2017-11-20 RX ADMIN — LIDOCAINE HYDROCHLORIDE 30 ML: 20 SOLUTION ORAL; TOPICAL at 17:07

## 2017-11-20 RX ADMIN — POTASSIUM CHLORIDE AND SODIUM CHLORIDE 100 ML/HR: 900; 150 INJECTION, SOLUTION INTRAVENOUS at 14:30

## 2017-11-20 RX ADMIN — ACYCLOVIR SODIUM 640 MG: 50 INJECTION, SOLUTION INTRAVENOUS at 18:15

## 2017-11-20 RX ADMIN — MUPIROCIN 1 APPLICATION: 20 OINTMENT TOPICAL at 21:44

## 2017-11-20 RX ADMIN — MUPIROCIN: 20 OINTMENT TOPICAL at 09:17

## 2017-11-20 RX ADMIN — MUPIROCIN: 20 OINTMENT TOPICAL at 14:31

## 2017-11-20 RX ADMIN — FLUCONAZOLE 200 MG: 2 INJECTION INTRAVENOUS at 22:03

## 2017-11-20 NOTE — H&P
[facility] Medicine Services  HISTORY AND PHYSICAL    Date of Admission: 11/19/2017  Primary Care Physician: Christian Castellon MD    Subjective     Chief Complaint: Mouth pain    History of Present Illness  Patient is a 62-year-old white male with oropharyngeal cancer who is undergoing chemotherapy he has severe thrush and mucositis.  Given outpatient medications which were unhelpful he presents to the emergency room in severe distress pain wise and was severe mucositis he is being admitted for further evaluation treatment and to get him comfortable.  He has a trach and a feeding tube.        Review of Systems   14 point review of systems are negative except for as per HPI  Otherwise complete ROS reviewed and negative except as mentioned in the HPI.    Past Medical History:   Past Medical History:   Diagnosis Date   • Arthritis    • Coronary artery disease    • Depression    • Diabetes mellitus    • Enlarged prostate    • GERD (gastroesophageal reflux disease)    • History of transfusion    • Hypertension    • Oropharyngeal cancer 08/27/2017   • Seizure     diabetic   • Severe esophageal dysplasia    • Stroke    • TB (pulmonary tuberculosis) 2004     Past Surgical History:  Past Surgical History:   Procedure Laterality Date   • CARDIAC SURGERY     • CHOLECYSTECTOMY     • CORONARY ARTERY BYPASS GRAFT     • FRACTURE SURGERY     • GTUBE REPLACEMENT N/A 10/6/2017    Procedure: GASTROSTOMY TUBE REPLACEMENT, port placement;  Surgeon: Jayne Phillips MD;  Location: [facility] OR;  Service:    • HERNIA REPAIR     • LARYNGOSCOPY N/A 10/11/2017    Procedure: DIRECT LARYNGOSCOPY WITH BIOPSY;  Surgeon: Darin Neal MD;  Location: [facility] OR;  Service:    • NISSEN FUNDOPLICATION LAPAROSCOPIC     • AR DENTAL SURGERY PROCEDURE N/A 10/11/2017    Procedure: TOOTH EXTRACTION;  Surgeon: Darin Neal MD;  Location: [facility] OR;  Service: ENT   • AR INSJ TUNNELED CVC W/O SUBQ PORT/ AGE 5  YR/> Left 10/6/2017    Procedure: INSERTION VENOUS ACCESS DEVICE;  Surgeon: Jayne Phillips MD;  Location: [facility] OR;  Service: General   • SKIN BIOPSY     • TESTICLE SURGERY      tubes implanted for drainage   • TONSILLECTOMY     • TRACHEOSTOMY N/A 10/5/2017    Procedure: Awake tracheostomy; DIRECT LARYNGOSCOPY WITH BIOPSY;  Surgeon: Alber Justin MD;  Location: [facility] OR;  Service:      Social History:  reports that he has been smoking Cigarettes.  He has a 26.00 pack-year smoking history. His smokeless tobacco use includes Snuff. He reports that he drinks alcohol. He reports that he does not use illicit drugs.    Family History: family history includes Cancer in his brother; Heart disease in his brother and father; Stroke in his mother.       Allergies:  Allergies   Allergen Reactions   • Codeine Hives     Medications:  Prior to Admission medications    Medication Sig Start Date End Date Taking? Authorizing Provider   baclofen (LIORESAL) 20 MG tablet Take 20 mg by mouth 3 (Three) Times a Day.    Historical Provider, MD   fexofenadine-pseudoephedrine (ALLEGRA-D)  MG per 12 hr tablet Take 1 tablet by mouth 2 (Two) Times a Day.    Historical Provider, MD   FLUVIRIN suspension injection ADM 0.5ML IM UTD 10/20/17   Historical Provider, MD   furosemide (LASIX) 40 MG tablet Take 40 mg by mouth 2 (Two) Times a Day.    Historical Provider, MD   insulin glargine (LANTUS) 100 UNIT/ML injection Inject 70 Units under the skin Daily.    Historical Provider, MD   lisinopril (PRINIVIL,ZESTRIL) 10 MG tablet Take 10 mg by mouth Daily.    Historical Provider, MD   metoprolol tartrate (LOPRESSOR) 50 MG tablet Take 50 mg by mouth 2 (Two) Times a Day.    Historical Provider, MD   nitroglycerin (NITROLINGUAL) 0.4 MG/SPRAY spray Place 1 spray under the tongue Every 5 (Five) Minutes As Needed for Chest Pain.    Historical Provider, MD   omeprazole (priLOSEC) 20 MG capsule Take 20 mg by mouth Daily.    Historical Provider,  "MD   oxyCODONE-acetaminophen (PERCOCET)  MG per tablet Take 1 tablet by mouth Every 6 (Six) Hours As Needed for Moderate Pain . 11/1/17   Alber Justin MD   oxyCODONE-acetaminophen (PERCOCET) 7.5-325 MG per tablet  10/18/17   Historical Provider, MD   sucralfate (CARAFATE) 1 g tablet Take 1 g by mouth 4 (Four) Times a Day.    Historical Provider, MD   tamsulosin (FLOMAX) 0.4 MG capsule 24 hr capsule Take 1 capsule by mouth Every Night.    Historical Provider, MD   zolpidem (AMBIEN) 10 MG tablet Take 10 mg by mouth At Night As Needed for Sleep.    Historical Provider, MD     Objective     Vital Signs: /67  Pulse 78  Temp 98.3 °F (36.8 °C) (Oral)   Resp 18  Ht 66\" (167.6 cm)  Wt 145 lb 4.8 oz (65.9 kg)  SpO2 99%  BMI 23.45 kg/m2  Physical Exam  Constitutional: He is oriented to person, place, and time. He appears well-developed and well-nourished.   HENT:   Head: Normocephalic and atraumatic.   White plaque with erythema noted throughout the oropharynx consistent with candidiasis   Eyes: Conjunctivae are normal. Pupils are equal, round, and reactive to light.   Neck: Normal range of motion.   Trach in place with no visible discharge   Cardiovascular: Normal rate, regular rhythm and normal heart sounds.    Pulmonary/Chest: Effort normal and breath sounds normal.   Abdominal: Soft. There is no tenderness.   G-tube in place in left upper quadrant   Musculoskeletal: Normal range of motion. He exhibits no edema or deformity.   Neurological: He is alert and oriented to person, place, and time. He has normal strength.   Skin: Skin is warm.   Psychiatric: He has a normal mood and affect. His behavior is normal.   Nursing note and vitals reviewed.      Results Reviewed:  Lab Results (last 24 hours)     Procedure Component Value Units Date/Time    Blood Culture - Blood, [[id number]] Collected:  11/19/17 2138    Specimen:  Blood from Arm, Left Updated:  11/19/17 2147    CBC & Differential [[id number]] " Collected:  11/19/17 2138    Specimen:  Blood Updated:  11/19/17 2151    Narrative:       The following orders were created for panel order CBC & Differential.  Procedure                               Abnormality         Status                     ---------                               -----------         ------                     CBC Auto Differential[[id number]]        Abnormal            Final result                 Please view results for these tests on the individual orders.    CBC Auto Differential [[id number]]  (Abnormal) Collected:  11/19/17 2138    Specimen:  Blood Updated:  11/19/17 2151     WBC 5.63 10*3/mm3      RBC 3.73 (L) 10*6/mm3      Hemoglobin 11.2 (L) g/dL      Hematocrit 32.1 (L) %      MCV 86.1 fL      MCH 30.0 pg      MCHC 34.9 g/dL      RDW 15.4 (H) %      RDW-SD 46.6 fl      MPV 11.6 fL      Platelets 181 10*3/mm3      Neutrophil % 62.6 %      Lymphocyte % 24.9 %      Monocyte % 10.8 %      Eosinophil % 1.1 %      Basophil % 0.4 %      Immature Grans % 0.2 %      Neutrophils, Absolute 3.53 10*3/mm3      Lymphocytes, Absolute 1.40 10*3/mm3      Monocytes, Absolute 0.61 10*3/mm3      Eosinophils, Absolute 0.06 10*3/mm3      Basophils, Absolute 0.02 10*3/mm3      Immature Grans, Absolute 0.01 10*3/mm3     Blood Culture - Blood, [[id number]] Collected:  11/19/17 2147    Specimen:  Blood from Arm, Right Updated:  11/19/17 2154    Basic Metabolic Panel [[id number]]  (Abnormal) Collected:  11/19/17 2138    Specimen:  Blood Updated:  11/19/17 2206     Glucose 136 (H) mg/dL      BUN 19 mg/dL       Specimen hemolyzed. Results may be affected.        Creatinine 0.44 (L) mg/dL      Sodium 134 (L) mmol/L      Potassium 4.5 mmol/L       Specimen hemolyzed.  Results may be affected.        Chloride 99 mmol/L      CO2 27.0 mmol/L      Calcium 8.7 mg/dL      eGFR Non African Amer >150 mL/min/1.73      BUN/Creatinine Ratio 43.2 (H)     Anion Gap 8.0 mmol/L     Narrative:       GFR Normal >60  Chronic Kidney  Disease <60  Kidney Failure <15    BNP [[id number]]  (Normal) Collected:  11/19/17 2138    Specimen:  Blood Updated:  11/19/17 2208     proBNP 195.0 pg/mL         Imaging Results (last 24 hours)     Procedure Component Value Units Date/Time    XR Chest 1 View [[id number]] Collected:  11/19/17 2121     Updated:  11/19/17 2136    Narrative:       XR CHEST 1 VW- 11/19/2017 9:17 PM CST     HISTORY: Shortness of air       COMPARISON: 10/09/2017.     FINDINGS:   Tracheostomy tube and port are stable in position. Mild opacities are  seen in the LEFT lung base, which could be due to airspace disease or  scarring. The cardiomediastinal silhouette and pulmonary vascularity are  unchanged.      The osseous structures and surrounding soft tissues demonstrate no acute  abnormality.       Impression:       1. Possible scarring or airspace disease in the LEFT lung base.        This report was finalized on 11/19/2017 21:22 by Dr. Nitesh Adair MD.        I have personally reviewed and interpreted the radiology studies and ECG obtained at time of admission.     Assessment / Plan     Assessment:   Hospital Problem List     Oropharyngeal candidiasis        1.  Or pharyngeal candidiasis due to chemotherapy will give patient medical mouthwash and IV Diflucan.  We will also hydrate with IV fluids as well as continuing tube feeds.  Will give higher dose opiates to relieve pain this patient is an extreme discomfort.  2.  Or pharyngeal cancer continue chemotherapy as per previously directed  3.  Probable mild dehydration we will hydrate with IV fluids.  4.  Tobacco habituation will give him a nicotine patch.           Code Status: Full     I discussed the patients findings and my recommendations with the patient his spouse as his healthcare power surrogate    Estimated length of stay 1-2 days    Zion Márquez MD   11/20/17   2:59 AM

## 2017-11-20 NOTE — PLAN OF CARE
Problem: Patient Care Overview (Adult)  Goal: Plan of Care Review  Outcome: Ongoing (interventions implemented as appropriate)    11/20/17 0936   Patient Care Overview   Progress progress toward functional goals as expected   Outcome Evaluation   Outcome Summary/Follow up Plan Follow as inpatient as well as in XRT once d/c'd         Problem: Nutrition, Enteral (Adult)  Goal: Signs and Symptoms of Listed Potential Problems Will be Absent or Manageable (Nutrition, Enteral)  Outcome: Ongoing (interventions implemented as appropriate)    11/20/17 0936   Nutrition, Enteral   Problems Assessed (Enteral Nutrition) all   Problems Present (Enteral Nutrition) none  (Has TF at home)

## 2017-11-20 NOTE — ED PROVIDER NOTES
Subjective patient is a 62-year-old male who presents to the ER with choking sensation and shortness of air.  Patient states that last evening he began having pain in his mouth and difficulty swallowing and pain.  Patient states that anytime he tries to drink water he has severe pain.  Patient states he looked in his mouth and saw some white and black material on his tongue.  Patient has a history of oropharyngeal cancer and is currently undergoing chemotherapy once weekly and daily radiation treatments.  Patient states that he took a nap this evening and when he woke up he felt like he was choking and that his throat was closing.  Patient does have a trach in place and has had a trach for the last 1-1/2 months.  Patient states he has had increased mucus production from the trach but denies fever.  Patient has had a cough and has been hoarse.  Patient denies any chest pain, swelling, abdominal pain, nausea vomiting diarrhea, urinary changes, neurological changes.    History provided by:  Patient   used: No        Review of Systems   Constitutional: Negative.    HENT: Positive for mouth sores, trouble swallowing and voice change.    Eyes: Negative.    Respiratory: Positive for cough, choking and shortness of breath.    Cardiovascular: Negative.    Gastrointestinal: Negative.    Endocrine: Negative.    Genitourinary: Negative.    Musculoskeletal: Negative.    Skin: Negative.    Allergic/Immunologic: Negative.    Neurological: Negative.    Hematological: Negative.    Psychiatric/Behavioral: Negative.    All other systems reviewed and are negative.      Past Medical History:   Diagnosis Date   • Arthritis    • Coronary artery disease    • Depression    • Diabetes mellitus    • Enlarged prostate    • GERD (gastroesophageal reflux disease)    • History of transfusion    • Hypertension    • Oropharyngeal cancer 08/27/2017   • Seizure     diabetic   • Severe esophageal dysplasia    • Stroke    • TB  (pulmonary tuberculosis) 2004       Allergies   Allergen Reactions   • Codeine Hives       Past Surgical History:   Procedure Laterality Date   • CARDIAC SURGERY     • CHOLECYSTECTOMY     • CORONARY ARTERY BYPASS GRAFT     • FRACTURE SURGERY     • GTUBE REPLACEMENT N/A 10/6/2017    Procedure: GASTROSTOMY TUBE REPLACEMENT, port placement;  Surgeon: Jayne Phillips MD;  Location:  [facility];  Service:    • HERNIA REPAIR     • LARYNGOSCOPY N/A 10/11/2017    Procedure: DIRECT LARYNGOSCOPY WITH BIOPSY;  Surgeon: Darin Neal MD;  Location:  [facility];  Service:    • NISSEN FUNDOPLICATION LAPAROSCOPIC     • MO DENTAL SURGERY PROCEDURE N/A 10/11/2017    Procedure: TOOTH EXTRACTION;  Surgeon: Darin Neal MD;  Location:  [facility];  Service: ENT   • MO INSJ TUNNELED CVC W/O SUBQ PORT/ AGE 5 YR/> Left 10/6/2017    Procedure: INSERTION VENOUS ACCESS DEVICE;  Surgeon: Jayne Phillips MD;  Location:  [facility];  Service: General   • SKIN BIOPSY     • TESTICLE SURGERY      tubes implanted for drainage   • TONSILLECTOMY     • TRACHEOSTOMY N/A 10/5/2017    Procedure: Awake tracheostomy; DIRECT LARYNGOSCOPY WITH BIOPSY;  Surgeon: Alber Justin MD;  Location:  [facility];  Service:        Family History   Problem Relation Age of Onset   • Stroke Mother    • Heart disease Father    • Heart disease Brother    • Cancer Brother        Social History     Social History   • Marital status:      Spouse name: N/A   • Number of children: N/A   • Years of education: N/A     Occupational History   •       Disabled     Social History Main Topics   • Smoking status: Current Every Day Smoker     Packs/day: 0.50     Years: 52.00     Types: Cigarettes   • Smokeless tobacco: Current User     Types: Snuff      Comment: I've tried and tried   • Alcohol use Yes      Comment: occasionally   • Drug use: No   • Sexual activity: Defer     Other Topics Concern   • None     Social History Narrative   • None            Objective   Physical Exam   Constitutional: He is oriented to person, place, and time. He appears well-developed and well-nourished.   HENT:   Head: Normocephalic and atraumatic.   White plaque with erythema noted throughout the oropharynx consistent with candidiasis   Eyes: Conjunctivae are normal. Pupils are equal, round, and reactive to light.   Neck: Normal range of motion.   Trach in place with no visible discharge   Cardiovascular: Normal rate, regular rhythm and normal heart sounds.    Pulmonary/Chest: Effort normal and breath sounds normal.   Abdominal: Soft. There is no tenderness.   G-tube in place in left upper quadrant   Musculoskeletal: Normal range of motion. He exhibits no edema or deformity.   Neurological: He is alert and oriented to person, place, and time. He has normal strength.   Skin: Skin is warm.   Psychiatric: He has a normal mood and affect. His behavior is normal.   Nursing note and vitals reviewed.      Procedures         ED Course  ED Course      Trach was suctioned.  He was given IV fluids, morphine, and fluconazole.  EKG: Sinus rhythm with a rate of 89, incomplete right branch block, Q waves in the inferior leads    Lab Results (last 24 hours)     Procedure Component Value Units Date/Time    CBC & Differential [[id number]] Collected:  11/19/17 2138    Specimen:  Blood Updated:  11/19/17 2151    Narrative:       The following orders were created for panel order CBC & Differential.  Procedure                               Abnormality         Status                     ---------                               -----------         ------                     CBC Auto Differential[[id number]]        Abnormal            Final result                 Please view results for these tests on the individual orders.    Basic Metabolic Panel [[id number]]  (Abnormal) Collected:  11/19/17 2138    Specimen:  Blood Updated:  11/19/17 2206     Glucose 136 (H) mg/dL      BUN 19 mg/dL       Specimen  hemolyzed. Results may be affected.        Creatinine 0.44 (L) mg/dL      Sodium 134 (L) mmol/L      Potassium 4.5 mmol/L       Specimen hemolyzed.  Results may be affected.        Chloride 99 mmol/L      CO2 27.0 mmol/L      Calcium 8.7 mg/dL      eGFR Non African Amer >150 mL/min/1.73      BUN/Creatinine Ratio 43.2 (H)     Anion Gap 8.0 mmol/L     Narrative:       GFR Normal >60  Chronic Kidney Disease <60  Kidney Failure <15    Blood Culture - Blood, [[id number]] Collected:  11/19/17 2138    Specimen:  Blood from Arm, Left Updated:  11/19/17 2147    BNP [[id number]]  (Normal) Collected:  11/19/17 2138    Specimen:  Blood Updated:  11/19/17 2208     proBNP 195.0 pg/mL     CBC Auto Differential [[id number]]  (Abnormal) Collected:  11/19/17 2138    Specimen:  Blood Updated:  11/19/17 2151     WBC 5.63 10*3/mm3      RBC 3.73 (L) 10*6/mm3      Hemoglobin 11.2 (L) g/dL      Hematocrit 32.1 (L) %      MCV 86.1 fL      MCH 30.0 pg      MCHC 34.9 g/dL      RDW 15.4 (H) %      RDW-SD 46.6 fl      MPV 11.6 fL      Platelets 181 10*3/mm3      Neutrophil % 62.6 %      Lymphocyte % 24.9 %      Monocyte % 10.8 %      Eosinophil % 1.1 %      Basophil % 0.4 %      Immature Grans % 0.2 %      Neutrophils, Absolute 3.53 10*3/mm3      Lymphocytes, Absolute 1.40 10*3/mm3      Monocytes, Absolute 0.61 10*3/mm3      Eosinophils, Absolute 0.06 10*3/mm3      Basophils, Absolute 0.02 10*3/mm3      Immature Grans, Absolute 0.01 10*3/mm3     Blood Culture - Blood, [[id number]] Collected:  11/19/17 2147    Specimen:  Blood from Arm, Right Updated:  11/19/17 2154        XR Chest 1 View   Final Result   1. Possible scarring or airspace disease in the LEFT lung base.           This report was finalized on 11/19/2017 21:22 by Dr. Nitesh Adair MD.        Labs are unremarkable.  Chest x-ray showed scarring versus airspace disease in the left lung base.  Patient has had no fever and has no leukocytosis.  I favor scarring over airspace disease.  Due  to patient being immunocompromised and the amount of pain the patient was having, patient was admitted to the hospitalist service by Dr. Márquez for antifungal treatment and pain control.            MDM    Final diagnoses:   Oropharyngeal candidiasis   Esophageal candidiasis   Immunocompromised state            Estela Mayers MD  11/19/17 6454

## 2017-11-20 NOTE — PLAN OF CARE
Problem: Patient Care Overview (Adult)  Goal: Plan of Care Review  Outcome: Ongoing (interventions implemented as appropriate)    11/20/17 0737   Patient Care Overview   Progress improving   Coping/Psychosocial Response Interventions   Plan Of Care Reviewed With patient   Pt awake and alert. Complains of severe oral pain. PRN pain medication given per MD order. Patient states pain is tolerable with IV medication. Patient refuses to take oral medication, including pain medication through PEG tube. Patient states that he has had a previously bad experience where his PEG tube was clogged because of medication. RN educated patient that if proper medication administration was done, that tube will not clog. Patient continues to refused medication. Dr. Watson aware on rounds. Patient is self administering bolus feeds through PEG tube per his routine at home. Glucerna 1.2, 3 cans, three times a day with Ensure 2-3 cans a day with meals or between. Patient refused staff to clean around peg tube or perform trach care. Patient states he can do this himself. Trach supplies were given to patient. Extra trach with disposable inner cannulas at BS. Suction and suction supplies at BS. Patient undergoing radiation therapy today. NPO. Voiding without difficulty. Diffuse white patchy lesions to tongue and oral mucosa.   Goal: Adult Individualization and Mutuality  Outcome: Ongoing (interventions implemented as appropriate)    11/20/17 8625   Individualization   Patient Specific Goals Decreased pain. White lesions on tongue and oral mucosa resolved.    Patient Specific Interventions PRN pain medication. IV/oral antifungal medication per MD order.        Goal: Discharge Needs Assessment  Outcome: Ongoing (interventions implemented as appropriate)    Problem: Infection, Risk/Actual (Adult)  Goal: Identify Related Risk Factors and Signs and Symptoms  Outcome: Ongoing (interventions implemented as appropriate)    11/20/17 3576   Infection,  Risk/Actual   Infection, Risk/Actual: Related Risk Factors chronic illness/condition;immunosuppressed   Signs and Symptoms (Infection, Risk/Actual) pain       Goal: Infection Prevention/Resolution  Outcome: Ongoing (interventions implemented as appropriate)    11/20/17 1338   Infection, Risk/Actual (Adult)   Infection Prevention/Resolution making progress toward outcome         Problem: Skin Integrity Impairment, Risk/Actual (Adult)  Goal: Identify Related Risk Factors and Signs and Symptoms  Outcome: Ongoing (interventions implemented as appropriate)    11/20/17 1338   Skin Integrity Impairment, Risk/Actual   Signs and Symptoms (Skin Integrity Impairment) other (see comments)       Goal: Skin Integrity/Wound Healing  Outcome: Ongoing (interventions implemented as appropriate)    11/20/17 1338   Skin Integrity Impairment, Risk/Actual (Adult)   Skin Integrity/Wound Healing making progress toward outcome         Problem: Fall Risk (Adult)  Goal: Identify Related Risk Factors and Signs and Symptoms  Outcome: Ongoing (interventions implemented as appropriate)    11/20/17 1338   Fall Risk   Fall Risk: Related Risk Factors age-related changes;fatigue/slow reaction;history of falls;environment unfamiliar   Fall Risk: Signs and Symptoms presence of risk factors       Goal: Absence of Falls  Outcome: Ongoing (interventions implemented as appropriate)    11/20/17 1338   Fall Risk (Adult)   Absence of Falls making progress toward outcome         Problem: Nutrition, Enteral (Adult)  Goal: Signs and Symptoms of Listed Potential Problems Will be Absent or Manageable (Nutrition, Enteral)  Outcome: Ongoing (interventions implemented as appropriate)    11/20/17 1338   Nutrition, Enteral   Problems Assessed (Enteral Nutrition) all   Problems Present (Enteral Nutrition) none

## 2017-11-20 NOTE — PROGRESS NOTES
[facility] Medicine Services  INPATIENT PROGRESS NOTE    Length of Stay: 0  Date of Admission: 11/19/2017  Primary Care Physician: Christian Castellon MD    Subjective   Chief Complaint:   Oral pain  HPI   61 YO CM admitted on 11/19/17. Patient has ENT cancer and is actively being treated. Comes in with severe pain in throat and horrible thrush. Patient has PEG tube.     Review of Systems   Throat pain  Thrush  Dry OM  Chronic PEG    All pertinent negatives and positives are as above. All other systems have been reviewed and are negative unless otherwise stated.     Objective    Temp:  [98.2 °F (36.8 °C)-98.6 °F (37 °C)] 98.2 °F (36.8 °C)  Heart Rate:  [73-98] 73  Resp:  [16-20] 18  BP: (106-130)/(61-92) 116/68  Physical Exam   HENT:   Head: Normocephalic and atraumatic.   Nose: Nose normal.   Severe thrush  Dry OM  Trach   Eyes: Conjunctivae and EOM are normal.   Neck: Normal range of motion. Neck supple.   Cardiovascular: Normal rate, regular rhythm and normal heart sounds.    Pulmonary/Chest: Effort normal and breath sounds normal.   Abdominal: Soft. Bowel sounds are normal.   PEG   Musculoskeletal: He exhibits no edema or tenderness.   Neurological: He is alert. No cranial nerve deficit.   Skin: Skin is warm and dry.   Psychiatric: He has a normal mood and affect.   Vitals reviewed.          Results Review:  I have reviewed the labs, radiology results, and diagnostic studies.    Laboratory Data:     Results from last 7 days  Lab Units 11/20/17  0430 11/19/17  2138   WBC 10*3/mm3 3.91* 5.63   HEMOGLOBIN g/dL 11.1* 11.2*   HEMATOCRIT % 32.7* 32.1*   PLATELETS 10*3/mm3 152 181          Results from last 7 days  Lab Units 11/20/17  0430 11/19/17  2138   SODIUM mmol/L 136 134*   POTASSIUM mmol/L 4.4 4.5   CHLORIDE mmol/L 102 99   CO2 mmol/L 27.0 27.0   BUN mg/dL 16 19   CREATININE mg/dL 0.44* 0.44*   CALCIUM mg/dL 8.5 8.7   BILIRUBIN mg/dL 0.5  --    ALK PHOS U/L 168*  --    ALT  (SGPT) U/L 100*  --    AST (SGOT) U/L 126*  --    GLUCOSE mg/dL 148* 136*       Culture Data:   Blood Culture   Date Value Ref Range Status   11/19/2017 No growth at less than 24 hours  Preliminary   11/19/2017 No growth at less than 24 hours  Preliminary       Radiology Data:   Imaging Results (last 24 hours)     Procedure Component Value Units Date/Time    XR Chest 1 View [[id number]] Collected:  11/19/17 2121     Updated:  11/19/17 2136    Narrative:       XR CHEST 1 VW- 11/19/2017 9:17 PM CST     HISTORY: Shortness of air       COMPARISON: 10/09/2017.     FINDINGS:   Tracheostomy tube and port are stable in position. Mild opacities are  seen in the LEFT lung base, which could be due to airspace disease or  scarring. The cardiomediastinal silhouette and pulmonary vascularity are  unchanged.      The osseous structures and surrounding soft tissues demonstrate no acute  abnormality.       Impression:       1. Possible scarring or airspace disease in the LEFT lung base.        This report was finalized on 11/19/2017 21:22 by Dr. Nitesh Aadir MD.          I have reviewed the patient current medications.     Assessment/Plan     Hospital Problem List     Oropharyngeal candidiasis          Impression/Plan:  1. Severe thrush  2. Mild dehydration  3. ENT cancer  4. Tobacco abuse  5. Mouth pain  6. Chronic PEG and TRACH    Will start mouth rinse and chew siddharth. Consult ENT. Continue IV diflucan.  Courtesy consult oncology  Nicotine patch  Continue PEG feeding and hydration  Pain control      Rain Watson,    11/20/17   12:53 PM

## 2017-11-20 NOTE — CONSULTS
MEDICAL ONCOLOGY CONSULTATION    Pt Name: Sameer Tavarez  MRN: [id number]  [id number]  YOB: 1955  Date of evaluation: 11/20/2017    Reason for Consultation:  Continuity of care    Requesting Physician:  Hospitalist    History Obtained From:  PATIENT, FAMILY and CHART    HISTORY OF PRESENT ILLNESS:    Mr. 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  He is getting weekly Taxol/carboplatin-dose #5 administered 11/16/2017 along with concomitant XRT - 8 doses so far.  He presented to the hospital with significant pain in his mouth.     Diagnosis  Invasive moderately differentiated Squamous cell carcinoma of the right tonsil and right base of tongue  Treatment summary  10/16/2017- initiation of carboplatin AUC of 2 and Taxol 80 mg/m² weekly and received 3 doses. Taxol dose decreased to 35 mg/m² with cycle 4 on 11/9/2017.   Radiation therapy initiated 11/9/2017.  Cancer history- Invasive moderately differentiated Squamous cell carcinoma of the right tonsil and right base of tongue.  Mr Tavarez was first seen by me on 9/18/2017 referred by Dr. 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 He had recent complains of weight loss of about 15 pounds and difficulty swallowing.  8/27/2017-he underwent an upper endoscopy for follow-up on a history of Recio's esophagus. A oropharynx lesion was noted and biopsy was consistent with at least squamous cell carcinoma in situ. Status of invasion was indeterminate. P 16 was positive. He was referred to ENT, but has not been seen yet.   9/13/2017-CT scan of the chest/abdomen/pelvis contrast showed a 1.1 cm lymph node adjacent to the distal esophagus. 2 small pulmonary nodules measuring 4 mm and 4.2 mm in diameter located in the right lower lobe and at right middle lobe respectively. Multiple solid noncalcified nodules smaller  than 6 mm in diameter. A 4.9 x 2.5 x 3.3 cm abnormal soft tissue appearance in the right perineum of unknown significance. In addition, 1 x 1.5 cm sclerotic lesion in the left iliac wing laterally to the sacroiliac joints. 7 mm sclerosis the left iliac lateral to the left SI joint. Sclerosis on the medial side of the right sacroiliac joints.   9/19/2017- initial oncology consultation.   9/29/2017 - PET CT scan revealed abnormal metabolic activity in the base the tongue on the right extending in the right palatine tonsil with an SUV of 8.8, this is consistent with neoplasm. No other regions of abnormal metabolic activity are identified.   9/29/2017- MRI of the orbit, face and neck protocol revealed large right oropharyngeal soft mass measuring 6.76 x 4.6 cm with involvement of the right side of the base of the tongue. Invasion of the superior margin of the right submandibular gland is noted. No bony involvement is noted. Scattered left jugular node measures up to 1.2 x 0.6 cm.   10/5/2017- Biopsy of the right superior tonsil and right base of tongue by Dr. Alber Justin. Pathology of the tonsil and tongue revealed invasive moderate differentiated squamous cell carcinoma positive for p16 by IHC. No lymph nodes were submitted.   10/5/2017- tracheostomy placement by Dr. Darin Neal.   10/6/2017- Dr. Jayne Phillips placed a left subclavian Port-A-Cath, and a PEG tube.   10/11/2017- Full dental extraction and direct laryngoscopy by Dr. Rylie Neal and Dr. Les Goddard.   10/16/2017- initiation of systemic chemotherapy with carboplatin AUC of 2 and Taxol 80 mg/m² weekly, anticipating dose reduction of Taxol to 40 mg/m² once radiation is added to treatment plan.   10/23/2017- appointment with Dr. Hadley Marie.   11/1/2017- colonoscopy performed by Dr. Patel revealed 1. Cecal polyp, severe diverticulosis throughout the colon, posterior anal fissure and internal hemorrhoids ×3, grade 2+. Pathology from polyp was negative for  high-grade dysplasia   11/9/2017- radiation therapy initiated per Dr. Marie.    Past Medical History:    Past Medical History:   Diagnosis Date   • Arthritis    • Coronary artery disease    • Depression    • Diabetes mellitus    • Enlarged prostate    • GERD (gastroesophageal reflux disease)    • History of transfusion    • Hypertension    • Oropharyngeal cancer 08/27/2017   • Seizure     diabetic   • Severe esophageal dysplasia    • Stroke    • TB (pulmonary tuberculosis) 2004       Past Surgical History:    Past Surgical History:   Procedure Laterality Date   • CARDIAC SURGERY     • CHOLECYSTECTOMY     • CORONARY ARTERY BYPASS GRAFT     • FRACTURE SURGERY     • GTUBE REPLACEMENT N/A 10/6/2017    Procedure: GASTROSTOMY TUBE REPLACEMENT, port placement;  Surgeon: Jayne Phillips MD;  Location:  [facility];  Service:    • HERNIA REPAIR     • LARYNGOSCOPY N/A 10/11/2017    Procedure: DIRECT LARYNGOSCOPY WITH BIOPSY;  Surgeon: Darin Neal MD;  Location:  [facility];  Service:    • NISSEN FUNDOPLICATION LAPAROSCOPIC     • NJ DENTAL SURGERY PROCEDURE N/A 10/11/2017    Procedure: TOOTH EXTRACTION;  Surgeon: Darin Neal MD;  Location:  [facility];  Service: ENT   • NJ INSJ TUNNELED CVC W/O SUBQ PORT/ AGE 5 YR/> Left 10/6/2017    Procedure: INSERTION VENOUS ACCESS DEVICE;  Surgeon: Jayne Phillips MD;  Location:  [facility];  Service: General   • SKIN BIOPSY     • TESTICLE SURGERY      tubes implanted for drainage   • TONSILLECTOMY     • TRACHEOSTOMY N/A 10/5/2017    Procedure: Awake tracheostomy; DIRECT LARYNGOSCOPY WITH BIOPSY;  Surgeon: Alber Justin MD;  Location:  [facility];  Service:        Immunizations:    There is no immunization history for the selected administration types on file for this patient.    Current Hospital Medications:      Current Facility-Administered Medications:   •  acetaminophen (TYLENOL) tablet 650 mg, 650 mg, Oral, Q4H PRN, Zion Márquez MD  •  baclofen (LIORESAL)  tablet 20 mg, 20 mg, Oral, TID, Zion Márquez MD  •  cetirizine (zyrTEC) tablet 10 mg, 10 mg, Oral, Daily, Zion Márquez MD  •  clotrimazole (MYCELEX) siddharth 10 mg, 10 mg, Oral, 5x Daily, Rain Watson, , 10 mg at 11/20/17 1433  •  dextrose (D50W) solution 25 g, 25 g, Intravenous, Q15 Min PRN, Zion Márquez MD  •  dextrose (GLUTOSE) oral gel 15 g, 15 g, Oral, Q15 Min PRN, Zion Márquez MD  •  enoxaparin (LOVENOX) syringe 40 mg, 40 mg, Subcutaneous, Daily, Zion Márquez MD, 40 mg at 11/20/17 0918  •  fluconazole (DIFLUCAN) IVPB 200 mg, 200 mg, Intravenous, Daily, Zion Márquez MD  •  glucagon (human recombinant) (GLUCAGEN DIAGNOSTIC) injection 1 mg, 1 mg, Subcutaneous, Q15 Min PRN, Zion Márquez MD  •  HYDROmorphone (DILAUDID) injection 1 mg, 1 mg, Intravenous, Q2H PRN, 1 mg at 11/20/17 1311 **AND** naloxone (NARCAN) injection 0.4 mg, 0.4 mg, Intravenous, Q5 Min PRN, Zion Márquez MD  •  insulin detemir (LEVEMIR) injection 70 Units, 70 Units, Subcutaneous, QAM, Zion Márquez MD, 30 Units at 11/20/17 0917  •  insulin lispro (humaLOG) injection 0-14 Units, 0-14 Units, Subcutaneous, 4x Daily With Meals & Nightly, Zion Márquez MD  •  lidocaine-Maalox-diphenhydramine (MIRACLE MOUTHWASH) oral suspension 30 mL, 30 mL, Oral, 4x Daily PRN, Zion Márquez MD  •  lisinopril (PRINIVIL,ZESTRIL) tablet 10 mg, 10 mg, Oral, Daily, Zion Márquez MD  •  LORazepam (ATIVAN) injection 0.5 mg, 0.5 mg, Intravenous, Q6H PRN, Zion Márquez MD  •  metoprolol tartrate (LOPRESSOR) tablet 50 mg, 50 mg, Oral, BID, Zion Márquez MD  •  mupirocin (BACTROBAN) 2 % ointment, , Topical, Q8H, Zion Márquez MD  •  nitroglycerin (NITROSTAT) SL tablet 0.4 mg, 0.4 mg, Sublingual, Q5 Min PRN, Zion Márquez MD  •  ondansetron (ZOFRAN) injection 4 mg, 4 mg, Intravenous, Q6H PRN, Zion Márquez MD  •  oxyCODONE-acetaminophen (PERCOCET)  MG per tablet 1 tablet, 1 tablet, Oral, Q6H  PRN, Zion Márquez MD  •  oxyCODONE-acetaminophen (PERCOCET) 7.5-325 MG per tablet 1 tablet, 1 tablet, Oral, Q4H PRN, Zion Márquez MD  •  pantoprazole (PROTONIX) EC tablet 40 mg, 40 mg, Oral, QAM, Zion Márquez MD  •  pseudoephedrine (SUDAFED) 12 hr tablet 120 mg, 120 mg, Oral, Q12H, Zion Márquez MD  •  sodium chloride 0.9 % flush 1-10 mL, 1-10 mL, Intravenous, PRN, Zion Márquez MD  •  Insert peripheral IV, , , Once **AND** sodium chloride 0.9 % flush 10 mL, 10 mL, Intravenous, PRN, Estela Mayers MD, 10 mL at 11/19/17 2158  •  sodium chloride 0.9 % with KCl 20 mEq/L infusion, 100 mL/hr, Intravenous, Continuous, Zion Márquez MD, Last Rate: 100 mL/hr at 11/20/17 1430, 100 mL/hr at 11/20/17 1430  •  sucralfate (CARAFATE) tablet 1 g, 1 g, Oral, 4x Daily, Zion Márquez MD  •  terazosin (HYTRIN) capsule 1 mg, 1 mg, Oral, Nightly, Rain Watson DO  •  zolpidem (AMBIEN) tablet 10 mg, 10 mg, Oral, Nightly PRN, Zion Márquez MD    Allergies:   Allergies   Allergen Reactions   • Codeine Hives       Social History:    Social History     Social History   • Marital status:      Spouse name: N/A   • Number of children: N/A   • Years of education: N/A     Occupational History   •       Disabled     Social History Main Topics   • Smoking status: Current Every Day Smoker     Packs/day: 0.50     Years: 52.00     Types: Cigarettes   • Smokeless tobacco: Current User     Types: Snuff      Comment: I've tried and tried   • Alcohol use Yes      Comment: occasionally   • Drug use: No   • Sexual activity: Defer     Other Topics Concern   • Not on file     Social History Narrative       Family History:   Family History   Problem Relation Age of Onset   • Stroke Mother    • Heart disease Father    • Heart disease Brother    • Cancer Brother        REVIEW OF SYSTEMS:    Constitutional: no fever; positive for fatigue  HEENT: no blurring of vision, no double vision    Lungs: no  "wheeze; positive for cough  CVS: no palpitation, no chest pain  GI: no abdominal pain, no constipation; positive for mucositis  LUCAS: no dysuria, no hematuria  Musculoskeletal: no joint pain, swelling , stiffness  Endocrine: positive for DM  Hematology: positive for mild anemia and leukopenia  Dermatology: no skin rash, no eczema, no pruritis  Psychiatry: no depression, no anxiety,no panic attacks, no suicide ideation  Neurology: no seizures    Vitals:    /68 (BP Location: Left arm, Patient Position: Lying)  Pulse 73  Temp 98.2 °F (36.8 °C) (Oral)   Resp 18  Ht 66\" (167.6 cm)  Wt 145 lb 4.8 oz (65.9 kg)  SpO2 97%  BMI 23.45 kg/m2    PHYSICAL EXAM:    CONSTITUTIONAL: Weak - family at the bedside  EYES: Non icteric  ENT: Viral lesions and thick patches on the tongue and buccal mucosa  NECK: Supple, no masses   CHEST/LUNGS: Mild coarse bilaterally which clears with cough some.  CARDIOVASCULAR: RRR, no murmurs  ABDOMEN: soft non-tender, active bowel sounds, no HSM, G-tube in place  EXTREMITIES: warm, moves all fours  SKIN: warm, dry with no rashes or lesions  LYMPH: No cervical, clavicular, axillary, or inguinal lymphadenopathy  NEUROLOGIC: follows commands, non focal   PSYCH: mood and affect appropriate    CBC    Results from last 7 days  Lab Units 11/20/17  0430 11/19/17  2138   WBC 10*3/mm3 3.91* 5.63   HEMOGLOBIN g/dL 11.1* 11.2*   HEMATOCRIT % 32.7* 32.1*   PLATELETS 10*3/mm3 152 181         Lab Results   Component Value Date     11/20/2017    K 4.4 11/20/2017     11/20/2017    CO2 27.0 11/20/2017    BUN 16 11/20/2017    CREATININE 0.44 (L) 11/20/2017    GLUCOSE 148 (H) 11/20/2017    CALCIUM 8.5 11/20/2017    BILITOT 0.5 11/20/2017    ALKPHOS 168 (H) 11/20/2017     (H) 11/20/2017     (H) 11/20/2017    GLOB 3.0 11/20/2017       No results found for: INR, PROTIME    Cultures:    Lab Results   Component Value Date    BLOODCX No growth at less than 24 hours 11/19/2017     No " components found for: URINCX    ASSESSMENT/PLAN:  1.  Mucositis.  He appears to have both bilateral and yeast components.  He has IV Diflucan and Mycelex ordered.  We will add miracle mouthwash and IV acyclovir.    2.  Squamous cell carcinoma of the oral cavity/base of the tongue.  Recommendations is to hold chemotherapy/radiation until mucositis significantly improves.    JEWEL Pacheco    11/20/17  3:58 PM    Sin Tompkins MD  11/21/17   6:23 PM

## 2017-11-21 ENCOUNTER — APPOINTMENT (OUTPATIENT)
Dept: RADIATION ONCOLOGY | Facility: HOSPITAL | Age: 62
End: 2017-11-21

## 2017-11-21 LAB
ALBUMIN SERPL-MCNC: 3.3 G/DL (ref 3.5–5)
ALBUMIN/GLOB SERPL: 1.1 G/DL (ref 1.1–2.5)
ALP SERPL-CCNC: 148 U/L (ref 24–120)
ALT SERPL W P-5'-P-CCNC: 72 U/L (ref 0–54)
ANION GAP SERPL CALCULATED.3IONS-SCNC: 9 MMOL/L (ref 4–13)
AST SERPL-CCNC: 47 U/L (ref 7–45)
BASOPHILS # BLD AUTO: 0.02 10*3/MM3 (ref 0–0.2)
BASOPHILS NFR BLD AUTO: 0.4 % (ref 0–2)
BILIRUB SERPL-MCNC: 0.6 MG/DL (ref 0.1–1)
BUN BLD-MCNC: 14 MG/DL (ref 5–21)
BUN/CREAT SERPL: 32.6 (ref 7–25)
CALCIUM SPEC-SCNC: 8.7 MG/DL (ref 8.4–10.4)
CHLORIDE SERPL-SCNC: 102 MMOL/L (ref 98–110)
CO2 SERPL-SCNC: 26 MMOL/L (ref 24–31)
CREAT BLD-MCNC: 0.43 MG/DL (ref 0.5–1.4)
DEPRECATED RDW RBC AUTO: 48.8 FL (ref 40–54)
EOSINOPHIL # BLD AUTO: 0.04 10*3/MM3 (ref 0–0.7)
EOSINOPHIL NFR BLD AUTO: 0.8 % (ref 0–4)
ERYTHROCYTE [DISTWIDTH] IN BLOOD BY AUTOMATED COUNT: 15.8 % (ref 12–15)
GFR SERPL CREATININE-BSD FRML MDRD: >150 ML/MIN/1.73
GLOBULIN UR ELPH-MCNC: 3.1 GM/DL
GLUCOSE BLD-MCNC: 91 MG/DL (ref 70–100)
GLUCOSE BLDC GLUCOMTR-MCNC: 169 MG/DL (ref 70–130)
GLUCOSE BLDC GLUCOMTR-MCNC: 207 MG/DL (ref 70–130)
GLUCOSE BLDC GLUCOMTR-MCNC: 91 MG/DL (ref 70–130)
GLUCOSE BLDC GLUCOMTR-MCNC: 95 MG/DL (ref 70–130)
HCT VFR BLD AUTO: 30.1 % (ref 40–52)
HGB BLD-MCNC: 10.2 G/DL (ref 14–18)
IMM GRANULOCYTES # BLD: 0.04 10*3/MM3 (ref 0–0.03)
IMM GRANULOCYTES NFR BLD: 0.8 % (ref 0–5)
LYMPHOCYTES # BLD AUTO: 0.95 10*3/MM3 (ref 0.72–4.86)
LYMPHOCYTES NFR BLD AUTO: 18.9 % (ref 15–45)
MCH RBC QN AUTO: 29.5 PG (ref 28–32)
MCHC RBC AUTO-ENTMCNC: 33.9 G/DL (ref 33–36)
MCV RBC AUTO: 87 FL (ref 82–95)
MONOCYTES # BLD AUTO: 0.61 10*3/MM3 (ref 0.19–1.3)
MONOCYTES NFR BLD AUTO: 12.2 % (ref 4–12)
NEUTROPHILS # BLD AUTO: 3.36 10*3/MM3 (ref 1.87–8.4)
NEUTROPHILS NFR BLD AUTO: 66.9 % (ref 39–78)
PLATELET # BLD AUTO: 154 10*3/MM3 (ref 130–400)
PMV BLD AUTO: 11.3 FL (ref 6–12)
POTASSIUM BLD-SCNC: 4.3 MMOL/L (ref 3.5–5.3)
PROT SERPL-MCNC: 6.4 G/DL (ref 6.3–8.7)
RBC # BLD AUTO: 3.46 10*6/MM3 (ref 4.8–5.9)
SODIUM BLD-SCNC: 137 MMOL/L (ref 135–145)
WBC NRBC COR # BLD: 5.02 10*3/MM3 (ref 4.8–10.8)

## 2017-11-21 PROCEDURE — 99221 1ST HOSP IP/OBS SF/LOW 40: CPT | Performed by: INTERNAL MEDICINE

## 2017-11-21 PROCEDURE — 25010000002 ENOXAPARIN PER 10 MG: Performed by: INTERNAL MEDICINE

## 2017-11-21 PROCEDURE — 85025 COMPLETE CBC W/AUTO DIFF WBC: CPT | Performed by: INTERNAL MEDICINE

## 2017-11-21 PROCEDURE — 80053 COMPREHEN METABOLIC PANEL: CPT | Performed by: INTERNAL MEDICINE

## 2017-11-21 PROCEDURE — 25010000002 ACYCLOVIR PER 5 MG: Performed by: PHYSICIAN ASSISTANT

## 2017-11-21 PROCEDURE — 63710000001 INSULIN DETEMIR PER 5 UNITS: Performed by: INTERNAL MEDICINE

## 2017-11-21 PROCEDURE — 63710000001 INSULIN LISPRO (HUMAN) PER 5 UNITS: Performed by: INTERNAL MEDICINE

## 2017-11-21 PROCEDURE — 25010000002 HYDROMORPHONE PER 4 MG: Performed by: INTERNAL MEDICINE

## 2017-11-21 PROCEDURE — 25810000003 SODIUM CHLORIDE 0.9 % WITH KCL 20 MEQ 20-0.9 MEQ/L-% SOLUTION: Performed by: INTERNAL MEDICINE

## 2017-11-21 PROCEDURE — 82962 GLUCOSE BLOOD TEST: CPT

## 2017-11-21 RX ORDER — FLUCONAZOLE 100 MG/1
100 TABLET ORAL EVERY 24 HOURS
Status: DISCONTINUED | OUTPATIENT
Start: 2017-11-21 | End: 2017-11-22 | Stop reason: HOSPADM

## 2017-11-21 RX ADMIN — LIDOCAINE HYDROCHLORIDE 30 ML: 20 SOLUTION ORAL; TOPICAL at 21:39

## 2017-11-21 RX ADMIN — HYDROMORPHONE HYDROCHLORIDE 1 MG: 1 INJECTION, SOLUTION INTRAMUSCULAR; INTRAVENOUS; SUBCUTANEOUS at 18:05

## 2017-11-21 RX ADMIN — HYDROMORPHONE HYDROCHLORIDE 1 MG: 1 INJECTION, SOLUTION INTRAMUSCULAR; INTRAVENOUS; SUBCUTANEOUS at 20:36

## 2017-11-21 RX ADMIN — CLOTRIMAZOLE 10 MG: 10 LOZENGE ORAL; TOPICAL at 09:22

## 2017-11-21 RX ADMIN — LIDOCAINE HYDROCHLORIDE 30 ML: 20 SOLUTION ORAL; TOPICAL at 11:44

## 2017-11-21 RX ADMIN — MUPIROCIN: 20 OINTMENT TOPICAL at 21:40

## 2017-11-21 RX ADMIN — CLOTRIMAZOLE 10 MG: 10 LOZENGE ORAL; TOPICAL at 15:30

## 2017-11-21 RX ADMIN — HYDROMORPHONE HYDROCHLORIDE 1 MG: 1 INJECTION, SOLUTION INTRAMUSCULAR; INTRAVENOUS; SUBCUTANEOUS at 00:14

## 2017-11-21 RX ADMIN — ACYCLOVIR SODIUM 640 MG: 50 INJECTION, SOLUTION INTRAVENOUS at 17:14

## 2017-11-21 RX ADMIN — LIDOCAINE HYDROCHLORIDE 30 ML: 20 SOLUTION ORAL; TOPICAL at 17:11

## 2017-11-21 RX ADMIN — POTASSIUM CHLORIDE AND SODIUM CHLORIDE 100 ML/HR: 900; 150 INJECTION, SOLUTION INTRAVENOUS at 02:15

## 2017-11-21 RX ADMIN — ACYCLOVIR SODIUM 640 MG: 50 INJECTION, SOLUTION INTRAVENOUS at 03:15

## 2017-11-21 RX ADMIN — INSULIN DETEMIR 50 UNITS: 100 INJECTION, SOLUTION SUBCUTANEOUS at 11:49

## 2017-11-21 RX ADMIN — LIDOCAINE HYDROCHLORIDE 30 ML: 20 SOLUTION ORAL; TOPICAL at 12:00

## 2017-11-21 RX ADMIN — MUPIROCIN: 20 OINTMENT TOPICAL at 06:24

## 2017-11-21 RX ADMIN — HYDROMORPHONE HYDROCHLORIDE 1 MG: 1 INJECTION, SOLUTION INTRAMUSCULAR; INTRAVENOUS; SUBCUTANEOUS at 09:22

## 2017-11-21 RX ADMIN — ENOXAPARIN SODIUM 40 MG: 40 INJECTION SUBCUTANEOUS at 09:22

## 2017-11-21 RX ADMIN — HYDROMORPHONE HYDROCHLORIDE 1 MG: 1 INJECTION, SOLUTION INTRAMUSCULAR; INTRAVENOUS; SUBCUTANEOUS at 03:23

## 2017-11-21 RX ADMIN — FLUCONAZOLE 100 MG: 100 TABLET ORAL at 21:41

## 2017-11-21 RX ADMIN — TERAZOSIN HYDROCHLORIDE 1 MG: 1 CAPSULE ORAL at 21:40

## 2017-11-21 RX ADMIN — CLOTRIMAZOLE 10 MG: 10 LOZENGE ORAL; TOPICAL at 17:11

## 2017-11-21 RX ADMIN — CLOTRIMAZOLE 10 MG: 10 LOZENGE ORAL; TOPICAL at 11:44

## 2017-11-21 RX ADMIN — HYDROMORPHONE HYDROCHLORIDE 1 MG: 1 INJECTION, SOLUTION INTRAMUSCULAR; INTRAVENOUS; SUBCUTANEOUS at 11:52

## 2017-11-21 RX ADMIN — HYDROMORPHONE HYDROCHLORIDE 1 MG: 1 INJECTION, SOLUTION INTRAMUSCULAR; INTRAVENOUS; SUBCUTANEOUS at 15:31

## 2017-11-21 RX ADMIN — HYDROMORPHONE HYDROCHLORIDE 1 MG: 1 INJECTION, SOLUTION INTRAMUSCULAR; INTRAVENOUS; SUBCUTANEOUS at 06:24

## 2017-11-21 RX ADMIN — CLOTRIMAZOLE 10 MG: 10 LOZENGE ORAL; TOPICAL at 21:40

## 2017-11-21 RX ADMIN — INSULIN LISPRO 3 UNITS: 100 INJECTION, SOLUTION INTRAVENOUS; SUBCUTANEOUS at 11:52

## 2017-11-21 RX ADMIN — LIDOCAINE HYDROCHLORIDE 30 ML: 20 SOLUTION ORAL; TOPICAL at 08:00

## 2017-11-21 RX ADMIN — ACYCLOVIR SODIUM 640 MG: 50 INJECTION, SOLUTION INTRAVENOUS at 09:58

## 2017-11-21 NOTE — PLAN OF CARE
Problem: Patient Care Overview (Adult)  Goal: Plan of Care Review  Outcome: Ongoing (interventions implemented as appropriate)    11/21/17 0904   Patient Care Overview   Progress no change   Outcome Evaluation   Outcome Summary/Follow up Plan To clarify TF order recommendations. Pt was seen in XRT per oncology RD 11/17. His TF recommendations were, 300mL of Glucerna 1.2, 6x/daily. Tube to be flushed with 60mL before and after boluses for hydration. RD will cont to follow for nutrition needs. Orders updated.

## 2017-11-21 NOTE — PROGRESS NOTES
PROGRESS NOTE  Patient name: Sameer Tavarez  Patient : 1955  VISIT # [id number]  MR #[id number]    SUBJECTIVE:  Mouth a little better    INTERVAL HISTORY:  Mr. 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  He is getting weekly Taxol/carboplatin-dose #5 administered 2017 along with concomitant XRT - 8 doses so far.  He presented to the hospital with significant pain in his mouth.      Diagnosis  · Invasive moderately differentiated Squamous cell carcinoma of the right tonsil and right base of tongue  Treatment summary  · 10/16/2017- initiation of carboplatin AUC of 2 and Taxol 80 mg/m² weekly and received 3 doses. Taxol dose decreased to 35 mg/m² with cycle 4 on 2017.   · Radiation therapy initiated 2017.  Cancer history- Invasive moderately differentiated Squamous cell carcinoma of the right tonsil and right base of tongue.  Mr Tavarez was first seen by me on 2017 referred by Dr. 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 He had recent complains of weight loss of about 15 pounds and difficulty swallowing.  · 2017-he underwent an upper endoscopy for follow-up on a history of Recio's esophagus. A oropharynx lesion was noted and biopsy was consistent with at least squamous cell carcinoma in situ. Status of invasion was indeterminate. P 16 was positive. He was referred to ENT, but has not been seen yet.   · 2017-CT scan of the chest/abdomen/pelvis contrast showed a 1.1 cm lymph node adjacent to the distal esophagus. 2 small pulmonary nodules measuring 4 mm and 4.2 mm in diameter located in the right lower lobe and at right middle lobe respectively. Multiple solid noncalcified nodules smaller than 6 mm in diameter. A 4.9 x 2.5 x 3.3 cm abnormal soft tissue appearance in the right perineum of unknown significance. In addition, 1  x 1.5 cm sclerotic lesion in the left iliac wing laterally to the sacroiliac joints. 7 mm sclerosis the left iliac lateral to the left SI joint. Sclerosis on the medial side of the right sacroiliac joints.   · 9/19/2017- initial oncology consultation.   · 9/29/2017 - PET CT scan revealed abnormal metabolic activity in the base the tongue on the right extending in the right palatine tonsil with an SUV of 8.8, this is consistent with neoplasm. No other regions of abnormal metabolic activity are identified.   · 9/29/2017- MRI of the orbit, face and neck protocol revealed large right oropharyngeal soft mass measuring 6.76 x 4.6 cm with involvement of the right side of the base of the tongue. Invasion of the superior margin of the right submandibular gland is noted. No bony involvement is noted. Scattered left jugular node measures up to 1.2 x 0.6 cm.   · 10/5/2017- Biopsy of the right superior tonsil and right base of tongue by Dr. Alber Justin. Pathology of the tonsil and tongue revealed invasive moderate differentiated squamous cell carcinoma positive for p16 by IHC. No lymph nodes were submitted.   · 10/5/2017- tracheostomy placement by Dr. Darin Neal.   · 10/6/2017- Dr. Jayne Phillips placed a left subclavian Port-A-Cath, and a PEG tube.   · 10/11/2017- Full dental extraction and direct laryngoscopy by Dr. Rylie Neal and Dr. Les Gdodard.   · 10/16/2017- initiation of systemic chemotherapy with carboplatin AUC of 2 and Taxol 80 mg/m² weekly, anticipating dose reduction of Taxol to 40 mg/m² once radiation is added to treatment plan.   · 10/23/2017- appointment with Dr. Hadley Marie.   · 11/1/2017- colonoscopy performed by Dr. Patel revealed 1. Cecal polyp, severe diverticulosis throughout the colon, posterior anal fissure and internal hemorrhoids ×3, grade 2+. Pathology from polyp was negative for high-grade dysplasia   · 11/9/2017- radiation therapy initiated per Dr. Marie.    REVIEW OF SYSTEMS:    Constitutional:  no fever; positive for fatigue  HEENT: no blurring of vision, no double vision                                           Lungs: no wheeze; positive for cough  CVS: no palpitation, no chest pain  GI: no abdominal pain, no constipation; positive for mucositis  LUCAS: no dysuria, no hematuria  Musculoskeletal: no joint pain, swelling , stiffness  Endocrine: positive for DM  Hematology: positive for mild anemia and leukopenia  Dermatology: no skin rash, no eczema, no pruritis  Psychiatry: no depression, no anxiety,no panic attacks, no suicide ideation  Neurology: no seizures    OBJECTIVE:    Vitals:    11/21/17 0421   BP: 110/58   Pulse: 81   Resp: 18   Temp: 98.2 °F (36.8 °C)   SpO2: 97%       Intake/Output Summary (Last 24 hours) at 11/21/17 0710  Last data filed at 11/21/17 0214   Gross per 24 hour   Intake          4022.76 ml   Output                0 ml   Net          4022.76 ml     PHYSICAL EXAM:    CONSTITUTIONAL:   EYES: Non icteric  ENT: Viral lesions on the sides of the tongue - thrush looks better  NECK: Supple, no masses   CHEST/LUNGS: Mild coarse bilaterally which clears with cough some.  CARDIOVASCULAR: RRR, no murmurs  ABDOMEN: soft non-tender, active bowel sounds, no HSM, G-tube in place  EXTREMITIES: warm, moves all fours  SKIN: warm, dry with no rashes or lesions  LYMPH: No cervical, clavicular, axillary, or inguinal lymphadenopathy  NEUROLOGIC: follows commands, non focal   PSYCH: mood and affect appropriate     CBC    Results from last 7 days  Lab Units 11/21/17  0630 11/20/17  0430 11/19/17  2138   WBC 10*3/mm3 5.02 3.91* 5.63   HEMOGLOBIN g/dL 10.2* 11.1* 11.2*   HEMATOCRIT % 30.1* 32.7* 32.1*   PLATELETS 10*3/mm3 154 152 181         Lab Results   Component Value Date     11/21/2017    K 4.3 11/21/2017     11/21/2017    CO2 26.0 11/21/2017    BUN 14 11/21/2017    CREATININE 0.43 (L) 11/21/2017    GLUCOSE 91 11/21/2017    CALCIUM 8.7 11/21/2017    BILITOT 0.6 11/21/2017    ALKPHOS 148 (H)  11/21/2017    AST 47 (H) 11/21/2017    ALT 72 (H) 11/21/2017    GLOB 3.1 11/21/2017       Cultures:    Lab Results   Component Value Date    BLOODCX No growth at 24 hours 11/19/2017     No components found for: URINCX    ASSESSMENT/PLAN:  1.  Mucositis.    He appears to have both bilateral and yeast components.  He has   · IV Diflucan  · IV acyclovir  · Mycelex siddharth   · miracle mouthwash      2.  Squamous cell carcinoma of the oral cavity/base of the tongue.    Recommendations is to hold chemotherapy/radiation until mucositis significantly improves.    3.  Normocytic Normochromic Anemia  HGB - 10.2 - monitor - check OP serologic w/u    JEWEL Pacheco    11/21/17  7:10 AM

## 2017-11-21 NOTE — PROGRESS NOTES
[facility] Medicine Services  INPATIENT PROGRESS NOTE    Length of Stay: 1  Date of Admission: 11/19/2017  Primary Care Physician: Christian Castellon MD    Subjective   Chief Complaint:   Oral pain  Shortness of Breath        63 YO CM admitted on 11/19/17. Patient has ENT cancer and is actively being treated. Comes in with severe pain in throat and horrible thrush. Patient has PEG tube.     Today the patient is much improved. Able to talk. Not as drowsy. Mouth is 100% improved. Saliva is now present. Radiation therapy has been put on hold per nursing to allow the area to improve. Patient still complains for throat pain but does feel much better.     Review of Systems   Respiratory: Positive for shortness of breath.       Throat pain  Thrush  Dry OM  Chronic PEG    All pertinent negatives and positives are as above. All other systems have been reviewed and are negative unless otherwise stated.     Objective    Temp:  [98.1 °F (36.7 °C)-99.4 °F (37.4 °C)] 98.1 °F (36.7 °C)  Heart Rate:  [77-90] 86  Resp:  [18] 18  BP: ()/(44-75) 119/75  Physical Exam   HENT:   Head: Normocephalic and atraumatic.   Nose: Nose normal.   Much improved. Sides of tongue white discharge, saliva is now present.   Trach   Eyes: Conjunctivae and EOM are normal.   Neck: Normal range of motion. Neck supple.   Cardiovascular: Normal rate, regular rhythm and normal heart sounds.    Pulmonary/Chest: Effort normal and breath sounds normal.   Abdominal: Soft. Bowel sounds are normal.   PEG   Musculoskeletal: He exhibits no edema or tenderness.   Neurological: He is alert. No cranial nerve deficit.   Skin: Skin is warm and dry.   Psychiatric: He has a normal mood and affect.   Vitals reviewed.          Results Review:  I have reviewed the labs, radiology results, and diagnostic studies.    Laboratory Data:     Results from last 7 days  Lab Units 11/21/17  0630 11/20/17  0430 11/19/17  2138   WBC 10*3/mm3 5.02  3.91* 5.63   HEMOGLOBIN g/dL 10.2* 11.1* 11.2*   HEMATOCRIT % 30.1* 32.7* 32.1*   PLATELETS 10*3/mm3 154 152 181          Results from last 7 days  Lab Units 11/21/17  0630 11/20/17  0430 11/19/17  2138   SODIUM mmol/L 137 136 134*   POTASSIUM mmol/L 4.3 4.4 4.5   CHLORIDE mmol/L 102 102 99   CO2 mmol/L 26.0 27.0 27.0   BUN mg/dL 14 16 19   CREATININE mg/dL 0.43* 0.44* 0.44*   CALCIUM mg/dL 8.7 8.5 8.7   BILIRUBIN mg/dL 0.6 0.5  --    ALK PHOS U/L 148* 168*  --    ALT (SGPT) U/L 72* 100*  --    AST (SGOT) U/L 47* 126*  --    GLUCOSE mg/dL 91 148* 136*       Culture Data:   Blood Culture   Date Value Ref Range Status   11/19/2017 No growth at less than 24 hours  Preliminary   11/19/2017 No growth at less than 24 hours  Preliminary       Radiology Data:   Imaging Results (last 24 hours)     ** No results found for the last 24 hours. **          I have reviewed the patient current medications.     Assessment/Plan     Hospital Problem List     Oropharyngeal candidiasis          Impression/Plan:  1. Severe thrush  2. Mild dehydration  3. ENT cancer  4. Tobacco abuse  5. Mouth pain  6. Chronic PEG and TRACH    Will start mouth rinse and chew siddharth. Much improved. Change to PO diflucan.  Oncology consulted. Radiation therapy on hold.   Nicotine patch  Continue PEG feeding and hydration  Pain control  Liver enzymes improved.  Decrease IVF rate    Rain Watson DO   11/21/17   9:59 AM

## 2017-11-21 NOTE — PLAN OF CARE
Problem: Patient Care Overview (Adult)  Goal: Discharge Needs Assessment  Outcome: Ongoing (interventions implemented as appropriate)  Dc planning continues    11/20/17 1100 11/21/17 2629   Discharge Needs Assessment   Concerns To Be Addressed --  denies needs/concerns at this time   Discharge Disposition --  still a patient   Living Environment   Transportation Available car;family or friend will provide --    Self-Care   Equipment Currently Used at Home cane, straight --          Problem: Infection, Risk/Actual (Adult)  Goal: Infection Prevention/Resolution  Outcome: Outcome(s) achieved Date Met:  11/21/17    Problem: Skin Integrity Impairment, Risk/Actual (Adult)  Goal: Skin Integrity/Wound Healing  Outcome: Ongoing (interventions implemented as appropriate)  No new breakdown.    Problem: Fall Risk (Adult)  Goal: Absence of Falls  Outcome: Ongoing (interventions implemented as appropriate)  No falls this shift    Problem: Nutrition, Enteral (Adult)  Goal: Signs and Symptoms of Listed Potential Problems Will be Absent or Manageable (Nutrition, Enteral)  Outcome: Ongoing (interventions implemented as appropriate)  Bolus feeding and oral intake as ordered.

## 2017-11-21 NOTE — CONSULTS
[facility] Gastroenterology  Inpatient Consult Note  Today's date:  11/21/17    Sameer Tavarez  1955       Referring Provider: Zion Márquez MD  Primary Physician: Christian Castellon MD   Primary Gastroenterologist: Dr. Patel    Date of Admission: 11/19/2017  Date of Service:  11/21/17    Reason for Consultation/Chief Complaint: Severe mucositis and oral thrush    History of present illness:  This is a very pleasant 62-year-old male who was diagnosed with tonsillar and tongue cancer in August.  The patient tells me that he does not have a very good memory at this time however he states that he remembers in the past 2 days that he began to have a very sore mouth to the point where he was unable to eat without discomfort.  He states that the pain started in his gums.  He states that after he was diagnosed with cancer he had to have all of his teeth removed per Dr. Gonzales.    He underwent an upper endoscopy performed on 8/27/17 for follow-up of history of Recio's esophagus.  An oropharynx lesion was noted and biopsy was consistent with at least squamous cell carcinoma in situ.  He was referred to ENT at that time but has not been seen by them.  On 9/13/17 a CT scan of the chest abdomen and pelvis revealed a 1.1 cm lymph node adjacent to the distal esophagus.  The patient also underwent G-tube placement by Dr. Jayne Phillips on 10/6/17 along with a left subclavian Port-A-Cath.  A tracheostomy was also performed at the same time along with oral surgery to have all of his teeth removed.  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  He began radiation therapy on 11/9/17.      The patient denies any nausea, vomiting, epigastric pain or hematemesis.  He does complain of odynophagia.  He is currently being treated with Carafate and Protonix with a history of GERD and Recio's  esophagitis.  He denies any fever or chills.  He denies any melena or hematochezia.    Labs and imaging: On admission CMP revealed hypoechoic albumin, ALT 72, AST 47.  CBC revealed hemoglobin of 10 otherwise unremarkable.  Blood cultures are pending.  Chest x-ray revealed possible scarring or airspace disease in the left lung base.    Past Medical History:   Diagnosis Date   • Arthritis    • Coronary artery disease    • Depression    • Diabetes mellitus    • Enlarged prostate    • GERD (gastroesophageal reflux disease)    • History of transfusion    • Hypertension    • Oropharyngeal cancer 08/27/2017   • Seizure     diabetic   • Severe esophageal dysplasia    • Stroke    • TB (pulmonary tuberculosis) 2004       Past Surgical History:   Procedure Laterality Date   • CARDIAC SURGERY     • CHOLECYSTECTOMY     • CORONARY ARTERY BYPASS GRAFT     • FRACTURE SURGERY     • GTUBE REPLACEMENT N/A 10/6/2017    Procedure: GASTROSTOMY TUBE REPLACEMENT, port placement;  Surgeon: Jayne Phillips MD;  Location: [facility] OR;  Service:    • HERNIA REPAIR     • LARYNGOSCOPY N/A 10/11/2017    Procedure: DIRECT LARYNGOSCOPY WITH BIOPSY;  Surgeon: Darin Neal MD;  Location: [facility] OR;  Service:    • NISSEN FUNDOPLICATION LAPAROSCOPIC     • MA DENTAL SURGERY PROCEDURE N/A 10/11/2017    Procedure: TOOTH EXTRACTION;  Surgeon: Darin Neal MD;  Location:  [facility] OR;  Service: ENT   • MA INSJ TUNNELED CVC W/O SUBQ PORT/ AGE 5 YR/> Left 10/6/2017    Procedure: INSERTION VENOUS ACCESS DEVICE;  Surgeon: Jayne Phillips MD;  Location: [facility] OR;  Service: General   • SKIN BIOPSY     • TESTICLE SURGERY      tubes implanted for drainage   • TONSILLECTOMY     • TRACHEOSTOMY N/A 10/5/2017    Procedure: Awake tracheostomy; DIRECT LARYNGOSCOPY WITH BIOPSY;  Surgeon: Alber Justin MD;  Location:  [facility] OR;  Service:         Allergies   Allergen Reactions   • Codeine Hives       Prescriptions Prior to Admission   Medication Sig  Dispense Refill Last Dose   • baclofen (LIORESAL) 20 MG tablet Take 20 mg by mouth 3 (Three) Times a Day.   Not Taking   • fexofenadine-pseudoephedrine (ALLEGRA-D)  MG per 12 hr tablet Take 1 tablet by mouth 2 (Two) Times a Day.   Not Taking   • FLUVIRIN suspension injection ADM 0.5ML IM UTD  0 Not Taking   • furosemide (LASIX) 40 MG tablet Take 40 mg by mouth 2 (Two) Times a Day.   Not Taking   • insulin glargine (LANTUS) 100 UNIT/ML injection Inject 70 Units under the skin Daily.   Not Taking   • lisinopril (PRINIVIL,ZESTRIL) 10 MG tablet Take 10 mg by mouth Daily.   Not Taking   • metoprolol tartrate (LOPRESSOR) 50 MG tablet Take 50 mg by mouth 2 (Two) Times a Day.   Not Taking   • nitroglycerin (NITROLINGUAL) 0.4 MG/SPRAY spray Place 1 spray under the tongue Every 5 (Five) Minutes As Needed for Chest Pain.   Not Taking   • omeprazole (priLOSEC) 20 MG capsule Take 20 mg by mouth Daily.   Not Taking   • oxyCODONE-acetaminophen (PERCOCET)  MG per tablet Take 1 tablet by mouth Every 6 (Six) Hours As Needed for Moderate Pain . 40 tablet 0 Taking   • oxyCODONE-acetaminophen (PERCOCET) 7.5-325 MG per tablet    Taking   • sucralfate (CARAFATE) 1 g tablet Take 1 g by mouth 4 (Four) Times a Day.   Not Taking   • tamsulosin (FLOMAX) 0.4 MG capsule 24 hr capsule Take 1 capsule by mouth Every Night.   Taking   • zolpidem (AMBIEN) 10 MG tablet Take 10 mg by mouth At Night As Needed for Sleep.   Not Taking       Hospital Medications (active)       Dose Frequency Start End    acetaminophen (TYLENOL) tablet 650 mg 650 mg Every 4 Hours PRN 11/20/2017     Sig - Route: Take 2 tablets by mouth Every 4 (Four) Hours As Needed for Mild Pain . - Oral    acyclovir (ZOVIRAX) 640 mg in sodium chloride 0.9 % 100 mL IVPB 10 mg/kg × 63.8 kg (Ideal) Every 8 Hours 11/20/2017 11/27/2017    Sig - Route: Infuse 640 mg into a venous catheter Every 8 (Eight) Hours. - Intravenous    Cosign for Ordering: Required by Sin Escobar  "MD Turner    baclofen (LIORESAL) tablet 20 mg 20 mg 3 Times Daily 11/20/2017     Sig - Route: Take 2 tablets by mouth 3 (Three) Times a Day. - Oral    cetirizine (zyrTEC) tablet 10 mg 10 mg Daily 11/20/2017     Sig - Route: Take 1 tablet by mouth Daily. - Oral    clotrimazole (MYCELEX) siddharth 10 mg 10 mg 5 Times Daily 11/20/2017     Sig - Route: Take 1 tablet by mouth 5 (Five) Times a Day. - Oral    dextrose (D50W) solution 25 g 25 g Every 15 Minutes PRN 11/20/2017     Sig - Route: Infuse 50 mL into a venous catheter Every 15 (Fifteen) Minutes As Needed for Low Blood Sugar (Blood Sugar Less Than 70, Patient Has IV Access - Unresponsive, NPO or Unable To Safely Swallow). - Intravenous    dextrose (GLUTOSE) oral gel 15 g 15 g Every 15 Minutes PRN 11/20/2017     Sig - Route: Take 15 g by mouth Every 15 (Fifteen) Minutes As Needed for Low Blood Sugar (Blood Sugar Less Than 70, Patient Alert, Is Not NPO & Can Safely Swallow). - Oral    enoxaparin (LOVENOX) syringe 40 mg 40 mg Daily 11/20/2017     Sig - Route: Inject 0.4 mL under the skin Daily. - Subcutaneous    fluconazole (DIFLUCAN) IVPB 200 mg 200 mg Daily 11/20/2017 12/1/2017    Sig - Route: Infuse 100 mL into a venous catheter Daily. - Intravenous    glucagon (human recombinant) (GLUCAGEN DIAGNOSTIC) injection 1 mg 1 mg Every 15 Minutes PRN 11/20/2017     Sig - Route: Inject 1 mg under the skin Every 15 (Fifteen) Minutes As Needed (Blood Glucose Less Than 70 - Patient Without IV Access - Unresponsive, NPO or Unable To Safely Swallow). - Subcutaneous    HYDROmorphone (DILAUDID) injection 1 mg 1 mg Every 2 Hours PRN 11/20/2017 11/30/2017    Sig - Route: Infuse 1 mL into a venous catheter Every 2 (Two) Hours As Needed for Severe Pain . - Intravenous    Linked Group 1:  \"And\" Linked Group Details        insulin detemir (LEVEMIR) injection 70 Units 70 Units Every Morning 11/20/2017     Sig - Route: Inject 70 Units under the skin Every Morning. - Subcutaneous    insulin " "lispro (humaLOG) injection 0-14 Units 0-14 Units 4 Times Daily With Meals & Nightly 11/20/2017     Sig - Route: Inject 0-14 Units under the skin 4 (Four) Times a Day With Meals & at Bedtime. - Subcutaneous    lidocaine-Maalox-diphenhydramine (MIRACLE MOUTHWASH) oral suspension 30 mL 30 mL 4 Times Daily PRN 11/20/2017     Sig - Route: Take 30 mL by mouth 4 (Four) Times a Day As Needed (thrush -). - Oral    lidocaine-Maalox-diphenhydramine (MIRACLE MOUTHWASH) oral suspension 30 mL 30 mL 4 Times Daily 11/20/2017     Sig - Route: Take 30 mL by mouth 4 (Four) Times a Day. - Oral    Cosign for Ordering: Required by Sin Tompkins MD    lisinopril (PRINIVIL,ZESTRIL) tablet 10 mg 10 mg Daily 11/20/2017     Sig - Route: Take 1 tablet by mouth Daily. - Oral    LORazepam (ATIVAN) injection 0.5 mg 0.5 mg Every 6 Hours PRN 11/20/2017 11/30/2017    Sig - Route: Infuse 0.25 mL into a venous catheter Every 6 (Six) Hours As Needed for Anxiety. - Intravenous    metoprolol tartrate (LOPRESSOR) tablet 50 mg 50 mg 2 Times Daily 11/20/2017     Sig - Route: Take 1 tablet by mouth 2 (Two) Times a Day. - Oral    mupirocin (BACTROBAN) 2 % ointment  Every 8 Hours Scheduled 11/20/2017     Sig - Route: Apply  topically Every 8 (Eight) Hours. - Topical    naloxone (NARCAN) injection 0.4 mg 0.4 mg Every 5 Minutes PRN 11/20/2017     Sig - Route: Infuse 1 mL into a venous catheter Every 5 (Five) Minutes As Needed for Respiratory Depression. - Intravenous    Linked Group 1:  \"And\" Linked Group Details        nitroglycerin (NITROSTAT) SL tablet 0.4 mg 0.4 mg Every 5 Minutes PRN 11/20/2017     Sig - Route: Place 1 tablet under the tongue Every 5 (Five) Minutes As Needed for Chest Pain. - Sublingual    ondansetron (ZOFRAN) injection 4 mg 4 mg Every 6 Hours PRN 11/20/2017     Sig - Route: Infuse 2 mL into a venous catheter Every 6 (Six) Hours As Needed for Nausea or Vomiting. - Intravenous    oxyCODONE-acetaminophen (PERCOCET)  MG per " "tablet 1 tablet 1 tablet Every 6 Hours PRN 11/20/2017     Sig - Route: Take 1 tablet by mouth Every 6 (Six) Hours As Needed for Moderate Pain . - Oral    oxyCODONE-acetaminophen (PERCOCET) 7.5-325 MG per tablet 1 tablet 1 tablet Every 4 Hours PRN 11/20/2017     Sig - Route: Take 1 tablet by mouth Every 4 (Four) Hours As Needed for Moderate Pain . - Oral    pantoprazole (PROTONIX) EC tablet 40 mg 40 mg Every Morning 11/20/2017     Sig - Route: Take 1 tablet by mouth Every Morning. - Oral    pseudoephedrine (SUDAFED) 12 hr tablet 120 mg 120 mg Every 12 Hours Scheduled 11/20/2017     Sig - Route: Take 1 tablet by mouth Every 12 (Twelve) Hours. - Oral    sodium chloride 0.9 % flush 1-10 mL 1-10 mL As Needed 11/20/2017     Sig - Route: Infuse 1-10 mL into a venous catheter As Needed for Line Care. - Intravenous    sodium chloride 0.9 % flush 10 mL 10 mL As Needed 11/19/2017     Sig - Route: Infuse 10 mL into a venous catheter As Needed for Line Care. - Intravenous    Linked Group 2:  \"And\" Linked Group Details        sodium chloride 0.9 % with KCl 20 mEq/L infusion 100 mL/hr Continuous 11/20/2017     Sig - Route: Infuse 100 mL/hr into a venous catheter Continuous. - Intravenous    sucralfate (CARAFATE) tablet 1 g 1 g 4 Times Daily 11/20/2017     Sig - Route: Take 1 tablet by mouth 4 (Four) Times a Day. - Oral    terazosin (HYTRIN) capsule 1 mg 1 mg Nightly 11/20/2017     Sig - Route: Take 1 capsule by mouth Every Night. - Oral    zolpidem (AMBIEN) tablet 10 mg 10 mg Nightly PRN 11/20/2017     Sig - Route: Take 2 tablets by mouth At Night As Needed for Sleep. - Oral    furosemide (LASIX) tablet 40 mg (Discontinued) 40 mg 2 Times Daily 11/20/2017 11/20/2017    Sig - Route: Take 1 tablet by mouth 2 (Two) Times a Day. - Oral    tamsulosin (FLOMAX) 24 hr capsule 0.4 mg (Discontinued) 0.4 mg Nightly 11/20/2017 11/20/2017    Sig - Route: Take 1 capsule by mouth Every Night. - Oral    Reason for Discontinue: Alternate therapy    " "      Social History   Substance Use Topics   • Smoking status: Current Every Day Smoker     Packs/day: 0.50     Years: 52.00     Types: Cigarettes   • Smokeless tobacco: Current User     Types: Snuff      Comment: I've tried and tried   • Alcohol use Yes      Comment: occasionally        Past Family History:  Family History   Problem Relation Age of Onset   • Stroke Mother    • Heart disease Father    • Heart disease Brother    • Cancer Brother        Review of Systems:  Review of Systems   Constitutional: Negative for fever and unexpected weight change.   HENT: Positive for mouth sores and trouble swallowing. Negative for hearing loss.         The patient explains to me today that he has had very sore gums as well as a film he states is \"white all on his tongue and in his mouth\".  He states that this has been very painful.   Eyes: Negative for visual disturbance.   Respiratory: Negative for cough.    Cardiovascular: Negative for chest pain.   Gastrointestinal:        See HPI   Endocrine: Negative for cold intolerance and heat intolerance.   Genitourinary: Negative for dysuria.   Neurological: Negative for seizures.   Psychiatric/Behavioral: Negative for hallucinations.       Physical Exam:  Temp:  [98.1 °F (36.7 °C)-99.4 °F (37.4 °C)] 98.3 °F (36.8 °C)  Heart Rate:  [77-90] 85  Resp:  [18] 18  BP: ()/(44-75) 109/59  Body mass index is 23.45 kg/(m^2).    Intake/Output Summary (Last 24 hours) at 11/21/17 1305  Last data filed at 11/21/17 0958   Gross per 24 hour   Intake          3185.76 ml   Output                0 ml   Net          3185.76 ml     I/O this shift:  In: 300 [IV Piggyback:300]  Out: -   Physical Exam   Constitutional: He is oriented to person, place, and time. He appears well-developed and well-nourished.   HENT:   Tracheostomy in place.  The patient does exhibit some thrush on his tongue however he states this is much better since he has been treated with medication.   Eyes: EOM are normal. "   Cardiovascular: Normal rate, regular rhythm and normal heart sounds.  Exam reveals no gallop and no friction rub.    No murmur heard.  Pulmonary/Chest: Effort normal. He has no wheezes. He has no rales.   Coarse bilateral   Abdominal: Soft. Bowel sounds are normal. He exhibits no distension. There is no hepatosplenomegaly. There is no tenderness. There is no rigidity, no rebound and no guarding.   G-tube is in place without signs or symptoms of infection or drainage.   Musculoskeletal: Normal range of motion. He exhibits no edema, tenderness or deformity.   Neurological: He is alert and oriented to person, place, and time.   Skin: Skin is warm and dry. No rash noted.   Psychiatric: He has a normal mood and affect. His behavior is normal. Judgment and thought content normal.   Vitals reviewed.    I have performed the physical exam and agree with the findings as noted above.   ARMIN Narvaez MD    Results Review:  Lab Results (last 24 hours)     Procedure Component Value Units Date/Time    POC Glucose Fingerstick [[id number]]  (Abnormal) Collected:  11/20/17 1705    Specimen:  Blood Updated:  11/20/17 1727     Glucose 139 (H) mg/dL       : [id number] Elgin CaraMeter ID: [id number]       Blood Culture - Blood, [[id number]]  (Normal) Collected:  11/19/17 2147    Specimen:  Blood from Arm, Right Updated:  11/20/17 2201     Blood Culture No growth at 24 hours    Blood Culture - Blood, [[id number]]  (Normal) Collected:  11/19/17 2138    Specimen:  Blood from Arm, Left Updated:  11/20/17 2201     Blood Culture No growth at 24 hours    POC Glucose Fingerstick [[id number]]  (Abnormal) Collected:  11/20/17 2153    Specimen:  Blood Updated:  11/20/17 2205     Glucose 207 (H) mg/dL       : [id number] Shane StephanieMeter ID: [id number]       CBC & Differential [[id number]] Collected:  11/21/17 0630    Specimen:  Blood Updated:  11/21/17 0652    Narrative:       The following orders were created for panel order CBC &  Differential.  Procedure                               Abnormality         Status                     ---------                               -----------         ------                     CBC Auto Differential[[id number]]        Abnormal            Final result                 Please view results for these tests on the individual orders.    CBC Auto Differential [[id number]]  (Abnormal) Collected:  11/21/17 0630    Specimen:  Blood Updated:  11/21/17 0652     WBC 5.02 10*3/mm3      RBC 3.46 (L) 10*6/mm3      Hemoglobin 10.2 (L) g/dL      Hematocrit 30.1 (L) %      MCV 87.0 fL      MCH 29.5 pg      MCHC 33.9 g/dL      RDW 15.8 (H) %      RDW-SD 48.8 fl      MPV 11.3 fL      Platelets 154 10*3/mm3      Neutrophil % 66.9 %      Lymphocyte % 18.9 %      Monocyte % 12.2 (H) %      Eosinophil % 0.8 %      Basophil % 0.4 %      Immature Grans % 0.8 %      Neutrophils, Absolute 3.36 10*3/mm3      Lymphocytes, Absolute 0.95 10*3/mm3      Monocytes, Absolute 0.61 10*3/mm3      Eosinophils, Absolute 0.04 10*3/mm3      Basophils, Absolute 0.02 10*3/mm3      Immature Grans, Absolute 0.04 (H) 10*3/mm3     Comprehensive Metabolic Panel [[id number]]  (Abnormal) Collected:  11/21/17 0630    Specimen:  Blood Updated:  11/21/17 0705     Glucose 91 mg/dL      BUN 14 mg/dL      Creatinine 0.43 (L) mg/dL      Sodium 137 mmol/L      Potassium 4.3 mmol/L      Chloride 102 mmol/L      CO2 26.0 mmol/L      Calcium 8.7 mg/dL      Total Protein 6.4 g/dL      Albumin 3.30 (L) g/dL      ALT (SGPT) 72 (H) U/L      AST (SGOT) 47 (H) U/L      Alkaline Phosphatase 148 (H) U/L      Total Bilirubin 0.6 mg/dL      eGFR Non African Amer >150 mL/min/1.73      Globulin 3.1 gm/dL      A/G Ratio 1.1 g/dL      BUN/Creatinine Ratio 32.6 (H)     Anion Gap 9.0 mmol/L     POC Glucose Fingerstick [[id number]]  (Abnormal) Collected:  11/21/17 0919    Specimen:  Blood Updated:  11/21/17 0949     Glucose 207 (H) mg/dL       : [id number] Valdo Bautista ID:  [id number]       POC Glucose Fingerstick [[id number]]  (Abnormal) Collected:  11/21/17 1141    Specimen:  Blood Updated:  11/21/17 1152     Glucose 169 (H) mg/dL       : [id number] JonesriMeter ID: [id number]             Radiology Review:  Imaging Results (last 72 hours)     Procedure Component Value Units Date/Time    XR Chest 1 View [[id number]] Collected:  11/19/17 2121     Updated:  11/19/17 2136    Narrative:       XR CHEST 1 VW- 11/19/2017 9:17 PM CST     HISTORY: Shortness of air       COMPARISON: 10/09/2017.     FINDINGS:   Tracheostomy tube and port are stable in position. Mild opacities are  seen in the LEFT lung base, which could be due to airspace disease or  scarring. The cardiomediastinal silhouette and pulmonary vascularity are  unchanged.      The osseous structures and surrounding soft tissues demonstrate no acute  abnormality.       Impression:       1. Possible scarring or airspace disease in the LEFT lung base.        This report was finalized on 11/19/2017 21:22 by Dr. Nitesh Adair MD.          Impression/Plan:  Patient Active Problem List   Diagnosis Code   • Oropharyngeal cancer C10.9   • Current smoker F17.200   • Perineal mass in male N50.9   • Recio's esophagus with dysplasia K22.719   • Tracheostomy dependence Z93.0   • Postoperative pain G89.18   • S/P percutaneous endoscopic gastrostomy (PEG) tube placement Z93.1   • Constipation, acute K59.00   • Pain, rectal K62.89   • Oropharyngeal candidiasis B37.0     Recio's esophagus  He is up-to-date with endoscopy.  There is no need to proceed with any type of endoscopic evaluation for this at the present time.  He will follow with his local physician for routine surveillance as needed pending his progress regarding his head and neck cancer.  The pt tells me that he doesn't want another endoscopy any time soon.    Mucositis  Being treated with Diflucan and Magic mouthwash    Nutrition  He has a surgical G-tube in place.  He tells  me he needs no assistance in this regard.    GI service is signing off.  Please reconsult if there is a specific question that I can answer for you.    LAURY Fuentes  11/21/17   9:45 AM    Jessica Narvaez MD  [facility] Gastroenterology  11/21/17  1:05 PM    Much of this encounter note is an electronic transcription/translation of spoken language to printed text. The electronic translation of spoken language may permit erroneous, or at times, nonsensical words or phrases to be inadvertently transcribed; although I have reviewed the note for such errors, some may still exist.

## 2017-11-21 NOTE — PLAN OF CARE
Problem: Patient Care Overview (Adult)  Goal: Plan of Care Review  Outcome: Ongoing (interventions implemented as appropriate)    11/21/17 0415   Patient Care Overview   Progress no change   Outcome Evaluation   Outcome Summary/Follow up Plan Medicated for pain as needed; IV ABX cont.; up with a cane; trach with speaking valve   Coping/Psychosocial Response Interventions   Plan Of Care Reviewed With patient       Goal: Adult Individualization and Mutuality  Outcome: Ongoing (interventions implemented as appropriate)  Goal: Discharge Needs Assessment  Outcome: Ongoing (interventions implemented as appropriate)    Problem: Infection, Risk/Actual (Adult)  Goal: Identify Related Risk Factors and Signs and Symptoms  Outcome: Outcome(s) achieved Date Met:  11/21/17  Goal: Infection Prevention/Resolution  Outcome: Ongoing (interventions implemented as appropriate)    Problem: Skin Integrity Impairment, Risk/Actual (Adult)  Goal: Identify Related Risk Factors and Signs and Symptoms  Outcome: Outcome(s) achieved Date Met:  11/21/17  Goal: Skin Integrity/Wound Healing  Outcome: Ongoing (interventions implemented as appropriate)    Problem: Fall Risk (Adult)  Goal: Identify Related Risk Factors and Signs and Symptoms  Outcome: Outcome(s) achieved Date Met:  11/21/17  Goal: Absence of Falls  Outcome: Ongoing (interventions implemented as appropriate)    Problem: Nutrition, Enteral (Adult)  Goal: Signs and Symptoms of Listed Potential Problems Will be Absent or Manageable (Nutrition, Enteral)  Outcome: Ongoing (interventions implemented as appropriate)

## 2017-11-22 ENCOUNTER — APPOINTMENT (OUTPATIENT)
Dept: RADIATION ONCOLOGY | Facility: HOSPITAL | Age: 62
End: 2017-11-22

## 2017-11-22 VITALS
HEART RATE: 90 BPM | BODY MASS INDEX: 23.35 KG/M2 | DIASTOLIC BLOOD PRESSURE: 52 MMHG | WEIGHT: 145.3 LBS | HEIGHT: 66 IN | TEMPERATURE: 98.3 F | RESPIRATION RATE: 18 BRPM | OXYGEN SATURATION: 97 % | SYSTOLIC BLOOD PRESSURE: 133 MMHG

## 2017-11-22 LAB
ALBUMIN SERPL-MCNC: 3.3 G/DL (ref 3.5–5)
ALBUMIN/GLOB SERPL: 1.1 G/DL (ref 1.1–2.5)
ALP SERPL-CCNC: 140 U/L (ref 24–120)
ALT SERPL W P-5'-P-CCNC: 56 U/L (ref 0–54)
ANION GAP SERPL CALCULATED.3IONS-SCNC: 8 MMOL/L (ref 4–13)
AST SERPL-CCNC: 38 U/L (ref 7–45)
BASOPHILS # BLD AUTO: 0.04 10*3/MM3 (ref 0–0.2)
BASOPHILS NFR BLD AUTO: 0.8 % (ref 0–2)
BILIRUB SERPL-MCNC: 0.3 MG/DL (ref 0.1–1)
BUN BLD-MCNC: 16 MG/DL (ref 5–21)
BUN/CREAT SERPL: 33.3 (ref 7–25)
CALCIUM SPEC-SCNC: 9 MG/DL (ref 8.4–10.4)
CHLORIDE SERPL-SCNC: 104 MMOL/L (ref 98–110)
CO2 SERPL-SCNC: 27 MMOL/L (ref 24–31)
CREAT BLD-MCNC: 0.48 MG/DL (ref 0.5–1.4)
DEPRECATED RDW RBC AUTO: 49.7 FL (ref 40–54)
EOSINOPHIL # BLD AUTO: 0.07 10*3/MM3 (ref 0–0.7)
EOSINOPHIL NFR BLD AUTO: 1.4 % (ref 0–4)
ERYTHROCYTE [DISTWIDTH] IN BLOOD BY AUTOMATED COUNT: 15.9 % (ref 12–15)
GFR SERPL CREATININE-BSD FRML MDRD: >150 ML/MIN/1.73
GLOBULIN UR ELPH-MCNC: 3 GM/DL
GLUCOSE BLD-MCNC: 59 MG/DL (ref 70–100)
GLUCOSE BLDC GLUCOMTR-MCNC: 127 MG/DL (ref 70–130)
GLUCOSE BLDC GLUCOMTR-MCNC: 83 MG/DL (ref 70–130)
HCT VFR BLD AUTO: 29.9 % (ref 40–52)
HGB BLD-MCNC: 10.2 G/DL (ref 14–18)
IMM GRANULOCYTES # BLD: 0.02 10*3/MM3 (ref 0–0.03)
IMM GRANULOCYTES NFR BLD: 0.4 % (ref 0–5)
LYMPHOCYTES # BLD AUTO: 2.05 10*3/MM3 (ref 0.72–4.86)
LYMPHOCYTES NFR BLD AUTO: 40.5 % (ref 15–45)
MCH RBC QN AUTO: 30.1 PG (ref 28–32)
MCHC RBC AUTO-ENTMCNC: 34.1 G/DL (ref 33–36)
MCV RBC AUTO: 88.2 FL (ref 82–95)
MONOCYTES # BLD AUTO: 0.74 10*3/MM3 (ref 0.19–1.3)
MONOCYTES NFR BLD AUTO: 14.6 % (ref 4–12)
NEUTROPHILS # BLD AUTO: 2.14 10*3/MM3 (ref 1.87–8.4)
NEUTROPHILS NFR BLD AUTO: 42.3 % (ref 39–78)
NRBC BLD MANUAL-RTO: 0 /100 WBC (ref 0–0)
PLATELET # BLD AUTO: 155 10*3/MM3 (ref 130–400)
PMV BLD AUTO: 11.6 FL (ref 6–12)
POTASSIUM BLD-SCNC: 4.3 MMOL/L (ref 3.5–5.3)
PROT SERPL-MCNC: 6.3 G/DL (ref 6.3–8.7)
RBC # BLD AUTO: 3.39 10*6/MM3 (ref 4.8–5.9)
SODIUM BLD-SCNC: 139 MMOL/L (ref 135–145)
WBC NRBC COR # BLD: 5.06 10*3/MM3 (ref 4.8–10.8)

## 2017-11-22 PROCEDURE — 80053 COMPREHEN METABOLIC PANEL: CPT | Performed by: INTERNAL MEDICINE

## 2017-11-22 PROCEDURE — 25010000002 ACYCLOVIR PER 5 MG: Performed by: PHYSICIAN ASSISTANT

## 2017-11-22 PROCEDURE — 82962 GLUCOSE BLOOD TEST: CPT

## 2017-11-22 PROCEDURE — 85025 COMPLETE CBC W/AUTO DIFF WBC: CPT | Performed by: INTERNAL MEDICINE

## 2017-11-22 PROCEDURE — 25810000003 SODIUM CHLORIDE 0.9 % WITH KCL 20 MEQ 20-0.9 MEQ/L-% SOLUTION: Performed by: INTERNAL MEDICINE

## 2017-11-22 RX ORDER — ACYCLOVIR 200 MG/1
200 CAPSULE ORAL
Qty: 20 CAPSULE | Refills: 0 | Status: SHIPPED | OUTPATIENT
Start: 2017-11-22 | End: 2017-12-04

## 2017-11-22 RX ORDER — FLUCONAZOLE 100 MG/1
100 TABLET ORAL EVERY 24 HOURS
Qty: 1 TABLET | Refills: 0 | Status: SHIPPED | OUTPATIENT
Start: 2017-11-22 | End: 2017-11-23

## 2017-11-22 RX ORDER — CLOTRIMAZOLE 10 MG/1
10 LOZENGE ORAL; TOPICAL
Qty: 35 TABLET | Refills: 0 | Status: SHIPPED | OUTPATIENT
Start: 2017-11-22 | End: 2017-11-29

## 2017-11-22 RX ADMIN — ACYCLOVIR SODIUM 640 MG: 50 INJECTION, SOLUTION INTRAVENOUS at 02:21

## 2017-11-22 RX ADMIN — CLOTRIMAZOLE 10 MG: 10 LOZENGE ORAL; TOPICAL at 07:58

## 2017-11-22 RX ADMIN — LIDOCAINE HYDROCHLORIDE 30 ML: 20 SOLUTION ORAL; TOPICAL at 12:10

## 2017-11-22 RX ADMIN — MUPIROCIN: 20 OINTMENT TOPICAL at 06:00

## 2017-11-22 RX ADMIN — CLOTRIMAZOLE 10 MG: 10 LOZENGE ORAL; TOPICAL at 11:58

## 2017-11-22 RX ADMIN — ACYCLOVIR SODIUM 640 MG: 50 INJECTION, SOLUTION INTRAVENOUS at 10:17

## 2017-11-22 RX ADMIN — OXYCODONE HYDROCHLORIDE AND ACETAMINOPHEN 1 TABLET: 10; 325 TABLET ORAL at 00:22

## 2017-11-22 RX ADMIN — OXYCODONE HYDROCHLORIDE AND ACETAMINOPHEN 1 TABLET: 10; 325 TABLET ORAL at 08:32

## 2017-11-22 RX ADMIN — LIDOCAINE HYDROCHLORIDE 30 ML: 20 SOLUTION ORAL; TOPICAL at 07:59

## 2017-11-22 RX ADMIN — POTASSIUM CHLORIDE AND SODIUM CHLORIDE 75 ML/HR: 900; 150 INJECTION, SOLUTION INTRAVENOUS at 06:59

## 2017-11-22 NOTE — PLAN OF CARE
Problem: Patient Care Overview (Adult)  Goal: Plan of Care Review  Outcome: Ongoing (interventions implemented as appropriate)    11/21/17 0415 11/21/17 0904 11/22/17 0256   Patient Care Overview   Progress --  no change --    Outcome Evaluation   Outcome Summary/Follow up Plan --  --  Patient refuses to have medication flushed through peg tube; patient does own tube feedings; medicated for pain x2   Coping/Psychosocial Response Interventions   Plan Of Care Reviewed With patient --  --        Goal: Adult Individualization and Mutuality  Outcome: Ongoing (interventions implemented as appropriate)  Goal: Discharge Needs Assessment  Outcome: Ongoing (interventions implemented as appropriate)    Problem: Skin Integrity Impairment, Risk/Actual (Adult)  Goal: Skin Integrity/Wound Healing  Outcome: Ongoing (interventions implemented as appropriate)    Problem: Fall Risk (Adult)  Goal: Absence of Falls  Outcome: Ongoing (interventions implemented as appropriate)    Problem: Nutrition, Enteral (Adult)  Goal: Signs and Symptoms of Listed Potential Problems Will be Absent or Manageable (Nutrition, Enteral)  Outcome: Ongoing (interventions implemented as appropriate)

## 2017-11-22 NOTE — DISCHARGE SUMMARY
[facility] Medicine Services  DISCHARGE SUMMARY       Date of Admission: 11/19/2017  Date of Discharge:  11/22/2017  Primary Care Physician: Christian Castellon MD    Presenting Problem/History of Present Illness:  Oropharyngeal candidiasis [B37.0]  Oropharyngeal candidiasis [B37.0]     Final Discharge Diagnoses:  Hospital Problem List     Oropharyngeal candidiasis          Consults: oncology, GI    Procedures Performed:     Pertinent Test Results:   Lab Results (last 48 hours)     Procedure Component Value Units Date/Time    POC Glucose Fingerstick [[id number]]  (Abnormal) Collected:  11/20/17 1705    Specimen:  Blood Updated:  11/20/17 1727     Glucose 139 (H) mg/dL       : [id number] AUTOFACT CaraMeter ID: [id number]       POC Glucose Fingerstick [[id number]]  (Abnormal) Collected:  11/20/17 2153    Specimen:  Blood Updated:  11/20/17 2205     Glucose 207 (H) mg/dL       : [id number] Shane StephanieMeter ID: [id number]       CBC & Differential [[id number]] Collected:  11/21/17 0630    Specimen:  Blood Updated:  11/21/17 0652    Narrative:       The following orders were created for panel order CBC & Differential.  Procedure                               Abnormality         Status                     ---------                               -----------         ------                     CBC Auto Differential[[id number]]        Abnormal            Final result                 Please view results for these tests on the individual orders.    CBC Auto Differential [[id number]]  (Abnormal) Collected:  11/21/17 0630    Specimen:  Blood Updated:  11/21/17 0652     WBC 5.02 10*3/mm3      RBC 3.46 (L) 10*6/mm3      Hemoglobin 10.2 (L) g/dL      Hematocrit 30.1 (L) %      MCV 87.0 fL      MCH 29.5 pg      MCHC 33.9 g/dL      RDW 15.8 (H) %      RDW-SD 48.8 fl      MPV 11.3 fL      Platelets 154 10*3/mm3      Neutrophil % 66.9 %      Lymphocyte % 18.9 %      Monocyte % 12.2 (H) %       Eosinophil % 0.8 %      Basophil % 0.4 %      Immature Grans % 0.8 %      Neutrophils, Absolute 3.36 10*3/mm3      Lymphocytes, Absolute 0.95 10*3/mm3      Monocytes, Absolute 0.61 10*3/mm3      Eosinophils, Absolute 0.04 10*3/mm3      Basophils, Absolute 0.02 10*3/mm3      Immature Grans, Absolute 0.04 (H) 10*3/mm3     Comprehensive Metabolic Panel [[id number]]  (Abnormal) Collected:  11/21/17 0630    Specimen:  Blood Updated:  11/21/17 0705     Glucose 91 mg/dL      BUN 14 mg/dL      Creatinine 0.43 (L) mg/dL      Sodium 137 mmol/L      Potassium 4.3 mmol/L      Chloride 102 mmol/L      CO2 26.0 mmol/L      Calcium 8.7 mg/dL      Total Protein 6.4 g/dL      Albumin 3.30 (L) g/dL      ALT (SGPT) 72 (H) U/L      AST (SGOT) 47 (H) U/L      Alkaline Phosphatase 148 (H) U/L      Total Bilirubin 0.6 mg/dL      eGFR Non African Amer >150 mL/min/1.73      Globulin 3.1 gm/dL      A/G Ratio 1.1 g/dL      BUN/Creatinine Ratio 32.6 (H)     Anion Gap 9.0 mmol/L     POC Glucose Fingerstick [[id number]]  (Abnormal) Collected:  11/21/17 0919    Specimen:  Blood Updated:  11/21/17 0949     Glucose 207 (H) mg/dL       : [id number] Metafusedrosa LorriMeter ID: [id number]       POC Glucose Fingerstick [[id number]]  (Abnormal) Collected:  11/21/17 1141    Specimen:  Blood Updated:  11/21/17 1152     Glucose 169 (H) mg/dL       : [id number] Swivelrn LorriMeter ID: [id number]       POC Glucose Fingerstick [[id number]]  (Normal) Collected:  11/21/17 1716    Specimen:  Blood Updated:  11/21/17 1736     Glucose 95 mg/dL       : [id number] Metafusedrosa LorriMeter ID: [id number]       POC Glucose Fingerstick [[id number]]  (Normal) Collected:  11/21/17 2122    Specimen:  Blood Updated:  11/21/17 2133     Glucose 91 mg/dL       : [id number] Shane StephanieMeter ID: [id number]       Blood Culture - Blood, [[id number]]  (Normal) Collected:  11/19/17 2147    Specimen:  Blood from Arm, Right Updated:  11/21/17 2201     Blood Culture No growth at 2  days    Blood Culture - Blood, [[id number]]  (Normal) Collected:  11/19/17 2138    Specimen:  Blood from Arm, Left Updated:  11/21/17 2201     Blood Culture No growth at 2 days    CBC & Differential [[id number]] Collected:  11/22/17 0500    Specimen:  Blood Updated:  11/22/17 0542    Narrative:       The following orders were created for panel order CBC & Differential.  Procedure                               Abnormality         Status                     ---------                               -----------         ------                     CBC Auto Differential[[id number]]        Abnormal            Final result                 Please view results for these tests on the individual orders.    CBC Auto Differential [[id number]]  (Abnormal) Collected:  11/22/17 0500    Specimen:  Blood Updated:  11/22/17 0542     WBC 5.06 10*3/mm3      RBC 3.39 (L) 10*6/mm3      Hemoglobin 10.2 (L) g/dL      Hematocrit 29.9 (L) %      MCV 88.2 fL      MCH 30.1 pg      MCHC 34.1 g/dL      RDW 15.9 (H) %      RDW-SD 49.7 fl      MPV 11.6 fL      Platelets 155 10*3/mm3      Neutrophil % 42.3 %      Lymphocyte % 40.5 %      Monocyte % 14.6 (H) %      Eosinophil % 1.4 %      Basophil % 0.8 %      Immature Grans % 0.4 %      Neutrophils, Absolute 2.14 10*3/mm3      Lymphocytes, Absolute 2.05 10*3/mm3      Monocytes, Absolute 0.74 10*3/mm3      Eosinophils, Absolute 0.07 10*3/mm3      Basophils, Absolute 0.04 10*3/mm3      Immature Grans, Absolute 0.02 10*3/mm3      nRBC 0.0 /100 WBC     Comprehensive Metabolic Panel [[id number]]  (Abnormal) Collected:  11/22/17 0500    Specimen:  Blood Updated:  11/22/17 0557     Glucose 59 (L) mg/dL      BUN 16 mg/dL      Creatinine 0.48 (L) mg/dL      Sodium 139 mmol/L      Potassium 4.3 mmol/L      Chloride 104 mmol/L      CO2 27.0 mmol/L      Calcium 9.0 mg/dL      Total Protein 6.3 g/dL      Albumin 3.30 (L) g/dL      ALT (SGPT) 56 (H) U/L      AST (SGOT) 38 U/L      Alkaline Phosphatase 140 (H) U/L       Total Bilirubin 0.3 mg/dL      eGFR Non African Amer >150 mL/min/1.73      Globulin 3.0 gm/dL      A/G Ratio 1.1 g/dL      BUN/Creatinine Ratio 33.3 (H)     Anion Gap 8.0 mmol/L     POC Glucose Fingerstick [[id number]]  (Normal) Collected:  11/22/17 0744    Specimen:  Blood Updated:  11/22/17 0817     Glucose 83 mg/dL       : [id number] Khoury KaciMeter ID: [id number]       POC Glucose Fingerstick [[id number]]  (Normal) Collected:  11/22/17 1207    Specimen:  Blood Updated:  11/22/17 1236     Glucose 127 mg/dL       : [id number] Khoury KaciMeter ID: [id number]           Imaging Results (all)     Procedure Component Value Units Date/Time    XR Chest 1 View [[id number]] Collected:  11/19/17 2121     Updated:  11/19/17 2136    Narrative:       XR CHEST 1 VW- 11/19/2017 9:17 PM CST     HISTORY: Shortness of air       COMPARISON: 10/09/2017.     FINDINGS:   Tracheostomy tube and port are stable in position. Mild opacities are  seen in the LEFT lung base, which could be due to airspace disease or  scarring. The cardiomediastinal silhouette and pulmonary vascularity are  unchanged.      The osseous structures and surrounding soft tissues demonstrate no acute  abnormality.       Impression:       1. Possible scarring or airspace disease in the LEFT lung base.        This report was finalized on 11/19/2017 21:22 by Dr. Nitesh Adair MD.          Chief Complaint on Day of Discharge: feeling better    History of Present Illness on Day of Discharge: Patient states he is feeling much better.  He is able to swallow and tolerate a diet.  He is able to take his pills by mouth now.  He feels ready to go home.     Hospital Course:  The patient is a 62 y.o. male who presented to [facility] with significant mouth and throat pain along with difficulty swallowing.  He has a known history of oropharyngeal cancer is undergoing chemotherapy however presents with severe thrush and mucositis.  He had been given some  "outpatient medications which were not helpful and presented in significant distress and pain.  He was seen by oncology while here who added IV acyclovir.  Patient also was on per PEG fluconazole.  Patient was receiving oral mouth care as well as  miracle mouthwash.  On day of discharge patient was able to swallow pills by mouth.  He had been able to keep some liquids down.  He was tolerating his tube feeds as regularly.  He was ambulatory around the room without assistance.  He felt ready for discharge.  His radiation have been held secondary to his significant mouth discomfort however he will resume his appointments as scheduled.    Condition on Discharge:  Improved    Physical Exam on Discharge:  /52 (BP Location: Left arm, Patient Position: Lying)  Pulse 90  Temp 98.3 °F (36.8 °C) (Oral)   Resp 18  Ht 66\" (167.6 cm)  Wt 145 lb 4.8 oz (65.9 kg)  SpO2 97%  BMI 23.45 kg/m2  Physical Exam   Constitutional: He is oriented to person, place, and time. He appears well-developed and well-nourished.   HENT:   Head: Normocephalic and atraumatic.   trach   Eyes: Conjunctivae and EOM are normal. Pupils are equal, round, and reactive to light.   Neck: Neck supple. No JVD present. No thyromegaly present.   Cardiovascular: Normal rate, regular rhythm, normal heart sounds and intact distal pulses.  Exam reveals no gallop and no friction rub.    No murmur heard.  Pulmonary/Chest: Effort normal and breath sounds normal. No respiratory distress. He has no wheezes. He has no rales. He exhibits no tenderness.   Abdominal: Soft. Bowel sounds are normal. He exhibits no distension. There is no tenderness. There is no rebound and no guarding.   PEG in pplace   Musculoskeletal: Normal range of motion. He exhibits no edema, tenderness or deformity.   Lymphadenopathy:     He has no cervical adenopathy.   Neurological: He is alert and oriented to person, place, and time. He displays normal reflexes. No cranial nerve deficit. He " exhibits normal muscle tone.   Skin: Skin is warm and dry. No rash noted.   Psychiatric: He has a normal mood and affect. His behavior is normal. Judgment and thought content normal.         Discharge Disposition:  Home or Self Care    Discharge Medications:   Sameer Tavarez   Home Medication Instructions CHARLENE:[id number]    Printed on:11/22/17 7372   Medication Information                      acyclovir (ZOVIRAX) 200 MG capsule  Take 1 capsule by mouth Every 4 (Four) Hours While Awake.             baclofen (LIORESAL) 20 MG tablet  Take 20 mg by mouth 3 (Three) Times a Day.             clotrimazole (MYCELEX) 10 MG siddharth  Take 1 tablet by mouth 5 (Five) Times a Day for 7 days.             fexofenadine-pseudoephedrine (ALLEGRA-D)  MG per 12 hr tablet  Take 1 tablet by mouth 2 (Two) Times a Day.             fluconazole (DIFLUCAN) 100 MG tablet  1 tablet by Per G Tube route Daily for 1 dose. Indications: severe thrush             FLUVIRIN suspension injection  ADM 0.5ML IM UTD             furosemide (LASIX) 40 MG tablet  Take 40 mg by mouth 2 (Two) Times a Day.             insulin glargine (LANTUS) 100 UNIT/ML injection  Inject 70 Units under the skin Daily.             lidocaine-Maalox-diphenhydramine (MIRACLE MOUTHWASH) 900 mL suspension  Take 30 mL by mouth 4 (Four) Times a Day As Needed (thrush -).             lisinopril (PRINIVIL,ZESTRIL) 10 MG tablet  Take 10 mg by mouth Daily.             metoprolol tartrate (LOPRESSOR) 50 MG tablet  Take 50 mg by mouth 2 (Two) Times a Day.             mupirocin (BACTROBAN) 2 % ointment  Apply  topically Every 8 (Eight) Hours.             nitroglycerin (NITROLINGUAL) 0.4 MG/SPRAY spray  Place 1 spray under the tongue Every 5 (Five) Minutes As Needed for Chest Pain.             omeprazole (priLOSEC) 20 MG capsule  Take 20 mg by mouth Daily.             oxyCODONE-acetaminophen (PERCOCET)  MG per tablet  Take 1 tablet by mouth Every 6 (Six) Hours As Needed for Moderate  Pain .             sucralfate (CARAFATE) 1 g tablet  Take 1 g by mouth 4 (Four) Times a Day.             tamsulosin (FLOMAX) 0.4 MG capsule 24 hr capsule  Take 1 capsule by mouth Every Night.             zolpidem (AMBIEN) 10 MG tablet  Take 10 mg by mouth At Night As Needed for Sleep.                 Discharge Diet:   Diet Instructions     Advance Diet As Tolerated                     Activity at Discharge:   Activity Instructions     Activity as Tolerated                     Discharge Care Plan/Instructions:     Follow-up Appointments:   Future Appointments  Date Time Provider Department Center   11/27/2017 2:00 PM [facility] PAD TRUBEAM SRS LINEAR ACCELERATOR  [facility]   11/28/2017 2:00 PM [facility] PAD TRUBEAM SRS LINEAR ACCELERATOR  [facility]   11/29/2017 2:00 PM [facility] PAD TRUBEAM SRS LINEAR ACCELERATOR  [facility]   11/30/2017 2:00 PM  PAD TRUBEAM SRS LINEAR ACCELERATOR  [facility]   11/30/2017 2:15 PM PAD SPEECH ONCOLOGY  [facility]   11/30/2017 2:30 PM PAD NUTRITION ONCOLOGY  [facility]   12/1/2017 2:00 PM  PAD TRUBEAM SRS LINEAR ACCELERATOR  [facility]   12/4/2017 9:55 AM PAD RAD ONC BILLING ONLY  [facility]   12/4/2017 2:00 PM BH PAD TRUBEAM SRS LINEAR ACCELERATOR  [facility]   12/5/2017 2:00 PM BH PAD TRUBEAM SRS LINEAR ACCELERATOR  [facility]   12/6/2017 2:00 PM BH PAD TRUBEAM SRS LINEAR ACCELERATOR  [facility]   12/7/2017 2:00 PM BH PAD TRUBEAM SRS LINEAR ACCELERATOR  [facility]   12/8/2017 2:00 PM BH PAD TRUBEAM SRS LINEAR ACCELERATOR  [facility]   12/11/2017 2:00 PM BH PAD TRUBEAM SRS LINEAR ACCELERATOR  [facility]   12/12/2017 2:00 PM BH PAD TRUBEAM SRS LINEAR ACCELERATOR  [facility]   12/13/2017 2:00 PM BH PAD TRUBEAM SRS LINEAR ACCELERATOR  [facility]   12/14/2017 2:00 PM BH PAD TRUBEAM SRS LINEAR ACCELERATOR  [facility]   12/15/2017 2:00 PM BH PAD TRUBEAM SRS LINEAR ACCELERATOR  [facility] RO PAD   12/18/2017 2:00 PM BH PAD TRUBEAM SRS LINEAR ACCELERATOR  [facility]   12/19/2017  2:00 PM BH PAD TRUBEAM SRS LINEAR ACCELERATOR BH PAD RO PAD   12/20/2017 2:00 PM BH PAD TRUBEAM SRS LINEAR ACCELERATOR BH PAD RO PAD   12/21/2017 2:00 PM BH PAD TRUBEAM SRS LINEAR ACCELERATOR BH PAD RO PAD   12/22/2017 2:00 PM BH PAD TRUBEAM SRS LINEAR ACCELERATOR BH PAD RO PAD   12/25/2017 2:00 PM BH PAD TRUBEAM SRS LINEAR ACCELERATOR  PAD RO PAD   12/26/2017 2:00 PM BH PAD TRUBEAM SRS LINEAR ACCELERATOR  [facility] RO PAD   12/27/2017 2:00 PM  PAD TRUBEAM SRS LINEAR ACCELERATOR  PAD RO PAD   1/3/2018 2:45 PM Alber Justin MD [facility] PAD None       Test Results Pending at Discharge: none    Imelda Graves MD  11/22/17  12:28 PM    Time: 35 min

## 2017-11-22 NOTE — PROGRESS NOTES
PROGRESS NOTE  Patient name: Sameer Tavarez  Patient : 1955  VISIT # [id number]  MR #[id number]    SUBJECTIVE:  Mouth a little better    INTERVAL HISTORY:  Mr. 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  He is getting weekly Taxol/carboplatin-dose #5 administered 2017 along with concomitant XRT - 8 doses so far.  He presented to the hospital with significant pain in his mouth.      Diagnosis  · Invasive moderately differentiated Squamous cell carcinoma of the right tonsil and right base of tongue  Treatment summary  · 10/16/2017- initiation of carboplatin AUC of 2 and Taxol 80 mg/m² weekly and received 3 doses. Taxol dose decreased to 35 mg/m² with cycle 4 on 2017.   · Radiation therapy initiated 2017.  Cancer history- Invasive moderately differentiated Squamous cell carcinoma of the right tonsil and right base of tongue.  Mr Tavarez was first seen by me on 2017 referred by Dr. 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 He had recent complains of weight loss of about 15 pounds and difficulty swallowing.  · 2017-he underwent an upper endoscopy for follow-up on a history of Recio's esophagus. A oropharynx lesion was noted and biopsy was consistent with at least squamous cell carcinoma in situ. Status of invasion was indeterminate. P 16 was positive. He was referred to ENT, but has not been seen yet.   · 2017-CT scan of the chest/abdomen/pelvis contrast showed a 1.1 cm lymph node adjacent to the distal esophagus. 2 small pulmonary nodules measuring 4 mm and 4.2 mm in diameter located in the right lower lobe and at right middle lobe respectively. Multiple solid noncalcified nodules smaller than 6 mm in diameter. A 4.9 x 2.5 x 3.3 cm abnormal soft tissue appearance in the right perineum of unknown significance. In addition, 1  x 1.5 cm sclerotic lesion in the left iliac wing laterally to the sacroiliac joints. 7 mm sclerosis the left iliac lateral to the left SI joint. Sclerosis on the medial side of the right sacroiliac joints.   · 9/19/2017- initial oncology consultation.   · 9/29/2017 - PET CT scan revealed abnormal metabolic activity in the base the tongue on the right extending in the right palatine tonsil with an SUV of 8.8, this is consistent with neoplasm. No other regions of abnormal metabolic activity are identified.   · 9/29/2017- MRI of the orbit, face and neck protocol revealed large right oropharyngeal soft mass measuring 6.76 x 4.6 cm with involvement of the right side of the base of the tongue. Invasion of the superior margin of the right submandibular gland is noted. No bony involvement is noted. Scattered left jugular node measures up to 1.2 x 0.6 cm.   · 10/5/2017- Biopsy of the right superior tonsil and right base of tongue by Dr. Alber Justin. Pathology of the tonsil and tongue revealed invasive moderate differentiated squamous cell carcinoma positive for p16 by IHC. No lymph nodes were submitted.   · 10/5/2017- tracheostomy placement by Dr. Darin Neal.   · 10/6/2017- Dr. Jayne Phillips placed a left subclavian Port-A-Cath, and a PEG tube.   · 10/11/2017- Full dental extraction and direct laryngoscopy by Dr. Rylie Neal and Dr. Les Goddard.   · 10/16/2017- initiation of systemic chemotherapy with carboplatin AUC of 2 and Taxol 80 mg/m² weekly, anticipating dose reduction of Taxol to 40 mg/m² once radiation is added to treatment plan.   · 10/23/2017- appointment with Dr. Hadley Marie.   · 11/1/2017- colonoscopy performed by Dr. Patel revealed 1. Cecal polyp, severe diverticulosis throughout the colon, posterior anal fissure and internal hemorrhoids ×3, grade 2+. Pathology from polyp was negative for high-grade dysplasia   · 11/9/2017- radiation therapy initiated per Dr. Marie.    REVIEW OF SYSTEMS:    Constitutional:  no fever; positive for fatigue  HEENT: no blurring of vision, no double vision                                           Lungs: no wheeze; positive for cough  CVS: no palpitation, no chest pain  GI: no abdominal pain, no constipation; positive for mucositis  LUCAS: no dysuria, no hematuria  Musculoskeletal: no joint pain, swelling , stiffness  Endocrine: positive for DM  Hematology: positive for mild anemia and leukopenia  Dermatology: no skin rash, no eczema, no pruritis  Psychiatry: no depression, no anxiety,no panic attacks, no suicide ideation  Neurology: no seizures    OBJECTIVE:    Vitals:    11/22/17 0535   BP: 114/58   Pulse: 80   Resp: 18   Temp: 97.8 °F (36.6 °C)   SpO2: 97%       Intake/Output Summary (Last 24 hours) at 11/22/17 0740  Last data filed at 11/22/17 0659   Gross per 24 hour   Intake             3298 ml   Output                0 ml   Net             3298 ml     PHYSICAL EXAM:    CONSTITUTIONAL:   EYES: Non icteric  ENT: Viral lesions on the sides of the tongue - thrush looks better  NECK: Supple, no masses   CHEST/LUNGS: Mild coarse bilaterally which clears with cough some.  CARDIOVASCULAR: RRR, no murmurs  ABDOMEN: soft non-tender, active bowel sounds, no HSM, G-tube in place  EXTREMITIES: warm, moves all fours  SKIN: warm, dry with no rashes or lesions  LYMPH: No cervical, clavicular, axillary, or inguinal lymphadenopathy  NEUROLOGIC: follows commands, non focal   PSYCH: mood and affect appropriate     CBC    Results from last 7 days  Lab Units 11/22/17  0500 11/21/17  0630 11/20/17  0430   WBC 10*3/mm3 5.06 5.02 3.91*   HEMOGLOBIN g/dL 10.2* 10.2* 11.1*   HEMATOCRIT % 29.9* 30.1* 32.7*   PLATELETS 10*3/mm3 155 154 152         Lab Results   Component Value Date     11/22/2017    K 4.3 11/22/2017     11/22/2017    CO2 27.0 11/22/2017    BUN 16 11/22/2017    CREATININE 0.48 (L) 11/22/2017    GLUCOSE 59 (L) 11/22/2017    CALCIUM 9.0 11/22/2017    BILITOT 0.3 11/22/2017    ALKPHOS 140  (H) 11/22/2017    AST 38 11/22/2017    ALT 56 (H) 11/22/2017    GLOB 3.0 11/22/2017       Cultures:    Lab Results   Component Value Date    BLOODCX No growth at 2 days 11/19/2017     No components found for: URINCX    ASSESSMENT/PLAN:  1.  Mucositis.    He appears to have both bilateral and yeast components.  He is on   · IV Diflucan  · IV acyclovir  · Mycelex siddharth   · miracle mouthwash      2.  Squamous cell carcinoma of the oral cavity/base of the tongue.    Recommendations is to hold chemotherapy/radiation until mucositis significantly improves.    3.  Normocytic Normochromic Anemia  HGB - 10.2 - stable    It is okay from our standpoint for him to go home when he is stable.    He will keep his appointment for treatment in the office and reevaluation by Prachi SCHAEFFER on 11/30 at 12:30 PM.    Dr. Chau is covering the Thanksgiving holiday and weekend -- please call her if needed, otherwise we will see him again on Monday 11/27 if he is still in the hospital.    JEWEL Pacheco    11/22/17  7:40 AM

## 2017-11-24 LAB
BACTERIA SPEC AEROBE CULT: NORMAL
BACTERIA SPEC AEROBE CULT: NORMAL

## 2017-11-27 ENCOUNTER — HOSPITAL ENCOUNTER (OUTPATIENT)
Dept: RADIATION ONCOLOGY | Facility: HOSPITAL | Age: 62
Setting detail: RADIATION/ONCOLOGY SERIES
Discharge: HOME | End: 2017-11-27

## 2017-11-27 PROCEDURE — 77386: CPT | Performed by: RADIOLOGY

## 2017-11-28 ENCOUNTER — HOSPITAL ENCOUNTER (OUTPATIENT)
Dept: RADIATION ONCOLOGY | Facility: HOSPITAL | Age: 62
Setting detail: RADIATION/ONCOLOGY SERIES
Discharge: HOME | End: 2017-11-28

## 2017-11-28 PROCEDURE — 77386: CPT | Performed by: RADIOLOGY

## 2017-11-29 ENCOUNTER — NUTRITION (OUTPATIENT)
Dept: SPEECH THERAPY | Facility: HOSPITAL | Age: 62
End: 2017-11-29

## 2017-11-29 ENCOUNTER — HOSPITAL ENCOUNTER (OUTPATIENT)
Dept: RADIATION ONCOLOGY | Facility: HOSPITAL | Age: 62
Setting detail: RADIATION/ONCOLOGY SERIES
Discharge: HOME | End: 2017-11-29

## 2017-11-29 ENCOUNTER — TREATMENT (OUTPATIENT)
Dept: SPEECH THERAPY | Facility: HOSPITAL | Age: 62
End: 2017-11-29

## 2017-11-29 VITALS — HEIGHT: 66 IN

## 2017-11-29 DIAGNOSIS — R13.12 OROPHARYNGEAL DYSPHAGIA: Primary | ICD-10-CM

## 2017-11-29 PROCEDURE — 92526 ORAL FUNCTION THERAPY: CPT | Performed by: SPEECH-LANGUAGE PATHOLOGIST

## 2017-11-29 PROCEDURE — 77386: CPT | Performed by: RADIOLOGY

## 2017-11-29 NOTE — THERAPY TREATMENT NOTE
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 Oropharyngeal dysphagia R13.12 787.22                               SLP OP Goals       17 1500       Dysphagia Goals    Dysphagia LTG's Patient will safely consume the recommended diet without complications such as aspiration pneumonia  -KW     Other Goals    Other Adult Goal- 1 Pt will tolerate a pureed diet with thin liquids during and after completion of radiation treatments with no s/s of aspiration.  -KW     Status: Other Adult Goal- 1 Progressing as expected  -KW     Comments: Other Adult Goal- 1 No overt cough with water but severa minutes later had strong cough.  Voice remianed clear.  -KW     Other Adult Goal- 2 Pt will maintain adequate nutrition and hydration via PO during and after radiation treatment.  -KW     Status: Other Adult Goal- 2 Progressing as expected  -KW     Comments: Other Adult Goal- 2 Up 1 lb  -KW     Other Adult Goal- 3 Pt will complete swallow exercises 3x a day with 90% accuracy.  -KW     Status: Other Adult Goal- 3 Progressing as expected  -KW     Comments: Other Adult Goal- 3 Decreased elevation and timing with mendelson.  Did have good ROM for tongue.  -KW     Other Adult Goal- 4 Pt will continue to be assessed and education completed on effects of radiation treatments as warranted up until 2 months post treatment.  -KW     Status: Other Adult Goal- 4 Progressing as expected  -KW     Comments: Other Adult Goal- 4  Educated on exercises and diet..  Patient having some taste cchanges so mostly just doing PEG tube.    -KW       User Key  (r) = Recorded By, (t) = Taken By, (c) = Cosigned By    Initials Name Provider Type    DAGOBERTO Johnson MS CCC-SLP Speech and Language Pathologist                OP SLP Education       11/29/17 1606    Education    Barriers to Learning Decreased comprehension  -KW    Education Provided Patient requires further education on strategies, risks  -KW    Assessed Learning needs;Learning motivation;Learning preferences;Learning readiness  -    Learning Motivation Moderate  -KW    Learning Method Explanation  -KW    Teaching Response Reinforcement needed  -KW    Education Comments see flow sheet data  -KW      User Key  (r) = Recorded By, (t) = Taken By, (c) = Cosigned By    Initials Name Effective Dates    DAGOBERTO Johnson MS CCC-SLP 08/02/16 -                 OP SLP Assessment/Plan - 11/29/17 1604     SLP Assessment    Clinical Impression Comments progressing as expected  -KW    SLP Plan    Plan Comments Continue to follow during radiation treatment.  -KW      User Key  (r) = Recorded By, (t) = Taken By, (c) = Cosigned By    Initials Name Provider Type    DAGOBERTO Johnson MS CCC-SLP Speech and Language Pathologist                Time Calculation:   SLP Start Time: 1500  SLP Stop Time: 1530  SLP Time Calculation (min): 30 min                 Maribel Johnson MS CCC-SLP  11/29/2017

## 2017-11-30 ENCOUNTER — APPOINTMENT (OUTPATIENT)
Dept: SPEECH THERAPY | Facility: HOSPITAL | Age: 62
End: 2017-11-30

## 2017-11-30 ENCOUNTER — APPOINTMENT (OUTPATIENT)
Dept: RADIATION ONCOLOGY | Facility: HOSPITAL | Age: 62
End: 2017-11-30

## 2017-11-30 ENCOUNTER — HOSPITAL ENCOUNTER (OUTPATIENT)
Dept: RADIATION ONCOLOGY | Facility: HOSPITAL | Age: 62
Discharge: HOME | End: 2017-11-30

## 2017-11-30 PROCEDURE — 77386: CPT | Performed by: RADIOLOGY

## 2017-11-30 PROCEDURE — 77336 RADIATION PHYSICS CONSULT: CPT | Performed by: RADIOLOGY

## 2017-12-01 ENCOUNTER — HOSPITAL ENCOUNTER (OUTPATIENT)
Dept: RADIATION ONCOLOGY | Facility: HOSPITAL | Age: 62
Setting detail: RADIATION/ONCOLOGY SERIES
Discharge: HOME | End: 2017-12-01

## 2017-12-01 PROCEDURE — 77386: CPT | Performed by: RADIOLOGY

## 2017-12-04 ENCOUNTER — HOSPITAL ENCOUNTER (OUTPATIENT)
Dept: RADIATION ONCOLOGY | Facility: HOSPITAL | Age: 62
Setting detail: RADIATION/ONCOLOGY SERIES
End: 2017-12-04

## 2017-12-04 ENCOUNTER — APPOINTMENT (OUTPATIENT)
Dept: GENERAL RADIOLOGY | Facility: HOSPITAL | Age: 62
End: 2017-12-04

## 2017-12-04 ENCOUNTER — HOSPITAL ENCOUNTER (OUTPATIENT)
Dept: RADIATION ONCOLOGY | Facility: HOSPITAL | Age: 62
Setting detail: RADIATION/ONCOLOGY SERIES
Discharge: HOME | End: 2017-12-04

## 2017-12-04 PROBLEM — I24.9 ACS (ACUTE CORONARY SYNDROME) (HCC): Status: ACTIVE | Noted: 2017-12-04

## 2017-12-04 PROBLEM — I10 HTN (HYPERTENSION): Status: ACTIVE | Noted: 2017-12-04

## 2017-12-04 PROCEDURE — 71020 HC CHEST PA AND LATERAL: CPT

## 2017-12-04 PROCEDURE — 77386: CPT | Performed by: RADIOLOGY

## 2017-12-05 ENCOUNTER — HOSPITAL ENCOUNTER (OUTPATIENT)
Dept: RADIATION ONCOLOGY | Facility: HOSPITAL | Age: 62
Setting detail: RADIATION/ONCOLOGY SERIES
Discharge: HOME | End: 2017-12-05

## 2017-12-05 PROCEDURE — 77386: CPT | Performed by: RADIOLOGY

## 2017-12-06 ENCOUNTER — APPOINTMENT (OUTPATIENT)
Dept: SPEECH THERAPY | Facility: HOSPITAL | Age: 62
End: 2017-12-06

## 2017-12-06 ENCOUNTER — HOSPITAL ENCOUNTER (OUTPATIENT)
Dept: RADIATION ONCOLOGY | Facility: HOSPITAL | Age: 62
Setting detail: RADIATION/ONCOLOGY SERIES
Discharge: HOME | End: 2017-12-06

## 2017-12-06 PROCEDURE — 77336 RADIATION PHYSICS CONSULT: CPT | Performed by: RADIOLOGY

## 2017-12-06 PROCEDURE — 77386: CPT | Performed by: RADIOLOGY

## 2017-12-07 ENCOUNTER — APPOINTMENT (OUTPATIENT)
Dept: SPEECH THERAPY | Facility: HOSPITAL | Age: 62
End: 2017-12-07

## 2017-12-07 ENCOUNTER — HOSPITAL ENCOUNTER (OUTPATIENT)
Dept: RADIATION ONCOLOGY | Facility: HOSPITAL | Age: 62
Setting detail: RADIATION/ONCOLOGY SERIES
Discharge: HOME | End: 2017-12-07

## 2017-12-07 PROCEDURE — 77386: CPT | Performed by: RADIOLOGY

## 2017-12-08 ENCOUNTER — APPOINTMENT (OUTPATIENT)
Dept: RADIATION ONCOLOGY | Facility: HOSPITAL | Age: 62
End: 2017-12-08

## 2017-12-08 ENCOUNTER — TREATMENT (OUTPATIENT)
Dept: SPEECH THERAPY | Facility: HOSPITAL | Age: 62
End: 2017-12-08

## 2017-12-08 ENCOUNTER — OFFICE VISIT (OUTPATIENT)
Dept: SPEECH THERAPY | Facility: HOSPITAL | Age: 62
End: 2017-12-08

## 2017-12-08 ENCOUNTER — HOSPITAL ENCOUNTER (OUTPATIENT)
Dept: RADIATION ONCOLOGY | Facility: HOSPITAL | Age: 62
Discharge: HOME | End: 2017-12-08

## 2017-12-08 DIAGNOSIS — R13.12 OROPHARYNGEAL DYSPHAGIA: Primary | ICD-10-CM

## 2017-12-08 DIAGNOSIS — R13.12 DYSPHAGIA, OROPHARYNGEAL: Primary | ICD-10-CM

## 2017-12-08 DIAGNOSIS — C10.9 CARCINOMA OF OROPHARYNX (HCC): ICD-10-CM

## 2017-12-08 PROCEDURE — 77386: CPT | Performed by: RADIOLOGY

## 2017-12-08 PROCEDURE — 92526 ORAL FUNCTION THERAPY: CPT

## 2017-12-08 NOTE — THERAPY TREATMENT NOTE
Outpatient Speech Language Pathology   Adult Swallow Treatment Note       Patient Name: Sameer Tavarez  : 1955  MRN: [id number]  Today's Date: 2017         Visit Date: 2017   Patient Active Problem List   Diagnosis   • Oropharyngeal cancer   • Current smoker   • Perineal mass in male   • Recio's esophagus with dysplasia   • Tracheostomy dependence   • Postoperative pain   • S/P percutaneous endoscopic gastrostomy (PEG) tube placement   • Constipation, acute   • Pain, rectal   • Oropharyngeal candidiasis   • ACS (acute coronary syndrome)   • HTN (hypertension)        Visit Dx:    ICD-10-CM ICD-9-CM   1. Oropharyngeal dysphagia R13.12 787.22                               SLP OP Goals       17 1119 17 0813    Goal Type Needed    Goal Type Needed  Dysphagia  -CS    Subjective Comments    Subjective Comments  Alert and cooperative  -CS    Dysphagia Goals    Dysphagia LTG's  Patient will safely consume the recommended diet without complications such as aspiration pneumonia  -CS    Other Goals    Other Adult Goal- 1  Pt will tolerate a pureed diet with thin liquids during and after completion of radiation treatments with no s/s of aspiration.  -CS    Status: Other Adult Goal- 1  Progressing as expected  -CS    Comments: Other Adult Goal- 1  Pt explains he is not currently eating any solid consistencies just completing PO water and coffee as he does not like the taste of food at this time . He also explains that a majority of PO solids completed were causing lingual irritation. He completed 3x trial of thin coffee w/o any overt s/s of aspiration and his voice remained unchanged. However, ST cannot r/o silent aspiration.  -CS    Other Adult Goal- 2  Pt will maintain adequate nutrition and hydration via PO during and after radiation treatment.  -CS    Status: Other Adult Goal- 2  Progressing as expected  -CS    Comments: Other Adult Goal- 2  Slight weight loss noted this week.  -CS    Other  Adult Goal- 3  Pt will complete swallow exercises 3x a day with 90% accuracy.  -CS    Status: Other Adult Goal- 3  Progressing as expected  -CS    Comments: Other Adult Goal- 3  Tongue ROM remains adequate. Moderate cues to initiate effortful swallow with decreased laryngeal elevation noted.  -CS    Other Adult Goal- 4  Pt will continue to be assessed and education completed on effects of radiation treatments as warranted up until 2 months post treatment.  -CS    Status: Other Adult Goal- 4  Progressing as expected  -CS    Comments: Other Adult Goal- 4  Educated on VFSS order to objectively assess swallow function as he was noted to experience a incident of pneumonia this past week.  -CS    SLP Time Calculation    SLP Goal Re-Cert Due Date 12/18/17  -CS 02/09/18  -CS      User Key  (r) = Recorded By, (t) = Taken By, (c) = Cosigned By    Initials Name Provider Type    MALINDA Carr MS CCC-SLP Speech and Language Pathologist                OP SLP Education       12/08/17 1119    Education    Barriers to Learning Decreased comprehension  -CS    Education Provided Patient requires further education on strategies, risks  -CS    Assessed Learning needs;Learning motivation;Learning preferences;Learning readiness  -CS    Learning Motivation Moderate  -CS    Learning Method Explanation  -CS    Teaching Response Verbalized understanding  -CS    Education Comments See flow sheet data.  -CS      User Key  (r) = Recorded By, (t) = Taken By, (c) = Cosigned By    Initials Name Effective Dates    MALINDA Carr MS CCC-SLP 06/28/17 -                 OP SLP Assessment/Plan - 12/08/17 1118     SLP Assessment    Clinical Impression Comments Progressing as expected.  -CS    SLP Plan    Plan Comments SLP will follow next week and complete a VFSS to objectively assess swallow function and r/o aspiration.  -CS      User Key  (r) = Recorded By, (t) = Taken By, (c) = Cosigned By    Initials Name Provider Type    MALINDA Carr MS  CCC-SLP Speech and Language Pathologist                Time Calculation:   SLP Start Time: 0813  SLP Stop Time: 0845  SLP Time Calculation (min): 32 min                 Cash Carr MS CCC-SLP  12/8/2017

## 2017-12-08 NOTE — PROGRESS NOTES
[facility]  MD Chayo Lopez, DERIAN, ERICKA  ___________________________________________________________  [facility]  Department of Radiation Oncology  [address] [place] [id number]  Office:  [phone]  Fax: [phone]      Mr. Tavarez is currently under chemoradiation treatments and was evaluated today by Speech Therapist, they requested a dysphagia study today to evaluate for aspiration. Order placed.        Chayo Taylor PA-C

## 2017-12-08 NOTE — PROGRESS NOTES
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  Pt reports his tongue has been burning, altered taste however pt reports he is able to taste his dark roast coffee. Pt primarily uses peg tube for sole source of nutrition at this time. Pt is hopeful he can get his peg tube taken out soon after his radiation is complete. Pt is using Glucerna 1.2 6-7 cans per day per peg tube, one ensure enlive daily and reports he is also using Apple juice per peg tube. Encouraged pt to flush his tube before and after feedings for adequate hydration and keeping tube clean. Pt report he recently visited Urgent care this week and was diagnosed with pneumonia. Pt reports he experienced chills and overall fatigue with this. Pt has had weight loss this past week of 7.6 lbs. Patient ht is 66 inches, weight 137.2 lbs BMI 22. Cont to encourage oral intake as tolerated. Pt is compliant with tube feeds and flushes. Cont to benefit from enteral ntn at this time. Cont to follow while undergoing radiation therapy.    Electronically signed by:  Tessy Zuluaga MS,RDN,LD  12/08/17 12:12 PM

## 2017-12-11 ENCOUNTER — HOSPITAL ENCOUNTER (OUTPATIENT)
Dept: RADIATION ONCOLOGY | Facility: HOSPITAL | Age: 62
Setting detail: RADIATION/ONCOLOGY SERIES
Discharge: HOME | End: 2017-12-11

## 2017-12-11 PROCEDURE — 77386: CPT | Performed by: RADIOLOGY

## 2017-12-11 RX ORDER — METHYLPREDNISOLONE 4 MG/1
TABLET ORAL
Qty: 1 EACH | Refills: 0 | Status: SHIPPED | OUTPATIENT
Start: 2017-12-11 | End: 2018-01-18

## 2017-12-12 ENCOUNTER — HOSPITAL ENCOUNTER (OUTPATIENT)
Dept: RADIATION ONCOLOGY | Facility: HOSPITAL | Age: 62
Setting detail: RADIATION/ONCOLOGY SERIES
Discharge: HOME | End: 2017-12-12

## 2017-12-12 ENCOUNTER — HOSPITAL ENCOUNTER (OUTPATIENT)
Dept: GENERAL RADIOLOGY | Facility: HOSPITAL | Age: 62
Discharge: HOME | End: 2017-12-12
Admitting: PHYSICIAN ASSISTANT

## 2017-12-12 DIAGNOSIS — C10.9 CARCINOMA OF OROPHARYNX (HCC): ICD-10-CM

## 2017-12-12 DIAGNOSIS — R13.12 DYSPHAGIA, OROPHARYNGEAL: ICD-10-CM

## 2017-12-12 PROCEDURE — 92611 MOTION FLUOROSCOPY/SWALLOW: CPT | Performed by: SPEECH-LANGUAGE PATHOLOGIST

## 2017-12-12 PROCEDURE — 77386: CPT | Performed by: RADIOLOGY

## 2017-12-12 PROCEDURE — G8997 SWALLOW GOAL STATUS: HCPCS | Performed by: SPEECH-LANGUAGE PATHOLOGIST

## 2017-12-12 PROCEDURE — 63710000001 BARIUM SULFATE 40 % RECONSTITUTED SUSPENSION: Performed by: PHYSICIAN ASSISTANT

## 2017-12-12 PROCEDURE — 63710000001 BARIUM SULFATE 40 % SUSPENSION: Performed by: PHYSICIAN ASSISTANT

## 2017-12-12 PROCEDURE — 63710000001 BARIUM SULFATE 40 % PASTE: Performed by: PHYSICIAN ASSISTANT

## 2017-12-12 PROCEDURE — A9270 NON-COVERED ITEM OR SERVICE: HCPCS | Performed by: PHYSICIAN ASSISTANT

## 2017-12-12 PROCEDURE — 74230 X-RAY XM SWLNG FUNCJ C+: CPT

## 2017-12-12 PROCEDURE — G8998 SWALLOW D/C STATUS: HCPCS | Performed by: SPEECH-LANGUAGE PATHOLOGIST

## 2017-12-12 PROCEDURE — G8996 SWALLOW CURRENT STATUS: HCPCS | Performed by: SPEECH-LANGUAGE PATHOLOGIST

## 2017-12-12 RX ADMIN — BARIUM SULFATE 20 ML: 400 PASTE ORAL at 12:40

## 2017-12-12 RX ADMIN — BARIUM SULFATE 20 ML: 400 SUSPENSION ORAL at 12:40

## 2017-12-12 RX ADMIN — BARIUM SULFATE 20 ML: 0.81 POWDER, FOR SUSPENSION ORAL at 12:40

## 2017-12-12 NOTE — MBS/VFSS/FEES
Outpatient Speech Language Pathology   Adult Swallow Dysphagia Study  [facility]     Patient Name: Sameer Tavarez  : 1955  MRN: [id number]  Today's Date: 2017         Visit Date: 2017   SPEECH-LANGUAGE PATHOLOGY EVALUTION - VFSS  Subjective: The patient was seen on this date for a VFSS(Videofluoroscopic Swallowing Study).  Patient was alert and cooperative.    Significant history: Head and Neck Cancer receiving radiation treatment.  He has trach tube.  He has had recent pneumonia, therefor SLP who sees patient weekly during treatment requested dysphagia study.   Objective: Risks/benefits were reviewed with the patient, and consent was obtained. The study was completed with SLP and Radiologist present. The patient was seen in lateral view(s). Textures given included thin liquid, nectar thick liquid, honey thick liquid, puree consistency and mechanical soft consistency.  Assessment: Difficulties were noted with thin liquid, characterized by decreased laryngeal elevation and epiglottic inversion and premature loss to the pyriforms.  This resulted in penetration to the level of the vocal cords and moderate residue throughout pharyngeal area.  Patient unable to clear out penetrated material and had coating of the vocal cords.  Cued to cough which was successful.  Attempted trials with chin tuck, however this did not decrease risk or amount of penetration.  All other trails resulted in decreased laryngeal elevation and epiglottic inversion.  Premature loss to pyriforms was also noted with nectar, however no penetration or aspiration was seen.  Mild to moderate residue remained on base of tongue and in valleculae with all consistencies.  Did not try solids due to no dentures and patient request.   SLP Findings: Patient presents with moderate oropharyngeal dysphagia.   Comments: Results were discussed with patient.  He is mainly just taking soups and coffee by mouth, everything else is via PEG.  I gave  patient starter pack of nectar thick and educated on how and when to use.  Discussed allowing water between and allowing creamy soups with no thickener.    Recommendations: Diet Textures: nectar thick liquid, mechanical soft consistency food. Medications should be taken crushed with thickened liquids. May have water and Ice between meals after oral care.  Recommended Strategies: Upright for PO and small bites and sips. Oral care before breakfast, after all meals and PRN.  Other Recommended Evaluations: N/A    Dysphagia therapy is recommended. Continue to follow during radiation treatment.  Maribel Johnson MS CCC-SLP 12/12/2017 2:22 PM        Patient Active Problem List   Diagnosis   • Oropharyngeal cancer   • Current smoker   • Perineal mass in male   • Recio's esophagus with dysplasia   • Tracheostomy dependence   • Postoperative pain   • S/P percutaneous endoscopic gastrostomy (PEG) tube placement   • Constipation, acute   • Pain, rectal   • Oropharyngeal candidiasis   • ACS (acute coronary syndrome)   • HTN (hypertension)        Past Medical History:   Diagnosis Date   • Arthritis    • Coronary artery disease    • Depression    • Diabetes mellitus    • Enlarged prostate    • GERD (gastroesophageal reflux disease)    • History of transfusion    • Hypertension    • Oropharyngeal cancer 08/27/2017   • Seizure     diabetic   • Severe esophageal dysplasia    • Stroke    • TB (pulmonary tuberculosis) 2004        Past Surgical History:   Procedure Laterality Date   • CARDIAC SURGERY     • CHOLECYSTECTOMY     • CORONARY ARTERY BYPASS GRAFT     • FRACTURE SURGERY     • GTUBE REPLACEMENT N/A 10/6/2017    Procedure: GASTROSTOMY TUBE REPLACEMENT, port placement;  Surgeon: Jayne Phillips MD;  Location: [facility] OR;  Service:    • HERNIA REPAIR     • LARYNGOSCOPY N/A 10/11/2017    Procedure: DIRECT LARYNGOSCOPY WITH BIOPSY;  Surgeon: Darin Neal MD;  Location: [facility] OR;  Service:    • NISSEN FUNDOPLICATION  LAPAROSCOPIC     • LA DENTAL SURGERY PROCEDURE N/A 10/11/2017    Procedure: TOOTH EXTRACTION;  Surgeon: Darin Neal MD;  Location:  [facility] OR;  Service: ENT   • LA INSJ TUNNELED CVC W/O SUBQ PORT/ AGE 5 YR/> Left 10/6/2017    Procedure: INSERTION VENOUS ACCESS DEVICE;  Surgeon: Jayne Phillips MD;  Location:  [facility] OR;  Service: General   • SKIN BIOPSY     • TESTICLE SURGERY      tubes implanted for drainage   • TONSILLECTOMY     • TRACHEOSTOMY N/A 10/5/2017    Procedure: Awake tracheostomy; DIRECT LARYNGOSCOPY WITH BIOPSY;  Surgeon: Alber Justin MD;  Location:  [facility] OR;  Service:          Visit Dx:     ICD-10-CM ICD-9-CM   1. Dysphagia, oropharyngeal R13.12 787.22   2. Carcinoma of oropharynx C10.9 146.9                   Adult Dysphagia - 12/12/17 1230     Adult Dysphagia Background    Patient Description of Complaint Undergoing radiation treatment for Head and Neck cancer.  Has trach tube.  Is being seen weekly by SLP after radiation treatments.  Has had recent pneomonia, therefore SLP recommended dysphagia study to r/o aspiration.  -KW    Symptoms Reported by Patient Coughing  -KW    Patient Report of Functional Impact Patient reports cough a few times but nothing consistent.  -KW    History Pertinent to Diagnosis See above  -KW    Diet Level (Liquids) Thin Liquid  -KW    Non-Oral Feeding Gastrostomy Tube  -KW    Oral Mech    Position During Evaluation Upright  -KW    Anatomy/Physiology WNL Patient demonstrates anatomy and physiology that is WNL  -KW    Dentition Patient is edentulous  -KW    Oral Health Oral cavity is clean  -KW    Awareness/Control of Secretions Patient swallows saliva  -KW    VFSS Exam    Study Completed By SLP and Radiologist (comment)  -KW    Textures Presented Thin;Nectar;Honey;Pudding;Other (comment)   Mech Soft  -KW    Position During Study/Views Obtained Upright;Lateral View  -KW    Physiologic Impairments Noted on VFSS    Physiologic Impairments Noted on VFSS X  -KW     Reduced Tongue Base Retraction all  -KW    Mistiming thin and nectar  -KW    Reduced Laryngeal Elevation all  -KW    Symptoms    Residue Noted X  -KW    Valleculae With these consistencies (comment)   all  -KW    Compensatory Strategies X  -KW    Compensatory Strategies Used Chin tuck did not help  -KW    Results/Recs/Barriers/Education for Dysphagia    Factors Affecting Performance no difficulty participating in study  -KW    Learning Motivation strong  -KW    Education/Learning Comments Patient aware of results of study and recommendations.  -KW    Clinical Impression: Swallowing Moderate:;oropharyngeal phase dysphagia  -KW    Impact on Function risk for aspiration;risk for pneumonia  -KW    Recommendations for Diet/Nutirition alternative nutrition but with some oral intake as per below;water or ice chips between meals  -KW    Swallowing Precautions upright position for at least 30 minutes after meals;small sips and bites when eating  -KW      User Key  (r) = Recorded By, (t) = Taken By, (c) = Cosigned By    Initials Name Provider Type    DAGOBERTO Johnson MS CCC-SLP Speech and Language Pathologist                                    OP SLP Assessment/Plan - 12/12/17 1230     SLP Assessment    Clinical Impression: Swallowing Moderate:;oropharyngeal phase dysphagia  -KW      User Key  (r) = Recorded By, (t) = Taken By, (c) = Cosigned By    Initials Name Provider Type    DAGOBERTO Johnson MS CCC-SLP Speech and Language Pathologist                    Time Calculation:   SLP Start Time: 1230  SLP Stop Time: 1400  SLP Time Calculation (min): 90 min    Therapy Charges for Today     Code Description Service Date Service Provider Modifiers Qty    32254515502 HC ST SWALLOWING CURRENT STATUS 12/12/2017 Maribel Johnson MS CCC-SLP GN, CK 1    01297996675 HC ST SWALLOWING PROJECTED 12/12/2017 Maribel Johnson MS CCC-SLP GN, CK 1    22592615466 HC ST SWALLOWING DISCHARGE 12/12/2017 Maribel Johnson MS CCC-RADHA TORRES CK 1     [id number]  ST MOTION FLUORO EVAL SWALLOW 6 12/12/2017 Maribel Johnson MS CCC-SLP GN 1          SLP G-Codes  SLP NOMS Used?: Yes  Functional Limitations: Swallowing  Swallow Current Status (): At least 40 percent but less than 60 percent impaired, limited or restricted  Swallow Goal Status (): At least 40 percent but less than 60 percent impaired, limited or restricted  Swallow Discharge Status (): At least 40 percent but less than 60 percent impaired, limited or restricted        Maribel Johnson MS CCC-SLP  12/12/2017

## 2017-12-13 ENCOUNTER — TREATMENT (OUTPATIENT)
Dept: SPEECH THERAPY | Facility: HOSPITAL | Age: 62
End: 2017-12-13

## 2017-12-13 ENCOUNTER — HOSPITAL ENCOUNTER (OUTPATIENT)
Dept: RADIATION ONCOLOGY | Facility: HOSPITAL | Age: 62
Setting detail: RADIATION/ONCOLOGY SERIES
Discharge: HOME | End: 2017-12-13

## 2017-12-13 DIAGNOSIS — R13.12 OROPHARYNGEAL DYSPHAGIA: Primary | ICD-10-CM

## 2017-12-13 PROCEDURE — G8997 SWALLOW GOAL STATUS: HCPCS | Performed by: SPEECH-LANGUAGE PATHOLOGIST

## 2017-12-13 PROCEDURE — 77386: CPT | Performed by: RADIOLOGY

## 2017-12-13 PROCEDURE — 92526 ORAL FUNCTION THERAPY: CPT | Performed by: SPEECH-LANGUAGE PATHOLOGIST

## 2017-12-13 PROCEDURE — G8996 SWALLOW CURRENT STATUS: HCPCS | Performed by: SPEECH-LANGUAGE PATHOLOGIST

## 2017-12-13 NOTE — THERAPY TREATMENT NOTE
Outpatient Speech Language Pathology   Adult Swallow Progress Note       Patient Name: Sameer Tavarez  : 1955  MRN: [id number]  Today's Date: 2017         Visit Date: 2017   Patient Active Problem List   Diagnosis   • Oropharyngeal cancer   • Current smoker   • Perineal mass in male   • Recio's esophagus with dysplasia   • Tracheostomy dependence   • Postoperative pain   • S/P percutaneous endoscopic gastrostomy (PEG) tube placement   • Constipation, acute   • Pain, rectal   • Oropharyngeal candidiasis   • ACS (acute coronary syndrome)   • HTN (hypertension)        Visit Dx:    ICD-10-CM ICD-9-CM   1. Oropharyngeal dysphagia R13.12 787.22                               SLP OP Goals       17 1300       Dysphagia Goals    Dysphagia LTG's Patient will safely consume the recommended diet without complications such as aspiration pneumonia  -KW     Status: Patient will safely consume the recommended diet without complications such as aspiration pneumonia Progressing as expected  -KW     Other Goals    Other Adult Goal- 1 Pt will tolerate a pureed diet with thin liquids during and after completion of radiation treatments with no s/s of aspiration.  -KW     Status: Other Adult Goal- 1 Progressing as expected  -KW     Comments: Other Adult Goal- 1 Patient had MBS yesterday that showed coating of vocal cords with thin liquids.  Patinet given thickener nectar thick.  He came in to session drinking regular coffee.  States he tried gel thickener but did not like.  Gave patient packets of powder thickener as well as instant thick coffee.  He drinks 2 pots of coffee a day.  Encouraged to trade out with water.   -KW     Other Adult Goal- 2 Pt will maintain adequate nutrition and hydration via PO during and after radiation treatment.  -KW     Status: Other Adult Goal- 2 Progressing as expected  -KW     Comments: Other Adult Goal- 2 Just recently sarting using tube 3x/day.  -KW     Other Adult Goal- 3 Pt  will complete swallow exercises 3x a day with 90% accuracy.  -KW     Status: Other Adult Goal- 3 Progressing as expected  -KW     Comments: Other Adult Goal- 3 Completing at home.  -KW     Other Adult Goal- 4 Pt will continue to be assessed and education completed on effects of radiation treatments as warranted up until 2 months post treatment.  -KW     Status: Other Adult Goal- 4 Progressing as expected  -KW     Comments: Other Adult Goal- 4 Thickened coffee for patient with powder thickener.  Educated on how to thicken, how to purchase powder thickener.  Patient also discussed his concers with trach tube needing changed.  Had RN come see patient.  Encouraged to discuss with ENT.  He reports he puts in new inner cannual 2x/day.  He reports having PMV on some when sleeping.  Discussed precautions with PMV including having off when sleeping.  -KW       User Key  (r) = Recorded By, (t) = Taken By, (c) = Cosigned By    Initials Name Provider Type    DAGOBERTO Johnson MS CCC-SLP Speech and Language Pathologist                OP SLP Education       12/13/17 8789    Education    Barriers to Learning No barriers identified  -KW    Education Provided Patient requires further education on strategies, risks  -KW    Assessed Learning needs;Learning motivation;Learning preferences;Learning readiness  -KW    Learning Motivation Moderate  -KW    Learning Method Explanation  -KW    Teaching Response Verbalized understanding  -KW    Education Comments see flowsheet data  -KW      User Key  (r) = Recorded By, (t) = Taken By, (c) = Cosigned By    Initials Name Effective Dates    DAGOBERTO Johnson MS CCC-SLP 08/02/16 -                 OP SLP Assessment/Plan - 12/13/17 1440     SLP Assessment    Functional Problems Swallowing  -KW    Impact on Function: Swallowing Risk of aspiration;Risk of pneumonia  -KW    Clinical Impression: Swallowing oropharyngeal phase dysphagia;Moderate:  -KW    Clinical Impression Comments progressing as  expected  -KW    Prognosis Good (comment)  -KW    Patient/caregiver participated in establishment of treatment plan and goals Yes  -KW    Patient would benefit from skilled therapy intervention Yes  -KW    SLP Plan    Frequency 1x/week  -KW    Duration 2-4 months  -KW    Planned CPT's? SLP SWALLOW THERAPY: 21926;SLP MBS: 77361  -KW    Expected Duration Therapy Session (min) 15-30 minutes  -KW    Plan Comments continue to follow  -KW      User Key  (r) = Recorded By, (t) = Taken By, (c) = Cosigned By    Initials Name Provider Type    DAGOBERTO Johnson MS CCC-SLP Speech and Language Pathologist                Time Calculation:   SLP Start Time: 1300  SLP Stop Time: 1345  SLP Time Calculation (min): 45 min        SLP G-Codes  SLP NOMS Used?: Yes  Swallow Current Status (): At least 40 percent but less than 60 percent impaired, limited or restricted  Swallow Goal Status (): At least 1 percent but less than 20 percent impaired, limited or restricted        Maribel Johnson MS CCC-SLP  12/13/2017

## 2017-12-14 ENCOUNTER — APPOINTMENT (OUTPATIENT)
Dept: RADIATION ONCOLOGY | Facility: HOSPITAL | Age: 62
End: 2017-12-14

## 2017-12-14 PROCEDURE — 77336 RADIATION PHYSICS CONSULT: CPT | Performed by: RADIOLOGY

## 2017-12-14 PROCEDURE — 77386: CPT | Performed by: RADIOLOGY

## 2017-12-15 ENCOUNTER — HOSPITAL ENCOUNTER (OUTPATIENT)
Dept: RADIATION ONCOLOGY | Facility: HOSPITAL | Age: 62
Setting detail: RADIATION/ONCOLOGY SERIES
Discharge: HOME | End: 2017-12-15

## 2017-12-15 PROCEDURE — 77386: CPT | Performed by: RADIOLOGY

## 2017-12-18 ENCOUNTER — HOSPITAL ENCOUNTER (OUTPATIENT)
Dept: RADIATION ONCOLOGY | Facility: HOSPITAL | Age: 62
Setting detail: RADIATION/ONCOLOGY SERIES
End: 2017-12-18

## 2017-12-19 ENCOUNTER — APPOINTMENT (OUTPATIENT)
Dept: RADIATION ONCOLOGY | Facility: HOSPITAL | Age: 62
End: 2017-12-19

## 2017-12-20 ENCOUNTER — APPOINTMENT (OUTPATIENT)
Dept: SPEECH THERAPY | Facility: HOSPITAL | Age: 62
End: 2017-12-20

## 2017-12-20 ENCOUNTER — APPOINTMENT (OUTPATIENT)
Dept: RADIATION ONCOLOGY | Facility: HOSPITAL | Age: 62
End: 2017-12-20

## 2017-12-21 ENCOUNTER — APPOINTMENT (OUTPATIENT)
Dept: RADIATION ONCOLOGY | Facility: HOSPITAL | Age: 62
End: 2017-12-21

## 2017-12-26 ENCOUNTER — HOSPITAL ENCOUNTER (OUTPATIENT)
Dept: RADIATION ONCOLOGY | Facility: HOSPITAL | Age: 62
Setting detail: RADIATION/ONCOLOGY SERIES
Discharge: HOME | End: 2017-12-26

## 2017-12-26 PROCEDURE — 77300 RADIATION THERAPY DOSE PLAN: CPT | Performed by: RADIOLOGY

## 2017-12-26 PROCEDURE — 77386: CPT | Performed by: RADIOLOGY

## 2017-12-27 ENCOUNTER — OFFICE VISIT (OUTPATIENT)
Dept: SPEECH THERAPY | Facility: HOSPITAL | Age: 62
End: 2017-12-27

## 2017-12-27 ENCOUNTER — HOSPITAL ENCOUNTER (OUTPATIENT)
Dept: RADIATION ONCOLOGY | Facility: HOSPITAL | Age: 62
Setting detail: RADIATION/ONCOLOGY SERIES
Discharge: HOME | End: 2017-12-27

## 2017-12-27 ENCOUNTER — TREATMENT (OUTPATIENT)
Dept: SPEECH THERAPY | Facility: HOSPITAL | Age: 62
End: 2017-12-27

## 2017-12-27 DIAGNOSIS — R13.12 OROPHARYNGEAL DYSPHAGIA: Primary | ICD-10-CM

## 2017-12-27 PROCEDURE — 77386: CPT | Performed by: RADIOLOGY

## 2017-12-27 PROCEDURE — 77300 RADIATION THERAPY DOSE PLAN: CPT | Performed by: RADIOLOGY

## 2017-12-27 PROCEDURE — 92526 ORAL FUNCTION THERAPY: CPT | Performed by: SPEECH-LANGUAGE PATHOLOGIST

## 2017-12-27 NOTE — THERAPY TREATMENT NOTE
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 Oropharyngeal dysphagia R13.12 787.22                               SLP OP Goals       17 1330       Subjective Pain    Able to rate subjective pain? yes  -KW     Pre-Treatment Pain Level 0  -KW     Dysphagia Goals    Dysphagia LTG's Patient will safely consume the recommended diet without complications such as aspiration pneumonia  -KW     Status: Patient will safely consume the recommended diet without complications such as aspiration pneumonia Progressing as expected  -KW     Other Goals    Other Adult Goal- 1 Pt will tolerate a pureed diet with thin liquids during and after completion of radiation treatments with no s/s of aspiration.  -KW     Status: Other Adult Goal- 1 Progressing as expected  -KW     Comments: Other Adult Goal- 1 Was in hospital last week with pneumonia.  Discussed how this is likelly to continue as long a patient continues to drink thin liquids.  He sates he is now only doing a couple of cups of coffee a day instead of 2 pots.  He is about out of thickener.  Educated on how to get thickener and gave more sample packets.  He likes powder better than gel.  -KW     Other Adult Goal- 2 Pt will maintain adequate nutrition and hydration via PO during and after radiation treatment.  -KW     Status: Other Adult Goal- 2 Progressing as expected  -KW     Comments: Other Adult  Goal- 2 gained 1 lb  -KW     Other Adult Goal- 3 Pt will complete swallow exercises 3x a day with 90% accuracy.  -KW     Status: Other Adult Goal- 3 Progressing as expected  -KW     Comments: Other Adult Goal- 3 did not address  -KW     Other Adult Goal- 4 Pt will continue to be assessed and education completed on effects of radiation treatments as warranted up until 2 months post treatment.  -KW     Status: Other Adult Goal- 4 Progressing as expected  -KW     Comments: Other Adult Goal- 4 See above  -KW       User Key  (r) = Recorded By, (t) = Taken By, (c) = Cosigned By    Initials Name Provider Type    DAGOBERTO Johnson MS CCC-SLP Speech and Language Pathologist                OP SLP Education       12/27/17 1543    Education    Barriers to Learning No barriers identified  -KW    Education Provided Patient requires further education on strategies, risks  -KW    Assessed Learning needs;Learning motivation;Learning preferences;Learning readiness  -KW    Learning Motivation Strong  -KW    Learning Method Explanation  -KW    Teaching Response Verbalized understanding  -KW    Education Comments see flowsheeet data  -KW      User Key  (r) = Recorded By, (t) = Taken By, (c) = Cosigned By    Initials Name Effective Dates    DAGOBERTO Johnson MS CCC-SLP 08/02/16 -                 OP SLP Assessment/Plan - 12/27/17 1542     SLP Assessment    Clinical Impression Comments progressing as expected  -KW    SLP Plan    Plan Comments Continue to follow  -KW      User Key  (r) = Recorded By, (t) = Taken By, (c) = Cosigned By    Initials Name Provider Type    DAGOBERTO Johnson MS CCC-SLP Speech and Language Pathologist                Time Calculation:   SLP Start Time: 1330  SLP Stop Time: 1400  SLP Time Calculation (min): 30 min                 Maribel Johnson MS CCC-SLP  12/27/2017

## 2017-12-28 ENCOUNTER — APPOINTMENT (OUTPATIENT)
Dept: RADIATION ONCOLOGY | Facility: HOSPITAL | Age: 62
End: 2017-12-28

## 2017-12-28 ENCOUNTER — HOSPITAL ENCOUNTER (OUTPATIENT)
Dept: RADIATION ONCOLOGY | Facility: HOSPITAL | Age: 62
Discharge: HOME | End: 2017-12-28

## 2017-12-28 PROCEDURE — 77336 RADIATION PHYSICS CONSULT: CPT | Performed by: RADIOLOGY

## 2017-12-28 PROCEDURE — 77386: CPT | Performed by: RADIOLOGY

## 2017-12-29 ENCOUNTER — HOSPITAL ENCOUNTER (OUTPATIENT)
Dept: RADIATION ONCOLOGY | Facility: HOSPITAL | Age: 62
Setting detail: RADIATION/ONCOLOGY SERIES
Discharge: HOME | End: 2017-12-29

## 2017-12-29 PROCEDURE — 77386: CPT | Performed by: RADIOLOGY

## 2018-01-02 ENCOUNTER — APPOINTMENT (OUTPATIENT)
Dept: RADIATION ONCOLOGY | Facility: HOSPITAL | Age: 63
End: 2018-01-02

## 2018-01-02 ENCOUNTER — HOSPITAL ENCOUNTER (OUTPATIENT)
Dept: RADIATION ONCOLOGY | Facility: HOSPITAL | Age: 63
Setting detail: RADIATION/ONCOLOGY SERIES
End: 2018-01-02

## 2018-01-02 ENCOUNTER — DOCUMENTATION (OUTPATIENT)
Dept: ONCOLOGY | Facility: HOSPITAL | Age: 63
End: 2018-01-02

## 2018-01-02 PROCEDURE — 77386: CPT | Performed by: RADIOLOGY

## 2018-01-02 RX ORDER — OXYCODONE AND ACETAMINOPHEN 10; 325 MG/1; MG/1
1 TABLET ORAL EVERY 6 HOURS PRN
Qty: 60 TABLET | Refills: 0 | Status: ON HOLD | OUTPATIENT
Start: 2018-01-02 | End: 2019-10-22

## 2018-01-02 RX ORDER — OXYCODONE AND ACETAMINOPHEN 10; 325 MG/1; MG/1
1 TABLET ORAL EVERY 6 HOURS PRN
Qty: 40 TABLET | Refills: 0 | Status: SHIPPED | OUTPATIENT
Start: 2018-01-02 | End: 2018-01-02 | Stop reason: SDUPTHER

## 2018-01-02 NOTE — PROGRESS NOTES
Social work consultation on this date due to  Patient telling staff he was having much difficulty keeping his home warm during this terrible cold spell.  Met with patient on this date who reports that he has tried to find other heaters to keep unheated trailer warm and everyone is out of stock.  Offered patient assistance on this date and he reports that he has the money he is just not able to find what he is needing.  He reports that in the past he has utilized his Sikhism to help with such things and went over things we could assist with as well.   Patient agreed to contact this worker or other staff if further assistance was needed.  Discussed importance of good self care at this time.

## 2018-01-03 ENCOUNTER — OFFICE VISIT (OUTPATIENT)
Dept: SPEECH THERAPY | Facility: HOSPITAL | Age: 63
End: 2018-01-03

## 2018-01-03 ENCOUNTER — HOSPITAL ENCOUNTER (OUTPATIENT)
Dept: RADIATION ONCOLOGY | Facility: HOSPITAL | Age: 63
Setting detail: RADIATION/ONCOLOGY SERIES
Discharge: HOME | End: 2018-01-03

## 2018-01-03 ENCOUNTER — TREATMENT (OUTPATIENT)
Dept: SPEECH THERAPY | Facility: HOSPITAL | Age: 63
End: 2018-01-03

## 2018-01-03 DIAGNOSIS — R13.12 OROPHARYNGEAL DYSPHAGIA: Primary | ICD-10-CM

## 2018-01-03 PROCEDURE — 77336 RADIATION PHYSICS CONSULT: CPT | Performed by: RADIOLOGY

## 2018-01-03 PROCEDURE — 92526 ORAL FUNCTION THERAPY: CPT

## 2018-01-03 PROCEDURE — 77386: CPT | Performed by: RADIOLOGY

## 2018-01-03 NOTE — PROGRESS NOTES
Nutrition Services    Patient Name:  Sameer Tavarez  YOB: 1955  MRN: [id number]  Admit Date:  (Not on file)    Pt seen today s/p radiation therapy. Patient reports he has thrush, taking medication as directed by physician at this time. Pt not able to take much po intake at this time, sips of coffee with nectar thickener and water. Pt has peg tube, formula of Glucerna 1.2 bolus six times per day previously directed. Pt reports his heat was off yesterday and was busy with running errands to get his heat fixed and was only able to do one bolus of formula yesterday. Pt here for radiation at 2:15 and has not had a bolus of tube feeding at all today. Strongly encouraged pt to bolus as previously recommended to maintain weight. Pt also reports having trouble with constipation, he reports he was told to use mag citrate a few times per week, has also used benefiber in the past as well. Pt reports today he feels gassy and some stomach discomfort with the Glucerna 1.2 also using Ensure to bolus as well. Pt provided with Two Joseph samples to try for the next couple days to determine if he can tolerate this any better. Advised pt on bolus of water 120 ml before and after each tube feed and an additional water flush of 200 ml TID for hydration. Pt provided with RD name and contact information.  Pt with six more radiation treatments left. Cont to follow    Electronically signed by:  Tessy Zuluaga MS,RDN,LD  01/03/18 3:08 PM

## 2018-01-03 NOTE — THERAPY TREATMENT NOTE
Outpatient Speech Language Pathology   Adult Swallow Treatment Note       Patient Name: Sameer Tavarez  : 1955  MRN: [id number]  Today's Date: 1/3/2018         Visit Date: 2018   Patient Active Problem List   Diagnosis   • Oropharyngeal cancer   • Current smoker   • Perineal mass in male   • Recio's esophagus with dysplasia   • Tracheostomy dependence   • Postoperative pain   • S/P percutaneous endoscopic gastrostomy (PEG) tube placement   • Constipation, acute   • Pain, rectal   • Oropharyngeal candidiasis   • ACS (acute coronary syndrome)   • HTN (hypertension)        Visit Dx:    ICD-10-CM ICD-9-CM   1. Oropharyngeal dysphagia R13.12 787.22                               SLP OP Goals       18 1425       Goal Type Needed    Goal Type Needed Dysphagia  -CS     Subjective Comments    Subjective Comments Alert and cooperative  -CS     Subjective Pain    Able to rate subjective pain? yes  -CS     Pre-Treatment Pain Level 8  -CS     Dysphagia Goals    Dysphagia LTG's Patient will safely consume the recommended diet without complications such as aspiration pneumonia  -CS     Status: Patient will safely consume the recommended diet without complications such as aspiration pneumonia Progressing as expected  -CS     Other Goals    Other Adult Goal- 1 Pt will tolerate a pureed diet with thin liquids during and after completion of radiation treatments with no s/s of aspiration.  -CS     Status: Other Adult Goal- 1 Progressing as expected  -CS     Comments: Other Adult Goal- 1 Pt explains he is completing very little PO intake except for the occasional cup of coffe in which he is utilizing thickener. He explained he wll take small sips of water at times.  -CS     Other Adult Goal- 2 Pt will maintain adequate nutrition and hydration via PO during and after radiation treatment.  -CS     Status: Other Adult Goal- 2 Progressing as expected  -CS     Comments: Other Adult Goal- 2 gained 2 lb since last week.   -CS     Other Adult Goal- 3 Pt will complete swallow exercises 3x a day with 90% accuracy.  -CS     Status: Other Adult Goal- 3 Progressing as expected  -CS     Comments: Other Adult Goal- 3 Lingual ROM is limited at this time. He expressed discomfort manipulating his tongue due to thrush. He was able to complete a volitional swallow 1x with significant difficulty initiating swallow.  -CS     Other Adult Goal- 4 Pt will continue to be assessed and education completed on effects of radiation treatments as warranted up until 2 months post treatment.  -CS     Status: Other Adult Goal- 4 Progressing as expected  -CS     Comments: Other Adult Goal- 4 Pt educated on the importance of utilizing thickener if he completes PO intake of liquids other than water. Pt was also informed on the importance of completing oral care everyday in order to reduce buildup of bacteria.   -CS     SLP Time Calculation    SLP Goal Re-Cert Due Date 02/09/18  -CS       User Key  (r) = Recorded By, (t) = Taken By, (c) = Cosigned By    Initials Name Provider Type    CS Cash Carr MS CCC-SLP Speech and Language Pathologist                OP SLP Education       01/03/18 1531    Education    Barriers to Learning No barriers identified  -CS    Education Provided Patient requires further education on strategies, risks  -CS    Assessed Learning needs;Learning motivation;Learning preferences;Learning readiness  -CS    Learning Motivation Moderate  -CS    Learning Method Explanation  -CS    Teaching Response Verbalized understanding  -CS    Education Comments See flowsheet data.  -CS      User Key  (r) = Recorded By, (t) = Taken By, (c) = Cosigned By    Initials Name Effective Dates    CS Cash Carr MS [facility]-SLP 06/28/17 -                 OP SLP Assessment/Plan - 01/03/18 1531     SLP Assessment    Clinical Impression Comments Progressing as expected.  -CS    SLP Plan    Plan Comments SLP will continue to follow and treat.  -CS      User Key  (r) =  Recorded By, (t) = Taken By, (c) = Cosigned By    Initials Name Provider Type    CS Cash Carr MS CCC-SLP Speech and Language Pathologist                Time Calculation:   SLP Start Time: 1425  SLP Stop Time: 1500  SLP Time Calculation (min): 35 min                 Cash Carr MS CCC-SLP  1/3/2018

## 2018-01-04 ENCOUNTER — HOSPITAL ENCOUNTER (OUTPATIENT)
Dept: RADIATION ONCOLOGY | Facility: HOSPITAL | Age: 63
Setting detail: RADIATION/ONCOLOGY SERIES
Discharge: HOME | End: 2018-01-04

## 2018-01-04 VITALS — HEIGHT: 66 IN

## 2018-01-04 PROCEDURE — 77386: CPT | Performed by: RADIOLOGY

## 2018-01-04 NOTE — PROGRESS NOTES
Adult Outpatient Nutrition  Assessment/PES    Patient Name:  Sameer Tavarez  YOB: 1955  MRN: [id number]    Assessment Date:  1/4/2018    Comments:  Pt reports he has been having a gassy feeling, has not been able to take in all of the bolus tube feeds on daily basis.  Pt reports constipation as well. Pt has peg tube intake mostly from enteral nutrition, only takes bites of food orally and sips on thickened coffee or water. Pt  lbs prior to diagnosis, weight 10/6/17 168 lbs, 12/26/17 141.6lbs, 1/3/18 141.6lbs, pt with progressive weight loss. RD recommends formula change from Glucerna 1.2 bolus six times per day to Two Joseph HN 4-5 bolus (237 ml ) daily, recommend 120 ml water flush before and after each bolus. Have also suggested pt try Miralax for constipation issues when mixed with water per tube.           General Info       01/04/18 1320    Today's Session    Person(s) attending today's session Patient    General Information    How well do you speak English? well    Oncology patient? yes    Oncology Specific Assessment    Type of treatment Radiation    Frequency of treatment daily radiation    Goal of treatment Currative    Reported symptoms Taste changes;Weight loss;Dysphagia;Constipation            Physical Findings       01/04/18 1321    Physical Findings/Assessment    Additional Documentation Physical Appearance (Group)    Physical Appearance    Overall Physical Appearance loss of muscle mass    Gastrointestinal feeding tube    Tubes peg tube              Nutritional Info/Activity       01/04/18 1327    Nutritional Information    Enter everything you can remember eating in the last 24 hours (1 day) most oral intake is only thickend coffee, or sips of water. occasional few bites of yogurt,pudding, or oatmeal. Peg tube for ntn     Eating Environment    Eating environment Family            Home Nutrition Report       01/04/18 1328    Nutrition Prescription EN    Enteral Route PEG    Product  "TwoCal HN    TF Delivery Method Bolus    TF Bolus Goal Volume (mL) 237 mL    TF Bolus Frequency --   4-5 cans per day    Water flush (mL)  240 mL    Water Flush Frequency Every feeding   recommend 120 ml before and after each feeding            Estimated/Assessed Needs       01/04/18 1332    Calculation Measurements    Weight Used For Calculations 64.2 kg (141 lb 9.6 oz)    Height Used for Calculations 1.676 m (5' 6\")    Estimated/Assessed Energy Needs    Energy Need Method Kcal/kg    kcal/kg 35    35 Kcal/Kg (kcal) 2248.02    Estimated Kcal Range  7565-7675 kcal/day    Estimated/Assessed Protein Needs    Weight Used for Protein Calculation 64.4 kg (142 lb)    Protein (gm/kg) 1.5    1.5 Gm Protein (gm) 96.62    Estimated Protein Range  gm/day    Estimated/Assessed Fluid Needs    Fluid Need Method --   2565-4003 ml /day   30 ml/kg              Labs/Tests/Procedures/Meds       01/04/18 1333    Labs/Tests/Procedures/Meds    Labs/Tests Review Reviewed    Medication Review Reviewed, pertinent    Significant Vitals reviewed          Problem/Interventions:        Problem 1       01/04/18 1334    Nutrition Diagnoses Problem 1    Problem 1 Increased Nutrient Needs    Macronutrient Kcal;Protein    Etiology (related to) Medical Diagnosis    Endocrine DM    Gastrointestinal GERD/Reflux;Constipation    Oncology Head/neck cancer   tongue cancer    Pulmonary/Critical Care COPD    Signs/Symptoms (evidenced by) Report/Observation;SLP/Swallow eval    Reported/Observed By Patient;SLP    Other Comment pt reports he has been on Glucerna 1.2 bolus feeds 6 times per day. Pt reports of having gassy feeling and having difficulty with getting all of the bolus feeds in daily.    Swallow eval status Done    Type of SLP Evaluation Bedside                Intervention Goal       01/04/18 1337    Intervention Goal    General Maintain nutrition;Nutrition support treatment;Meet nutritional needs for age/condition    PO Meet estimated needs    " TF/PN Adjust TF/PN;Tolerate TF at goal    Weight Maintain weight        Electronically signed by:  Tessy Zuluaga MS,RDN,LD  01/04/18 1:39 PM

## 2018-01-05 ENCOUNTER — HOSPITAL ENCOUNTER (OUTPATIENT)
Dept: RADIATION ONCOLOGY | Facility: HOSPITAL | Age: 63
Setting detail: RADIATION/ONCOLOGY SERIES
Discharge: HOME | End: 2018-01-05

## 2018-01-05 PROCEDURE — 77386: CPT | Performed by: RADIOLOGY

## 2018-01-08 ENCOUNTER — APPOINTMENT (OUTPATIENT)
Dept: RADIATION ONCOLOGY | Facility: HOSPITAL | Age: 63
End: 2018-01-08

## 2018-01-09 ENCOUNTER — APPOINTMENT (OUTPATIENT)
Dept: RADIATION ONCOLOGY | Facility: HOSPITAL | Age: 63
End: 2018-01-09

## 2018-01-10 ENCOUNTER — APPOINTMENT (OUTPATIENT)
Dept: RADIATION ONCOLOGY | Facility: HOSPITAL | Age: 63
End: 2018-01-10

## 2018-01-10 ENCOUNTER — HOSPITAL ENCOUNTER (OUTPATIENT)
Dept: RADIATION ONCOLOGY | Facility: HOSPITAL | Age: 63
Setting detail: RADIATION/ONCOLOGY SERIES
Discharge: HOME | End: 2018-01-10

## 2018-01-10 ENCOUNTER — TREATMENT (OUTPATIENT)
Dept: SPEECH THERAPY | Facility: HOSPITAL | Age: 63
End: 2018-01-10

## 2018-01-10 DIAGNOSIS — R13.12 OROPHARYNGEAL DYSPHAGIA: Primary | ICD-10-CM

## 2018-01-10 PROCEDURE — 92526 ORAL FUNCTION THERAPY: CPT | Performed by: SPEECH-LANGUAGE PATHOLOGIST

## 2018-01-10 PROCEDURE — 77386: CPT | Performed by: RADIOLOGY

## 2018-01-10 NOTE — THERAPY PROGRESS REPORT/RE-CERT
Outpatient Speech Language Pathology   Adult Swallow Progress Note       Patient Name: Sameer Tavarez  : 1955  MRN: [id number]  Today's Date: 1/10/2018         Visit Date: 01/10/2018   Patient Active Problem List   Diagnosis   • Oropharyngeal cancer   • Current smoker   • Perineal mass in male   • Recio's esophagus with dysplasia   • Tracheostomy dependence   • Postoperative pain   • S/P percutaneous endoscopic gastrostomy (PEG) tube placement   • Constipation, acute   • Pain, rectal   • Oropharyngeal candidiasis   • ACS (acute coronary syndrome)   • HTN (hypertension)        Visit Dx:    ICD-10-CM ICD-9-CM   1. Oropharyngeal dysphagia R13.12 787.22                               SLP OP Goals       01/10/18 1430       Goal Type Needed    Goal Type Needed Dysphagia  -BN     Subjective Comments    Subjective Comments Pt alert and cooperative. C/o of pain this date with swallowing  -BN     Subjective Pain    Able to rate subjective pain? yes  -BN     Pre-Treatment Pain Level 7  -BN     Dysphagia Goals    Dysphagia LTG's Patient will safely consume the recommended diet without complications such as aspiration pneumonia  -BN     Status: Patient will safely consume the recommended diet without complications such as aspiration pneumonia Progressing as expected  -BN     Other Goals    Other Adult Goal- 1 Pt will tolerate a pureed diet with thin liquids during and after completion of radiation treatments with no s/s of aspiration.  -BN     Status: Other Adult Goal- 1 Progressing as expected  -BN     Comments: Other Adult Goal- 1 Pt stated he drinks coffee, tea, and water at home. Pt reports thickening his coffee but not his ice tea.   -BN     Other Adult Goal- 2 Pt will maintain adequate nutrition and hydration via PO during and after radiation treatment.  -BN     Status: Other Adult Goal- 2 Progressing as expected  -BN     Comments: Other Adult Goal- 2 lost 3 lbs since previous week.   -BN     Other Adult Goal- 3 Pt  will complete swallow exercises 3x a day with 90% accuracy.  -BN     Status: Other Adult Goal- 3 Progressing as expected  -BN     Comments: Other Adult Goal- 3 Pt reports increased pain in oral cavity the past week. Limited lingual movement due to pain.   -BN     Other Adult Goal- 4 Pt will continue to be assessed and education completed on effects of radiation treatments as warranted up until 2 months post treatment.  -BN     Status: Other Adult Goal- 4 Progressing as expected  -BN     Comments: Other Adult Goal- 4 Continued education on importance of thickened liquids. Pt currently utilizing nystatin swish and swallow for thrush and sores on tongue. Pt stated he recently was prescribed lidocane for pain in mouth,  however, only helps temporarily. Pt reports needing trach replaced and cleaned.   -BN     SLP Time Calculation    SLP Goal Re-Cert Due Date 02/09/18  -BN       User Key  (r) = Recorded By, (t) = Taken By, (c) = Cosigned By    Initials Name Provider Type    MARVEL Bautista CCC-SLP Speech and Language Pathologist                OP SLP Education       01/10/18 7562    Education    Barriers to Learning No barriers identified  -BN    Education Provided Patient requires further education on strategies, risks  -BN    Assessed Learning motivation;Learning preferences;Learning readiness;Learning needs  -BN    Learning Motivation Moderate  -BN    Learning Method Explanation  -BN    Teaching Response Verbalized understanding  -BN    Education Comments see flowsheet data  -BN      User Key  (r) = Recorded By, (t) = Taken By, (c) = Cosigned By    Initials Name Effective Dates    MARVEL Bautista CCC-SLP 08/02/16 -                 OP SLP Assessment/Plan - 01/10/18 6886     SLP Assessment    Functional Problems Swallowing  -BN    Impact on Function: Swallowing Risk of aspiration;Risk of pneumonia  -BN    Clinical Impression: Swallowing Moderate-Severe:;oropharyngeal phase dysphagia  -BN    Clinical  Impression Comments progressing as expected  -BN    SLP Plan    Plan Comments Continue to follow  -BN      User Key  (r) = Recorded By, (t) = Taken By, (c) = Cosigned By    Initials Name Provider Type    MARVEL Bautista CCC-SLP Speech and Language Pathologist                Time Calculation:   SLP Start Time: 1430  SLP Stop Time: 1448  SLP Time Calculation (min): 18 min                 Za Bautista CCC-SLP  1/10/2018

## 2018-01-11 ENCOUNTER — HOSPITAL ENCOUNTER (OUTPATIENT)
Dept: RADIATION ONCOLOGY | Facility: HOSPITAL | Age: 63
Setting detail: RADIATION/ONCOLOGY SERIES
Discharge: HOME | End: 2018-01-11

## 2018-01-11 PROCEDURE — 77386: CPT | Performed by: RADIOLOGY

## 2018-01-12 ENCOUNTER — APPOINTMENT (OUTPATIENT)
Dept: RADIATION ONCOLOGY | Facility: HOSPITAL | Age: 63
End: 2018-01-12

## 2018-01-15 ENCOUNTER — HOSPITAL ENCOUNTER (OUTPATIENT)
Dept: RADIATION ONCOLOGY | Facility: HOSPITAL | Age: 63
Setting detail: RADIATION/ONCOLOGY SERIES
Discharge: HOME | End: 2018-01-15

## 2018-01-15 PROCEDURE — 77386: CPT | Performed by: RADIOLOGY

## 2018-01-16 ENCOUNTER — HOSPITAL ENCOUNTER (OUTPATIENT)
Dept: RADIATION ONCOLOGY | Facility: HOSPITAL | Age: 63
Discharge: HOME | End: 2018-01-16

## 2018-01-16 PROCEDURE — 77386: CPT | Performed by: RADIOLOGY

## 2018-01-17 ENCOUNTER — APPOINTMENT (OUTPATIENT)
Dept: SPEECH THERAPY | Facility: HOSPITAL | Age: 63
End: 2018-01-17

## 2018-01-18 ENCOUNTER — OFFICE VISIT (OUTPATIENT)
Dept: OTOLARYNGOLOGY | Facility: CLINIC | Age: 63
End: 2018-01-18

## 2018-01-18 VITALS
DIASTOLIC BLOOD PRESSURE: 94 MMHG | RESPIRATION RATE: 20 BRPM | WEIGHT: 140 LBS | HEIGHT: 66 IN | HEART RATE: 96 BPM | BODY MASS INDEX: 22.5 KG/M2 | TEMPERATURE: 98.2 F | SYSTOLIC BLOOD PRESSURE: 110 MMHG

## 2018-01-18 DIAGNOSIS — Z72.0 TOBACCO USE: ICD-10-CM

## 2018-01-18 DIAGNOSIS — C10.9 OROPHARYNGEAL CANCER (HCC): Primary | ICD-10-CM

## 2018-01-18 DIAGNOSIS — Z93.0 TRACHEOSTOMY DEPENDENCE (HCC): ICD-10-CM

## 2018-01-18 PROCEDURE — 99214 OFFICE O/P EST MOD 30 MIN: CPT | Performed by: OTOLARYNGOLOGY

## 2018-01-18 RX ORDER — VARENICLINE TARTRATE 1 MG/1
1 TABLET, FILM COATED ORAL 2 TIMES DAILY
Qty: 84 TABLET | Refills: 1 | Status: SHIPPED | OUTPATIENT
Start: 2018-01-18 | End: 2018-03-01

## 2018-01-18 NOTE — PROGRESS NOTES
Patient Intake Note    Review of Systems  Review of Systems   Constitutional: Negative for chills and fever.   HENT:        See HPI   Respiratory: Positive for cough.    Gastrointestinal: Negative for diarrhea, nausea and vomiting.   Musculoskeletal: Positive for neck pain and neck stiffness.   Skin: Positive for pallor.   Neurological: Negative for headaches.   Hematological: Bruises/bleeds easily.   Psychiatric/Behavioral: Positive for sleep disturbance.       QUALITY MEASURES    Body Mass Index Screening and Follow-Up Plan  Body mass index is 22.6 kg/(m^2).      Tobacco Use: Screening and Cessation Intervention  Smoking status: Current Every Day Smoker                                                   Packs/day: 0.50      Years: 52.00        Types: Cigarettes  Smokeless status: Current User                       Types: Snuff  Comment: I've tried and tried    Smoking cessation information given in after visit summary.    Courtney Soria RN  1/18/2018  2:47 PM

## 2018-01-18 NOTE — PATIENT INSTRUCTIONS
IF YOU SMOKE OR USE TOBACCO PLEASE READ THE FOLLOWING:    Why is smoking bad for me?  Smoking increases the risk of heart disease, lung disease, vascular disease, stroke, and cancer.     If you smoke, STOP!    If you would like more information on quitting smoking, please visit the [employer] website: [email or web address]   This link will provide additional resources including the QUIT line and the Beat the Pack support groups.     For more information:    Quit Now [facility]  1-800-QUIT-NOW  https://[email or web address].quitlogix.org/en-[place]/

## 2018-01-18 NOTE — PROGRESS NOTES
"PRIMARY CARE PROVIDER: Christian Castellon MD  REFERRING PROVIDER: No ref. provider found    CC:  follow up head and neck cancer    Subjective   History of Present Illness:  Sameer Tavarez is a  62 y.o. male who presents for follow-up of oropharyngeal cancer.  He has last treatment last week.  He has the expected amount of throat soreness.  He has a G-tube and is using it for nutrition.  He has a tracheostomy tube and uses a good [place] voice prosthesis for speech.    Oncology/Hematology History    Sameer Tavarez is a 62 y.o. male with oropharyngeal cancer. He had an EGD on 2/6/17 by Dr Patel with biopsies showing Barretts esophagus and reflux esophagitis. He had continued dysphagia and underwent another EGD and an oropharyngeal biopsy on 8/27/17 with the oropharyngeal biosies showing \"at least squamous cell carcinoma in situ\". CT scanning showed \"oropharyngeal asymmety without overt enhancing mass\" on 8/28/17. He was referred to Dr. Alber Justin MD and diagnosed with a large ulcerative lesion that was in the oral pharynx.  It extended from the right lateral aspect of the uvula and extended to the posterior rim of the soft palate extending to the tonsillar fossa and deeply penetrating into it.  It involved the right base of tongue and extended at least to the midline of the base of tongue.  It extended forward into the lingual tongue muscular approximately 2 cm. He was having difficulty with his secretions and his airway at night, so an awake tracheostomy with direct laryngoscopy and biopsy was performed on 10/5/17. At the time of admission, g tube placement and dental extraction was performed.        Oropharyngeal cancer    8/28/2017 Initial Diagnosis     Oropharyngeal cancer         8/28/2017 Biopsy     Dr Patel ([facility]) - EGD with oropharynx biopsy:    Clinical Information       Pre-Op: Gerd/Hoarsness      Post-Op Esophageal ulcer,barretts oral lesion    Final Diagnosis   1.  " Esophagus, endoscopic biopsy:  A.  Fragments of benign squamocolumnar junction mucosa and benign glandular mucosa demonstrating intestinal metaplasia (Recio's esophagus)  B.  Chronic active inflammation, moderate.  C.  No dysplasia identified.     2.  Oropharynx, biopsy: At least squamous cell carcinoma in situ.     Comment: Status of invasion is indeterminate due to tangential sectioning of several of the tissue fragments.  Immunohistochemical stain for p16 is positive.     AJCC stage:pTX pNX                 8/28/2017 Imaging     [facility]  CT Soft tissue neck  No discrete enhancing mass  Soft tissue thickening right oropharynx and peritonsillar soft tissues compared to left.  Small lymph nodes may be inflammatory in nature         9/13/2017 Imaging     [facility]    CT Chest  Small hiatal hernia  1.1 cm lymph node adjacent to the distal esophagus.  Two small lung nodules.  For multiple solid noncalcified nodules smaller than 6 mm in diameter, no routine follow-up is recommended. (grade 2B; weak recommendation, moderate-quality evidence)  CT Abd Pelvis  Right perineal mass of unknown significance. Please correlate with physical exam.  Dilated small bowel with multiple air-fluid levels. Differential considerations include partial small bowel obstruction vs adynamic ileus.  Mild diverticulosis. Mild distended urinary bladder.         9/29/2017 Imaging     [facility]  MR Orbit Face Neck  1. Large right oropharyngeal mass compatible with the history of  carcinoma.  2. Tongue base and right submandibular gland involvement.    PET  1. Abnormal metabolic activity in the base of the tongue on the right  extending in the right palatine tonsil. SUV is 8.8. This is consistent  with neoplasm. No other regions of abnormal metabolic activity are  identified..           10/5/2017 Procedure     Tracheostomy with Direct Laryngoscopy and biopsy -JUANITA Justin MD     Path    Final Diagnosis   1.  Right superior  tonsillar fossa, biopsy:  A.  Moderately differentiated squamous cell carcinoma, invasive.  B.  Immunohistochemical stain for p16 is positive.      AJCC stage: pTX pNX     2.  Right base of tongue, biopsy:  A.  Moderately differentiated squamous cell carcinoma, invasive.  B.  Immunohistochemical stain for p16 is positive.              10/6/2017 Procedure     Port placement  Gastrostomy tube placement         10/11/2017 Procedure     Teeth extraction- Les Goddard MD, DMD         11/3/2017 Cancer Staged     Oropharyngeal cancer    Staging form: Pharynx - HPV-Mediated Oropharynx, AJCC 8th Edition    - Clinical stage from 11/3/2017: Stage III (cT4, cN0, cM0, p16: Positive) - Signed by Alber Justin MD on 1/1/2018    - Pathologic: No stage assigned - Unsigned            Review of Systems  Reviewed as per patient intake note.    Past History:  Past Medical History:   Diagnosis Date   • Arthritis    • Coronary artery disease    • Depression    • Diabetes mellitus    • Enlarged prostate    • GERD (gastroesophageal reflux disease)    • History of transfusion    • Hypertension    • Oropharyngeal cancer 08/27/2017   • Seizure     diabetic   • Severe esophageal dysplasia    • Stroke    • TB (pulmonary tuberculosis) 2004     Past Surgical History:   Procedure Laterality Date   • CARDIAC SURGERY     • CHOLECYSTECTOMY     • CORONARY ARTERY BYPASS GRAFT     • FRACTURE SURGERY     • GTUBE REPLACEMENT N/A 10/6/2017    Procedure: GASTROSTOMY TUBE REPLACEMENT, port placement;  Surgeon: Jayne Phillips MD;  Location: [facility] OR;  Service:    • HERNIA REPAIR     • LARYNGOSCOPY N/A 10/11/2017    Procedure: DIRECT LARYNGOSCOPY WITH BIOPSY;  Surgeon: Darin Neal MD;  Location: [facility] OR;  Service:    • NISSEN FUNDOPLICATION LAPAROSCOPIC     • TX DENTAL SURGERY PROCEDURE N/A 10/11/2017    Procedure: TOOTH EXTRACTION;  Surgeon: Darin Neal MD;  Location: [facility] OR;  Service: ENT   • TX INSJ TUNNELED CVC W/O SUBQ PORT/  AGE 5 YR/> Left 10/6/2017    Procedure: INSERTION VENOUS ACCESS DEVICE;  Surgeon: Jayne Phillips MD;  Location:  [facility] OR;  Service: General   • SKIN BIOPSY     • TESTICLE SURGERY      tubes implanted for drainage   • TONSILLECTOMY     • TRACHEOSTOMY N/A 10/5/2017    Procedure: Awake tracheostomy; DIRECT LARYNGOSCOPY WITH BIOPSY;  Surgeon: Alber Justin MD;  Location:  [facility] OR;  Service:      Family History   Problem Relation Age of Onset   • Stroke Mother    • Heart disease Father    • Heart disease Brother    • Cancer Brother      Social History   Substance Use Topics   • Smoking status: Current Every Day Smoker     Packs/day: 0.25     Years: 52.00     Types: Cigarettes   • Smokeless tobacco: Former User     Types: Snuff      Comment: continuing to try   • Alcohol use Yes      Comment: occasionally       Current Outpatient Prescriptions:   •  insulin glargine (LANTUS) 100 UNIT/ML injection, Inject 70 Units under the skin Daily., Disp: , Rfl:   •  lidocaine viscous (XYLOCAINE) 2 % solution, Take 5 mL by mouth As Needed for Mild Pain ., Disp: 100 mL, Rfl: 2  •  nitroglycerin (NITROLINGUAL) 0.4 MG/SPRAY spray, Place 1 spray under the tongue Every 5 (Five) Minutes As Needed for Chest Pain., Disp: , Rfl:   •  oxyCODONE-acetaminophen (PERCOCET)  MG per tablet, Take 1 tablet by mouth Every 6 (Six) Hours As Needed for Moderate Pain ., Disp: 60 tablet, Rfl: 0  •  tamsulosin (FLOMAX) 0.4 MG capsule 24 hr capsule, Take 1 capsule by mouth Every Night., Disp: , Rfl:   •  zolpidem (AMBIEN) 10 MG tablet, Take 10 mg by mouth At Night As Needed for Sleep., Disp: , Rfl:   •  varenicline (CHANTIX CONTINUING MONTH KATHERINE) 1 MG tablet, Take 1 tablet by mouth 2 (Two) Times a Day., Disp: 84 tablet, Rfl: 1  •  varenicline (CHANTIX STARTING MONTH KATHERINE) 0.5 MG X 11 & 1 MG X 42 tablet, Take 0.5 mg one daily on days 1-3 and and 0.5 mg twice daily on days 4-7.Then 1 mg twice daily for a total of 12 weeks., Disp: 42 tablet, Rfl:  0  Allergies:  Codeine    Objective     Vital Signs:  Temp:  [98.2 °F (36.8 °C)] 98.2 °F (36.8 °C)  Heart Rate:  [96] 96  Resp:  [20] 20  BP: (110)/(94) 110/94    Physical Exam  CONSTITUTIONAL: well nourished, well-developed, alert, oriented, in no acute distress   COMMUNICATION AND VOICE: able to communicate normally for age, normal voice quality  HEAD: normocephalic, no lesions, atraumatic, no tenderness, no masses   FACE: appearance normal, no lesions, no tenderness, no deformities, facial motion symmetric  EYES: ocular motility normal, eyelids normal, orbits normal, no proptosis, conjunctiva normal , pupils equal, round   EARS:  Hearing: response to conversational voice normal bilaterally   External Ears: auricles without lesions  NOSE:  External Nose: structure normal, no tenderness on palpation, no nasal discharge, no lesions, no evidence of trauma, nostrils patent   Intranasal exam: nasal mucosa with mucosal congestion and erythema, nasal septum relatively midline   ORAL:  Lips: upper and lower lips without lesion   NECK: neck appearance normal, tracheostomy tube changed with a normal tracheostomy site with no granulation tissue.    CHEST/RESPIRATORY: respiratory effort normal, normal chest excursion  CARDIOVASCULAR: extremities without cyanosis or edema   NEUROLOGIC/PSYCHIATRIC: oriented appropriately for age, affect appropriate, CN II-XII intact grossly        Assessment   1. Oropharyngeal cancer    2. Tobacco use    3. Tracheostomy dependence        Plan   Continue current management plan.     -----INSTRUCTIONS-----  Be aware of the signs and symptoms of head and neck cancer including neck mass, persistent sore throat, ear pain, hemoptysis, weight loss and hoarseness. If any of these symptoms occur, call for evaluation.      -----FOLLOW UP-----  Return in about 6 weeks (around 3/1/2018).      Alber Justin MD  01/18/18  3:10 PM

## 2018-02-07 ENCOUNTER — HOSPITAL ENCOUNTER (INPATIENT)
Age: 63
LOS: 3 days | Discharge: HOME | DRG: 249 | End: 2018-02-10
Attending: EMERGENCY MEDICINE
Payer: MEDICARE

## 2018-02-07 ENCOUNTER — APPOINTMENT (OUTPATIENT)
Dept: GENERAL RADIOLOGY | Age: 63
DRG: 249 | End: 2018-02-07
Payer: MEDICARE

## 2018-02-07 ENCOUNTER — TELEPHONE (OUTPATIENT)
Dept: OTOLARYNGOLOGY | Facility: CLINIC | Age: 63
End: 2018-02-07

## 2018-02-07 DIAGNOSIS — R07.9 ACUTE CHEST PAIN: Primary | ICD-10-CM

## 2018-02-07 LAB
ALBUMIN SERPL-MCNC: 4.6 G/DL (ref 3.5–5.2)
ALP BLD-CCNC: 156 U/L (ref 40–130)
ALT SERPL-CCNC: 27 U/L (ref 5–41)
ANION GAP SERPL CALCULATED.3IONS-SCNC: 19 MMOL/L (ref 7–19)
APTT: 27.7 SEC (ref 26–36.2)
AST SERPL-CCNC: 27 U/L (ref 5–40)
BASOPHILS ABSOLUTE: 0 K/UL (ref 0–0.2)
BASOPHILS RELATIVE PERCENT: 0.5 % (ref 0–1)
BILIRUB SERPL-MCNC: 0.5 MG/DL (ref 0.2–1.2)
BUN BLDV-MCNC: 16 MG/DL (ref 8–23)
CALCIUM SERPL-MCNC: 10.2 MG/DL (ref 8.8–10.2)
CHLORIDE BLD-SCNC: 97 MMOL/L (ref 98–111)
CO2: 24 MMOL/L (ref 22–29)
CREAT SERPL-MCNC: 0.6 MG/DL (ref 0.5–1.2)
EOSINOPHILS ABSOLUTE: 0.1 K/UL (ref 0–0.6)
EOSINOPHILS RELATIVE PERCENT: 0.8 % (ref 0–5)
GFR NON-AFRICAN AMERICAN: >60
GLUCOSE BLD-MCNC: 140 MG/DL (ref 74–109)
HCT VFR BLD CALC: 45 % (ref 42–52)
HEMOGLOBIN: 15.9 G/DL (ref 14–18)
INR BLD: 1.04 (ref 0.88–1.18)
LYMPHOCYTES ABSOLUTE: 0.9 K/UL (ref 1.1–4.5)
LYMPHOCYTES RELATIVE PERCENT: 14.3 % (ref 20–40)
MCH RBC QN AUTO: 31.2 PG (ref 27–31)
MCHC RBC AUTO-ENTMCNC: 35.3 G/DL (ref 33–37)
MCV RBC AUTO: 88.2 FL (ref 80–94)
MONOCYTES ABSOLUTE: 0.5 K/UL (ref 0–0.9)
MONOCYTES RELATIVE PERCENT: 8.6 % (ref 0–10)
NEUTROPHILS ABSOLUTE: 4.6 K/UL (ref 1.5–7.5)
NEUTROPHILS RELATIVE PERCENT: 75.3 % (ref 50–65)
PDW BLD-RTO: 13.3 % (ref 11.5–14.5)
PLATELET # BLD: 209 K/UL (ref 130–400)
PMV BLD AUTO: 11 FL (ref 9.4–12.4)
POTASSIUM SERPL-SCNC: 4.1 MMOL/L (ref 3.5–5)
PRO-BNP: 147 PG/ML (ref 0–900)
PROTHROMBIN TIME: 13.5 SEC (ref 12–14.6)
RAPID INFLUENZA  B AGN: NEGATIVE
RAPID INFLUENZA A AGN: NEGATIVE
RBC # BLD: 5.1 M/UL (ref 4.7–6.1)
SODIUM BLD-SCNC: 140 MMOL/L (ref 136–145)
TOTAL PROTEIN: 8.7 G/DL (ref 6.6–8.7)
TROPONIN: <0.01 NG/ML (ref 0–0.03)
TROPONIN: <0.01 NG/ML (ref 0–0.03)
WBC # BLD: 6.2 K/UL (ref 4.8–10.8)

## 2018-02-07 PROCEDURE — 6370000000 HC RX 637 (ALT 250 FOR IP): Performed by: INTERNAL MEDICINE

## 2018-02-07 PROCEDURE — 6370000000 HC RX 637 (ALT 250 FOR IP): Performed by: EMERGENCY MEDICINE

## 2018-02-07 PROCEDURE — 2580000003 HC RX 258: Performed by: INTERNAL MEDICINE

## 2018-02-07 PROCEDURE — 36415 COLL VENOUS BLD VENIPUNCTURE: CPT

## 2018-02-07 PROCEDURE — 85730 THROMBOPLASTIN TIME PARTIAL: CPT

## 2018-02-07 PROCEDURE — 84484 ASSAY OF TROPONIN QUANT: CPT

## 2018-02-07 PROCEDURE — 93005 ELECTROCARDIOGRAM TRACING: CPT

## 2018-02-07 PROCEDURE — 99223 1ST HOSP IP/OBS HIGH 75: CPT | Performed by: INTERNAL MEDICINE

## 2018-02-07 PROCEDURE — 85610 PROTHROMBIN TIME: CPT

## 2018-02-07 PROCEDURE — 85025 COMPLETE CBC W/AUTO DIFF WBC: CPT

## 2018-02-07 PROCEDURE — 87804 INFLUENZA ASSAY W/OPTIC: CPT

## 2018-02-07 PROCEDURE — 71045 X-RAY EXAM CHEST 1 VIEW: CPT

## 2018-02-07 PROCEDURE — 2500000003 HC RX 250 WO HCPCS: Performed by: INTERNAL MEDICINE

## 2018-02-07 PROCEDURE — 99285 EMERGENCY DEPT VISIT HI MDM: CPT | Performed by: EMERGENCY MEDICINE

## 2018-02-07 PROCEDURE — 2140000000 HC CCU INTERMEDIATE R&B

## 2018-02-07 PROCEDURE — 99285 EMERGENCY DEPT VISIT HI MDM: CPT

## 2018-02-07 PROCEDURE — 80053 COMPREHEN METABOLIC PANEL: CPT

## 2018-02-07 PROCEDURE — 83880 ASSAY OF NATRIURETIC PEPTIDE: CPT

## 2018-02-07 RX ORDER — SODIUM CHLORIDE 0.9 % (FLUSH) 0.9 %
10 SYRINGE (ML) INJECTION PRN
Status: DISCONTINUED | OUTPATIENT
Start: 2018-02-07 | End: 2018-02-08 | Stop reason: SDUPTHER

## 2018-02-07 RX ORDER — ASPIRIN 325 MG
325 TABLET ORAL ONCE
Status: COMPLETED | OUTPATIENT
Start: 2018-02-07 | End: 2018-02-07

## 2018-02-07 RX ORDER — NITROGLYCERIN 20 MG/100ML
5 INJECTION INTRAVENOUS CONTINUOUS
Status: DISCONTINUED | OUTPATIENT
Start: 2018-02-07 | End: 2018-02-09

## 2018-02-07 RX ORDER — SODIUM CHLORIDE 9 MG/ML
INJECTION, SOLUTION INTRAVENOUS CONTINUOUS
Status: DISCONTINUED | OUTPATIENT
Start: 2018-02-07 | End: 2018-02-09

## 2018-02-07 RX ORDER — SODIUM CHLORIDE 0.9 % (FLUSH) 0.9 %
10 SYRINGE (ML) INJECTION EVERY 12 HOURS SCHEDULED
Status: DISCONTINUED | OUTPATIENT
Start: 2018-02-07 | End: 2018-02-08 | Stop reason: SDUPTHER

## 2018-02-07 RX ORDER — OXYCODONE AND ACETAMINOPHEN 10; 325 MG/1; MG/1
1 TABLET ORAL EVERY 6 HOURS PRN
Status: DISCONTINUED | OUTPATIENT
Start: 2018-02-07 | End: 2018-02-08

## 2018-02-07 RX ORDER — NITROGLYCERIN 0.4 MG/1
0.4 TABLET SUBLINGUAL EVERY 5 MIN PRN
Status: DISCONTINUED | OUTPATIENT
Start: 2018-02-07 | End: 2018-02-08

## 2018-02-07 RX ORDER — ONDANSETRON 2 MG/ML
4 INJECTION INTRAMUSCULAR; INTRAVENOUS EVERY 6 HOURS PRN
Status: DISCONTINUED | OUTPATIENT
Start: 2018-02-07 | End: 2018-02-10 | Stop reason: HOSPADM

## 2018-02-07 RX ORDER — ZOLPIDEM TARTRATE 5 MG/1
5 TABLET ORAL NIGHTLY PRN
Status: DISCONTINUED | OUTPATIENT
Start: 2018-02-07 | End: 2018-02-10 | Stop reason: HOSPADM

## 2018-02-07 RX ORDER — ASPIRIN 81 MG/1
81 TABLET, CHEWABLE ORAL DAILY
Status: DISCONTINUED | OUTPATIENT
Start: 2018-02-08 | End: 2018-02-10 | Stop reason: HOSPADM

## 2018-02-07 RX ORDER — ACETAMINOPHEN 325 MG/1
650 TABLET ORAL EVERY 4 HOURS PRN
Status: DISCONTINUED | OUTPATIENT
Start: 2018-02-07 | End: 2018-02-10 | Stop reason: HOSPADM

## 2018-02-07 RX ORDER — TAMSULOSIN HYDROCHLORIDE 0.4 MG/1
0.4 CAPSULE ORAL DAILY
Status: DISCONTINUED | OUTPATIENT
Start: 2018-02-08 | End: 2018-02-08 | Stop reason: SDUPTHER

## 2018-02-07 RX ADMIN — NITROGLYCERIN 5 MCG/MIN: 20 INJECTION INTRAVENOUS at 20:11

## 2018-02-07 RX ADMIN — NITROGLYCERIN 0.4 MG: 0.4 TABLET SUBLINGUAL at 17:23

## 2018-02-07 RX ADMIN — ASPIRIN 325 MG ORAL TABLET 325 MG: 325 PILL ORAL at 17:22

## 2018-02-07 RX ADMIN — ZOLPIDEM TARTRATE 5 MG: 5 TABLET ORAL at 23:39

## 2018-02-07 RX ADMIN — OXYCODONE HYDROCHLORIDE AND ACETAMINOPHEN 1 TABLET: 10; 325 TABLET ORAL at 23:39

## 2018-02-07 RX ADMIN — SODIUM CHLORIDE: 9 INJECTION, SOLUTION INTRAVENOUS at 21:58

## 2018-02-07 ASSESSMENT — ENCOUNTER SYMPTOMS
VOMITING: 0
DIARRHEA: 0
SORE THROAT: 0
SHORTNESS OF BREATH: 1
ABDOMINAL PAIN: 0
COUGH: 1
RHINORRHEA: 0
CONSTIPATION: 0

## 2018-02-07 ASSESSMENT — PAIN SCALES - GENERAL
PAINLEVEL_OUTOF10: 7
PAINLEVEL_OUTOF10: 5

## 2018-02-07 NOTE — ED NOTES
ASSESSMENT:    PT ALERT/ORIENTED X4. PUPILS EQUAL/REACTIVE    SKIN:  WARM/DRY PINK CAPILLARY REFILL < 2SECS    CARDIAC:  S1 S2 NOTED     LUNGS: CLEAR UPPER AND LOWER LOBES, RESPIRATIONS EVEN/UNLABORED     ABDOMEN: BOWEL SOUNDS NOTED UPPER AND LOWER QUADRANTS                     SOFT AND NONTENDER. EXTREMITIES:  BILATERAL DP AND PT AND NO EDEMA NOTED. NO DISTRESS NOTED. SIDE RAILS UP AND CALL LIGHT IN REACH.      [address] [facility], [address] [place] [address]  02/07/18 3345

## 2018-02-07 NOTE — TELEPHONE ENCOUNTER
Call received from wife stating they do not have gas money to get to pcp office to get script for ambien. I have explained to her that Dr. Justin does not prescribe this medication and that it would need to come from pcp.

## 2018-02-07 NOTE — ED PROVIDER NOTES
[facility]  eMERGENCY dEPARTMENT eNCOUnter      Pt Name: Edwin Mathew  MRN: [id number]  [place] 1955  Date of evaluation: 2/7/2018  Provider: Riaz Guo MD    [address] [place] [address]       Chief Complaint   Patient presents with    Shortness of Breath     presents with c/o sob and c/p x 4 days         HISTORY OF PRESENT ILLNESS   (Location/Symptom, Timing/Onset, Context/Setting, Quality, Duration, Modifying Factors, Severity)  Note limiting factors. Edwin Mathew is a 58 y.o. male who presents to the emergency department For concern of chest pain and shortness of breath intermittently over the past 7 days. He describes some left-sided squeezing type sensation that radiates to his left arm and hand sometimes causing a tingling sensation. No obvious aggravating or relieving factors states he is having some recurrent pain now. He does have a significant cardiac history of stents and prior bypass. He is followed by Dr. Shadi Londono. He has had a recent cough and been sick recently he reports. He has a history of throat cancer and has a tracheostomy and finished chemo and radiation approximately 3 weeks ago and is followed by Dr. Lacey Leggett. States he has not had a recent stress test or heart cath. HPI    Nursing Notes were reviewed. REVIEW OF SYSTEMS    (2-9 systems for level 4, 10 or more for level 5)     Review of Systems   Constitutional: Positive for fatigue. Negative for diaphoresis. HENT: Negative for rhinorrhea and sore throat. Respiratory: Positive for cough and shortness of breath. Cardiovascular: Positive for chest pain. Negative for palpitations. Gastrointestinal: Negative for abdominal pain, constipation, diarrhea and vomiting. Genitourinary: Negative for dysuria and hematuria. Neurological: Negative for syncope and light-headedness.             PAST MEDICAL HISTORY     Past Medical History:   Diagnosis Date    Gastelum's esophagus     CAD (coronary artery disease)     14.3 (*)     Lymphocytes # 0.9 (*)     All other components within normal limits   COMPREHENSIVE METABOLIC PANEL - Abnormal; Notable for the following:     Chloride 97 (*)     Glucose 140 (*)     Alkaline Phosphatase 156 (*)     All other components within normal limits   RAPID INFLUENZA A/B ANTIGENS   APTT   PROTIME-INR   BRAIN NATRIURETIC PEPTIDE   TROPONIN   TROPONIN   TROPONIN   TROPONIN   LIPID PANEL   CBC WITH AUTO DIFFERENTIAL   PROTIME-INR   POCT TROPONIN   POCT TROPONIN       All other labs were within normal range or not returned as of this dictation. EMERGENCY DEPARTMENT COURSE and DIFFERENTIAL DIAGNOSIS/MDM:   Vitals:    Vitals:    02/07/18 1613 02/07/18 2015 02/07/18 2135 02/07/18 2136   BP: (!) 166/95 120/71  112/72   Pulse: 86 114 79 85   Resp: 26 25 20 16   Temp: 98.2 °F (36.8 °C)  98.5 °F (36.9 °C) 98.5 °F (36.9 °C)   TempSrc:    Temporal   SpO2: 99% 95%  98%   Weight: 135 lb (61.2 kg)   131 lb 3.2 oz (59.5 kg)   Height: 5' 6\" (1.676 m)   5' 6\" (1.676 m)       MDM  Number of Diagnoses or Management Options  Acute chest pain:      Amount and/or Complexity of Data Reviewed  Clinical lab tests: ordered and reviewed  Tests in the radiology section of CPT®: ordered and reviewed  Discuss the patient with other providers: yes  Independent visualization of images, tracings, or specimens: yes      October 2016 cath report  1. Normal left ventricular systolic function and hemodynamics. 2.  Severe left main and triple vessel coronary artery disease as described  above. 3.  Atretic and nonfunctioning normal left internal mammary graft to the LAD. 4.  Significant disease in the proximal segment of the vein graft to the  diagonal branch of the LAD. 5.  Widely patent saphenous vein graft to the obtuse marginal branch of the  circumflex. 6.  Successful percutaneous coronary intervention with primary stent placement  to the vein graft of the diagonal branch of the LAD.   7.  Successful percutaneous coronary

## 2018-02-07 NOTE — ED NOTES
Portable chest x-ray at bedside.      [address] [facility], [address] [place] [address]  02/07/18 4050

## 2018-02-08 LAB
BASOPHILS ABSOLUTE: 0 K/UL (ref 0–0.2)
BASOPHILS RELATIVE PERCENT: 0.3 % (ref 0–1)
CHOLESTEROL, TOTAL: 114 MG/DL (ref 160–199)
EKG P AXIS: 71 DEGREES
EKG P-R INTERVAL: 144 MS
EKG Q-T INTERVAL: 416 MS
EKG QRS DURATION: 94 MS
EKG QTC CALCULATION (BAZETT): 440 MS
EKG T AXIS: 52 DEGREES
EOSINOPHILS ABSOLUTE: 0.1 K/UL (ref 0–0.6)
EOSINOPHILS RELATIVE PERCENT: 2.1 % (ref 0–5)
GLUCOSE BLD-MCNC: 114 MG/DL (ref 70–99)
GLUCOSE BLD-MCNC: 203 MG/DL (ref 70–99)
GLUCOSE BLD-MCNC: 226 MG/DL (ref 70–99)
GLUCOSE BLD-MCNC: 279 MG/DL (ref 70–99)
HCT VFR BLD CALC: 35.1 % (ref 42–52)
HDLC SERPL-MCNC: 32 MG/DL (ref 55–121)
HEMOGLOBIN: 12.1 G/DL (ref 14–18)
INR BLD: 1.08 (ref 0.88–1.18)
LDL CHOLESTEROL CALCULATED: 56 MG/DL
LYMPHOCYTES ABSOLUTE: 0.7 K/UL (ref 1.1–4.5)
LYMPHOCYTES RELATIVE PERCENT: 18.2 % (ref 20–40)
MCH RBC QN AUTO: 30.9 PG (ref 27–31)
MCHC RBC AUTO-ENTMCNC: 34.5 G/DL (ref 33–37)
MCV RBC AUTO: 89.8 FL (ref 80–94)
MONOCYTES ABSOLUTE: 0.6 K/UL (ref 0–0.9)
MONOCYTES RELATIVE PERCENT: 16.1 % (ref 0–10)
NEUTROPHILS ABSOLUTE: 2.4 K/UL (ref 1.5–7.5)
NEUTROPHILS RELATIVE PERCENT: 62.8 % (ref 50–65)
PDW BLD-RTO: 13.5 % (ref 11.5–14.5)
PERFORMED ON: ABNORMAL
PLATELET # BLD: 165 K/UL (ref 130–400)
PMV BLD AUTO: 11.2 FL (ref 9.4–12.4)
PROTHROMBIN TIME: 13.9 SEC (ref 12–14.6)
RBC # BLD: 3.91 M/UL (ref 4.7–6.1)
TRIGL SERPL-MCNC: 128 MG/DL (ref 0–149)
TROPONIN: <0.01 NG/ML (ref 0–0.03)
TROPONIN: <0.01 NG/ML (ref 0–0.03)
WBC # BLD: 3.9 K/UL (ref 4.8–10.8)

## 2018-02-08 PROCEDURE — 2140000000 HC CCU INTERMEDIATE R&B

## 2018-02-08 PROCEDURE — 84484 ASSAY OF TROPONIN QUANT: CPT

## 2018-02-08 PROCEDURE — 2580000003 HC RX 258: Performed by: INTERNAL MEDICINE

## 2018-02-08 PROCEDURE — 36415 COLL VENOUS BLD VENIPUNCTURE: CPT

## 2018-02-08 PROCEDURE — 6370000000 HC RX 637 (ALT 250 FOR IP): Performed by: INTERNAL MEDICINE

## 2018-02-08 PROCEDURE — 80061 LIPID PANEL: CPT

## 2018-02-08 PROCEDURE — 6360000002 HC RX W HCPCS: Performed by: INTERNAL MEDICINE

## 2018-02-08 PROCEDURE — 82948 REAGENT STRIP/BLOOD GLUCOSE: CPT

## 2018-02-08 PROCEDURE — 93005 ELECTROCARDIOGRAM TRACING: CPT

## 2018-02-08 PROCEDURE — 85610 PROTHROMBIN TIME: CPT

## 2018-02-08 PROCEDURE — 99232 SBSQ HOSP IP/OBS MODERATE 35: CPT | Performed by: INTERNAL MEDICINE

## 2018-02-08 PROCEDURE — 85025 COMPLETE CBC W/AUTO DIFF WBC: CPT

## 2018-02-08 RX ORDER — SODIUM CHLORIDE 0.9 % (FLUSH) 0.9 %
10 SYRINGE (ML) INJECTION EVERY 12 HOURS SCHEDULED
Status: DISCONTINUED | OUTPATIENT
Start: 2018-02-08 | End: 2018-02-09

## 2018-02-08 RX ORDER — DEXTROSE MONOHYDRATE 50 MG/ML
100 INJECTION, SOLUTION INTRAVENOUS PRN
Status: DISCONTINUED | OUTPATIENT
Start: 2018-02-08 | End: 2018-02-10 | Stop reason: HOSPADM

## 2018-02-08 RX ORDER — ZOLPIDEM TARTRATE 5 MG/1
10 TABLET ORAL NIGHTLY PRN
Status: DISCONTINUED | OUTPATIENT
Start: 2018-02-08 | End: 2018-02-08 | Stop reason: SDUPTHER

## 2018-02-08 RX ORDER — SODIUM CHLORIDE 9 MG/ML
INJECTION, SOLUTION INTRAVENOUS CONTINUOUS
Status: DISCONTINUED | OUTPATIENT
Start: 2018-02-09 | End: 2018-02-09

## 2018-02-08 RX ORDER — DEXTROSE MONOHYDRATE 25 G/50ML
12.5 INJECTION, SOLUTION INTRAVENOUS PRN
Status: DISCONTINUED | OUTPATIENT
Start: 2018-02-08 | End: 2018-02-10 | Stop reason: HOSPADM

## 2018-02-08 RX ORDER — SODIUM CHLORIDE 0.9 % (FLUSH) 0.9 %
10 SYRINGE (ML) INJECTION PRN
Status: DISCONTINUED | OUTPATIENT
Start: 2018-02-08 | End: 2018-02-09

## 2018-02-08 RX ORDER — OXYCODONE AND ACETAMINOPHEN 10; 325 MG/1; MG/1
1 TABLET ORAL EVERY 8 HOURS PRN
Status: DISCONTINUED | OUTPATIENT
Start: 2018-02-08 | End: 2018-02-10 | Stop reason: HOSPADM

## 2018-02-08 RX ORDER — NICOTINE POLACRILEX 4 MG
15 LOZENGE BUCCAL PRN
Status: DISCONTINUED | OUTPATIENT
Start: 2018-02-08 | End: 2018-02-10 | Stop reason: HOSPADM

## 2018-02-08 RX ORDER — TAMSULOSIN HYDROCHLORIDE 0.4 MG/1
0.4 CAPSULE ORAL DAILY
Status: DISCONTINUED | OUTPATIENT
Start: 2018-02-08 | End: 2018-02-10 | Stop reason: HOSPADM

## 2018-02-08 RX ORDER — NITROGLYCERIN 0.4 MG/1
0.4 TABLET SUBLINGUAL EVERY 5 MIN PRN
Status: DISCONTINUED | OUTPATIENT
Start: 2018-02-08 | End: 2018-02-10 | Stop reason: HOSPADM

## 2018-02-08 RX ADMIN — TAMSULOSIN HYDROCHLORIDE 0.4 MG: 0.4 CAPSULE ORAL at 08:24

## 2018-02-08 RX ADMIN — INSULIN LISPRO 2 UNITS: 100 INJECTION, SOLUTION INTRAVENOUS; SUBCUTANEOUS at 14:04

## 2018-02-08 RX ADMIN — OXYCODONE HYDROCHLORIDE AND ACETAMINOPHEN 1 TABLET: 10; 325 TABLET ORAL at 08:30

## 2018-02-08 RX ADMIN — TAMSULOSIN HYDROCHLORIDE 0.4 MG: 0.4 CAPSULE ORAL at 14:03

## 2018-02-08 RX ADMIN — INSULIN LISPRO 2 UNITS: 100 INJECTION, SOLUTION INTRAVENOUS; SUBCUTANEOUS at 21:10

## 2018-02-08 RX ADMIN — ENOXAPARIN SODIUM 40 MG: 40 INJECTION SUBCUTANEOUS at 08:24

## 2018-02-08 RX ADMIN — METOPROLOL TARTRATE 12.5 MG: 25 TABLET ORAL at 20:55

## 2018-02-08 RX ADMIN — SODIUM CHLORIDE: 9 INJECTION, SOLUTION INTRAVENOUS at 11:21

## 2018-02-08 RX ADMIN — OXYCODONE HYDROCHLORIDE AND ACETAMINOPHEN 1 TABLET: 10; 325 TABLET ORAL at 23:53

## 2018-02-08 RX ADMIN — ASPIRIN 81 MG CHEWABLE TABLET 81 MG: 81 TABLET CHEWABLE at 08:24

## 2018-02-08 RX ADMIN — INSULIN LISPRO 2 UNITS: 100 INJECTION, SOLUTION INTRAVENOUS; SUBCUTANEOUS at 16:59

## 2018-02-08 RX ADMIN — OXYCODONE HYDROCHLORIDE AND ACETAMINOPHEN 1 TABLET: 10; 325 TABLET ORAL at 16:24

## 2018-02-08 RX ADMIN — METOPROLOL TARTRATE 12.5 MG: 25 TABLET ORAL at 14:03

## 2018-02-08 RX ADMIN — ZOLPIDEM TARTRATE 5 MG: 5 TABLET ORAL at 22:09

## 2018-02-08 ASSESSMENT — PAIN SCALES - GENERAL
PAINLEVEL_OUTOF10: 9
PAINLEVEL_OUTOF10: 5
PAINLEVEL_OUTOF10: 10
PAINLEVEL_OUTOF10: 5
PAINLEVEL_OUTOF10: 9

## 2018-02-08 NOTE — H&P
[facility]      History and Physical       Date of Admission:  2/7/2018  4:19 PM    Date of Initially Being Seen / Consultation:  2/7/18    Cardiologist:  Jocy Patricia MD     Cardiology Attending: Sheila Whitman Attending: FLIP     PCP:  Davonte Allen MD    Reason for Consultation or Admission / Chief Complaint:  Chest discomfort    SUBJECTIVE AND HISTORY OF PRESENT ILLNESS:    Source of the history:  Patient, family, previous inpatient and outpatient records in [facility]    Yesi Lauren is a 58 y.o. male who presents to [facility] ED with symptoms / signs / problem or diagnosis of chest discomfort. The symptoms began about a week ago. The chest discomfort began at rest and has been progressive. He took a nitro earlier today and it helped. It was worse with activity. There was not partial relief with rest.  The chest discomfort was described as pressure, fullness and tightness. It was not crushing. It was it located in the central chest with radiation to the back, throat and left shoulder. It was described as mild. There were no associated manifestations such as nausea, vomiting, diaphoresis, presyncope, syncope, dizzyness, weakness, cold sweats, or shortness of air. When being examined this evening, he has had no symptoms of exertional chest discomfort, unusual or change in shortness of air, presyncope or syncope.         Family present:  yes      CARDIAC RISK PROFILE:    Risk Factor Yes / No / Unknown       Gender Male   Cigarette Use Yes:    Family History of Cardiovascular Disease N/A   Diabetes Mellitus yes   Hypercholesteremia no   Hypertension yes          Cardiac Specific Problems:    Specialty Problems        Cardiology Problems    CAD (coronary artery disease)        HTN (hypertension)        ACS (acute coronary syndrome) ([facility] [address]                PRIOR CARDIAC PROBLEM LIST  (IF APPLICABLE):    5257  CABG LIMA-LAD, VG-OM, VG-diag  12/26/2013  Cath  Closed LIMA-LAD, Recent Labs      02/07/18   1710   WBC  6.2   HGB  15.9   HCT  45.0   MCV  88.2   PLT  209     BMP:   Recent Labs      02/07/18   1710   NA  140   K  4.1   CL  97*   CO2  24   BUN  16   CREATININE  0.6     Cardiac Enzymes:   Recent Labs      02/07/18   1750   TROPONINI  <0.01     PT/INR:   Recent Labs      02/07/18   1710   PROTIME  13.5   INR  1.04     APTT:   Recent Labs      02/07/18   1710   APTT  27.7     Liver Profile:  Lab Results   Component Value Date    AST 27 02/07/2018    ALT 27 02/07/2018    BILITOT 0.5 02/07/2018    ALKPHOS 156 02/07/2018    ALKPHOS 161 09/09/2011     Lab Results   Component Value Date    CHOL 154 10/26/2016    HDL 20 10/26/2016    TRIG 411 10/26/2016     TSH:  Lab Results   Component Value Date    TSH 0.60 01/04/2015     UA:   Lab Results   Component Value Date    COLORU YELLOW 10/24/2017    PHUR 6.5 10/24/2017    BACTERIA TRACE 09/09/2011    CLARITYU Clear 10/24/2017    SPECGRAV 1.010 10/24/2017    LEUKOCYTESUR Negative 10/24/2017    UROBILINOGEN 1.0 10/24/2017    BILIRUBINUR Negative 10/24/2017    BILIRUBINUR NEGATIVE 09/09/2011    BLOODU Negative 10/24/2017    GLUCOSEU Negative 10/24/2017             ALL THE CARDIOLOGY PROBLEMS ARE LISTED ABOVE; HOWEVER, THE FOLLOWING SPECIFIC CARDIAC PROBLEMS WERE ADDRESSED AND TREATED DURING THE HOSPITAL VISIT TODAY:                                                                                                                                                                                                                                            MEDICAL DECISION MAKING             Cardiac Specific Problem / Diagnosis  Discussion and Data Reviewed Diagnostic Procedures Ordered Management Options Selected           1. Presenting problem / symptom    Chest discomfort of ? etiology  are worsening   The chest discomfort is was exertional and does perhaps appear to represent myocardial ischemia.       Nonetheless, He has the following risk factors

## 2018-02-08 NOTE — PROGRESS NOTES
Salvador Carson : 1955, Male, 58 y.o. History: This is a 78-year-old white male with known coronary artery disease who were asked to see regarding chest pain    [Allergies/Contraindications:  Codeine]     Todays status: Patient is having no current chest pain the patient becomes short of breath with speaking    Physical Examination  BP 94/60   Pulse 86   Temp 98.6 °F (37 °C) (Temporal)   Resp 16   Ht 5' 6\" (1.676 m)   Wt 131 lb 3.2 oz (59.5 kg)   SpO2 96%   BMI 21.18 kg/m²     Respiratory -  lungs revealed diminished breath sounds . Cardiovascular   the rhythm is regular  Abdominal -  Soft. Bowel sounds present. Nontender. Extremities -  no lower extremity edema is detected. Data  Recent Labs      18   1710   NA  140   K  4.1   CL  97*   CO2  24   BUN  16   CREATININE  0.6   GLUCOSE  140*   CALCIUM  10.2   PROT  8.7   LABALBU  4.6   BILITOT  0.5   ALKPHOS  156*   AST  27   ALT  27       Lab Results   Component Value Date    TROPONINI <0.01 2018       Telemetry--Sinus    Impression/Plan          We'll hold Lovenox. We will hydrate the patient and plan cardiac catheterization for tomorrow. We will add metoprolol 12.5 mg by mouth twice a day and place him on sliding scale insulin      Ana Stevenson MD    [facility]

## 2018-02-08 NOTE — PROGRESS NOTES
Nutrition Assessment    Type and Reason for Visit: Initial, Positive Nutrition Screen    Nutrition Recommendations: continue Glucerna 1.2 240 x 2, Ensure Enlive x 3 a day    Malnutrition Assessment:  · Malnutrition Status: Meets the criteria for moderate malnutrition  · Context: Chronic illness  · Findings of the 6 clinical characteristics of malnutrition (Minimum of 2 out of 6 clinical characteristics is required to make the diagnosis of moderate or severe Protein Calorie Malnutrition based on AND/ASPEN Guidelines):  1. Energy Intake-Less than or equal to 50%, greater than or equal to 3 months    2. Weight Loss-10% loss or greater, in 3 months  3. Fat Loss-Moderate subcutaneous fat loss, Orbital  4. Muscle Loss-Moderate muscle mass loss, Temples (temporalis muscle), Clavicles (pectoralis and deltoids)  5. Fluid Accumulation-Unable to assess,    6.  Strength-Not measured    Nutrition Diagnosis:   · Problem: Increased nutrient needs, Inadequate oral intake  · Etiology: related to Insufficient energy/nutrient consumption, Increased demand for energy/nutrients due to, Difficulty swallowing     Signs and symptoms:  as evidenced by Nutrition support - EN, Patient report of, Weight loss, Weight loss greater than or equal to 7.5% in 3 months    Nutrition Assessment:  · Subjective Assessment: Positive nutrition screen for decreased weight and home EN. Pt also has a trach. Aware finished chemo and radiation 3 weeks ago. Does bolus feedings at home of Glucerna 1.2 2 cans per day and 3 Ensure Enlive a day. --will suggest increasing Enlive to 4 a day to help with increased calorie needs  · Nutrition-Focused Physical Findings: thin appearing gentleman with trach  · Wound Type: Surgical Wound  · Current Nutrition Therapies:  · Oral Diet Orders: Full Liquid   · Oral Diet intake: Unable to assess  · Oral Nutrition Supplement (ONS) Orders: Standard High Calorie Oral Supplement  · ONS intake: Unable to assess  · Tube

## 2018-02-08 NOTE — PROGRESS NOTES
Pt arrived on the floor via stretcher accompanied by RN and PCA. Pt was on a nitro drip at 1.5ml/hr. Pt was tolerating well and denies chest pain at this time. Pt is alert and oriented.  Will continue to monitor

## 2018-02-09 LAB
ANION GAP SERPL CALCULATED.3IONS-SCNC: 12 MMOL/L (ref 7–19)
BILIRUBIN URINE: NEGATIVE
BLOOD, URINE: NEGATIVE
BUN BLDV-MCNC: 21 MG/DL (ref 8–23)
CALCIUM SERPL-MCNC: 8.3 MG/DL (ref 8.8–10.2)
CHLORIDE BLD-SCNC: 101 MMOL/L (ref 98–111)
CLARITY: ABNORMAL
CO2: 25 MMOL/L (ref 22–29)
COLOR: YELLOW
CREAT SERPL-MCNC: 0.5 MG/DL (ref 0.5–1.2)
EKG P AXIS: 61 DEGREES
EKG P AXIS: 74 DEGREES
EKG P-R INTERVAL: 148 MS
EKG P-R INTERVAL: 154 MS
EKG Q-T INTERVAL: 404 MS
EKG Q-T INTERVAL: 406 MS
EKG QRS DURATION: 92 MS
EKG QRS DURATION: 94 MS
EKG QTC CALCULATION (BAZETT): 433 MS
EKG QTC CALCULATION (BAZETT): 435 MS
EKG T AXIS: 54 DEGREES
EKG T AXIS: 58 DEGREES
GFR NON-AFRICAN AMERICAN: >60
GLUCOSE BLD-MCNC: 101 MG/DL (ref 70–99)
GLUCOSE BLD-MCNC: 105 MG/DL (ref 70–99)
GLUCOSE BLD-MCNC: 110 MG/DL (ref 70–99)
GLUCOSE BLD-MCNC: 112 MG/DL (ref 74–109)
GLUCOSE BLD-MCNC: 333 MG/DL (ref 70–99)
GLUCOSE URINE: NEGATIVE MG/DL
HCT VFR BLD CALC: 31.8 % (ref 42–52)
HEMOGLOBIN: 10.4 G/DL (ref 14–18)
KETONES, URINE: NEGATIVE MG/DL
LEUKOCYTE ESTERASE, URINE: NEGATIVE
LV EF: 60 %
LVEF MODALITY: NORMAL
MCH RBC QN AUTO: 30.5 PG (ref 27–31)
MCHC RBC AUTO-ENTMCNC: 32.7 G/DL (ref 33–37)
MCV RBC AUTO: 93.3 FL (ref 80–94)
NITRITE, URINE: NEGATIVE
PDW BLD-RTO: 13.4 % (ref 11.5–14.5)
PERFORMED ON: ABNORMAL
PH UA: 7.5
PLATELET # BLD: 128 K/UL (ref 130–400)
PMV BLD AUTO: 11.5 FL (ref 9.4–12.4)
POTASSIUM SERPL-SCNC: 4.1 MMOL/L (ref 3.5–5)
PROTEIN UA: NEGATIVE MG/DL
RBC # BLD: 3.41 M/UL (ref 4.7–6.1)
SODIUM BLD-SCNC: 138 MMOL/L (ref 136–145)
SPECIFIC GRAVITY UA: >1.045
TROPONIN: <0.01 NG/ML (ref 0–0.03)
UROBILINOGEN, URINE: 1 E.U./DL
WBC # BLD: 6.1 K/UL (ref 4.8–10.8)

## 2018-02-09 PROCEDURE — 80048 BASIC METABOLIC PNL TOTAL CA: CPT

## 2018-02-09 PROCEDURE — 81003 URINALYSIS AUTO W/O SCOPE: CPT

## 2018-02-09 PROCEDURE — 92928 PRQ TCAT PLMT NTRAC ST 1 LES: CPT | Performed by: INTERNAL MEDICINE

## 2018-02-09 PROCEDURE — 84484 ASSAY OF TROPONIN QUANT: CPT

## 2018-02-09 PROCEDURE — 2709999900 HC NON-CHARGEABLE SUPPLY

## 2018-02-09 PROCEDURE — 93005 ELECTROCARDIOGRAM TRACING: CPT

## 2018-02-09 PROCEDURE — C1769 GUIDE WIRE: HCPCS

## 2018-02-09 PROCEDURE — 02703DZ DILATION OF CORONARY ARTERY, ONE ARTERY WITH INTRALUMINAL DEVICE, PERCUTANEOUS APPROACH: ICD-10-PCS | Performed by: INTERNAL MEDICINE

## 2018-02-09 PROCEDURE — B2151ZZ FLUOROSCOPY OF LEFT HEART USING LOW OSMOLAR CONTRAST: ICD-10-PCS | Performed by: INTERNAL MEDICINE

## 2018-02-09 PROCEDURE — B2181ZZ FLUOROSCOPY OF LEFT INTERNAL MAMMARY BYPASS GRAFT USING LOW OSMOLAR CONTRAST: ICD-10-PCS | Performed by: INTERNAL MEDICINE

## 2018-02-09 PROCEDURE — 82948 REAGENT STRIP/BLOOD GLUCOSE: CPT

## 2018-02-09 PROCEDURE — 6360000002 HC RX W HCPCS

## 2018-02-09 PROCEDURE — 36415 COLL VENOUS BLD VENIPUNCTURE: CPT

## 2018-02-09 PROCEDURE — 2580000003 HC RX 258: Performed by: INTERNAL MEDICINE

## 2018-02-09 PROCEDURE — 2140000000 HC CCU INTERMEDIATE R&B

## 2018-02-09 PROCEDURE — 93459 L HRT ART/GRFT ANGIO: CPT | Performed by: INTERNAL MEDICINE

## 2018-02-09 PROCEDURE — 85027 COMPLETE CBC AUTOMATED: CPT

## 2018-02-09 PROCEDURE — 6370000000 HC RX 637 (ALT 250 FOR IP): Performed by: INTERNAL MEDICINE

## 2018-02-09 PROCEDURE — B2131ZZ FLUOROSCOPY OF MULTIPLE CORONARY ARTERY BYPASS GRAFTS USING LOW OSMOLAR CONTRAST: ICD-10-PCS | Performed by: INTERNAL MEDICINE

## 2018-02-09 PROCEDURE — 4A023N7 MEASUREMENT OF CARDIAC SAMPLING AND PRESSURE, LEFT HEART, PERCUTANEOUS APPROACH: ICD-10-PCS | Performed by: INTERNAL MEDICINE

## 2018-02-09 PROCEDURE — 6370000000 HC RX 637 (ALT 250 FOR IP)

## 2018-02-09 PROCEDURE — B2111ZZ FLUOROSCOPY OF MULTIPLE CORONARY ARTERIES USING LOW OSMOLAR CONTRAST: ICD-10-PCS | Performed by: INTERNAL MEDICINE

## 2018-02-09 PROCEDURE — C1760 CLOSURE DEV, VASC: HCPCS

## 2018-02-09 PROCEDURE — C1887 CATHETER, GUIDING: HCPCS

## 2018-02-09 PROCEDURE — 2720000000 HC MISC SURG SUPPLY STERILE $0-50

## 2018-02-09 PROCEDURE — 3E033PZ INTRODUCTION OF PLATELET INHIBITOR INTO PERIPHERAL VEIN, PERCUTANEOUS APPROACH: ICD-10-PCS | Performed by: INTERNAL MEDICINE

## 2018-02-09 PROCEDURE — C1876 STENT, NON-COA/NON-COV W/DEL: HCPCS

## 2018-02-09 PROCEDURE — 2720000001 HC MISC SURG SUPPLY STERILE $51-500

## 2018-02-09 RX ORDER — SODIUM CHLORIDE 9 MG/ML
INJECTION, SOLUTION INTRAVENOUS CONTINUOUS
Status: ACTIVE | OUTPATIENT
Start: 2018-02-09 | End: 2018-02-09

## 2018-02-09 RX ORDER — SODIUM CHLORIDE 0.9 % (FLUSH) 0.9 %
10 SYRINGE (ML) INJECTION PRN
Status: DISCONTINUED | OUTPATIENT
Start: 2018-02-09 | End: 2018-02-10 | Stop reason: HOSPADM

## 2018-02-09 RX ORDER — ATORVASTATIN CALCIUM 20 MG/1
10 TABLET, FILM COATED ORAL NIGHTLY
Status: DISCONTINUED | OUTPATIENT
Start: 2018-02-09 | End: 2018-02-10 | Stop reason: HOSPADM

## 2018-02-09 RX ORDER — SODIUM CHLORIDE 0.9 % (FLUSH) 0.9 %
10 SYRINGE (ML) INJECTION EVERY 12 HOURS SCHEDULED
Status: DISCONTINUED | OUTPATIENT
Start: 2018-02-09 | End: 2018-02-10 | Stop reason: HOSPADM

## 2018-02-09 RX ADMIN — SODIUM CHLORIDE: 9 INJECTION, SOLUTION INTRAVENOUS at 05:37

## 2018-02-09 RX ADMIN — ACETAMINOPHEN 650 MG: 325 TABLET, FILM COATED ORAL at 12:26

## 2018-02-09 RX ADMIN — TAMSULOSIN HYDROCHLORIDE 0.4 MG: 0.4 CAPSULE ORAL at 09:27

## 2018-02-09 RX ADMIN — OXYCODONE HYDROCHLORIDE AND ACETAMINOPHEN 1 TABLET: 10; 325 TABLET ORAL at 17:40

## 2018-02-09 RX ADMIN — SODIUM CHLORIDE: 9 INJECTION, SOLUTION INTRAVENOUS at 09:28

## 2018-02-09 RX ADMIN — TICAGRELOR 90 MG: 90 TABLET ORAL at 19:47

## 2018-02-09 RX ADMIN — ZOLPIDEM TARTRATE 5 MG: 5 TABLET ORAL at 23:42

## 2018-02-09 RX ADMIN — Medication 10 ML: at 19:48

## 2018-02-09 RX ADMIN — ACETAMINOPHEN 650 MG: 325 TABLET, FILM COATED ORAL at 17:40

## 2018-02-09 RX ADMIN — METOPROLOL TARTRATE 12.5 MG: 25 TABLET ORAL at 09:26

## 2018-02-09 RX ADMIN — OXYCODONE HYDROCHLORIDE AND ACETAMINOPHEN 1 TABLET: 10; 325 TABLET ORAL at 09:32

## 2018-02-09 RX ADMIN — ATORVASTATIN CALCIUM 10 MG: 20 TABLET, FILM COATED ORAL at 19:47

## 2018-02-09 RX ADMIN — TICAGRELOR 90 MG: 90 TABLET ORAL at 10:16

## 2018-02-09 RX ADMIN — ASPIRIN 81 MG CHEWABLE TABLET 81 MG: 81 TABLET CHEWABLE at 09:26

## 2018-02-09 RX ADMIN — INSULIN LISPRO 2 UNITS: 100 INJECTION, SOLUTION INTRAVENOUS; SUBCUTANEOUS at 20:14

## 2018-02-09 ASSESSMENT — PAIN SCALES - GENERAL
PAINLEVEL_OUTOF10: 0
PAINLEVEL_OUTOF10: 9
PAINLEVEL_OUTOF10: 0
PAINLEVEL_OUTOF10: 8
PAINLEVEL_OUTOF10: 0
PAINLEVEL_OUTOF10: 8
PAINLEVEL_OUTOF10: 0

## 2018-02-10 VITALS
TEMPERATURE: 97 F | DIASTOLIC BLOOD PRESSURE: 61 MMHG | OXYGEN SATURATION: 97 % | HEIGHT: 66 IN | HEART RATE: 73 BPM | WEIGHT: 139.8 LBS | SYSTOLIC BLOOD PRESSURE: 100 MMHG | RESPIRATION RATE: 16 BRPM | BODY MASS INDEX: 22.47 KG/M2

## 2018-02-10 LAB
ANION GAP SERPL CALCULATED.3IONS-SCNC: 10 MMOL/L (ref 7–19)
BUN BLDV-MCNC: 14 MG/DL (ref 8–23)
CALCIUM SERPL-MCNC: 8.2 MG/DL (ref 8.8–10.2)
CHLORIDE BLD-SCNC: 100 MMOL/L (ref 98–111)
CO2: 25 MMOL/L (ref 22–29)
CREAT SERPL-MCNC: <0.5 MG/DL (ref 0.5–1.2)
GFR NON-AFRICAN AMERICAN: >60
GLUCOSE BLD-MCNC: 129 MG/DL (ref 70–99)
GLUCOSE BLD-MCNC: 134 MG/DL (ref 74–109)
GLUCOSE BLD-MCNC: 238 MG/DL (ref 70–99)
HCT VFR BLD CALC: 31.1 % (ref 42–52)
HEMOGLOBIN: 10.6 G/DL (ref 14–18)
MCH RBC QN AUTO: 30.6 PG (ref 27–31)
MCHC RBC AUTO-ENTMCNC: 34.1 G/DL (ref 33–37)
MCV RBC AUTO: 89.9 FL (ref 80–94)
PDW BLD-RTO: 13.1 % (ref 11.5–14.5)
PERFORMED ON: ABNORMAL
PERFORMED ON: ABNORMAL
PLATELET # BLD: 121 K/UL (ref 130–400)
PMV BLD AUTO: 11.5 FL (ref 9.4–12.4)
POTASSIUM REFLEX MAGNESIUM: 4 MMOL/L (ref 3.5–5)
RBC # BLD: 3.46 M/UL (ref 4.7–6.1)
SODIUM BLD-SCNC: 135 MMOL/L (ref 136–145)
WBC # BLD: 5.7 K/UL (ref 4.8–10.8)

## 2018-02-10 PROCEDURE — 99238 HOSP IP/OBS DSCHRG MGMT 30/<: CPT | Performed by: INTERNAL MEDICINE

## 2018-02-10 PROCEDURE — 6370000000 HC RX 637 (ALT 250 FOR IP): Performed by: INTERNAL MEDICINE

## 2018-02-10 PROCEDURE — 82948 REAGENT STRIP/BLOOD GLUCOSE: CPT

## 2018-02-10 PROCEDURE — 85027 COMPLETE CBC AUTOMATED: CPT

## 2018-02-10 PROCEDURE — 93005 ELECTROCARDIOGRAM TRACING: CPT

## 2018-02-10 PROCEDURE — 80048 BASIC METABOLIC PNL TOTAL CA: CPT

## 2018-02-10 PROCEDURE — 36415 COLL VENOUS BLD VENIPUNCTURE: CPT

## 2018-02-10 PROCEDURE — 2580000003 HC RX 258: Performed by: INTERNAL MEDICINE

## 2018-02-10 RX ORDER — ASPIRIN 81 MG/1
81 TABLET, CHEWABLE ORAL DAILY
Qty: 30 TABLET | Refills: 3 | Status: SHIPPED | OUTPATIENT
Start: 2018-02-11

## 2018-02-10 RX ORDER — ATORVASTATIN CALCIUM 10 MG/1
10 TABLET, FILM COATED ORAL NIGHTLY
Qty: 30 TABLET | Refills: 3 | Status: SHIPPED | OUTPATIENT
Start: 2018-02-10 | End: 2018-03-22 | Stop reason: SDUPTHER

## 2018-02-10 RX ADMIN — INSULIN LISPRO 2 UNITS: 100 INJECTION, SOLUTION INTRAVENOUS; SUBCUTANEOUS at 11:43

## 2018-02-10 RX ADMIN — Medication 10 ML: at 09:07

## 2018-02-10 RX ADMIN — OXYCODONE HYDROCHLORIDE AND ACETAMINOPHEN 1 TABLET: 10; 325 TABLET ORAL at 14:04

## 2018-02-10 RX ADMIN — OXYCODONE HYDROCHLORIDE AND ACETAMINOPHEN 1 TABLET: 10; 325 TABLET ORAL at 06:10

## 2018-02-10 RX ADMIN — ASPIRIN 81 MG CHEWABLE TABLET 81 MG: 81 TABLET CHEWABLE at 09:07

## 2018-02-10 RX ADMIN — TAMSULOSIN HYDROCHLORIDE 0.4 MG: 0.4 CAPSULE ORAL at 09:07

## 2018-02-10 RX ADMIN — TICAGRELOR 90 MG: 90 TABLET ORAL at 09:07

## 2018-02-10 RX ADMIN — MAGNESIUM HYDROXIDE 30 ML: 400 SUSPENSION ORAL at 09:39

## 2018-02-10 ASSESSMENT — PAIN SCALES - GENERAL
PAINLEVEL_OUTOF10: 9
PAINLEVEL_OUTOF10: 4
PAINLEVEL_OUTOF10: 9
PAINLEVEL_OUTOF10: 10

## 2018-02-10 NOTE — PROGRESS NOTES
Charge nurse informed Dr Pedro Wu that patient was refusing metoprolol.    Electronically signed by Alvina Nieto RN on 2/10/2018 at 3:29 PM

## 2018-02-10 NOTE — DISCHARGE SUMMARY
tablet, Refills: 3      atorvastatin (LIPITOR) 10 MG tablet Take 1 tablet by mouth nightly  Qty: 30 tablet, Refills: 3      metoprolol tartrate (LOPRESSOR) 25 MG tablet Take 0.5 tablets by mouth 2 times daily  Qty: 60 tablet, Refills: 3      ticagrelor (BRILINTA) 90 MG TABS tablet Take 1 tablet by mouth 2 times daily  Qty: 60 tablet, Refills: 1              Details   insulin glargine (LANTUS) 100 UNIT/ML injection vial Inject 30 Units into the skin every morning      zolpidem (AMBIEN) 10 MG tablet Take by mouth nightly as needed for Sleep      tamsulosin (FLOMAX) 0.4 MG capsule Take 0.4 mg by mouth daily. nitroGLYCERIN (NITROSTAT) 0.4 MG SL tablet Place 1 tablet under the tongue every 5 minutes as needed for Chest pain. Qty: 25 tablet, Refills: 3      oxyCODONE-acetaminophen (PERCOCET)  MG per tablet Take 1 tablet by mouth every 8 hours as needed                  Disposition:      1. Home  2. Follow up with cardiology as arranged  3. Follow up with primary care provider as arranged  4. For patients who underwent percutaneous coronary intervention during this hospitalization, they will be sent home on:  aspirin, an antiplatelet,a beta - blocker, ACE-inhibitor or ARB, and statin if not allergic.     Electronically signed by Chelsea Amaro MD on 2/10/18

## 2018-02-12 LAB
EKG P AXIS: 70 DEGREES
EKG P-R INTERVAL: 158 MS
EKG Q-T INTERVAL: 410 MS
EKG QRS DURATION: 94 MS
EKG QTC CALCULATION (BAZETT): 432 MS
EKG T AXIS: 45 DEGREES

## 2018-03-01 ENCOUNTER — TREATMENT (OUTPATIENT)
Dept: SPEECH THERAPY | Facility: HOSPITAL | Age: 63
End: 2018-03-01

## 2018-03-01 ENCOUNTER — OFFICE VISIT (OUTPATIENT)
Dept: OTOLARYNGOLOGY | Facility: CLINIC | Age: 63
End: 2018-03-01

## 2018-03-01 ENCOUNTER — HOSPITAL ENCOUNTER (OUTPATIENT)
Dept: RADIATION ONCOLOGY | Facility: HOSPITAL | Age: 63
Setting detail: RADIATION/ONCOLOGY SERIES
End: 2018-03-01

## 2018-03-01 ENCOUNTER — OFFICE VISIT (OUTPATIENT)
Dept: RADIATION ONCOLOGY | Facility: HOSPITAL | Age: 63
End: 2018-03-01

## 2018-03-01 VITALS
WEIGHT: 143 LBS | BODY MASS INDEX: 22.98 KG/M2 | HEIGHT: 66 IN | DIASTOLIC BLOOD PRESSURE: 78 MMHG | SYSTOLIC BLOOD PRESSURE: 118 MMHG | TEMPERATURE: 98.8 F

## 2018-03-01 VITALS
SYSTOLIC BLOOD PRESSURE: 108 MMHG | DIASTOLIC BLOOD PRESSURE: 78 MMHG | WEIGHT: 146 LBS | BODY MASS INDEX: 23.46 KG/M2 | HEIGHT: 66 IN

## 2018-03-01 DIAGNOSIS — Z93.0 TRACHEOSTOMY DEPENDENCE (HCC): ICD-10-CM

## 2018-03-01 DIAGNOSIS — F17.200 CURRENT EVERY DAY SMOKER: ICD-10-CM

## 2018-03-01 DIAGNOSIS — Z72.0 TOBACCO USE: ICD-10-CM

## 2018-03-01 DIAGNOSIS — C10.9 OROPHARYNGEAL CANCER (HCC): Primary | ICD-10-CM

## 2018-03-01 DIAGNOSIS — R13.12 OROPHARYNGEAL DYSPHAGIA: ICD-10-CM

## 2018-03-01 DIAGNOSIS — R13.12 OROPHARYNGEAL DYSPHAGIA: Primary | ICD-10-CM

## 2018-03-01 DIAGNOSIS — Z92.3 HISTORY OF RADIATION THERAPY: ICD-10-CM

## 2018-03-01 DIAGNOSIS — Z93.1 S/P PERCUTANEOUS ENDOSCOPIC GASTROSTOMY (PEG) TUBE PLACEMENT (HCC): ICD-10-CM

## 2018-03-01 PROCEDURE — 92526 ORAL FUNCTION THERAPY: CPT | Performed by: SPEECH-LANGUAGE PATHOLOGIST

## 2018-03-01 PROCEDURE — G8996 SWALLOW CURRENT STATUS: HCPCS | Performed by: SPEECH-LANGUAGE PATHOLOGIST

## 2018-03-01 PROCEDURE — 99406 BEHAV CHNG SMOKING 3-10 MIN: CPT | Performed by: OTOLARYNGOLOGY

## 2018-03-01 PROCEDURE — G8997 SWALLOW GOAL STATUS: HCPCS | Performed by: SPEECH-LANGUAGE PATHOLOGIST

## 2018-03-01 PROCEDURE — 99214 OFFICE O/P EST MOD 30 MIN: CPT | Performed by: OTOLARYNGOLOGY

## 2018-03-01 RX ORDER — ATORVASTATIN CALCIUM 10 MG/1
10 TABLET, FILM COATED ORAL DAILY
COMMUNITY
Start: 2018-02-10 | End: 2018-10-11

## 2018-03-01 RX ORDER — ASPIRIN 81 MG/1
81 TABLET, CHEWABLE ORAL DAILY
COMMUNITY
Start: 2018-02-11 | End: 2018-10-11

## 2018-03-01 NOTE — PROGRESS NOTES
" Alber Justin MD   CC:  follow up head and neck cancer    History of Present Illness:  The patient presents for routine cancer follow up with no acute complaints. He finishes radiation therapy in January.  He still has moderate difficulty with swallowing but is tolerating some oral intake.  He says he has some difficulty due to the inability to chew his food.  He states that radiation oncology has told him he can consider dentures in May.  He is able to plug his tracheostomy without shortness of breath.    Oncology/Hematology History    Sameer Tavarez is a 62 y.o. male with oropharyngeal cancer. He had an EGD on 2/6/17 by Dr Patel with biopsies showing Barretts esophagus and reflux esophagitis. He had continued dysphagia and underwent another EGD and an oropharyngeal biopsy on 8/27/17 with the oropharyngeal biosies showing \"at least squamous cell carcinoma in situ\". CT scanning showed \"oropharyngeal asymmety without overt enhancing mass\" on 8/28/17. He was referred to Dr. Alber Justin MD and diagnosed with a large ulcerative lesion that was in the oral pharynx.  It extended from the right lateral aspect of the uvula and extended to the posterior rim of the soft palate extending to the tonsillar fossa and deeply penetrating into it.  It involved the right base of tongue and extended at least to the midline of the base of tongue.  It extended forward into the lingual tongue muscular approximately 2 cm. He was having difficulty with his secretions and his airway at night, so an awake tracheostomy with direct laryngoscopy and biopsy was performed on 10/5/17. At the time of admission, g tube placement and dental extraction was performed.            Oropharyngeal cancer    8/28/2017 Initial Diagnosis     Oropharyngeal cancer         8/28/2017 Biopsy     Dr Patel ([facility]) - EGD with oropharynx biopsy:    Clinical Information       Pre-Op: Gerd/Hoarsness      Post-Op Esophageal " ulcer,barretts oral lesion    Final Diagnosis   1.  Esophagus, endoscopic biopsy:  A.  Fragments of benign squamocolumnar junction mucosa and benign glandular mucosa demonstrating intestinal metaplasia (Recio's esophagus)  B.  Chronic active inflammation, moderate.  C.  No dysplasia identified.     2.  Oropharynx, biopsy: At least squamous cell carcinoma in situ.     Comment: Status of invasion is indeterminate due to tangential sectioning of several of the tissue fragments.  Immunohistochemical stain for p16 is positive.     AJCC stage:pTX pNX                 8/28/2017 Imaging     [facility]  CT Soft tissue neck  No discrete enhancing mass  Soft tissue thickening right oropharynx and peritonsillar soft tissues compared to left.  Small lymph nodes may be inflammatory in nature         9/13/2017 Imaging     [facility]    CT Chest  Small hiatal hernia  1.1 cm lymph node adjacent to the distal esophagus.  Two small lung nodules.  For multiple solid noncalcified nodules smaller than 6 mm in diameter, no routine follow-up is recommended. (grade 2B; weak recommendation, moderate-quality evidence)  CT Abd Pelvis  Right perineal mass of unknown significance. Please correlate with physical exam.  Dilated small bowel with multiple air-fluid levels. Differential considerations include partial small bowel obstruction vs adynamic ileus.  Mild diverticulosis. Mild distended urinary bladder.         9/29/2017 Imaging     [facility]  MR Orbit Face Neck  1. Large right oropharyngeal mass compatible with the history of  carcinoma.  2. Tongue base and right submandibular gland involvement.    PET  1. Abnormal metabolic activity in the base of the tongue on the right  extending in the right palatine tonsil. SUV is 8.8. This is consistent  with neoplasm. No other regions of abnormal metabolic activity are  identified..           10/5/2017 Procedure     Tracheostomy with Direct Laryngoscopy and biopsy -JUANITA Justin,  MD     Path    Final Diagnosis   1.  Right superior tonsillar fossa, biopsy:  A.  Moderately differentiated squamous cell carcinoma, invasive.  B.  Immunohistochemical stain for p16 is positive.      AJCC stage: pTX pNX     2.  Right base of tongue, biopsy:  A.  Moderately differentiated squamous cell carcinoma, invasive.  B.  Immunohistochemical stain for p16 is positive.              10/6/2017 Procedure     Port placement  Gastrostomy tube placement         10/11/2017 Procedure     Teeth extraction- Les Goddard MD, DMD         11/3/2017 Cancer Staged     Oropharyngeal cancer    Staging form: Pharynx - HPV-Mediated Oropharynx, AJCC 8th Edition    - Clinical stage from 11/3/2017: Stage III (cT4, cN0, cM0, p16: Positive) - Signed by Alber Justin MD on 1/1/2018    - Pathologic: No stage assigned - Unsigned         11/9/2017 - 1/16/2018 Chemotherapy/Radiation     Carboplatin and Taxol- [facility]    Radiation OncologyTreatment Course:  Sameer Tavarez received 7000 cGy in 35 fractions to the oral pharynx and neck via External Beam Radiation - EBRT.- [facility]            Review of Systems  Reviewed as per patient intake note.    Past History:  Past medical and surgical history, family history and social history reviewed and updated when appropriate.  Current medications and allergies reviewed and updated when appropriate.  Allergies:  Codeine    Vital Signs:  Temp:  [98.8 °F (37.1 °C)] 98.8 °F (37.1 °C)  BP: (108-118)/(78) 118/78    Physical Exam    CONSTITUTIONAL: well nourished, well-developed, alert, oriented, in no acute distress   COMMUNICATION AND VOICE: able to communicate normally, normal voice quality, voice produced with Passy- Miur Valve,  HEAD: normocephalic, no lesions, atraumatic, no tenderness, no masses   FACE: appearance normal, no lesions, no tenderness, no deformities, facial motion symmetric  SALIVARY GLANDS: parotid glands with no tenderness, no swelling, no masses, submandibular glands with normal  size, nontender  EYES: ocular motility normal, eyelids normal, orbits normal, no proptosis, conjunctiva normal , pupils equal, round  HEARING: response to conversational voice normal bilaterally   EXTERNAL EARS: auricles without lesions  EXTERNAL EAR CANALS: normal ear canals without stenosis or significant cerumen  TYMPANIC MEMBRANES: tympanic membrane appearance normal, no lesions, no perforation, normal mobility, no fluid  EXTERNAL NOSE: structure normal, no tenderness on palpation, no nasal discharge, no lesions, no evidence of trauma, nostrils patent  INTRANASAL EXAM: nasal mucosa normal, vestibule within normal limits, inferior turbinate normal, nasal septum without overtly obstructing anterior deviation  LIPS: structure normal, no tenderness on palpation, no lesions, no evidence of trauma  TEETH: edentulous  GUMS: gingivae healthy  ORAL MUCOSA: oral mucosa normal, no mucosal lesions  FLOOR OF MOUTH: Warthin’s duct patent, mucosa normal  TONGUE: There is radiation changes within the mouth.  There is no ulceration.  There is no visible ulcerated mass.  There is only minimal fullness on the right lateral to base of tongue.  Overall there is an excellent response to therapy.  PALATE: soft and hard palates with normal mucosa and structure  OROPHARYNX: mucosa normal, tonsil fossa normal, radiation change present. no evidence of recurrent disease,  NECK: neck appearance normal, no masses, or tenderness tracheostomy present,  THYROID: no overt thyromegaly, no tenderness, nodules or mass present on palpation, position midline   LYMPH NODES: no lymphadenopathy  CHEST/RESPIRATORY: respiratory effort normal  CARDIOVASCULAR: extremities without cyanosis or edema, no overt jugulovenous distension present  NEUROLOGIC/PSYCHIATRIC: oriented appropriately for age, mood normal, affect appropriate, cranial nerves intact grossly unless specifically mentioned above     RESULTS REVIEW:    Swallow study results  reviewed    Assessment   1. Oropharyngeal cancer    2. Tobacco use    3. Tracheostomy dependence    4. Oropharyngeal dysphagia    5. S/P percutaneous endoscopic gastrostomy (PEG) tube placement        Plan    Orders Placed This Encounter   Procedures       • CT Soft Tissue Neck With Contrast     -----INSTRUCTIONS-----  Be aware of the signs and symptoms of head and neck cancer including neck mass, persistent sore throat, ear pain, hemoptysis, weight loss and hoarseness. If any of these symptoms occur, call for evaluation.     I have instructed him to begin trying to plug the trach. We will go ahead and get him set up for a repeat direct laryngoscopy with biopsy approximately 3 months after completion of therapy.  If those biopsies are negative, we will consider decannulation and possible removal of the G-tube at that time.    I advised the patient of the risks in continuing to use tobacco, and I provided this patient with smoking cessation educational materials.    During this visit, I spent > 3-10 minutes counseling the patient regarding smoking cessation.        -----SURGERY SCHEDULING:-----  DIRECT LARYNGOSCOPY, with biopsy (N/A)       ---INFORMED CONSENT DISCUSSION:---  DIRECT LARYNGOSCOPY WITH BIOPSY: The risks and benefits were explained including but not limited to pain, bleeding, infection, (including possible mediastinitis), the risks of the general anesthesia, pain, temporary or permanent hoarseness, airway loss, and/or tooth injury. Questions were answered. No guarantees were made or implied.       ---PREOPERATIVE WORKUP:---  CBC  CMP  Chest X-Ray  EKG     Follow up  postoperatively    Alber Justin MD  03/01/18  5:33 PM

## 2018-03-01 NOTE — PROGRESS NOTES
Griselda Herrera   Patient Intake Note    Review of Systems  Review of Systems   Constitutional: Negative for chills, fatigue and fever.   HENT:        See HPI   Respiratory: Positive for cough, shortness of breath and wheezing. Negative for choking.    Cardiovascular: Negative.    Gastrointestinal: Negative for constipation, diarrhea, nausea and vomiting.   Allergic/Immunologic: Negative for environmental allergies and food allergies.   Neurological: Positive for dizziness. Negative for light-headedness and headaches.   Hematological: Bruises/bleeds easily.   Psychiatric/Behavioral: Negative for sleep disturbance.       QUALITY MEASURES    Body Mass Index Screening and Follow-Up Plan  Body mass index is 23.08 kg/(m^2).      Tobacco Use: Screening and Cessation Intervention  Smoking status: Current Every Day Smoker                                                   Packs/day: 0.25      Years: 52.00        Types: Cigarettes  Smokeless status: Former User                        Types: Snuff  Comment: continuing to try        Griselda Herrera  3/1/2018  3:04 PM

## 2018-03-01 NOTE — THERAPY PROGRESS REPORT/RE-CERT
Outpatient Speech Language Pathology   Adult Swallow Progress Note       Patient Name: Sameer Tavarez  : 1955  MRN: [id number]  Today's Date: 3/1/2018         Visit Date: 2018   Patient Active Problem List   Diagnosis   • Oropharyngeal cancer   • Current smoker   • Perineal mass in male   • Recio's esophagus with dysplasia   • Tracheostomy dependence   • Postoperative pain   • S/P percutaneous endoscopic gastrostomy (PEG) tube placement   • Constipation, acute   • Pain, rectal   • Oropharyngeal candidiasis   • ACS (acute coronary syndrome)   • HTN (hypertension)   • History of radiation therapy   • Current every day smoker        Visit Dx:    ICD-10-CM ICD-9-CM   1. Oropharyngeal dysphagia R13.12 787.22                               SLP OP Goals       18 1601 18 1400    Dysphagia Goals    Dysphagia LTG's  Patient will safely consume the recommended diet without complications such as aspiration pneumonia  -KW    Status: Patient will safely consume the recommended diet without complications such as aspiration pneumonia  Progressing as expected  -KW    Other Goals    Other Adult Goal- 1  Pt will tolerate a pureed diet with thin liquids during and after completion of radiation treatments with no s/s of aspiration.  -KW    Status: Other Adult Goal- 1  Progressing as expected  -KW    Comments: Other Adult Goal- 1  No overt cough with thin liquids.  Eating soft foods at home and drinking thin liquids.  States no difficulty.  Does state having nasal regurgitation.    -KW    Other Adult Goal- 2  Pt will maintain adequate nutrition and hydration via PO during and after radiation treatment.  -KW    Status: Other Adult Goal- 2  Progressing as expected  -KW    Comments: Other Adult Goal- 2  n/a  -KW    Other Adult Goal- 3  Pt will complete swallow exercises 3x a day with 90% accuracy.  -KW    Status: Other Adult Goal- 3  Progressing as expected  -KW    Comments: Other Adult Goal- 3  Good ROM of oral  muscles.  -KW    Other Adult Goal- 4  Pt will continue to be assessed and education completed on effects of radiation treatments as warranted up until 2 months post treatment.  -KW    Status: Other Adult Goal- 4  Progressing as expected  -KW    Comments: Other Adult Goal- 4  Discussed repeating dysphagia study to see if swallow improved and if he needs to continue thick liquids or if ok for thin liquids.  -KW    SLP Time Calculation    SLP Goal Re-Cert Due Date 05/30/18  -KW       User Key  (r) = Recorded By, (t) = Taken By, (c) = Cosigned By    Initials Name Provider Type    DAGOBERTO Johnson MS CCC-SLP Speech and Language Pathologist                OP SLP Education       03/01/18 1600    Education    Barriers to Learning No barriers identified  -KW    Education Provided Patient requires further education on strategies, risks  -KW    Assessed Learning needs;Learning motivation;Learning preferences;Learning readiness  -KW    Learning Motivation Moderate  -KW    Learning Method Explanation  -KW    Teaching Response Verbalized understanding  -KW    Education Comments see flowsheet data.  -KW      User Key  (r) = Recorded By, (t) = Taken By, (c) = Cosigned By    Initials Name Effective Dates    DAGOBERTO Johnson MS CCC-SLP 08/02/16 -                 OP SLP Assessment/Plan - 03/01/18 1559     SLP Assessment    Functional Problems Swallowing  -KW    Impact on Function: Swallowing Risk of aspiration;Risk of pneumonia  -KW    Clinical Impression: Swallowing Mild:;oropharyngeal phase dysphagia  -KW    Clinical Impression Comments progressing as expected.  -KW    Prognosis Good (comment)  -KW    Patient/caregiver participated in establishment of treatment plan and goals Yes  -KW    Patient would benefit from skilled therapy intervention Yes  -KW    SLP Plan    Frequency 1x/week  -KW    Duration pending dysphagia study  -KW    Planned CPT's? SLP MBS: 79718;SLP SWALLOW THERAPY: 76732  -KW    Expected Duration Therapy Session  (min) 30-45 minutes  -KW    Plan Comments Will request dysphagia study to see if ongoing services need or if symptoms resolved.  -KW      User Key  (r) = Recorded By, (t) = Taken By, (c) = Cosigned By    Initials Name Provider Type    DAGOBERTO Johnson MS CCC-SLP Speech and Language Pathologist                Time Calculation:   SLP Start Time: 1400  SLP Stop Time: 1430  SLP Time Calculation (min): 30 min        SLP G-Codes  SLP NOMS Used?: Yes  Swallow Current Status (): At least 1 percent but less than 20 percent impaired, limited or restricted  Swallow Goal Status (): At least 1 percent but less than 20 percent impaired, limited or restricted        Maribel Johnson MS CCC-SLP  3/1/2018

## 2018-03-01 NOTE — PATIENT INSTRUCTIONS
IF YOU SMOKE OR USE TOBACCO PLEASE READ THE FOLLOWING:    Why is smoking bad for me?  Smoking increases the risk of heart disease, lung disease, vascular disease, stroke, and cancer.     If you smoke, STOP!    If you would like more information on quitting smoking, please visit the [employer] website: [email or web address]   This link will provide additional resources including the QUIT line and the Beat the Pack support groups.     For more information:    Quit Now [facility]  1-800-QUIT-NOW  https://[place].quitlogix.org/en-US/

## 2018-03-02 DIAGNOSIS — C10.9 OROPHARYNGEAL CANCER (HCC): Primary | ICD-10-CM

## 2018-03-02 DIAGNOSIS — Z93.0 TRACHEOSTOMY DEPENDENCE (HCC): ICD-10-CM

## 2018-03-02 DIAGNOSIS — Z92.3 HISTORY OF RADIATION THERAPY: ICD-10-CM

## 2018-03-02 DIAGNOSIS — F17.200 CURRENT EVERY DAY SMOKER: ICD-10-CM

## 2018-03-02 PROBLEM — Z72.0 TOBACCO USE: Status: ACTIVE | Noted: 2018-03-02

## 2018-03-02 NOTE — PROGRESS NOTES
[facility]  MD Chayo Lopez, BSN, ERICKA  ___________________________________________________________  [facility]  Department of Radiation Oncology  [address] [place] [id number]  Office:  [phone]  Fax: [phone]      03/02/2018  Request from Maribel Farr, MS-CCC/SLP to order a FL Video Swallow with Speech on . Sameer Tavarez, one in the past that showed aspiration during his radiation treatment.  He was on thickened liquids but now is drinking thin.  He has multiple episodes of pneumonia due to aspiration and PEG was placed.  He reports no difficulties but states he is having nasal regurgitation.  Was seen for his 6 week follow up yesterday, she requested a Swallow study to see if he needs to continue with thickened liquids and speech services to evaluate progress.       Orders Placed This Encounter   Procedures   • FL Video Swallow With Speech     Standing Status:   Future     Standing Expiration Date:   3/2/2019     Order Specific Question:   Reason for Exam:     Answer:   Post radiation- hx aspiration     Chayo Taylor PA-C  10:24 AM

## 2018-03-06 ENCOUNTER — HOSPITAL ENCOUNTER (OUTPATIENT)
Dept: GENERAL RADIOLOGY | Facility: HOSPITAL | Age: 63
Discharge: HOME | End: 2018-03-06
Admitting: PHYSICIAN ASSISTANT

## 2018-03-06 DIAGNOSIS — Z93.0 TRACHEOSTOMY DEPENDENCE (HCC): ICD-10-CM

## 2018-03-06 DIAGNOSIS — C10.9 OROPHARYNGEAL CANCER (HCC): ICD-10-CM

## 2018-03-06 DIAGNOSIS — F17.200 CURRENT EVERY DAY SMOKER: ICD-10-CM

## 2018-03-06 DIAGNOSIS — Z92.3 HISTORY OF RADIATION THERAPY: ICD-10-CM

## 2018-03-06 PROCEDURE — 74230 X-RAY XM SWLNG FUNCJ C+: CPT

## 2018-03-06 PROCEDURE — G8997 SWALLOW GOAL STATUS: HCPCS

## 2018-03-06 PROCEDURE — A9270 NON-COVERED ITEM OR SERVICE: HCPCS | Performed by: RADIOLOGY

## 2018-03-06 PROCEDURE — 63710000001 BARIUM SULFATE 40 % PASTE: Performed by: RADIOLOGY

## 2018-03-06 PROCEDURE — G8998 SWALLOW D/C STATUS: HCPCS

## 2018-03-06 PROCEDURE — 92611 MOTION FLUOROSCOPY/SWALLOW: CPT

## 2018-03-06 PROCEDURE — 63710000001 BARIUM SULFATE 40 % RECONSTITUTED SUSPENSION: Performed by: RADIOLOGY

## 2018-03-06 PROCEDURE — 63710000001 BARIUM SULFATE 40 % SUSPENSION: Performed by: RADIOLOGY

## 2018-03-06 PROCEDURE — G8996 SWALLOW CURRENT STATUS: HCPCS

## 2018-03-06 RX ADMIN — BARIUM SULFATE 20 ML: 400 PASTE ORAL at 13:00

## 2018-03-06 RX ADMIN — BARIUM SULFATE 55 ML: 0.81 POWDER, FOR SUSPENSION ORAL at 13:00

## 2018-03-06 RX ADMIN — BARIUM SULFATE 55 ML: 400 SUSPENSION ORAL at 13:00

## 2018-03-06 RX ADMIN — BARIUM SULFATE 50 ML: 400 SUSPENSION ORAL at 13:00

## 2018-03-06 NOTE — THERAPY DISCHARGE NOTE
Outpatient Speech Language Pathology   Adult Swallow Initial Eval/Discharge  [facility]     Patient Name: Sameer Tavarez  : 1955  MRN: [id number]  Today's Date: 3/6/2018      SPEECH-LANGUAGE PATHOLOGY EVALUTION - VFSS  Subjective: The patient was seen on this date for a VFSS(Videofluoroscopic Swallowing Study).  Patient was alert and cooperative.    Objective: Risks/benefits were reviewed with the patient, and consent was obtained. The study was completed with SLP and Radiologist present. The patient was seen in lateral view(s). Textures given included thin liquid, nectar thick liquid, honey thick liquid and puree consistency.  Assessment: Difficulties were noted with thin liquid, nectar thick liquid, honey thick liquid and puree consistency characterized by decreased back of tongue strength and delayed swallow initiation which resulted in premature spillage of bolus to the point of the pyriform sinuses with thin liquids and to at least the point of the vallecula with all other consistencies. Throughout assessment the pt was noted to display limited hyolaryngeal excursion and decreased laryngeal elevation. Due to these deficits as well as significant residue within the vallecula, along the posterior pharyngeal wall, and lateral channels the pt exhibited silent penetration into the laryngeal vestibule at times with all consistencies presented. Deep penetration was noted with thin, nectar thick, and pudding thick liquids x1. Instances of silent penetration was also noted 2x with honey thick consistencies. It is also important to note reflux into the nasopharynx. No definite aspiration was noted, however pt would be at high risk of aspiration with completion of meals.     The patient demonstrated silent penetration.  Noted with, thin liquid, nectar thick liquid, honey thick liquid and puree consistency.  Residue was moderate and severe.  SLP Findings: Patient presents with severe oropharyngeal dysphagia.    Recommendations: Diet Textures: NPO. Medications should be taken  by alternate means. May have water and Ice between meals after oral care, under staff or family supervision and with the recommended strategies for safe swallowing.  Recommended Strategies: Upright for PO. Oral care before breakfast, after all meals and PRN.  Dysphagia therapy is recommended.   Cash Carr MS CCC-SLP 3/6/2018 3:53 PM         Visit Date: 03/06/2018   Patient Active Problem List   Diagnosis   • Oropharyngeal cancer   • Current smoker   • Perineal mass in male   • Recio's esophagus with dysplasia   • Tracheostomy dependence   • Postoperative pain   • S/P percutaneous endoscopic gastrostomy (PEG) tube placement   • Constipation, acute   • Pain, rectal   • Oropharyngeal candidiasis   • ACS (acute coronary syndrome)   • HTN (hypertension)   • History of radiation therapy   • Current every day smoker   • Tobacco use        Past Medical History:   Diagnosis Date   • Arthritis    • Coronary artery disease    • Depression    • Diabetes mellitus    • Enlarged prostate    • GERD (gastroesophageal reflux disease)    • History of transfusion    • Hypertension    • Oropharyngeal cancer 08/27/2017   • Seizure     diabetic   • Severe esophageal dysplasia    • Stroke    • TB (pulmonary tuberculosis) 2004        Past Surgical History:   Procedure Laterality Date   • CARDIAC SURGERY     • CHOLECYSTECTOMY     • CORONARY ARTERY BYPASS GRAFT     • FRACTURE SURGERY     • GTUBE REPLACEMENT N/A 10/6/2017    Procedure: GASTROSTOMY TUBE REPLACEMENT, port placement;  Surgeon: Jayne Phillips MD;  Location: [facility] OR;  Service:    • HERNIA REPAIR     • LARYNGOSCOPY N/A 10/11/2017    Procedure: DIRECT LARYNGOSCOPY WITH BIOPSY;  Surgeon: Darin Neal MD;  Location: [facility] OR;  Service:    • NISSEN FUNDOPLICATION LAPAROSCOPIC     • MA DENTAL SURGERY PROCEDURE N/A 10/11/2017    Procedure: TOOTH EXTRACTION;  Surgeon: Darin Neal MD;  Location:   [facility] OR;  Service: ENT   • SD INSJ TUNNELED CVC W/O SUBQ PORT/ AGE 5 YR/> Left 10/6/2017    Procedure: INSERTION VENOUS ACCESS DEVICE;  Surgeon: Jayne Phillips MD;  Location:  [facility] OR;  Service: General   • SKIN BIOPSY     • TESTICLE SURGERY      tubes implanted for drainage   • TONSILLECTOMY     • TRACHEOSTOMY N/A 10/5/2017    Procedure: Awake tracheostomy; DIRECT LARYNGOSCOPY WITH BIOPSY;  Surgeon: Alber Justin MD;  Location:  [facility] OR;  Service:          Visit Dx:     ICD-10-CM ICD-9-CM   1. Oropharyngeal cancer C10.9 146.9   2. Tracheostomy dependence Z93.0 V44.0   3. Current every day smoker F17.200 305.1   4. History of radiation therapy Z92.3 V15.3                   Adult Dysphagia - 03/06/18 1310     Adult Dysphagia Background    Patient Description of Complaint Pt has recently completed 35 radiation treatments for head and neck cancer and had multiple instances of pneumonia throughout treatment.  -CS    Symptoms Reported by Patient Coughing  -CS    Patient Report of Functional Impact Coughing at times.  -CS    History Pertinent to Diagnosis See above  -CS    Non-Oral Feeding Gastrostomy Tube  -CS    Oral Mech    Position During Evaluation Upright  -CS    Anatomy/Physiology WNL Patient demonstrates anatomy that is WNL  -CS    Dentition Patient is edentulous  -CS    Oral Health Oral cavity is clean  -CS    Awareness/Control of Secretions Patient swallows saliva  -CS    VFSS Exam    Study Completed By SLP and Radiologist (comment)  -CS    Textures Presented Thin;Nectar;Honey;Pudding  -CS    Position During Study/Views Obtained Upright;Lateral View  -CS    Physiologic Impairments Noted on VFSS    Physiologic Impairments Noted on VFSS X  -CS    Reduced Tongue Base Retraction x  -CS    Mistiming x   All consistencies  -CS    Reduced Laryngeal Elevation x   All consistencies  -CS    Reduced Pharyngeal Contraction x   All consistencies  -CS    Symptoms    Residue Noted X  -CS    Valleculae With these  consistencies (comment)   All trials presented  -CS    Pharyngeal Walls With these consistencies (comment)   All consistencies  -CS    Results/Recs/Barriers/Education for Dysphagia    Factors Affecting Performance no difficulty participating in study  -CS    Learning Motivation moderate  -CS    Education/Learning Comments Educated on results of VFSS as well as recommendations.  -CS    Clinical Impression: Swallowing Severe:;oropharyngeal phase dysphagia  -CS    Impact on Function risk for aspiration;risk for pneumonia  -CS    Recommendations for Diet/Nutirition alternative nutrition but with some oral intake as per below   Ice chips and sips of water  -CS    Recommendations continue therapy to address swallowing deficits  -CS      User Key  (r) = Recorded By, (t) = Taken By, (c) = Cosigned By    Initials Name Provider Type    MALINDA Carr MS CCC-SLP Speech and Language Pathologist                            OP SLP Education       03/06/18 1536    Education    Barriers to Learning No barriers identified  -CS    Education Provided Described results of evaluation  -CS    Assessed Learning needs;Learning motivation;Learning preferences;Learning readiness  -CS    Learning Motivation Moderate  -CS    Learning Method Explanation;Teach back  -CS    Teaching Response Verbalized understanding  -CS    Education Comments Educated on utilizing YASIR as well as ok for ice chips and sips of water.  -CS      User Key  (r) = Recorded By, (t) = Taken By, (c) = Cosigned By    Initials Name Effective Dates    MALINDA Carr MS CCC-SLP 06/28/17 -                     OP SLP Assessment/Plan - 03/06/18 1310     SLP Assessment    Clinical Impression: Swallowing Severe:;oropharyngeal phase dysphagia  -CS      User Key  (r) = Recorded By, (t) = Taken By, (c) = Cosigned By    Initials Name Provider Type    MALINDA Carr MS CCC-SLP Speech and Language Pathologist              SLP Outcome Measures (last 72 hours)      SLP Outcome  Measures       03/06/18 1310          SLP Outcome Measures    Outcome Measure Used? Adult NOMS  -CS      FCM Scores    FCM Chosen Swallowing  -CS      Swallowing FCM Score 2  -CS        User Key  (r) = Recorded By, (t) = Taken By, (c) = Cosigned By    Initials Name Effective Dates    CS Cash W MS Bruno CCC-SLP 06/28/17 -             Time Calculation:   SLP Start Time: 1310  SLP Stop Time: 1500  SLP Time Calculation (min): 110 min    Therapy Charges for Today     Code Description Service Date Service Provider Modifiers Qty    43058670325 HC ST MOTION FLUORO EVAL SWALLOW 7 3/6/2018 Cash Carr MS CCC-SLP GN 1    83650149356 HC ST SWALLOWING CURRENT STATUS 3/6/2018 Cash Carr MS CCC-SLP GN, CM 1    91394599043 HC ST SWALLOWING PROJECTED 3/6/2018 Cash Carr MS CCC-SLP GN, CM 1    16069779163 HC ST SWALLOWING DISCHARGE 3/6/2018 Cash Carr MS CCC-SLP GN, CM 1          SLP G-Codes  SLP NOMS Used?: Yes  Functional Limitations: Swallowing  Swallow Current Status (): At least 80 percent but less than 100 percent impaired, limited or restricted  Swallow Goal Status (): At least 80 percent but less than 100 percent impaired, limited or restricted  Swallow Discharge Status (): At least 80 percent but less than 100 percent impaired, limited or restricted           Cash Carr MS CCC-SLP  3/6/2018

## 2018-03-07 ENCOUNTER — APPOINTMENT (OUTPATIENT)
Dept: CT IMAGING | Facility: HOSPITAL | Age: 63
End: 2018-03-07
Attending: OTOLARYNGOLOGY

## 2018-03-15 ENCOUNTER — TELEPHONE (OUTPATIENT)
Dept: CARDIOLOGY | Age: 63
End: 2018-03-15

## 2018-03-22 ENCOUNTER — OFFICE VISIT (OUTPATIENT)
Dept: CARDIOLOGY | Age: 63
End: 2018-03-22
Payer: MEDICARE

## 2018-03-22 VITALS
HEART RATE: 58 BPM | SYSTOLIC BLOOD PRESSURE: 124 MMHG | DIASTOLIC BLOOD PRESSURE: 58 MMHG | WEIGHT: 137 LBS | BODY MASS INDEX: 22.02 KG/M2 | HEIGHT: 66 IN

## 2018-03-22 DIAGNOSIS — Z95.5 HISTORY OF CORONARY ARTERY STENT PLACEMENT: ICD-10-CM

## 2018-03-22 DIAGNOSIS — I10 ESSENTIAL HYPERTENSION: ICD-10-CM

## 2018-03-22 DIAGNOSIS — I25.10 CORONARY ARTERY DISEASE INVOLVING NATIVE CORONARY ARTERY OF NATIVE HEART WITHOUT ANGINA PECTORIS: Primary | ICD-10-CM

## 2018-03-22 DIAGNOSIS — Z95.1 HX OF CABG: ICD-10-CM

## 2018-03-22 DIAGNOSIS — F17.200 SMOKER: ICD-10-CM

## 2018-03-22 PROCEDURE — 4004F PT TOBACCO SCREEN RCVD TLK: CPT | Performed by: NURSE PRACTITIONER

## 2018-03-22 PROCEDURE — G8420 CALC BMI NORM PARAMETERS: HCPCS | Performed by: NURSE PRACTITIONER

## 2018-03-22 PROCEDURE — 99213 OFFICE O/P EST LOW 20 MIN: CPT | Performed by: NURSE PRACTITIONER

## 2018-03-22 PROCEDURE — G8484 FLU IMMUNIZE NO ADMIN: HCPCS | Performed by: NURSE PRACTITIONER

## 2018-03-22 PROCEDURE — G8598 ASA/ANTIPLAT THER USED: HCPCS | Performed by: NURSE PRACTITIONER

## 2018-03-22 PROCEDURE — G8427 DOCREV CUR MEDS BY ELIG CLIN: HCPCS | Performed by: NURSE PRACTITIONER

## 2018-03-22 PROCEDURE — 3017F COLORECTAL CA SCREEN DOC REV: CPT | Performed by: NURSE PRACTITIONER

## 2018-03-22 RX ORDER — VARENICLINE TARTRATE 1 MG/1
1 TABLET, FILM COATED ORAL 2 TIMES DAILY
Qty: 60 TABLET | Refills: 2 | Status: SHIPPED | OUTPATIENT
Start: 2018-03-22 | End: 2018-05-14 | Stop reason: ALTCHOICE

## 2018-03-22 RX ORDER — VARENICLINE TARTRATE 25 MG
KIT ORAL
Qty: 60 TABLET | Refills: 0 | Status: SHIPPED | OUTPATIENT
Start: 2018-03-22 | End: 2018-05-14

## 2018-03-22 RX ORDER — ATORVASTATIN CALCIUM 10 MG/1
10 TABLET, FILM COATED ORAL NIGHTLY
Qty: 30 TABLET | Refills: 5 | Status: SHIPPED | OUTPATIENT
Start: 2018-03-22 | End: 2018-11-21

## 2018-03-22 NOTE — PROGRESS NOTES
[facility], [place]. [address] [place] [address], [facility], [address]  [phone] office  [phone] fax      OFFICE VISIT:  3/22/2018    Phillip Torres - : 1955    Reason For Visit:  Abdullahi Medeiros is a 58 y.o. male who is here for Follow-up (Patient presents for hospital follow up after stent placement doing well. ); Coronary Artery Disease; Hypertension; and Nicotine Dependence    The patient presents today for cardiology hospital follow up. He presented to [facility] ED on 18 with chest pain. Subsequently, the patient underwent cardiac catheterization with bare metal stent placed to the mid right coronary artery. The patient was found to have grossly normal left ventricular systolic function with a patent stent in the left anterior descending with distal severe disease of the proximal 1st obtuse marginal branch and mid right coronary artery. Atretic, but patent, left internal mammary graft to left anterior descending and patent saphenous vein graft to the diagonal obtuse marginal branch were noted. Overall, the patient is doing well from a cardiac standpoint without symptoms to suggest myocardial ischemia. BP is well controlled on current regimen. He has an additional bottle of Brilinta and continues on ASA. The patient would like to try Chantix. He denies hx of seizures or depression with suicidal ideation. The patient's PCP monitors cholesterol. Subjective  Abdullahi Medeiros denies exertional chest pain, shortness of breath, orthopnea, paroxysmal nocturnal dyspnea, syncope, presyncope, sustained arrythmia, edema and fatigue. The patient denies numbness or weakness to suggest cerebrovascular accident or transient ischemic attack.       Phillip Torres has the following history as recorded in [employer]:    Patient Active Problem List   Diagnosis Code    CAD (coronary artery disease) I25.10    Chest pain R07.9    HTN (hypertension) I10    Smoker F17.200    ACS (acute coronary syndrome) (HCC) I24.9    Alkaline phosphatase elevation R74.8    Liver enzyme elevation R74.8    History of coronary artery stent placement Z95.5    Hx of CABG Z95.1    Acute chest pain R07.9     Past Medical History:   Diagnosis Date    Gastelum's esophagus     CAD (coronary artery disease)     Chest pain     COPD (chronic obstructive pulmonary disease) (HCC)     DM2 (diabetes mellitus, type 2) (HCC)     GERD (gastroesophageal reflux disease)     History of cervical fracture     History of drug abuse     HTN (hypertension)     Seizure disorder ([facility] [address]     Tobacco abuse      Past Surgical History:   Procedure Laterality Date    CARDIAC CATHETERIZATION  12/26/13  [address]    EF over 60%    CORONARY ANGIOPLASTY WITH STENT PLACEMENT      x6 unsure of dates    CORONARY ARTERY BYPASS GRAFT  2008     No family history on file. Social History   Substance Use Topics    Smoking status: Current Every Day Smoker     Packs/day: 0.50     Years: 50.00     Types: Cigarettes    Smokeless tobacco: Never Used      Comment: 10 cigerrettes aday    Alcohol use Yes      Comment: monthly      Current Outpatient Prescriptions   Medication Sig Dispense Refill    atorvastatin (LIPITOR) 10 MG tablet Take 1 tablet by mouth nightly 30 tablet 5    metoprolol tartrate (LOPRESSOR) 25 MG tablet Take 0.5 tablets by mouth 2 times daily 30 tablet 5    varenicline (CHANTIX STARTING MONTH GANESH) 0.5 MG X 11 & 1 MG X 42 tablet Take by mouth.  60 tablet 0    varenicline (CHANTIX CONTINUING MONTH GANESH) 1 MG tablet Take 1 tablet by mouth 2 times daily 60 tablet 2    aspirin 81 MG chewable tablet Take 1 tablet by mouth daily 30 tablet 3    ticagrelor (BRILINTA) 90 MG TABS tablet Take 1 tablet by mouth 2 times daily 60 tablet 1    insulin glargine (LANTUS) 100 UNIT/ML injection vial Inject 30 Units into the skin every morning      zolpidem (AMBIEN) 10 MG tablet Take by mouth nightly as needed for Sleep      Last 3 Encounters:   03/22/18 58   02/10/18 73   10/24/17 78        Wt Readings from Last 3 Encounters:   03/22/18 137 lb (62.1 kg)   02/10/18 139 lb 12.8 oz (63.4 kg)   10/23/17 145 lb (65.8 kg)       Recent Results (from the past 1008 hour(s))   POCT Glucose    Collection Time: 02/08/18  3:39 PM   Result Value Ref Range    POC Glucose 226 (H) 70 - 99 mg/dl    Performed on [employer]    POCT Glucose    Collection Time: 02/08/18  8:58 PM   Result Value Ref Range    POC Glucose 279 (H) 70 - 99 mg/dl    Performed on [employer]    CBC    Collection Time: 02/09/18  2:01 AM   Result Value Ref Range    WBC 6.1 4.8 - 10.8 K/uL    RBC 3.41 (L) 4.70 - 6.10 M/uL    Hemoglobin 10.4 (L) 14.0 - 18.0 g/dL    Hematocrit 31.8 (L) 42.0 - 52.0 %    MCV 93.3 80.0 - 94.0 fL    MCH 30.5 27.0 - 31.0 pg    MCHC 32.7 (L) 33.0 - 37.0 g/dL    RDW 13.4 11.5 - 14.5 %    Platelets 534 (L) 630 - 400 K/uL    MPV 11.5 9.4 - 12.4 fL   Basic Metabolic Panel    Collection Time: 02/09/18  2:01 AM   Result Value Ref Range    Sodium 138 136 - 145 mmol/L    Potassium 4.1 3.5 - 5.0 mmol/L    Chloride 101 98 - 111 mmol/L    CO2 25 22 - 29 mmol/L    Anion Gap 12 7 - 19 mmol/L    Glucose 112 (H) 74 - 109 mg/dL    BUN 21 8 - 23 mg/dL    CREATININE 0.5 0.5 - 1.2 mg/dL    GFR Non-African American >60 >60    Calcium 8.3 (L) 8.8 - 10.2 mg/dL   EKG 12 Lead    Collection Time: 02/09/18  6:41 AM   Result Value Ref Range    P-R Interval 154 ms    QRS Duration 94 ms    Q-T Interval 404 ms    QTc Calculation (Bazett) 433 ms    P Axis 74 degrees    T Axis 58 degrees   POCT Glucose    Collection Time: 02/09/18  7:36 AM   Result Value Ref Range    POC Glucose 110 (H) 70 - 99 mg/dl    Performed on AccuChek    Troponin    Collection Time: 02/09/18 10:14 AM   Result Value Ref Range    Troponin <0.01 0.00 - 0.03 ng/mL   POCT Glucose    Collection Time: 02/09/18 11:56 AM   Result Value Ref Range    POC Glucose 101 (H) 70 - 99 mg/dl    Performed on AccuChek    Troponin Collection Time: 02/09/18  3:27 PM   Result Value Ref Range    Troponin <0.01 0.00 - 0.03 ng/mL   Urinalysis    Collection Time: 02/09/18  3:50 PM   Result Value Ref Range    Color, UA YELLOW Straw/Yellow    Clarity, UA CLOUDY (A) Clear    Glucose, Ur Negative Negative mg/dL    Bilirubin Urine Negative Negative    Ketones, Urine Negative Negative mg/dL    Specific Gravity, UA >1.045 1.005 - 1.030    Blood, Urine Negative Negative    pH, UA 7.5 5.0 - 8.0    Protein, UA Negative Negative mg/dL    Urobilinogen, Urine 1.0 <2.0 E.U./dL    Nitrite, Urine Negative Negative    Leukocyte Esterase, Urine Negative Negative   POCT Glucose    Collection Time: 02/09/18  4:06 PM   Result Value Ref Range    POC Glucose 105 (H) 70 - 99 mg/dl    Performed on AccuChek    POCT Glucose    Collection Time: 02/09/18  8:06 PM   Result Value Ref Range    POC Glucose 333 (H) 70 - 99 mg/dl    Performed on AccuChek    Troponin    Collection Time: 02/09/18  9:08 PM   Result Value Ref Range    Troponin <0.01 0.00 - 0.03 ng/mL   EKG 12 lead    Collection Time: 02/09/18 11:28 PM   Result Value Ref Range    P-R Interval 158 ms    QRS Duration 94 ms    Q-T Interval 410 ms    QTc Calculation (Bazett) 432 ms    P Axis 70 degrees    T Axis 45 degrees   CBC    Collection Time: 02/10/18  1:41 AM   Result Value Ref Range    WBC 5.7 4.8 - 10.8 K/uL    RBC 3.46 (L) 4.70 - 6.10 M/uL    Hemoglobin 10.6 (L) 14.0 - 18.0 g/dL    Hematocrit 31.1 (L) 42.0 - 52.0 %    MCV 89.9 80.0 - 94.0 fL    MCH 30.6 27.0 - 31.0 pg    MCHC 34.1 33.0 - 37.0 g/dL    RDW 13.1 11.5 - 14.5 %    Platelets 826 (L) 734 - 400 K/uL    MPV 11.5 9.4 - 12.4 fL   Basic Metabolic Panel w/ Reflex to MG    Collection Time: 02/10/18  1:41 AM   Result Value Ref Range    Sodium 135 (L) 136 - 145 mmol/L    Potassium reflex Magnesium 4.0 3.5 - 5.0 mmol/L    Chloride 100 98 - 111 mmol/L    CO2 25 22 - 29 mmol/L    Anion Gap 10 7 - 19 mmol/L    Glucose 134 (H) 74 - 109 mg/dL    BUN 14 8 - 23 mg/dL CREATININE <0.5 0.5 - 1.2 mg/dL    GFR Non-African American >60 >60    Calcium 8.2 (L) 8.8 - 10.2 mg/dL   POCT Glucose    Collection Time: 02/10/18  6:06 AM   Result Value Ref Range    POC Glucose 129 (H) 70 - 99 mg/dl    Performed on AccuChek    POCT Glucose    Collection Time: 02/10/18 11:02 AM   Result Value Ref Range    POC Glucose 238 (H) 70 - 99 mg/dl    Performed on AccuChek      Plan  Previous cardiac history and records reviewed. Take Chantix as directed for smoking cessation. Continue current medications as prescribed. Continue to follow up with primary care provider for non cardiac medical problems. Call the office with any problems, questions or concerns at [phone]. Follow up as scheduled with your cardiologist - Dr. Luzmaria Singh x 2 months. The following educational material has been included in this after visit summary for your review: heart disease, heart health, Chantix.     Additional instructions:  Coronary artery disease risk factors you can control: Smoking, high blood pressure, high cholesterol, diabetes, being overweight, lack of exercise and stress. Continue heart healthy diet. Take medications as directed. Exercise as tolerated. Strive for 15 minutes of exercise most days of the week. If asked to keep a blood pressure log, do so for 2 weeks. Call the office to report readings at [phone]. Blood pressure goal is 140/90 or less. If you are a diabetic, the goal is 130/80 or less. If you are taking cholesterol lowering medications, it is recommended that lab work be checked annually.   Always keep a current medication list. Bring your medications to every office visit.         DORON Nicole

## 2018-03-22 NOTE — PATIENT INSTRUCTIONS
Take Chantix as directed for smoking cessation. Continue current medications as prescribed. Continue to follow up with primary care provider for non cardiac medical problems. Call the office with any problems, questions or concerns at [phone]. Follow up as scheduled with your cardiologist - Dr. Sanya Mccann x 2 months. The following educational material has been included in this after visit summary for your review: heart disease, heart health, Chantix.     Additional instructions:  Coronary artery disease risk factors you can control: Smoking, high blood pressure, high cholesterol, diabetes, being overweight, lack of exercise and stress. Continue heart healthy diet. Take medications as directed. Exercise as tolerated. Strive for 15 minutes of exercise most days of the week. If asked to keep a blood pressure log, do so for 2 weeks. Call the office to report readings at [phone]. Blood pressure goal is 140/90 or less. If you are a diabetic, the goal is 130/80 or less. If you are taking cholesterol lowering medications, it is recommended that lab work be checked annually. Always keep a current medication list. Bring your medications to every office visit.        Patient Education          varenicline  Pronunciation:  jimena Samaniego  Brand:  Rene  What is the most important information I should know about varenicline? Do not drink large amounts alcohol. Varenicline can increase the effects of alcohol or change the way you react to it. What is varenicline? Varenicline is a smoking cessation medicine. It is used together with behavior modification and counseling support to help you stop smoking. Varenicline may also be used for purposes not listed in this medication guide. What should I discuss with my health care provider before taking varenicline?   You should not use varenicline if you used it in the past and had:  · a serious allergic reaction --trouble breathing, swelling in your face full glass of water. Use varenicline regularly to get the most benefit. Get your prescription refilled before you run out of medicine completely. You should remain under the care of a doctor while taking varenicline. Read all patient information, medication guides, and instruction sheets provided to you. Ask your doctor or pharmacist if you have any questions. You may have nicotine withdrawal symptoms when you stop smoking, including: increased appetite, weight gain, trouble sleeping, trouble concentrating, slower heart rate, having the urge to smoke, and feeling anxious, restless, depressed, angry, frustrated, or irritated. These symptoms may occur with or without using medication such as varenicline. Smoking cessation may also cause new or worsening mental health problems, such as depression. Store at room temperature away from moisture and heat. What happens if I miss a dose? Take the missed dose as soon as you remember. Skip the missed dose if it is almost time for your next scheduled dose. Do not take extra medicine to make up the missed dose. What happens if I overdose? Seek emergency medical attention or call the Poison Help line at [phone]. What should I avoid while taking varenicline? Do not drink large amounts alcohol while taking this medicine. Varenicline can increase the effects of alcohol or change the way you react to it. Some people taking varenicline have had unusual or aggressive behavior or forgetfulness while drinking alcohol. Do not use other medicines to quit smoking, unless your doctor tells you to. Using varenicline while wearing a nicotine patch can cause unpleasant side effects. This medicine may impair your thinking or reactions. You may also have mood or behavior changes when you quit smoking.  Until you know how varenicline and the smoking cessation process are going to affect you, be careful if you drive or do anything that requires you to be cautious and information about varenicline. Remember, keep this and all other medicines out of the reach of children, never share your medicines with others, and use this medication only for the indication prescribed. Every effort has been made to ensure that the information provided by [address] is accurate, up-to-date, and complete, but no guarantee is made to that effect. Drug information contained herein may be time sensitive. [facility] information has been compiled for use by healthcare practitioners and consumers in the [place] and therefore [facility] does not warrant that uses outside of the [place] are appropriate, unless specifically indicated otherwise. [facility]'s drug information does not endorse drugs, diagnose patients or recommend therapy. [facility]'s drug information is an informational resource designed to assist licensed healthcare practitioners in caring for their patients and/or to serve consumers viewing this service as a supplement to, and not a substitute for, the expertise, skill, knowledge and judgment of healthcare practitioners. The absence of a warning for a given drug or drug combination in no way should be construed to indicate that the drug or drug combination is safe, effective or appropriate for any given patient. [facility] does not assume any responsibility for any aspect of healthcare administered with the aid of information [facility] provides. The information contained herein is not intended to cover all possible uses, directions, precautions, warnings, drug interactions, allergic reactions, or adverse effects. If you have questions about the drugs you are taking, check with your doctor, nurse or pharmacist.  Copyright 3071-0136 [address]. Version: 8.03. Revision date: 12/21/2016. Care instructions adapted under license by [facility] ([facility]).  If you have questions about a medical condition or this instruction, always ask your healthcare professional. Roman Maria · Eat a variety of fruit and vegetables every day. Dark green, deep orange, red, or yellow fruits and vegetables are especially good for you. Examples include spinach, carrots, peaches, and berries. ? · Foods high in fiber can reduce your cholesterol and provide important vitamins and minerals. High-fiber foods include whole-grain cereals and breads, oatmeal, beans, brown rice, citrus fruits, and apples. ? · Limit drinks and foods with added sugar. These include candy, desserts, and soda pop. ? Lifestyle changes  ? · If your doctor recommends it, get more exercise. Walking is a good choice. Bit by bit, increase the amount you walk every day. Try for at least 30 minutes on most days of the week. You also may want to swim, bike, or do other activities. ? · Do not smoke. If you need help quitting, talk to your doctor about stop-smoking programs and medicines. These can increase your chances of quitting for good. Quitting smoking may be the most important step you can take to protect your heart. It is never too late to quit. You will get health benefits right away. ? · Limit alcohol to 2 drinks a day for men and 1 drink a day for women. Too much alcohol can cause health problems. Medicines  ? · Take your medicines exactly as prescribed. Call your doctor if you think you are having a problem with your medicine. ? · If your doctor recommends aspirin, take the amount directed each day. Make sure you take aspirin and not another kind of pain reliever, such as acetaminophen (Tylenol). If you take ibuprofen (such as Advil or Motrin) for other problems, take aspirin at least 2 hours before taking ibuprofen. When should you call for help? Call 911 if you have symptoms of a heart attack. These may include:  ? · Chest pain or pressure, or a strange feeling in the chest.   ? · Sweating. ? · Shortness of breath.    ? · Pain, pressure, or a strange feeling in the back, neck, jaw, or upper belly or in one or both

## 2018-04-12 ENCOUNTER — APPOINTMENT (OUTPATIENT)
Dept: PREADMISSION TESTING | Facility: HOSPITAL | Age: 63
End: 2018-04-12

## 2018-04-24 ENCOUNTER — HOSPITAL ENCOUNTER (EMERGENCY)
Facility: HOSPITAL | Age: 63
Discharge: HOME | End: 2018-04-24
Attending: EMERGENCY MEDICINE | Admitting: EMERGENCY MEDICINE

## 2018-04-24 ENCOUNTER — APPOINTMENT (OUTPATIENT)
Dept: GENERAL RADIOLOGY | Facility: HOSPITAL | Age: 63
End: 2018-04-24

## 2018-04-24 VITALS
BODY MASS INDEX: 20.89 KG/M2 | TEMPERATURE: 97.2 F | OXYGEN SATURATION: 100 % | WEIGHT: 130 LBS | DIASTOLIC BLOOD PRESSURE: 93 MMHG | HEART RATE: 87 BPM | HEIGHT: 66 IN | RESPIRATION RATE: 20 BRPM | SYSTOLIC BLOOD PRESSURE: 158 MMHG

## 2018-04-24 DIAGNOSIS — R06.00 DYSPNEA, UNSPECIFIED TYPE: Primary | ICD-10-CM

## 2018-04-24 LAB
ALBUMIN SERPL-MCNC: 4.2 G/DL (ref 3.5–5)
ALBUMIN/GLOB SERPL: 1.2 G/DL (ref 1.1–2.5)
ALP SERPL-CCNC: 165 U/L (ref 24–120)
ALT SERPL W P-5'-P-CCNC: 63 U/L (ref 0–54)
ANION GAP SERPL CALCULATED.3IONS-SCNC: 10 MMOL/L (ref 4–13)
ARTERIAL PATENCY WRIST A: POSITIVE
AST SERPL-CCNC: 49 U/L (ref 7–45)
ATMOSPHERIC PRESS: 749 MMHG
BASE EXCESS BLDA CALC-SCNC: 4.3 MMOL/L (ref 0–2)
BASOPHILS # BLD AUTO: 0.01 10*3/MM3 (ref 0–0.2)
BASOPHILS NFR BLD AUTO: 0.2 % (ref 0–2)
BDY SITE: ABNORMAL
BILIRUB SERPL-MCNC: 0.5 MG/DL (ref 0.1–1)
BODY TEMPERATURE: 37 C
BUN BLD-MCNC: 14 MG/DL (ref 5–21)
BUN/CREAT SERPL: 25.5 (ref 7–25)
CALCIUM SPEC-SCNC: 9.4 MG/DL (ref 8.4–10.4)
CHLORIDE SERPL-SCNC: 94 MMOL/L (ref 98–110)
CO2 SERPL-SCNC: 31 MMOL/L (ref 24–31)
CREAT BLD-MCNC: 0.55 MG/DL (ref 0.5–1.4)
D DIMER PPP FEU-MCNC: 0.45 MG/L (FEU) (ref 0–0.5)
DEPRECATED RDW RBC AUTO: 39.8 FL (ref 40–54)
EOSINOPHIL # BLD AUTO: 0.09 10*3/MM3 (ref 0–0.7)
EOSINOPHIL NFR BLD AUTO: 1.6 % (ref 0–4)
ERYTHROCYTE [DISTWIDTH] IN BLOOD BY AUTOMATED COUNT: 12.3 % (ref 12–15)
GFR SERPL CREATININE-BSD FRML MDRD: >150 ML/MIN/1.73
GLOBULIN UR ELPH-MCNC: 3.5 GM/DL
GLUCOSE BLD-MCNC: 157 MG/DL (ref 70–100)
HCO3 BLDA-SCNC: 26.3 MMOL/L (ref 20–26)
HCT VFR BLD AUTO: 39.8 % (ref 40–52)
HGB BLD-MCNC: 13.9 G/DL (ref 14–18)
HOROWITZ INDEX BLD+IHG-RTO: 21 %
IMM GRANULOCYTES # BLD: 0.03 10*3/MM3 (ref 0–0.03)
IMM GRANULOCYTES NFR BLD: 0.5 % (ref 0–5)
LYMPHOCYTES # BLD AUTO: 0.76 10*3/MM3 (ref 0.72–4.86)
LYMPHOCYTES NFR BLD AUTO: 13.5 % (ref 15–45)
Lab: ABNORMAL
MCH RBC QN AUTO: 30.5 PG (ref 28–32)
MCHC RBC AUTO-ENTMCNC: 34.9 G/DL (ref 33–36)
MCV RBC AUTO: 87.5 FL (ref 82–95)
MODALITY: ABNORMAL
MONOCYTES # BLD AUTO: 0.84 10*3/MM3 (ref 0.19–1.3)
MONOCYTES NFR BLD AUTO: 14.9 % (ref 4–12)
NEUTROPHILS # BLD AUTO: 3.9 10*3/MM3 (ref 1.87–8.4)
NEUTROPHILS NFR BLD AUTO: 69.3 % (ref 39–78)
NRBC BLD MANUAL-RTO: 0 /100 WBC (ref 0–0)
PCO2 BLDA: 31 MM HG (ref 35–45)
PH BLDA: 7.54 PH UNITS (ref 7.35–7.45)
PLATELET # BLD AUTO: 211 10*3/MM3 (ref 130–400)
PMV BLD AUTO: 10.4 FL (ref 6–12)
PO2 BLDA: 107 MM HG (ref 83–108)
POTASSIUM BLD-SCNC: 4.6 MMOL/L (ref 3.5–5.3)
PROT SERPL-MCNC: 7.7 G/DL (ref 6.3–8.7)
RBC # BLD AUTO: 4.55 10*6/MM3 (ref 4.8–5.9)
SAO2 % BLDCOA: 99.3 % (ref 94–99)
SODIUM BLD-SCNC: 135 MMOL/L (ref 135–145)
VENTILATOR MODE: ABNORMAL
WBC NRBC COR # BLD: 5.63 10*3/MM3 (ref 4.8–10.8)

## 2018-04-24 PROCEDURE — 99284 EMERGENCY DEPT VISIT MOD MDM: CPT

## 2018-04-24 PROCEDURE — 71045 X-RAY EXAM CHEST 1 VIEW: CPT

## 2018-04-24 PROCEDURE — 85379 FIBRIN DEGRADATION QUANT: CPT | Performed by: EMERGENCY MEDICINE

## 2018-04-24 PROCEDURE — 85025 COMPLETE CBC W/AUTO DIFF WBC: CPT | Performed by: EMERGENCY MEDICINE

## 2018-04-24 PROCEDURE — 80053 COMPREHEN METABOLIC PANEL: CPT | Performed by: EMERGENCY MEDICINE

## 2018-04-24 PROCEDURE — 82803 BLOOD GASES ANY COMBINATION: CPT

## 2018-04-24 PROCEDURE — 36600 WITHDRAWAL OF ARTERIAL BLOOD: CPT

## 2018-04-24 PROCEDURE — 93010 ELECTROCARDIOGRAM REPORT: CPT | Performed by: INTERNAL MEDICINE

## 2018-04-24 PROCEDURE — 93005 ELECTROCARDIOGRAM TRACING: CPT | Performed by: EMERGENCY MEDICINE

## 2018-04-24 RX ORDER — SODIUM CHLORIDE 0.9 % (FLUSH) 0.9 %
10 SYRINGE (ML) INJECTION AS NEEDED
Status: DISCONTINUED | OUTPATIENT
Start: 2018-04-24 | End: 2018-04-24 | Stop reason: HOSPADM

## 2018-04-24 RX ORDER — PREDNISONE 20 MG/1
20 TABLET ORAL 2 TIMES DAILY
Qty: 14 TABLET | Refills: 0 | Status: SHIPPED | OUTPATIENT
Start: 2018-04-24 | End: 2018-06-18

## 2018-04-24 NOTE — ED PROVIDER NOTES
Subjective   Patient c/o SOB for 2 weeks.  Was in [facility] 9 days ago for bronchitis.  Still feeling SOB with some cough.  Also c/o pain around G-tube site on abdominal wall.        History provided by:  Patient and relative   used: No    Shortness of Breath   Severity:  Moderate  Onset quality:  Gradual  Duration:  2 weeks  Timing:  Constant  Progression:  Waxing and waning  Chronicity:  New  Context: not activity, not animal exposure, not emotional upset, not fumes, not known allergens, not occupational exposure, not pollens, not smoke exposure, not strong odors, not URI and not weather changes    Relieved by:  Nothing  Worsened by:  Nothing  Ineffective treatments:  None tried  Associated symptoms: abdominal pain and cough    Associated symptoms: no chest pain, no claudication, no diaphoresis, no ear pain, no fever, no headaches, no hemoptysis, no neck pain, no PND, no rash, no sore throat, no sputum production, no syncope, no swollen glands, no vomiting and no wheezing    Risk factors: hx of cancer    Risk factors: no recent alcohol use, no family hx of DVT, no hx of PE/DVT, no obesity, no oral contraceptive use, no prolonged immobilization, no recent surgery and no tobacco use        Review of Systems   Constitutional: Negative.  Negative for diaphoresis and fever.   HENT: Negative.  Negative for ear pain and sore throat.    Respiratory: Positive for cough and shortness of breath. Negative for hemoptysis, sputum production and wheezing.    Cardiovascular: Negative.  Negative for chest pain, claudication, syncope and PND.   Gastrointestinal: Positive for abdominal pain. Negative for vomiting.   Genitourinary: Negative.    Musculoskeletal: Negative.  Negative for neck pain.   Skin: Negative.  Negative for rash.   Neurological: Negative.  Negative for headaches.   Psychiatric/Behavioral: Negative.    All other systems reviewed and are negative.      Past Medical History:   Diagnosis Date    • Arthritis    • Coronary artery disease    • Depression    • Diabetes mellitus    • Enlarged prostate    • GERD (gastroesophageal reflux disease)    • History of transfusion    • Hypertension    • Oropharyngeal cancer 08/27/2017   • Seizure     diabetic   • Severe esophageal dysplasia    • Stroke    • TB (pulmonary tuberculosis) 2004       Allergies   Allergen Reactions   • Codeine Hives       Past Surgical History:   Procedure Laterality Date   • CARDIAC SURGERY     • CHOLECYSTECTOMY     • CORONARY ARTERY BYPASS GRAFT     • FRACTURE SURGERY     • GTUBE REPLACEMENT N/A 10/6/2017    Procedure: GASTROSTOMY TUBE REPLACEMENT, port placement;  Surgeon: Jayne Phillips MD;  Location:  [facility] OR;  Service:    • HERNIA REPAIR     • LARYNGOSCOPY N/A 10/11/2017    Procedure: DIRECT LARYNGOSCOPY WITH BIOPSY;  Surgeon: Darin Neal MD;  Location:  [facility] OR;  Service:    • NISSEN FUNDOPLICATION LAPAROSCOPIC     • IN DENTAL SURGERY PROCEDURE N/A 10/11/2017    Procedure: TOOTH EXTRACTION;  Surgeon: Darin Neal MD;  Location:  [facility] OR;  Service: ENT   • IN INSJ TUNNELED CVC W/O SUBQ PORT/ AGE 5 YR/> Left 10/6/2017    Procedure: INSERTION VENOUS ACCESS DEVICE;  Surgeon: Jayne Phillips MD;  Location:  [facility] OR;  Service: General   • SKIN BIOPSY     • TESTICLE SURGERY      tubes implanted for drainage   • TONSILLECTOMY     • TRACHEOSTOMY N/A 10/5/2017    Procedure: Awake tracheostomy; DIRECT LARYNGOSCOPY WITH BIOPSY;  Surgeon: Alber Justin MD;  Location:  [facility] OR;  Service:        Family History   Problem Relation Age of Onset   • Stroke Mother    • Heart disease Father    • Heart disease Brother    • Cancer Brother        Social History     Social History   • Marital status:      Occupational History   •       Disabled     Social History Main Topics   • Smoking status: Current Every Day Smoker     Packs/day: 0.25     Years: 52.00     Types: Cigarettes   • Smokeless tobacco: Former  User     Types: Snuff      Comment: continuing to try   • Alcohol use Yes      Comment: occasionally   • Drug use: No   • Sexual activity: Defer     Other Topics Concern   • Not on file       Prior to Admission medications    Medication Sig Start Date End Date Taking? Authorizing Provider   aspirin 81 MG chewable tablet Chew 81 mg. 2/11/18   Historical Provider, MD   atorvastatin (LIPITOR) 10 MG tablet Take 10 mg by mouth. 2/10/18   Historical Provider, MD   insulin glargine (LANTUS) 100 UNIT/ML injection Inject 70 Units under the skin Daily.    Historical Provider, MD   lidocaine viscous (XYLOCAINE) 2 % solution Take 5 mL by mouth As Needed for Mild Pain . 1/8/18   Hadley Marie III, MD   metoprolol tartrate (LOPRESSOR) 25 MG tablet Take 12.5 mg by mouth. 2/10/18   Historical Provider, MD   nitroglycerin (NITROLINGUAL) 0.4 MG/SPRAY spray Place 1 spray under the tongue Every 5 (Five) Minutes As Needed for Chest Pain.    Historical Provider, MD   oxyCODONE-acetaminophen (PERCOCET)  MG per tablet Take 1 tablet by mouth Every 6 (Six) Hours As Needed for Moderate Pain . 1/2/18   Hadley Marie III, MD   tamsulosin (FLOMAX) 0.4 MG capsule 24 hr capsule Take 1 capsule by mouth Every Night.    Historical Provider, MD   ticagrelor (BRILINTA) 90 MG tablet tablet Take 90 mg by mouth. 2/10/18   Historical Provider, MD   zolpidem (AMBIEN) 10 MG tablet Take 10 mg by mouth At Night As Needed for Sleep.    Historical Provider, MD       Medications   sodium chloride 0.9 % flush 10 mL (not administered)       Vitals:    04/24/18 1756   BP:    Pulse:    Resp: 18   Temp:    SpO2:          Objective   Physical Exam   Constitutional: He is oriented to person, place, and time. He appears well-developed and well-nourished.   HENT:   Head: Normocephalic and atraumatic.   Eyes: EOM are normal. Pupils are equal, round, and reactive to light.   Neck:   Trach noted but no distress   Cardiovascular: Normal rate and regular rhythm.     Pulmonary/Chest: Breath sounds normal.   tachypneic but seems mild hyperventilation.  Talking without limitation   Abdominal: Soft. Bowel sounds are normal.   G-tube present and appears clear and no drainage   Musculoskeletal: Normal range of motion.   Neurological: He is alert and oriented to person, place, and time.   Skin: Skin is warm and dry. Capillary refill takes less than 2 seconds.   Psychiatric: He has a normal mood and affect. His behavior is normal.   Nursing note and vitals reviewed.      Procedures         Lab Results (last 24 hours)     Procedure Component Value Units Date/Time    Blood Gas, Arterial [[id number]]  (Abnormal) Collected:  04/24/18 1640    Specimen:  Arterial Blood Updated:  04/24/18 1646     Site Left Brachial     Jeffrey's Test Positive     pH, Arterial 7.537 (H) pH units      pCO2, Arterial 31.0 (L) mm Hg      pO2, Arterial 107.0 mm Hg      HCO3, Arterial 26.3 (H) mmol/L      Base Excess, Arterial 4.3 (H) mmol/L      O2 Saturation, Arterial 99.3 (H) %      Temperature 37.0 C      Barometric Pressure for Blood Gas 749 mmHg      Modality Room Air     FIO2 21 %      Ventilator Mode NA     Collected by [id number]    CBC & Differential [[id number]] Collected:  04/24/18 1654    Specimen:  Blood Updated:  04/24/18 1704    Narrative:       The following orders were created for panel order CBC & Differential.  Procedure                               Abnormality         Status                     ---------                               -----------         ------                     CBC Auto Differential[[id number]]        Abnormal            Final result                 Please view results for these tests on the individual orders.    Comprehensive Metabolic Panel [[id number]]  (Abnormal) Collected:  04/24/18 1654    Specimen:  Blood Updated:  04/24/18 1719     Glucose 157 (H) mg/dL      BUN 14 mg/dL      Creatinine 0.55 mg/dL      Sodium 135 mmol/L      Potassium 4.6 mmol/L      Chloride 94 (L) mmol/L       CO2 31.0 mmol/L      Calcium 9.4 mg/dL      Total Protein 7.7 g/dL      Albumin 4.20 g/dL      ALT (SGPT) 63 (H) U/L      AST (SGOT) 49 (H) U/L      Alkaline Phosphatase 165 (H) U/L      Total Bilirubin 0.5 mg/dL      eGFR Non African Amer >150 mL/min/1.73      Globulin 3.5 gm/dL      A/G Ratio 1.2 g/dL      BUN/Creatinine Ratio 25.5 (H)     Anion Gap 10.0 mmol/L     D-dimer, Quantitative [[id number]]  (Normal) Collected:  04/24/18 1654    Specimen:  Blood Updated:  04/24/18 1716     D-Dimer, Quantitative 0.45 mg/L (FEU)     Narrative:       Reference Range is 0-0.50 mg/L FEU. However, results <0.50 mg/L FEU tends to rule out DVT or PE. Results >0.50 mg/L FEU are not useful in predicting absence or presence of DVT or PE.    CBC Auto Differential [[id number]]  (Abnormal) Collected:  04/24/18 1654    Specimen:  Blood Updated:  04/24/18 1704     WBC 5.63 10*3/mm3      RBC 4.55 (L) 10*6/mm3      Hemoglobin 13.9 (L) g/dL      Hematocrit 39.8 (L) %      MCV 87.5 fL      MCH 30.5 pg      MCHC 34.9 g/dL      RDW 12.3 %      RDW-SD 39.8 (L) fl      MPV 10.4 fL      Platelets 211 10*3/mm3      Neutrophil % 69.3 %      Lymphocyte % 13.5 (L) %      Monocyte % 14.9 (H) %      Eosinophil % 1.6 %      Basophil % 0.2 %      Immature Grans % 0.5 %      Neutrophils, Absolute 3.90 10*3/mm3      Lymphocytes, Absolute 0.76 10*3/mm3      Monocytes, Absolute 0.84 10*3/mm3      Eosinophils, Absolute 0.09 10*3/mm3      Basophils, Absolute 0.01 10*3/mm3      Immature Grans, Absolute 0.03 10*3/mm3      nRBC 0.0 /100 WBC           XR Chest 1 View   Final Result   1. Appropriate positioning the tracheostomy tube. Gastrostomy tube in   place.   Prior mediastinal surgery. Coronary vascular stents.   2. No acute pulmonary process. Skinfold left lower hemithorax.   This report was finalized on 04/24/2018 16:29 by Dr. Christen Obrien MD.          ED Course  ED Course   Comment By Time   Told patient and his wife that testing is okay and I think  this is just lung irritation with element of hyperventilation. Neel Dodson Jr., MD 04/24 1804          MDM  Number of Diagnoses or Management Options  Dyspnea, unspecified type: new and requires workup     Amount and/or Complexity of Data Reviewed  Clinical lab tests: ordered and reviewed  Tests in the radiology section of CPT®: ordered and reviewed  Tests in the medicine section of CPT®: ordered and reviewed    Risk of Complications, Morbidity, and/or Mortality  Presenting problems: moderate  Diagnostic procedures: moderate  Management options: moderate    Patient Progress  Patient progress: stable      Final diagnoses:   Dyspnea, unspecified type          Neel Dodson Jr., MD  04/24/18 9685

## 2018-05-14 ENCOUNTER — OFFICE VISIT (OUTPATIENT)
Dept: CARDIOLOGY | Age: 63
End: 2018-05-14
Payer: MEDICARE

## 2018-05-14 VITALS
SYSTOLIC BLOOD PRESSURE: 116 MMHG | BODY MASS INDEX: 20.09 KG/M2 | RESPIRATION RATE: 16 BRPM | HEART RATE: 99 BPM | HEIGHT: 66 IN | WEIGHT: 125 LBS | DIASTOLIC BLOOD PRESSURE: 52 MMHG

## 2018-05-14 DIAGNOSIS — I25.10 CORONARY ARTERY DISEASE INVOLVING NATIVE CORONARY ARTERY OF NATIVE HEART WITHOUT ANGINA PECTORIS: Primary | ICD-10-CM

## 2018-05-14 DIAGNOSIS — R07.9 CHEST PAIN IN ADULT: ICD-10-CM

## 2018-05-14 PROCEDURE — G8598 ASA/ANTIPLAT THER USED: HCPCS | Performed by: INTERNAL MEDICINE

## 2018-05-14 PROCEDURE — 4004F PT TOBACCO SCREEN RCVD TLK: CPT | Performed by: INTERNAL MEDICINE

## 2018-05-14 PROCEDURE — G8420 CALC BMI NORM PARAMETERS: HCPCS | Performed by: INTERNAL MEDICINE

## 2018-05-14 PROCEDURE — G8427 DOCREV CUR MEDS BY ELIG CLIN: HCPCS | Performed by: INTERNAL MEDICINE

## 2018-05-14 PROCEDURE — 3017F COLORECTAL CA SCREEN DOC REV: CPT | Performed by: INTERNAL MEDICINE

## 2018-05-14 PROCEDURE — 99214 OFFICE O/P EST MOD 30 MIN: CPT | Performed by: INTERNAL MEDICINE

## 2018-05-14 RX ORDER — NITROGLYCERIN 0.4 MG/1
0.4 TABLET SUBLINGUAL EVERY 5 MIN PRN
Qty: 25 TABLET | Refills: 3 | Status: SHIPPED | OUTPATIENT
Start: 2018-05-14 | End: 2019-06-18 | Stop reason: SDUPTHER

## 2018-05-14 RX ORDER — LISINOPRIL 10 MG/1
10 TABLET ORAL DAILY
COMMUNITY
Start: 2018-04-26 | End: 2018-11-21

## 2018-05-14 RX ORDER — FUROSEMIDE 40 MG/1
40 TABLET ORAL DAILY
COMMUNITY
Start: 2018-04-26 | End: 2018-11-21

## 2018-05-17 NOTE — PROGRESS NOTES
" Alber Justin MD   CC:  follow up head and neck cancer    History of Present Illness:  The patient presents for routine cancer follow up with no acute complaints.He was scheduled for direct laryngoscopy to assure complete response prior to considering tracheal decannulation.  However, he did not show up to the operation. He has had a sore on the left (opposite side) tongue and inner aspect of the mandibular body that is sore    Oncology/Hematology History    Sameer Tavarez is a 62 y.o. male with oropharyngeal cancer. He had an EGD on 2/6/17 by Dr Patel with biopsies showing Barretts esophagus and reflux esophagitis. He had continued dysphagia and underwent another EGD and an oropharyngeal biopsy on 8/27/17 with the oropharyngeal biosies showing \"at least squamous cell carcinoma in situ\". CT scanning showed \"oropharyngeal asymmety without overt enhancing mass\" on 8/28/17. He was referred to Dr. Alber Justin MD and diagnosed with a large ulcerative lesion that was in the oral pharynx.  It extended from the right lateral aspect of the uvula and extended to the posterior rim of the soft palate extending to the tonsillar fossa and deeply penetrating into it.  It involved the right base of tongue and extended at least to the midline of the base of tongue.  It extended forward into the lingual tongue muscular approximately 2 cm. He was having difficulty with his secretions and his airway at night, so an awake tracheostomy with direct laryngoscopy and biopsy was performed on 10/5/17. At the time of admission, g tube placement and dental extraction was performed.            Oropharyngeal cancer    8/28/2017 Initial Diagnosis     Oropharyngeal cancer         8/28/2017 Biopsy     Dr Patel ([facility] [place]) - EGD with oropharynx biopsy:    Clinical Information       Pre-Op: Gerd/Hoarsness      Post-Op Esophageal ulcer,barretts oral lesion    Final Diagnosis   1.  Esophagus, endoscopic biopsy:  A. "  Fragments of benign squamocolumnar junction mucosa and benign glandular mucosa demonstrating intestinal metaplasia (Recio's esophagus)  B.  Chronic active inflammation, moderate.  C.  No dysplasia identified.     2.  Oropharynx, biopsy: At least squamous cell carcinoma in situ.     Comment: Status of invasion is indeterminate due to tangential sectioning of several of the tissue fragments.  Immunohistochemical stain for p16 is positive.     AJCC stage:pTX pNX                 8/28/2017 Imaging     [facility]  CT Soft tissue neck  No discrete enhancing mass  Soft tissue thickening right oropharynx and peritonsillar soft tissues compared to left.  Small lymph nodes may be inflammatory in nature         9/13/2017 Imaging     [facility]    CT Chest  Small hiatal hernia  1.1 cm lymph node adjacent to the distal esophagus.  Two small lung nodules.  For multiple solid noncalcified nodules smaller than 6 mm in diameter, no routine follow-up is recommended. (grade 2B; weak recommendation, moderate-quality evidence)  CT Abd Pelvis  Right perineal mass of unknown significance. Please correlate with physical exam.  Dilated small bowel with multiple air-fluid levels. Differential considerations include partial small bowel obstruction vs adynamic ileus.  Mild diverticulosis. Mild distended urinary bladder.         9/29/2017 Imaging     [facility]  MR Orbit Face Neck  1. Large right oropharyngeal mass compatible with the history of  carcinoma.  2. Tongue base and right submandibular gland involvement.    PET  1. Abnormal metabolic activity in the base of the tongue on the right  extending in the right palatine tonsil. SUV is 8.8. This is consistent  with neoplasm. No other regions of abnormal metabolic activity are  identified..           10/5/2017 Procedure     Tracheostomy with Direct Laryngoscopy and biopsy -JUANITA Justin MD     Path    Final Diagnosis   1.  Right superior tonsillar fossa, biopsy:  A.   Moderately differentiated squamous cell carcinoma, invasive.  B.  Immunohistochemical stain for p16 is positive.      AJCC stage: pTX pNX     2.  Right base of tongue, biopsy:  A.  Moderately differentiated squamous cell carcinoma, invasive.  B.  Immunohistochemical stain for p16 is positive.              10/6/2017 Procedure     Port placement  Gastrostomy tube placement         10/11/2017 Procedure     Teeth extraction- Les Goddard MD, DMD         11/3/2017 Cancer Staged     Oropharyngeal cancer    Staging form: Pharynx - HPV-Mediated Oropharynx, AJCC 8th Edition    - Clinical stage from 11/3/2017: Stage III (cT4, cN0, cM0, p16: Positive) - Signed by Alber Justin MD on 1/1/2018    - Pathologic: No stage assigned - Unsigned         11/9/2017 - 1/16/2018 Chemotherapy/Radiation     Carboplatin and Taxol- [facility]    Radiation OncologyTreatment Course:  Sameer Tavarez received 7000 cGy in 35 fractions to the oral pharynx and neck via External Beam Radiation - EBRT.- [facility]            Review of Systems  Reviewed as per patient intake note.    Past History:  Past medical and surgical history, family history and social history reviewed and updated when appropriate.  Current medications and allergies reviewed and updated when appropriate.  Allergies:  Codeine    Vital Signs:  Temp:  [98.2 °F (36.8 °C)] 98.2 °F (36.8 °C)  BP: (98)/(68) 98/68    Physical Exam    CONSTITUTIONAL: well nourished, well-developed, alert, oriented, in no acute distress   COMMUNICATION AND VOICE: able to communicate normally, normal voice quality, voice produced with Passy- Miur Valve,  HEAD: normocephalic, no lesions, atraumatic, no tenderness, no masses   FACE: appearance normal, no lesions, no tenderness, no deformities, facial motion symmetric  SALIVARY GLANDS: parotid glands with no tenderness, no swelling, no masses, submandibular glands with normal size, nontender  EYES: ocular motility normal, eyelids normal, orbits normal, no  proptosis, conjunctiva normal , pupils equal, round  HEARING: response to conversational voice normal bilaterally   EXTERNAL EARS: auricles without lesions  EXTERNAL EAR CANALS: normal ear canals without stenosis or significant cerumen  TYMPANIC MEMBRANES: tympanic membrane appearance normal, no lesions, no perforation, normal mobility, no fluid  EXTERNAL NOSE: structure normal, no tenderness on palpation, no nasal discharge, no lesions, no evidence of trauma, nostrils patent  INTRANASAL EXAM: nasal mucosa normal, vestibule within normal limits, inferior turbinate normal, nasal septum without overtly obstructing anterior deviation  LIPS: structure normal, no tenderness on palpation, no lesions, no evidence of trauma  TEETH: edentulous  GUMS: , exposed bone 5 mm left inner aspect of  mandibular body.  ORAL MUCOSA: oral mucosa normal, no mucosal lesions  FLOOR OF MOUTH: Warthin’s duct patent, mucosa normal  TONGUE: There is radiation changes within the mouth.  Overall there is an excellent response to therapy.  PALATE: soft and hard palates with normal mucosa and structure  OROPHARYNX: mucosa normal, tonsil fossa normal, radiation change present. no evidence of recurrent disease,  NECK: neck appearance normal, no masses, or tenderness tracheostomy present,  THYROID: no overt thyromegaly, no tenderness, nodules or mass present on palpation, position midline   LYMPH NODES: no lymphadenopathy  CHEST/RESPIRATORY: respiratory effort normal  CARDIOVASCULAR: extremities without cyanosis or edema, no overt jugulovenous distension present  NEUROLOGIC/PSYCHIATRIC        Assessment   1. Glossitis    2. Oropharyngeal cancer    3. Tracheostomy dependence    4. Tobacco use    5. Exposed mandibular bone        Plan    I would like to do an operative direct laryngoscopy prior to considering removal of the tracheostomy.  We will reschedule him for this procedure. If there is no evidence of recurrent disease, we will try to decanulate  his trach.  -----SURGERY SCHEDULING:-----  DIRECT LARYNGOSCOPY, with biopsy (N/A)        ---INFORMED CONSENT DISCUSSION:---  DIRECT LARYNGOSCOPY WITH BIOPSY: The risks and benefits were explained including but not limited to pain, bleeding, infection, (including possible mediastinitis), the risks of the general anesthesia, pain, temporary or permanent hoarseness, airway loss, and/or tooth injury. Questions were answered. No guarantees were made or implied.       ---PREOPERATIVE WORKUP:---  CBC  CMP  Chest X-Ray  EKG     Follow up  postoperatively    Alber Justin MD  05/18/18  12:16 PM

## 2018-05-18 ENCOUNTER — OFFICE VISIT (OUTPATIENT)
Dept: OTOLARYNGOLOGY | Facility: CLINIC | Age: 63
End: 2018-05-18

## 2018-05-18 VITALS
SYSTOLIC BLOOD PRESSURE: 98 MMHG | DIASTOLIC BLOOD PRESSURE: 68 MMHG | TEMPERATURE: 98.2 F | HEIGHT: 66 IN | BODY MASS INDEX: 20.89 KG/M2 | WEIGHT: 130 LBS

## 2018-05-18 DIAGNOSIS — Z93.0 TRACHEOSTOMY DEPENDENCE (HCC): ICD-10-CM

## 2018-05-18 DIAGNOSIS — R29.898 EXPOSED MANDIBULAR BONE: ICD-10-CM

## 2018-05-18 DIAGNOSIS — Z92.3 HISTORY OF RADIATION THERAPY: ICD-10-CM

## 2018-05-18 DIAGNOSIS — C10.9 OROPHARYNGEAL CANCER (HCC): ICD-10-CM

## 2018-05-18 DIAGNOSIS — Z72.0 TOBACCO USE: ICD-10-CM

## 2018-05-18 DIAGNOSIS — K14.0 GLOSSITIS: Primary | ICD-10-CM

## 2018-05-18 PROCEDURE — 99214 OFFICE O/P EST MOD 30 MIN: CPT | Performed by: OTOLARYNGOLOGY

## 2018-05-18 RX ORDER — LISINOPRIL 10 MG/1
10 TABLET ORAL DAILY
COMMUNITY
Start: 2018-04-26 | End: 2018-10-11

## 2018-05-18 RX ORDER — SYRINGE-NEEDLE,INSULIN,0.5 ML 29 G X1/2"
SYRINGE, EMPTY DISPOSABLE MISCELLANEOUS
Refills: 5 | COMMUNITY
Start: 2018-04-26 | End: 2018-10-11

## 2018-05-18 RX ORDER — FUROSEMIDE 40 MG/1
40 TABLET ORAL DAILY
COMMUNITY
Start: 2018-04-26 | End: 2018-10-11

## 2018-05-18 RX ORDER — NITROGLYCERIN 0.4 MG/1
0.4 TABLET SUBLINGUAL
COMMUNITY
Start: 2018-05-14 | End: 2018-10-11

## 2018-05-18 NOTE — PATIENT INSTRUCTIONS
IF YOU SMOKE OR USE TOBACCO PLEASE READ THE FOLLOWING:    Why is smoking bad for me?  Smoking increases the risk of heart disease, lung disease, vascular disease, stroke, and cancer.     If you smoke, STOP!    If you would like more information on quitting smoking, please visit the [employer] website: [email or web address]   This link will provide additional resources including the QUIT line and the Beat the Pack support groups.     For more information:    Quit Now [facility]  1-800-QUIT-NOW  https://[email or web address].quitlogix.org/en-US/

## 2018-05-25 ENCOUNTER — TELEPHONE (OUTPATIENT)
Dept: OTOLARYNGOLOGY | Facility: CLINIC | Age: 63
End: 2018-05-25

## 2018-05-25 NOTE — TELEPHONE ENCOUNTER
Called and spoke to [employer] supplies about Mr. Tavarez's tracheostomy supplies.  He told us that medicare was not covering his supplies anymore.  In speaking to them, they told me that Mr. Tavarez has copays on his supplies and refuses to pay it, and will not accept any supplies.  He also states he does not wish to apply for medicaid as he was told he makes too much money.  His trach copay is $10 and inner cannulas are $1.50 each.

## 2018-05-30 PROBLEM — Z85.819 ENCOUNTER FOR FOLLOW-UP SURVEILLANCE OF ORAL CAVITY CANCER: Status: ACTIVE | Noted: 2018-05-30

## 2018-05-30 PROBLEM — Z08 ENCOUNTER FOR FOLLOW-UP SURVEILLANCE OF ORAL CAVITY CANCER: Status: ACTIVE | Noted: 2018-05-30

## 2018-06-01 ENCOUNTER — HOSPITAL ENCOUNTER (OUTPATIENT)
Dept: RADIATION ONCOLOGY | Facility: HOSPITAL | Age: 63
Setting detail: RADIATION/ONCOLOGY SERIES
End: 2018-06-01

## 2018-06-01 ENCOUNTER — OFFICE VISIT (OUTPATIENT)
Dept: RADIATION ONCOLOGY | Facility: HOSPITAL | Age: 63
End: 2018-06-01

## 2018-06-01 VITALS
SYSTOLIC BLOOD PRESSURE: 120 MMHG | DIASTOLIC BLOOD PRESSURE: 58 MMHG | HEIGHT: 66 IN | WEIGHT: 131 LBS | BODY MASS INDEX: 21.05 KG/M2

## 2018-06-01 DIAGNOSIS — Z92.3 HISTORY OF RADIATION THERAPY: ICD-10-CM

## 2018-06-01 DIAGNOSIS — C10.9 OROPHARYNGEAL CANCER (HCC): Primary | ICD-10-CM

## 2018-06-01 DIAGNOSIS — F17.200 CURRENT EVERY DAY SMOKER: ICD-10-CM

## 2018-06-01 DIAGNOSIS — Z85.819 ENCOUNTER FOR FOLLOW-UP SURVEILLANCE OF ORAL CAVITY CANCER: ICD-10-CM

## 2018-06-01 DIAGNOSIS — Z08 ENCOUNTER FOR FOLLOW-UP SURVEILLANCE OF ORAL CAVITY CANCER: ICD-10-CM

## 2018-06-10 ENCOUNTER — HOSPITAL ENCOUNTER (EMERGENCY)
Facility: HOSPITAL | Age: 63
Discharge: HOME | End: 2018-06-10
Attending: EMERGENCY MEDICINE | Admitting: EMERGENCY MEDICINE

## 2018-06-10 VITALS
HEIGHT: 66 IN | BODY MASS INDEX: 20.09 KG/M2 | HEART RATE: 96 BPM | TEMPERATURE: 97.9 F | DIASTOLIC BLOOD PRESSURE: 63 MMHG | RESPIRATION RATE: 18 BRPM | WEIGHT: 125 LBS | OXYGEN SATURATION: 100 % | SYSTOLIC BLOOD PRESSURE: 109 MMHG

## 2018-06-10 DIAGNOSIS — K94.23 PEG TUBE MALFUNCTION (HCC): Primary | ICD-10-CM

## 2018-06-10 PROCEDURE — 99283 EMERGENCY DEPT VISIT LOW MDM: CPT

## 2018-06-10 RX ORDER — SULFAMETHOXAZOLE AND TRIMETHOPRIM 800; 160 MG/1; MG/1
1 TABLET ORAL 2 TIMES DAILY
Qty: 14 TABLET | Refills: 0 | Status: SHIPPED | OUTPATIENT
Start: 2018-06-10 | End: 2018-06-18

## 2018-06-10 RX ORDER — FAMOTIDINE 40 MG/1
40 TABLET, FILM COATED ORAL DAILY
Qty: 30 TABLET | Refills: 0 | Status: SHIPPED | OUTPATIENT
Start: 2018-06-10 | End: 2018-06-18

## 2018-06-10 RX ORDER — SULFAMETHOXAZOLE AND TRIMETHOPRIM 800; 160 MG/1; MG/1
1 TABLET ORAL ONCE
Status: COMPLETED | OUTPATIENT
Start: 2018-06-10 | End: 2018-06-10

## 2018-06-10 RX ORDER — OXYCODONE AND ACETAMINOPHEN 7.5; 325 MG/1; MG/1
1 TABLET ORAL ONCE AS NEEDED
Status: COMPLETED | OUTPATIENT
Start: 2018-06-10 | End: 2018-06-10

## 2018-06-10 RX ADMIN — OXYCODONE AND ACETAMINOPHEN 1 TABLET: 7.5; 325 TABLET ORAL at 05:30

## 2018-06-10 RX ADMIN — SULFAMETHOXAZOLE AND TRIMETHOPRIM 160 MG: 800; 160 TABLET ORAL at 05:23

## 2018-06-10 NOTE — ED PROVIDER NOTES
"Subjective   61 y/o male with severe dysphagia with PEG and Trach placement who arrives after his PEG tube came out. He states he is currently eating solid foods and there was plans to remove his PEG and trach soon. He states the PEG came out around 1-2 hours PTA and arrives asking that I \"sew it up.\" He does not wish for the PEG tube any longer as he states \"the damn thing gets infected and it just bothers me and I can eat so I don't want the damn thing.\" He is alert and oriented x4 and arrives with his wife as well who also states she no longer wishes for the PEG tube to be in place at this time. He denies falls, trauma, fevers, chills, abdominal pain, nausea, vomiting, diarrhea or other issues. He is currently not on abx.       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  No date: Stroke  2004: TB (pulmonary tuberculosis)    Past Surgical History:  No date: CARDIAC SURGERY  No date: CHOLECYSTECTOMY  No date: CORONARY ARTERY BYPASS GRAFT  No date: FRACTURE SURGERY  10/6/2017: GTUBE REPLACEMENT N/A      Comment: Procedure: GASTROSTOMY TUBE REPLACEMENT, port                placement;  Surgeon: Jayne Phillips MD;                 Location: [facility] OR;  Service:   No date: HERNIA REPAIR  10/11/2017: LARYNGOSCOPY N/A      Comment: Procedure: DIRECT LARYNGOSCOPY WITH BIOPSY;                 Surgeon: Darin Neal MD;  Location: [facility] OR;  Service:   No date: NISSEN FUNDOPLICATION LAPAROSCOPIC  10/11/2017: MS DENTAL SURGERY PROCEDURE N/A      Comment: " Procedure: TOOTH EXTRACTION;  Surgeon: Darin Neal MD;  Location:  [facility] OR;                 Service: ENT  10/6/2017: CT INSJ TUNNELED CVC W/O SUBQ PORT/ AGE 5 Y* Left      Comment: Procedure: INSERTION VENOUS ACCESS DEVICE;                 Surgeon: Jayne Phillips MD;  Location:  [facility]               OR;  Service: General  No date: SKIN BIOPSY  No date: TESTICLE SURGERY      Comment: tubes implanted for drainage  No date: TONSILLECTOMY  10/5/2017: TRACHEOSTOMY N/A      Comment: Procedure: Awake tracheostomy; DIRECT                LARYNGOSCOPY WITH BIOPSY;  Surgeon: Alber Justin MD;  Location:  [facility] OR;                 Service:     Social History    Marital status:              Spouse name:                       Years of education:                 Number of children:               Occupational History  Occupation          Employer            Comment                                           Disabled    Social History Main Topics    Smoking status: Current Every Day Smoker                                                     Packs/day: 0.25      Years: 52.00          Types: Cigarettes    Smokeless tobacco: Former User                          Types: Snuff    Comment: continuing to try; smoking about 5 a day    Alcohol use: Yes                Comment: occasionally    Drug use: No              Sexual activity: Defer                Other Topics            Concern    None on file    Social History Narrative    None on file        Family history: reviewed and non contributory             Review of Systems   All other systems reviewed and are negative.      Past Medical History:   Diagnosis Date   • Arthritis    • Coronary artery disease    • Depression    • Diabetes mellitus    • Enlarged prostate    • GERD (gastroesophageal reflux disease)    • History of transfusion    • Hypertension    • Oropharyngeal cancer 08/27/2017   • Seizure     diabetic   •  Severe esophageal dysplasia    • Stroke    • TB (pulmonary tuberculosis) 2004       Allergies   Allergen Reactions   • Codeine Hives       Past Surgical History:   Procedure Laterality Date   • CARDIAC SURGERY     • CHOLECYSTECTOMY     • CORONARY ARTERY BYPASS GRAFT     • FRACTURE SURGERY     • GTUBE REPLACEMENT N/A 10/6/2017    Procedure: GASTROSTOMY TUBE REPLACEMENT, port placement;  Surgeon: Jayne Phillips MD;  Location:  [facility] OR;  Service:    • HERNIA REPAIR     • LARYNGOSCOPY N/A 10/11/2017    Procedure: DIRECT LARYNGOSCOPY WITH BIOPSY;  Surgeon: Darin Neal MD;  Location:  [facility] OR;  Service:    • NISSEN FUNDOPLICATION LAPAROSCOPIC     • DC DENTAL SURGERY PROCEDURE N/A 10/11/2017    Procedure: TOOTH EXTRACTION;  Surgeon: Darin Neal MD;  Location:  [facility] OR;  Service: ENT   • DC INSJ TUNNELED CVC W/O SUBQ PORT/ AGE 5 YR/> Left 10/6/2017    Procedure: INSERTION VENOUS ACCESS DEVICE;  Surgeon: Jayne Phillips MD;  Location:  [facility] OR;  Service: General   • SKIN BIOPSY     • TESTICLE SURGERY      tubes implanted for drainage   • TONSILLECTOMY     • TRACHEOSTOMY N/A 10/5/2017    Procedure: Awake tracheostomy; DIRECT LARYNGOSCOPY WITH BIOPSY;  Surgeon: Alber Justin MD;  Location:  [facility] OR;  Service:        Family History   Problem Relation Age of Onset   • Stroke Mother    • Heart disease Father    • Heart disease Brother    • Cancer Brother        Social History     Social History   • Marital status:      Occupational History   •       Disabled     Social History Main Topics   • Smoking status: Current Every Day Smoker     Packs/day: 0.25     Years: 52.00     Types: Cigarettes   • Smokeless tobacco: Former User     Types: Snuff      Comment: continuing to try; smoking about 5 a day   • Alcohol use Yes      Comment: occasionally   • Drug use: No   • Sexual activity: Defer     Other Topics Concern   • Not on file           Objective   Physical Exam   Constitutional:  "He is oriented to person, place, and time. He appears well-developed and well-nourished.   HENT:   Head: Normocephalic.   Nose: Nose normal.   Trach noted, speech is clear   Eyes: Conjunctivae and EOM are normal. Pupils are equal, round, and reactive to light.   Neck: Normal range of motion. Neck supple.   Abdominal: Soft. Bowel sounds are normal. He exhibits no distension and no mass. There is no tenderness. There is no rebound and no guarding. No hernia.   PEG tube site seen, there is slight irritation around this which I feel is just from gastric exposure, no signs of infection, no drainage, no crepitance, no fluctance, no guarding, no rigidty.    Neurological: He is alert and oriented to person, place, and time.   Skin: Skin is warm. Capillary refill takes less than 2 seconds.   Psychiatric: He has a normal mood and affect.   Vitals reviewed.      Procedures           ED Course      Patient is alert and oriented x4, he is capable of making his own decisions, expresses good judgement into his condition and has his wife at this side for support. He nor she wishes for me to replace the PEG tube. I have made him aware that we do not suture this. That it should close in a few days to 1-2 weeks fully. I will place him on anti-acids and provide bandages in the mean time. He insists he can eat and drink normally and refuses several times for replacement. I do not feel the site is infected but the patient does feel it is and I will provide abx. At this time patient wife both are refusing replacement. They are aware that should they refuse and he needs to have this replaced he will have to undergo surgery again. They state they understand this but \"I will not get this put back in\" per the patient.     Upon discharge he asks for his chronic pain medication for his chronic pain.             MDM      Final diagnoses:   PEG tube malfunction            Abimael Justice MD  06/10/18 0525    "

## 2018-06-10 NOTE — DISCHARGE INSTRUCTIONS
Sameer,    You were seen today after you feeding tube came out. You are alert and oriented and capable of making your own decisions. While I did offer to replace this tube for you, you have refused. You understand from our talk that this tube site will begin to close in several hours after it is removed (the stomach opening will close in 1-2 hours but the skin hole may take as long as 1-2 weeks). After this time, you need the tube placed again for feeding issues you will have to undergo surgery again. You understand all this but have asked that we not replace the tube. Please keep the area clean and dry with clean dressings in place until the skin hole is fully healed (this may take 1-2 weeks to fully heal up, some people actually notice it heal in 1-2 days). Please be sure to take the medications I am giving you (one of these will limit your stomach acid which and keep your skin from burning) and return for worsening pain, fevers, abdominal pain or any other issues.

## 2018-06-14 PROCEDURE — G0463 HOSPITAL OUTPT CLINIC VISIT: HCPCS | Performed by: RADIOLOGY

## 2018-06-18 ENCOUNTER — HOSPITAL ENCOUNTER (OUTPATIENT)
Dept: GENERAL RADIOLOGY | Facility: HOSPITAL | Age: 63
Discharge: HOME | End: 2018-06-18
Admitting: OTOLARYNGOLOGY

## 2018-06-18 ENCOUNTER — APPOINTMENT (OUTPATIENT)
Dept: PREADMISSION TESTING | Facility: HOSPITAL | Age: 63
End: 2018-06-18

## 2018-06-18 VITALS
HEART RATE: 83 BPM | DIASTOLIC BLOOD PRESSURE: 93 MMHG | SYSTOLIC BLOOD PRESSURE: 133 MMHG | OXYGEN SATURATION: 98 % | HEIGHT: 66 IN

## 2018-06-18 DIAGNOSIS — Z93.0 TRACHEOSTOMY DEPENDENCE (HCC): ICD-10-CM

## 2018-06-18 DIAGNOSIS — C10.9 OROPHARYNGEAL CANCER (HCC): ICD-10-CM

## 2018-06-18 LAB
ANION GAP SERPL CALCULATED.3IONS-SCNC: 9 MMOL/L (ref 4–13)
BUN BLD-MCNC: 15 MG/DL (ref 5–21)
BUN/CREAT SERPL: 31.9 (ref 7–25)
CALCIUM SPEC-SCNC: 9.3 MG/DL (ref 8.4–10.4)
CHLORIDE SERPL-SCNC: 98 MMOL/L (ref 98–110)
CO2 SERPL-SCNC: 28 MMOL/L (ref 24–31)
CREAT BLD-MCNC: 0.47 MG/DL (ref 0.5–1.4)
DEPRECATED RDW RBC AUTO: 40.7 FL (ref 40–54)
ERYTHROCYTE [DISTWIDTH] IN BLOOD BY AUTOMATED COUNT: 13.2 % (ref 12–15)
GFR SERPL CREATININE-BSD FRML MDRD: >150 ML/MIN/1.73
GLUCOSE BLD-MCNC: 148 MG/DL (ref 70–100)
HCT VFR BLD AUTO: 37.4 % (ref 40–52)
HGB BLD-MCNC: 13.2 G/DL (ref 14–18)
MCH RBC QN AUTO: 30.4 PG (ref 28–32)
MCHC RBC AUTO-ENTMCNC: 35.3 G/DL (ref 33–36)
MCV RBC AUTO: 86.2 FL (ref 82–95)
PLATELET # BLD AUTO: 271 10*3/MM3 (ref 130–400)
PMV BLD AUTO: 9.9 FL (ref 6–12)
POTASSIUM BLD-SCNC: 4.3 MMOL/L (ref 3.5–5.3)
RBC # BLD AUTO: 4.34 10*6/MM3 (ref 4.8–5.9)
SODIUM BLD-SCNC: 135 MMOL/L (ref 135–145)
WBC NRBC COR # BLD: 7.52 10*3/MM3 (ref 4.8–10.8)

## 2018-06-18 PROCEDURE — 36415 COLL VENOUS BLD VENIPUNCTURE: CPT

## 2018-06-18 PROCEDURE — 80048 BASIC METABOLIC PNL TOTAL CA: CPT | Performed by: OTOLARYNGOLOGY

## 2018-06-18 PROCEDURE — 85027 COMPLETE CBC AUTOMATED: CPT | Performed by: OTOLARYNGOLOGY

## 2018-06-18 PROCEDURE — 93005 ELECTROCARDIOGRAM TRACING: CPT

## 2018-06-18 PROCEDURE — 71045 X-RAY EXAM CHEST 1 VIEW: CPT

## 2018-06-18 PROCEDURE — 93010 ELECTROCARDIOGRAM REPORT: CPT | Performed by: INTERNAL MEDICINE

## 2018-06-18 NOTE — DISCHARGE INSTRUCTIONS
DAY OF SURGERY INSTRUCTIONS           Attending:   Alber Justin MD  Direct Laryngoscopy With Biopsy-N/A           DATE OF SURGERY: June 25 2018    ARRIVAL TIME: AS DIRECTED BY OFFICE    DAY OF SURGERY TAKE ONLY THESE MEDICATIONS UNLESS OTHERWISE INSTRUCTED BY YOUR PHYSICIAN: METOPROLOL          MANAGING PAIN AFTER SURGERY    We know you are probably wondering what your pain will be like after surgery.  Following surgery it is unrealistic to expect you will not have pain.   Pain is how our bodies let us know that something is wrong or cautions us to be careful.  That said, our goal is to make your pain tolerable.    Methods we may use to treat your pain include (oral or IV medications, PCAs, epidurals, nerve blocks, etc.)   While some procedures require IV pain medications for a short time after surgery, transitioning to pain medications by mouth allows for better management of pain.   Your nurse will encourage you to take oral pain medications whenever possible.  IV medications work almost immediately, but only last a short while.  Taking medications by mouth allows for a more constant level of medication in your blood stream for a longer period of time.      Once your pain is out of control it is harder to get back under control.  It is important you are aware when your next dose of pain medication is due.  If you are admitted, your nurse may write the time of your next dose on the white board in your room to help you remember.      We are interested in your pain and encourage you to inform us about aggravating factors during your visit.   Many times a simple repositioning every few hours can make a big difference.    If your physician says it is okay, do not let your pain prevent you from getting out of bed. Be sure to call your nurse for assistance prior to getting up so you do not fall.      Before surgery, please decide your tolerable pain goal.  These faces help describe the pain ratings we use on a  0-10 scale.   Be prepared to tell us your goal and whether or not you take pain or anxiety medications at home.            BEFORE YOU COME TO THE HOSPITAL  (Pre-op instructions)  • Do not eat, drink, smoke or chew gum after midnight the night before surgery.  This also includes no mints.  • Morning of surgery take only the medicines you have been instructed with a sip of water unless otherwise instructed  by your physician.  • Do not shave, wear makeup or dark nail polish.  • Remove all jewelry including rings.  • Leave anything you consider valuable at home.  • Leave your suitcase in the car until after your surgery.  • Bring the following with you if applicable:  o Picture ID and insurance, Medicare or Medicaid cards  o Co-pay/deductible required by insurance (cash, check, credit card)  o Copy of advance directive, living will or power-of- documents if not brought to PAT  o CPAP or BIPAP mask and tubing  o Relaxation aids (MP3 player, book, magazine)  • On the day of surgery check in at registration located at the main entrance of the hospital.       Outpatient Surgery Guidelines, Adult  Outpatient procedures are those for which the person having the procedure is allowed to go home the same day as the procedure. Various procedures are done on an outpatient basis. You should follow some general guidelines if you will be having an outpatient procedure.  LET YOUR HEALTH CARE PROVIDER KNOW ABOUT:  · Any allergies you have.  · All medicines you are taking, including vitamins, herbs, eye drops, creams, and over-the-counter medicines.  · Previous problems you or members of your family have had with the use of anesthetics.  · Any blood disorders you have.  · Previous surgeries you have had.  · Medical conditions you have.  RISKS AND COMPLICATIONS  Your health care provider will discuss possible risks and complications with you before surgery. Common risks and complications include:    · Problems due to the use of  anesthetics.  · Blood loss and replacement (does not apply to minor surgical procedures).  · Temporary increase in pain due to surgery.  · Uncorrected pain or problems that the surgery was meant to correct.  · Infection.  · New damage.  BEFORE THE PROCEDURE  · Ask your health care provider about changing or stopping your regular medicines. You may need to stop taking certain medicines in the days or weeks before the procedure.  · Stop smoking at least 2 weeks before surgery. This lowers your risk for complications during and after surgery. Ask your health care provider for help with this if needed.  · Eat your usual meals and a light supper the day before surgery. Continue fluid intake. Do not drink alcohol.  · Do not eat or drink after midnight the night before your surgery.   · Arrange for someone to take you home and to stay with you for 24 hours after the procedure. Medicine given for your procedure may affect your ability to drive or to care for yourself.  · Call your health care provider's office if you develop an illness or problem that may prevent you from safely having your procedure.  AFTER THE PROCEDURE  After surgery, you will be taken to a recovery area, where your progress will be monitored. If there are no complications, you will be allowed to go home when you are awake, stable, and taking fluids well. You may have numbness around the surgical site. Healing will take some time. You will have tenderness at the surgical site and may have some swelling and bruising. You may also have some nausea.  HOME CARE INSTRUCTIONS  · Do not drive for 24 hours, or as directed by your health care provider. Do not drive while taking prescription pain medicines.  · Do not drink alcohol for 24 hours.  · Do not make important decisions or sign legal documents for 24 hours.  · You may resume a normal diet and activities as directed.  · Do not lift anything heavier than 10 pounds (4.5 kg) or play contact sports until your  health care provider says it is okay.  · Change your bandages (dressings) as directed.  · Only take over-the-counter or prescription medicines as directed by your health care provider.  · Follow up with your health care provider as directed.  SEEK MEDICAL CARE IF:  · You have increased bleeding (more than a small spot) from the surgical site.  · You have redness, swelling, or increasing pain in the wound.  · You see pus coming from the wound.  · You have a fever.  · You notice a bad smell coming from the wound or dressing.  · You feel lightheaded or faint.  · You develop a rash.  · You have trouble breathing.  · You develop allergies.  MAKE SURE YOU:  · Understand these instructions.  · Will watch your condition.  · Will get help right away if you are not doing well or get worse.     This information is not intended to replace advice given to you by your health care provider. Make sure you discuss any questions you have with your health care provider.     Document Released: 09/12/2002 Document Revised: 05/03/2016 Document Reviewed: 05/22/2014  [employer] Interactive Patient Education ©2016 [employer]       Fall Prevention in Hospitals, Adult  As a hospital patient, your condition and the treatments you receive can increase your risk for falls. Some additional risk factors for falls in a hospital include:  · Being in an unfamiliar environment.  · Being on bed rest.  · Your surgery.  · Taking certain medicines.  · Your tubing requirements, such as intravenous (IV) therapy or catheters.  It is important that you learn how to decrease fall risks while at the hospital. Below are important tips that can help prevent falls.  SAFETY TIPS FOR PREVENTING FALLS  Talk about your risk of falling.  · Ask your health care provider why you are at risk for falling. Is it your medicine, illness, tubing placement, or something else?  · Make a plan with your health care provider to keep you safe from falls.  · Ask your health care  provider or pharmacist about side effects of your medicines. Some medicines can make you dizzy or affect your coordination.  Ask for help.  · Ask for help before getting out of bed. You may need to press your call button.  · Ask for assistance in getting safely to the toilet.  · Ask for a walker or cane to be put at your bedside. Ask that most of the side rails on your bed be placed up before your health care provider leaves the room.  · Ask family or friends to sit with you.  · Ask for things that are out of your reach, such as your glasses, hearing aids, telephone, bedside table, or call button.  Follow these tips to avoid falling:  · Stay lying or seated, rather than standing, while waiting for help.  · Wear rubber-soled slippers or shoes whenever you walk in the hospital.  · Avoid quick, sudden movements.  ¨ Change positions slowly.  ¨ Sit on the side of your bed before standing.  ¨ Stand up slowly and wait before you start to walk.  · Let your health care provider know if there is a spill on the floor.  · Pay careful attention to the medical equipment, electrical cords, and tubes around you.  · When you need help, use your call button by your bed or in the bathroom. Wait for one of your health care providers to help you.  · If you feel dizzy or unsure of your footing, return to bed and wait for assistance.  · Avoid being distracted by the TV, telephone, or another person in your room.  · Do not lean or support yourself on rolling objects, such as IV poles or bedside tables.     This information is not intended to replace advice given to you by your health care provider. Make sure you discuss any questions you have with your health care provider.     Document Released: 12/15/2001 Document Revised: 01/08/2016 Document Reviewed: 08/25/2013  [employer] Interactive Patient Education ©2016 [employer]       Surgical Site Infections FAQs  What is a Surgical Site Infection (SSI)?  A surgical site infection is an  infection that occurs after surgery in the part of the body where the surgery took place. Most patients who have surgery do not develop an infection. However, infections develop in about 1 to 3 out of every 100 patients who have surgery.  Some of the common symptoms of a surgical site infection are:  · Redness and pain around the area where you had surgery  · Drainage of cloudy fluid from your surgical wound  · Fever  Can SSIs be treated?  Yes. Most surgical site infections can be treated with antibiotics. The antibiotic given to you depends on the bacteria (germs) causing the infection. Sometimes patients with SSIs also need another surgery to treat the infection.  What are some of the things that hospitals are doing to prevent SSIs?  To prevent SSIs, doctors, nurses, and other healthcare providers:  · Clean their hands and arms up to their elbows with an antiseptic agent just before the surgery.  · Clean their hands with soap and water or an alcohol-based hand rub before and after caring for each patient.  · May remove some of your hair immediately before your surgery using electric clippers if the hair is in the same area where the procedure will occur. They should not shave you with a razor.  · Wear special hair covers, masks, gowns, and gloves during surgery to keep the surgery area clean.  · Give you antibiotics before your surgery starts. In most cases, you should get antibiotics within 60 minutes before the surgery starts and the antibiotics should be stopped within 24 hours after surgery.  · Clean the skin at the site of your surgery with a special soap that kills germs.  What can I do to help prevent SSIs?  Before your surgery:  · Tell your doctor about other medical problems you may have. Health problems such as allergies, diabetes, and obesity could affect your surgery and your treatment.  · Quit smoking. Patients who smoke get more infections. Talk to your doctor about how you can quit before your  surgery.  · Do not shave near where you will have surgery. Shaving with a razor can irritate your skin and make it easier to develop an infection.  At the time of your surgery:  · Speak up if someone tries to shave you with a razor before surgery. Ask why you need to be shaved and talk with your surgeon if you have any concerns.  · Ask if you will get antibiotics before surgery.  After your surgery:  · Make sure that your healthcare providers clean their hands before examining you, either with soap and water or an alcohol-based hand rub.  · If you do not see your providers clean their hands, please ask them to do so.  · Family and friends who visit you should not touch the surgical wound or dressings.  · Family and friends should clean their hands with soap and water or an alcohol-based hand rub before and after visiting you. If you do not see them clean their hands, ask them to clean their hands.  What do I need to do when I go home from the hospital?  · Before you go home, your doctor or nurse should explain everything you need to know about taking care of your wound. Make sure you understand how to care for your wound before you leave the hospital.  · Always clean your hands before and after caring for your wound.  · Before you go home, make sure you know who to contact if you have questions or problems after you get home.  · If you have any symptoms of an infection, such as redness and pain at the surgery site, drainage, or fever, call your doctor immediately.  If you have additional questions, please ask your doctor or nurse.  Developed and co-sponsored by The Society for Healthcare Epidemiology of [place] (SHEA); Infectious Diseases Society of [place] (IDSA); American Hospital Association; Association for Professionals in Infection Control and Epidemiology (APIC); Centers for Disease Control and Prevention (CDC); and The Joint Commission.     This information is not intended to replace advice given to you by  your health care provider. Make sure you discuss any questions you have with your health care provider.     Document Released: 12/23/2014 Document Revised: 01/08/2016 Document Reviewed: 03/02/2016  [employer] Interactive Patient Education ©2016 [employer]     PATIENT/FAMILY/RESPONSIBLE PARTY VERBALIZES UNDERSTANDING OF ABOVE EDUCATION.  COPY OF PAIN SCALE GIVEN AND REVIEWED WITH VERBALIZED UNDERSTANDING.

## 2018-06-18 NOTE — ED NOTES
"ED Call Back Questions    1. How are you doing since leaving the Emergency Department?    Doing better.  Good ER visit.  2. Do you have any questions about your discharge instructions? No     3. Have you filled your new prescriptions yet? N/A  a. Do you have any questions about those medications? N/A    4. Were you able to make a follow-up appointment with the physician? Yes     5. Do you have a primary care physician? Yes   a. If No, would you like for me to set you up with one? N/A  i. If Yes, “I will have our ED  give you a call right back at this number to work with you on the best time for an appointment.”    6. We are always looking to get better at what we do. Do you have any suggestions for what we can do to be even better? No   a. If Yes, \"Thank you for sharing your concerns. I apologize. I will follow up with our manager and patient . Would you like someone to call you back?\" N/A    7. Is there anything else I can do for you? No     "

## 2018-06-24 ENCOUNTER — HOSPITAL ENCOUNTER (EMERGENCY)
Facility: HOSPITAL | Age: 63
Discharge: HOME | End: 2018-06-24
Attending: EMERGENCY MEDICINE | Admitting: EMERGENCY MEDICINE

## 2018-06-24 ENCOUNTER — APPOINTMENT (OUTPATIENT)
Dept: CT IMAGING | Facility: HOSPITAL | Age: 63
End: 2018-06-24

## 2018-06-24 VITALS
HEIGHT: 66 IN | SYSTOLIC BLOOD PRESSURE: 105 MMHG | WEIGHT: 120 LBS | RESPIRATION RATE: 15 BRPM | HEART RATE: 69 BPM | OXYGEN SATURATION: 98 % | TEMPERATURE: 98 F | BODY MASS INDEX: 19.29 KG/M2 | DIASTOLIC BLOOD PRESSURE: 69 MMHG

## 2018-06-24 DIAGNOSIS — T85.848A PAIN AROUND PEG TUBE SITE, INITIAL ENCOUNTER: Primary | ICD-10-CM

## 2018-06-24 DIAGNOSIS — R23.8 SKIN IRRITATION: ICD-10-CM

## 2018-06-24 LAB
ALBUMIN SERPL-MCNC: 3.8 G/DL (ref 3.5–5)
ALBUMIN/GLOB SERPL: 1.2 G/DL (ref 1.1–2.5)
ALP SERPL-CCNC: 111 U/L (ref 24–120)
ALT SERPL W P-5'-P-CCNC: 42 U/L (ref 0–54)
AMYLASE SERPL-CCNC: 39 U/L (ref 30–110)
ANION GAP SERPL CALCULATED.3IONS-SCNC: 10 MMOL/L (ref 4–13)
APTT PPP: 26.8 SECONDS (ref 24.1–34.8)
AST SERPL-CCNC: 34 U/L (ref 7–45)
BASOPHILS # BLD AUTO: 0.03 10*3/MM3 (ref 0–0.2)
BASOPHILS NFR BLD AUTO: 0.3 % (ref 0–2)
BILIRUB SERPL-MCNC: 0.5 MG/DL (ref 0.1–1)
BILIRUB UR QL STRIP: NEGATIVE
BUN BLD-MCNC: 15 MG/DL (ref 5–21)
BUN/CREAT SERPL: 31.3 (ref 7–25)
CALCIUM SPEC-SCNC: 9.1 MG/DL (ref 8.4–10.4)
CHLORIDE SERPL-SCNC: 97 MMOL/L (ref 98–110)
CLARITY UR: CLEAR
CO2 SERPL-SCNC: 29 MMOL/L (ref 24–31)
COLOR UR: YELLOW
CREAT BLD-MCNC: 0.48 MG/DL (ref 0.5–1.4)
D-LACTATE SERPL-SCNC: 1.1 MMOL/L (ref 0.5–2)
D-LACTATE SERPL-SCNC: 2.4 MMOL/L (ref 0.5–2)
DEPRECATED RDW RBC AUTO: 44.9 FL (ref 40–54)
EOSINOPHIL # BLD AUTO: 0.16 10*3/MM3 (ref 0–0.7)
EOSINOPHIL NFR BLD AUTO: 1.7 % (ref 0–4)
ERYTHROCYTE [DISTWIDTH] IN BLOOD BY AUTOMATED COUNT: 13.5 % (ref 12–15)
GFR SERPL CREATININE-BSD FRML MDRD: >150 ML/MIN/1.73
GLOBULIN UR ELPH-MCNC: 3.1 GM/DL
GLUCOSE BLD-MCNC: 212 MG/DL (ref 70–100)
GLUCOSE UR STRIP-MCNC: ABNORMAL MG/DL
HCT VFR BLD AUTO: 37.2 % (ref 40–52)
HGB BLD-MCNC: 12.7 G/DL (ref 14–18)
HGB UR QL STRIP.AUTO: NEGATIVE
HOLD SPECIMEN: NORMAL
IMM GRANULOCYTES # BLD: 0.06 10*3/MM3 (ref 0–0.03)
IMM GRANULOCYTES NFR BLD: 0.6 % (ref 0–5)
INR PPP: 1.02 (ref 0.91–1.09)
KETONES UR QL STRIP: NEGATIVE
LEUKOCYTE ESTERASE UR QL STRIP.AUTO: NEGATIVE
LIPASE SERPL-CCNC: 41 U/L (ref 23–203)
LYMPHOCYTES # BLD AUTO: 0.79 10*3/MM3 (ref 0.72–4.86)
LYMPHOCYTES NFR BLD AUTO: 8.5 % (ref 15–45)
MCH RBC QN AUTO: 30.9 PG (ref 28–32)
MCHC RBC AUTO-ENTMCNC: 34.1 G/DL (ref 33–36)
MCV RBC AUTO: 90.5 FL (ref 82–95)
MONOCYTES # BLD AUTO: 0.91 10*3/MM3 (ref 0.19–1.3)
MONOCYTES NFR BLD AUTO: 9.8 % (ref 4–12)
NEUTROPHILS # BLD AUTO: 7.38 10*3/MM3 (ref 1.87–8.4)
NEUTROPHILS NFR BLD AUTO: 79.1 % (ref 39–78)
NITRITE UR QL STRIP: NEGATIVE
NRBC BLD MANUAL-RTO: 0 /100 WBC (ref 0–0)
PH UR STRIP.AUTO: 6 [PH] (ref 5–8)
PLATELET # BLD AUTO: 208 10*3/MM3 (ref 130–400)
PMV BLD AUTO: 10.6 FL (ref 6–12)
POTASSIUM BLD-SCNC: 4.4 MMOL/L (ref 3.5–5.3)
PROT SERPL-MCNC: 6.9 G/DL (ref 6.3–8.7)
PROT UR QL STRIP: NEGATIVE
PROTHROMBIN TIME: 13.7 SECONDS (ref 11.9–14.6)
RBC # BLD AUTO: 4.11 10*6/MM3 (ref 4.8–5.9)
SODIUM BLD-SCNC: 136 MMOL/L (ref 135–145)
SP GR UR STRIP: >1.03 (ref 1–1.03)
UROBILINOGEN UR QL STRIP: ABNORMAL
WBC NRBC COR # BLD: 9.33 10*3/MM3 (ref 4.8–10.8)
WHOLE BLOOD HOLD SPECIMEN: NORMAL
WHOLE BLOOD HOLD SPECIMEN: NORMAL

## 2018-06-24 PROCEDURE — 96374 THER/PROPH/DIAG INJ IV PUSH: CPT

## 2018-06-24 PROCEDURE — 85610 PROTHROMBIN TIME: CPT | Performed by: NURSE PRACTITIONER

## 2018-06-24 PROCEDURE — 87205 SMEAR GRAM STAIN: CPT | Performed by: NURSE PRACTITIONER

## 2018-06-24 PROCEDURE — 99284 EMERGENCY DEPT VISIT MOD MDM: CPT

## 2018-06-24 PROCEDURE — 85730 THROMBOPLASTIN TIME PARTIAL: CPT | Performed by: NURSE PRACTITIONER

## 2018-06-24 PROCEDURE — 0 IOHEXOL 300 MG/ML SOLUTION: Performed by: NURSE PRACTITIONER

## 2018-06-24 PROCEDURE — 83605 ASSAY OF LACTIC ACID: CPT | Performed by: NURSE PRACTITIONER

## 2018-06-24 PROCEDURE — 96361 HYDRATE IV INFUSION ADD-ON: CPT

## 2018-06-24 PROCEDURE — 25010000002 ONDANSETRON PER 1 MG: Performed by: NURSE PRACTITIONER

## 2018-06-24 PROCEDURE — 81003 URINALYSIS AUTO W/O SCOPE: CPT | Performed by: NURSE PRACTITIONER

## 2018-06-24 PROCEDURE — 83690 ASSAY OF LIPASE: CPT | Performed by: NURSE PRACTITIONER

## 2018-06-24 PROCEDURE — 96375 TX/PRO/DX INJ NEW DRUG ADDON: CPT

## 2018-06-24 PROCEDURE — 25010000002 IOPAMIDOL 61 % SOLUTION: Performed by: EMERGENCY MEDICINE

## 2018-06-24 PROCEDURE — 87070 CULTURE OTHR SPECIMN AEROBIC: CPT | Performed by: NURSE PRACTITIONER

## 2018-06-24 PROCEDURE — 82150 ASSAY OF AMYLASE: CPT | Performed by: NURSE PRACTITIONER

## 2018-06-24 PROCEDURE — 80053 COMPREHEN METABOLIC PANEL: CPT | Performed by: NURSE PRACTITIONER

## 2018-06-24 PROCEDURE — 74177 CT ABD & PELVIS W/CONTRAST: CPT

## 2018-06-24 PROCEDURE — 85025 COMPLETE CBC W/AUTO DIFF WBC: CPT | Performed by: NURSE PRACTITIONER

## 2018-06-24 PROCEDURE — 25010000002 HYDROMORPHONE PER 4 MG: Performed by: EMERGENCY MEDICINE

## 2018-06-24 RX ORDER — ONDANSETRON 2 MG/ML
4 INJECTION INTRAMUSCULAR; INTRAVENOUS ONCE
Status: COMPLETED | OUTPATIENT
Start: 2018-06-24 | End: 2018-06-24

## 2018-06-24 RX ORDER — HYDROMORPHONE HYDROCHLORIDE 1 MG/ML
1 INJECTION, SOLUTION INTRAMUSCULAR; INTRAVENOUS; SUBCUTANEOUS ONCE
Status: COMPLETED | OUTPATIENT
Start: 2018-06-24 | End: 2018-06-24

## 2018-06-24 RX ORDER — MEPERIDINE HYDROCHLORIDE 50 MG/ML
25 INJECTION INTRAMUSCULAR; INTRAVENOUS; SUBCUTANEOUS ONCE
Status: COMPLETED | OUTPATIENT
Start: 2018-06-24 | End: 2018-06-24

## 2018-06-24 RX ADMIN — SODIUM CHLORIDE 1000 ML: 9 INJECTION, SOLUTION INTRAVENOUS at 02:07

## 2018-06-24 RX ADMIN — SILVER NITRATE APPLICATORS: 25; 75 STICK TOPICAL at 07:37

## 2018-06-24 RX ADMIN — IOHEXOL 50 ML: 300 INJECTION, SOLUTION INTRAVENOUS at 02:07

## 2018-06-24 RX ADMIN — MEPERIDINE HYDROCHLORIDE 25 MG: 50 INJECTION, SOLUTION INTRAMUSCULAR; INTRAVENOUS; SUBCUTANEOUS at 02:07

## 2018-06-24 RX ADMIN — IOPAMIDOL 100 ML: 612 INJECTION, SOLUTION INTRAVENOUS at 03:59

## 2018-06-24 RX ADMIN — HYDROMORPHONE HYDROCHLORIDE 1 MG: 1 INJECTION, SOLUTION INTRAMUSCULAR; INTRAVENOUS; SUBCUTANEOUS at 06:11

## 2018-06-24 RX ADMIN — ONDANSETRON 4 MG: 2 INJECTION, SOLUTION INTRAMUSCULAR; INTRAVENOUS at 02:07

## 2018-06-24 RX ADMIN — SILVER SULFADIAZINE 1 APPLICATION: 10 CREAM TOPICAL at 06:04

## 2018-06-25 ENCOUNTER — ANESTHESIA (OUTPATIENT)
Dept: PERIOP | Facility: HOSPITAL | Age: 63
End: 2018-06-25

## 2018-06-25 ENCOUNTER — HOSPITAL ENCOUNTER (OUTPATIENT)
Facility: HOSPITAL | Age: 63
Setting detail: HOSPITAL OUTPATIENT SURGERY
Discharge: HOME | End: 2018-06-25
Attending: OTOLARYNGOLOGY | Admitting: OTOLARYNGOLOGY

## 2018-06-25 ENCOUNTER — ANESTHESIA EVENT (OUTPATIENT)
Dept: PERIOP | Facility: HOSPITAL | Age: 63
End: 2018-06-25

## 2018-06-25 VITALS
DIASTOLIC BLOOD PRESSURE: 70 MMHG | SYSTOLIC BLOOD PRESSURE: 135 MMHG | HEART RATE: 72 BPM | OXYGEN SATURATION: 100 % | RESPIRATION RATE: 16 BRPM | TEMPERATURE: 97.9 F

## 2018-06-25 DIAGNOSIS — C10.9 OROPHARYNGEAL CANCER (HCC): ICD-10-CM

## 2018-06-25 DIAGNOSIS — Z93.0 TRACHEOSTOMY DEPENDENCE (HCC): ICD-10-CM

## 2018-06-25 LAB
GLUCOSE BLDC GLUCOMTR-MCNC: 139 MG/DL (ref 70–130)
GLUCOSE BLDC GLUCOMTR-MCNC: 151 MG/DL (ref 70–130)

## 2018-06-25 PROCEDURE — 25010000002 MIDAZOLAM PER 1 MG: Performed by: ANESTHESIOLOGY

## 2018-06-25 PROCEDURE — 82962 GLUCOSE BLOOD TEST: CPT

## 2018-06-25 PROCEDURE — 25010000002 ONDANSETRON PER 1 MG: Performed by: ANESTHESIOLOGY

## 2018-06-25 PROCEDURE — 88305 TISSUE EXAM BY PATHOLOGIST: CPT | Performed by: OTOLARYNGOLOGY

## 2018-06-25 PROCEDURE — 31535 LARYNGOSCOPY W/BIOPSY: CPT | Performed by: OTOLARYNGOLOGY

## 2018-06-25 PROCEDURE — 25010000002 FENTANYL CITRATE (PF) 100 MCG/2ML SOLUTION: Performed by: NURSE ANESTHETIST, CERTIFIED REGISTERED

## 2018-06-25 RX ORDER — LABETALOL HYDROCHLORIDE 5 MG/ML
5 INJECTION, SOLUTION INTRAVENOUS
Status: DISCONTINUED | OUTPATIENT
Start: 2018-06-25 | End: 2018-06-25 | Stop reason: HOSPADM

## 2018-06-25 RX ORDER — ETOMIDATE 2 MG/ML
INJECTION INTRAVENOUS AS NEEDED
Status: DISCONTINUED | OUTPATIENT
Start: 2018-06-25 | End: 2018-06-25 | Stop reason: SURG

## 2018-06-25 RX ORDER — OXYCODONE HYDROCHLORIDE AND ACETAMINOPHEN 5; 325 MG/1; MG/1
1 TABLET ORAL EVERY 4 HOURS PRN
Qty: 20 TABLET | Refills: 0 | Status: SHIPPED | OUTPATIENT
Start: 2018-06-25 | End: 2018-06-28

## 2018-06-25 RX ORDER — IBUPROFEN 600 MG/1
600 TABLET ORAL EVERY 6 HOURS PRN
COMMUNITY
Start: 2018-06-25 | End: 2018-07-10

## 2018-06-25 RX ORDER — SODIUM CHLORIDE, SODIUM LACTATE, POTASSIUM CHLORIDE, CALCIUM CHLORIDE 600; 310; 30; 20 MG/100ML; MG/100ML; MG/100ML; MG/100ML
1000 INJECTION, SOLUTION INTRAVENOUS CONTINUOUS
Status: DISCONTINUED | OUTPATIENT
Start: 2018-06-25 | End: 2018-06-25 | Stop reason: HOSPADM

## 2018-06-25 RX ORDER — IPRATROPIUM BROMIDE AND ALBUTEROL SULFATE 2.5; .5 MG/3ML; MG/3ML
3 SOLUTION RESPIRATORY (INHALATION) ONCE AS NEEDED
Status: DISCONTINUED | OUTPATIENT
Start: 2018-06-25 | End: 2018-06-25 | Stop reason: HOSPADM

## 2018-06-25 RX ORDER — FENTANYL CITRATE 50 UG/ML
INJECTION, SOLUTION INTRAMUSCULAR; INTRAVENOUS AS NEEDED
Status: DISCONTINUED | OUTPATIENT
Start: 2018-06-25 | End: 2018-06-25 | Stop reason: SURG

## 2018-06-25 RX ORDER — OXYCODONE AND ACETAMINOPHEN 10; 325 MG/1; MG/1
1 TABLET ORAL ONCE AS NEEDED
Status: DISCONTINUED | OUTPATIENT
Start: 2018-06-25 | End: 2018-06-25 | Stop reason: HOSPADM

## 2018-06-25 RX ORDER — NALOXONE HCL 0.4 MG/ML
0.4 VIAL (ML) INJECTION AS NEEDED
Status: DISCONTINUED | OUTPATIENT
Start: 2018-06-25 | End: 2018-06-25 | Stop reason: HOSPADM

## 2018-06-25 RX ORDER — SODIUM CHLORIDE 0.9 % (FLUSH) 0.9 %
3 SYRINGE (ML) INJECTION AS NEEDED
Status: DISCONTINUED | OUTPATIENT
Start: 2018-06-25 | End: 2018-06-25 | Stop reason: HOSPADM

## 2018-06-25 RX ORDER — ONDANSETRON 2 MG/ML
4 INJECTION INTRAMUSCULAR; INTRAVENOUS ONCE AS NEEDED
Status: DISCONTINUED | OUTPATIENT
Start: 2018-06-25 | End: 2018-06-25 | Stop reason: HOSPADM

## 2018-06-25 RX ORDER — OXYMETAZOLINE HYDROCHLORIDE 0.05 G/100ML
SPRAY NASAL AS NEEDED
Status: DISCONTINUED | OUTPATIENT
Start: 2018-06-25 | End: 2018-06-25 | Stop reason: HOSPADM

## 2018-06-25 RX ORDER — MEPERIDINE HYDROCHLORIDE 50 MG/ML
12.5 INJECTION INTRAMUSCULAR; INTRAVENOUS; SUBCUTANEOUS
Status: DISCONTINUED | OUTPATIENT
Start: 2018-06-25 | End: 2018-06-25 | Stop reason: HOSPADM

## 2018-06-25 RX ORDER — LIDOCAINE HYDROCHLORIDE 20 MG/ML
INJECTION, SOLUTION INFILTRATION; PERINEURAL AS NEEDED
Status: DISCONTINUED | OUTPATIENT
Start: 2018-06-25 | End: 2018-06-25 | Stop reason: SURG

## 2018-06-25 RX ORDER — SODIUM CHLORIDE, SODIUM LACTATE, POTASSIUM CHLORIDE, CALCIUM CHLORIDE 600; 310; 30; 20 MG/100ML; MG/100ML; MG/100ML; MG/100ML
100 INJECTION, SOLUTION INTRAVENOUS CONTINUOUS
Status: DISCONTINUED | OUTPATIENT
Start: 2018-06-25 | End: 2018-06-25 | Stop reason: HOSPADM

## 2018-06-25 RX ORDER — MIDAZOLAM HYDROCHLORIDE 1 MG/ML
2 INJECTION INTRAMUSCULAR; INTRAVENOUS
Status: DISCONTINUED | OUTPATIENT
Start: 2018-06-25 | End: 2018-06-25 | Stop reason: HOSPADM

## 2018-06-25 RX ORDER — OXYCODONE AND ACETAMINOPHEN 7.5; 325 MG/1; MG/1
2 TABLET ORAL ONCE AS NEEDED
Status: COMPLETED | OUTPATIENT
Start: 2018-06-25 | End: 2018-06-25

## 2018-06-25 RX ORDER — IBUPROFEN 600 MG/1
600 TABLET ORAL EVERY 6 HOURS PRN
Status: DISCONTINUED | OUTPATIENT
Start: 2018-06-25 | End: 2018-06-25 | Stop reason: HOSPADM

## 2018-06-25 RX ORDER — ACETAMINOPHEN 500 MG
1000 TABLET ORAL ONCE
Status: DISCONTINUED | OUTPATIENT
Start: 2018-06-25 | End: 2018-06-25 | Stop reason: HOSPADM

## 2018-06-25 RX ORDER — MAGNESIUM HYDROXIDE 1200 MG/15ML
LIQUID ORAL AS NEEDED
Status: DISCONTINUED | OUTPATIENT
Start: 2018-06-25 | End: 2018-06-25 | Stop reason: HOSPADM

## 2018-06-25 RX ORDER — ACETAMINOPHEN 325 MG/1
650 TABLET ORAL ONCE
Status: DISCONTINUED | OUTPATIENT
Start: 2018-06-25 | End: 2018-06-25 | Stop reason: HOSPADM

## 2018-06-25 RX ORDER — SODIUM CHLORIDE 0.9 % (FLUSH) 0.9 %
1-10 SYRINGE (ML) INJECTION AS NEEDED
Status: DISCONTINUED | OUTPATIENT
Start: 2018-06-25 | End: 2018-06-25 | Stop reason: HOSPADM

## 2018-06-25 RX ORDER — FENTANYL CITRATE 50 UG/ML
25 INJECTION, SOLUTION INTRAMUSCULAR; INTRAVENOUS AS NEEDED
Status: DISCONTINUED | OUTPATIENT
Start: 2018-06-25 | End: 2018-06-25 | Stop reason: HOSPADM

## 2018-06-25 RX ORDER — ONDANSETRON 2 MG/ML
4 INJECTION INTRAMUSCULAR; INTRAVENOUS ONCE AS NEEDED
Status: COMPLETED | OUTPATIENT
Start: 2018-06-25 | End: 2018-06-25

## 2018-06-25 RX ORDER — METOCLOPRAMIDE HYDROCHLORIDE 5 MG/ML
5 INJECTION INTRAMUSCULAR; INTRAVENOUS
Status: DISCONTINUED | OUTPATIENT
Start: 2018-06-25 | End: 2018-06-25 | Stop reason: HOSPADM

## 2018-06-25 RX ORDER — ATRACURIUM BESYLATE 10 MG/ML
INJECTION, SOLUTION INTRAVENOUS AS NEEDED
Status: DISCONTINUED | OUTPATIENT
Start: 2018-06-25 | End: 2018-06-25 | Stop reason: SURG

## 2018-06-25 RX ORDER — MIDAZOLAM HYDROCHLORIDE 1 MG/ML
1 INJECTION INTRAMUSCULAR; INTRAVENOUS
Status: DISCONTINUED | OUTPATIENT
Start: 2018-06-25 | End: 2018-06-25 | Stop reason: HOSPADM

## 2018-06-25 RX ORDER — IBUPROFEN 600 MG/1
600 TABLET ORAL ONCE AS NEEDED
Status: DISCONTINUED | OUTPATIENT
Start: 2018-06-25 | End: 2018-06-25 | Stop reason: HOSPADM

## 2018-06-25 RX ORDER — ACETAMINOPHEN 325 MG/1
650 TABLET ORAL EVERY 4 HOURS PRN
COMMUNITY
Start: 2018-06-25 | End: 2018-07-10

## 2018-06-25 RX ADMIN — OXYCODONE HYDROCHLORIDE AND ACETAMINOPHEN 2 TABLET: 7.5; 325 TABLET ORAL at 10:19

## 2018-06-25 RX ADMIN — ETOMIDATE 20 MG: 2 INJECTION, SOLUTION INTRAVENOUS at 09:25

## 2018-06-25 RX ADMIN — MIDAZOLAM HYDROCHLORIDE 2 MG: 1 INJECTION, SOLUTION INTRAMUSCULAR; INTRAVENOUS at 08:15

## 2018-06-25 RX ADMIN — SODIUM CHLORIDE, POTASSIUM CHLORIDE, SODIUM LACTATE AND CALCIUM CHLORIDE 1000 ML: 600; 310; 30; 20 INJECTION, SOLUTION INTRAVENOUS at 06:45

## 2018-06-25 RX ADMIN — ONDANSETRON 4 MG: 2 INJECTION, SOLUTION INTRAMUSCULAR; INTRAVENOUS at 10:18

## 2018-06-25 RX ADMIN — LIDOCAINE HYDROCHLORIDE 0.5 ML: 10 INJECTION, SOLUTION EPIDURAL; INFILTRATION; INTRACAUDAL; PERINEURAL at 06:44

## 2018-06-25 RX ADMIN — FENTANYL CITRATE 100 MCG: 50 INJECTION, SOLUTION INTRAMUSCULAR; INTRAVENOUS at 09:25

## 2018-06-25 RX ADMIN — SODIUM CHLORIDE, POTASSIUM CHLORIDE, SODIUM LACTATE AND CALCIUM CHLORIDE: 600; 310; 30; 20 INJECTION, SOLUTION INTRAVENOUS at 09:20

## 2018-06-25 RX ADMIN — LIDOCAINE HYDROCHLORIDE 100 MG: 20 INJECTION, SOLUTION INFILTRATION; PERINEURAL at 09:25

## 2018-06-25 RX ADMIN — ATRACURIUM BESYLATE 15 MG: 10 INJECTION, SOLUTION INTRAVENOUS at 09:25

## 2018-06-25 NOTE — ANESTHESIA PROCEDURE NOTES
Airway  Urgency: elective    Airway not difficult    General Information and Staff    CRNA: BERHANE HOYT    Indications and Patient Condition  Indications for airway management: airway protection    Preoxygenated: yes  MILS maintained throughout  Mask difficulty assessment: 0 - not attempted    Final Airway Details  Final airway type: endotracheal airway      Successful airway: ETT  Cuffed: yes   Endotracheal tube insertion site: tracheostomy  ETT size: 6.0 mm  Placement verified by: chest auscultation and capnometry   Cuff volume (mL): 3  Number of attempts at approach: 1

## 2018-06-26 LAB
BACTERIA SPEC AEROBE CULT: ABNORMAL
GRAM STN SPEC: ABNORMAL
GRAM STN SPEC: ABNORMAL

## 2018-06-27 ENCOUNTER — TELEPHONE (OUTPATIENT)
Dept: EMERGENCY DEPT | Facility: HOSPITAL | Age: 63
End: 2018-06-27

## 2018-06-27 NOTE — TELEPHONE ENCOUNTER
----- Message from JEWEL Bell sent at 6/26/2018  3:53 PM CDT -----  Please call in diflucan 100mg qd x 7 days

## 2018-06-29 LAB
CYTO UR: NORMAL
LAB AP CASE REPORT: NORMAL
PATH REPORT.FINAL DX SPEC: NORMAL
PATH REPORT.GROSS SPEC: NORMAL

## 2018-07-10 ENCOUNTER — OFFICE VISIT (OUTPATIENT)
Dept: OTOLARYNGOLOGY | Facility: CLINIC | Age: 63
End: 2018-07-10

## 2018-07-10 VITALS
WEIGHT: 124 LBS | DIASTOLIC BLOOD PRESSURE: 75 MMHG | TEMPERATURE: 97.7 F | HEART RATE: 90 BPM | BODY MASS INDEX: 19.93 KG/M2 | SYSTOLIC BLOOD PRESSURE: 122 MMHG | HEIGHT: 66 IN

## 2018-07-10 DIAGNOSIS — R29.898 EXPOSED MANDIBULAR BONE: ICD-10-CM

## 2018-07-10 DIAGNOSIS — Z92.3 HISTORY OF RADIATION THERAPY: Primary | ICD-10-CM

## 2018-07-10 DIAGNOSIS — C10.9 OROPHARYNGEAL CANCER (HCC): ICD-10-CM

## 2018-07-10 PROCEDURE — 99214 OFFICE O/P EST MOD 30 MIN: CPT | Performed by: NURSE PRACTITIONER

## 2018-07-10 PROCEDURE — 31575 DIAGNOSTIC LARYNGOSCOPY: CPT | Performed by: NURSE PRACTITIONER

## 2018-07-10 RX ORDER — CIPROFLOXACIN 500 MG/1
TABLET, FILM COATED ORAL
COMMUNITY
End: 2018-10-11

## 2018-07-10 RX ORDER — FAMOTIDINE 20 MG/1
TABLET, FILM COATED ORAL
COMMUNITY
End: 2018-10-11

## 2018-07-10 NOTE — PATIENT INSTRUCTIONS
Keep stoma covered with biocclusive dressing, mendez every 2-3 days  Patient was unable to afford appt with oral surgery - we will continue to monitor this area     Call for problems or worsening symptoms    Followup in 1 month    Dr. Darin Neal has seen and evaluated this patient and agrees with this plan

## 2018-07-10 NOTE — PROGRESS NOTES
OPERATIVE NOTE:    PROCEDURE:   Flexible Fiberoptic Laryngoscopy    ANESTHESIA:  None    REASON FOR PROCEDURE:  Procedure was recommend for suspicious clinical behavior unresponsive to medical management.  Risks, benefits and alternatives were discussed.      DETAILS of OPERATION:  The patient was seated in the exam chair.  A flexible fiberoptic laryngoscopy was performed through the oral cavity.  The scope was introduced into the oral cavity and directed to the level of the glottis, examining the structures of the oropharynx, base of tongue, vallecula, supraglottic larynx, glottic larynx, and hypopharynx.      FINDINGS:  Mucosal surfaces:   The mucosal surfaces demonstrated dryness    Base of tongue:  The base of tongue was found to have no mass or lesion.    Epiglottis:  The epiglottis was found to have no mass or lesion.    Aryepligottic fold:  The AE folds were found to have no mass or lesion.    False Vocal Fold:  The false cords were found to have no mass or lesion.    True Vocal Cord:  The true vocal cords were found to have no mass or lesion.    Arytenoid:   The arytenoids were found to have no mass or lesions.    Hypopharynx:  The hypopharynx was found to have no mass or lesion.      TVC adduct and abduct normally  The patient tolerated procedure well.

## 2018-07-10 NOTE — PROGRESS NOTES
"YOB: 1955  Location: [facility]  Location Address: [facility] [facility],  [address] [place] [address]  Location Phone: [phone]    Chief Complaint   Patient presents with   • Cancer       History of Present Illness  Sameer Tavarez is a 63 y.o. male.  Sameer Tavarez is here for follow up of ENT complaints s/p DL with bx path negative.   The patient has had no problems and would like trach out.  He is plugging nearly continuously without problems, sleeping with cap with minimal tracheal secretions.    The symptoms are not localized to a particular location. The patient has had improving symptoms. The symptoms have been present for the last several weeks The symptoms are aggravated by  no identifiable factors. The symptoms are improved by surgery.  He is taking diet without difficulty, Gtube out.         Sameer Tavarez is a 62 y.o. male with oropharyngeal cancer. He had an EGD on 17 by Dr Patel with biopsies showing Barretts esophagus and reflux esophagitis. He had continued dysphagia and underwent another EGD and an oropharyngeal biopsy on 17 with the oropharyngeal biosies showing \"at least squamous cell carcinoma in situ\". CT scanning showed \"oropharyngeal asymmety without overt enhancing mass\" on 17. He was referred to Dr. Alber Justin MD and diagnosed with a large ulcerative lesion that was in the oral pharynx.  It extended from the right lateral aspect of the uvula and extended to the posterior rim of the soft palate extending to the tonsillar fossa and deeply penetrating into it.  It involved the right base of tongue and extended at least to the midline of the base of tongue.  It extended forward into the lingual tongue muscular approximately 2 cm. He was having difficulty with his secretions and his airway at night, so an awake tracheostomy with direct laryngoscopy and biopsy was performed on 10/5/17. At the time of admission, g tube placement and dental extraction was " performed.               Tissue Pathology Exam [[id number]] (Order [id number])   Order   Date: 6/25/2018 Department: [facility] OR Released By: Latasha Anne RN Authorizing: Alber Justin MD   Reprint Order Requisition     Tissue Pathology Exam (Order #[id number]) on 6/25/18   OR Specimen ID: [id number]  Specimen Description: right base of tongue   Tissue Pathology Exam   Order: [id number]   Status:  Final result   Visible to patient:  No (Not Released) Dx:  Oropharyngeal cancer (CMS/HCC); Trach...   Notes recorded by Elise Li on 7/2/2018 at 2:06 PM CDT  Spoke with Janett and gave the results per Dr. Justin of benign pathology  ------    Notes recorded by Alber Justin MD on 6/29/2018 at 7:37 AM CDT  Call pt about benign pathology    2wk ago   Final Diagnosis   1.  Right base of tongue, biopsy:  Benign squamous mucosa.  Negative for malignancy.     2.  Right tonsil, biopsy:  Benign squamous mucosa.  Negative for malignancy.     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   Electronically signed by Christi Gerardo MD on 6/29/2018 at 0726   Gross                  Past Medical History:   Diagnosis Date   • Arthritis    • Coronary artery disease    • Depression    • Diabetes mellitus (CMS/HCC)    • Difficulty urinating    • Enlarged prostate    • GERD (gastroesophageal reflux disease)    • History of BPH    • History of transfusion    • Hyperlipidemia    • Hypertension    • Oropharyngeal cancer (CMS/HCC) 08/27/2017   • Seizure (CMS/HCC)     diabetic   • Severe esophageal dysplasia    • Stroke (CMS/HCC)    • TB (pulmonary tuberculosis) 2004   • Tracheostomy present (CMS/HCC)     PLUGGED       Past Surgical History:   Procedure Laterality Date   • CARDIAC SURGERY     • CHOLECYSTECTOMY     • CORONARY ARTERY BYPASS GRAFT      2009   • FRACTURE SURGERY     • GTUBE REPLACEMENT N/A 10/6/2017    Procedure: GASTROSTOMY TUBE  "REPLACEMENT, port placement;  Surgeon: Jayne Phillips MD;  Location:  [facility] OR;  Service:    • HERNIA REPAIR     • LARYNGOSCOPY N/A 10/11/2017    Procedure: DIRECT LARYNGOSCOPY WITH BIOPSY;  Surgeon: Darin Neal MD;  Location:  [facility] OR;  Service:    • LARYNGOSCOPY N/A 6/25/2018    Procedure: DIRECT LARYNGOSCOPY WITH BIOPSY;  Surgeon: Alber Justin MD;  Location:  [facility] OR;  Service: ENT   • NISSEN FUNDOPLICATION LAPAROSCOPIC     • OH DENTAL SURGERY PROCEDURE N/A 10/11/2017    Procedure: TOOTH EXTRACTION;  Surgeon: Darin Neal MD;  Location:  [facility] OR;  Service: ENT   • OH INSJ TUNNELED CVC W/O SUBQ PORT/ AGE 5 YR/> Left 10/6/2017    Procedure: INSERTION VENOUS ACCESS DEVICE;  Surgeon: Jayne Phillips MD;  Location:  [facility] OR;  Service: General   • SKIN BIOPSY     • TESTICLE SURGERY      tubes implanted for drainage   • TONSILLECTOMY     • TRACHEOSTOMY N/A 10/5/2017    Procedure: Awake tracheostomy; DIRECT LARYNGOSCOPY WITH BIOPSY;  Surgeon: Alber Justin MD;  Location:  [facility] OR;  Service:        Outpatient Prescriptions Marked as Taking for the 7/10/18 encounter (Office Visit) with LAURY Wheeler   Medication Sig Dispense Refill   • aspirin 81 MG chewable tablet Chew 81 mg Daily.     • atorvastatin (LIPITOR) 10 MG tablet Take 10 mg by mouth Daily.     • ciprofloxacin (CIPRO) 500 MG tablet ciprofloxacin 500 mg tablet     • CLOTRIMAZOLE ANTI-FUNGAL EX clotrimazole 10 mg siddharth     • EASY TOUCH INSULIN SYRINGE 29G X 1/2\" 0.5 ML misc USE AS DIRECTED TO INJECT INSULIN THREE TIMES DAILY  5   • famotidine (PEPCID) 20 MG tablet famotidine 20 mg tablet   1 tablet po twice daily     • furosemide (LASIX) 40 MG tablet Take 40 mg by mouth Daily.     • insulin glargine (LANTUS) 100 UNIT/ML injection Inject 25 Units under the skin Every Morning.     • lisinopril (PRINIVIL,ZESTRIL) 10 MG tablet Take 10 mg by mouth Daily.     • metoprolol tartrate (LOPRESSOR) 25 MG tablet Take 25 mg by mouth " Daily.     • nitroglycerin (NITROSTAT) 0.4 MG SL tablet Place 0.4 mg under the tongue Every 5 (Five) Minutes As Needed for Chest Pain.     • oxyCODONE-acetaminophen (PERCOCET)  MG per tablet Take 1 tablet by mouth Every 6 (Six) Hours As Needed for Moderate Pain . (Patient taking differently: Take 1 tablet by mouth Every 6 (Six) Hours As Needed for Moderate Pain  (SPINAL PAIN).) 60 tablet 0   • tamsulosin (FLOMAX) 0.4 MG capsule 24 hr capsule Take 1 capsule by mouth Every Night.     • zolpidem (AMBIEN) 10 MG tablet Take 10 mg by mouth At Night As Needed for Sleep.         Codeine    Family History   Problem Relation Age of Onset   • Stroke Mother    • Heart disease Father    • Heart disease Brother    • Cancer Brother        Social History     Social History   • Marital status: Legally      Spouse name: N/A   • Number of children: N/A   • Years of education: N/A     Occupational History   •       Disabled     Social History Main Topics   • Smoking status: Current Every Day Smoker     Packs/day: 0.00     Years: 52.00     Types: Cigarettes   • Smokeless tobacco: Former User     Types: Snuff      Comment: continuing to try; smoking about 5 a day   • Alcohol use Yes      Comment: occasionally   • Drug use: No   • Sexual activity: Defer     Other Topics Concern   • Not on file     Social History Narrative   • No narrative on file       Review of Systems   Constitutional: Negative.    HENT:        SEE HPI   Eyes: Negative.    Respiratory: Negative.    Cardiovascular: Negative.    Gastrointestinal: Negative.    Endocrine: Negative.    Genitourinary: Negative.    Musculoskeletal: Negative.    Skin: Negative.    Allergic/Immunologic: Negative.    Neurological: Negative.    Hematological: Negative.    Psychiatric/Behavioral: Negative.        Vitals:    07/10/18 1448   BP: 122/75   Pulse: 90   Temp: 97.7 °F (36.5 °C)       Body mass index is 20.01 kg/m².    Objective     Physical Exam  CONSTITUTIONAL:  well nourished, alert, oriented, in no acute distress     COMMUNICATION AND VOICE: able to communicate normally, normal voice quality    HEAD: normocephalic, no lesions, atraumatic, no tenderness, no masses     FACE: appearance normal, no lesions, no tenderness, no deformities, facial motion symmetric    SALIVARY GLANDS: parotid glands with no tenderness, no swelling, no masses, submandibular glands with normal size, nontender    EYES: ocular motility normal, eyelids normal, orbits normal, no proptosis, conjunctiva normal , pupils equal, round     EARS:  Hearing: response to conversational voice normal bilaterally with finger occlusion  External Ears: auricles without lesions  Otoscopic: tympanic membrane appearance normal, no lesions, no perforation, normal mobility, no fluid    NOSE:  External Nose: structure normal, no tenderness on palpation, no nasal discharge, no lesions, no evidence of trauma, nostrils patent   Intranasal Exam: nasal mucosa normal, vestibule within normal limits, inferior turbinate normal, nasal septum midline     ORAL:  Lips: upper and lower lips without lesion   Teeth: dentition within normal limits for age   Gums: left mandibular exposed bone stable   Oral Mucosa: oral mucosa normal, no mucosal lesions   Floor of Mouth: Warthin’s duct patent, mucosa normal  Tongue: lingual mucosa normal without lesions, normal tongue mobility   Palate: soft and hard palates with normal mucosa and structure  Oropharynx: oropharyngeal mucosa normal    HYPOPHARYNX:   LARYNX: see scope    NECK: thin s/p XRT changes, trach in place - decannulated - stoma clean - covered with vaseline gauze and 4x4, silk tape    THYROID: no overt thyromegaly, no tenderness, nodules or mass present on palpation, position midline     LYMPH NODES: no lymphadenopathy    CHEST/RESPIRATORY: respiratory effort normal, normal breath sounds     CARDIOVASCULAR: rate and rhythm normal, extremities without cyanosis or edema       NEUROLOGIC/PSYCHIATRIC: oriented to time, place and person, mood normal, affect appropriate, CN II-XII intact grossly    Assessment/Plan   Sameer was seen today for cancer.    Diagnoses and all orders for this visit:    History of radiation therapy    Oropharyngeal cancer (CMS/HCC)    Exposed mandibular bone      * Surgery not found *  No orders of the defined types were placed in this encounter.    Return in about 1 month (around 8/10/2018).       Patient Instructions   Keep stoma covered with biocclusive dressing, mendez every 2-3 days  Patient was unable to afford appt with oral surgery - we will continue to monitor this area     Call for problems or worsening symptoms    Followup in 1 month,

## 2018-07-19 NOTE — ED PROVIDER NOTES
Subjective   History of Present Illness    Review of Systems    Past Medical History:   Diagnosis Date   • Arthritis    • Coronary artery disease    • Depression    • Diabetes mellitus (CMS/HCC)    • Difficulty urinating    • Enlarged prostate    • GERD (gastroesophageal reflux disease)    • History of BPH    • History of transfusion    • Hyperlipidemia    • Hypertension    • Oropharyngeal cancer (CMS/HCC) 08/27/2017   • Seizure (CMS/HCC)     diabetic   • Severe esophageal dysplasia    • Stroke (CMS/[facility])    • TB (pulmonary tuberculosis) 2004   • Tracheostomy present (CMS/[facility])     PLUGGED       Allergies   Allergen Reactions   • Codeine Hives     ONLY COUGH SYRUP WITH CODEINE CAUSES HIVES       Past Surgical History:   Procedure Laterality Date   • CARDIAC SURGERY     • CHOLECYSTECTOMY     • CORONARY ARTERY BYPASS GRAFT      2009   • FRACTURE SURGERY     • GTUBE REPLACEMENT N/A 10/6/2017    Procedure: GASTROSTOMY TUBE REPLACEMENT, port placement;  Surgeon: Jayne Phillips MD;  Location:  [facility] OR;  Service:    • HERNIA REPAIR     • LARYNGOSCOPY N/A 10/11/2017    Procedure: DIRECT LARYNGOSCOPY WITH BIOPSY;  Surgeon: Darin Neal MD;  Location:  [facility];  Service:    • LARYNGOSCOPY N/A 6/25/2018    Procedure: DIRECT LARYNGOSCOPY WITH BIOPSY;  Surgeon: Alber Justin MD;  Location:  [facility];  Service: ENT   • NISSEN FUNDOPLICATION LAPAROSCOPIC     • [place] DENTAL SURGERY PROCEDURE N/A 10/11/2017    Procedure: TOOTH EXTRACTION;  Surgeon: Darin Neal MD;  Location:  [facility];  Service: ENT   • NH INSJ TUNNELED CVC W/O SUBQ PORT/ AGE 5 YR/> Left 10/6/2017    Procedure: INSERTION VENOUS ACCESS DEVICE;  Surgeon: Jayne Phillips MD;  Location:  [facility];  Service: General   • SKIN BIOPSY     • TESTICLE SURGERY      tubes implanted for drainage   • TONSILLECTOMY     • TRACHEOSTOMY N/A 10/5/2017    Procedure: Awake tracheostomy; DIRECT LARYNGOSCOPY WITH BIOPSY;  Surgeon: Alber Justin MD;   Location: [facility] OR;  Service:        Family History   Problem Relation Age of Onset   • Stroke Mother    • Heart disease Father    • Heart disease Brother    • Cancer Brother        Social History     Social History   • Marital status: Legally      Occupational History   •       Disabled     Social History Main Topics   • Smoking status: Current Every Day Smoker     Packs/day: 0.00     Years: 52.00     Types: Cigarettes   • Smokeless tobacco: Former User     Types: Snuff      Comment: continuing to try; smoking about 5 a day   • Alcohol use Yes      Comment: occasionally   • Drug use: No   • Sexual activity: Defer     Other Topics Concern   • Not on file           Objective   Physical Exam    Procedures           ED Course  ED Course as of Jul 19 1355   Sun Jun 24, 2018   0204 Reviewed pt and pt care plan with dr cole- also assessed pt and in agreement with pt care plan.   [CW]   0217 Pending laboratory workup and ct scan at this time. Dr cole has eval pt and in agreement with pt care plan. Care of pt transferred to dr cole at this time   [CW]      ED Course User Index  [CW] LAURY Mcdaniels          Note has been completed and was pt was transferred to dr cole         [facility]      Final diagnoses:   Pain around PEG tube site, initial encounter   Skin irritation            LAURY Mcdaniels  07/19/18 0812

## 2018-07-27 ENCOUNTER — HOSPITAL ENCOUNTER (OUTPATIENT)
Dept: NUCLEAR MEDICINE | Age: 63
Discharge: HOME | End: 2018-07-29
Payer: MEDICARE

## 2018-07-27 ENCOUNTER — HOSPITAL ENCOUNTER (OUTPATIENT)
Dept: NUCLEAR MEDICINE | Age: 63
End: 2018-07-27
Payer: MEDICARE

## 2018-07-27 ENCOUNTER — HOSPITAL ENCOUNTER (OUTPATIENT)
Dept: NON INVASIVE DIAGNOSTICS | Age: 63
Discharge: HOME | End: 2018-07-27
Payer: MEDICARE

## 2018-07-27 DIAGNOSIS — I25.10 CORONARY ARTERY DISEASE INVOLVING NATIVE CORONARY ARTERY OF NATIVE HEART WITHOUT ANGINA PECTORIS: ICD-10-CM

## 2018-07-27 DIAGNOSIS — R07.9 CHEST PAIN IN ADULT: ICD-10-CM

## 2018-07-27 PROCEDURE — A9500 TC99M SESTAMIBI: HCPCS | Performed by: INTERNAL MEDICINE

## 2018-07-27 PROCEDURE — 3430000000 HC RX DIAGNOSTIC RADIOPHARMACEUTICAL: Performed by: INTERNAL MEDICINE

## 2018-07-27 PROCEDURE — 93017 CV STRESS TEST TRACING ONLY: CPT

## 2018-07-27 PROCEDURE — 78452 HT MUSCLE IMAGE SPECT MULT: CPT

## 2018-07-27 PROCEDURE — 6360000002 HC RX W HCPCS: Performed by: INTERNAL MEDICINE

## 2018-07-27 RX ADMIN — TETRAKIS(2-METHOXYISOBUTYLISOCYANIDE)COPPER(I) TETRAFLUOROBORATE 10 MILLICURIE: 1 INJECTION, POWDER, LYOPHILIZED, FOR SOLUTION INTRAVENOUS at 11:21

## 2018-07-27 RX ADMIN — TETRAKIS(2-METHOXYISOBUTYLISOCYANIDE)COPPER(I) TETRAFLUOROBORATE 30 MILLICURIE: 1 INJECTION, POWDER, LYOPHILIZED, FOR SOLUTION INTRAVENOUS at 11:21

## 2018-07-27 RX ADMIN — REGADENOSON 0.4 MG: 0.08 INJECTION, SOLUTION INTRAVENOUS at 11:21

## 2018-07-30 LAB
LV EF: 62 %
LVEF MODALITY: NORMAL

## 2018-09-04 PROBLEM — Z93.0 TRACHEOSTOMY DEPENDENCE (HCC): Status: RESOLVED | Noted: 2017-10-06 | Resolved: 2018-09-04

## 2018-09-04 NOTE — PROGRESS NOTES
" Alber Justin MD   CC:  follow up head and neck cancer    History of Present Illness:  The patient presents for routine cancer follow up with no acute complaints. He is status post removal of the tracheostomy on 7/11/18 after negative post treatment biopsies in June 2018. He has had an exposed area of his left mandible. This is much improved. He has had a persistent tracheocutaneous fistula. He has not been wearing a dressing or putting pressure on the site when talking.     Oncology/Hematology History    Sameer Tavarez is a 62 y.o. male with oropharyngeal cancer. He had an EGD on 2/6/17 by Dr Patel with biopsies showing Barretts esophagus and reflux esophagitis. He had continued dysphagia and underwent another EGD and an oropharyngeal biopsy on 8/27/17 with the oropharyngeal biosies showing \"at least squamous cell carcinoma in situ\". CT scanning showed \"oropharyngeal asymmety without overt enhancing mass\" on 8/28/17. He was referred to Dr. Alber Justin MD and diagnosed with a large ulcerative lesion that was in the oral pharynx.  It extended from the right lateral aspect of the uvula and extended to the posterior rim of the soft palate extending to the tonsillar fossa and deeply penetrating into it.  It involved the right base of tongue and extended at least to the midline of the base of tongue.  It extended forward into the lingual tongue muscular approximately 2 cm. He was having difficulty with his secretions and his airway at night, so an awake tracheostomy with direct laryngoscopy and biopsy was performed on 10/5/17. At the time of admission, g tube placement and dental extraction was performed.            Oropharyngeal cancer (CMS/[facility])    8/28/2017 Initial Diagnosis     Oropharyngeal cancer         8/28/2017 Biopsy     Dr Patel ([facility]) - EGD with oropharynx biopsy:    Clinical Information       Pre-Op: Gerd/Hoarsness      Post-Op Esophageal ulcer,barretts oral lesion  "   Final Diagnosis   1.  Esophagus, endoscopic biopsy:  A.  Fragments of benign squamocolumnar junction mucosa and benign glandular mucosa demonstrating intestinal metaplasia (Recio's esophagus)  B.  Chronic active inflammation, moderate.  C.  No dysplasia identified.     2.  Oropharynx, biopsy: At least squamous cell carcinoma in situ.     Comment: Status of invasion is indeterminate due to tangential sectioning of several of the tissue fragments.  Immunohistochemical stain for p16 is positive.     AJCC stage:pTX pNX                 8/28/2017 Imaging     [facility]  CT Soft tissue neck  No discrete enhancing mass  Soft tissue thickening right oropharynx and peritonsillar soft tissues compared to left.  Small lymph nodes may be inflammatory in nature         9/13/2017 Imaging     [facility]    CT Chest  Small hiatal hernia  1.1 cm lymph node adjacent to the distal esophagus.  Two small lung nodules.  For multiple solid noncalcified nodules smaller than 6 mm in diameter, no routine follow-up is recommended. (grade 2B; weak recommendation, moderate-quality evidence)  CT Abd Pelvis  Right perineal mass of unknown significance. Please correlate with physical exam.  Dilated small bowel with multiple air-fluid levels. Differential considerations include partial small bowel obstruction vs adynamic ileus.  Mild diverticulosis. Mild distended urinary bladder.         9/29/2017 Imaging     [facility]  MR Orbit Face Neck  1. Large right oropharyngeal mass compatible with the history of  carcinoma.  2. Tongue base and right submandibular gland involvement.    PET  1. Abnormal metabolic activity in the base of the tongue on the right  extending in the right palatine tonsil. SUV is 8.8. This is consistent  with neoplasm. No other regions of abnormal metabolic activity are  identified..           10/5/2017 Procedure     Tracheostomy with Direct Laryngoscopy and biopsy -JUANITA Justin MD     Path    Final  Diagnosis   1.  Right superior tonsillar fossa, biopsy:  A.  Moderately differentiated squamous cell carcinoma, invasive.  B.  Immunohistochemical stain for p16 is positive.      AJCC stage: pTX pNX     2.  Right base of tongue, biopsy:  A.  Moderately differentiated squamous cell carcinoma, invasive.  B.  Immunohistochemical stain for p16 is positive.              10/6/2017 Procedure     Port placement  Gastrostomy tube placement         10/11/2017 Procedure     Teeth extraction- Les Goddard MD, DMD         11/3/2017 Cancer Staged     Oropharyngeal cancer    Staging form: Pharynx - HPV-Mediated Oropharynx, AJCC 8th Edition    - Clinical stage from 11/3/2017: Stage III (cT4, cN0, cM0, p16: Positive) - Signed by Alber Justin MD on 1/1/2018    - Pathologic: No stage assigned - Unsigned         11/9/2017 - 1/16/2018 Chemotherapy/Radiation     Carboplatin and Taxol- Claudino    Radiation OncologyTreatment Course:  Sameer Tavarez received 7000 cGy in 35 fractions to the oral pharynx and neck via External Beam Radiation - EBRT.- [place]         6/25/2018 Biopsy     direct laryngoscopy with biopsy- benign          7/10/2018 -  Other Event     Tracheostomy removal            Review of Systems  Reviewed as per patient intake note.    Past History:  Past medical and surgical history, family history and social history reviewed and updated when appropriate.  Current medications and allergies reviewed and updated when appropriate.  Allergies:  Codeine    Vital Signs:  Temp:  [97.3 °F (36.3 °C)] 97.3 °F (36.3 °C)  BP: (122)/(86) 122/86    Physical Exam    CONSTITUTIONAL: well nourished, well-developed, alert, oriented, in no acute distress   COMMUNICATION AND VOICE: able to communicate normally, normal voice quality  HEAD: normocephalic, no lesions, atraumatic, no tenderness, no masses   FACE: appearance normal, no lesions, no tenderness, no deformities, facial motion symmetric  EYES: ocular motility normal, eyelids normal,  "orbits normal, no proptosis, conjunctiva normal , pupils equal, round  HEARING: response to conversational voice normal bilaterally   EXTERNAL EARS: auricles without lesions  EXTERNAL NOSE: structure normal, no tenderness on palpation, no nasal discharge, no lesions, no evidence of trauma, nostrils patent  LIPS: structure normal, no tenderness on palpation, no lesions, no evidence of trauma  TEETH: edentulous  GUMS: gingivae healthy  ORAL MUCOSA: oral mucosa with diffuse dryness, no mucosal lesions   TONGUE:  lingual mucosa normal without lesions, normal tongue mobility  post radiation changes present, no evidence of recurrent disease present,  NECK: persistent tracheocutaneous fistula  LYMPH NODES: no lymphadenopathy  CHEST/RESPIRATORY: respiratory effort normal  CARDIOVASCULAR: extremities without cyanosis or edema, no overt jugulovenous distension present  NEUROLOGIC/PSYCHIATRIC: oriented appropriately for age, mood normal, affect appropriate, cranial nerves intact grossly unless specifically mentioned above     {RESULTS REVIEW (Optional):27264::\" \":1}    Assessment   1. Oropharyngeal cancer (CMS/HCC) Inactive   2. History of radiation therapy    3. Exposed mandibular bone        Plan   Continue current management plan.     -----INSTRUCTIONS-----  Be aware of the signs and symptoms of head and neck cancer including neck mass, persistent sore throat, ear pain, hemoptysis, weight loss and hoarseness. If any of these symptoms occur, call for evaluation.   Patient Instructions   Keep a folded 4 x 4 gauze over top of the old tracheostomy site.  Always put pressure on the site when talking or coughing is a swivel assisted to close.       If it does not close, we mad need to consider a Wookie flap closure    -----FOLLOW UP-----  Return in about 4 weeks (around 10/3/2018).      Alber Justin MD  09/05/18  10:43 AM  "

## 2018-09-05 ENCOUNTER — OFFICE VISIT (OUTPATIENT)
Dept: OTOLARYNGOLOGY | Facility: CLINIC | Age: 63
End: 2018-09-05

## 2018-09-05 VITALS
TEMPERATURE: 97.3 F | SYSTOLIC BLOOD PRESSURE: 122 MMHG | DIASTOLIC BLOOD PRESSURE: 86 MMHG | HEIGHT: 66 IN | WEIGHT: 131.8 LBS | BODY MASS INDEX: 21.18 KG/M2

## 2018-09-05 DIAGNOSIS — Z92.3 HISTORY OF RADIATION THERAPY: ICD-10-CM

## 2018-09-05 DIAGNOSIS — C10.9 OROPHARYNGEAL CANCER (HCC): Primary | ICD-10-CM

## 2018-09-05 DIAGNOSIS — R29.898 EXPOSED MANDIBULAR BONE: ICD-10-CM

## 2018-09-05 PROCEDURE — 99213 OFFICE O/P EST LOW 20 MIN: CPT | Performed by: OTOLARYNGOLOGY

## 2018-09-05 RX ORDER — ROPINIROLE 1 MG/1
TABLET, FILM COATED ORAL EVERY 8 HOURS SCHEDULED
COMMUNITY
End: 2018-10-11

## 2018-09-05 NOTE — PROGRESS NOTES
Berna Fatima MA   Patient Intake Note    Review of Systems  Review of Systems   Constitutional: Negative for chills, fatigue and fever.   HENT:        See hpi   Respiratory: Positive for cough. Negative for choking, shortness of breath and wheezing.    Gastrointestinal: Negative for diarrhea, nausea and vomiting.   Allergic/Immunologic: Negative for environmental allergies and food allergies.   Neurological: Positive for dizziness (when standing). Negative for light-headedness and headaches.   Hematological: Bruises/bleeds easily.   Psychiatric/Behavioral: Positive for sleep disturbance.   All other systems reviewed and are negative.      QUALITY MEASURES    Body Mass Index Screening and Follow-Up Plan  Body mass index is 21.27 kg/m².  Patient's Body mass index is 21.27 kg/m². BMI is below normal parameters. Recommendations include: referral to primary care.    Tobacco Use: Screening and Cessation Intervention  Smoking status: Current Every Day Smoker                                                   Packs/day: 0.00      Years: 52.00        Types: Cigarettes  Smokeless tobacco: Former User                        Types: Snuff  Comment: continuing to try; smoking about 5 a day        Berna Fatima MA  9/5/2018  10:24 AM

## 2018-09-05 NOTE — PATIENT INSTRUCTIONS
Keep a folded 4 x 4 gauze over top of the old tracheostomy site.  Always put pressure on the site when talking or coughing is a swivel assisted to close.

## 2018-09-13 ENCOUNTER — TELEPHONE (OUTPATIENT)
Dept: CARDIOLOGY | Age: 63
End: 2018-09-13

## 2018-09-14 ENCOUNTER — TELEPHONE (OUTPATIENT)
Dept: CARDIOLOGY | Age: 63
End: 2018-09-14

## 2018-09-14 NOTE — TELEPHONE ENCOUNTER
6 mo follow up, attempted to contact pt x 3 days, only # listed on chart states unreachalbe, rsd apt & mailed to pt.

## 2018-09-24 ENCOUNTER — HOSPITAL ENCOUNTER (EMERGENCY)
Age: 63
Discharge: HOME | End: 2018-09-24
Payer: MEDICARE

## 2018-09-24 ENCOUNTER — APPOINTMENT (OUTPATIENT)
Dept: GENERAL RADIOLOGY | Age: 63
End: 2018-09-24
Payer: MEDICARE

## 2018-09-24 VITALS
HEIGHT: 66 IN | SYSTOLIC BLOOD PRESSURE: 134 MMHG | BODY MASS INDEX: 21.83 KG/M2 | RESPIRATION RATE: 18 BRPM | TEMPERATURE: 97.4 F | DIASTOLIC BLOOD PRESSURE: 64 MMHG | OXYGEN SATURATION: 98 % | HEART RATE: 84 BPM | WEIGHT: 135.8 LBS

## 2018-09-24 DIAGNOSIS — S41.102A WOUND OF LEFT UPPER EXTREMITY, INITIAL ENCOUNTER: Primary | ICD-10-CM

## 2018-09-24 PROCEDURE — 6360000002 HC RX W HCPCS: Performed by: NURSE PRACTITIONER

## 2018-09-24 PROCEDURE — 99283 EMERGENCY DEPT VISIT LOW MDM: CPT | Performed by: NURSE PRACTITIONER

## 2018-09-24 PROCEDURE — 90471 IMMUNIZATION ADMIN: CPT | Performed by: NURSE PRACTITIONER

## 2018-09-24 PROCEDURE — 73090 X-RAY EXAM OF FOREARM: CPT

## 2018-09-24 PROCEDURE — 90715 TDAP VACCINE 7 YRS/> IM: CPT | Performed by: NURSE PRACTITIONER

## 2018-09-24 PROCEDURE — 99283 EMERGENCY DEPT VISIT LOW MDM: CPT

## 2018-09-24 RX ORDER — CEPHALEXIN 500 MG/1
500 CAPSULE ORAL 4 TIMES DAILY
Qty: 28 CAPSULE | Refills: 0 | Status: SHIPPED | OUTPATIENT
Start: 2018-09-24 | End: 2018-11-16 | Stop reason: CLARIF

## 2018-09-24 RX ADMIN — TETANUS TOXOID, REDUCED DIPHTHERIA TOXOID AND ACELLULAR PERTUSSIS VACCINE, ADSORBED 0.5 ML: 5; 2.5; 8; 8; 2.5 SUSPENSION INTRAMUSCULAR at 11:42

## 2018-09-24 NOTE — ED PROVIDER NOTES
Comment: 10 cigerrettes aday    Alcohol use Yes      Comment: monthly    Drug use: No    Sexual activity: Not on file     Other Topics Concern    Not on file     Social History Narrative    No narrative on file       SCREENINGS           PHYSICAL EXAM    (up to 7 for level 4, 8 or more for level 5)     ED Triage Vitals [09/24/18 1051]   BP Temp Temp src Pulse Resp SpO2 Height Weight   (!) 141/87 97.8 °F (36.6 °C) -- 88 18 98 % 5' 6\" (1.676 m) 135 lb 12.8 oz (61.6 kg)       Physical Exam   Constitutional: He is oriented to person, place, and time. He appears well-developed and well-nourished. No distress. HENT:   Head: Normocephalic. Eyes: Pupils are equal, round, and reactive to light. Conjunctivae and EOM are normal. Right eye exhibits no discharge. Left eye exhibits no discharge. Cardiovascular: Normal rate, regular rhythm and normal heart sounds. No murmur heard. Pulmonary/Chest: Effort normal and breath sounds normal. No respiratory distress. He has no wheezes. Abdominal: Soft. Bowel sounds are normal. He exhibits no distension. Musculoskeletal: He exhibits tenderness. He exhibits no edema or deformity. Arms:  Tissue to the forearm is soft. Neurological: He is alert and oriented to person, place, and time. Skin: Skin is warm and dry. No rash noted. He is not diaphoretic. No erythema. No pallor. Psychiatric: He has a normal mood and affect. Nursing note and vitals reviewed. DIAGNOSTIC RESULTS     RADIOLOGY:   Non-plain film images such as CT, Ultrasound and MRI are read by the radiologist. Plain radiographic images are visualized and preliminarily interpreted by No att. providers found with the below findings:        Interpretation per the Radiologist below, if available at the time of this note:    XR RADIUS ULNA LEFT (2 VIEWS)   Final Result   No fracture. The above finding are recorded on a digital voice clip in PACS.    Signed by Dr Madison Santos on 9/24/2018 11:55

## 2018-10-02 NOTE — PROGRESS NOTES
" Alber Justin MD   CC:  follow up head and neck cancer    History of Present Illness:  Sameer Tavarez is a 63 y.o. male who presents for follow up. He was recently decanulated and had a persistent tracheocutaneous fistula.  He reports he is having food come out of his stoma when he swallows.  He had a G-tube but he removed it himself after having difficulty with drainage and irritation around the site.  He complained that he was not getting follow-up care from the procedure was to put it in.  He has not had difficulty with lymphadenopathy or returning tongue lesion.    Oncology/Hematology History    Sameer Tavarez is a 62 y.o. male with oropharyngeal cancer. He had an EGD on 2/6/17 by Dr Patel with biopsies showing Barretts esophagus and reflux esophagitis. He had continued dysphagia and underwent another EGD and an oropharyngeal biopsy on 8/27/17 with the oropharyngeal biosies showing \"at least squamous cell carcinoma in situ\". CT scanning showed \"oropharyngeal asymmety without overt enhancing mass\" on 8/28/17. He was referred to Dr. Alber Justin MD and diagnosed with a large ulcerative lesion that was in the oral pharynx.  It extended from the right lateral aspect of the uvula and extended to the posterior rim of the soft palate extending to the tonsillar fossa and deeply penetrating into it.  It involved the right base of tongue and extended at least to the midline of the base of tongue.  It extended forward into the lingual tongue muscular approximately 2 cm. He was having difficulty with his secretions and his airway at night, so an awake tracheostomy with direct laryngoscopy and biopsy was performed on 10/5/17. At the time of admission, g tube placement and dental extraction was performed.            Oropharyngeal cancer (CMS/HCC)    8/28/2017 Initial Diagnosis     Oropharyngeal cancer         8/28/2017 Biopsy     Dr Patel ([facility] [place]) - EGD with oropharynx " biopsy:    Clinical Information       Pre-Op: Gerd/Hoarsness      Post-Op Esophageal ulcer,barretts oral lesion    Final Diagnosis   1.  Esophagus, endoscopic biopsy:  A.  Fragments of benign squamocolumnar junction mucosa and benign glandular mucosa demonstrating intestinal metaplasia (Recio's esophagus)  B.  Chronic active inflammation, moderate.  C.  No dysplasia identified.     2.  Oropharynx, biopsy: At least squamous cell carcinoma in situ.     Comment: Status of invasion is indeterminate due to tangential sectioning of several of the tissue fragments.  Immunohistochemical stain for p16 is positive.     AJCC stage:pTX pNX                 8/28/2017 Imaging     [facility]  CT Soft tissue neck  No discrete enhancing mass  Soft tissue thickening right oropharynx and peritonsillar soft tissues compared to left.  Small lymph nodes may be inflammatory in nature         9/13/2017 Imaging     [facility]    CT Chest  Small hiatal hernia  1.1 cm lymph node adjacent to the distal esophagus.  Two small lung nodules.  For multiple solid noncalcified nodules smaller than 6 mm in diameter, no routine follow-up is recommended. (grade 2B; weak recommendation, moderate-quality evidence)  CT Abd Pelvis  Right perineal mass of unknown significance. Please correlate with physical exam.  Dilated small bowel with multiple air-fluid levels. Differential considerations include partial small bowel obstruction vs adynamic ileus.  Mild diverticulosis. Mild distended urinary bladder.         9/29/2017 Imaging     [facility]  MR Orbit Face Neck  1. Large right oropharyngeal mass compatible with the history of  carcinoma.  2. Tongue base and right submandibular gland involvement.    PET  1. Abnormal metabolic activity in the base of the tongue on the right  extending in the right palatine tonsil. SUV is 8.8. This is consistent  with neoplasm. No other regions of abnormal metabolic activity are  identified..            10/5/2017 Procedure     Tracheostomy with Direct Laryngoscopy and biopsy -JUANITA Justin MD     Path    Final Diagnosis   1.  Right superior tonsillar fossa, biopsy:  A.  Moderately differentiated squamous cell carcinoma, invasive.  B.  Immunohistochemical stain for p16 is positive.      AJCC stage: pTX pNX     2.  Right base of tongue, biopsy:  A.  Moderately differentiated squamous cell carcinoma, invasive.  B.  Immunohistochemical stain for p16 is positive.              10/6/2017 Procedure     Port placement  Gastrostomy tube placement         10/11/2017 Procedure     Teeth extraction- Les Goddard MD, DMD         11/3/2017 Cancer Staged     Oropharyngeal cancer    Staging form: Pharynx - HPV-Mediated Oropharynx, AJCC 8th Edition    - Clinical stage from 11/3/2017: Stage III (cT4, cN0, cM0, p16: Positive) - Signed by Alber Justin MD on 1/1/2018    - Pathologic: No stage assigned - Unsigned         11/9/2017 - 1/16/2018 Chemotherapy/Radiation     Carboplatin and Taxol- Claudino    Radiation OncologyTreatment Course:  Sameer Tavarez received 7000 cGy in 35 fractions to the oral pharynx and neck via External Beam Radiation - EBRT.- [place]         6/25/2018 Biopsy     direct laryngoscopy with biopsy- benign          7/10/2018 -  Other Event     Tracheostomy removal            Review of Systems  Reviewed as per patient intake note.    Past History:  Past medical and surgical history, family history and social history reviewed and updated when appropriate.  Current medications and allergies reviewed and updated when appropriate.  Allergies:  Codeine    Vital Signs:  Temp:  [97.6 °F (36.4 °C)] 97.6 °F (36.4 °C)  BP: (126)/(78) 126/78    Physical Exam    CONSTITUTIONAL: well nourished, well-developed, alert, oriented, in no acute distress   COMMUNICATION AND VOICE: able to communicate normally, normal voice quality  HEAD: normocephalic, no lesions, atraumatic, no tenderness, no masses   FACE: appearance normal,  no lesions, no tenderness, no deformities, facial motion symmetric  EYES: ocular motility normal, eyelids normal, orbits normal, no proptosis, conjunctiva normal , pupils equal, round  HEARING: response to conversational voice normal bilaterally   EXTERNAL EARS: auricles without lesions  EXTERNAL NOSE: structure normal, no tenderness on palpation, no nasal discharge, no lesions, no evidence of trauma, nostrils patent  LIPS: structure normal, no tenderness on palpation, no lesions, no evidence of trauma  TEETH: edentulous  GUMS: gingivae healthy  ORAL MUCOSA: oral mucosa with diffuse dryness, no mucosal lesions   TONGUE:  lingual mucosa normal without lesions, normal tongue mobility  post radiation changes present, no evidence of recurrent disease present,  NECK: persistent tracheocutaneous fistula  LYMPH NODES: no lymphadenopathy  CHEST/RESPIRATORY: respiratory effort normal  CARDIOVASCULAR: extremities without cyanosis or edema, no overt jugulovenous distension present  NEUROLOGIC/PSYCHIATRIC: oriented appropriately for age, mood normal, affect appropriate, cranial nerves intact grossly unless specifically mentioned above        Assessment   1. Oropharyngeal cancer (CMS/HCC)    2. History of radiation therapy    3. Tracheocutaneous fistula following tracheostomy (CMS/HCC)    4. Pharyngoesophageal dysphagia        Plan    I am concerned about his swallowing and aspiration.  I have strongly recommended he receive another swallow study, and unless this is improved from the previous one and can be treated with therapeutic measures, I recommended placing the G-tube again at the time of his tracheostomy closure.  At first he was strongly opposed to placing the G-tube again but after discussing it with him further, he would be more open to add as long as he had a different surgeon and had better postoperative care.  If the swallow study shows lopez aspiration, I will contact Gen. surgery to see if this can be done at the  same time.    Medical and surgical options were discussed including medical and surgical options. Risks, benefits and alternatives were discussed and questions were answered. After considering the options, the patient decided to proceed with surgery.     -----SURGERY SCHEDULING:-----  Schedule TRACHEOCUTANEOUS FISTULA CLOSURE (N/A), DIAGNOSTIC DIRECT LARYNGOSCOPY< POSSIBLE BIOPSY (N/A)     ---INFORMED CONSENT DISCUSSION:---  TRACHEOCUTANEOUS FISTULA CLOSURE WITH WOOKIE FLAP. Risks include, but are not limited to: bleeding, infection, hematoma, persistent fistula, need for additional procedures, flap failure, cosmetic deformity. Patient understands risks and would like to proceed with surgery.      ---PREOPERATIVE WORKUP:---  CBC  CMP  Chest X-Ray  EKG     Follow up  postoperatively    Alber Justin MD  10/03/18  4:56 PM

## 2018-10-03 ENCOUNTER — OFFICE VISIT (OUTPATIENT)
Dept: OTOLARYNGOLOGY | Facility: CLINIC | Age: 63
End: 2018-10-03

## 2018-10-03 ENCOUNTER — HOSPITAL ENCOUNTER (OUTPATIENT)
Dept: GENERAL RADIOLOGY | Facility: HOSPITAL | Age: 63
Discharge: HOME | End: 2018-10-03
Attending: OTOLARYNGOLOGY | Admitting: OTOLARYNGOLOGY

## 2018-10-03 VITALS
SYSTOLIC BLOOD PRESSURE: 126 MMHG | WEIGHT: 141.2 LBS | TEMPERATURE: 97.6 F | DIASTOLIC BLOOD PRESSURE: 78 MMHG | HEIGHT: 66 IN | BODY MASS INDEX: 22.69 KG/M2

## 2018-10-03 DIAGNOSIS — R13.12 DYSPHAGIA, OROPHARYNGEAL: Primary | ICD-10-CM

## 2018-10-03 DIAGNOSIS — C10.9 OROPHARYNGEAL CANCER (HCC): Primary | ICD-10-CM

## 2018-10-03 DIAGNOSIS — J95.04 TRACHEOCUTANEOUS FISTULA FOLLOWING TRACHEOSTOMY (HCC): ICD-10-CM

## 2018-10-03 DIAGNOSIS — R13.14 PHARYNGOESOPHAGEAL DYSPHAGIA: ICD-10-CM

## 2018-10-03 DIAGNOSIS — Z92.3 HISTORY OF RADIATION THERAPY: ICD-10-CM

## 2018-10-03 PROCEDURE — 92611 MOTION FLUOROSCOPY/SWALLOW: CPT

## 2018-10-03 PROCEDURE — 63710000001 BARIUM SULFATE 40 % SUSPENSION: Performed by: OTOLARYNGOLOGY

## 2018-10-03 PROCEDURE — 63710000001 BARIUM SULFATE 40 % PASTE: Performed by: OTOLARYNGOLOGY

## 2018-10-03 PROCEDURE — A9270 NON-COVERED ITEM OR SERVICE: HCPCS | Performed by: OTOLARYNGOLOGY

## 2018-10-03 PROCEDURE — 63710000001 BARIUM SULFATE 40 % RECONSTITUTED SUSPENSION: Performed by: OTOLARYNGOLOGY

## 2018-10-03 PROCEDURE — 74230 X-RAY XM SWLNG FUNCJ C+: CPT

## 2018-10-03 PROCEDURE — 99214 OFFICE O/P EST MOD 30 MIN: CPT | Performed by: OTOLARYNGOLOGY

## 2018-10-03 PROCEDURE — G8996 SWALLOW CURRENT STATUS: HCPCS

## 2018-10-03 PROCEDURE — G8998 SWALLOW D/C STATUS: HCPCS

## 2018-10-03 PROCEDURE — G8997 SWALLOW GOAL STATUS: HCPCS

## 2018-10-03 RX ADMIN — BARIUM SULFATE 20 ML: 400 SUSPENSION ORAL at 13:35

## 2018-10-03 RX ADMIN — BARIUM SULFATE 20 ML: 400 PASTE ORAL at 13:35

## 2018-10-03 RX ADMIN — BARIUM SULFATE 20 ML: 0.81 POWDER, FOR SUSPENSION ORAL at 13:35

## 2018-10-03 NOTE — PROGRESS NOTES
Berna Fatima MA   Patient Intake Note    Review of Systems  Review of Systems   Constitutional: Negative for chills and fever.   HENT:        See hpi     Respiratory: Positive for cough. Negative for choking, shortness of breath and wheezing.    Gastrointestinal: Negative for nausea and vomiting.   Neurological: Positive for dizziness (while walking). Negative for light-headedness and headaches.   Hematological: Bruises/bleeds easily.   Psychiatric/Behavioral: Negative for sleep disturbance.   All other systems reviewed and are negative.      QUALITY MEASURES    Body Mass Index Screening and Follow-Up Plan  Body mass index is 22.79 kg/m².  Patient's Body mass index is 22.79 kg/m². BMI is below normal parameters. Recommendations include: treating the underlying disease process.    Tobacco Use: Screening and Cessation Intervention  Smoking status: Current Every Day Smoker                                                   Packs/day: 0.00      Years: 52.00        Types: Cigarettes  Smokeless tobacco: Former User                        Types: Snuff  Comment: continuing to try; smoking about 5 a day    Smoking cessation information given in after visit summary.    Berna Fatima MA  10/3/2018  10:32 AM

## 2018-10-03 NOTE — PATIENT INSTRUCTIONS
IF YOU SMOKE OR USE TOBACCO PLEASE READ THE FOLLOWING:    Why is smoking bad for me?  Smoking increases the risk of heart disease, lung disease, vascular disease, stroke, and cancer.     If you smoke, STOP!    If you would like more information on quitting smoking, please visit the [employer] website: [email or web address]   This link will provide additional resources including the QUIT line and the Beat the Pack support groups.     For more information:    Quit Now [facility]  1-800-QUIT-NOW  https://[email or web address].quitlogix.org/en-[email or web address]/

## 2018-10-03 NOTE — THERAPY DISCHARGE NOTE
Outpatient Speech Language Pathology   Adult Swallow Initial Eval/Discharge  [facility]     Patient Name: Sameer Tavarez  : 1955  MRN: [id number]  Today's Date: 10/3/2018    SPEECH-LANGUAGE PATHOLOGY EVALUTION - VFSS  Subjective: The patient was seen on this date for a VFSS(Videofluoroscopic Swallowing Study).  Patient was alert and cooperative.    Significant history: Oropharyngeal cancer with radiation treatment, tracheostomy, PEG placement, stroke x2, continued tobacco use.   Objective: Risks/benefits were reviewed with the patient, and consent was obtained. The study was completed with SLP and Radiologist present. The patient was seen in lateral view(s). Textures given included thin liquid, nectar thick liquid, honey thick liquid, puree consistency and mechanical soft consistency.  Assessment: Difficulties were noted with thin liquid, nectar thick liquid, honey thick liquid, puree consistency and mechanical soft consistency.   Pt presented with a full range of PO consistencies except for regular solids due to being edentulous. Due to decreased back of tongue strength and delayed swallow initiation the pt experinced premature spillage to the point of pyriform sinuses, except with honey thick liquids in which premature loss of bolus was only to the the point of the vallecula. Decreased hyolaryngeal excursion and poor epiglottic inversion was evident with all consistencies. Due to these issues the pt experienced laryngeal penetration with of pudding thick residue with subsequent swallow, as well as 2x instance of laryngeal penetration and 1x instance of aspiration with nectar thick liquids. Multiple instances of laryngeal penetration as well as aspiration with thin liquids. Pt experienced a cough in which assissted with slight clearance of barium from the airway, however a moderate amount remained. A chin tuck was completed with both nectar thick and thin liquid consistencies with no benefit yielded.  Moderate-severe residue observed along back and base of tongue and within the vallecula with all consistencies completed. Mild-moderate residue also noted along the posterior pharyngeal wall due to poor pharyngeal stripping. SLP cannot fully ensure safety with a PO diet, but patient is certain he does not want to have a PEG tube placed again. Residue was moderate and severe.  Esophageal screen was performed and demonstrated reduced esophageal motility.     SLP Findings: Patient presents with moderate to severe oropharyngeal dysphagia.   Recommendations: Diet Textures: honey thick liquid, mechanical soft consistency food. Medications should be taken whole or crushed with thickened liquids or puree. May have water and Ice between meals after oral care, under staff or family supervision and with the recommended strategies for safe swallowing.  Recommended Strategies: Upright for PO and small bites and sips. Oral care before breakfast, after all meals and PRN.   Dysphagia therapy is recommended.  Cash Carr MS CCC-SLP 10/3/2018 3:35 PM             Visit Date: 10/03/2018   Patient Active Problem List   Diagnosis   • Oropharyngeal cancer (CMS/HCC)   • Current smoker   • Perineal mass in male   • Recio's esophagus with dysplasia   • Postoperative pain   • S/P percutaneous endoscopic gastrostomy (PEG) tube placement (CMS/HCC)   • Constipation, acute   • Pain, rectal   • Oropharyngeal candidiasis   • ACS (acute coronary syndrome) (CMS/HCC)   • HTN (hypertension)   • History of radiation therapy   • Current every day smoker   • Tobacco use   • Encounter for follow-up surveillance of oral cavity cancer        Past Medical History:   Diagnosis Date   • Arthritis    • Coronary artery disease    • Depression    • Diabetes mellitus (CMS/HCC)    • Difficulty urinating    • Enlarged prostate    • GERD (gastroesophageal reflux disease)    • History of BPH    • History of transfusion    • Hyperlipidemia    • Hypertension    •  Oropharyngeal cancer (CMS/HCC) 08/27/2017   • Seizure (CMS/HCC)     diabetic   • Severe esophageal dysplasia    • Stroke (CMS/[facility])    • TB (pulmonary tuberculosis) 2004   • Tracheostomy present (CMS/[facility])     PLUGGED        Past Surgical History:   Procedure Laterality Date   • CARDIAC SURGERY     • CHOLECYSTECTOMY     • CORONARY ARTERY BYPASS GRAFT      2009   • FRACTURE SURGERY     • GTUBE REPLACEMENT N/A 10/6/2017    Procedure: GASTROSTOMY TUBE REPLACEMENT, port placement;  Surgeon: Jayne Phillips MD;  Location:  [facility] OR;  Service:    • HERNIA REPAIR     • LARYNGOSCOPY N/A 10/11/2017    Procedure: DIRECT LARYNGOSCOPY WITH BIOPSY;  Surgeon: Darin Neal MD;  Location:  [facility] OR;  Service:    • LARYNGOSCOPY N/A 6/25/2018    Procedure: DIRECT LARYNGOSCOPY WITH BIOPSY;  Surgeon: Alber Justin MD;  Location:  [facility] OR;  Service: ENT   • NISSEN FUNDOPLICATION LAPAROSCOPIC     • WI DENTAL SURGERY PROCEDURE N/A 10/11/2017    Procedure: TOOTH EXTRACTION;  Surgeon: Darin Neal MD;  Location:  [facility] OR;  Service: ENT   • WI INSJ TUNNELED CVC W/O SUBQ PORT/ AGE 5 YR/> Left 10/6/2017    Procedure: INSERTION VENOUS ACCESS DEVICE;  Surgeon: Jayne Phillips MD;  Location:  [facility] OR;  Service: General   • SKIN BIOPSY     • TESTICLE SURGERY      tubes implanted for drainage   • TONSILLECTOMY     • TRACHEOSTOMY N/A 10/5/2017    Procedure: Awake tracheostomy; DIRECT LARYNGOSCOPY WITH BIOPSY;  Surgeon: Alber Justin MD;  Location:  [facility] OR;  Service:          Visit Dx:     ICD-10-CM ICD-9-CM   1. Dysphagia, oropharyngeal R13.12 787.22                 SLP Adult Swallow Evaluation - 10/03/18 1312        Rehab Evaluation    Document Type evaluation  -CS    Subjective Information no complaints  -CS    Patient Observations alert;cooperative;agree to therapy  -CS       General Information    Patient Profile Reviewed yes  -CS    Pertinent History Of Current Problem History of oropharyngeal cancer with  radiation complete, tracheostomy, stroke 2x, PEG tube placement secondary to dysphagia, current smoker. Complains of food coming out of stoma with meal completion.   -CS    Current Method of Nutrition regular textures;thin liquids  -CS    Precautions/Limitations, Vision WFL;for purposes of eval  -CS    Precautions/Limitations, Hearing WFL;for purposes of eval  -CS    Prior Level of Function-Communication motor speech impairment  -CS    Prior Level of Function-Swallowing alternative feeding method  -CS    Plans/Goals Discussed with patient  -CS    Barriers to Rehab none identified   Poor understanding of deficits.  -CS       Pain Assessment    Additional Documentation Pain Scale: FACES Pre/Post-Treatment (Group)  -CS       Pain Scale: FACES Pre/Post-Treatment    Pain: FACES Scale, Pretreatment 0-->no hurt  -CS    Pain: FACES Scale, Post-Treatment 0-->no hurt  -CS       Oral Motor and Function    Dentition Assessment edentulous, does not have dentures  -CS    Secretion Management WNL/WFL  -CS    Mucosal Quality moist, healthy  -CS    Volitional Swallow delayed;weak  -CS    Volitional Cough non-productive  -CS       Oral Musculature and Cranial Nerve Assessment    Oral Motor General Assessment WFL  -CS       General Eating/Swallowing Observations    Respiratory Support Currently in Use room air  -CS       MBS/VFSS    Utensils Used spoon;straw  -CS    Consistencies Trialed soft textures;pudding thick;honey-thick liquids;nectar/syrup-thick liquids;thin liquids  -CS       MBS/VFSS Interpretation    Oral Transit Phase impaired  -CS    Oral Residue impaired  -CS    VFSS Summary VFSS complete. Pt presented with a full range of PO consistencies except for regular solids due to being edentulous. Due to decreased back of tongue strength and delayed swallow initiation the pt experinced premature spillage to the point of pyriform sinuses, except with honey thick liquids in which premature loss of bolus was only to the the point  of the vallecula. Decreased hyolaryngeal excursion and poor epiglottic inversion was evident with all consistencies. Due to these issues the pt experienced laryngeal penetration with of pudding thick residue with subsequent swallow, as well as 2x instance of laryngeal penetration and 1x instance of aspiration with nectar thick liquids. Multiple instances of laryngeal penetration as well as aspiration with thin liquids. Pt experienced a cough in which assissted with slight clearance of barium from the airway, however a moderate amount remained. A chin tuck was completed with both nectar thick and thin liquid consistencies with no benefit yielded. Moderate-severe residue observed along back and base of tongue and within the vallecula with all consistencies completed. Mild-moderate residue also noted along the posterior pharyngeal wall due to poor pharyngeal stripping. SLP cannot fully ensure safety with a PO diet, but patient is certain he does not want to have a PEG tube placed again. SLP recommends a mechanical soft diet with honey thick liquids. Ok for ice chips or sips of water between meals 30 minutes following any other PO intake. Meds to be completed in applesauce or with thickened liquids. Pt to monitor for s/s of aspiration/pneumonia. Pt would benefit from outpatient SLP services to address dysphagia.  -CS       Oral Transit Phase    Impaired Oral Transit Phase premature spillage of liquids into pharynx  -CS    Premature Spillage of Liquids into Pharynx thin liquids;nectar-thick liquids;honey-thick liquids;pudding/puree;mechanical soft  -CS       Oral Residue    Impaired Oral Residue lingual residue  -CS    Lingual Residue honey-thick liquids;nectar-thick liquids;thin liquids;pudding/puree  -CS       Initiation of Pharyngeal Swallow    Initiation of Pharyngeal Swallow bolus in valleculae;bolus in pyriform sinuses  -CS       Clinical Impression    SLP Swallowing Diagnosis mod-severe;oral dysfunction;pharyngeal  dysfunction  -CS    Functional Impact risk of aspiration/pneumonia  -CS    Swallow Criteria for Skilled Therapeutic Interventions Met demonstrates skilled criteria  -CS       Recommendations    SLP Diet Recommendation soft textures;honey thick liquids;water between meals after oral care, with supervision;ice chips between meals after oral care, with supervision  -CS    Recommended Precautions and Strategies upright posture during/after eating;small bites of food and sips of liquid  -CS    SLP Rec. for Method of Medication Administration meds whole;meds crushed;with pudding or applesauce;with thick liquids  -CS    Monitor for Signs of Aspiration yes;notify SLP if any concerns  -CS      User Key  (r) = Recorded By, (t) = Taken By, (c) = Cosigned By    Initials Name Provider Type    CS Cash Carr MS CCC-SLP Speech and Language Pathologist                              OP SLP Education     Row Name 10/03/18 1525       Education    Barriers to Learning Decreased comprehension  -CS    Education Provided Described results of evaluation  -CS    Assessed Learning needs;Learning motivation;Learning preferences;Learning readiness  -CS    Learning Motivation Moderate  -CS    Learning Method Explanation  -CS    Teaching Response Verbalized understanding;Reinforcement needed  -CS    Education Comments Educated on diet as well as benefit of swallow therapy.  -CS      User Key  (r) = Recorded By, (t) = Taken By, (c) = Cosigned By    Initials Name Effective Dates    Cash Jaimes MS CCC-SLP 04/03/18 -                       SLP Outcome Measures (last 72 hours)      SLP Outcome Measures     Row Name 10/03/18 1500             SLP Outcome Measures    Outcome Measure Used? Adult NOMS  -CS         Adult FCM Scores    FCM Chosen Swallowing  -CS      Swallowing FCM Score 4  -CS        User Key  (r) = Recorded By, (t) = Taken By, (c) = Cosigned By    Initials Name Effective Dates    Cash Jaimes MS CCC-SLP 04/03/18 -              Time Calculation:   SLP Start Time: 1312  SLP Stop Time: 1520  SLP Time Calculation (min): 128 min    Therapy Charges for Today     Code Description Service Date Service Provider Modifiers Qty    99726007866 HC ST SWALLOWING CURRENT STATUS 10/3/2018 Cash Carr, MS CCC-SLP GN, CK 1    34375045948 HC ST SWALLOWING PROJECTED 10/3/2018 Cash Carr, MS CHI-SLP GN, CK 1    96500760493 HC ST SWALLOWING DISCHARGE 10/3/2018 Cash Carr, MS CHI-SLP GN, CK 1    82368117603 HC ST MOTION FLUORO EVAL SWALLOW 8 10/3/2018 Cash Carr, MS CHI-SLP GN 1          SLP G-Codes  SLP NOMS Used?: Yes  Functional Limitations: Swallowing  Swallow Current Status (): At least 40 percent but less than 60 percent impaired, limited or restricted  Swallow Goal Status (): At least 40 percent but less than 60 percent impaired, limited or restricted  Swallow Discharge Status (): At least 40 percent but less than 60 percent impaired, limited or restricted           Cash Carr MS CCC-RADHA  10/3/2018

## 2018-10-04 PROBLEM — J95.04 TRACHEOCUTANEOUS FISTULA FOLLOWING TRACHEOSTOMY (HCC): Status: ACTIVE | Noted: 2018-10-04

## 2018-10-05 ENCOUNTER — TELEPHONE (OUTPATIENT)
Dept: OTOLARYNGOLOGY | Facility: CLINIC | Age: 63
End: 2018-10-05

## 2018-10-05 DIAGNOSIS — R13.10 DYSPHAGIA, UNSPECIFIED TYPE: Primary | ICD-10-CM

## 2018-10-05 NOTE — TELEPHONE ENCOUNTER
Called and gave patient results of his swallow study and recommendations for speech therapy.  He is very pleased with the news and is ready for speech therapy.

## 2018-10-11 ENCOUNTER — APPOINTMENT (OUTPATIENT)
Dept: PREADMISSION TESTING | Facility: HOSPITAL | Age: 63
End: 2018-10-11

## 2018-10-11 ENCOUNTER — HOSPITAL ENCOUNTER (OUTPATIENT)
Dept: GENERAL RADIOLOGY | Facility: HOSPITAL | Age: 63
Discharge: HOME | End: 2018-10-11
Admitting: OTOLARYNGOLOGY

## 2018-10-11 VITALS
OXYGEN SATURATION: 99 % | RESPIRATION RATE: 20 BRPM | BODY MASS INDEX: 22.39 KG/M2 | HEART RATE: 83 BPM | DIASTOLIC BLOOD PRESSURE: 69 MMHG | SYSTOLIC BLOOD PRESSURE: 138 MMHG | HEIGHT: 67 IN | WEIGHT: 142.64 LBS

## 2018-10-11 DIAGNOSIS — J95.04 TRACHEOCUTANEOUS FISTULA FOLLOWING TRACHEOSTOMY (HCC): ICD-10-CM

## 2018-10-11 DIAGNOSIS — Z92.3 HISTORY OF RADIATION THERAPY: ICD-10-CM

## 2018-10-11 DIAGNOSIS — C10.9 OROPHARYNGEAL CANCER (HCC): ICD-10-CM

## 2018-10-11 LAB
ALBUMIN SERPL-MCNC: 4 G/DL (ref 3.5–5)
ALBUMIN/GLOB SERPL: 1.4 G/DL (ref 1.1–2.5)
ALP SERPL-CCNC: 132 U/L (ref 24–120)
ALT SERPL W P-5'-P-CCNC: 60 U/L (ref 0–54)
ANION GAP SERPL CALCULATED.3IONS-SCNC: 12 MMOL/L (ref 4–13)
AST SERPL-CCNC: 48 U/L (ref 7–45)
BILIRUB SERPL-MCNC: 0.4 MG/DL (ref 0.1–1)
BUN BLD-MCNC: 17 MG/DL (ref 5–21)
BUN/CREAT SERPL: 27 (ref 7–25)
CALCIUM SPEC-SCNC: 9.2 MG/DL (ref 8.4–10.4)
CHLORIDE SERPL-SCNC: 100 MMOL/L (ref 98–110)
CO2 SERPL-SCNC: 27 MMOL/L (ref 24–31)
CREAT BLD-MCNC: 0.63 MG/DL (ref 0.5–1.4)
DEPRECATED RDW RBC AUTO: 40.9 FL (ref 40–54)
ERYTHROCYTE [DISTWIDTH] IN BLOOD BY AUTOMATED COUNT: 12.4 % (ref 12–15)
GFR SERPL CREATININE-BSD FRML MDRD: 129 ML/MIN/1.73
GLOBULIN UR ELPH-MCNC: 2.9 GM/DL
GLUCOSE BLD-MCNC: 204 MG/DL (ref 70–100)
HCT VFR BLD AUTO: 38 % (ref 40–52)
HGB BLD-MCNC: 13.2 G/DL (ref 14–18)
MCH RBC QN AUTO: 31.5 PG (ref 28–32)
MCHC RBC AUTO-ENTMCNC: 34.7 G/DL (ref 33–36)
MCV RBC AUTO: 90.7 FL (ref 82–95)
PLATELET # BLD AUTO: 181 10*3/MM3 (ref 130–400)
PMV BLD AUTO: 11.4 FL (ref 6–12)
POTASSIUM BLD-SCNC: 4.3 MMOL/L (ref 3.5–5.3)
PROT SERPL-MCNC: 6.9 G/DL (ref 6.3–8.7)
RBC # BLD AUTO: 4.19 10*6/MM3 (ref 4.8–5.9)
SODIUM BLD-SCNC: 139 MMOL/L (ref 135–145)
WBC NRBC COR # BLD: 5.45 10*3/MM3 (ref 4.8–10.8)

## 2018-10-11 PROCEDURE — 36415 COLL VENOUS BLD VENIPUNCTURE: CPT

## 2018-10-11 PROCEDURE — 71046 X-RAY EXAM CHEST 2 VIEWS: CPT

## 2018-10-11 PROCEDURE — 85027 COMPLETE CBC AUTOMATED: CPT | Performed by: OTOLARYNGOLOGY

## 2018-10-11 PROCEDURE — 93005 ELECTROCARDIOGRAM TRACING: CPT

## 2018-10-11 PROCEDURE — 80053 COMPREHEN METABOLIC PANEL: CPT | Performed by: OTOLARYNGOLOGY

## 2018-10-11 PROCEDURE — 93010 ELECTROCARDIOGRAM REPORT: CPT | Performed by: INTERNAL MEDICINE

## 2018-10-11 NOTE — DISCHARGE INSTRUCTIONS
DAY OF SURGERY INSTRUCTIONS        YOUR SURGEON: Dr. Justin    PROCEDURE: Tracheocutaneous Fistula Closure/Laryngoscopy, possible biopsy     DATE OF SURGERY: October 18, 2018    ARRIVAL TIME: AS DIRECTED BY OFFICE    DAY OF SURGERY TAKE ONLY THESE MEDICATIONS UNLESS OTHERWISE INSTRUCTED BY YOUR PHYSICIAN: Nakia        MANAGING PAIN AFTER SURGERY    We know you are probably wondering what your pain will be like after surgery.  Following surgery it is unrealistic to expect you will not have pain.   Pain is how our bodies let us know that something is wrong or cautions us to be careful.  That said, our goal is to make your pain tolerable.    Methods we may use to treat your pain include (oral or IV medications, PCAs, epidurals, nerve blocks, etc.)   While some procedures require IV pain medications for a short time after surgery, transitioning to pain medications by mouth allows for better management of pain.   Your nurse will encourage you to take oral pain medications whenever possible.  IV medications work almost immediately, but only last a short while.  Taking medications by mouth allows for a more constant level of medication in your blood stream for a longer period of time.      Once your pain is out of control it is harder to get back under control.  It is important you are aware when your next dose of pain medication is due.  If you are admitted, your nurse may write the time of your next dose on the white board in your room to help you remember.      We are interested in your pain and encourage you to inform us about aggravating factors during your visit.   Many times a simple repositioning every few hours can make a big difference.    If your physician says it is okay, do not let your pain prevent you from getting out of bed. Be sure to call your nurse for assistance prior to getting up so you do not fall.      Before surgery, please decide your tolerable pain goal.  These faces help describe the pain  ratings we use on a 0-10 scale.   Be prepared to tell us your goal and whether or not you take pain or anxiety medications at home.          BEFORE YOU COME TO THE HOSPITAL  (Pre-op instructions)  • Do not eat, drink, smoke or chew gum after midnight the night before surgery.  This also includes no mints.  • Morning of surgery take only the medicines you have been instructed with a sip of water unless otherwise instructed  by your physician.  • Do not shave, wear makeup or dark nail polish.  • Remove all jewelry including rings.  • Leave anything you consider valuable at home.  • Leave your suitcase in the car until after your surgery.  • Bring the following with you if applicable:  o Picture ID and insurance, Medicare or Medicaid cards  o Co-pay/deductible required by insurance (cash, check, credit card)  o Copy of advance directive, living will or power-of- documents if not brought to PAT  o CPAP or BIPAP mask and tubing  o Relaxation aids (MP3 player, book, magazine)  • On the day of surgery check in at registration located at the main entrance of the hospital.       Outpatient Surgery Guidelines, Adult  Outpatient procedures are those for which the person having the procedure is allowed to go home the same day as the procedure. Various procedures are done on an outpatient basis. You should follow some general guidelines if you will be having an outpatient procedure.  LET YOUR HEALTH CARE PROVIDER KNOW ABOUT:  · Any allergies you have.  · All medicines you are taking, including vitamins, herbs, eye drops, creams, and over-the-counter medicines.  · Previous problems you or members of your family have had with the use of anesthetics.  · Any blood disorders you have.  · Previous surgeries you have had.  · Medical conditions you have.  RISKS AND COMPLICATIONS  Your health care provider will discuss possible risks and complications with you before surgery. Common risks and complications include:    · Problems  due to the use of anesthetics.  · Blood loss and replacement (does not apply to minor surgical procedures).  · Temporary increase in pain due to surgery.  · Uncorrected pain or problems that the surgery was meant to correct.  · Infection.  · New damage.  BEFORE THE PROCEDURE  · Ask your health care provider about changing or stopping your regular medicines. You may need to stop taking certain medicines in the days or weeks before the procedure.  · Stop smoking at least 2 weeks before surgery. This lowers your risk for complications during and after surgery. Ask your health care provider for help with this if needed.  · Eat your usual meals and a light supper the day before surgery. Continue fluid intake. Do not drink alcohol.  · Do not eat or drink after midnight the night before your surgery.   · Arrange for someone to take you home and to stay with you for 24 hours after the procedure. Medicine given for your procedure may affect your ability to drive or to care for yourself.  · Call your health care provider's office if you develop an illness or problem that may prevent you from safely having your procedure.  AFTER THE PROCEDURE  After surgery, you will be taken to a recovery area, where your progress will be monitored. If there are no complications, you will be allowed to go home when you are awake, stable, and taking fluids well. You may have numbness around the surgical site. Healing will take some time. You will have tenderness at the surgical site and may have some swelling and bruising. You may also have some nausea.  HOME CARE INSTRUCTIONS  · Do not drive for 24 hours, or as directed by your health care provider. Do not drive while taking prescription pain medicines.  · Do not drink alcohol for 24 hours.  · Do not make important decisions or sign legal documents for 24 hours.  · You may resume a normal diet and activities as directed.  · Do not lift anything heavier than 10 pounds (4.5 kg) or play contact  sports until your health care provider says it is okay.  · Change your bandages (dressings) as directed.  · Only take over-the-counter or prescription medicines as directed by your health care provider.  · Follow up with your health care provider as directed.  SEEK MEDICAL CARE IF:  · You have increased bleeding (more than a small spot) from the surgical site.  · You have redness, swelling, or increasing pain in the wound.  · You see pus coming from the wound.  · You have a fever.  · You notice a bad smell coming from the wound or dressing.  · You feel lightheaded or faint.  · You develop a rash.  · You have trouble breathing.  · You develop allergies.  MAKE SURE YOU:  · Understand these instructions.  · Will watch your condition.  · Will get help right away if you are not doing well or get worse.     This information is not intended to replace advice given to you by your health care provider. Make sure you discuss any questions you have with your health care provider.     Document Released: 09/12/2002 Document Revised: 05/03/2016 Document Reviewed: 05/22/2014  [employer] Interactive Patient Education ©2016 [employer]       Fall Prevention in Hospitals, Adult  As a hospital patient, your condition and the treatments you receive can increase your risk for falls. Some additional risk factors for falls in a hospital include:  · Being in an unfamiliar environment.  · Being on bed rest.  · Your surgery.  · Taking certain medicines.  · Your tubing requirements, such as intravenous (IV) therapy or catheters.  It is important that you learn how to decrease fall risks while at the hospital. Below are important tips that can help prevent falls.  SAFETY TIPS FOR PREVENTING FALLS  Talk about your risk of falling.  · Ask your health care provider why you are at risk for falling. Is it your medicine, illness, tubing placement, or something else?  · Make a plan with your health care provider to keep you safe from falls.  · Ask  your health care provider or pharmacist about side effects of your medicines. Some medicines can make you dizzy or affect your coordination.  Ask for help.  · Ask for help before getting out of bed. You may need to press your call button.  · Ask for assistance in getting safely to the toilet.  · Ask for a walker or cane to be put at your bedside. Ask that most of the side rails on your bed be placed up before your health care provider leaves the room.  · Ask family or friends to sit with you.  · Ask for things that are out of your reach, such as your glasses, hearing aids, telephone, bedside table, or call button.  Follow these tips to avoid falling:  · Stay lying or seated, rather than standing, while waiting for help.  · Wear rubber-soled slippers or shoes whenever you walk in the hospital.  · Avoid quick, sudden movements.  ¨ Change positions slowly.  ¨ Sit on the side of your bed before standing.  ¨ Stand up slowly and wait before you start to walk.  · Let your health care provider know if there is a spill on the floor.  · Pay careful attention to the medical equipment, electrical cords, and tubes around you.  · When you need help, use your call button by your bed or in the bathroom. Wait for one of your health care providers to help you.  · If you feel dizzy or unsure of your footing, return to bed and wait for assistance.  · Avoid being distracted by the TV, telephone, or another person in your room.  · Do not lean or support yourself on rolling objects, such as IV poles or bedside tables.     This information is not intended to replace advice given to you by your health care provider. Make sure you discuss any questions you have with your health care provider.     Document Released: 12/15/2001 Document Revised: 01/08/2016 Document Reviewed: 08/25/2013  [employer] Interactive Patient Education ©2016 [employer]       Surgical Site Infections FAQs  What is a Surgical Site Infection (SSI)?  A surgical site  infection is an infection that occurs after surgery in the part of the body where the surgery took place. Most patients who have surgery do not develop an infection. However, infections develop in about 1 to 3 out of every 100 patients who have surgery.  Some of the common symptoms of a surgical site infection are:  · Redness and pain around the area where you had surgery  · Drainage of cloudy fluid from your surgical wound  · Fever  Can SSIs be treated?  Yes. Most surgical site infections can be treated with antibiotics. The antibiotic given to you depends on the bacteria (germs) causing the infection. Sometimes patients with SSIs also need another surgery to treat the infection.  What are some of the things that hospitals are doing to prevent SSIs?  To prevent SSIs, doctors, nurses, and other healthcare providers:  · Clean their hands and arms up to their elbows with an antiseptic agent just before the surgery.  · Clean their hands with soap and water or an alcohol-based hand rub before and after caring for each patient.  · May remove some of your hair immediately before your surgery using electric clippers if the hair is in the same area where the procedure will occur. They should not shave you with a razor.  · Wear special hair covers, masks, gowns, and gloves during surgery to keep the surgery area clean.  · Give you antibiotics before your surgery starts. In most cases, you should get antibiotics within 60 minutes before the surgery starts and the antibiotics should be stopped within 24 hours after surgery.  · Clean the skin at the site of your surgery with a special soap that kills germs.  What can I do to help prevent SSIs?  Before your surgery:  · Tell your doctor about other medical problems you may have. Health problems such as allergies, diabetes, and obesity could affect your surgery and your treatment.  · Quit smoking. Patients who smoke get more infections. Talk to your doctor about how you can quit  before your surgery.  · Do not shave near where you will have surgery. Shaving with a razor can irritate your skin and make it easier to develop an infection.  At the time of your surgery:  · Speak up if someone tries to shave you with a razor before surgery. Ask why you need to be shaved and talk with your surgeon if you have any concerns.  · Ask if you will get antibiotics before surgery.  After your surgery:  · Make sure that your healthcare providers clean their hands before examining you, either with soap and water or an alcohol-based hand rub.  · If you do not see your providers clean their hands, please ask them to do so.  · Family and friends who visit you should not touch the surgical wound or dressings.  · Family and friends should clean their hands with soap and water or an alcohol-based hand rub before and after visiting you. If you do not see them clean their hands, ask them to clean their hands.  What do I need to do when I go home from the hospital?  · Before you go home, your doctor or nurse should explain everything you need to know about taking care of your wound. Make sure you understand how to care for your wound before you leave the hospital.  · Always clean your hands before and after caring for your wound.  · Before you go home, make sure you know who to contact if you have questions or problems after you get home.  · If you have any symptoms of an infection, such as redness and pain at the surgery site, drainage, or fever, call your doctor immediately.  If you have additional questions, please ask your doctor or nurse.  Developed and co-sponsored by The Society for Healthcare Epidemiology of [place] (SHEA); Infectious Diseases Society of [place] (IDSA); American Hospital Association; Association for Professionals in Infection Control and Epidemiology (APIC); Centers for Disease Control and Prevention (CDC); and The Joint Commission.     This information is not intended to replace advice given  to you by your health care provider. Make sure you discuss any questions you have with your health care provider.     Document Released: 12/23/2014 Document Revised: 01/08/2016 Document Reviewed: 03/02/2016  [employer] Interactive Patient Education ©2016 [employer]       [facility]  CHG 4% Patient Instruction Sheet    Preparing the Skin Before Surgery  Preparing or “prepping” skin before surgery can reduce the risk of infection at the surgical site. To make the process easier,[facility] has chosen 4% Chlorhexidine Gluconate (CHG) antiseptic solution.   The steps below outline the prepping process and should be carefully followed.                                                                                                                                                      Prep the skin at the following time(s):                                                      We recommend you shower the night before surgery, and again the morning of surgery with the 4% CHG antiseptic solution using half of the bottle and a cloth each time.  Dress in clean clothes/sleepwear after showering.  See instructions below for application.          Do not apply any lotions or moisturizers.       Do not shave the area to be prepped for at least 2 days prior to surgery.    Clipping the hair may be done immediately prior to your surgery at the hospital    if needed.    Directions:  Thoroughly rinse your body with water.  Apply 4% CHG to a cloth and wash skin gently, paying special attention to the operative site.  Rinse again thoroughly.  Once you have begun using this product do not apply anything else to your skin. If itching or redness persists, rinse affected areas and discontinue use.    When using this product:  • Keep out of eyes, ears, and mouth.  • If solution should contact these areas, rinse out promptly and thoroughly with water.  • For external use only.  • Do not use in genital area, on your face or  head.      PATIENT/FAMILY/RESPONSIBLE PARTY VERBALIZES UNDERSTANDING OF ABOVE EDUCATION.  COPY OF PAIN SCALE GIVEN AND REVIEWED WITH VERBALIZED UNDERSTANDING.

## 2018-10-16 ENCOUNTER — HOSPITAL ENCOUNTER (OUTPATIENT)
Dept: SPEECH THERAPY | Facility: HOSPITAL | Age: 63
Setting detail: THERAPIES SERIES
Discharge: HOME | End: 2018-10-16

## 2018-10-16 DIAGNOSIS — R13.12 OROPHARYNGEAL DYSPHAGIA: Primary | ICD-10-CM

## 2018-10-16 PROCEDURE — 92610 EVALUATE SWALLOWING FUNCTION: CPT | Performed by: SPEECH-LANGUAGE PATHOLOGIST

## 2018-10-16 PROCEDURE — G8997 SWALLOW GOAL STATUS: HCPCS | Performed by: SPEECH-LANGUAGE PATHOLOGIST

## 2018-10-16 PROCEDURE — G8996 SWALLOW CURRENT STATUS: HCPCS | Performed by: SPEECH-LANGUAGE PATHOLOGIST
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
## 2018-10-18 ENCOUNTER — ANESTHESIA (OUTPATIENT)
Dept: PERIOP | Facility: HOSPITAL | Age: 63
End: 2018-10-18

## 2018-10-18 ENCOUNTER — HOSPITAL ENCOUNTER (OUTPATIENT)
Facility: HOSPITAL | Age: 63
Discharge: HOME | End: 2018-10-19
Attending: OTOLARYNGOLOGY | Admitting: OTOLARYNGOLOGY

## 2018-10-18 ENCOUNTER — ANESTHESIA EVENT (OUTPATIENT)
Dept: PERIOP | Facility: HOSPITAL | Age: 63
End: 2018-10-18

## 2018-10-18 DIAGNOSIS — Z92.3 HISTORY OF RADIATION THERAPY: ICD-10-CM

## 2018-10-18 DIAGNOSIS — C10.9 OROPHARYNGEAL CANCER (HCC): ICD-10-CM

## 2018-10-18 DIAGNOSIS — J95.04 TRACHEOCUTANEOUS FISTULA FOLLOWING TRACHEOSTOMY (HCC): ICD-10-CM

## 2018-10-18 LAB
GLUCOSE BLDC GLUCOMTR-MCNC: 119 MG/DL (ref 70–130)
GLUCOSE BLDC GLUCOMTR-MCNC: 151 MG/DL (ref 70–130)

## 2018-10-18 PROCEDURE — 25010000002 MORPHINE SULFATE (PF) 2 MG/ML SOLUTION: Performed by: ANESTHESIOLOGY

## 2018-10-18 PROCEDURE — G8997 SWALLOW GOAL STATUS: HCPCS

## 2018-10-18 PROCEDURE — 25010000002 DEXAMETHASONE PER 1 MG: Performed by: NURSE ANESTHETIST, CERTIFIED REGISTERED

## 2018-10-18 PROCEDURE — 25010000002 FENTANYL CITRATE (PF) 100 MCG/2ML SOLUTION: Performed by: NURSE ANESTHETIST, CERTIFIED REGISTERED

## 2018-10-18 PROCEDURE — 31820 CLOSURE OF WINDPIPE LESION: CPT | Performed by: OTOLARYNGOLOGY

## 2018-10-18 PROCEDURE — 63710000001 OXYCODONE-ACETAMINOPHEN 10-325 MG TABLET: Performed by: OTOLARYNGOLOGY

## 2018-10-18 PROCEDURE — A9270 NON-COVERED ITEM OR SERVICE: HCPCS | Performed by: OTOLARYNGOLOGY

## 2018-10-18 PROCEDURE — A9270 NON-COVERED ITEM OR SERVICE: HCPCS | Performed by: ANESTHESIOLOGY

## 2018-10-18 PROCEDURE — 92610 EVALUATE SWALLOWING FUNCTION: CPT

## 2018-10-18 PROCEDURE — 25010000002 PHENYLEPHRINE PER 1 ML: Performed by: NURSE ANESTHETIST, CERTIFIED REGISTERED

## 2018-10-18 PROCEDURE — 63710000001 OXYCODONE-ACETAMINOPHEN 10-325 MG TABLET: Performed by: ANESTHESIOLOGY

## 2018-10-18 PROCEDURE — 25010000002 MIDAZOLAM PER 1 MG: Performed by: ANESTHESIOLOGY

## 2018-10-18 PROCEDURE — G8996 SWALLOW CURRENT STATUS: HCPCS

## 2018-10-18 PROCEDURE — 88304 TISSUE EXAM BY PATHOLOGIST: CPT | Performed by: OTOLARYNGOLOGY

## 2018-10-18 PROCEDURE — 25010000002 PROPOFOL 10 MG/ML EMULSION: Performed by: NURSE ANESTHETIST, CERTIFIED REGISTERED

## 2018-10-18 PROCEDURE — 25010000002 FENTANYL CITRATE (PF) 100 MCG/2ML SOLUTION: Performed by: ANESTHESIOLOGY

## 2018-10-18 PROCEDURE — 25010000002 ONDANSETRON PER 1 MG: Performed by: NURSE ANESTHETIST, CERTIFIED REGISTERED

## 2018-10-18 PROCEDURE — 88305 TISSUE EXAM BY PATHOLOGIST: CPT | Performed by: OTOLARYNGOLOGY

## 2018-10-18 PROCEDURE — 25010000002 MORPHINE PER 10 MG: Performed by: SPECIALIST

## 2018-10-18 PROCEDURE — 25010000002 ONDANSETRON PER 1 MG: Performed by: ANESTHESIOLOGY

## 2018-10-18 PROCEDURE — 25810000003 SODIUM CHLORIDE 0.9 % WITH KCL 20 MEQ 20-0.9 MEQ/L-% SOLUTION: Performed by: OTOLARYNGOLOGY

## 2018-10-18 PROCEDURE — 82962 GLUCOSE BLOOD TEST: CPT

## 2018-10-18 PROCEDURE — 31535 LARYNGOSCOPY W/BIOPSY: CPT | Performed by: OTOLARYNGOLOGY

## 2018-10-18 RX ORDER — ROCURONIUM BROMIDE 10 MG/ML
INJECTION, SOLUTION INTRAVENOUS AS NEEDED
Status: DISCONTINUED | OUTPATIENT
Start: 2018-10-18 | End: 2018-10-18 | Stop reason: SURG

## 2018-10-18 RX ORDER — PHENYLEPHRINE HCL IN 0.9% NACL 0.8MG/10ML
SYRINGE (ML) INTRAVENOUS AS NEEDED
Status: DISCONTINUED | OUTPATIENT
Start: 2018-10-18 | End: 2018-10-18 | Stop reason: SURG

## 2018-10-18 RX ORDER — FLUMAZENIL 0.1 MG/ML
0.2 INJECTION INTRAVENOUS AS NEEDED
Status: DISCONTINUED | OUTPATIENT
Start: 2018-10-18 | End: 2018-10-18 | Stop reason: HOSPADM

## 2018-10-18 RX ORDER — MORPHINE SULFATE 2 MG/ML
2 INJECTION, SOLUTION INTRAMUSCULAR; INTRAVENOUS
Status: DISCONTINUED | OUTPATIENT
Start: 2018-10-18 | End: 2018-10-18 | Stop reason: HOSPADM

## 2018-10-18 RX ORDER — SODIUM CHLORIDE, SODIUM LACTATE, POTASSIUM CHLORIDE, CALCIUM CHLORIDE 600; 310; 30; 20 MG/100ML; MG/100ML; MG/100ML; MG/100ML
1000 INJECTION, SOLUTION INTRAVENOUS CONTINUOUS
Status: DISCONTINUED | OUTPATIENT
Start: 2018-10-18 | End: 2018-10-18 | Stop reason: HOSPADM

## 2018-10-18 RX ORDER — DEXAMETHASONE SODIUM PHOSPHATE 4 MG/ML
INJECTION, SOLUTION INTRA-ARTICULAR; INTRALESIONAL; INTRAMUSCULAR; INTRAVENOUS; SOFT TISSUE AS NEEDED
Status: DISCONTINUED | OUTPATIENT
Start: 2018-10-18 | End: 2018-10-18 | Stop reason: SURG

## 2018-10-18 RX ORDER — ONDANSETRON 2 MG/ML
4 INJECTION INTRAMUSCULAR; INTRAVENOUS AS NEEDED
Status: DISCONTINUED | OUTPATIENT
Start: 2018-10-18 | End: 2018-10-18 | Stop reason: HOSPADM

## 2018-10-18 RX ORDER — SODIUM CHLORIDE 0.9 % (FLUSH) 0.9 %
3 SYRINGE (ML) INJECTION AS NEEDED
Status: DISCONTINUED | OUTPATIENT
Start: 2018-10-18 | End: 2018-10-18 | Stop reason: HOSPADM

## 2018-10-18 RX ORDER — MEPERIDINE HYDROCHLORIDE 25 MG/ML
12.5 INJECTION INTRAMUSCULAR; INTRAVENOUS; SUBCUTANEOUS
Status: DISCONTINUED | OUTPATIENT
Start: 2018-10-18 | End: 2018-10-18 | Stop reason: HOSPADM

## 2018-10-18 RX ORDER — HYDRALAZINE HYDROCHLORIDE 20 MG/ML
5 INJECTION INTRAMUSCULAR; INTRAVENOUS
Status: DISCONTINUED | OUTPATIENT
Start: 2018-10-18 | End: 2018-10-18 | Stop reason: HOSPADM

## 2018-10-18 RX ORDER — LIDOCAINE HYDROCHLORIDE 10 MG/ML
INJECTION, SOLUTION INFILTRATION; PERINEURAL AS NEEDED
Status: DISCONTINUED | OUTPATIENT
Start: 2018-10-18 | End: 2018-10-18 | Stop reason: HOSPADM

## 2018-10-18 RX ORDER — OXYCODONE AND ACETAMINOPHEN 10; 325 MG/1; MG/1
1 TABLET ORAL EVERY 6 HOURS PRN
Status: DISCONTINUED | OUTPATIENT
Start: 2018-10-18 | End: 2018-10-19 | Stop reason: HOSPADM

## 2018-10-18 RX ORDER — OXYCODONE AND ACETAMINOPHEN 10; 325 MG/1; MG/1
1 TABLET ORAL ONCE AS NEEDED
Status: COMPLETED | OUTPATIENT
Start: 2018-10-18 | End: 2018-10-18

## 2018-10-18 RX ORDER — LIDOCAINE HYDROCHLORIDE AND EPINEPHRINE 10; 10 MG/ML; UG/ML
INJECTION, SOLUTION INFILTRATION; PERINEURAL AS NEEDED
Status: DISCONTINUED | OUTPATIENT
Start: 2018-10-18 | End: 2018-10-18 | Stop reason: HOSPADM

## 2018-10-18 RX ORDER — SODIUM CHLORIDE, SODIUM LACTATE, POTASSIUM CHLORIDE, CALCIUM CHLORIDE 600; 310; 30; 20 MG/100ML; MG/100ML; MG/100ML; MG/100ML
100 INJECTION, SOLUTION INTRAVENOUS CONTINUOUS
Status: DISCONTINUED | OUTPATIENT
Start: 2018-10-18 | End: 2018-10-18 | Stop reason: HOSPADM

## 2018-10-18 RX ORDER — IPRATROPIUM BROMIDE AND ALBUTEROL SULFATE 2.5; .5 MG/3ML; MG/3ML
3 SOLUTION RESPIRATORY (INHALATION) ONCE AS NEEDED
Status: DISCONTINUED | OUTPATIENT
Start: 2018-10-18 | End: 2018-10-18 | Stop reason: HOSPADM

## 2018-10-18 RX ORDER — LABETALOL HYDROCHLORIDE 5 MG/ML
5 INJECTION, SOLUTION INTRAVENOUS
Status: DISCONTINUED | OUTPATIENT
Start: 2018-10-18 | End: 2018-10-18 | Stop reason: HOSPADM

## 2018-10-18 RX ORDER — SODIUM CHLORIDE 0.9 % (FLUSH) 0.9 %
3 SYRINGE (ML) INJECTION EVERY 12 HOURS SCHEDULED
Status: DISCONTINUED | OUTPATIENT
Start: 2018-10-18 | End: 2018-10-18 | Stop reason: HOSPADM

## 2018-10-18 RX ORDER — LIDOCAINE HYDROCHLORIDE 20 MG/ML
INJECTION, SOLUTION INFILTRATION; PERINEURAL AS NEEDED
Status: DISCONTINUED | OUTPATIENT
Start: 2018-10-18 | End: 2018-10-18 | Stop reason: SURG

## 2018-10-18 RX ORDER — SODIUM CHLORIDE 0.9 % (FLUSH) 0.9 %
1-10 SYRINGE (ML) INJECTION AS NEEDED
Status: DISCONTINUED | OUTPATIENT
Start: 2018-10-18 | End: 2018-10-18 | Stop reason: HOSPADM

## 2018-10-18 RX ORDER — ONDANSETRON 2 MG/ML
INJECTION INTRAMUSCULAR; INTRAVENOUS AS NEEDED
Status: DISCONTINUED | OUTPATIENT
Start: 2018-10-18 | End: 2018-10-18 | Stop reason: SURG

## 2018-10-18 RX ORDER — MIDAZOLAM HYDROCHLORIDE 1 MG/ML
1 INJECTION INTRAMUSCULAR; INTRAVENOUS
Status: DISCONTINUED | OUTPATIENT
Start: 2018-10-18 | End: 2018-10-18 | Stop reason: HOSPADM

## 2018-10-18 RX ORDER — MIDAZOLAM HYDROCHLORIDE 1 MG/ML
2 INJECTION INTRAMUSCULAR; INTRAVENOUS
Status: DISCONTINUED | OUTPATIENT
Start: 2018-10-18 | End: 2018-10-18 | Stop reason: HOSPADM

## 2018-10-18 RX ORDER — ONDANSETRON 2 MG/ML
4 INJECTION INTRAMUSCULAR; INTRAVENOUS ONCE AS NEEDED
Status: DISCONTINUED | OUTPATIENT
Start: 2018-10-18 | End: 2018-10-19 | Stop reason: HOSPADM

## 2018-10-18 RX ORDER — METOCLOPRAMIDE HYDROCHLORIDE 5 MG/ML
5 INJECTION INTRAMUSCULAR; INTRAVENOUS
Status: DISCONTINUED | OUTPATIENT
Start: 2018-10-18 | End: 2018-10-18 | Stop reason: HOSPADM

## 2018-10-18 RX ORDER — FENTANYL CITRATE 50 UG/ML
25 INJECTION, SOLUTION INTRAMUSCULAR; INTRAVENOUS AS NEEDED
Status: DISCONTINUED | OUTPATIENT
Start: 2018-10-18 | End: 2018-10-18 | Stop reason: HOSPADM

## 2018-10-18 RX ORDER — FENTANYL CITRATE 50 UG/ML
INJECTION, SOLUTION INTRAMUSCULAR; INTRAVENOUS AS NEEDED
Status: DISCONTINUED | OUTPATIENT
Start: 2018-10-18 | End: 2018-10-18 | Stop reason: SURG

## 2018-10-18 RX ORDER — PROPOFOL 10 MG/ML
VIAL (ML) INTRAVENOUS AS NEEDED
Status: DISCONTINUED | OUTPATIENT
Start: 2018-10-18 | End: 2018-10-18 | Stop reason: SURG

## 2018-10-18 RX ORDER — MAGNESIUM HYDROXIDE 1200 MG/15ML
LIQUID ORAL AS NEEDED
Status: DISCONTINUED | OUTPATIENT
Start: 2018-10-18 | End: 2018-10-18 | Stop reason: HOSPADM

## 2018-10-18 RX ORDER — NALOXONE HCL 0.4 MG/ML
0.04 VIAL (ML) INJECTION AS NEEDED
Status: DISCONTINUED | OUTPATIENT
Start: 2018-10-18 | End: 2018-10-18 | Stop reason: HOSPADM

## 2018-10-18 RX ORDER — SODIUM CHLORIDE AND POTASSIUM CHLORIDE 150; 900 MG/100ML; MG/100ML
100 INJECTION, SOLUTION INTRAVENOUS CONTINUOUS
Status: DISCONTINUED | OUTPATIENT
Start: 2018-10-18 | End: 2018-10-19 | Stop reason: HOSPADM

## 2018-10-18 RX ADMIN — MORPHINE SULFATE 4 MG: 4 INJECTION INTRAVENOUS at 16:13

## 2018-10-18 RX ADMIN — PROPOFOL 200 MG: 10 INJECTION, EMULSION INTRAVENOUS at 07:35

## 2018-10-18 RX ADMIN — OXYCODONE HYDROCHLORIDE AND ACETAMINOPHEN 1 TABLET: 10; 325 TABLET ORAL at 09:50

## 2018-10-18 RX ADMIN — MIDAZOLAM HYDROCHLORIDE 2 MG: 1 INJECTION, SOLUTION INTRAMUSCULAR; INTRAVENOUS at 07:16

## 2018-10-18 RX ADMIN — FENTANYL CITRATE 25 MCG: 50 INJECTION, SOLUTION INTRAMUSCULAR; INTRAVENOUS at 09:24

## 2018-10-18 RX ADMIN — LIDOCAINE HYDROCHLORIDE 50 MG: 20 INJECTION, SOLUTION INFILTRATION; PERINEURAL at 07:36

## 2018-10-18 RX ADMIN — PHENYLEPHRINE HYDROCHLORIDE 1 MCG/KG/MIN: 10 INJECTION INTRAVENOUS at 07:39

## 2018-10-18 RX ADMIN — ROCURONIUM BROMIDE 15 MG: 10 INJECTION INTRAVENOUS at 07:45

## 2018-10-18 RX ADMIN — LIDOCAINE HYDROCHLORIDE 50 MG: 20 INJECTION, SOLUTION INFILTRATION; PERINEURAL at 07:35

## 2018-10-18 RX ADMIN — FENTANYL CITRATE 25 MCG: 50 INJECTION, SOLUTION INTRAMUSCULAR; INTRAVENOUS at 09:38

## 2018-10-18 RX ADMIN — Medication 80 MCG: at 08:07

## 2018-10-18 RX ADMIN — MORPHINE SULFATE 2 MG: 2 INJECTION, SOLUTION INTRAMUSCULAR; INTRAVENOUS at 09:44

## 2018-10-18 RX ADMIN — MORPHINE SULFATE 4 MG: 4 INJECTION INTRAVENOUS at 13:12

## 2018-10-18 RX ADMIN — POTASSIUM CHLORIDE AND SODIUM CHLORIDE 100 ML/HR: 900; 150 INJECTION, SOLUTION INTRAVENOUS at 16:24

## 2018-10-18 RX ADMIN — MORPHINE SULFATE 4 MG: 4 INJECTION INTRAVENOUS at 18:32

## 2018-10-18 RX ADMIN — FENTANYL CITRATE 25 MCG: 50 INJECTION, SOLUTION INTRAMUSCULAR; INTRAVENOUS at 09:28

## 2018-10-18 RX ADMIN — SODIUM CHLORIDE, POTASSIUM CHLORIDE, SODIUM LACTATE AND CALCIUM CHLORIDE 1000 ML: 600; 310; 30; 20 INJECTION, SOLUTION INTRAVENOUS at 06:11

## 2018-10-18 RX ADMIN — SODIUM CHLORIDE, POTASSIUM CHLORIDE, SODIUM LACTATE AND CALCIUM CHLORIDE: 600; 310; 30; 20 INJECTION, SOLUTION INTRAVENOUS at 09:09

## 2018-10-18 RX ADMIN — ONDANSETRON HYDROCHLORIDE 4 MG: 2 INJECTION, SOLUTION INTRAMUSCULAR; INTRAVENOUS at 09:25

## 2018-10-18 RX ADMIN — LIDOCAINE HYDROCHLORIDE 0.5 ML: 10 INJECTION, SOLUTION EPIDURAL; INFILTRATION; INTRACAUDAL; PERINEURAL at 06:10

## 2018-10-18 RX ADMIN — DEXAMETHASONE SODIUM PHOSPHATE 4 MG: 4 INJECTION, SOLUTION INTRAMUSCULAR; INTRAVENOUS at 07:46

## 2018-10-18 RX ADMIN — ROCURONIUM BROMIDE 25 MG: 10 INJECTION INTRAVENOUS at 07:35

## 2018-10-18 RX ADMIN — FENTANYL CITRATE 25 MCG: 50 INJECTION, SOLUTION INTRAMUSCULAR; INTRAVENOUS at 09:29

## 2018-10-18 RX ADMIN — PROPOFOL 100 MG: 10 INJECTION, EMULSION INTRAVENOUS at 07:37

## 2018-10-18 RX ADMIN — FENTANYL CITRATE 100 MCG: 50 INJECTION, SOLUTION INTRAMUSCULAR; INTRAVENOUS at 07:35

## 2018-10-18 RX ADMIN — OXYCODONE HYDROCHLORIDE AND ACETAMINOPHEN 1 TABLET: 10; 325 TABLET ORAL at 20:24

## 2018-10-18 RX ADMIN — ONDANSETRON HYDROCHLORIDE 4 MG: 2 SOLUTION INTRAMUSCULAR; INTRAVENOUS at 07:46

## 2018-10-18 RX ADMIN — SUGAMMADEX 200 MG: 100 INJECTION, SOLUTION INTRAVENOUS at 09:05

## 2018-10-18 RX ADMIN — CEFAZOLIN 1 G: 1 INJECTION, POWDER, FOR SOLUTION INTRAMUSCULAR; INTRAVENOUS; PARENTERAL at 07:38

## 2018-10-18 NOTE — OP NOTE
Ayanna Early MD Operative Note    Sameer Tavarez  10/18/2018    Pre-op Diagnosis:   Oropharyngeal cancer (CMS/HCC) [C10.9]  History of radiation therapy [Z92.3]  Tracheocutaneous fistula following tracheostomy (CMS/HCC) [J95.04]    Post-op Diagnosis:     same    Procedure/CPT® Codes:  HI SURG CLOSURE TRACH/FISTULA [29920]  HI LARYNGOSCOPY,DIRCT,OP,BIOPSY [45346]    Procedure(s):  TRACHEOCUTANEOUS FISTULA CLOSURE  DIAGNOSTIC DIRECT LARYNGOSCOPY< POSSIBLE BIOPSY  takedown gastroutaneous fistula with gastrostomy tube plaement    Surgeon(s):  Alber Justin MD Tyrrell, Dana R, MD    Anesthesia: General    Staff:   Circulator: Latasha Anne RN  Scrub Person: Elyssa Teixeira  Assistant: Karla Johnson    Estimated Blood Loss: minimal    Specimens:                ID Type Source Tests Collected by Time   A : gastrocutaneous fistula tract Tissue Stomach TISSUE PATHOLOGY EXAM Alber Justin MD 10/18/2018 0830         Drains:   Gastrostomy/Enterostomy Gastrostomy 22 Fr. LUQ (Active)       Indications: Patient had prior G-tube with prolonged drainage after removal.  He still unable swallow needs another feeding tube.    Findings: Gastric changes fistula with body of stomach adherent to the abdominal wall.    Complications: none    Procedure: The patient was brought to the operating room and placed in the supine position.  After induction of general anesthesia and infusion of IV antibiotics, the patient was prepped and draped in the usual sterile fashion.  Mid line incision was made and the abdomen entered.  We were working simultaneously with Dr. Georges.  Adhesions were dissected with sharp and electrocautery dissection.  I attempted to take down the stomach but there was a persistent fistula.  Once this was taken down I elevated with Allis's and resected this fistulous tract with the TA 60 stapler.  I oversewed the staple line with 2-0 silk seromuscular sutures.  I then went above that area and placed 2  pursestring sutures of 2-0 silk.  I passed the G-tube through a separate incision left upper quadrant.  It was a 22 [place] TANJA gastrostomy tube.  We passed it into the osteotomy hole that was made between the 2 purse string sutures in these pursestring sutures were tied.  The anterior stomach wall was sutured to the posterior surface of the anterior abdominal wall with four-quadrant 2-0 silk sutures.  We then inflated the balloon and pulled the stomach up to the abdominal wall and tied the sutures.  Irrigation was performed.  We then closed the fascia with running PDS.  Irrigated the wound and closed with 2-0 Vicryl and staples.  The G-tube was sutured to the skin with 2-0 silk.  Dressing was placed patient was awakened transferred to the cover him stable condition tolerated the procedure well.  At the end of the procedure counts correct.    Ayanna Early MD     Date: 10/18/2018  Time: 8:39 AM

## 2018-10-18 NOTE — H&P
Ayanna Early MD  H&P    Patient Care Team:  Christian Castellon MD as PCP - General (Internal Medicine)  Sin Alarcon MD as Referring Physician (General Practice)  Alber Justin MD as Consulting Physician (Otolaryngology)  Kriss Dominguez MD as Referring Physician (Hematology and Oncology)  Hadley Marie III, MD as Consulting Physician (Radiation Oncology)  Chayo Gould PA-C as Physician Assistant (Radiation Oncology)  Ayanna Early MD as Surgeon (General Surgery)    Chief complaint   inability to swallow    Subjective     Sameer Tavarez  is a 63 y.o. male presents with inability to swallow due to head and neck cancer.  His had a G-tube in the past that came out.  It took about 2 months to heal.  He is here to get a closure of his tracheo and he is fistula.  I been asked to place a G-tube.  He has also had a TIF procedure for his reflux about 2 years ago.      Review of Systems   Pertinent items are noted in HPI, all other systems reviewed and negative    History  Past Medical History:   Diagnosis Date   • Arthritis    • Coronary artery disease    • Depression    • Diabetes mellitus (CMS/HCC)    • Difficulty urinating    • Enlarged prostate    • GERD (gastroesophageal reflux disease)    • History of BPH    • History of transfusion    • Hyperlipidemia    • Hypertension    • Neuropathy    • Oropharyngeal cancer (CMS/HCC) 08/27/2017   • Seizure (CMS/HCC)     diabetic   • Severe esophageal dysplasia    • Stroke (CMS/HCC)    • TB (pulmonary tuberculosis) 2004   • Tracheostomy present (CMS/HCC)     PLUGGED     Past Surgical History:   Procedure Laterality Date   • CARDIAC SURGERY      9-10 stents   • CHOLECYSTECTOMY     • CORONARY ARTERY BYPASS GRAFT      2009   • FRACTURE SURGERY     • GTUBE REPLACEMENT N/A 10/6/2017    Procedure: GASTROSTOMY TUBE REPLACEMENT, port placement;  Surgeon: Jayne Phillips MD;  Location: [facility] OR;  Service:    • HERNIA REPAIR     • LARYNGOSCOPY N/A  10/11/2017    Procedure: DIRECT LARYNGOSCOPY WITH BIOPSY;  Surgeon: Darin Neal MD;  Location:  [facility] OR;  Service:    • LARYNGOSCOPY N/A 6/25/2018    Procedure: DIRECT LARYNGOSCOPY WITH BIOPSY;  Surgeon: Alber Justin MD;  Location:  [facility] OR;  Service: ENT   • NISSEN FUNDOPLICATION LAPAROSCOPIC     • NH DENTAL SURGERY PROCEDURE N/A 10/11/2017    Procedure: TOOTH EXTRACTION;  Surgeon: Darin Neal MD;  Location:  [facility] OR;  Service: ENT   • NH INSJ TUNNELED CVC W/O SUBQ PORT/ AGE 5 YR/> Left 10/6/2017    Procedure: INSERTION VENOUS ACCESS DEVICE;  Surgeon: Jayne Phillips MD;  Location:  [facility] OR;  Service: General   • SKIN BIOPSY     • TESTICLE SURGERY      tubes implanted for drainage   • TONSILLECTOMY     • TRACHEOSTOMY N/A 10/5/2017    Procedure: Awake tracheostomy; DIRECT LARYNGOSCOPY WITH BIOPSY;  Surgeon: Alber Justin MD;  Location:  [facility] OR;  Service:      Family History   Problem Relation Age of Onset   • Stroke Mother    • Heart disease Father    • Heart disease Brother    • Cancer Brother      Social History   Substance Use Topics   • Smoking status: Current Every Day Smoker     Packs/day: 0.00     Years: 52.00     Types: Cigarettes   • Smokeless tobacco: Former User     Types: Snuff      Comment: 10 cigarettes a day   • Alcohol use 1.8 oz/week     3 Cans of beer per week     Prescriptions Prior to Admission   Medication Sig Dispense Refill Last Dose   • oxyCODONE-acetaminophen (PERCOCET)  MG per tablet Take 1 tablet by mouth Every 6 (Six) Hours As Needed for Moderate Pain . (Patient taking differently: Take 1 tablet by mouth Every 6 (Six) Hours As Needed for Moderate Pain  (SPINAL PAIN).) 60 tablet 0 10/18/2018 at 0100     Allergies:  Codeine    Objective     Vital Signs  Temp:  [98.2 °F (36.8 °C)] 98.2 °F (36.8 °C)  Heart Rate:  [65-82] 65  Resp:  [16-18] 16  BP: (153)/(87) 153/87    Physical Exam:      General Appearance:    Alert, cooperative, in no acute  distress   Head:    Normocephalic, without obvious abnormality, atraumatic   Eyes:            Lids and lashes normal, conjunctivae and sclerae normal, no   icterus, no pallor, corneas clear, PERRLA   Ears:    Ears appear intact with no abnormalities noted   Neck:   No adenopathy, supple, trachea midline   Back:     No kyphosis present, no scoliosis present, no skin lesions,      erythema or scars, no tenderness to percussion or                   palpation,   range of motion normal   Lungs:     Clear to auscultation,respirations regular, even and                  unlabored    Heart:    Regular rhythm and normal rate, normal S1 and S2, no            murmur, no gallop, no rub, no click   Chest Wall:    No abnormalities observed   Abdomen:   Soft and upper midline incision.  He still some redness around a left upper quadrant incision    Rectal:     Deferred   Extremities:   Moves all extremities well, no edema, no cyanosis, no             redness   Pulses:   Pulses palpable and equal bilaterally   Skin:   No bleeding, bruising or rash   Lymph nodes:   No palpable adenopathy   Neurologic:   No focal deficits       Results Review:      Lab Results (last 72 hours)     Procedure Component Value Units Date/Time    POC Glucose Once [[id number]]  (Abnormal) Collected:  10/18/18 0553    Specimen:  Blood Updated:  10/18/18 0607     Glucose 151 (H) mg/dL      Comment: : [id number] Jairo Veras RMeter ID: [id number]           Imaging Results (last 72 hours)     ** No results found for the last 72 hours. **          Assessment/Plan       Oropharyngeal cancer (CMS/HCC)    History of radiation therapy    Tracheocutaneous fistula following tracheostomy (CMS/HCC)      Will place a repeat gastrostomy tube.  May require takedown of gastrocutaneous fistula and placing into another area.  The risks of bleeding infection injury to structures difficulty due to the prior surgery all were discussed.  He understands this is not as easy  as the first time being placed with Meliza and the difficulties with scarring or difficulty with placement of the tube in the left upper quadrant.      Ayanna Early MD  10/18/18  7:31 AM

## 2018-10-18 NOTE — PLAN OF CARE
Problem: Patient Care Overview  Goal: Plan of Care Review  Outcome: Ongoing (interventions implemented as appropriate)   10/18/18 1621   Coping/Psychosocial   Plan of Care Reviewed With patient   OTHER   Outcome Summary G-tube replaced. Consult to start TF's on 10/19. Recommend to initiate 2-amy boluses. Will start boluses at 120mL as tolerated and increase to 240mL, 4x/day. G-tube should be flushed with 30mL of water before and after each bolus and an additional water bolus of 250mL, 4x/day should be administered to meet fluid needs. Pt is wanting to eat as tolerated. He is scheduled to have a VFSS on 10/19. He remains NPO at present. Will cont to follow for nutrition needs and TF tolerance. Pt has been on this TF regimen previously.    Plan of Care Review   Progress no change

## 2018-10-18 NOTE — ANESTHESIA PROCEDURE NOTES
Airway  Urgency: elective    Airway not difficult    General Information and Staff    Patient location during procedure: OR  CRNA: VIJAY BUCKLEY    Indications and Patient Condition  Indications for airway management: airway protection    Preoxygenated: yes  MILS not maintained throughout  Mask difficulty assessment: 1 - vent by mask    Final Airway Details  Final airway type: endotracheal airway      Successful airway: ETT  Cuffed: yes   Successful intubation technique: video laryngoscopy  Facilitating devices/methods: intubating stylet  Endotracheal tube insertion site: oral  Blade: CMAC  Blade size: 4  ETT size: 7.0 mm  Cormack-Lehane Classification: grade I - full view of glottis  Placement verified by: chest auscultation and capnometry   Cuff volume (mL): 4  Measured from: lips  Number of attempts at approach: 1

## 2018-10-18 NOTE — NURSING NOTE
Pt was instructed he was NPO numerous times and found him in his room eating a doughnut out of his bag.

## 2018-10-18 NOTE — H&P (VIEW-ONLY)
" Alber Justin MD   CC:  follow up head and neck cancer    History of Present Illness:  Sameer Tavarez is a 63 y.o. male who presents for follow up. He was recently decanulated and had a persistent tracheocutaneous fistula.  He reports he is having food come out of his stoma when he swallows.  He had a G-tube but he removed it himself after having difficulty with drainage and irritation around the site.  He complained that he was not getting follow-up care from the procedure was to put it in.  He has not had difficulty with lymphadenopathy or returning tongue lesion.    Oncology/Hematology History    Sameer Tavarez is a 62 y.o. male with oropharyngeal cancer. He had an EGD on 2/6/17 by Dr Patel with biopsies showing Barretts esophagus and reflux esophagitis. He had continued dysphagia and underwent another EGD and an oropharyngeal biopsy on 8/27/17 with the oropharyngeal biosies showing \"at least squamous cell carcinoma in situ\". CT scanning showed \"oropharyngeal asymmety without overt enhancing mass\" on 8/28/17. He was referred to Dr. Alber Justin MD and diagnosed with a large ulcerative lesion that was in the oral pharynx.  It extended from the right lateral aspect of the uvula and extended to the posterior rim of the soft palate extending to the tonsillar fossa and deeply penetrating into it.  It involved the right base of tongue and extended at least to the midline of the base of tongue.  It extended forward into the lingual tongue muscular approximately 2 cm. He was having difficulty with his secretions and his airway at night, so an awake tracheostomy with direct laryngoscopy and biopsy was performed on 10/5/17. At the time of admission, g tube placement and dental extraction was performed.            Oropharyngeal cancer (CMS/HCC)    8/28/2017 Initial Diagnosis     Oropharyngeal cancer         8/28/2017 Biopsy     Dr Patel ([facility] [place] - EGD with oropharynx " biopsy:    Clinical Information       Pre-Op: Gerd/Hoarsness      Post-Op Esophageal ulcer,barretts oral lesion    Final Diagnosis   1.  Esophagus, endoscopic biopsy:  A.  Fragments of benign squamocolumnar junction mucosa and benign glandular mucosa demonstrating intestinal metaplasia (Recio's esophagus)  B.  Chronic active inflammation, moderate.  C.  No dysplasia identified.     2.  Oropharynx, biopsy: At least squamous cell carcinoma in situ.     Comment: Status of invasion is indeterminate due to tangential sectioning of several of the tissue fragments.  Immunohistochemical stain for p16 is positive.     AJCC stage:pTX pNX                 8/28/2017 Imaging     [facility]  CT Soft tissue neck  No discrete enhancing mass  Soft tissue thickening right oropharynx and peritonsillar soft tissues compared to left.  Small lymph nodes may be inflammatory in nature         9/13/2017 Imaging     [facility]    CT Chest  Small hiatal hernia  1.1 cm lymph node adjacent to the distal esophagus.  Two small lung nodules.  For multiple solid noncalcified nodules smaller than 6 mm in diameter, no routine follow-up is recommended. (grade 2B; weak recommendation, moderate-quality evidence)  CT Abd Pelvis  Right perineal mass of unknown significance. Please correlate with physical exam.  Dilated small bowel with multiple air-fluid levels. Differential considerations include partial small bowel obstruction vs adynamic ileus.  Mild diverticulosis. Mild distended urinary bladder.         9/29/2017 Imaging     [facility]  MR Orbit Face Neck  1. Large right oropharyngeal mass compatible with the history of  carcinoma.  2. Tongue base and right submandibular gland involvement.    PET  1. Abnormal metabolic activity in the base of the tongue on the right  extending in the right palatine tonsil. SUV is 8.8. This is consistent  with neoplasm. No other regions of abnormal metabolic activity are  identified..            10/5/2017 Procedure     Tracheostomy with Direct Laryngoscopy and biopsy -JUANITA Justin MD     Path    Final Diagnosis   1.  Right superior tonsillar fossa, biopsy:  A.  Moderately differentiated squamous cell carcinoma, invasive.  B.  Immunohistochemical stain for p16 is positive.      AJCC stage: pTX pNX     2.  Right base of tongue, biopsy:  A.  Moderately differentiated squamous cell carcinoma, invasive.  B.  Immunohistochemical stain for p16 is positive.              10/6/2017 Procedure     Port placement  Gastrostomy tube placement         10/11/2017 Procedure     Teeth extraction- Les Goddard MD, DMD         11/3/2017 Cancer Staged     Oropharyngeal cancer    Staging form: Pharynx - HPV-Mediated Oropharynx, AJCC 8th Edition    - Clinical stage from 11/3/2017: Stage III (cT4, cN0, cM0, p16: Positive) - Signed by Alber Justin MD on 1/1/2018    - Pathologic: No stage assigned - Unsigned         11/9/2017 - 1/16/2018 Chemotherapy/Radiation     Carboplatin and Taxol- Claudino    Radiation OncologyTreatment Course:  Sameer Tavarez received 7000 cGy in 35 fractions to the oral pharynx and neck via External Beam Radiation - EBRT.- Locken         6/25/2018 Biopsy     direct laryngoscopy with biopsy- benign          7/10/2018 -  Other Event     Tracheostomy removal            Review of Systems  Reviewed as per patient intake note.    Past History:  Past medical and surgical history, family history and social history reviewed and updated when appropriate.  Current medications and allergies reviewed and updated when appropriate.  Allergies:  Codeine    Vital Signs:  Temp:  [97.6 °F (36.4 °C)] 97.6 °F (36.4 °C)  BP: (126)/(78) 126/78    Physical Exam    CONSTITUTIONAL: well nourished, well-developed, alert, oriented, in no acute distress   COMMUNICATION AND VOICE: able to communicate normally, normal voice quality  HEAD: normocephalic, no lesions, atraumatic, no tenderness, no masses   FACE: appearance normal,  no lesions, no tenderness, no deformities, facial motion symmetric  EYES: ocular motility normal, eyelids normal, orbits normal, no proptosis, conjunctiva normal , pupils equal, round  HEARING: response to conversational voice normal bilaterally   EXTERNAL EARS: auricles without lesions  EXTERNAL NOSE: structure normal, no tenderness on palpation, no nasal discharge, no lesions, no evidence of trauma, nostrils patent  LIPS: structure normal, no tenderness on palpation, no lesions, no evidence of trauma  TEETH: edentulous  GUMS: gingivae healthy  ORAL MUCOSA: oral mucosa with diffuse dryness, no mucosal lesions   TONGUE:  lingual mucosa normal without lesions, normal tongue mobility  post radiation changes present, no evidence of recurrent disease present,  NECK: persistent tracheocutaneous fistula  LYMPH NODES: no lymphadenopathy  CHEST/RESPIRATORY: respiratory effort normal  CARDIOVASCULAR: extremities without cyanosis or edema, no overt jugulovenous distension present  NEUROLOGIC/PSYCHIATRIC: oriented appropriately for age, mood normal, affect appropriate, cranial nerves intact grossly unless specifically mentioned above        Assessment   1. Oropharyngeal cancer (CMS/HCC)    2. History of radiation therapy    3. Tracheocutaneous fistula following tracheostomy (CMS/HCC)    4. Pharyngoesophageal dysphagia        Plan    I am concerned about his swallowing and aspiration.  I have strongly recommended he receive another swallow study, and unless this is improved from the previous one and can be treated with therapeutic measures, I recommended placing the G-tube again at the time of his tracheostomy closure.  At first he was strongly opposed to placing the G-tube again but after discussing it with him further, he would be more open to add as long as he had a different surgeon and had better postoperative care.  If the swallow study shows lopez aspiration, I will contact Gen. surgery to see if this can be done at the  same time.    Medical and surgical options were discussed including medical and surgical options. Risks, benefits and alternatives were discussed and questions were answered. After considering the options, the patient decided to proceed with surgery.     -----SURGERY SCHEDULING:-----  Schedule TRACHEOCUTANEOUS FISTULA CLOSURE (N/A), DIAGNOSTIC DIRECT LARYNGOSCOPY< POSSIBLE BIOPSY (N/A)     ---INFORMED CONSENT DISCUSSION:---  TRACHEOCUTANEOUS FISTULA CLOSURE WITH WOOKIE FLAP. Risks include, but are not limited to: bleeding, infection, hematoma, persistent fistula, need for additional procedures, flap failure, cosmetic deformity. Patient understands risks and would like to proceed with surgery.      ---PREOPERATIVE WORKUP:---  CBC  CMP  Chest X-Ray  EKG     Follow up  postoperatively    Alber Justin MD  10/03/18  4:56 PM

## 2018-10-18 NOTE — PROGRESS NOTES
Discharge Planning Assessment  [facility]     Patient Name: Sameer Tavarez  MRN: [id number]  Today's Date: 10/18/2018    Admit Date: 10/18/2018          Discharge Needs Assessment     Row Name 10/18/18 1634       Living Environment    Lives With alone    Primary Care Provided by self    Provides Primary Care For no one    Quality of Family Relationships unable to assess    Able to Return to Prior Arrangements yes       Resource/Environmental Concerns    Resource/Environmental Concerns financial;environmental       Transition Planning    Patient/Family Anticipates Transition to home       Discharge Needs Assessment    Concerns to be Addressed adjustment to diagnosis/illness;basic needs;discharge planning;care coordination/care conferences;financial/insurance    Equipment Currently Used at Home none    Anticipated Changes Related to Illness none    Current Discharge Risk chronically ill;lives alone    Discharge Coordination/Progress Rec'd orders for trach supplies and tube feeding for pt. Went to his room to discuss and he said he no longer has a trach. Checked the documentation along with his nurse and pt had a closure and a new feeding tube placed. She is going to check on the order about the trach supplies. Asked pt where he got his supplies before and he was not able to name the place so will check into this in the am. Asked pt about living alone and he said he lives in a tent. Before more conversation could take place, pt had to go to the restroom. Will check back in am to get a better assessment and work on ordering what supplies he actually does need.             Discharge Plan    No documentation.       Destination     No service coordination in this encounter.      Durable Medical Equipment     No service coordination in this encounter.      Dialysis/Infusion     No service coordination in this encounter.      Home Medical Care     No service coordination in this encounter.      Social Care     No service  coordination in this encounter.                Demographic Summary    No documentation.           Functional Status    No documentation.           Psychosocial    No documentation.           Abuse/Neglect    No documentation.           Legal    No documentation.           Substance Abuse    No documentation.           Patient Forms    No documentation.         CRIS Simpson

## 2018-10-18 NOTE — OP NOTE
Alber Justin MD   Operative Note    Sameer Tavarez  10/18/2018    Pre-op Diagnosis:   Oropharyngeal cancer (CMS/HCC) [C10.9]  History of radiation therapy [Z92.3]  Tracheocutaneous fistula following tracheostomy (CMS/HCC) [J95.04]    Post-op Diagnosis:     Post-Op Diagnosis Codes:     * Oropharyngeal cancer (CMS/HCC) [C10.9]     * History of radiation therapy [Z92.3]     * Tracheocutaneous fistula following tracheostomy (CMS/HCC) [J95.04]    Procedure/CPT® Codes:  NM SURG CLOSURE TRACH/FISTULA [27221]  NM LARYNGOSCOPY,DIRCT,OP,BIOPSY [70551]    Procedure(s):  TRACHEOCUTANEOUS FISTULA CLOSURE  DIAGNOSTIC DIRECT LARYNGOSCOPY WITH BIOPSY  Takedown gastroutaneous fistula with gastrostomy tube plaement (per Dr Talamantes- see seperate dictation)    Surgeon(s):  Alber Justin MD Tyrrell, Dana R, MD    Anesthesia: General    Staff:   Circulator: Latasha Anne RN  Scrub Person: Elyssa Teixeira  Assistant: Karla Johnson    Estimated Blood Loss:   minimal    Specimens:                None       Drains:   none    Findings:   Mature tracheocutaneous fistula  There was postradiation changes in the oral cavity and oropharynx.  There was no obvious mass seen there was some irritation in the previous site of the tumor at the right base of tongue and tonsillar fossa.  I felt this was more consistent with radiation and scarring change rather than recurring tumor.  Biopsies were taken from the right base of tongue and the right tonsillar fossa.    Complications:   none    Reason for the Operation:  Sameer Tavarez is a 63 y.o. male with a history of right tongue cancer s/p radiation therapy. He required a tracheostomy and the site has not closed after decannulation. He also has had continued aspiration and his g tube has come out and closed. Tracheostomy closure and g tube replacement was recommended. After understanding the risks, benefits and alternatives, a consent for the operation was given.     Procedure  Description:  The patient was taken back to the operating room, placed supine on the operating table and placed under anesthesia by the anesthesia staff. Once this was done a time out was performed to confirm the patient and the proper procedure.  The patient was prepped and draped in the standard fashion both for primary for my portion of the procedure and Dr. Talamantes's procedure.  He performed a gastrostomy work well I worked on the tracheocutaneous fistula.  The area was marked and injected with 1% lidocaine with 1 100,000 parts adrenaline.  Triangular skin flaps were created in the lateral aspect of the stoma.  These were then elevated to the edge of the stoma and trimmed.  I then used imbricating 4-0 Vicryl sutures to close the stoma with the skin flaps.  I then elevated the donor area to allow for a second layer of closure over top of this.  I closed the wound with 4-0 Vicryl sutures in the deep tissue followed by 5-0 fast absorbing plain gut in a running stitch.  A dressing was placed.  After the completion of the gastrostomy tube placement, the table was turned 90° to facilitate axis to the head.  The patient was prepped and draped in the standard laryngoscopy fashion.  A tooth guard was placed to protect the teeth.  Direct laryngoscopy was carried out with the anterior commissure laryngoscope systematically examining all the structures of the oropharynx, supraglottic larynx, true vocal cords, and hypopharynx. There was radiation dryness and irritation throughout the examination.  There is a lot of thick secretions in the larynx.  There is no obvious mass that I could visually see or palpate.  There was some fullness/firmness of the right tonsil and base of tongue that I felt was more consistent with posttreatment change rather than persisting and/or recurring tumor.  However, I took biopsies of the right tonsillar fossa and base of tongue region and sent it for permanent section.  No significant bleeding was  encountered.  The patient was then turned over to the anesthesia team and allowed to wake from anesthesia. The patient was transported to the recovery room in a stable condition.       Alber Justin MD     Date: 10/18/2018  Time: 8:27 AM

## 2018-10-18 NOTE — ANESTHESIA POSTPROCEDURE EVALUATION
"Patient: Sameer Tavarez    Procedure Summary     Date:  10/18/18 Room / [facility]:   [facility]  [facility]    Anesthesia Start:  0732 Anesthesia Stop:  0916    Procedures:       TRACHEOCUTANEOUS FISTULA CLOSURE (N/A Neck)      DIAGNOSTIC DIRECT LARYNGOSCOPY WITH BIOPSY (N/A Mouth)      takedown gastroutaneous fistula with gastrostomy tube plaement (N/A Abdomen) Diagnosis:       Oropharyngeal cancer (CMS/HCC)      History of radiation therapy      Tracheocutaneous fistula following tracheostomy (CMS/HCC)      (Oropharyngeal cancer (CMS/HCC) [C10.9])      (History of radiation therapy [Z92.3])      (Tracheocutaneous fistula following tracheostomy (CMS/HCC) [J95.04])    Surgeon:  Alber Justin MD; Ayanna Early MD Provider:  Sameer James CRNA    Anesthesia Type:  general ASA Status:  3          Anesthesia Type: general  Last vitals  BP   (!) 144/103 (10/18/18 1023)   Temp   97.7 °F (36.5 °C) (10/18/18 1023)   Pulse   77 (10/18/18 1023)   Resp   16 (10/18/18 1023)     SpO2   97 % (10/18/18 1023)     Post Anesthesia Care and Evaluation    Patient location during evaluation: PACU  Patient participation: complete - patient participated  Level of consciousness: awake and alert  Pain management: adequate  Airway patency: patent  Anesthetic complications: No anesthetic complications  PONV Status: none  Cardiovascular status: acceptable and hemodynamically stable  Respiratory status: acceptable  Hydration status: acceptable    Comments: Blood pressure (!) 144/103, pulse 77, temperature 97.7 °F (36.5 °C), temperature source Oral, resp. rate 16, height 169 cm (66.54\"), weight 64.7 kg (142 lb 10.2 oz), SpO2 97 %.    Patient discharged from PACU based upon Jon score. Please see RN notes for further details      "

## 2018-10-18 NOTE — ANESTHESIA PREPROCEDURE EVALUATION
Anesthesia Evaluation     Patient summary reviewed   no history of anesthetic complications:  NPO Solid Status: > 8 hours  NPO Liquid Status: > 8 hours           Airway   Mallampati: III  TM distance: >3 FB  Neck ROM: limited  Comment: Mature stoma  Dental    (+) edentulous    Pulmonary - normal exam    breath sounds clear to auscultation  (+) a smoker (0.5 ppd) Current Smoked day of surgery,   (-) COPD, asthma, recent URI, sleep apnea    ROS comment:     Cardiovascular   Exercise tolerance: good (4-7 METS)    ECG reviewed  Patient on routine beta blocker and Beta blocker given within 24 hours of surgery  Rhythm: regular  Rate: normal    (+) hypertension well controlled, past MI (x10, last one one year ago) , CAD, CABG (2009, 3 vessels), cardiac stents ( 9 stents 10/2016. most recent 02/2018 mid right coronary artery employing a 3.0 x 18 mm bare metal ) Bare metal stent within the past 12 months angina (Present for years, last nitro use was three months ago) with exertion, hyperlipidemia,   (-) pacemaker      Neuro/Psych  (+) seizures, CVA (2000, no residual symptoms),     (-) TIA  GI/Hepatic/Renal/Endo    (+)  GERD,  diabetes mellitus using insulin,   (-) liver disease, no renal disease    ROS Comment: Gastrostomy tube- nonfunctional    Musculoskeletal     Abdominal    Substance History      OB/GYN          Other      history of cancer (OP cancer, SCC)                    Anesthesia Plan    ASA 3     general   (Will place ETT through stoma for airway protection)  intravenous induction   Anesthetic plan, all risks, benefits, and alternatives have been provided, discussed and informed consent has been obtained with: patient.

## 2018-10-18 NOTE — PLAN OF CARE
Problem: Patient Care Overview  Goal: Individualization and Mutuality  Outcome: Ongoing (interventions implemented as appropriate)   10/18/18 7531   Mutuality/Individual Preferences   What Anxieties, Fears, Concerns, or Questions Do You Have About Your Care? Pt wants to be able to eat at some time   What Information Would Help Us Give You More Personalized Care? Pt goes to pain management for pain control.   How Would You and/or Your Support Person Like to Participate in Your Care? Keep pt informed of treatment plan   Mutuality/Individual Preferences   How to Address Anxieties/Fears Answer any questions   Individualization   Patient Specific Preferences Likes head of bed elevated   Patient Specific Goals (Include Timeframe) Pain controlled with po meds and able to use G-tube for supplemental feedings.   Patient Specific Interventions NPO, for dysphagia study on 10/19/2018. IV pain med to pain fairly controlled.      10/18/18 8385   Mutuality/Individual Preferences   What Anxieties, Fears, Concerns, or Questions Do You Have About Your Care? Pt wants to be able to eat at some time   What Information Would Help Us Give You More Personalized Care? Pt goes to pain management for pain control.   How Would You and/or Your Support Person Like to Participate in Your Care? Keep pt informed of treatment plan   Mutuality/Individual Preferences   How to Address Anxieties/Fears Answer any questions   Individualization   Patient Specific Preferences Likes head of bed elevated   Patient Specific Goals (Include Timeframe) Pain controlled with po meds and able to use G-tube for supplemental feedings.   Patient Specific Interventions NPO, for dysphagia study on 10/19/2018. IV pain med to pain fairly controlled.     Goal: Discharge Needs Assessment  Outcome: Ongoing (interventions implemented as appropriate)   10/18/18 1036   Disability   Equipment Currently Used at Home none   Discharge Needs Assessment   Patient/Family Anticipates  Transition to home   Patient/Family Anticipated Services at Transition    Transportation Concerns car, none   Transportation Anticipated family or friend will provide       Problem: Surgery Nonspecified (Adult)  Goal: Signs and Symptoms of Listed Potential Problems Will be Absent, Minimized or Managed (Surgery Nonspecified)  Outcome: Ongoing (interventions implemented as appropriate)   10/18/18 8351   Goal/Outcome Evaluation   Problems Assessed (Surgery) all   Problems Present (Surgery) pain

## 2018-10-18 NOTE — PLAN OF CARE
Problem: Patient Care Overview  Goal: Plan of Care Review  Outcome: Ongoing (interventions implemented as appropriate)   10/18/18 1028   Coping/Psychosocial   Plan of Care Reviewed With patient   OTHER   Outcome Summary Clinical bedside swallow evaluation. Pureed, honey, nectar, and thin consistencies were presented. Pt has a history of dysphagia secondary to oropharyngeal cancer and radiation treatment. His last outpatient VFSS showed laryngeal penetration with most consistencies and aspiration of nectar thick. He just had his G-tube replaced and persistent tracheocutaneous fistula repaired today. He reports that he was still having food/drink expelled from his stoma site this week. It did not sound as though he was following previous recommendations for mechanical soft solids and honey thick liquids. Today he exhibited up to 5 multiple swallows with pureed. He had a 1x delayed throat clear with nectar thick after attempting voicing. He also had more immediate throat clearing/coughing 2x with thin. Recommend continuing NPO except for occasional ice chips and meds whole or crushed with pudding or honey thick consistencies. VFSS in radiology tomorrow, 10/19/18. Oral care BID and PRN.

## 2018-10-18 NOTE — THERAPY EVALUATION
Acute Care - Speech Language Pathology   Swallow Initial Evaluation [facility]     Patient Name: Sameer Tavarez  : 1955  MRN: [id number]  Today's Date: 10/18/2018               Admit Date: 10/18/2018  Clinical bedside swallow evaluation. Pureed, honey, nectar, and thin consistencies were presented. Pt has a history of dysphagia secondary to oropharyngeal cancer and radiation treatment. His last outpatient VFSS showed laryngeal penetration with most consistencies and aspiration of nectar thick. He just had his G-tube replaced and persistent tracheocutaneous fistula repaired today. He reports that he was still having food/drink expelled from his stoma site this week. It did not sound as though he was following previous recommendations for mechanical soft solids and honey thick liquids. Today he exhibited up to 5 multiple swallows with pureed. He had a 1x delayed throat clear with nectar thick after attempting voicing. He also had more immediate throat clearing/coughing 2x with thin.   Recommend:  1. Continuing NPO except for occasional ice chips and meds whole or crushed with pudding or honey thick consistencies.   2. VFSS in radiology tomorrow, 10/19/18.   3. Oral care BID and PRN.   Roseann Garcia CCC-SLP 10/18/2018 2:09 PM    Visit Dx:     ICD-10-CM ICD-9-CM   1. Oropharyngeal cancer (CMS/HCC) C10.9 146.9   2. History of radiation therapy Z92.3 V15.3   3. Tracheocutaneous fistula following tracheostomy (CMS/HCC) J95.04 519.09     Patient Active Problem List   Diagnosis   • Oropharyngeal cancer (CMS/HCC)   • Current smoker   • Perineal mass in male   • Recio's esophagus with dysplasia   • Postoperative pain   • S/P percutaneous endoscopic gastrostomy (PEG) tube placement (CMS/HCC)   • Constipation, acute   • Pain, rectal   • Oropharyngeal candidiasis   • ACS (acute coronary syndrome) (CMS/HCC)   • HTN (hypertension)   • History of radiation therapy   • Current every day smoker   • Tobacco use   •  Encounter for follow-up surveillance of oral cavity cancer   • Tracheocutaneous fistula following tracheostomy (CMS/HCC)     Past Medical History:   Diagnosis Date   • Arthritis    • Coronary artery disease    • Depression    • Diabetes mellitus (CMS/HCC)    • Difficulty urinating    • Enlarged prostate    • GERD (gastroesophageal reflux disease)    • History of BPH    • History of transfusion    • Hyperlipidemia    • Hypertension    • Neuropathy    • Oropharyngeal cancer (CMS/HCC) 08/27/2017   • Seizure (CMS/HCC)     diabetic   • Severe esophageal dysplasia    • Stroke (CMS/HCC)    • TB (pulmonary tuberculosis) 2004   • Tracheostomy present (CMS/HCC)     PLUGGED     Past Surgical History:   Procedure Laterality Date   • CARDIAC SURGERY      9-10 stents   • CHOLECYSTECTOMY     • CORONARY ARTERY BYPASS GRAFT      2009   • FRACTURE SURGERY     • GTUBE REPLACEMENT N/A 10/6/2017    Procedure: GASTROSTOMY TUBE REPLACEMENT, port placement;  Surgeon: Jayne Phillips MD;  Location:  [facility];  Service:    • HERNIA REPAIR     • LARYNGOSCOPY N/A 10/11/2017    Procedure: DIRECT LARYNGOSCOPY WITH BIOPSY;  Surgeon: Darin Neal MD;  Location:  [facility];  Service:    • LARYNGOSCOPY N/A 6/25/2018    Procedure: DIRECT LARYNGOSCOPY WITH BIOPSY;  Surgeon: Alber Justin MD;  Location:  [facility];  Service: ENT   • NISSEN FUNDOPLICATION LAPAROSCOPIC     • SC DENTAL SURGERY PROCEDURE N/A 10/11/2017    Procedure: TOOTH EXTRACTION;  Surgeon: Darin Neal MD;  Location:  [facility];  Service: ENT   • SC INSJ TUNNELED CVC W/O SUBQ PORT/ AGE 5 YR/> Left 10/6/2017    Procedure: INSERTION VENOUS ACCESS DEVICE;  Surgeon: Jayne Phillips MD;  Location:  [facility];  Service: General   • SKIN BIOPSY     • TESTICLE SURGERY      tubes implanted for drainage   • TONSILLECTOMY     • TRACHEOSTOMY N/A 10/5/2017    Procedure: Awake tracheostomy; DIRECT LARYNGOSCOPY WITH BIOPSY;  Surgeon: Alber Justin MD;  Location: [facility]  OR;  Service:           SWALLOW EVALUATION (last 72 hours)      SLP Adult Swallow Evaluation     Row Name 10/18/18 0249                   Rehab Evaluation    Document Type evaluation  -MB        Subjective Information complains of;pain  -MB        Patient Observations alert;cooperative  -MB           General Information    Patient Profile Reviewed yes  -MB        Pertinent History Of Current Problem G-tube replaced and persistent tracheocutaneous fistula repaired 10/18/18. Hx of oropharyngeal cancer, g-tube placement and pt removal.  -MB        Current Method of Nutrition NPO  -MB        Precautions/Limitations, Vision WFL;for purposes of eval  -MB        Precautions/Limitations, Hearing WFL;for purposes of eval  -MB        Prior Level of Function-Communication WFL  -MB        Prior Level of Function-Swallowing mechanical soft textures;honey thick liquids;other (see comments)   recommended from last OP MBS  -MB        Plans/Goals Discussed with patient  -MB        Barriers to Rehab medically complex  -MB        Patient's Goals for Discharge return to PO diet  -MB           Pain Assessment    Additional Documentation Pain Scale: FACES Pre/Post-Treatment (Group)  -MB           Pain Scale: FACES Pre/Post-Treatment    Pain: FACES Scale, Pretreatment 6-->hurts even more  -MB        Pre/Post Treatment Pain Comment RN, gave pain meds before SLP started swallow eval  -MB           Oral Motor and Function    Dentition Assessment edentulous, does not have dentures  -MB        Secretion Management WNL/WFL  -MB        Mucosal Quality dry  -MB           Oral Musculature and Cranial Nerve Assessment    Oral Motor General Assessment oral labial or buccal impairment;lingual impairment;vocal impairment  -MB        Oral Labial or Buccal Impairment, Detail, Cranial Nerve VII (Facial): CN7: Motor Impairment;reduced ROM;reduced strength bilaterally  -MB        Lingual Impairment, Detail. Cranial Nerves IX, XII (Glossopharyngeal and  Hypoglossal) CN12: Motor Impairment;reduced lingual ROM;reduced strength  -MB        Vocal Impairment, Detail. Cranial Nerve X (Vagus) CN10: Motor;vocal quality abnormality (see comments);other (see comments)   hoarse  -MB           General Eating/Swallowing Observations    Respiratory Support Currently in Use nasal cannula  -MB        Eating/Swallowing Skills fed by SLP  -MB        Positioning During Eating semi-reclined  -MB        Utensils Used spoon;straw  -MB        Consistencies Trialed pureed;thin liquids;nectar/syrup-thick liquids;honey-thick liquids  -MB           Clinical Swallow Eval    Oral Prep Phase WFL   no solids were presented  -MB        Oral Transit WFL  -MB        Oral Residue WFL  -MB        Pharyngeal Phase suspected pharyngeal impairment  -MB        Clinical Swallow Evaluation Summary Pureed, honey, nectar, and thin consistencies were presented. Pt has a history of dysphagia secondary to oropharyngeal cancer and radiation treatment. His last outpatient VFSS showed laryngeal penetration with most consistencies and aspiration of nectar thick. He just had his G-tube replaced and persistent tracheocutaneous fistula repaired today. He reports that he was still having food/drink expelled from his stoma site this week. It did not sound as though he was following previous recommendations for mechanical soft solids and honey thick liquids. Today he exhibited up to 5 multiple swallows with pureed. He had a 1x delayed throat clear with nectar thick after attempting voicing. He also had more immediate throat clearing/coughing 2x with thin.   -MB           Pharyngeal Phase Concerns    Pharyngeal Phase Concerns multiple swallows;cough;throat clear  -MB        Multiple Swallows pudding  -MB        Cough thin  -MB        Throat Clear thin;nectar  -MB           Clinical Impression    SLP Swallowing Diagnosis functional oral phase;mod-severe;suspected pharyngeal dysfunction  -MB        Functional Impact risk of  aspiration/pneumonia;risk of malnutrition;risk of dehydration  -MB        Rehab Potential/Prognosis, Swallowing re-evaluate goals as necessary  -MB        Swallow Criteria for Skilled Therapeutic Interventions Met demonstrates skilled criteria  -MB           Recommendations    Therapy Frequency (Swallow) 3 days per week  -MB        Predicted Duration Therapy Intervention (Days) until discharge  -MB        SLP Diet Recommendation NPO;other (see comments)   except occasional ice chips  -MB        Recommended Diagnostics VFSS (MBS);other (see comments)   tomorrow 10/19/18  -MB        SLP Rec. for Method of Medication Administration meds whole;meds crushed;with thick liquids;with pudding or applesauce  -MB        Monitor for Signs of Aspiration yes;cough;gurgly voice;throat clearing  -MB        Anticipated Dischage Disposition home  -MB           Swallow Goals (SLP)    Oral Nutrition/Hydration Goal Selection (SLP) oral nutrition/hydration, SLP goal 1  -MB           Oral Nutrition/Hydration Goal 1 (SLP)    Oral Nutrition/Hydration Goal 1, SLP Pt will tolerate least restrictive diet with no overt s/s of aspiration.  -MB        Time Frame (Oral Nutrition/Hydration Goal 1, SLP) by discharge  -MB        Barriers (Oral Nutrition/Hydration Goal 1, SLP) n/a  -MB        Progress/Outcomes (Oral Nutrition/Hydration Goal 1, SLP) goal ongoing  -MB          User Key  (r) = Recorded By, (t) = Taken By, (c) = Cosigned By    Initials Name Effective Dates    Roseann Galicia, CCC-SLP 08/02/16 -         EDUCATION  The patient has been educated in the following areas:   Dysphagia (Swallowing Impairment).    SLP Recommendation and Plan  SLP Swallowing Diagnosis: functional oral phase, mod-severe, suspected pharyngeal dysfunction  SLP Diet Recommendation: NPO, other (see comments) (except occasional ice chips)        Monitor for Signs of Aspiration: yes, cough, gurgly voice, throat clearing  Recommended Diagnostics: VFSS (MBS), other  (see comments) (tomorrow 10/19/18)  Swallow Criteria for Skilled Therapeutic Interventions Met: demonstrates skilled criteria  Anticipated Dischage Disposition: home  Rehab Potential/Prognosis, Swallowing: re-evaluate goals as necessary  Therapy Frequency (Swallow): 3 days per week  Predicted Duration Therapy Intervention (Days): until discharge       Plan of Care Reviewed With: patient  Plan of Care Review  Plan of Care Reviewed With: patient  Outcome Summary: Clinical bedside swallow evaluation. Pureed, honey, nectar, and thin consistencies were presented. Pt has a history of dysphagia secondary to oropharyngeal cancer and radiation treatment. His last outpatient VFSS showed laryngeal penetration with most consistencies and aspiration of nectar thick. He just had his G-tube replaced and persistent tracheocutaneous fistula repaired today. He reports that he was still having food/drink expelled from his stoma site this week. It did not sound as though he was following previous recommendations for mechanical soft solids and honey thick liquids. Today he exhibited up to 5 multiple swallows with pureed. He had a 1x delayed throat clear with nectar thick after attempting voicing. He also had more immediate throat clearing/coughing 2x with thin. Recommend continuing NPO except for occasional ice chips and meds whole or crushed with pudding or honey thick consistencies. VFSS in radiology tomorrow, 10/19/18. Oral care BID and PRN.           SLP GOALS     Row Name 10/18/18 1668             Oral Nutrition/Hydration Goal 1 (SLP)    Oral Nutrition/Hydration Goal 1, SLP Pt will tolerate least restrictive diet with no overt s/s of aspiration.  -MB      Time Frame (Oral Nutrition/Hydration Goal 1, SLP) by discharge  -MB      Barriers (Oral Nutrition/Hydration Goal 1, SLP) n/a  -MB      Progress/Outcomes (Oral Nutrition/Hydration Goal 1, SLP) goal ongoing  -MB        User Key  (r) = Recorded By, (t) = Taken By, (c) = Cosigned By     Initials Name Provider Type    Roseann Galicia CCC-SLP Speech and Language Pathologist             SLP Outcome Measures (last 72 hours)      SLP Outcome Measures     Row Name 10/18/18 1400             SLP Outcome Measures    Outcome Measure Used? Adult NOMS  -MB         Adult FCM Scores    FCM Chosen Swallowing  -MB      Swallowing FCM Score 2  -MB        User Key  (r) = Recorded By, (t) = Taken By, (c) = Cosigned By    Initials Name Effective Dates    Roseann Galicia CCC-SLP 08/02/16 -            Time Calculation:         Time Calculation- SLP     Row Name 10/18/18 1408             Time Calculation- SLP    SLP Start Time 1255  -MB      SLP Stop Time 1408  -MB      SLP Time Calculation (min) 73 min  -MB      SLP Received On 10/18/18  -MB      SLP Goal Re-Cert Due Date 10/28/18  -MB        User Key  (r) = Recorded By, (t) = Taken By, (c) = Cosigned By    Initials Name Provider Type    Roseann Galicia CCC-SLP Speech and Language Pathologist          Therapy Charges for Today     Code Description Service Date Service Provider Modifiers Qty    24234065293 HC ST SWALLOWING CURRENT STATUS 10/18/2018 Roseann Garcia CCC-SLP GN, CM 1    83126827214 HC ST SWALLOWING PROJECTED 10/18/2018 Roseann Garcia CCC-SLP GN, CL 1    78091362477 HC ST EVAL ORAL PHARYNG SWALLOW 5 10/18/2018 Roseann Garcia CCC-SLP GN 1          SLP G-Codes  SLP NOMS Used?: Yes  Functional Limitations: Swallowing  Swallow Current Status (): At least 80 percent but less than 100 percent impaired, limited or restricted  Swallow Goal Status (): At least 60 percent but less than 80 percent impaired, limited or restricted    EARL Kearney  10/18/2018

## 2018-10-19 ENCOUNTER — APPOINTMENT (OUTPATIENT)
Dept: GENERAL RADIOLOGY | Facility: HOSPITAL | Age: 63
End: 2018-10-19

## 2018-10-19 VITALS
HEIGHT: 67 IN | OXYGEN SATURATION: 93 % | WEIGHT: 143.5 LBS | TEMPERATURE: 97.8 F | DIASTOLIC BLOOD PRESSURE: 62 MMHG | SYSTOLIC BLOOD PRESSURE: 110 MMHG | HEART RATE: 79 BPM | BODY MASS INDEX: 22.52 KG/M2 | RESPIRATION RATE: 16 BRPM

## 2018-10-19 LAB
CYTO UR: NORMAL
GLUCOSE BLDC GLUCOMTR-MCNC: 166 MG/DL (ref 70–130)
GLUCOSE BLDC GLUCOMTR-MCNC: 219 MG/DL (ref 70–130)
LAB AP CASE REPORT: NORMAL
PATH REPORT.FINAL DX SPEC: NORMAL
PATH REPORT.GROSS SPEC: NORMAL

## 2018-10-19 PROCEDURE — 74230 X-RAY XM SWLNG FUNCJ C+: CPT

## 2018-10-19 PROCEDURE — A9270 NON-COVERED ITEM OR SERVICE: HCPCS | Performed by: OTOLARYNGOLOGY

## 2018-10-19 PROCEDURE — 63710000001 BARIUM SULFATE 40 % SUSPENSION: Performed by: OTOLARYNGOLOGY

## 2018-10-19 PROCEDURE — 25810000003 SODIUM CHLORIDE 0.9 % WITH KCL 20 MEQ 20-0.9 MEQ/L-% SOLUTION: Performed by: OTOLARYNGOLOGY

## 2018-10-19 PROCEDURE — 63710000001 BARIUM SULFATE 40 % RECONSTITUTED SUSPENSION: Performed by: OTOLARYNGOLOGY

## 2018-10-19 PROCEDURE — 92611 MOTION FLUOROSCOPY/SWALLOW: CPT | Performed by: SPEECH-LANGUAGE PATHOLOGIST

## 2018-10-19 PROCEDURE — 99406 BEHAV CHNG SMOKING 3-10 MIN: CPT

## 2018-10-19 PROCEDURE — 82962 GLUCOSE BLOOD TEST: CPT

## 2018-10-19 PROCEDURE — 63710000001 BARIUM SULFATE 40 % PASTE: Performed by: OTOLARYNGOLOGY

## 2018-10-19 PROCEDURE — 25010000002 MORPHINE PER 10 MG: Performed by: SPECIALIST

## 2018-10-19 RX ORDER — OXYCODONE AND ACETAMINOPHEN 10; 325 MG/1; MG/1
1 TABLET ORAL EVERY 4 HOURS PRN
Qty: 30 TABLET | Refills: 0 | Status: SHIPPED | OUTPATIENT
Start: 2018-10-19

## 2018-10-19 RX ADMIN — BARIUM SULFATE 20 ML: 0.81 POWDER, FOR SUSPENSION ORAL at 10:25

## 2018-10-19 RX ADMIN — BARIUM SULFATE 20 ML: 400 SUSPENSION ORAL at 10:25

## 2018-10-19 RX ADMIN — POTASSIUM CHLORIDE AND SODIUM CHLORIDE 100 ML/HR: 900; 150 INJECTION, SOLUTION INTRAVENOUS at 13:51

## 2018-10-19 RX ADMIN — POTASSIUM CHLORIDE AND SODIUM CHLORIDE 100 ML/HR: 900; 150 INJECTION, SOLUTION INTRAVENOUS at 02:30

## 2018-10-19 RX ADMIN — BARIUM SULFATE 20 ML: 400 PASTE ORAL at 10:25

## 2018-10-19 RX ADMIN — MORPHINE SULFATE 4 MG: 4 INJECTION INTRAVENOUS at 10:59

## 2018-10-19 RX ADMIN — MORPHINE SULFATE 4 MG: 4 INJECTION INTRAVENOUS at 00:34

## 2018-10-19 RX ADMIN — MORPHINE SULFATE 4 MG: 4 INJECTION INTRAVENOUS at 08:56

## 2018-10-19 NOTE — PROGRESS NOTES
The patient will require tube feeds at least for the next 90 days.  He will require supplies to be able to care for and delivered tube feeds for this 90 day duration.  At the end of the 90 days, we will reassess him for the need for continued tube feeds.  He will continue to work with speech therapy to fully be able to stop the need for tube feeds.    Alber Justin MD  10/19/18  3:48 PM

## 2018-10-19 NOTE — PROGRESS NOTES
Alber Justin MD     Chief Complaint:  Unable to pee    Interval History:   He has had a lot of pain in his abdomen overnight.  He has not been unable to urinate.  He initially with straight cath last night but is refusing both the bladder scan and straight cath according the nurses.  He has not had any difficulty with the neck wound.    Review of Systems:   Review of Systems   Constitutional: Negative for chills and fever.   HENT:        As per HPI   Respiratory: Negative for cough, shortness of breath and stridor.    Gastrointestinal: Positive for abdominal pain. Negative for nausea and vomiting.   Genitourinary: Positive for difficulty urinating.       Vital Signs  Temp:  [97.3 °F (36.3 °C)-98.7 °F (37.1 °C)] 98.5 °F (36.9 °C)  Heart Rate:  [55-92] 90  Resp:  [11-17] 16  BP: (101-184)/() 101/47    Physical Exam:  He in no acute distress  Neck wound with dry dressing with no obvious drainage.  Voice is normal with no hoarseness stridor or stertor        Medications, Allergies and Past History Reviewed    Assessment & Plan  1. Oropharyngeal cancer (CMS/HCC)    2. History of radiation therapy    3. Tracheocutaneous fistula following tracheostomy (CMS/HCC)      I instructed nursing to still work with him about his urination.  If not we might need to get urology to see him and give recommendations.  We need to get them up out of bed and ambulating this morning.  Hopefully we can start his tube feeds.  We need to work with social work to get his social situation figured out to give him supplies for both G-tube care and for tube feeding.  He is working with speech therapy.  I am hoping to be able to get him discharged today or tomorrow.    Alber Justin MD  10/19/18  7:55 AM

## 2018-10-19 NOTE — PLAN OF CARE
Problem: Patient Care Overview  Goal: Plan of Care Review  Outcome: Ongoing (interventions implemented as appropriate)      Problem: Surgery Nonspecified (Adult)  Goal: Signs and Symptoms of Listed Potential Problems Will be Absent, Minimized or Managed (Surgery Nonspecified)  Outcome: Ongoing (interventions implemented as appropriate)      Problem: Nutrition, Enteral (Adult)  Goal: Signs and Symptoms of Listed Potential Problems Will be Absent, Minimized or Managed (Nutrition, Enteral)  Outcome: Ongoing (interventions implemented as appropriate)      Problem: Fall Risk (Adult)  Goal: Identify Related Risk Factors and Signs and Symptoms  Outcome: Ongoing (interventions implemented as appropriate)    Goal: Absence of Fall  Outcome: Ongoing (interventions implemented as appropriate)

## 2018-10-19 NOTE — PROGRESS NOTES
Continued Stay Note  [facility]     Patient Name: Sameer Tavarez  MRN: [id number]  Today's Date: 10/19/2018    Admit Date: 10/18/2018          Discharge Plan     Row Name 10/19/18 1418       Plan    Plan Home    Patient/Family in Agreement with Plan yes    Plan Comments Izabela came by from [facility] and pt will now only have to pay $15 a month. He says he can do this. The tf will be delivered to his home tomorrow. Pt could possibly go home today and tf sent home with him to get him through until the delivery. Will still need the documentation that pt will need tf for greater than 90 days.     Row Name 10/19/18 1411       Plan    Plan Home    Patient/Family in Agreement with Plan yes    Plan Comments Discussed the cost of the tf with pt and he is aware [facility] is trying to work something out. He will not have to pay up front. Pt asked if he could just do Ensure, so the dietician is looking at this to see if pt could do that. It is likely pt would require twice as much Ensure.     Row Name 10/19/18 1207       Plan    Plan Home    Patient/Family in Agreement with Plan yes    Plan Comments Spoke with pt again about d/c planning. He says he will return home and he does not want home health because he knows how to do the TF. TF have been approved but need documentation that pt will need them greater than 90 days. He said [place] actually [place] in a barn and that it is his desire to return there. He does not want any other assistance.               Discharge Codes    No documentation.           CRIS Simpson

## 2018-10-19 NOTE — DISCHARGE SUMMARY
Alber Justin MD   DISCHARGE SUMMARY:     PATIENT NAME: Sameer Tavarez  ACCOUNT NUMBER: [id number]  ADMISSION DATE:  10/18/2018  DISCHARGE DATE:   10/19/2018  ATTENDING PHYSICIAN: Alber Justin MD  CONDITION ON DISCHARGE: stable    ADMITTING DIAGNOSIS:   Tracheocutaneous fistula  Status post radiation therapy  History of oropharyngeal cancer  Dysphagia    PROCEDURES:   TRACHEOCUTANEOUS FISTULA CLOSURE (N/A), DIAGNOSTIC DIRECT LARYNGOSCOPY WITH BIOPSY (N/A)    REASON FOR ADMISSION:   Sameer Tavarez is a 63 y.o. male who was admitted for observation after surgery.    HOSPITAL COURSE:   He underwent the procedure and was recovered and then sent to the floor for observation. He was observed overnight on IV fluids, The next morning, he was tolerating tube feeds well with adequate pain control and no overt nausea or vomiting.  He initially had some urinary retention that had resolved on its own after initial need for straight cath.  Social work had work to provide G-tube supplies and tube feed supplies for home use.  It was felt he could be discharged to home.    DISCHARGE MEDICATIONS:     Discharge Medications      Changes to Medications      Instructions Start Date   oxyCODONE-acetaminophen  MG per tablet  Commonly known as:  PERCOCET  What changed:  reasons to take this   1 tablet, Oral, Every 6 Hours PRN             DISCHARGE INSTRUCTIONS:         Your medication list      CHANGE how you take these medications      Instructions Last Dose Given Next Dose Due   oxyCODONE-acetaminophen  MG per tablet  Commonly known as:  PERCOCET  What changed:  reasons to take this      Take 1 tablet by mouth Every 6 (Six) Hours As Needed for Moderate Pain .              FOLLOW UP:  Follow up in 3 weeks with with midlevel practitioner      Alber Justin MD  10/19/2018  3:52 PM

## 2018-10-19 NOTE — PROGRESS NOTES
Doing well.  Sore.  Comfortable with doing tube feeds at home.  Wound okay.  Okay for discharge from my standpoint.

## 2018-10-19 NOTE — PLAN OF CARE
Problem: Patient Care Overview  Goal: Plan of Care Review  Outcome: Ongoing (interventions implemented as appropriate)   10/19/18 5690   Coping/Psychosocial   Plan of Care Reviewed With patient   OTHER   Outcome Summary Pt agitated. Drsg to throat with scant amt drainage, Drsg to g-tube site with small amt drainage. To start bolus feedings today. Pt medicated with Percocet and Morphine for c/o pain. Pt unable to void. Bladder scanned for 845. Tried to I & O cath, but pt couldn't tolerate and said to stop. He refused to be I & O cath any more at this time. Pt stated he would call if he got uncomfortable. Pt NPO . Agitiated about being NPO, bed check on and having to I & O cath.    Plan of Care Review   Progress no change

## 2018-10-19 NOTE — PROGRESS NOTES
Continued Stay Note   [place]     Patient Name: Sameer Tavarez  MRN: [id number]  Today's Date: 10/19/2018    Admit Date: 10/18/2018          Discharge Plan     Row Name 10/19/18 1618       Plan    Plan Home    Patient/Family in Agreement with Plan yes    Final Discharge Disposition Code 01 - home or self-care    Final Note Pt is going home today. Faxed d/c information to [facility] at [phone]. His tf will be delivered tomorrow and nursing is sending enough with pt sam.    Row Name 10/19/18 1418       Plan    Plan Home    Patient/Family in Agreement with Plan yes    Plan Comments Izabela came by from [facility] and pt will now only have to pay $15 a month. He says he can do this. The tf will be delivered to his home tomorrow. Pt could possibly go home today and tf sent home with him to get him through until the delivery. Will still need the documentation that pt will need tf for greater than 90 days.     Row Name 10/19/18 1411       Plan    Plan Home    Patient/Family in Agreement with Plan yes    Plan Comments Discussed the cost of the tf with pt and he is aware [facility] is trying to work something out. He will not have to pay up front. Pt asked if he could just do Ensure, so the dietician is looking at this to see if pt could do that. It is likely pt would require twice as much Ensure.               Discharge Codes    No documentation.       Expected Discharge Date and Time     Expected Discharge Date Expected Discharge Time    Oct 19, 2018             CRIS Simpson

## 2018-10-19 NOTE — PROGRESS NOTES
Continued Stay Note   [place]     Patient Name: Sameer Tavarez  MRN: [id number]  Today's Date: 10/19/2018    Admit Date: 10/18/2018          Discharge Plan     Row Name 10/19/18 1411       Plan    Plan Home    Patient/Family in Agreement with Plan yes    Plan Comments Discussed the cost of the tf with pt and he is aware [facility] is trying to work something out. He will not have to pay up front. Pt asked if he could just do Ensure, so the dietician is looking at this to see if pt could do that. It is likely pt would require twice as much Ensure.     Row Name 10/19/18 1207       Plan    Plan Home    Patient/Family in Agreement with Plan yes    Plan Comments Spoke with pt again about d/c planning. He says he will return home and he does not want home health because he knows how to do the TF. TF have been approved but need documentation that pt will need them greater than 90 days. He said he actually lives in a barn and that it is his desire to return there. He does not want any other assistance.               Discharge Codes    No documentation.           CRIS Simpson

## 2018-10-19 NOTE — PROGRESS NOTES
Continued Stay Note  [facility]     Patient Name: Sameer Tavarez  MRN: [id number]  Today's Date: 10/19/2018    Admit Date: 10/18/2018          Discharge Plan     Row Name 10/19/18 0908       Plan    Plan Home    Patient/Family in Agreement with Plan yes    Plan Comments Pt does not need trach supplies but he does need tube feedings. Faxed the orders to [facility]. Awaiting for approval.              Discharge Codes    No documentation.           CRIS Simpson

## 2018-10-19 NOTE — DISCHARGE PLACEMENT REQUEST
[facility] [phone]    Alber Justin MD Physician Signed Otolaryngology/ENT  Progress Notes Date of Service: 10/19/2018  3:46 PM         []Manual[]Template  []Copied  The patient will require tube feeds at least for the next 90 days.  He will require supplies to be able to care for and delivered tube feeds for this 90 day duration.  At the end of the 90 days, we will reassess him for the need for continued tube feeds.  He will continue to work with speech therapy to fully be able to stop the need for tube feeds.     Alber Justin MD  10/19/18  3:48 PM                  Discharge Summary      Alber Justin MD at 10/19/2018  3:52 PM           Alber Justin MD   DISCHARGE SUMMARY:     PATIENT NAME: Sameer Tavarez  ACCOUNT NUMBER: [id number]  ADMISSION DATE:  10/18/2018  DISCHARGE DATE:   10/19/2018  ATTENDING PHYSICIAN: Alber Justin MD  CONDITION ON DISCHARGE: stable    ADMITTING DIAGNOSIS:   Tracheocutaneous fistula  Status post radiation therapy  History of oropharyngeal cancer  Dysphagia    PROCEDURES:   TRACHEOCUTANEOUS FISTULA CLOSURE (N/A), DIAGNOSTIC DIRECT LARYNGOSCOPY WITH BIOPSY (N/A)    REASON FOR ADMISSION:   Sameer Tavarez is a 63 y.o. male who was admitted for observation after surgery.    HOSPITAL COURSE:   He underwent the procedure and was recovered and then sent to the floor for observation. He was observed overnight on IV fluids, The next morning, he was tolerating tube feeds well with adequate pain control and no overt nausea or vomiting.  He initially had some urinary retention that had resolved on its own after initial need for straight cath.  Social work had work to provide G-tube supplies and tube feed supplies for home use.  It was felt he could be discharged to home.    DISCHARGE MEDICATIONS:     Discharge Medications      Changes to Medications      Instructions Start Date   oxyCODONE-acetaminophen  MG per tablet  Commonly known as:  PERCOCET  What  changed:  reasons to take this   1 tablet, Oral, Every 6 Hours PRN             DISCHARGE INSTRUCTIONS:         Your medication list      CHANGE how you take these medications      Instructions Last Dose Given Next Dose Due   oxyCODONE-acetaminophen  MG per tablet  Commonly known as:  PERCOCET  What changed:  reasons to take this      Take 1 tablet by mouth Every 6 (Six) Hours As Needed for Moderate Pain .              FOLLOW UP:  Follow up in 3 weeks with with midlevel practitioner      Alber Justin MD  10/19/2018  3:52 PM    Electronically signed by Alber Justin MD at 10/19/2018  3:54 PM

## 2018-10-19 NOTE — PLAN OF CARE
Problem: Patient Care Overview  Goal: Plan of Care Review  Outcome: Ongoing (interventions implemented as appropriate)   10/19/18 5400   Coping/Psychosocial   Plan of Care Reviewed With patient   OTHER   Outcome Summary Video swallow evaluation completed with SLP. Pooling/stasis in valleculae with honey and pudding consistencies which did spill over the tip of the epiglottis. Penetration with nectar thick liquids cleared reflexively. Pietro aspiration of thin liquids before the swallow with initially silent response, did have delayed cough. Unable to determine clearance of thin residue with cough due to flouro being turned off. Recommend continue PEG feedings with thin water or nectar thick clear liquids after oral care. SLP will continue to follow for swallow exercises. Recommend continued swallow therapy at next level of care.    Plan of Care Review   Progress no change

## 2018-10-19 NOTE — MBS/VFSS/FEES
Acute Care - Speech Language Pathology   Swallow Re-Evaluation  [place]     Patient Name: Sameer Tavarez  : 1955  MRN: [id number]  Today's Date: 10/19/2018               Admit Date: 10/18/2018    Visit Dx:     ICD-10-CM ICD-9-CM   1. Oropharyngeal cancer (CMS/HCC) C10.9 146.9   2. History of radiation therapy Z92.3 V15.3   3. Tracheocutaneous fistula following tracheostomy (CMS/HCC) J95.04 519.09     Patient Active Problem List   Diagnosis   • Oropharyngeal cancer (CMS/HCC)   • Current smoker   • Perineal mass in male   • Recio's esophagus with dysplasia   • Postoperative pain   • S/P percutaneous endoscopic gastrostomy (PEG) tube placement (CMS/HCC)   • Constipation, acute   • Pain, rectal   • Oropharyngeal candidiasis   • ACS (acute coronary syndrome) (CMS/HCC)   • HTN (hypertension)   • History of radiation therapy   • Current every day smoker   • Tobacco use   • Encounter for follow-up surveillance of oral cavity cancer   • Tracheocutaneous fistula following tracheostomy (CMS/HCC)     Past Medical History:   Diagnosis Date   • Arthritis    • Coronary artery disease    • Depression    • Diabetes mellitus (CMS/HCC)    • Difficulty urinating    • Enlarged prostate    • GERD (gastroesophageal reflux disease)    • History of BPH    • History of transfusion    • Hyperlipidemia    • Hypertension    • Neuropathy    • Oropharyngeal cancer (CMS/HCC) 2017   • Seizure (CMS/HCC)     diabetic   • Severe esophageal dysplasia    • Stroke (CMS/HCC)    • TB (pulmonary tuberculosis)    • Tracheostomy present (CMS/HCC)     PLUGGED     Past Surgical History:   Procedure Laterality Date   • CARDIAC SURGERY      9-10 stents   • CHOLECYSTECTOMY     • CORONARY ARTERY BYPASS GRAFT         • FRACTURE SURGERY     • GASTROSTOMY FEEDING TUBE INSERTION N/A 10/18/2018    Procedure: takedown gastroutaneous fistula with gastrostomy tube plaement;  Surgeon: Ayanna Early MD;  Location: [facility];  Service: General   •  GTUBE REPLACEMENT N/A 10/6/2017    Procedure: GASTROSTOMY TUBE REPLACEMENT, port placement;  Surgeon: Jayne Phillips MD;  Location:  [facility];  Service:    • HERNIA REPAIR     • LARYNGOSCOPY N/A 10/11/2017    Procedure: DIRECT LARYNGOSCOPY WITH BIOPSY;  Surgeon: Darin Neal MD;  Location:  [facility];  Service:    • LARYNGOSCOPY N/A 6/25/2018    Procedure: DIRECT LARYNGOSCOPY WITH BIOPSY;  Surgeon: Alber Justin MD;  Location:  [facility];  Service: ENT   • LARYNGOSCOPY N/A 10/18/2018    Procedure: DIAGNOSTIC DIRECT LARYNGOSCOPY WITH BIOPSY;  Surgeon: Alber Justin MD;  Location:  [facility];  Service: ENT   • NISSEN FUNDOPLICATION LAPAROSCOPIC     • PA DENTAL SURGERY PROCEDURE N/A 10/11/2017    Procedure: TOOTH EXTRACTION;  Surgeon: Darin Neal MD;  Location:  [facility];  Service: ENT   • PA INSJ TUNNELED CVC W/O SUBQ PORT/ AGE 5 YR/> Left 10/6/2017    Procedure: INSERTION VENOUS ACCESS DEVICE;  Surgeon: Jayne Phillips MD;  Location:  [facility];  Service: General   • SKIN BIOPSY     • TESTICLE SURGERY      tubes implanted for drainage   • TONSILLECTOMY     • TRACHEOSTOMY N/A 10/5/2017    Procedure: Awake tracheostomy; DIRECT LARYNGOSCOPY WITH BIOPSY;  Surgeon: Alber Justin MD;  Location:  [facility];  Service:    • TRACHEOSTOMY N/A 10/18/2018    Procedure: TRACHEOCUTANEOUS FISTULA CLOSURE;  Surgeon: Alber Justin MD;  Location:  [facility];  Service: ENT       SPEECH-LANGUAGE PATHOLOGY EVALUTION - VFSS  Subjective: The patient was seen on this date for a VFSS(Videofluoroscopic Swallowing Study).  Patient was alert and cooperative.    Significant history: Oropharyngeal cancer s/p tracheostomy repair & PEG placement.   Objective: Risks/benefits were reviewed with the patient, and consent was obtained. The study was completed with SLP and Radiologist present. The patient was seen in lateral view(s). Textures given included thin liquid, nectar thick liquid, honey thick liquid,  puree consistency and mechanical soft consistency.  Assessment: Difficulties were noted with thin liquid, nectar thick liquid, honey thick liquid, puree consistency and mechanical soft consistency, characterized by Pooling/stasis in valleculae with honey and pudding consistencies which did spill over the tip of the epiglottis. Penetration with nectar thick liquids cleared reflexively. Pietro aspiration of thin liquids before the swallow with initially silent response, did have delayed cough. Unable to determine clearance of thin residue with cough due to flouro being turned off.   .   The patient demonstrated penetration, aspiration and silent aspiration.  thin liquid, honey thick liquid, puree consistency and mechanical soft consistency.  Residue was moderate. No esophageal concerns noted.      SLP Findings: Patient presents with severe pharyngeal dysphagia.   Comments: weak laryngeal elevation and minimal epiglottic inversion resulting in aspiration of thin liquids with initially silent and delayed cough.   Recommendations: Diet Textures: nectar thick liquid, Puree trials with SLP. Medications should be taken  by alternate means. May have nectar thick liquids and water and Ice between meals after oral care, under staff or family supervision and with the recommended strategies for safe swallowing.  Recommended Strategies: Upright for PO and small bites and sips. Oral care before breakfast, after all meals and PRN.  Other Recommended Evaluations: Referral to outpatient speech thearpy for swallowing after discharge from the hospital.   Dysphagia therapy is recommended. Rationale: pharyngeal dysphagia.           SWALLOW EVALUATION (last 72 hours)      SLP Adult Swallow Evaluation     Row Name 10/19/18 1100 10/18/18 6240                Rehab Evaluation    Document Type evaluation  -KG evaluation  -MB       Subjective Information  -- complains of;pain  -MB       Patient Observations  -- alert;cooperative  -MB           General Information    Patient Profile Reviewed yes  -KG yes  -MB       Pertinent History Of Current Problem  -- G-tube replaced and persistent tracheocutaneous fistula repaired 10/18/18. Hx of oropharyngeal cancer, g-tube placement and pt removal.  -MB       Current Method of Nutrition NPO  -KG NPO  -MB       Precautions/Limitations, Vision WFL;for purposes of eval  -KG WFL;for purposes of eval  -MB       Precautions/Limitations, Hearing WFL;for purposes of eval  -KG WFL;for purposes of eval  -MB       Prior Level of Function-Communication WFL  -KG WFL  -MB       Prior Level of Function-Swallowing mechanical soft with no mixed consistencies;thin liquids;other (see comments)   Not following recommendations for honey thick liquids.   -KG mechanical soft textures;honey thick liquids;other (see comments)   recommended from last OP MBS  -MB       Plans/Goals Discussed with patient  -KG patient  -MB       Barriers to Rehab medically complex  -KG medically complex  -MB       Patient's Goals for Discharge return to PO diet  -KG return to PO diet  -MB          Pain Assessment    Additional Documentation Pain Scale: FACES Pre/Post-Treatment (Group)  -KG Pain Scale: FACES Pre/Post-Treatment (Group)  -MB          Pain Scale: FACES Pre/Post-Treatment    Pain: FACES Scale, Pretreatment 4-->hurts little more  -KG 6-->hurts even more  -MB       Pain: FACES Scale, Post-Treatment 4-->hurts little more  -KG  --       Pre/Post Treatment Pain Comment RN notified  -KG RN, gave pain meds before SLP started swallow eval  -MB          Oral Motor and Function    Dentition Assessment edentulous, does not have dentures  -KG edentulous, does not have dentures  -MB       Secretion Management WNL/WFL  -KG WNL/WFL  -MB       Mucosal Quality dry  -KG dry  -MB          Oral Musculature and Cranial Nerve Assessment    Oral Motor General Assessment oral labial or buccal impairment;lingual impairment;vocal impairment  -KG oral labial or buccal  impairment;lingual impairment;vocal impairment  -MB       Oral Labial or Buccal Impairment, Detail, Cranial Nerve VII (Facial): CN7: Motor Impairment;reduced ROM;reduced strength bilaterally  -KG CN7: Motor Impairment;reduced ROM;reduced strength bilaterally  -MB       Lingual Impairment, Detail. Cranial Nerves IX, XII (Glossopharyngeal and Hypoglossal) CN12: Motor Impairment;reduced lingual ROM;reduced strength  -KG CN12: Motor Impairment;reduced lingual ROM;reduced strength  -MB       Vocal Impairment, Detail. Cranial Nerve X (Vagus) CN10: Motor;vocal quality abnormality (see comments);other (see comments)  -KG CN10: Motor;vocal quality abnormality (see comments);other (see comments)   hoarse  -MB          General Eating/Swallowing Observations    Respiratory Support Currently in Use  -- nasal cannula  -MB       Eating/Swallowing Skills  -- fed by SLP  -MB       Positioning During Eating  -- semi-reclined  -MB       Utensils Used  -- spoon;straw  -MB       Consistencies Trialed  -- pureed;thin liquids;nectar/syrup-thick liquids;honey-thick liquids  -MB          Respiratory    Respiratory Status WFL  -KG  --          Clinical Swallow Eval    Oral Prep Phase  -- WFL   no solids were presented  -MB       Oral Transit  -- WFL  -MB       Oral Residue  -- WFL  -MB       Pharyngeal Phase  -- suspected pharyngeal impairment  -MB       Clinical Swallow Evaluation Summary  -- Pureed, honey, nectar, and thin consistencies were presented. Pt has a history of dysphagia secondary to oropharyngeal cancer and radiation treatment. His last outpatient VFSS showed laryngeal penetration with most consistencies and aspiration of nectar thick. He just had his G-tube replaced and persistent tracheocutaneous fistula repaired today. He reports that he was still having food/drink expelled from his stoma site this week. It did not sound as though he was following previous recommendations for mechanical soft solids and honey thick liquids.  Today he exhibited up to 5 multiple swallows with pureed. He had a 1x delayed throat clear with nectar thick after attempting voicing. He also had more immediate throat clearing/coughing 2x with thin.   -MB          Pharyngeal Phase Concerns    Pharyngeal Phase Concerns  -- multiple swallows;cough;throat clear  -MB       Multiple Swallows  -- pudding  -MB       Cough  -- thin  -MB       Throat Clear  -- thin;nectar  -MB          MBS/VFSS    Utensils Used spoon;straw  -KG  --       Consistencies Trialed pudding thick;honey-thick liquids;nectar/syrup-thick liquids;thin liquids;soft textures  -KG  --          MBS/VFSS Interpretation    Oral Prep Phase WFL  -KG  --       Oral Transit Phase WFL  -KG  --       Oral Residue WFL  -KG  --       VFSS Summary Pooling/stasis in valleculae with honey and pudding consistencies which did spill over the tip of the epiglottis. Penetration with nectar thick liquids cleared reflexively. Pietro aspiration of thin liquids before the swallow with initially silent response, did have delayed cough. Unable to determine clearance of thin residue with cough due to flouro being turned off.    -KG  --          Initiation of Pharyngeal Swallow    Initiation of Pharyngeal Swallow WFL;bolus in valleculae  -KG  --       Pharyngeal Phase impaired pharyngeal phase of swallowing  -KG  --       Penetration Before the Swallow thin liquids;honey-thick liquids  -KG  --       Aspiration Before the Swallow thin liquids  -KG  --       Penetration During the Swallow thin liquids  -KG  --       Aspiration During the Swallow thin liquids  -KG  --       Response to Penetration reflexive response  -KG  --       Response to Aspiration no response, silent aspiration;cough;inconsistent response   Initially silent with delayed cough.   -KG  --       Rosenbek's Scale 7--->level 7  -KG  --       Pharyngeal Residue pudding/puree;honey-thick liquids;mechanical soft;valleculae;pyriform sinuses;posterior pharyngeal  wall;secondary to reduced laryngeal elevation;secondary to reduced posterior pharyngeal wall stripping;secondary to reduced hyolaryngeal excursion  -KG  --       Response to Residue cleared residue with spontaneous subsequent swallow;cleared residue with liquid wash;unable to clear residue;other (see comments)   Cleared some residue with cough/swallow, not all cleared.  -KG  --       Pharyngeal Phase, Comment Patient with recently closed tracheal stoma.   -KG  --          Esophageal Phase    Esophageal Phase no impairments  -KG  --          Clinical Impression    SLP Swallowing Diagnosis  -- functional oral phase;mod-severe;suspected pharyngeal dysfunction  -MB       Functional Impact  -- risk of aspiration/pneumonia;risk of malnutrition;risk of dehydration  -MB       Rehab Potential/Prognosis, Swallowing  -- re-evaluate goals as necessary  -MB       Swallow Criteria for Skilled Therapeutic Interventions Met  -- demonstrates skilled criteria  -MB          Recommendations    Therapy Frequency (Swallow)  -- 3 days per week  -MB       Predicted Duration Therapy Intervention (Days)  -- until discharge  -MB       SLP Diet Recommendation  -- NPO;other (see comments)   except occasional ice chips  -MB       Recommended Diagnostics  -- VFSS ([facility] [place]other (see comments)   tomorrow 10/19/18  -MB       SLP Rec. for Method of Medication Administration  -- meds whole;meds crushed;with thick liquids;with pudding or applesauce  -MB       Monitor for Signs of Aspiration  -- yes;cough;gurgly voice;throat clearing  -MB       Anticipated Dischage Disposition  -- home  -MB          Swallow Goals (SLP)    Oral Nutrition/Hydration Goal Selection (SLP) oral nutrition/hydration, SLP goal 1  -KG oral nutrition/hydration, SLP goal 1  -MB       Pharyngeal Strengthening Exercise Goal Selection (SLP) pharyngeal strengthening exercise, SLP goal 1  -KG  --       Swallow Compensatory Strategies Goal Selection (SLP) swallow compensatory strategies,  SLP goal 1  -KG  --       Additional Documentation pharyngeal strengthening exercise goal selection (SLP);swallow compensatory strategies goal selection (SLP)  -KG  --          Oral Nutrition/Hydration Goal 1 (SLP)    Oral Nutrition/Hydration Goal 1, SLP Pt will tolerate least restrictive diet with no overt s/s of aspiration.  -KG Pt will tolerate least restrictive diet with no overt s/s of aspiration.  -MB       Time Frame (Oral Nutrition/Hydration Goal 1, SLP) by discharge  -KG by discharge  -MB       Barriers (Oral Nutrition/Hydration Goal 1, SLP) Medically complex  -KG n/a  -MB       Progress/Outcomes (Oral Nutrition/Hydration Goal 1, SLP) goal ongoing  -KG goal ongoing  -MB          Pharyngeal Strengthening Exercise Goal 1 (SLP)    Activity (Pharyngeal Strengthening Goal 1, SLP) increase superior movement of the hyolaryngeal complex;increase anterior movement of the hyolaryngeal complex  -KG  --       Increase Superior Movement of the Hyolaryngeal Complex hard effortful swallow  -KG  --       Increase Anterior Movement of the Hyolaryngeal Complex Mendelsohn  -KG  --       [place]/Accuracy (Pharyngeal Strengthening Goal 1, SLP) with moderate cues (50-74% accuracy)  -KG  --       Time Frame (Pharyngeal Strengthening Goal 1, SLP) by discharge  -KG  --       Progress/Outcomes (Pharyngeal Strengthening Goal 1, SLP) goal ongoing  -KG  --          Swallow Compensatory Strategies Goal 1 (SLP)    Activity (Swallow Compensatory Strategies/Techniques Goal 1, SLP) compensatory strategies;small cup sips;small bites;throat clear/extra swallow  -KG  --       [place]/Accuracy (Swallow Compensatory Strategies/Techniques Goal 1, SLP) with moderate cues (50-74% accuracy)  -KG  --       Time Frame (Swallow Compensatory Strategies/Techniques Goal 1, SLP) by discharge  -KG  --       Progress/Outcomes (Swallow Compensatory Strategies/Techniques Goal 1, SLP) goal ongoing  -KG  --         User Key  (r) = Recorded By, (t) =  Taken By, (c) = Cosigned By    Initials Name Effective Dates    KG Ngoc Bonilla MARY, CCC-SLP 06/08/18 -     MB Roseann Garcia, CCC-SLP 08/02/16 -         EDUCATION  The patient has been educated in the following areas:   Home Exercise Program (HEP) Dysphagia (Swallowing Impairment) Oral Care/Hydration NPO rationale Modified Diet Instruction.    SLP Recommendation and Plan                                        Plan of Care Reviewed With: patient  Plan of Care Review  Plan of Care Reviewed With: patient  Progress: no change  Outcome Summary: Video swallow evaluation completed with SLP. Pooling/stasis in valleculae with honey and pudding consistencies which did spill over the tip of the epiglottis. Penetration with nectar thick liquids cleared reflexively. Pietro aspiration of thin liquids before the swallow with initially silent response, did have delayed cough. Unable to determine clearance of thin residue with cough due to flouro being turned off. Recommend continue PEG feedings with thin water or nectar thick clear liquids after oral care. SLP will continue to follow for swallow exercises. Recommend continued swallow therapy at next level of care.             SLP GOALS     Row Name 10/19/18 1100 10/18/18 1255          Oral Nutrition/Hydration Goal 1 (SLP)    Oral Nutrition/Hydration Goal 1, SLP Pt will tolerate least restrictive diet with no overt s/s of aspiration.  -KG Pt will tolerate least restrictive diet with no overt s/s of aspiration.  -MB     Time Frame (Oral Nutrition/Hydration Goal 1, SLP) by discharge  -KG by discharge  -MB     Barriers (Oral Nutrition/Hydration Goal 1, SLP) Medically complex  -KG n/a  -MB     Progress/Outcomes (Oral Nutrition/Hydration Goal 1, SLP) goal ongoing  -KG goal ongoing  -MB        Pharyngeal Strengthening Exercise Goal 1 (SLP)    Activity (Pharyngeal Strengthening Goal 1, SLP) increase superior movement of the hyolaryngeal complex;increase anterior movement of the  hyolaryngeal complex  -KG  --     Increase Superior Movement of the Hyolaryngeal Complex hard effortful swallow  -KG  --     Increase Anterior Movement of the Hyolaryngeal Complex Mendelsohn  -KG  --     [place]/Accuracy (Pharyngeal Strengthening Goal 1, SLP) with moderate cues (50-74% accuracy)  -KG  --     Time Frame (Pharyngeal Strengthening Goal 1, SLP) by discharge  -KG  --     Progress/Outcomes (Pharyngeal Strengthening Goal 1, SLP) goal ongoing  -KG  --        Swallow Compensatory Strategies Goal 1 (SLP)    Activity (Swallow Compensatory Strategies/Techniques Goal 1, SLP) compensatory strategies;small cup sips;small bites;throat clear/extra swallow  -KG  --     [place]/Accuracy (Swallow Compensatory Strategies/Techniques Goal 1, SLP) with moderate cues (50-74% accuracy)  -KG  --     Time Frame (Swallow Compensatory Strategies/Techniques Goal 1, SLP) by discharge  -KG  --     Progress/Outcomes (Swallow Compensatory Strategies/Techniques Goal 1, SLP) goal ongoing  -KG  --       User Key  (r) = Recorded By, (t) = Taken By, (c) = Cosigned By    Initials Name Provider Type    KG Ngoc Bonilla, CCC-SLP Speech and Language Pathologist    Roseann Galicia CCC-SLP Speech and Language Pathologist             SLP Outcome Measures (last 72 hours)      SLP Outcome Measures     Row Name 10/18/18 1400             SLP Outcome Measures    Outcome Measure Used? Adult NOMS  -MB         Adult FCM Scores    FCM Chosen Swallowing  -MB      Swallowing FCM Score 2  -MB        User Key  (r) = Recorded By, (t) = Taken By, (c) = Cosigned By    Initials Name Effective Dates    Roseann Galicia CCC-SLP 08/02/16 -            Time Calculation:         Time Calculation- SLP     Row Name 10/19/18 1255             Time Calculation- SLP    SLP Start Time 1045  -KG      SLP Stop Time 1215  -KG      SLP Time Calculation (min) 90 min  -KG      SLP Received On 10/19/18  -KG      SLP Goal Re-Cert Due Date 10/28/18  -KG         User Key  (r) = Recorded By, (t) = Taken By, (c) = Cosigned By    Initials Name Provider Type     Ngoc Bonilla, CCC-SLP Speech and Language Pathologist          Therapy Charges for Today     Code Description Service Date Service Provider Modifiers Qty    [id number] HC ST MOTION FLUORO EVAL SWALLOW 6 10/19/2018 Ngoc Bonilla CCC-SLP GN 1          SLP G-Codes  SLP NOMS Used?: Yes  Functional Limitations: Swallowing  Swallow Current Status (): At least 80 percent but less than 100 percent impaired, limited or restricted  Swallow Goal Status (): At least 60 percent but less than 80 percent impaired, limited or restricted    EARL Anderson  10/19/2018

## 2018-10-19 NOTE — PROGRESS NOTES
Continued Stay Note   Es     Patient Name: Sameer Tavarez  MRN: [id number]  Today's Date: 10/19/2018    Admit Date: 10/18/2018          Discharge Plan     Row Name 10/19/18 1207       Plan    Plan Home    Patient/Family in Agreement with Plan yes    Plan Comments Spoke with pt again about d/c planning. He says he will return home and he does not want home health because he knows how to do the TF. TF have been approved but need documentation that pt will need them greater than 90 days. Also tube feedings will cost him $60 a month and he cannot afford so [facility] is trying to work out a payment plan. He said he actually lives in a barn and that it is his desire to return there. He does not want any other assistance.               Discharge Codes    No documentation.           CRIS Simpson

## 2018-10-20 NOTE — THERAPY DISCHARGE NOTE
Acute Care - Speech Language Pathology Discharge Summary  [facility]       Patient Name: Sameer Tavarez  : 1955  MRN: [id number]    Today's Date: 10/20/2018                   Admit Date: 10/18/2018      SLP Recommendation and Plan  Pureed diet and nectar thick liquids    Visit Dx:    ICD-10-CM ICD-9-CM   1. Oropharyngeal cancer (CMS/[facility]) C10.9 146.9   2. History of radiation therapy Z92.3 V15.3   3. 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  -MB Pt will tolerate least restrictive diet with no overt s/s of aspiration.  -KG Pt will tolerate least restrictive diet with no overt s/s of aspiration.  -MB    Time Frame (Oral Nutrition/Hydration Goal 1, SLP) by discharge  -MB by discharge  -KG by discharge  -MB    Barriers (Oral Nutrition/Hydration Goal 1, SLP) Medically complex  -MB Medically complex  -KG n/a  -MB    Progress/Outcomes (Oral Nutrition/Hydration Goal 1, SLP) goal not met  -MB goal ongoing  -KG goal ongoing  -MB       Pharyngeal Strengthening Exercise Goal 1 (SLP)    Activity (Pharyngeal Strengthening Goal 1, SLP) increase superior movement of the hyolaryngeal complex;increase anterior movement of the hyolaryngeal complex  -MB increase superior movement of the hyolaryngeal complex;increase anterior movement of the hyolaryngeal complex  -KG  --    Increase Superior Movement of the Hyolaryngeal Complex hard effortful swallow  -MB hard effortful swallow  -KG  --    Increase Anterior Movement of the Hyolaryngeal Complex Mendelsohn  -MB Mendelsohn  -KG  --    [place]/Accuracy (Pharyngeal Strengthening Goal 1, SLP) with moderate cues (50-74% accuracy)  -MB with moderate cues (50-74% accuracy)  -KG  --    Time Frame (Pharyngeal  Strengthening Goal 1, SLP) by discharge  -MB by discharge  -KG  --    Progress/Outcomes (Pharyngeal Strengthening Goal 1, SLP) goal not met  -MB goal ongoing  -KG  --       Swallow Compensatory Strategies Goal 1 (SLP)    Activity (Swallow Compensatory Strategies/Techniques Goal 1, SLP) compensatory strategies;small cup sips;small bites;throat clear/extra swallow  -MB compensatory strategies;small cup sips;small bites;throat clear/extra swallow  -KG  --    [place]/Accuracy (Swallow Compensatory Strategies/Techniques Goal 1, SLP) with moderate cues (50-74% accuracy)  -MB with moderate cues (50-74% accuracy)  -KG  --    Time Frame (Swallow Compensatory Strategies/Techniques Goal 1, SLP) by discharge  -MB by discharge  -KG  --    Progress/Outcomes (Swallow Compensatory Strategies/Techniques Goal 1, SLP) goal not met  -MB goal ongoing  -KG  --      User Key  (r) = Recorded By, (t) = Taken By, (c) = Cosigned By    Initials Name Provider Type    KG Ngoc Bonilla CCC-SLP Speech and Language Pathologist    Roseann Galicia CCC-SLP Speech and Language Pathologist                  SLP Discharge Summary  Anticipated Dischage Disposition: home  Reason for Discharge: discharge from this facility  Progress Toward Achieving Short/long Term Goals: goals not met within established timelines  Discharge Destination: home      Roseann Garcia CCC-SLP  10/20/2018

## 2018-10-22 ENCOUNTER — TELEPHONE (OUTPATIENT)
Dept: CARDIOLOGY | Age: 63
End: 2018-10-22

## 2018-10-23 ENCOUNTER — TELEPHONE (OUTPATIENT)
Dept: CARDIOLOGY | Age: 63
End: 2018-10-23

## 2018-11-16 ENCOUNTER — CARE COORDINATION (OUTPATIENT)
Dept: CARE COORDINATION | Age: 63
End: 2018-11-16

## 2018-11-16 ENCOUNTER — OFFICE VISIT (OUTPATIENT)
Dept: CARDIOLOGY | Age: 63
End: 2018-11-16
Payer: MEDICARE

## 2018-11-16 VITALS
SYSTOLIC BLOOD PRESSURE: 110 MMHG | DIASTOLIC BLOOD PRESSURE: 60 MMHG | HEART RATE: 75 BPM | WEIGHT: 141 LBS | HEIGHT: 66 IN | BODY MASS INDEX: 22.66 KG/M2

## 2018-11-16 DIAGNOSIS — R13.19 OTHER DYSPHAGIA: ICD-10-CM

## 2018-11-16 DIAGNOSIS — I25.10 CORONARY ARTERY DISEASE INVOLVING NATIVE CORONARY ARTERY OF NATIVE HEART WITHOUT ANGINA PECTORIS: Primary | ICD-10-CM

## 2018-11-16 DIAGNOSIS — I10 ESSENTIAL HYPERTENSION: ICD-10-CM

## 2018-11-16 DIAGNOSIS — F17.200 SMOKER: ICD-10-CM

## 2018-11-16 PROCEDURE — 3017F COLORECTAL CA SCREEN DOC REV: CPT | Performed by: CLINICAL NURSE SPECIALIST

## 2018-11-16 PROCEDURE — G8598 ASA/ANTIPLAT THER USED: HCPCS | Performed by: CLINICAL NURSE SPECIALIST

## 2018-11-16 PROCEDURE — G8484 FLU IMMUNIZE NO ADMIN: HCPCS | Performed by: CLINICAL NURSE SPECIALIST

## 2018-11-16 PROCEDURE — G8420 CALC BMI NORM PARAMETERS: HCPCS | Performed by: CLINICAL NURSE SPECIALIST

## 2018-11-16 PROCEDURE — G8427 DOCREV CUR MEDS BY ELIG CLIN: HCPCS | Performed by: CLINICAL NURSE SPECIALIST

## 2018-11-16 PROCEDURE — 4004F PT TOBACCO SCREEN RCVD TLK: CPT | Performed by: CLINICAL NURSE SPECIALIST

## 2018-11-16 PROCEDURE — 99213 OFFICE O/P EST LOW 20 MIN: CPT | Performed by: CLINICAL NURSE SPECIALIST

## 2018-11-16 ASSESSMENT — ENCOUNTER SYMPTOMS
COUGH: 0
NAUSEA: 0
SHORTNESS OF BREATH: 1
CHEST TIGHTNESS: 0
ABDOMINAL PAIN: 0
FACIAL SWELLING: 0
VOMITING: 0
WHEEZING: 0
EYE REDNESS: 0

## 2018-11-16 NOTE — PROGRESS NOTES
[facility] [address]  Phone: [phone]  Fax: [phone]    OFFICE VISIT:  2018    Juan José Amaro - : 1955    Reason For Visit:  Nena Yarbrough is a 61 y.o. male who is here for 6 Month Follow-Up (no cardiac symptoms) and Coronary Artery Disease      HPI  Patient is here for follow-up with history of CAD. The patient had FARHANA placement to SVG to RCA and mid LAD by Dr. Essence Knowles on 10/24/16. Patient has multiple issues with dysphasia and currently has a feeding tube in place. He follows with Dr. Alda Padilla at [facility].  He states he is not supposed to be swallowing anything, rather to use his feeding tube. He states he does continue to swallow food even though he is \"not supposed to. \"  There is concern for aspiration. He decided to stop taking all of his medicines due to his swallowing issues. Patient states he has been kicked out of his home by his wife and is currently living in a tent at [place]. He has a car that he is paying for, states he is unable to afford a home. Patient states the only medication he is taking his is oxycodone    He denies any chest pain, unusual dyspnea, orthopnea, PND, edema, or palpitations          Trevon Carmona MD is PCP.   Juan José Amaro has the following history as recorded in [facility]:    Patient Active Problem List    Diagnosis Date Noted    Other dysphagia 2018    Acute chest pain 2018    History of coronary artery stent placement 2016    Hx of CABG 2016    ACS (acute coronary syndrome) (HCC)     Alkaline phosphatase elevation     Liver enzyme elevation     HTN (hypertension)     Smoker     CAD (coronary artery disease)     Chest pain      Past Medical History:   Diagnosis Date    Gastelum's esophagus     CAD (coronary artery disease)     Chest pain     COPD (chronic obstructive pulmonary disease) (HCC)     DM[facility], type 2[facility]     No current facility-administered medications for this visit. Allergies: Codeine    Review of Systems  Review of Systems   Constitutional: Negative for activity change, diaphoresis, fatigue, fever and unexpected weight change. HENT: Negative for facial swelling and nosebleeds. Dysphagia   Eyes: Negative for redness and visual disturbance. Respiratory: Positive for shortness of breath (chronic). Negative for cough, chest tightness and wheezing. Cardiovascular: Negative for chest pain, palpitations and leg swelling. Gastrointestinal: Negative for abdominal pain, nausea and vomiting. Endocrine: Negative for cold intolerance and heat intolerance. Genitourinary: Negative for dysuria and hematuria. Musculoskeletal: Negative for arthralgias and myalgias. Skin: Negative for pallor and rash. Neurological: Negative for dizziness, seizures, syncope, weakness and light-headedness. Hematological: Does not bruise/bleed easily. Psychiatric/Behavioral: Negative for agitation. The patient is not nervous/anxious. Objective  Vital Signs - /60   Pulse 75   Ht 5' 6\" (1.676 m)   Wt 141 lb (64 kg)   BMI 22.76 kg/m²   Physical Exam   Constitutional: He is oriented to person, place, and time. He appears well-developed and well-nourished. HENT:   Head: Normocephalic and atraumatic. Eyes: Pupils are equal, round, and reactive to light. Right eye exhibits no discharge. Left eye exhibits no discharge. Neck: No JVD present. No tracheal deviation present. Cardiovascular: Normal rate, regular rhythm, normal heart sounds and intact distal pulses. Exam reveals no gallop and no friction rub. No murmur heard. No carotid bruit   Pulmonary/Chest: Effort normal and breath sounds normal. No respiratory distress. He has no wheezes. He has no rales. Abdominal: Soft. There is no tenderness. Feeding tube   Musculoskeletal: He exhibits no edema.    Normal gait and station   Neurological: for assistance in finding temporary shelter    Call with any questionsor concerns  Follow up with Jose Cartagena MD for non cardiac problems  Report any new problems  Cardiovascular Fitness-Exercise as tolerated. Strive for 15 minutes of exercise most days of the week. Cardiac / HealthyDiet  Continue current medications as directed  Continue plan of treatment  It is always recommended that you bring your medicationsbottles with you to each visit - this is for your safety!        DORON Smith
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
## 2018-11-21 ENCOUNTER — APPOINTMENT (OUTPATIENT)
Dept: GENERAL RADIOLOGY | Age: 63
End: 2018-11-21
Payer: MEDICARE

## 2018-11-21 ENCOUNTER — HOSPITAL ENCOUNTER (EMERGENCY)
Age: 63
Discharge: HOME | End: 2018-11-21
Attending: EMERGENCY MEDICINE
Payer: MEDICARE

## 2018-11-21 VITALS
DIASTOLIC BLOOD PRESSURE: 80 MMHG | RESPIRATION RATE: 16 BRPM | WEIGHT: 136 LBS | OXYGEN SATURATION: 97 % | SYSTOLIC BLOOD PRESSURE: 139 MMHG | BODY MASS INDEX: 21.95 KG/M2 | TEMPERATURE: 97.5 F | HEART RATE: 80 BPM

## 2018-11-21 DIAGNOSIS — S62.655A CLOSED NONDISPLACED FRACTURE OF MIDDLE PHALANX OF LEFT RING FINGER, INITIAL ENCOUNTER: ICD-10-CM
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
## 2018-11-21 PROCEDURE — 99283 EMERGENCY DEPT VISIT LOW MDM: CPT | Performed by: EMERGENCY MEDICINE

## 2018-11-21 PROCEDURE — 6360000002 HC RX W HCPCS: Performed by: EMERGENCY MEDICINE

## 2018-11-21 PROCEDURE — 99283 EMERGENCY DEPT VISIT LOW MDM: CPT

## 2018-11-21 PROCEDURE — 73130 X-RAY EXAM OF HAND: CPT

## 2018-11-21 RX ORDER — ONDANSETRON 4 MG/1
4 TABLET, ORALLY DISINTEGRATING ORAL ONCE
Status: COMPLETED | OUTPATIENT
Start: 2018-11-21 | End: 2018-11-21

## 2018-11-21 RX ADMIN — ONDANSETRON 4 MG: 4 TABLET, ORALLY DISINTEGRATING ORAL at 03:12

## 2018-11-21 ASSESSMENT — PAIN SCALES - GENERAL: PAINLEVEL_OUTOF10: 8

## 2018-11-21 ASSESSMENT — PAIN DESCRIPTION - LOCATION: LOCATION: FINGER (COMMENT WHICH ONE)

## 2018-11-21 ASSESSMENT — PAIN DESCRIPTION - ORIENTATION: ORIENTATION: LEFT

## 2018-11-21 ASSESSMENT — PAIN DESCRIPTION - FREQUENCY: FREQUENCY: CONTINUOUS

## 2018-11-28 NOTE — CARE COORDINATION
TELEPHONED ALVINO WITH [facility] ASKED ABOUT CASE REPORTED ON 11.16.2018. SHE STATED IT WAS NOT OPENED AS THE PATIENT DID NOT MEET QUALIFICATIONS. INFORMED ALVINO THAT THE PATIENT IS ASKING FOR HELP. PROVIDED HER ADDITIONAL INFORMATION ON THE PATIENT:    - PATIENT'S HEALTH STATUS, INCLUDING USE OF FEEDING TUBE. - PATIENT STATING HE DOESN'T KNOW WHAT HE IS DOING. - PATIENT ASKED FOR HELP. NEW CASE #[id number]    ALVINO STATED SHE WILL TAKE THIS TO HER SUPERVISOR. THIS CHW TOLD ALVINO SHE WILL CALL THE PATIENT TO CONFIRM HE IS STILL LIVING IN THE TENT AT [facility]. WILL CALL ALVINO BACK WITH UPDATE. Submitted by Asha/CASANDRA    TELEPHONED SARAHY. HE IS STILL LIVING IN HIS GREEN TENT AT [facility].      - HE STATED HE IS KEEPING WARM WITH A SPACE HEATER.    - HIS FOOD IS FROZEN SO HE DOESN'T HAVE ANYTHING TO EAT UNTIL IT THAWS.  - ASKED HIM ABOUT HIS MEDICATION. HE REPLIED, \"WELL. \"  NO OTHER RESPONSE.  - SARAHY STATED A  CHECKS ON HIM PERIODICALLY. THE RANGER VISITED HIM THIS MORNING.  - ASKED SARAHY TO DESCRIBE HIS DOG - DOG IS BROWN AND WHITE WITH COCOA SPOTS.  DOG GOES EVERYWHERE WITH HIM. INFORMED SARAHY THAT THIS CHW WOULD TELEPHONE APS TO COME HELP HIM. HE STATED HE WOULD BE THANKFUL FOR THEIR HELP. HE ASKED THAT APS CALL HIM BEFORE THEY GET TO HIS [place]. HE SAID HE WILL MEET THEM. Submitted by Asha/CASANDRA    TELEPHONED [address] [facility]. PROVIDED HER WITH THE UPDATED INFORMATION FROM SARAHY.     Submitted by Asha/CASANDRA

## 2018-11-30 NOTE — CARE COORDINATION
RECEIVED A CALL FROM Eren WITH [facility]. SHE IS GOING TO VISIT WITH THE PATIENT TODAY. SHE ASKED FOR DIRECTIONS AND ADDITIONAL INFORMATION. PROVIDED EDY WITH INFORMATION REQUESTED.     Submitted by Asha/CASANDRA

## 2019-01-14 ENCOUNTER — OFFICE VISIT (OUTPATIENT)
Dept: OTOLARYNGOLOGY | Facility: CLINIC | Age: 64
End: 2019-01-14

## 2019-01-14 VITALS
SYSTOLIC BLOOD PRESSURE: 128 MMHG | BODY MASS INDEX: 22.6 KG/M2 | DIASTOLIC BLOOD PRESSURE: 80 MMHG | WEIGHT: 144 LBS | HEIGHT: 67 IN | TEMPERATURE: 97.5 F

## 2019-01-14 DIAGNOSIS — D49.9 NEOPLASM: ICD-10-CM

## 2019-01-14 DIAGNOSIS — E11.9 TYPE 2 DIABETES MELLITUS WITHOUT COMPLICATION, WITH LONG-TERM CURRENT USE OF INSULIN (HCC): ICD-10-CM

## 2019-01-14 DIAGNOSIS — I10 ESSENTIAL HYPERTENSION: ICD-10-CM

## 2019-01-14 DIAGNOSIS — Z92.3 HISTORY OF RADIATION THERAPY: ICD-10-CM

## 2019-01-14 DIAGNOSIS — C10.9 OROPHARYNGEAL CANCER (HCC): Primary | ICD-10-CM

## 2019-01-14 DIAGNOSIS — Z79.4 TYPE 2 DIABETES MELLITUS WITHOUT COMPLICATION, WITH LONG-TERM CURRENT USE OF INSULIN (HCC): ICD-10-CM

## 2019-01-14 DIAGNOSIS — I24.9 ACS (ACUTE CORONARY SYNDROME) (HCC): ICD-10-CM

## 2019-01-14 DIAGNOSIS — D37.02 NEOPLASM OF UNCERTAIN BEHAVIOR OF TONGUE: ICD-10-CM

## 2019-01-14 DIAGNOSIS — Z72.0 TOBACCO USE: ICD-10-CM

## 2019-01-14 PROCEDURE — 99024 POSTOP FOLLOW-UP VISIT: CPT | Performed by: PHYSICIAN ASSISTANT

## 2019-01-14 PROCEDURE — 99214 OFFICE O/P EST MOD 30 MIN: CPT | Performed by: PHYSICIAN ASSISTANT

## 2019-01-14 RX ORDER — NITROGLYCERIN 0.4 MG/1
0.4 TABLET SUBLINGUAL
COMMUNITY
Start: 2018-05-14

## 2019-01-14 RX ORDER — INSULIN GLARGINE 100 [IU]/ML
25 INJECTION, SOLUTION SUBCUTANEOUS DAILY
COMMUNITY

## 2019-01-14 RX ORDER — TAMSULOSIN HYDROCHLORIDE 0.4 MG/1
1 CAPSULE ORAL DAILY
COMMUNITY
Start: 2019-01-10

## 2019-01-14 RX ORDER — ZOLPIDEM TARTRATE 10 MG/1
10 TABLET ORAL NIGHTLY PRN
COMMUNITY

## 2019-01-14 RX ORDER — FUROSEMIDE 40 MG/1
40 TABLET ORAL DAILY
COMMUNITY
Start: 2019-01-10

## 2019-01-14 RX ORDER — ASPIRIN 81 MG/1
81 TABLET, CHEWABLE ORAL DAILY
COMMUNITY
Start: 2018-02-11

## 2019-01-14 NOTE — PROGRESS NOTES
" JEWEL Guardado     Chief Complaint   Patient presents with   • Follow-up     sore on tongue       HPI   Sameer Tavarez is a 63 y.o. male who is here for follow up. He has had complaints of an oral lesion. The symptoms are localized to the central posterior tongue. The symptoms severity was described as: moderate The symptoms have been: relatively constant for the last several months The symptoms are aggravated by  no identifiable factors. The symptoms are improved by no identifiable factors.     Patient has a history of oropharyngeal cancer Stage III (cT4, cN0, cM0, p16: Positive) that he completed treatment for, but continues to use tobacco.          Oncology/Hematology History     Sameer Tavarez is a 62 y.o. male with oropharyngeal cancer. He had an EGD on 2/6/17 by Dr Patel with biopsies showing Barretts esophagus and reflux esophagitis. He had continued dysphagia and underwent another EGD and an oropharyngeal biopsy on 8/27/17 with the oropharyngeal biosies showing \"at least squamous cell carcinoma in situ\". CT scanning showed \"oropharyngeal asymmety without overt enhancing mass\" on 8/28/17. He was referred to Dr. Alber Justin MD and diagnosed with a large ulcerative lesion that was in the oral pharynx.  It extended from the right lateral aspect of the uvula and extended to the posterior rim of the soft palate extending to the tonsillar fossa and deeply penetrating into it.  It involved the right base of tongue and extended at least to the midline of the base of tongue.  It extended forward into the lingual tongue muscular approximately 2 cm. He was having difficulty with his secretions and his airway at night, so an awake tracheostomy with direct laryngoscopy and biopsy was performed on 10/5/17. At the time of admission, g tube placement and dental extraction was performed.                Oropharyngeal cancer (CMS/[facility])     8/28/2017 Initial Diagnosis       Oropharyngeal cancer            " 8/28/2017 Biopsy       Dr Patel ([facility]) - EGD with oropharynx biopsy:     Clinical Information        Pre-Op: Gerd/Hoarsness      Post-Op Esophageal ulcer,barretts oral lesion    Final Diagnosis   1.  Esophagus, endoscopic biopsy:  A.  Fragments of benign squamocolumnar junction mucosa and benign glandular mucosa demonstrating intestinal metaplasia (Recio's esophagus)  B.  Chronic active inflammation, moderate.  C.  No dysplasia identified.     2.  Oropharynx, biopsy: At least squamous cell carcinoma in situ.     Comment: Status of invasion is indeterminate due to tangential sectioning of several of the tissue fragments.  Immunohistochemical stain for p16 is positive.     AJCC stage:pTX pNX                      8/28/2017 Imaging       [facility]  CT Soft tissue neck  No discrete enhancing mass  Soft tissue thickening right oropharynx and peritonsillar soft tissues compared to left.  Small lymph nodes may be inflammatory in nature            9/13/2017 Imaging       [facility]     CT Chest  Small hiatal hernia  1.1 cm lymph node adjacent to the distal esophagus.  Two small lung nodules.  For multiple solid noncalcified nodules smaller than 6 mm in diameter, no routine follow-up is recommended. (grade 2B; weak recommendation, moderate-quality evidence)  CT Abd Pelvis  Right perineal mass of unknown significance. Please correlate with physical exam.  Dilated small bowel with multiple air-fluid levels. Differential considerations include partial small bowel obstruction vs adynamic ileus.  Mild diverticulosis. Mild distended urinary bladder.            9/29/2017 Imaging       [facility]  MR Orbit Face Neck  1. Large right oropharyngeal mass compatible with the history of  carcinoma.  2. Tongue base and right submandibular gland involvement.     PET  1. Abnormal metabolic activity in the base of the tongue on the right  extending in the right palatine tonsil. SUV is 8.8. This is  consistent  with neoplasm. No other regions of abnormal metabolic activity are  identified..               10/5/2017 Procedure       Tracheostomy with Direct Laryngoscopy and biopsy -JUANITA Justin MD      Path     Final Diagnosis   1.  Right superior tonsillar fossa, biopsy:  A.  Moderately differentiated squamous cell carcinoma, invasive.  B.  Immunohistochemical stain for p16 is positive.      AJCC stage: pTX pNX     2.  Right base of tongue, biopsy:  A.  Moderately differentiated squamous cell carcinoma, invasive.  B.  Immunohistochemical stain for p16 is positive.                   10/6/2017 Procedure       Port placement  Gastrostomy tube placement            10/11/2017 Procedure       Teeth extraction- Les Goddard MD, DMD            11/3/2017 Cancer Staged       Oropharyngeal cancer    Staging form: Pharynx - HPV-Mediated Oropharynx, AJCC 8th Edition    - Clinical stage from 11/3/2017: Stage III (cT4, cN0, cM0, p16: Positive) - Signed by Alber Justin MD on 1/1/2018    - Pathologic: No stage assigned - Unsigned            11/9/2017 - 1/16/2018 Chemotherapy/Radiation       Carboplatin and Taxol- Claudino     Radiation OncologyTreatment Course:  Sameer Tavarez received 7000 cGy in 35 fractions to the oral pharynx and neck via External Beam Radiation - EBRT.- Cynthia            6/25/2018 Biopsy       direct laryngoscopy with biopsy- benign             7/10/2018 -  Other Event       Tracheostomy removal             Review of Systems   Constitutional: Negative for activity change, appetite change, chills, diaphoresis, fatigue, fever and unexpected weight change.   HENT: Positive for voice change (chronic). Negative for congestion, ear discharge, ear pain, facial swelling, hearing loss, mouth sores, nosebleeds, postnasal drip, rhinorrhea, sinus pressure, sneezing, sore throat, tinnitus and trouble swallowing.         Neoplasm of uncertain behavior of tongue   Eyes: Negative for pain, discharge, redness,  itching and visual disturbance.   Respiratory: Negative for apnea, cough, choking, chest tightness, shortness of breath, wheezing and stridor.    Gastrointestinal: Negative for nausea and vomiting.   Endocrine: Negative for cold intolerance and heat intolerance.   Musculoskeletal: Negative for arthralgias, back pain, gait problem, neck pain and neck stiffness.   Skin: Negative for rash.   Allergic/Immunologic: Negative for environmental allergies and food allergies.   Neurological: Negative for dizziness, tremors, seizures, syncope, facial asymmetry, speech difficulty, weakness, light-headedness, numbness and headaches.   Hematological: Negative for adenopathy. Does not bruise/bleed easily.   Psychiatric/Behavioral: Negative for behavioral problems, sleep disturbance and suicidal ideas. The patient is not nervous/anxious and is not hyperactive.    :    Past History:  Past Medical History:   Diagnosis Date   • Arthritis    • Coronary artery disease    • Depression    • Diabetes mellitus (CMS/HCC)    • Difficulty urinating    • Enlarged prostate    • GERD (gastroesophageal reflux disease)    • History of BPH    • History of transfusion    • Hyperlipidemia    • Hypertension    • Neuropathy    • Oropharyngeal cancer (CMS/HCC) 08/27/2017   • Seizure (CMS/HCC)     diabetic   • Severe esophageal dysplasia    • Stroke (CMS/HCC)    • TB (pulmonary tuberculosis) 2004   • Tracheostomy present (CMS/HCC)     PLUGGED     Past Surgical History:   Procedure Laterality Date   • CARDIAC SURGERY      9-10 stents   • CHOLECYSTECTOMY     • CORONARY ARTERY BYPASS GRAFT      2009   • FRACTURE SURGERY     • GASTROSTOMY FEEDING TUBE INSERTION N/A 10/18/2018    Procedure: takedown gastroutaneous fistula with gastrostomy tube plaement;  Surgeon: Ayanna Early MD;  Location: [facility] OR;  Service: General   • GTUBE REPLACEMENT N/A 10/6/2017    Procedure: GASTROSTOMY TUBE REPLACEMENT, port placement;  Surgeon: Jayne Phillips MD;  Location:   [facility] OR;  Service:    • HERNIA REPAIR     • LARYNGOSCOPY N/A 10/11/2017    Procedure: DIRECT LARYNGOSCOPY WITH BIOPSY;  Surgeon: Darin Neal MD;  Location:  [facility] OR;  Service:    • LARYNGOSCOPY N/A 6/25/2018    Procedure: DIRECT LARYNGOSCOPY WITH BIOPSY;  Surgeon: Alber Justin MD;  Location:  [facility] OR;  Service: ENT   • LARYNGOSCOPY N/A 10/18/2018    Procedure: DIAGNOSTIC DIRECT LARYNGOSCOPY WITH BIOPSY;  Surgeon: Alber Justin MD;  Location:  [facility] OR;  Service: ENT   • NISSEN FUNDOPLICATION LAPAROSCOPIC     • NY DENTAL SURGERY PROCEDURE N/A 10/11/2017    Procedure: TOOTH EXTRACTION;  Surgeon: Darin Neal MD;  Location:  [facility] OR;  Service: ENT   • NY INSJ TUNNELED CVC W/O SUBQ PORT/ AGE 5 YR/> Left 10/6/2017    Procedure: INSERTION VENOUS ACCESS DEVICE;  Surgeon: Jayne Phillips MD;  Location:  [facility] OR;  Service: General   • SKIN BIOPSY     • TESTICLE SURGERY      tubes implanted for drainage   • TONSILLECTOMY     • TRACHEOSTOMY N/A 10/5/2017    Procedure: Awake tracheostomy; DIRECT LARYNGOSCOPY WITH BIOPSY;  Surgeon: Alber Justin MD;  Location:  [facility] OR;  Service:    • TRACHEOSTOMY N/A 10/18/2018    Procedure: TRACHEOCUTANEOUS FISTULA CLOSURE;  Surgeon: Alber Justin MD;  Location:  [facility] OR;  Service: ENT     Family History   Problem Relation Age of Onset   • Stroke Mother    • Heart disease Father    • Heart disease Brother    • Cancer Brother      Social History     Tobacco Use   • Smoking status: Current Every Day Smoker     Packs/day: 0.00     Years: 52.00     Pack years: 0.00     Types: Cigarettes   • Smokeless tobacco: Former User     Types: Snuff   • Tobacco comment: 10 cigarettes a day   Substance Use Topics   • Alcohol use: Yes     Alcohol/week: 1.8 oz     Types: 3 Cans of beer per week   • Drug use: No     Outpatient Medications Marked as Taking for the 1/14/19 encounter (Office Visit) with Jori Howe PA   Medication Sig Dispense  Refill   • aspirin 81 MG chewable tablet Chew 81 mg.     • Cyclobenzaprine HCl (FLEXERIL PO) Take  by mouth 4 (Four) Times a Day.     • furosemide (LASIX) 40 MG tablet      • insulin glargine (LANTUS) 100 UNIT/ML injection Inject  under the skin into the appropriate area as directed.     • nitroglycerin (NITROSTAT) 0.4 MG SL tablet Place 0.4 mg under the tongue.     • oxyCODONE-acetaminophen (PERCOCET)  MG per tablet Take 1 tablet by mouth Every 6 (Six) Hours As Needed for Moderate Pain . (Patient taking differently: Take 1 tablet by mouth Every 6 (Six) Hours As Needed for Moderate Pain  (SPINAL PAIN).) 60 tablet 0   • tamsulosin (FLOMAX) 0.4 MG capsule 24 hr capsule      • zolpidem (AMBIEN) 10 MG tablet Take  by mouth.       Allergies:  Codeine    Vital Signs:   Vitals:    01/14/19 1309   BP: 128/80   Temp: 97.5 °F (36.4 °C)       Physical Exam   CONSTITUTIONAL: well nourished, alert, oriented, in no acute distress     COMMUNICATION AND VOICE: able to communicate normally, normal voice quality    HEAD: normocephalic, no lesions, atraumatic, no tenderness, no masses     FACE: appearance normal, no lesions, no tenderness, no deformities, facial motion symmetric    SALIVARY GLANDS: parotid glands with no tenderness, no swelling, no masses, submandibular glands with normal size, nontender    EYES: ocular motility normal, eyelids normal, orbits normal, no proptosis, conjunctiva normal , pupils equal, round     EARS:  Hearing: response to conversational voice normal bilaterally   External Ears: auricles without lesions  Otoscopic: tympanic membrane appearance normal, no lesions, no perforation, normal mobility, no fluid    NOSE:  External Nose: structure normal, no tenderness on palpation, no nasal discharge, no lesions, no evidence of trauma, nostrils patent   Intranasal Exam: nasal mucosa normal, vestibule within normal limits, inferior turbinate normal, nasal septum midline     ORAL:  Lips: upper and lower lips  without lesion   Teeth: dentition within normal limits for age   Gums: gingivae healthy   Oral Mucosa: oral mucosa normal, no mucosal lesions   Floor of Mouth: Warthin’s duct patent, mucosa normal  Tongue: lingual mucosa with firm crusted and raised posterior central ventral tongue lesion that is 2 cm x 1 cm, normal tongue mobility   Palate: soft and hard palates with normal mucosa and structure  Oropharynx: oropharyngeal mucosa normal    NECK: neck appearance normal, no mass,  noted without erythema or tenderness    THYROID: no overt thyromegaly, no tenderness, nodules or mass present on palpation, position midline     LYMPH NODES: no lymphadenopathy    CHEST/RESPIRATORY: respiratory effort normal, normal breath sounds     CARDIOVASCULAR: rate and rhythm normal, extremities without cyanosis or edema      NEUROLOGIC/PSYCHIATRIC: oriented to time, place and person, mood normal, affect appropriate, CN II-XII intact grossly    RESULTS REVIEW:    I have reviewed the patients old records in the chart.    Assessment   Sameer was seen today for follow-up.    Diagnoses and all orders for this visit:    Oropharyngeal cancer (CMS/HCC)    Neoplasm of uncertain behavior of tongue    Tobacco use    ACS (acute coronary syndrome) (CMS/HCC)    Essential hypertension    History of radiation therapy    Type 2 diabetes mellitus without complication, with long-term current use of insulin (CMS/HCC)      * Surgery not found *  No orders of the defined types were placed in this encounter.    Plan    DIRECT LARYNGOSCOPY WITH BIOPSY OF POSTERIOR CENTRAL TONGUE: The risks and benefits were explained including but not limited to pain, bleeding, infection, (including possible mediastinitis), the risks of the general anesthesia, pain, temporary or permanent hoarseness, airway loss, and/or tooth injury. Questions were answered. No guarantees were made or implied.      I advised the patient of the risks in continuing to use tobacco and  recommended complete cessation, The inherent risks including the risk of disability, development of a malignancy and/or death was discussed.  The patient indicated understanding.    Return for Follow-up post-operatively as directed.    JEWEL Guardado  01/14/19  1:19 PM

## 2019-01-14 NOTE — PATIENT INSTRUCTIONS
DIRECT LARYNGOSCOPY WITH BIOPSY OF POSTERIOR CENTRAL TONGUE: The risks and benefits were explained including but not limited to pain, bleeding, infection, (including possible mediastinitis), the risks of the general anesthesia, pain, temporary or permanent hoarseness, airway loss, and/or tooth injury. Questions were answered. No guarantees were made or implied.      I advised the patient of the risks in continuing to use tobacco and recommended complete cessation, The inherent risks including the risk of disability, development of a malignancy and/or death was discussed.  The patient indicated understanding.      IF YOU SMOKE OR USE TOBACCO PLEASE READ THE FOLLOWING:    Why is smoking bad for me?  Smoking increases the risk of heart disease, lung disease, vascular disease, stroke, and cancer.     If you smoke, STOP!    If you would like more information on quitting smoking, please visit the [employer] website: [email or web address]   This link will provide additional resources including the QUIT line and the Beat the Pack support groups.     For more information:    Quit Now [place]  1-800-QUIT-NOW  [email or web address]/en-[email or web address]

## 2019-01-24 ENCOUNTER — APPOINTMENT (OUTPATIENT)
Dept: PREADMISSION TESTING | Facility: HOSPITAL | Age: 64
End: 2019-01-24

## 2019-01-31 ENCOUNTER — OFFICE VISIT (OUTPATIENT)
Dept: OTOLARYNGOLOGY | Facility: CLINIC | Age: 64
End: 2019-01-31

## 2019-01-31 VITALS
HEART RATE: 85 BPM | BODY MASS INDEX: 22.91 KG/M2 | HEIGHT: 67 IN | RESPIRATION RATE: 12 BRPM | TEMPERATURE: 97.9 F | SYSTOLIC BLOOD PRESSURE: 146 MMHG | DIASTOLIC BLOOD PRESSURE: 96 MMHG | WEIGHT: 146 LBS

## 2019-01-31 DIAGNOSIS — Z72.0 TOBACCO USE: ICD-10-CM

## 2019-01-31 DIAGNOSIS — Z92.3 HISTORY OF RADIATION THERAPY: ICD-10-CM

## 2019-01-31 DIAGNOSIS — C10.9 OROPHARYNGEAL CANCER (HCC): Primary | ICD-10-CM

## 2019-01-31 DIAGNOSIS — D37.02 NEOPLASM OF UNCERTAIN BEHAVIOR OF TONGUE: ICD-10-CM

## 2019-01-31 PROBLEM — J95.04 TRACHEOCUTANEOUS FISTULA FOLLOWING TRACHEOSTOMY (HCC): Status: RESOLVED | Noted: 2018-10-04 | Resolved: 2019-01-31

## 2019-01-31 PROCEDURE — 99213 OFFICE O/P EST LOW 20 MIN: CPT | Performed by: OTOLARYNGOLOGY

## 2019-01-31 NOTE — PROGRESS NOTES
Anu Talamantes   Patient Intake Note    Review of Systems  Review of Systems   Constitutional: Positive for chills and fatigue. Negative for fever.   HENT:        See HPI   Eyes: Negative for pain, discharge and itching.   Respiratory: Positive for cough and choking. Negative for shortness of breath and wheezing.    Gastrointestinal: Negative for abdominal pain, diarrhea and nausea.   Musculoskeletal: Positive for neck pain and neck stiffness.   Neurological: Positive for dizziness. Negative for seizures, syncope, weakness, light-headedness and headaches.   Hematological: Bruises/bleeds easily.   Psychiatric/Behavioral: Positive for sleep disturbance.       QUALITY MEASURES    Body Mass Index Screening and Follow-Up Plan  Body mass index is 23.19 kg/m².  Patient's Body mass index is 23.19 kg/m². BMI is above normal parameters. Recommendations include: referral to primary care.    Tobacco Use: Screening and Cessation Intervention  Social History    Tobacco Use      Smoking status: Current Every Day Smoker        Packs/day: 0.00        Years: 52.00        Pack years: 0        Types: Cigarettes      Smokeless tobacco: Former User        Types: Snuff      Tobacco comment: 10 cigarettes a day    Smoking cessation information given in after visit summary.    Anu Talamantes  1/31/2019  1:36 PM

## 2019-01-31 NOTE — PROGRESS NOTES
" Alber Justin MD   CC:  follow up head and neck cancer    History of Present Illness:  Sameer Tavarez is a  63 y.o. male who returns for follow-up of his right sided oropharyngeal cancer.  He is status post direct laryngoscopy with biopsy Lausier of tracheocutaneous fistula on October 18, 2018.  Last seen by my mid-level practitioner was concerned about an area on the midline of the tongue.  Patient reports he has been smoking still.  He has not had lymphadenopathy weight loss or neck mass.    Oncology/Hematology History    Sameer Tavarez is a 62 y.o. male with oropharyngeal cancer. He had an EGD on 2/6/17 by Dr Patel with biopsies showing Barretts esophagus and reflux esophagitis. He had continued dysphagia and underwent another EGD and an oropharyngeal biopsy on 8/27/17 with the oropharyngeal biosies showing \"at least squamous cell carcinoma in situ\". CT scanning showed \"oropharyngeal asymmety without overt enhancing mass\" on 8/28/17. He was referred to Dr. Alber Justin MD and diagnosed with a large ulcerative lesion that was in the oral pharynx.  It extended from the right lateral aspect of the uvula and extended to the posterior rim of the soft palate extending to the tonsillar fossa and deeply penetrating into it.  It involved the right base of tongue and extended at least to the midline of the base of tongue.  It extended forward into the lingual tongue muscular approximately 2 cm. He was having difficulty with his secretions and his airway at night, so an awake tracheostomy with direct laryngoscopy and biopsy was performed on 10/5/17. At the time of admission, g tube placement and dental extraction was performed.            Oropharyngeal cancer (CMS/[facility])    8/28/2017 Initial Diagnosis     Oropharyngeal cancer         8/28/2017 Biopsy     Dr Patel ([facility]) - EGD with oropharynx biopsy:    Clinical Information       Pre-Op: Gerd/Hoarsness      Post-Op Esophageal " ulcer,barretts oral lesion    Final Diagnosis   1.  Esophagus, endoscopic biopsy:  A.  Fragments of benign squamocolumnar junction mucosa and benign glandular mucosa demonstrating intestinal metaplasia (Recio's esophagus)  B.  Chronic active inflammation, moderate.  C.  No dysplasia identified.     2.  Oropharynx, biopsy: At least squamous cell carcinoma in situ.     Comment: Status of invasion is indeterminate due to tangential sectioning of several of the tissue fragments.  Immunohistochemical stain for p16 is positive.     AJCC stage:pTX pNX                 8/28/2017 Imaging     [facility] [place]  CT Soft tissue neck  No discrete enhancing mass  Soft tissue thickening right oropharynx and peritonsillar soft tissues compared to left.  Small lymph nodes may be inflammatory in nature         9/13/2017 Imaging     [facility] [place]    CT Chest  Small hiatal hernia  1.1 cm lymph node adjacent to the distal esophagus.  Two small lung nodules.  For multiple solid noncalcified nodules smaller than 6 mm in diameter, no routine follow-up is recommended. (grade 2B; weak recommendation, moderate-quality evidence)  CT Abd Pelvis  Right perineal mass of unknown significance. Please correlate with physical exam.  Dilated small bowel with multiple air-fluid levels. Differential considerations include partial small bowel obstruction vs adynamic ileus.  Mild diverticulosis. Mild distended urinary bladder.         9/29/2017 Imaging     [facility]  MR Orbit Face Neck  1. Large right oropharyngeal mass compatible with the history of  carcinoma.  2. Tongue base and right submandibular gland involvement.    PET  1. Abnormal metabolic activity in the base of the tongue on the right  extending in the right palatine tonsil. SUV is 8.8. This is consistent  with neoplasm. No other regions of abnormal metabolic activity are  identified..           10/5/2017 Procedure     Tracheostomy with Direct Laryngoscopy and biopsy -JUANITA Justin,  MD     Path    Final Diagnosis   1.  Right superior tonsillar fossa, biopsy:  A.  Moderately differentiated squamous cell carcinoma, invasive.  B.  Immunohistochemical stain for p16 is positive.      AJCC stage: pTX pNX     2.  Right base of tongue, biopsy:  A.  Moderately differentiated squamous cell carcinoma, invasive.  B.  Immunohistochemical stain for p16 is positive.              10/6/2017 Procedure     Port placement  Gastrostomy tube placement         10/11/2017 Procedure     Teeth extraction- Les Goddard MD, DMD         11/3/2017 Cancer Staged     Oropharyngeal cancer    Staging form: Pharynx - HPV-Mediated Oropharynx, AJCC 8th Edition    - Clinical stage from 11/3/2017: Stage III (cT4, cN0, cM0, p16: Positive) - Signed by Alber Justin MD on 1/1/2018    - Pathologic: No stage assigned - Unsigned         11/9/2017 - 1/16/2018 Chemotherapy/Radiation     Carboplatin and Taxol- Claudino    Radiation OncologyTreatment Course:  Sameer Tavarez received 7000 cGy in 35 fractions to the oral pharynx and neck via External Beam Radiation - EBRT.- [place]         6/25/2018 Biopsy     direct laryngoscopy with biopsy- benign          7/10/2018 -  Other Event     Tracheostomy removal         10/18/2018 Surgery     Tracheocutaneous fistula closure (Resser)  Gastrostomy tube replacement (Albin)         10/18/2018 Biopsy     Direct laryngoscopy with biopsy of right base of tongue and right tonsil            Review of Systems  Reviewed as per patient intake note.    Past History:  Past medical and surgical history, family history and social history reviewed and updated when appropriate.  Current medications and allergies reviewed and updated when appropriate.  Allergies:  Codeine    Vital Signs:  Temp:  [97.9 °F (36.6 °C)] 97.9 °F (36.6 °C)  Heart Rate:  [85] 85  Resp:  [12] 12  BP: (146)/(96) 146/96    Physical Exam:    CONSTITUTIONAL: well nourished, well-developed, alert, oriented, in no acute distress    COMMUNICATION AND VOICE: able to communicate normally, normal voice quality  HEAD: normocephalic, no lesions, atraumatic, no tenderness, no masses   FACE: appearance normal, no lesions, no tenderness, no deformities, facial motion symmetric  SALIVARY GLANDS: parotid glands with no tenderness, no swelling, no masses, submandibular glands with normal size, nontender  EYES: ocular motility normal, eyelids normal, orbits normal, no proptosis, conjunctiva normal , pupils equal, round  HEARING: response to conversational voice normal bilaterally   EXTERNAL EARS: auricles without lesions  EXTERNAL EAR CANALS: normal ear canals without stenosis or significant cerumen  TYMPANIC MEMBRANES: tympanic membrane appearance normal, no lesions, no perforation, normal mobility, no fluid  EXTERNAL NOSE: structure normal, no tenderness on palpation, no nasal discharge, no lesions, no evidence of trauma, nostrils patent  INTRANASAL EXAM: nasal mucosa normal, vestibule within normal limits, inferior turbinate normal, nasal septum without overtly obstructing anterior deviation  LIPS: structure normal, no tenderness on palpation, no lesions, no evidence of trauma  TEETH: dentition within normal limits for age  GUMS: gingivae healthy  ORAL MUCOSA: oral mucosa with diffuse dryness, no mucosal lesions   FLOOR OF MOUTH: Warthin's duct patent, mucosa normal  TONGUE: There is severe dryness of the tongue.  There is a small area of hypertrophic taste buds with mild tobacco staining change.  This feels relatively soft to palpation and is not ulcerated.  There is no bleeding associated with it.  I do not feel this represents malignancy.  Otherwise, there is no evidence of recurrent disease.  PALATE: soft and hard palates with normal mucosa and structure  OROPHARYNX: He is status post tonsillectomy without mass or lesion.  There is post radiation change.  NECK: He is status post tracheocutaneous fistula closure with no evidence of recurring  fistula.  There is no neck mass or lesion.  LYMPH NODES: no lymphadenopathy  CHEST/RESPIRATORY: respiratory effort normal  CARDIOVASCULAR: extremities without cyanosis or edema, no overt jugulovenous distension present  NEUROLOGIC/PSYCHIATRIC: oriented appropriately for age, mood normal, affect appropriate, cranial nerves intact grossly unless specifically mentioned above         Assessment   1. Oropharyngeal cancer (CMS/HCC) Inactive   2. Tobacco use    3. History of radiation therapy    4. Neoplasm of uncertain behavior of tongue        Plan   Continue current management plan.  I do not feel that there is a worrisome lesion.  However we will continue to watch this closely and I will reevaluate in 2 months.  I told the patient that if he has any returns of his symptoms or the area is changing, we will proceed with direct laryngoscopy with biopsy.     -----INSTRUCTIONS-----  Be aware of the signs and symptoms of head and neck cancer including neck mass, persistent sore throat, ear pain, hemoptysis, weight loss and hoarseness. If any of these symptoms occur, call for evaluation.      -----FOLLOW UP-----  Return in about 2 months (around 3/31/2019).      Alber Justin MD  01/31/19  2:12 PM

## 2019-02-21 ENCOUNTER — HOSPITAL ENCOUNTER (OUTPATIENT)
Dept: CT IMAGING | Age: 64
Discharge: HOME | End: 2019-02-21
Payer: MEDICARE

## 2019-02-21 ENCOUNTER — HOSPITAL ENCOUNTER (OUTPATIENT)
Dept: GENERAL RADIOLOGY | Age: 64
Discharge: HOME | End: 2019-02-21
Payer: MEDICARE

## 2019-02-21 ENCOUNTER — HOSPITAL ENCOUNTER (OUTPATIENT)
Dept: CT IMAGING | Age: 64
End: 2019-02-21
Payer: MEDICARE

## 2019-02-21 DIAGNOSIS — M47.816 LUMBAR FACET ARTHROPATHY: ICD-10-CM

## 2019-02-21 DIAGNOSIS — M50.33 DEGENERATION OF CERVICOTHORACIC INTERVERTEBRAL DISC: ICD-10-CM

## 2019-02-21 PROCEDURE — 72125 CT NECK SPINE W/O DYE: CPT

## 2019-02-21 PROCEDURE — 72131 CT LUMBAR SPINE W/O DYE: CPT

## 2019-06-18 ENCOUNTER — OFFICE VISIT (OUTPATIENT)
Dept: CARDIOLOGY | Age: 64
End: 2019-06-18
Payer: MEDICARE

## 2019-06-18 VITALS
HEART RATE: 86 BPM | BODY MASS INDEX: 23.14 KG/M2 | HEIGHT: 66 IN | SYSTOLIC BLOOD PRESSURE: 130 MMHG | WEIGHT: 144 LBS | DIASTOLIC BLOOD PRESSURE: 74 MMHG

## 2019-06-18 DIAGNOSIS — E78.2 MIXED HYPERLIPIDEMIA: ICD-10-CM

## 2019-06-18 DIAGNOSIS — F17.210 CIGARETTE NICOTINE DEPENDENCE WITHOUT COMPLICATION: ICD-10-CM

## 2019-06-18 DIAGNOSIS — I25.10 CORONARY ARTERY DISEASE INVOLVING NATIVE CORONARY ARTERY OF NATIVE HEART WITHOUT ANGINA PECTORIS: Primary | ICD-10-CM

## 2019-06-18 DIAGNOSIS — I10 ESSENTIAL HYPERTENSION: ICD-10-CM

## 2019-06-18 PROCEDURE — 93000 ELECTROCARDIOGRAM COMPLETE: CPT | Performed by: CLINICAL NURSE SPECIALIST

## 2019-06-18 PROCEDURE — G8427 DOCREV CUR MEDS BY ELIG CLIN: HCPCS | Performed by: CLINICAL NURSE SPECIALIST

## 2019-06-18 PROCEDURE — 4004F PT TOBACCO SCREEN RCVD TLK: CPT | Performed by: CLINICAL NURSE SPECIALIST

## 2019-06-18 PROCEDURE — 3017F COLORECTAL CA SCREEN DOC REV: CPT | Performed by: CLINICAL NURSE SPECIALIST

## 2019-06-18 PROCEDURE — 99213 OFFICE O/P EST LOW 20 MIN: CPT | Performed by: CLINICAL NURSE SPECIALIST

## 2019-06-18 PROCEDURE — G8599 NO ASA/ANTIPLAT THER USE RNG: HCPCS | Performed by: CLINICAL NURSE SPECIALIST

## 2019-06-18 PROCEDURE — G8420 CALC BMI NORM PARAMETERS: HCPCS | Performed by: CLINICAL NURSE SPECIALIST

## 2019-06-18 RX ORDER — CARBAMAZEPINE 100 MG/1
1 TABLET, CHEWABLE ORAL DAILY
Status: ON HOLD | COMMUNITY
End: 2019-07-09 | Stop reason: HOSPADM

## 2019-06-18 RX ORDER — METOPROLOL SUCCINATE 25 MG/1
25 TABLET, EXTENDED RELEASE ORAL DAILY
Qty: 90 TABLET | Refills: 3 | Status: SHIPPED | OUTPATIENT
Start: 2019-06-18

## 2019-06-18 RX ORDER — NITROGLYCERIN 0.4 MG/1
0.4 TABLET SUBLINGUAL EVERY 5 MIN PRN
Qty: 25 TABLET | Refills: 3 | Status: SHIPPED | OUTPATIENT
Start: 2019-06-18

## 2019-06-18 RX ORDER — LISINOPRIL 10 MG/1
1 TABLET ORAL DAILY
COMMUNITY
End: 2019-06-18 | Stop reason: SDUPTHER

## 2019-06-18 RX ORDER — LISINOPRIL 10 MG/1
10 TABLET ORAL DAILY
Qty: 90 TABLET | Refills: 3 | Status: ON HOLD | OUTPATIENT
Start: 2019-06-18 | End: 2019-09-02 | Stop reason: HOSPADM

## 2019-06-18 RX ORDER — ATORVASTATIN CALCIUM 10 MG/1
10 TABLET, FILM COATED ORAL DAILY
Qty: 90 TABLET | Refills: 3 | Status: SHIPPED | OUTPATIENT
Start: 2019-06-18 | End: 2019-06-20 | Stop reason: SDUPTHER

## 2019-06-18 RX ORDER — ROPINIROLE 1 MG/1
1 TABLET, FILM COATED ORAL NIGHTLY PRN
Status: ON HOLD | COMMUNITY
End: 2019-07-19 | Stop reason: HOSPADM

## 2019-06-18 RX ORDER — ATORVASTATIN CALCIUM 10 MG/1
1 TABLET, FILM COATED ORAL DAILY
COMMUNITY
End: 2019-06-18 | Stop reason: SDUPTHER

## 2019-06-18 RX ORDER — FUROSEMIDE 40 MG/1
1 TABLET ORAL DAILY
COMMUNITY
Start: 2019-06-10 | End: 2019-06-18 | Stop reason: SDUPTHER

## 2019-06-18 RX ORDER — METOPROLOL SUCCINATE 25 MG/1
1 TABLET, EXTENDED RELEASE ORAL DAILY
COMMUNITY
End: 2019-06-18 | Stop reason: SDUPTHER

## 2019-06-18 RX ORDER — FUROSEMIDE 40 MG/1
40 TABLET ORAL DAILY
Qty: 90 TABLET | Refills: 3 | Status: SHIPPED | OUTPATIENT
Start: 2019-06-18

## 2019-06-18 RX ORDER — BACLOFEN 20 MG/1
1 TABLET ORAL 2 TIMES DAILY
Status: ON HOLD | COMMUNITY
End: 2019-07-19 | Stop reason: HOSPADM

## 2019-06-18 RX ORDER — TAMSULOSIN HYDROCHLORIDE 0.4 MG/1
1 CAPSULE ORAL DAILY
COMMUNITY
Start: 2019-01-10

## 2019-06-18 ASSESSMENT — ENCOUNTER SYMPTOMS
COUGH: 0
EYE REDNESS: 0
WHEEZING: 0
CHEST TIGHTNESS: 0
SHORTNESS OF BREATH: 1
NAUSEA: 0
VOMITING: 0
FACIAL SWELLING: 0
ABDOMINAL PAIN: 0

## 2019-06-18 NOTE — PROGRESS NOTES
[facility], [address] [place] [address], [place]  [address]  Phone: [phone]  Fax: [phone]    OFFICE VISIT:  2019    Brii Brochure - : 1955    Reason For Visit:  Deo Salamanca is a 61 y.o. male who is here for follow-up for CAD    HPI  Patient is here for follow-up with history of CAD with CABG , hypertension, hyperlipidemia, dysphagia, smoking. The patient had bare-metal stent placement to SVG to  in . Patient has multiple issues with dysphasia and currently has a feeding tube in place. He follows with Dr. Malu Oates at [facility].  He states he is no longer using the feeding tube and swallowing has improved somewhat. He continues to smoke. At last visit, there are issues with homelessness. He is now back at his home. He denies any chest pain, unusual dyspnea, orthopnea, PND, edema, or palpitations     Jorge Alcazar MD is PCP.   Brii Weston has the following history as recorded in [facility]:    Patient Active Problem List    Diagnosis Date Noted    Cigarette nicotine dependence without complication     Other dysphagia 2018    Acute chest pain 2018    History of coronary artery stent placement 2016    Hx of CABG 2016    ACS (acute coronary syndrome) (HCC)     Alkaline phosphatase elevation     Liver enzyme elevation     HTN (hypertension)     Smoker     CAD (coronary artery disease)     Chest pain      Past Medical History:   Diagnosis Date    Gastelum's esophagus     CAD (coronary artery disease)     Chest pain     COPD (chronic obstructive pulmonary disease) (HCC)     DM2 (diabetes mellitus, type 2) (HCC)     GERD (gastroesophageal reflux disease)     History of cervical fracture     History of drug abuse     HTN (hypertension)     Seizure disorder ([facility] [address]     Tobacco abuse      Past Surgical History:   Procedure Laterality Date    CARDIAC CATHETERIZATION  13  [facility]    EF over 60%    CORONARY Constitutional: Negative for activity change, diaphoresis, fatigue, fever and unexpected weight change. HENT: Negative for facial swelling and nosebleeds. Dysphagia   Eyes: Negative for redness and visual disturbance. Respiratory: Positive for shortness of breath (chronic). Negative for cough, chest tightness and wheezing. Cardiovascular: Negative for chest pain, palpitations and leg swelling. Gastrointestinal: Negative for abdominal pain, nausea and vomiting. Endocrine: Negative for cold intolerance and heat intolerance. Genitourinary: Negative for dysuria and hematuria. Musculoskeletal: Negative for arthralgias and myalgias. Skin: Negative for pallor and rash. Neurological: Negative for dizziness, seizures, syncope, weakness and light-headedness. Hematological: Does not bruise/bleed easily. Psychiatric/Behavioral: Negative for agitation. The patient is not nervous/anxious. Objective  Vital Signs - /74   Pulse 86   Ht 5' 6\" (1.676 m)   Wt 144 lb (65.3 kg)   BMI 23.24 kg/m²   Physical Exam   Constitutional: He is oriented to person, place, and time. He appears well-developed and well-nourished. HENT:   Head: Normocephalic and atraumatic. Eyes: Pupils are equal, round, and reactive to light. Right eye exhibits no discharge. Left eye exhibits no discharge. Neck: No JVD present. No tracheal deviation present. Cardiovascular: Normal rate, regular rhythm, normal heart sounds and intact distal pulses. Exam reveals no gallop and no friction rub. No murmur heard. No carotid bruit   Pulmonary/Chest: Effort normal and breath sounds normal. No respiratory distress. He has no wheezes. He has no rales. Abdominal: Soft. There is no tenderness. Feeding tube   Musculoskeletal: He exhibits no edema. Normal gait and station   Neurological: He is alert and oriented to person, place, and time. No cranial nerve deficit. Skin: Skin is warm and dry. No rash noted. Psychiatric: He has a normal mood and affect. His behavior is normal. Judgment normal.   Nursing note and vitals reviewed. Data:    Cardiac Cath 2/18  Impression:    Grossly normal left ventricular systolic function-patent stent   noted in the left anterior descending with severe disease of the   proximal 1st obtuse marginal branch and mid right coronary   artery. Atretic but patent left internal mammary graft to left   anterior descending and patent saphenous vein grafts to the   diagonal and obtuse marginal branch. Successful stenting of the   mid right coronary artery employing a 3.0 x 18 mm bare metal   Stent    Julia 7/18  Impression   1. Inferior and septal infarction without ischemia. 2. The left ventricular ejection fraction is 62%. Signed by Dr Ronaldo Robison on 7/30/2018 8:01        Assessment:     Diagnosis Orders   1. Coronary artery disease involving native coronary artery of native heart without angina pectoris     2. Essential hypertension  EKG 12 lead   3. Mixed hyperlipidemia  ALT    AST    Lipid Panel   4. Cigarette nicotine dependence without complication       CAD-stable without angina. Dysphagia-with feeding tube. Follows with ENT at [facility].  Encouraged him to make appointment for follow-up with their office    Hypertension-well controlled    Hyperlipidemia-on statin therapy. Check cholesterol, AST, ALT fasting    Nicotine dependence-Instructed patient in smoking cessation rationales, strategies, and available resources x 1 minute. Stable cardiovascular status. No evidence of overt heart failure,angina or dysrhythmia. Plan    Return in about 6 months (around 12/18/2019) for APRN. Quit smoking. Call the [address] [place] [address] 5-064-QUIT-NOW  Check cholesterol fasting    Call with any questionsor concerns  Follow up with Park Campuzano MD for non cardiac problems  Report any new problems  Cardiovascular Fitness-Exercise as tolerated.   Strive for 15 minutes of

## 2019-06-18 NOTE — PATIENT INSTRUCTIONS
Return in about 6 months (around 12/18/2019) for APRN. Quit smoking. Call the [facility] [address] [place] [address] 6-800-QUIT-NOW  Check cholesterol fasting    Call with any questionsor concerns  Follow up with Sarah Yang MD for non cardiac problems  Report any new problems  Cardiovascular Fitness-Exercise as tolerated. Strive for 15 minutes of exercise most days of the week. Cardiac / HealthyDiet  Continue current medications as directed  Continue plan of treatment  It is always recommended that you bring your medicationsbottles with you to each visit - this is for your safety! Patient Education        Stopping Smoking: Care Instructions  Your Care Instructions  Cigarette smokers crave the nicotine in cigarettes. Giving it up is much harder than simply changing a habit. Your body has to stop craving the nicotine. It is hard to quit, but you can do it. There are many tools that people use to quit smoking. You may find that combining tools works best for you. There are several steps to quitting. First you get ready to quit. Then you get support to help you. After that, you learn new skills and behaviors to become a nonsmoker. For many people, a necessary step is getting and using medicine. Your doctor will help you set up the plan that best meets your needs. You may want to attend a smoking cessation program to help you quit smoking. When you choose a program, look for one that has proven success. Ask your doctor for ideas. You will greatly increase your chances of success if you take medicine as well as get counseling or join a cessation program.  Some of the changes you feel when you first quit tobacco are uncomfortable. Your body will miss the nicotine at first, and you may feel short-tempered and grumpy. You may have trouble sleeping or concentrating. Medicine can help you deal with these symptoms. You may struggle with changing your smoking habits and rituals. The last step is the tricky one:  Be prepared for need. These products come in several forms, many of them available over-the-counter:  ? Nicotine patches  ? Nicotine gum and lozenges  ? Nicotine inhaler  · Ask your doctor about bupropion (Wellbutrin) or varenicline (Chantix), which are prescription medicines. They do not contain nicotine. They help you by reducing withdrawal symptoms, such as stress and anxiety. · Some people find hypnosis, acupuncture, and massage helpful for ending the smoking habit. · Eat a healthy diet and get regular exercise. Having healthy habits will help your body move past its craving for nicotine. · Be prepared to keep trying. Most people are not successful the first few times they try to quit. Do not get mad at yourself if you smoke again. Make a list of things you learned and think about when you want to try again, such as next week, next month, or next year. Where can you learn more? Go to https://Play[employer]VintnersÃ¢â‚¬â„¢ Alliance.Foneshow. org and sign in to your [employer] account. Enter A991 in the [employer] box to learn more about \"Stopping Smoking: Care Instructions. \"     If you do not have an account, please click on the \"Sign Up Now\" link. Current as of: September 26, 2018  Content Version: 12.0  © 1669-8355 Healthwise, Incorporated. Care instructions adapted under license by [facility] ([facility]). If you have questions about a medical condition or this instruction, always ask your healthcare professional. [place] [address] any warranty or liability for your use of this information.

## 2019-06-19 PROBLEM — F17.210 CIGARETTE NICOTINE DEPENDENCE WITHOUT COMPLICATION: Status: ACTIVE | Noted: 2019-06-19

## 2019-06-20 DIAGNOSIS — E78.2 MIXED HYPERLIPIDEMIA: ICD-10-CM

## 2019-06-20 LAB
ALT SERPL-CCNC: 33 U/L (ref 5–41)
AST SERPL-CCNC: 29 U/L (ref 5–40)
CHOLESTEROL, TOTAL: 171 MG/DL (ref 160–199)
HDLC SERPL-MCNC: 46 MG/DL (ref 55–121)
LDL CHOLESTEROL CALCULATED: 107 MG/DL
TRIGL SERPL-MCNC: 91 MG/DL (ref 0–149)

## 2019-06-20 RX ORDER — ATORVASTATIN CALCIUM 10 MG/1
10 TABLET, FILM COATED ORAL DAILY
Qty: 90 TABLET | Refills: 3 | Status: SHIPPED | OUTPATIENT
Start: 2019-06-20 | End: 2019-12-03

## 2019-07-08 ENCOUNTER — HOSPITAL ENCOUNTER (OUTPATIENT)
Age: 64
Setting detail: OBSERVATION
Discharge: HOME | End: 2019-07-09
Attending: EMERGENCY MEDICINE | Admitting: INTERNAL MEDICINE
Payer: MEDICARE

## 2019-07-08 DIAGNOSIS — R07.9 CHEST PAIN, UNSPECIFIED TYPE: Primary | ICD-10-CM

## 2019-07-08 LAB
ALBUMIN SERPL-MCNC: 4.1 G/DL (ref 3.5–5.2)
ALP BLD-CCNC: 153 U/L (ref 40–130)
ALT SERPL-CCNC: 31 U/L (ref 5–41)
ANION GAP SERPL CALCULATED.3IONS-SCNC: 13 MMOL/L (ref 7–19)
AST SERPL-CCNC: 33 U/L (ref 5–40)
BASOPHILS ABSOLUTE: 0 K/UL (ref 0–0.2)
BASOPHILS RELATIVE PERCENT: 0.6 % (ref 0–1)
BILIRUB SERPL-MCNC: 0.3 MG/DL (ref 0.2–1.2)
BUN BLDV-MCNC: 19 MG/DL (ref 8–23)
CALCIUM SERPL-MCNC: 9.5 MG/DL (ref 8.8–10.2)
CHLORIDE BLD-SCNC: 98 MMOL/L (ref 98–111)
CO2: 25 MMOL/L (ref 22–29)
CREAT SERPL-MCNC: 0.9 MG/DL (ref 0.5–1.2)
EOSINOPHILS ABSOLUTE: 0.2 K/UL (ref 0–0.6)
EOSINOPHILS RELATIVE PERCENT: 3.5 % (ref 0–5)
GFR NON-AFRICAN AMERICAN: >60
GLUCOSE BLD-MCNC: 244 MG/DL (ref 74–109)
HCT VFR BLD CALC: 38.6 % (ref 42–52)
HEMOGLOBIN: 13.2 G/DL (ref 14–18)
LYMPHOCYTES ABSOLUTE: 1.4 K/UL (ref 1.1–4.5)
LYMPHOCYTES RELATIVE PERCENT: 20.7 % (ref 20–40)
MCH RBC QN AUTO: 30.5 PG (ref 27–31)
MCHC RBC AUTO-ENTMCNC: 34.2 G/DL (ref 33–37)
MCV RBC AUTO: 89.1 FL (ref 80–94)
MONOCYTES ABSOLUTE: 0.8 K/UL (ref 0–0.9)
MONOCYTES RELATIVE PERCENT: 11.4 % (ref 0–10)
NEUTROPHILS ABSOLUTE: 4.2 K/UL (ref 1.5–7.5)
NEUTROPHILS RELATIVE PERCENT: 63.5 % (ref 50–65)
PDW BLD-RTO: 12.8 % (ref 11.5–14.5)
PLATELET # BLD: 195 K/UL (ref 130–400)
PMV BLD AUTO: 11.4 FL (ref 9.4–12.4)
POTASSIUM SERPL-SCNC: 4 MMOL/L (ref 3.5–5)
RBC # BLD: 4.33 M/UL (ref 4.7–6.1)
SODIUM BLD-SCNC: 136 MMOL/L (ref 136–145)
TOTAL PROTEIN: 7.3 G/DL (ref 6.6–8.7)
TROPONIN: 0.02 NG/ML (ref 0–0.03)
WBC # BLD: 6.6 K/UL (ref 4.8–10.8)

## 2019-07-08 PROCEDURE — 99285 EMERGENCY DEPT VISIT HI MDM: CPT

## 2019-07-08 PROCEDURE — 93005 ELECTROCARDIOGRAM TRACING: CPT

## 2019-07-08 ASSESSMENT — PAIN DESCRIPTION - PAIN TYPE: TYPE: ACUTE PAIN

## 2019-07-08 ASSESSMENT — PAIN SCALES - GENERAL: PAINLEVEL_OUTOF10: 8

## 2019-07-08 ASSESSMENT — PAIN DESCRIPTION - FREQUENCY: FREQUENCY: CONTINUOUS

## 2019-07-09 ENCOUNTER — APPOINTMENT (OUTPATIENT)
Dept: NUCLEAR MEDICINE | Age: 64
End: 2019-07-09
Payer: MEDICARE

## 2019-07-09 ENCOUNTER — APPOINTMENT (OUTPATIENT)
Dept: GENERAL RADIOLOGY | Age: 64
End: 2019-07-09
Payer: MEDICARE

## 2019-07-09 VITALS
HEART RATE: 73 BPM | TEMPERATURE: 97.6 F | HEIGHT: 66 IN | BODY MASS INDEX: 22.53 KG/M2 | WEIGHT: 140.2 LBS | SYSTOLIC BLOOD PRESSURE: 133 MMHG | DIASTOLIC BLOOD PRESSURE: 80 MMHG | RESPIRATION RATE: 18 BRPM | OXYGEN SATURATION: 96 %

## 2019-07-09 LAB
INR BLD: 1.11 (ref 0.88–1.18)
PROTHROMBIN TIME: 13.7 SEC (ref 12–14.6)
TROPONIN: <0.01 NG/ML (ref 0–0.03)
TROPONIN: <0.01 NG/ML (ref 0–0.03)

## 2019-07-09 PROCEDURE — 6360000002 HC RX W HCPCS: Performed by: INTERNAL MEDICINE

## 2019-07-09 PROCEDURE — 2500000003 HC RX 250 WO HCPCS: Performed by: EMERGENCY MEDICINE

## 2019-07-09 PROCEDURE — A9500 TC99M SESTAMIBI: HCPCS | Performed by: INTERNAL MEDICINE

## 2019-07-09 PROCEDURE — 85025 COMPLETE CBC W/AUTO DIFF WBC: CPT

## 2019-07-09 PROCEDURE — G0378 HOSPITAL OBSERVATION PER HR: HCPCS

## 2019-07-09 PROCEDURE — 96372 THER/PROPH/DIAG INJ SC/IM: CPT

## 2019-07-09 PROCEDURE — 3430000000 HC RX DIAGNOSTIC RADIOPHARMACEUTICAL: Performed by: INTERNAL MEDICINE

## 2019-07-09 PROCEDURE — 99285 EMERGENCY DEPT VISIT HI MDM: CPT | Performed by: EMERGENCY MEDICINE

## 2019-07-09 PROCEDURE — 6360000002 HC RX W HCPCS

## 2019-07-09 PROCEDURE — 85610 PROTHROMBIN TIME: CPT

## 2019-07-09 PROCEDURE — 84484 ASSAY OF TROPONIN QUANT: CPT

## 2019-07-09 PROCEDURE — 96365 THER/PROPH/DIAG IV INF INIT: CPT

## 2019-07-09 PROCEDURE — 78452 HT MUSCLE IMAGE SPECT MULT: CPT

## 2019-07-09 PROCEDURE — 2580000003 HC RX 258: Performed by: EMERGENCY MEDICINE

## 2019-07-09 PROCEDURE — 6370000000 HC RX 637 (ALT 250 FOR IP)

## 2019-07-09 PROCEDURE — 80053 COMPREHEN METABOLIC PANEL: CPT

## 2019-07-09 PROCEDURE — 2580000003 HC RX 258: Performed by: INTERNAL MEDICINE

## 2019-07-09 PROCEDURE — 93017 CV STRESS TEST TRACING ONLY: CPT

## 2019-07-09 PROCEDURE — 36415 COLL VENOUS BLD VENIPUNCTURE: CPT

## 2019-07-09 PROCEDURE — 96374 THER/PROPH/DIAG INJ IV PUSH: CPT

## 2019-07-09 PROCEDURE — 71045 X-RAY EXAM CHEST 1 VIEW: CPT

## 2019-07-09 PROCEDURE — 6370000000 HC RX 637 (ALT 250 FOR IP): Performed by: INTERNAL MEDICINE

## 2019-07-09 PROCEDURE — 96366 THER/PROPH/DIAG IV INF ADDON: CPT

## 2019-07-09 PROCEDURE — 99220 PR INITIAL OBSERVATION CARE/DAY 70 MINUTES: CPT | Performed by: INTERNAL MEDICINE

## 2019-07-09 PROCEDURE — 96375 TX/PRO/DX INJ NEW DRUG ADDON: CPT

## 2019-07-09 RX ORDER — ONDANSETRON 2 MG/ML
4 INJECTION INTRAMUSCULAR; INTRAVENOUS EVERY 6 HOURS PRN
Status: DISCONTINUED | OUTPATIENT
Start: 2019-07-09 | End: 2019-07-09 | Stop reason: HOSPADM

## 2019-07-09 RX ORDER — FUROSEMIDE 40 MG/1
40 TABLET ORAL DAILY
Status: DISCONTINUED | OUTPATIENT
Start: 2019-07-09 | End: 2019-07-09 | Stop reason: HOSPADM

## 2019-07-09 RX ORDER — BACLOFEN 10 MG/1
20 TABLET ORAL 2 TIMES DAILY
Status: DISCONTINUED | OUTPATIENT
Start: 2019-07-09 | End: 2019-07-09 | Stop reason: HOSPADM

## 2019-07-09 RX ORDER — SODIUM CHLORIDE 0.9 % (FLUSH) 0.9 %
10 SYRINGE (ML) INJECTION EVERY 12 HOURS SCHEDULED
Status: DISCONTINUED | OUTPATIENT
Start: 2019-07-09 | End: 2019-07-09 | Stop reason: HOSPADM

## 2019-07-09 RX ORDER — ASPIRIN 81 MG/1
81 TABLET, CHEWABLE ORAL DAILY
Status: DISCONTINUED | OUTPATIENT
Start: 2019-07-09 | End: 2019-07-09

## 2019-07-09 RX ORDER — NITROGLYCERIN 0.4 MG/1
0.4 TABLET SUBLINGUAL EVERY 5 MIN PRN
Status: DISCONTINUED | OUTPATIENT
Start: 2019-07-09 | End: 2019-07-09 | Stop reason: HOSPADM

## 2019-07-09 RX ORDER — ASPIRIN 81 MG/1
81 TABLET, CHEWABLE ORAL DAILY
Status: DISCONTINUED | OUTPATIENT
Start: 2019-07-10 | End: 2019-07-09 | Stop reason: HOSPADM

## 2019-07-09 RX ORDER — INSULIN GLARGINE 100 [IU]/ML
30 INJECTION, SOLUTION SUBCUTANEOUS EVERY MORNING
Status: DISCONTINUED | OUTPATIENT
Start: 2019-07-10 | End: 2019-07-09 | Stop reason: HOSPADM

## 2019-07-09 RX ORDER — MORPHINE SULFATE 4 MG/ML
INJECTION, SOLUTION INTRAMUSCULAR; INTRAVENOUS
Status: COMPLETED
Start: 2019-07-09 | End: 2019-07-09

## 2019-07-09 RX ORDER — NITROGLYCERIN 0.4 MG/1
0.4 TABLET SUBLINGUAL ONCE
Status: COMPLETED | OUTPATIENT
Start: 2019-07-09 | End: 2019-07-09

## 2019-07-09 RX ORDER — LISINOPRIL 10 MG/1
10 TABLET ORAL DAILY
Status: DISCONTINUED | OUTPATIENT
Start: 2019-07-09 | End: 2019-07-09 | Stop reason: HOSPADM

## 2019-07-09 RX ORDER — METOPROLOL SUCCINATE 25 MG/1
25 TABLET, EXTENDED RELEASE ORAL DAILY
Status: DISCONTINUED | OUTPATIENT
Start: 2019-07-09 | End: 2019-07-09 | Stop reason: HOSPADM

## 2019-07-09 RX ORDER — ATORVASTATIN CALCIUM 10 MG/1
10 TABLET, FILM COATED ORAL DAILY
Status: DISCONTINUED | OUTPATIENT
Start: 2019-07-09 | End: 2019-07-09 | Stop reason: HOSPADM

## 2019-07-09 RX ORDER — NITROGLYCERIN 20 MG/100ML
5 INJECTION INTRAVENOUS CONTINUOUS
Status: DISCONTINUED | OUTPATIENT
Start: 2019-07-09 | End: 2019-07-09

## 2019-07-09 RX ORDER — SODIUM CHLORIDE 0.9 % (FLUSH) 0.9 %
10 SYRINGE (ML) INJECTION PRN
Status: DISCONTINUED | OUTPATIENT
Start: 2019-07-09 | End: 2019-07-09 | Stop reason: HOSPADM

## 2019-07-09 RX ORDER — ROPINIROLE 1 MG/1
1 TABLET, FILM COATED ORAL NIGHTLY PRN
Status: DISCONTINUED | OUTPATIENT
Start: 2019-07-09 | End: 2019-07-09 | Stop reason: HOSPADM

## 2019-07-09 RX ORDER — TAMSULOSIN HYDROCHLORIDE 0.4 MG/1
0.4 CAPSULE ORAL DAILY
Status: DISCONTINUED | OUTPATIENT
Start: 2019-07-09 | End: 2019-07-09 | Stop reason: HOSPADM

## 2019-07-09 RX ORDER — PROPOFOL 10 MG/ML
10 INJECTION, EMULSION INTRAVENOUS ONCE
Status: DISCONTINUED | OUTPATIENT
Start: 2019-07-09 | End: 2019-07-09

## 2019-07-09 RX ORDER — SODIUM CHLORIDE 9 MG/ML
INJECTION, SOLUTION INTRAVENOUS CONTINUOUS
Status: DISCONTINUED | OUTPATIENT
Start: 2019-07-09 | End: 2019-07-09 | Stop reason: HOSPADM

## 2019-07-09 RX ORDER — NITROGLYCERIN 0.4 MG/1
TABLET SUBLINGUAL
Status: COMPLETED
Start: 2019-07-09 | End: 2019-07-09

## 2019-07-09 RX ORDER — MORPHINE SULFATE 4 MG/ML
4 INJECTION, SOLUTION INTRAMUSCULAR; INTRAVENOUS ONCE
Status: COMPLETED | OUTPATIENT
Start: 2019-07-09 | End: 2019-07-09

## 2019-07-09 RX ORDER — ZOLPIDEM TARTRATE 5 MG/1
10 TABLET ORAL NIGHTLY PRN
Status: DISCONTINUED | OUTPATIENT
Start: 2019-07-09 | End: 2019-07-09 | Stop reason: HOSPADM

## 2019-07-09 RX ADMIN — MORPHINE SULFATE 4 MG: 4 INJECTION, SOLUTION INTRAMUSCULAR; INTRAVENOUS at 04:47

## 2019-07-09 RX ADMIN — ENOXAPARIN SODIUM 60 MG: 60 INJECTION SUBCUTANEOUS at 12:18

## 2019-07-09 RX ADMIN — ASPIRIN 81 MG 81 MG: 81 TABLET ORAL at 12:18

## 2019-07-09 RX ADMIN — Medication 10 ML: at 12:18

## 2019-07-09 RX ADMIN — REGADENOSON 0.4 MG: 0.08 INJECTION, SOLUTION INTRAVENOUS at 10:52

## 2019-07-09 RX ADMIN — NITROGLYCERIN 0.4 MG: 0.4 TABLET SUBLINGUAL at 04:48

## 2019-07-09 RX ADMIN — NITROGLYCERIN 5 MCG/MIN: 20 INJECTION INTRAVENOUS at 06:37

## 2019-07-09 RX ADMIN — TETRAKIS(2-METHOXYISOBUTYLISOCYANIDE)COPPER(I) TETRAFLUOROBORATE 30 MILLICURIE: 1 INJECTION, POWDER, LYOPHILIZED, FOR SOLUTION INTRAVENOUS at 12:32

## 2019-07-09 RX ADMIN — NITROGLYCERIN 0.4 MG: 0.4 TABLET, ORALLY DISINTEGRATING SUBLINGUAL at 04:48

## 2019-07-09 RX ADMIN — TETRAKIS(2-METHOXYISOBUTYLISOCYANIDE)COPPER(I) TETRAFLUOROBORATE 10 MILLICURIE: 1 INJECTION, POWDER, LYOPHILIZED, FOR SOLUTION INTRAVENOUS at 12:32

## 2019-07-09 RX ADMIN — MORPHINE SULFATE 4 MG: 4 INJECTION INTRAVENOUS at 04:47

## 2019-07-09 RX ADMIN — SODIUM CHLORIDE: 9 INJECTION, SOLUTION INTRAVENOUS at 06:37

## 2019-07-09 ASSESSMENT — PAIN SCALES - GENERAL
PAINLEVEL_OUTOF10: 9
PAINLEVEL_OUTOF10: 9

## 2019-07-09 ASSESSMENT — ENCOUNTER SYMPTOMS
VOMITING: 0
SHORTNESS OF BREATH: 1
ABDOMINAL PAIN: 0
DIARRHEA: 0
CHEST TIGHTNESS: 1

## 2019-07-09 NOTE — PROGRESS NOTES
[facility] arrived to room # 852-6. Presented with: chest pain   Mental Status: Patient is oriented, alert, coherent, logical, thought processes intact and able to concentrate and follow conversation. Vitals:    07/09/19 0925   BP:    Pulse: 78   Resp:    Temp:    SpO2:      Patient safety contract and falls prevention contract reviewed with patient Yes. Oriented Patient to room. Call light within reach. Yes. Needs, issues or concerns expressed at this time: no. No needs expressed at this time.          Electronically signed by Cierra Swanson RN on 7/9/2019 at 1:37 PM

## 2019-07-09 NOTE — H&P
[address] [facility]      History and Physical      Date of Admission:  7/8/2019 11:02 PM    Date of Initially Being Seen / Consultation:  7/9/19    Cardiologist:  NASIR Borden MD    Attending: Dr. Jameel Strickland      PCP:  Melyssa Triplett MD    Reason for Consultation or Admission / Chief Complaint:  Chest pain    SUBJECTIVE AND HISTORY OF PRESENT ILLNESS:    Source of the history:  Patient, family, previous inpatient and outpatient records in [facility], and from ER note    Chuck South is a 59 y.o. male who presents to [facility] ER with symptoms / signs / problem or diagnosis of coronary artery disease. The patient is a poor informant. He presents to the ER with numerous complaints, among which is chest discomfort. While at rest the patient began having chest discomfort all across his anterior chest described as an aching sensation. He tried aspirin and nitroglycerin together and obtained slight relief but the discomfort persisted. The patient has also had a headache and generalized body aching. He has had no nausea vomiting or dyspnea. No palpitations syncope or near syncope.       CARDIAC RISK PROFILE:    Risk Factor Yes / No / Unknown       Cigarette Use Former    Diabetes Mellitus Yes    Family History of CAD See below    Hypercholesteremia No    Hypertension Yes           Cardiac Specific Problems:    Specialty Problems        Cardiology Problems    CAD (coronary artery disease)        HTN (hypertension)        ACS (acute coronary syndrome) ([facility])                PRIOR CARDIAC PROBLEM LIST  (IF APPLICABLE):    As noted      Past Medical History:  Past Medical History:   Diagnosis Date    Gastelum's esophagus     CAD (coronary artery disease)     Chest pain     [facility]) [facility]     DM2 (diabetes mellitus, type 2) ([facility])     GERD (gastroesophageal reflux disease)     History of cervical fracture     History of drug abuse     HTN (hypertension)     Seizure disorder ([facility]     Tobacco abuse    He has a history of oropharyngeal cancer and has undergone radiation therapy and has had a G-tube placed which has now been removed. Past Surgical History:  Past Surgical History:   Procedure Laterality Date    C[facility]  [facility]    EF over 60%    CORONARY ANGIOPLASTY WITH STENT PLACEMENT      x6 unsure of dates    CORONARY ANGIOPLASTY WITH STENT PLACEMENT  02/2018    BMS to med RCA    CORONARY ARTERY BYPASS GRAFT  2008   He has also had tracheostomy, tonsillectomy, testicular surgery, Maverick fundoplication, cholecystectomy, G-tube placement and removal, inguinal herniorrhaphy, direct laryngoscopy with biopsy. Home Medications:   Prior to Admission medications    Medication Sig Start Date End Date Taking? Authorizing Provider   atorvastatin (LIPITOR) 10 MG tablet Take 1 tablet by mouth daily 6/20/19   DORON Spencer   baclofen (LIORESAL) 20 MG tablet Take 1 tablet by mouth 2 times daily    Historical Provider, MD   carBAMazepine (TEGRETOL) 100 MG chewable tablet Take 1 tablet by mouth daily    Historical Provider, MD   rOPINIRole (REQUIP) 1 MG tablet Take 1 tablet by mouth nightly as needed    Historical Provider, MD   tamsulosin (FLOMAX) 0.4 MG capsule Take 1 capsule by mouth daily 1/10/19   Historical Provider, MD   lisinopril (PRINIVIL;ZESTRIL) 10 MG tablet Take 1 tablet by mouth daily 6/18/19   DORON Spencer   furosemide (LASIX) 40 MG tablet Take 1 tablet by mouth daily 6/18/19   DORON Spencer   metoprolol succinate (TOPROL XL) 25 MG extended release tablet Take 1 tablet by mouth daily 6/18/19   DORON Spencer   nitroGLYCERIN (NITROSTAT) 0.4 MG SL tablet Place 1 tablet under the tongue every 5 minutes as needed for Chest pain up to max of 3 total doses.  If no relief after 1 dose, call 911. 6/18/19   DORON Spencer   aspirin 81 MG chewable tablet Take 1 tablet by mouth daily 2/11/18   Fiorella Douglass MD insulin glargine (LANTUS) 100 UNIT/ML injection vial Inject 30 Units into the skin every morning    Historical Provider, MD   zolpidem (AMBIEN) 10 MG tablet Take by mouth nightly as needed for Sleep    Historical Provider, MD   oxyCODONE-acetaminophen (PERCOCET)  MG per tablet Take 1 tablet by mouth every 8 hours as needed     Historical Provider, MD        Facility Administered Medications:        Allergies:    Codeine     Social History:    Social History     Socioeconomic History    Marital status:      Spouse name: Not on file    Number of children: Not on file    Years of education: Not on file    Highest education level: Not on file   Occupational History    Not on file   Social Needs    Financial resource strain: Not on file    Food insecurity:     Worry: Not on file     Inability: Not on file    Transportation needs:     Medical: Not on file     Non-medical: Not on file   Tobacco Use    Smoking status: Current Every Day Smoker     Packs/day: 0.50     Years: 50.00     Pack years: 25.00     Types: Cigarettes    Smokeless tobacco: Never Used    Tobacco comment: 10 cigerrettes aday   Substance and Sexual Activity    Alcohol use: Yes     Comment: monthly    Drug use: No    Sexual activity: Not on file   Lifestyle    Physical activity:     Days per week: Not on file     Minutes per session: Not on file    Stress: Not on file   Relationships    Social connections:     Talks on phone: Not on file     Gets together: Not on file     Attends Spiritism service: Not on file     Active member of club or organization: Not on file     Attends meetings of clubs or organizations: Not on file     Relationship status: Not on file    Intimate partner violence:     Fear of current or ex partner: Not on file     Emotionally abused: Not on file     Physically abused: Not on file     Forced sexual activity: Not on file   Other Topics Concern    Not on file   Social History Narrative    Not on file

## 2019-07-09 NOTE — ED PROVIDER NOTES
reviewed and are negative. PAST MEDICALHISTORY     Past Medical History:   Diagnosis Date    Gastelum's esophagus     CAD (coronary artery disease)     Chest pain     [facility]) [facility]     DM2 (diabetes mellitus, type 2) ([facility])     GERD (gastroesophageal reflux disease)     History of cervical fracture     History of drug abuse     HTN (hypertension)     Seiz[facility] disorder [facility]     Tobacco abuse          SURGICAL HISTORY       Past Surgical History:   Procedure Laterality Date    [facility]    EF over 60%    CORONARY ANGIOPLASTY WITH STENT PLACEMENT      x6 unsure of dates    CORONARY ANGIOPLASTY WITH STENT PLACEMENT  02/2018    BMS to med RCA    CORONARY ARTERY BYPASS GRAFT  2008         CURRENT MEDICATIONS     Previous Medications    ASPIRIN 81 MG CHEWABLE TABLET    Take 1 tablet by mouth daily    ATORVASTATIN (LIPITOR) 10 MG TABLET    Take 1 tablet by mouth daily    BACLOFEN (LIORESAL) 20 MG TABLET    Take 1 tablet by mouth 2 times daily    CARBAMAZEPINE (TEGRETOL) 100 MG CHEWABLE TABLET    Take 1 tablet by mouth daily    FUROSEMIDE (LASIX) 40 MG TABLET    Take 1 tablet by mouth daily    INSULIN GLARGINE (LANTUS) 100 UNIT/ML INJECTION VIAL    Inject 30 Units into the skin every morning    LISINOPRIL (PRINIVIL;ZESTRIL) 10 MG TABLET    Take 1 tablet by mouth daily    METOPROLOL SUCCINATE (TOPROL XL) 25 MG EXTENDED RELEASE TABLET    Take 1 tablet by mouth daily    NITROGLYCERIN (NITROSTAT) 0.4 MG SL TABLET    Place 1 tablet under the tongue every 5 minutes as needed for Chest pain up to max of 3 total doses. If no relief after 1 dose, call 911.     OXYCODONE-ACETAMINOPHEN (PERCOCET)  MG PER TABLET    Take 1 tablet by mouth every 8 hours as needed     ROPINIROLE (REQUIP) 1 MG TABLET    Take 1 tablet by mouth nightly as needed    TAMSULOSIN (FLOMAX) 0.4 MG CAPSULE    Take 1 capsule by mouth daily    ZOLPIDEM (AMBIEN) 10 MG TABLET dictation. EMERGENCY DEPARTMENT COURSE and DIFFERENTIAL DIAGNOSIS/MDM:   Vitals:    Vitals:    07/08/19 2251 07/09/19 0001 07/09/19 0031   BP: (!) 152/89 124/68 119/71   Pulse: 94 84 86   Resp: 18 20 18   Temp: 97.8 °F (36.6 °C)     SpO2: 96% 96% 93%   Weight: 140 lb (63.5 kg)     Height: 5' 6\" (1.676 m)         MDM    Reassessment    [place] cardiac biomarkers are negative for evidence of ischemic change. EKG does not demonstrate evidence of ST elevation. Given his extensive cardiac history with ongoing pain for several days we will plan for admission, cardiology consultation. CONSULTS:    Case was reviewed with Dr. Doug Garnica regarding observation admission to the Cardiology service. Patient to receive Lovenox 1mg/kg    PROCEDURES:  Unless otherwise noted below, none     Procedures    FINAL IMPRESSION      1.  Chest pain, unspecified type          DISPOSITION/PLAN   DISPOSITION  Admitted      (Please note that portions of this note were completed with a voice recognition program.  Efforts were made to edit thedictations but occasionally words are mis-transcribed.)    Johnny Camp MD (electronically signed)  Attending Emergency Physician         Johnny Camp MD  07/09/19 8511

## 2019-07-10 LAB
EKG P AXIS: 70 DEGREES
EKG P-R INTERVAL: 146 MS
EKG Q-T INTERVAL: 366 MS
EKG QRS DURATION: 98 MS
EKG QTC CALCULATION (BAZETT): 422 MS
EKG T AXIS: 45 DEGREES
LV EF: 48 %
LVEF MODALITY: NORMAL

## 2019-07-16 ENCOUNTER — APPOINTMENT (OUTPATIENT)
Dept: GENERAL RADIOLOGY | Age: 64
End: 2019-07-16
Payer: MEDICARE

## 2019-07-16 ENCOUNTER — APPOINTMENT (OUTPATIENT)
Dept: CT IMAGING | Age: 64
End: 2019-07-16
Payer: MEDICARE

## 2019-07-16 ENCOUNTER — HOSPITAL ENCOUNTER (OUTPATIENT)
Age: 64
Setting detail: OBSERVATION
Discharge: HOME | End: 2019-07-19
Attending: EMERGENCY MEDICINE | Admitting: HOSPITALIST
Payer: MEDICARE

## 2019-07-16 DIAGNOSIS — R20.0 NUMBNESS AND TINGLING: ICD-10-CM

## 2019-07-16 DIAGNOSIS — R07.9 CHEST PAIN, UNSPECIFIED TYPE: Primary | ICD-10-CM

## 2019-07-16 DIAGNOSIS — R73.9 HYPERGLYCEMIA: ICD-10-CM

## 2019-07-16 DIAGNOSIS — R27.0 ATAXIA: ICD-10-CM

## 2019-07-16 DIAGNOSIS — R06.00 DYSPNEA, UNSPECIFIED TYPE: ICD-10-CM

## 2019-07-16 DIAGNOSIS — R20.2 NUMBNESS AND TINGLING: ICD-10-CM

## 2019-07-16 PROBLEM — R42 DIZZY SPELLS: Status: ACTIVE | Noted: 2019-07-16

## 2019-07-16 LAB
ALBUMIN SERPL-MCNC: 4.1 G/DL (ref 3.5–5.2)
ALP BLD-CCNC: 124 U/L (ref 40–130)
ALT SERPL-CCNC: 39 U/L (ref 5–41)
ANION GAP SERPL CALCULATED.3IONS-SCNC: 16 MMOL/L (ref 7–19)
AST SERPL-CCNC: 31 U/L (ref 5–40)
BILIRUB SERPL-MCNC: 0.4 MG/DL (ref 0.2–1.2)
BUN BLDV-MCNC: 21 MG/DL (ref 8–23)
CALCIUM SERPL-MCNC: 9.3 MG/DL (ref 8.8–10.2)
CHLORIDE BLD-SCNC: 99 MMOL/L (ref 98–111)
CHP ED QC CHECK: NORMAL
CO2: 23 MMOL/L (ref 22–29)
CREAT SERPL-MCNC: 0.8 MG/DL (ref 0.5–1.2)
D DIMER: 0.33 UG/ML FEU (ref 0–0.48)
GFR NON-AFRICAN AMERICAN: >60
GLUCOSE BLD-MCNC: 175 MG/DL (ref 70–99)
GLUCOSE BLD-MCNC: 196 MG/DL (ref 74–109)
HCT VFR BLD CALC: 38.5 % (ref 42–52)
HEMOGLOBIN: 12.9 G/DL (ref 14–18)
INR BLD: 1.15 (ref 0.88–1.18)
MCH RBC QN AUTO: 30.1 PG (ref 27–31)
MCHC RBC AUTO-ENTMCNC: 33.5 G/DL (ref 33–37)
MCV RBC AUTO: 89.7 FL (ref 80–94)
PDW BLD-RTO: 13.1 % (ref 11.5–14.5)
PERFORMED ON: ABNORMAL
PLATELET # BLD: 210 K/UL (ref 130–400)
PMV BLD AUTO: 11.1 FL (ref 9.4–12.4)
POTASSIUM SERPL-SCNC: 4 MMOL/L (ref 3.5–5)
PROTHROMBIN TIME: 14.1 SEC (ref 12–14.6)
RBC # BLD: 4.29 M/UL (ref 4.7–6.1)
SODIUM BLD-SCNC: 138 MMOL/L (ref 136–145)
TOTAL PROTEIN: 7.6 G/DL (ref 6.6–8.7)
TROPONIN: 0.01 NG/ML (ref 0–0.03)
TROPONIN: <0.01 NG/ML (ref 0–0.03)
WBC # BLD: 7.5 K/UL (ref 4.8–10.8)

## 2019-07-16 PROCEDURE — 85379 FIBRIN DEGRADATION QUANT: CPT

## 2019-07-16 PROCEDURE — 82948 REAGENT STRIP/BLOOD GLUCOSE: CPT

## 2019-07-16 PROCEDURE — 85610 PROTHROMBIN TIME: CPT

## 2019-07-16 PROCEDURE — G0378 HOSPITAL OBSERVATION PER HR: HCPCS

## 2019-07-16 PROCEDURE — G0378 HOSPITAL OBSERVATION PER HR: HCPCS | Performed by: INTERNAL MEDICINE

## 2019-07-16 PROCEDURE — 93005 ELECTROCARDIOGRAM TRACING: CPT

## 2019-07-16 PROCEDURE — 70450 CT HEAD/BRAIN W/O DYE: CPT

## 2019-07-16 PROCEDURE — 85027 COMPLETE CBC AUTOMATED: CPT

## 2019-07-16 PROCEDURE — 99285 EMERGENCY DEPT VISIT HI MDM: CPT | Performed by: EMERGENCY MEDICINE

## 2019-07-16 PROCEDURE — 6370000000 HC RX 637 (ALT 250 FOR IP): Performed by: EMERGENCY MEDICINE

## 2019-07-16 PROCEDURE — 99285 EMERGENCY DEPT VISIT HI MDM: CPT

## 2019-07-16 PROCEDURE — 84484 ASSAY OF TROPONIN QUANT: CPT

## 2019-07-16 PROCEDURE — 71046 X-RAY EXAM CHEST 2 VIEWS: CPT

## 2019-07-16 PROCEDURE — 36415 COLL VENOUS BLD VENIPUNCTURE: CPT

## 2019-07-16 PROCEDURE — 80053 COMPREHEN METABOLIC PANEL: CPT

## 2019-07-16 RX ORDER — ASPIRIN 325 MG
325 TABLET ORAL ONCE
Status: COMPLETED | OUTPATIENT
Start: 2019-07-16 | End: 2019-07-16

## 2019-07-16 RX ADMIN — ASPIRIN 325 MG: 325 TABLET, COATED ORAL at 23:23

## 2019-07-16 ASSESSMENT — ENCOUNTER SYMPTOMS
BACK PAIN: 0
VOMITING: 0
ABDOMINAL PAIN: 0
DIARRHEA: 0
SHORTNESS OF BREATH: 1
EYE PAIN: 0

## 2019-07-16 ASSESSMENT — PAIN DESCRIPTION - LOCATION: LOCATION: CHEST

## 2019-07-16 ASSESSMENT — PAIN SCALES - GENERAL: PAINLEVEL_OUTOF10: 6

## 2019-07-17 ENCOUNTER — APPOINTMENT (OUTPATIENT)
Dept: MRI IMAGING | Age: 64
End: 2019-07-17
Payer: MEDICARE

## 2019-07-17 LAB
AMMONIA: 51 UMOL/L (ref 16–60)
BILIRUBIN URINE: NEGATIVE
BLOOD, URINE: NEGATIVE
C-REACTIVE PROTEIN: 0.06 MG/DL (ref 0–0.5)
CLARITY: CLEAR
COLOR: YELLOW
EKG P AXIS: 59 DEGREES
EKG P AXIS: 64 DEGREES
EKG P-R INTERVAL: 144 MS
EKG P-R INTERVAL: 172 MS
EKG Q-T INTERVAL: 376 MS
EKG Q-T INTERVAL: 392 MS
EKG QRS DURATION: 104 MS
EKG QRS DURATION: 104 MS
EKG QTC CALCULATION (BAZETT): 434 MS
EKG QTC CALCULATION (BAZETT): 439 MS
EKG T AXIS: 33 DEGREES
EKG T AXIS: 45 DEGREES
ETHANOL: <10 MG/DL (ref 0–0.08)
FOLATE: >20 NG/ML (ref 4.5–32.2)
GLUCOSE BLD-MCNC: 166 MG/DL (ref 70–99)
GLUCOSE BLD-MCNC: 178 MG/DL (ref 70–99)
GLUCOSE BLD-MCNC: 214 MG/DL (ref 70–99)
GLUCOSE URINE: 100 MG/DL
KETONES, URINE: NEGATIVE MG/DL
LEUKOCYTE ESTERASE, URINE: NEGATIVE
NITRITE, URINE: NEGATIVE
PERFORMED ON: ABNORMAL
PH UA: 7 (ref 5–8)
PROTEIN UA: NEGATIVE MG/DL
SEDIMENTATION RATE, ERYTHROCYTE: 16 MM/HR (ref 0–15)
SPECIFIC GRAVITY UA: 1.02 (ref 1–1.03)
T4 FREE: 0.8 NG/DL (ref 0.9–1.7)
TOTAL CK: 116 U/L (ref 39–308)
TROPONIN: 0.01 NG/ML (ref 0–0.03)
TROPONIN: 0.01 NG/ML (ref 0–0.03)
TROPONIN: <0.01 NG/ML (ref 0–0.03)
TSH SERPL DL<=0.05 MIU/L-ACNC: 3.84 UIU/ML (ref 0.27–4.2)
URINE REFLEX TO CULTURE: ABNORMAL
UROBILINOGEN, URINE: 1 E.U./DL
VITAMIN B-12: 563 PG/ML (ref 211–946)

## 2019-07-17 PROCEDURE — 86140 C-REACTIVE PROTEIN: CPT

## 2019-07-17 PROCEDURE — 85652 RBC SED RATE AUTOMATED: CPT

## 2019-07-17 PROCEDURE — 84443 ASSAY THYROID STIM HORMONE: CPT

## 2019-07-17 PROCEDURE — 82140 ASSAY OF AMMONIA: CPT

## 2019-07-17 PROCEDURE — 99219 PR INITIAL OBSERVATION CARE/DAY 50 MINUTES: CPT | Performed by: PSYCHIATRY & NEUROLOGY

## 2019-07-17 PROCEDURE — G0378 HOSPITAL OBSERVATION PER HR: HCPCS

## 2019-07-17 PROCEDURE — 82607 VITAMIN B-12: CPT

## 2019-07-17 PROCEDURE — 6360000004 HC RX CONTRAST MEDICATION: Performed by: INTERNAL MEDICINE

## 2019-07-17 PROCEDURE — 82550 ASSAY OF CK (CPK): CPT

## 2019-07-17 PROCEDURE — 6370000000 HC RX 637 (ALT 250 FOR IP): Performed by: INTERNAL MEDICINE

## 2019-07-17 PROCEDURE — 6370000000 HC RX 637 (ALT 250 FOR IP): Performed by: HOSPITALIST

## 2019-07-17 PROCEDURE — 96375 TX/PRO/DX INJ NEW DRUG ADDON: CPT

## 2019-07-17 PROCEDURE — 81003 URINALYSIS AUTO W/O SCOPE: CPT

## 2019-07-17 PROCEDURE — 82746 ASSAY OF FOLIC ACID SERUM: CPT

## 2019-07-17 PROCEDURE — 84439 ASSAY OF FREE THYROXINE: CPT

## 2019-07-17 PROCEDURE — G0480 DRUG TEST DEF 1-7 CLASSES: HCPCS

## 2019-07-17 PROCEDURE — 2580000003 HC RX 258: Performed by: HOSPITALIST

## 2019-07-17 PROCEDURE — 82948 REAGENT STRIP/BLOOD GLUCOSE: CPT

## 2019-07-17 PROCEDURE — 84484 ASSAY OF TROPONIN QUANT: CPT

## 2019-07-17 PROCEDURE — 99219 PR INITIAL OBSERVATION CARE/DAY 50 MINUTES: CPT | Performed by: INTERNAL MEDICINE

## 2019-07-17 PROCEDURE — A9577 INJ MULTIHANCE: HCPCS | Performed by: INTERNAL MEDICINE

## 2019-07-17 PROCEDURE — 6360000002 HC RX W HCPCS: Performed by: PSYCHIATRY & NEUROLOGY

## 2019-07-17 PROCEDURE — 36415 COLL VENOUS BLD VENIPUNCTURE: CPT

## 2019-07-17 PROCEDURE — 70553 MRI BRAIN STEM W/O & W/DYE: CPT

## 2019-07-17 RX ORDER — DEXTROSE MONOHYDRATE 25 G/50ML
12.5 INJECTION, SOLUTION INTRAVENOUS PRN
Status: DISCONTINUED | OUTPATIENT
Start: 2019-07-17 | End: 2019-07-19 | Stop reason: HOSPADM

## 2019-07-17 RX ORDER — NICOTINE POLACRILEX 4 MG
15 LOZENGE BUCCAL PRN
Status: DISCONTINUED | OUTPATIENT
Start: 2019-07-17 | End: 2019-07-19 | Stop reason: HOSPADM

## 2019-07-17 RX ORDER — SODIUM CHLORIDE 9 MG/ML
INJECTION, SOLUTION INTRAVENOUS CONTINUOUS
Status: DISCONTINUED | OUTPATIENT
Start: 2019-07-17 | End: 2019-07-19 | Stop reason: HOSPADM

## 2019-07-17 RX ORDER — FUROSEMIDE 40 MG/1
40 TABLET ORAL DAILY
Status: DISCONTINUED | OUTPATIENT
Start: 2019-07-17 | End: 2019-07-19 | Stop reason: HOSPADM

## 2019-07-17 RX ORDER — BACLOFEN 10 MG/1
20 TABLET ORAL 2 TIMES DAILY
Status: DISCONTINUED | OUTPATIENT
Start: 2019-07-17 | End: 2019-07-17

## 2019-07-17 RX ORDER — ROPINIROLE 0.5 MG/1
0.5 TABLET, FILM COATED ORAL NIGHTLY
Status: DISCONTINUED | OUTPATIENT
Start: 2019-07-17 | End: 2019-07-17

## 2019-07-17 RX ORDER — THIAMINE HYDROCHLORIDE 100 MG/ML
100 INJECTION, SOLUTION INTRAMUSCULAR; INTRAVENOUS DAILY
Status: DISCONTINUED | OUTPATIENT
Start: 2019-07-17 | End: 2019-07-19 | Stop reason: HOSPADM

## 2019-07-17 RX ORDER — ROPINIROLE 0.5 MG/1
0.25 TABLET, FILM COATED ORAL NIGHTLY
Status: DISCONTINUED | OUTPATIENT
Start: 2019-07-17 | End: 2019-07-19 | Stop reason: HOSPADM

## 2019-07-17 RX ORDER — ASPIRIN 81 MG/1
81 TABLET, CHEWABLE ORAL DAILY
Status: DISCONTINUED | OUTPATIENT
Start: 2019-07-17 | End: 2019-07-19 | Stop reason: HOSPADM

## 2019-07-17 RX ORDER — TAMSULOSIN HYDROCHLORIDE 0.4 MG/1
0.4 CAPSULE ORAL DAILY
Status: DISCONTINUED | OUTPATIENT
Start: 2019-07-17 | End: 2019-07-19 | Stop reason: HOSPADM

## 2019-07-17 RX ORDER — ATORVASTATIN CALCIUM 40 MG/1
40 TABLET, FILM COATED ORAL NIGHTLY
Status: DISCONTINUED | OUTPATIENT
Start: 2019-07-17 | End: 2019-07-19 | Stop reason: HOSPADM

## 2019-07-17 RX ORDER — METOPROLOL SUCCINATE 25 MG/1
25 TABLET, EXTENDED RELEASE ORAL DAILY
Status: DISCONTINUED | OUTPATIENT
Start: 2019-07-17 | End: 2019-07-19 | Stop reason: HOSPADM

## 2019-07-17 RX ORDER — DEXTROSE MONOHYDRATE 50 MG/ML
100 INJECTION, SOLUTION INTRAVENOUS PRN
Status: DISCONTINUED | OUTPATIENT
Start: 2019-07-17 | End: 2019-07-19 | Stop reason: HOSPADM

## 2019-07-17 RX ADMIN — FUROSEMIDE 40 MG: 40 TABLET ORAL at 08:58

## 2019-07-17 RX ADMIN — ASPIRIN 81 MG 81 MG: 81 TABLET ORAL at 08:58

## 2019-07-17 RX ADMIN — ATORVASTATIN CALCIUM 40 MG: 40 TABLET, FILM COATED ORAL at 22:32

## 2019-07-17 RX ADMIN — BACLOFEN 20 MG: 10 TABLET ORAL at 08:58

## 2019-07-17 RX ADMIN — SODIUM CHLORIDE: 9 INJECTION, SOLUTION INTRAVENOUS at 17:34

## 2019-07-17 RX ADMIN — TAMSULOSIN HYDROCHLORIDE 0.4 MG: 0.4 CAPSULE ORAL at 08:58

## 2019-07-17 RX ADMIN — THIAMINE HYDROCHLORIDE 100 MG: 100 INJECTION, SOLUTION INTRAMUSCULAR; INTRAVENOUS at 14:08

## 2019-07-17 RX ADMIN — ROPINIROLE HYDROCHLORIDE 0.25 MG: 0.5 TABLET, FILM COATED ORAL at 22:32

## 2019-07-17 RX ADMIN — GADOBENATE DIMEGLUMINE 15 ML: 529 INJECTION, SOLUTION INTRAVENOUS at 11:43

## 2019-07-17 NOTE — ED PROVIDER NOTES
[facility] EMERGENCY DEPT  eMERGENCY dEPARTMENT eNCOUnter      Pt Name: Evelyne Villanueva  MRN: [id number]  [place] 1955  Date of evaluation: 7/16/2019  Provider: Sariah Baeza MD    [address] [place] [address]       Chief Complaint   Patient presents with    Pleurisy    Hyperglycemia         HISTORY OF PRESENT ILLNESS   (Location/Symptom, Timing/Onset,Context/Setting, Quality, Duration, Modifying Factors, Severity)  Note limiting factors. Evelyne Villanueva is a 59 y.o. male who presents to the emergency department with multiple complaints. Patient complains of dizziness, shortness of breath, chest pain, generalized numbness and tingling. Tells me the symptoms first started a week ago. He was seen here and admitted for complaint of chest pain. Had negative Bebeto Mis was discharged the next day. Tells me that he has continued to have the symptoms intermittently ever since they started when he was admitted a little over week ago. Today they got worse. Feels like he is off balance when he walks. No focal numbness or weakness. Said he feels a little numb and weak all over. No vision changes. No headache or neck pain. Complains of aching chest discomfort. No exacerbating or alleviating factors. Intermittent dyspnea as well. No unilateral leg swelling or pain. No history of DVT or PE. No abdominal pain vomiting or diarrhea. No urinary symptoms. He does have a history of coronary disease. Speech is a bit difficult to understand. 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    HPI    NursingNotes were reviewed. REVIEW OF SYSTEMS    (2-9 systems for level 4, 10 or more for level 5)     Review of Systems   Constitutional: Negative for fever. Eyes: Negative for pain and visual disturbance. Respiratory: Positive for shortness of breath. Cardiovascular: Positive for chest pain. Negative for palpitations and leg swelling. METOPROLOL SUCCINATE (TOPROL XL) 25 MG EXTENDED RELEASE TABLET    Take 1 tablet by mouth daily    NITROGLYCERIN (NITROSTAT) 0.4 MG SL TABLET    Place 1 tablet under the tongue every 5 minutes as needed for Chest pain up to max of 3 total doses. If no relief after 1 dose, call 911. OXYCODONE-ACETAMINOPHEN (PERCOCET)  MG PER TABLET    Take 1 tablet by mouth every 8 hours as needed     ROPINIROLE (REQUIP) 1 MG TABLET    Take 1 tablet by mouth nightly as needed    TAMSULOSIN (FLOMAX) 0.4 MG CAPSULE    Take 1 capsule by mouth daily    ZOLPIDEM (AMBIEN) 10 MG TABLET    Take by mouth nightly as needed for Sleep       ALLERGIES     Codeine    FAMILY HISTORY     History reviewed. No pertinent family history.        SOCIAL HISTORY       Social History     Socioeconomic History    Marital status:      Spouse name: None    Number of children: None    Years of education: None    Highest education level: None   Occupational History    None   Social Needs    Financial resource strain: None    Food insecurity:     Worry: None     Inability: None    Transportation needs:     Medical: None     Non-medical: None   Tobacco Use    Smoking status: Current Every Day Smoker     Packs/day: 0.50     Years: 50.00     Pack years: 25.00     Types: Cigarettes    Smokeless tobacco: Never Used    Tobacco comment: 10 cigerrettes aday   Substance and Sexual Activity    Alcohol use: Yes     Comment: monthly    Drug use: No    Sexual activity: None   Lifestyle    Physical activity:     Days per week: None     Minutes per session: None    Stress: None   Relationships    Social connections:     Talks on phone: None     Gets together: None     Attends Christian service: None     Active member of club or organization: None     Attends meetings of clubs or organizations: None     Relationship status: None    Intimate partner violence:     Fear of current or ex partner: None     Emotionally abused: None     Physically

## 2019-07-17 NOTE — H&P
mouth 2 times daily   Yes Historical Provider, MD   rOPINIRole (REQUIP) 1 MG tablet Take 1 tablet by mouth nightly as needed   Yes Historical Provider, MD   tamsulosin (FLOMAX) 0.4 MG capsule Take 1 capsule by mouth daily 1/10/19  Yes Historical Provider, MD   lisinopril (PRINIVIL;ZESTRIL) 10 MG tablet Take 1 tablet by mouth daily 6/18/19  Yes DORON Wilkinson   furosemide (LASIX) 40 MG tablet Take 1 tablet by mouth daily 6/18/19  Yes DORON Wilkinson   metoprolol succinate (TOPROL XL) 25 MG extended release tablet Take 1 tablet by mouth daily 6/18/19  Yes DORON Wilkinson   aspirin 81 MG chewable tablet Take 1 tablet by mouth daily 2/11/18  Yes Cassie Way MD   insulin glargine (LANTUS) 100 UNIT/ML injection vial Inject 30 Units into the skin every morning   Yes Historical Provider, MD   zolpidem (AMBIEN) 10 MG tablet Take by mouth nightly as needed for Sleep   Yes Historical Provider, MD   oxyCODONE-acetaminophen (PERCOCET)  MG per tablet Take 1 tablet by mouth every 8 hours as needed    Yes Historical Provider, MD   nitroGLYCERIN (NITROSTAT) 0.4 MG SL tablet Place 1 tablet under the tongue every 5 minutes as needed for Chest pain up to max of 3 total doses. If no relief after 1 dose, call 911. 6/18/19   DORON Wilkinson         Social History:    Tobacco:   reports that he has been smoking cigarettes. He has a 25.00 pack-year smoking history. He has never used smokeless tobacco.  Alcohol:   reports that he drinks alcohol.   Illicit Drugs: Denies use      Family History:      Problem Relation Age of Onset    Coronary Art Dis Mother     Stroke Mother     Diabetes Mother     Cancer Sister     Coronary Art Dis Sister     Diabetes Sister     Stroke Sister     Stroke Brother     Cancer Brother     Coronary Art Dis Brother     Arthritis Paternal Grandmother          Allergies:    Codeine      Physical Examination:  BP (!) 102/59   Pulse 73   Temp 98 °F (36.7 °C)   Resp 18   Ht 5'

## 2019-07-17 NOTE — CONSULTS
Inpatient consult to Neurology  Consult performed by: Giovany Patel DO  Consult ordered by: Goldie Bingham MD              [facility] Neurology Consult      Patient:   Mercy Gaines  MR#:    [id number]  Account Number:                   [id number]      Room:    0331/331-01   YOB: 1955  Date of Progress Note: 7/17/2019  Time of Note                           1:29 PM  Attending Physician:  MARYAM Lazo  Consulting Physician:  Paulina Lundy DO       CHIEF COMPLAINT:  Dizziness    HISTORY OF PRESENT ILLNESS:   This is a 59 y.o. male who was admitted with severe dizziness, shortness of breath, pleuritic chest pain and numbness in all 4 extremities. Patient has been noting intermittent headaches, ataxia, visual changes over the last few weeks. Is also noting numbness and paresthesias in all 4 extremities. He denies amber weakness. He endorses dizziness more so than vertiginous symptoms. He has no prior history of stroke. His speech has been more slurred as well. He has a questionable history of underlying seizure disorder not currently on antiepileptic medications. He denies any recent amber seizure activity. MRI largely negative. Recent admission for chest pain noted. REVIEW OF SYSTEMS:  Constitutional - No fever or chills. HENT -  No Scalp tenderness. No tinnitus or significant hearing loss. No nose bleeding, no sore throat. Eyes - No sudden vision change or eye pain  Respiratory - No significant shortness of breath or cough  Cardiovascular - No palpitations or significant leg swelling  Gastrointestinal - No abdominal swelling or pain. Genitourinary - No difficulty urinating, dysuria  Musculoskeletal - No back pain or myalgia. Skin - No color change or rash  Neurologic - No seizures. No lateralizing weakness. Hematologic - No easy bruising or spontaneous bleeding. Psychiatric - No anxiety.      PAST MEDICAL HISTORY:      Diagnosis Date    Gastelum's esophagus     CAD (coronary artery disease)     Cancer ([facility] 2017    esophagus    Chest pain     [facility]) [facility]     DM2 (diabetes mellitus, type 2) ([facility])     GERD (gastroesophageal reflux disease)     History of cervical fracture     History of drug abuse     HTN (hypertension)     Myocardial infarction ([facility]     Other disorders of kidney and ureter in diseases classified elsewhere     Seizure disorder ([facility]     Tobacco abuse        PAST SURGICAL HISTORY:      Procedure Laterality Date    CARDIAC [address]HETER[address]ATION  12/26/[address]  [address]    EF over 60%    CORONARY ANGIOPLASTY WITH STENT PLACEMENT      x6 unsure of dates    CORONARY ANGIOPLASTY WITH STENT PLACEMENT  02/2018    BMS to med RCA    CORONARY ARTERY BYPASS GRAFT  2008       SOCIAL HISTORY:   TOBACCO:   reports that he has been smoking cigarettes. He has a 25.00 pack-year smoking history. He has never used smokeless tobacco.  ETOH:   reports that he drinks alcohol.   DRUG:    Social History     Substance and Sexual Activity   Drug Use No       FAMILY HISTORY:       Problem Relation Age of Onset    Coronary Art Dis Mother     Stroke Mother     Diabetes Mother     Cancer Sister     Coronary Art Dis Sister     Diabetes Sister     Stroke Sister     Stroke Brother     Cancer Brother     Coronary Art Dis Brother     Arthritis Paternal Grandmother        MEDICATIONS:      Current Facility-Administered Medications:     atorvastatin (LIPITOR) tablet 40 mg, 40 mg, Oral, Nightly, Pauline Chapa MD    aspirin chewable tablet 81 mg, 81 mg, Oral, Daily, Pauline Chapa MD, 81 mg at 07/17/19 0858    baclofen (LIORESAL) tablet 20 mg, 20 mg, Oral, BID, Pauline Chapa MD, 20 mg at 07/17/19 0858    furosemide (LASIX) tablet 40 mg, 40 mg, Oral, Daily, Pauline Chapa MD, 40 mg at 07/17/19 0858    metoprolol succinate (TOPROL XL) extended release tablet 25 mg, 25 mg, Oral, Daily, Pauline Chapa MD    rOPINIRole (REQUIP) tablet 0.5 The FLAIR sequence images show mild to moderate T2 signal changes in the centrum semiovale bilaterally particularly in the left occipital lobe in the periventricular region representing chronic ischemia due to small vessel disease. The gradient recalled imaging sequence show normal flow in the utilized intracranial vessels. There is mucosal thickening involving the frontal, ethmoid, maxillary and sphenoid sinuses. The mastoid air cells are normal and clear. No acute intracranial abnormality. Evidence of pansinusitis. Above findings are recorded on a digital voice clip in PACS. Signed by Dr Arvind Marin on 7/17/2019 12:00 PM      Recent Labs     07/16/19 2100   WBC 7.5   HGB 12.9*        Recent Labs     07/16/19 2100      K 4.0   CL 99   CO2 23   BUN 21   CREATININE 0.8   GLUCOSE 196*     Recent Labs     07/16/19 2100   AST 31   ALT 39   BILITOT 0.4   ALKPHOS 124     Recent Labs     07/16/19 2100   INR 1.15         ASSESSMENT:  59 y.o. admitted with worsening dizziness, ataxia, shortness of breath, widespread numbness, visual changes, and headache. MRI brain largely negative. Plan further workup to try and clarify source for his symptoms. PLAN:   1. MRA head  2. MRI C/L spine  3. Consider NCS/EMG or LP if progresses, but no amber weakness on exam, underlying GBS felt somewhat unlikely. 4.  Additional labs   5. Thimaine    Please feel free to call with any questions. [phone] (cell phone).     Matthew Hernandez DO  Board Certified Neurology

## 2019-07-17 NOTE — ED NOTES
Attempted to call report, nurse unable to take report at present, nurse will call back      Heather Bourne, RN  07/16/19 9805

## 2019-07-18 ENCOUNTER — APPOINTMENT (OUTPATIENT)
Dept: MRI IMAGING | Age: 64
End: 2019-07-18
Payer: MEDICARE

## 2019-07-18 LAB
ALBUMIN SERPL-MCNC: 3.3 G/DL (ref 3.5–5.2)
ALP BLD-CCNC: 102 U/L (ref 40–130)
ALT SERPL-CCNC: 31 U/L (ref 5–41)
AMPHETAMINE SCREEN, URINE: NEGATIVE
ANION GAP SERPL CALCULATED.3IONS-SCNC: 9 MMOL/L (ref 7–19)
AST SERPL-CCNC: 25 U/L (ref 5–40)
BARBITURATE SCREEN URINE: NEGATIVE
BASOPHILS ABSOLUTE: 0 K/UL (ref 0–0.2)
BASOPHILS RELATIVE PERCENT: 0.5 % (ref 0–1)
BENZODIAZEPINE SCREEN, URINE: NEGATIVE
BILIRUB SERPL-MCNC: 0.3 MG/DL (ref 0.2–1.2)
BUN BLDV-MCNC: 16 MG/DL (ref 8–23)
CALCIUM SERPL-MCNC: 8.8 MG/DL (ref 8.8–10.2)
CANNABINOID SCREEN URINE: NEGATIVE
CHLORIDE BLD-SCNC: 103 MMOL/L (ref 98–111)
CHOLESTEROL, TOTAL: 121 MG/DL (ref 160–199)
CO2: 26 MMOL/L (ref 22–29)
COCAINE METABOLITE SCREEN URINE: NEGATIVE
CREAT SERPL-MCNC: 0.6 MG/DL (ref 0.5–1.2)
EOSINOPHILS ABSOLUTE: 0.2 K/UL (ref 0–0.6)
EOSINOPHILS RELATIVE PERCENT: 2.7 % (ref 0–5)
GFR NON-AFRICAN AMERICAN: >60
GLUCOSE BLD-MCNC: 132 MG/DL (ref 70–99)
GLUCOSE BLD-MCNC: 136 MG/DL (ref 70–99)
GLUCOSE BLD-MCNC: 176 MG/DL (ref 70–99)
GLUCOSE BLD-MCNC: 195 MG/DL (ref 74–109)
GLUCOSE BLD-MCNC: 197 MG/DL (ref 70–99)
HBA1C MFR BLD: 7.5 % (ref 4–6)
HCT VFR BLD CALC: 39.2 % (ref 42–52)
HDLC SERPL-MCNC: 30 MG/DL (ref 55–121)
HEMOGLOBIN: 12.8 G/DL (ref 14–18)
LDL CHOLESTEROL CALCULATED: 72 MG/DL
LYMPHOCYTES ABSOLUTE: 1.3 K/UL (ref 1.1–4.5)
LYMPHOCYTES RELATIVE PERCENT: 19.8 % (ref 20–40)
Lab: NORMAL
MAGNESIUM: 1.8 MG/DL (ref 1.6–2.4)
MCH RBC QN AUTO: 30.2 PG (ref 27–31)
MCHC RBC AUTO-ENTMCNC: 32.7 G/DL (ref 33–37)
MCV RBC AUTO: 92.5 FL (ref 80–94)
MONOCYTES ABSOLUTE: 0.7 K/UL (ref 0–0.9)
MONOCYTES RELATIVE PERCENT: 10.9 % (ref 0–10)
NEUTROPHILS ABSOLUTE: 4.2 K/UL (ref 1.5–7.5)
NEUTROPHILS RELATIVE PERCENT: 65.9 % (ref 50–65)
OPIATE SCREEN URINE: NEGATIVE
PDW BLD-RTO: 13.2 % (ref 11.5–14.5)
PERFORMED ON: ABNORMAL
PLATELET # BLD: 178 K/UL (ref 130–400)
PMV BLD AUTO: 11.2 FL (ref 9.4–12.4)
POTASSIUM SERPL-SCNC: 4 MMOL/L (ref 3.5–5)
RBC # BLD: 4.24 M/UL (ref 4.7–6.1)
SODIUM BLD-SCNC: 138 MMOL/L (ref 136–145)
TOTAL PROTEIN: 6.4 G/DL (ref 6.6–8.7)
TRIGL SERPL-MCNC: 93 MG/DL (ref 0–149)
WBC # BLD: 6.4 K/UL (ref 4.8–10.8)

## 2019-07-18 PROCEDURE — 80307 DRUG TEST PRSMV CHEM ANLYZR: CPT

## 2019-07-18 PROCEDURE — 6360000002 HC RX W HCPCS: Performed by: PSYCHIATRY & NEUROLOGY

## 2019-07-18 PROCEDURE — 80061 LIPID PANEL: CPT

## 2019-07-18 PROCEDURE — 86757 RICKETTSIA ANTIBODY: CPT

## 2019-07-18 PROCEDURE — 82948 REAGENT STRIP/BLOOD GLUCOSE: CPT

## 2019-07-18 PROCEDURE — 72148 MRI LUMBAR SPINE W/O DYE: CPT

## 2019-07-18 PROCEDURE — 6370000000 HC RX 637 (ALT 250 FOR IP): Performed by: HOSPITALIST

## 2019-07-18 PROCEDURE — 83036 HEMOGLOBIN GLYCOSYLATED A1C: CPT

## 2019-07-18 PROCEDURE — 6360000002 HC RX W HCPCS: Performed by: HOSPITALIST

## 2019-07-18 PROCEDURE — 85025 COMPLETE CBC W/AUTO DIFF WBC: CPT

## 2019-07-18 PROCEDURE — 95909 NRV CNDJ TST 5-6 STUDIES: CPT | Performed by: PSYCHIATRY & NEUROLOGY

## 2019-07-18 PROCEDURE — G0378 HOSPITAL OBSERVATION PER HR: HCPCS

## 2019-07-18 PROCEDURE — 2580000003 HC RX 258: Performed by: HOSPITALIST

## 2019-07-18 PROCEDURE — 96375 TX/PRO/DX INJ NEW DRUG ADDON: CPT

## 2019-07-18 PROCEDURE — 95886 MUSC TEST DONE W/N TEST COMP: CPT | Performed by: PSYCHIATRY & NEUROLOGY

## 2019-07-18 PROCEDURE — 6370000000 HC RX 637 (ALT 250 FOR IP): Performed by: INTERNAL MEDICINE

## 2019-07-18 PROCEDURE — 86618 LYME DISEASE ANTIBODY: CPT

## 2019-07-18 PROCEDURE — 2500000003 HC RX 250 WO HCPCS: Performed by: HOSPITALIST

## 2019-07-18 PROCEDURE — 83735 ASSAY OF MAGNESIUM: CPT

## 2019-07-18 PROCEDURE — 72141 MRI NECK SPINE W/O DYE: CPT

## 2019-07-18 PROCEDURE — 86666 EHRLICHIA ANTIBODY: CPT

## 2019-07-18 PROCEDURE — 70544 MR ANGIOGRAPHY HEAD W/O DYE: CPT

## 2019-07-18 PROCEDURE — 99225 PR SBSQ OBSERVATION CARE/DAY 25 MINUTES: CPT | Performed by: PSYCHIATRY & NEUROLOGY

## 2019-07-18 PROCEDURE — 96376 TX/PRO/DX INJ SAME DRUG ADON: CPT

## 2019-07-18 PROCEDURE — 99225 PR SBSQ OBSERVATION CARE/DAY 25 MINUTES: CPT | Performed by: HOSPITALIST

## 2019-07-18 PROCEDURE — 36415 COLL VENOUS BLD VENIPUNCTURE: CPT

## 2019-07-18 PROCEDURE — 99215 OFFICE O/P EST HI 40 MIN: CPT | Performed by: INTERNAL MEDICINE

## 2019-07-18 PROCEDURE — 80053 COMPREHEN METABOLIC PANEL: CPT

## 2019-07-18 PROCEDURE — 96365 THER/PROPH/DIAG IV INF INIT: CPT

## 2019-07-18 RX ORDER — DIPHENHYDRAMINE HYDROCHLORIDE 50 MG/ML
25 INJECTION INTRAMUSCULAR; INTRAVENOUS ONCE
Status: COMPLETED | OUTPATIENT
Start: 2019-07-18 | End: 2019-07-18

## 2019-07-18 RX ORDER — OXYCODONE AND ACETAMINOPHEN 10; 325 MG/1; MG/1
1 TABLET ORAL EVERY 8 HOURS PRN
Status: DISCONTINUED | OUTPATIENT
Start: 2019-07-18 | End: 2019-07-19 | Stop reason: HOSPADM

## 2019-07-18 RX ORDER — DIAPER,BRIEF,INFANT-TODD,DISP
EACH MISCELLANEOUS 2 TIMES DAILY
Status: DISCONTINUED | OUTPATIENT
Start: 2019-07-18 | End: 2019-07-19 | Stop reason: HOSPADM

## 2019-07-18 RX ADMIN — METOPROLOL SUCCINATE 25 MG: 25 TABLET, EXTENDED RELEASE ORAL at 08:24

## 2019-07-18 RX ADMIN — THIAMINE HYDROCHLORIDE 100 MG: 100 INJECTION, SOLUTION INTRAMUSCULAR; INTRAVENOUS at 08:24

## 2019-07-18 RX ADMIN — DIPHENHYDRAMINE HYDROCHLORIDE 25 MG: 50 INJECTION, SOLUTION INTRAMUSCULAR; INTRAVENOUS at 15:54

## 2019-07-18 RX ADMIN — HYDROCORTISONE: 1 CREAM TOPICAL at 20:59

## 2019-07-18 RX ADMIN — ATORVASTATIN CALCIUM 40 MG: 40 TABLET, FILM COATED ORAL at 20:59

## 2019-07-18 RX ADMIN — OXYCODONE HYDROCHLORIDE AND ACETAMINOPHEN 1 TABLET: 10; 325 TABLET ORAL at 21:42

## 2019-07-18 RX ADMIN — DOXYCYCLINE 100 MG: 100 INJECTION, POWDER, LYOPHILIZED, FOR SOLUTION INTRAVENOUS at 17:11

## 2019-07-18 RX ADMIN — OXYCODONE HYDROCHLORIDE AND ACETAMINOPHEN 1 TABLET: 10; 325 TABLET ORAL at 13:09

## 2019-07-18 RX ADMIN — ASPIRIN 81 MG 81 MG: 81 TABLET ORAL at 08:24

## 2019-07-18 RX ADMIN — Medication: at 18:34

## 2019-07-18 RX ADMIN — SODIUM CHLORIDE 100 ML/HR: 9 INJECTION, SOLUTION INTRAVENOUS at 13:11

## 2019-07-18 RX ADMIN — FUROSEMIDE 40 MG: 40 TABLET ORAL at 08:24

## 2019-07-18 RX ADMIN — ROPINIROLE HYDROCHLORIDE 0.25 MG: 0.5 TABLET, FILM COATED ORAL at 20:59

## 2019-07-18 RX ADMIN — SODIUM CHLORIDE: 9 INJECTION, SOLUTION INTRAVENOUS at 03:44

## 2019-07-18 RX ADMIN — TAMSULOSIN HYDROCHLORIDE 0.4 MG: 0.4 CAPSULE ORAL at 08:24

## 2019-07-18 ASSESSMENT — PAIN DESCRIPTION - PAIN TYPE
TYPE: ACUTE PAIN
TYPE: CHRONIC PAIN

## 2019-07-18 ASSESSMENT — PAIN DESCRIPTION - PROGRESSION: CLINICAL_PROGRESSION: NOT CHANGED

## 2019-07-18 ASSESSMENT — PAIN DESCRIPTION - LOCATION
LOCATION: CHEST;BACK
LOCATION: BACK;NECK

## 2019-07-18 ASSESSMENT — PAIN - FUNCTIONAL ASSESSMENT: PAIN_FUNCTIONAL_ASSESSMENT: ACTIVITIES ARE NOT PREVENTED

## 2019-07-18 ASSESSMENT — PAIN DESCRIPTION - ONSET: ONSET: ON-GOING

## 2019-07-18 ASSESSMENT — PAIN DESCRIPTION - FREQUENCY
FREQUENCY: CONTINUOUS
FREQUENCY: CONTINUOUS

## 2019-07-18 ASSESSMENT — PAIN DESCRIPTION - DESCRIPTORS: DESCRIPTORS: CONSTANT

## 2019-07-18 ASSESSMENT — PAIN DESCRIPTION - ORIENTATION: ORIENTATION: UPPER

## 2019-07-18 ASSESSMENT — PAIN SCALES - GENERAL
PAINLEVEL_OUTOF10: 8
PAINLEVEL_OUTOF10: 3
PAINLEVEL_OUTOF10: 5
PAINLEVEL_OUTOF10: 8

## 2019-07-18 NOTE — PROCEDURES
INPATIENT NERVE CONDUCTION / EMG REPORT    Patient:   Keri Guan  MR#:    [id number]  Room #:    INPATIENT  YOB: 1955  Date of Evaluation:  7/18/2019  Primary Physician:     Robert Bolden MD   Referring Physician:   Lucretia Mc DO      CLINICAL INFORMATION:     This patient is a 59 y.o. male with a history of widespread numbness, paresthesias. SUMMARY:   Nerve conduction studies were performed in the bilateral lower extremities. The right and left peroneal motor amplitudes were reduced which was likely technical in nature. The right and left tibial motor nerve conductions were normal.  The right and left sural sensory nerve conductions were normal.  F-waves in the tibial motor nerves were normal.      Needle EMG was performed in the bilateral lower extremities. The tibialis anterior, medial gastrocnemius, medial and lateral vastus lateralis and rectus femoris were sampled in both legs. No overt abnormalities were noted. INTERPRETATION:  This was a largely normal study of the bilateral lower extremities.       Lucretia Mc DO  Board Certified Neurologist      Date reported: 7/18/2019  Date signed: 7/18/2019

## 2019-07-19 VITALS
HEART RATE: 75 BPM | WEIGHT: 140 LBS | OXYGEN SATURATION: 96 % | BODY MASS INDEX: 22.5 KG/M2 | DIASTOLIC BLOOD PRESSURE: 77 MMHG | SYSTOLIC BLOOD PRESSURE: 135 MMHG | RESPIRATION RATE: 16 BRPM | HEIGHT: 66 IN | TEMPERATURE: 98.2 F

## 2019-07-19 LAB
ALBUMIN SERPL-MCNC: 3.3 G/DL (ref 3.5–5.2)
ALP BLD-CCNC: 109 U/L (ref 40–130)
ALT SERPL-CCNC: 30 U/L (ref 5–41)
ANION GAP SERPL CALCULATED.3IONS-SCNC: 13 MMOL/L (ref 7–19)
AST SERPL-CCNC: 25 U/L (ref 5–40)
BASOPHILS ABSOLUTE: 0 K/UL (ref 0–0.2)
BASOPHILS RELATIVE PERCENT: 0.5 % (ref 0–1)
BILIRUB SERPL-MCNC: 0.3 MG/DL (ref 0.2–1.2)
BUN BLDV-MCNC: 18 MG/DL (ref 8–23)
CALCIUM SERPL-MCNC: 8.7 MG/DL (ref 8.8–10.2)
CHLORIDE BLD-SCNC: 102 MMOL/L (ref 98–111)
CO2: 21 MMOL/L (ref 22–29)
CREAT SERPL-MCNC: 0.6 MG/DL (ref 0.5–1.2)
EOSINOPHILS ABSOLUTE: 0.2 K/UL (ref 0–0.6)
EOSINOPHILS RELATIVE PERCENT: 2.9 % (ref 0–5)
GFR NON-AFRICAN AMERICAN: >60
GLUCOSE BLD-MCNC: 139 MG/DL (ref 74–109)
GLUCOSE BLD-MCNC: 170 MG/DL (ref 70–99)
HCT VFR BLD CALC: 39.8 % (ref 42–52)
HEMOGLOBIN: 12.9 G/DL (ref 14–18)
LYMPHOCYTES ABSOLUTE: 1.2 K/UL (ref 1.1–4.5)
LYMPHOCYTES RELATIVE PERCENT: 20.7 % (ref 20–40)
MAGNESIUM: 2 MG/DL (ref 1.6–2.4)
MCH RBC QN AUTO: 30.4 PG (ref 27–31)
MCHC RBC AUTO-ENTMCNC: 32.4 G/DL (ref 33–37)
MCV RBC AUTO: 93.9 FL (ref 80–94)
MONOCYTES ABSOLUTE: 0.7 K/UL (ref 0–0.9)
MONOCYTES RELATIVE PERCENT: 11.7 % (ref 0–10)
NEUTROPHILS ABSOLUTE: 3.8 K/UL (ref 1.5–7.5)
NEUTROPHILS RELATIVE PERCENT: 64 % (ref 50–65)
PDW BLD-RTO: 12.8 % (ref 11.5–14.5)
PERFORMED ON: ABNORMAL
PLATELET # BLD: 177 K/UL (ref 130–400)
PMV BLD AUTO: 11.2 FL (ref 9.4–12.4)
POTASSIUM SERPL-SCNC: 4.3 MMOL/L (ref 3.5–5)
RBC # BLD: 4.24 M/UL (ref 4.7–6.1)
SODIUM BLD-SCNC: 136 MMOL/L (ref 136–145)
TOTAL PROTEIN: 6.6 G/DL (ref 6.6–8.7)
WBC # BLD: 5.9 K/UL (ref 4.8–10.8)

## 2019-07-19 PROCEDURE — 6370000000 HC RX 637 (ALT 250 FOR IP): Performed by: INTERNAL MEDICINE

## 2019-07-19 PROCEDURE — 85025 COMPLETE CBC W/AUTO DIFF WBC: CPT

## 2019-07-19 PROCEDURE — 6360000002 HC RX W HCPCS: Performed by: PSYCHIATRY & NEUROLOGY

## 2019-07-19 PROCEDURE — G0378 HOSPITAL OBSERVATION PER HR: HCPCS

## 2019-07-19 PROCEDURE — 83735 ASSAY OF MAGNESIUM: CPT

## 2019-07-19 PROCEDURE — 96366 THER/PROPH/DIAG IV INF ADDON: CPT

## 2019-07-19 PROCEDURE — 80053 COMPREHEN METABOLIC PANEL: CPT

## 2019-07-19 PROCEDURE — 2500000003 HC RX 250 WO HCPCS: Performed by: HOSPITALIST

## 2019-07-19 PROCEDURE — 97161 PT EVAL LOW COMPLEX 20 MIN: CPT

## 2019-07-19 PROCEDURE — 2580000003 HC RX 258: Performed by: HOSPITALIST

## 2019-07-19 PROCEDURE — 82948 REAGENT STRIP/BLOOD GLUCOSE: CPT

## 2019-07-19 PROCEDURE — 96376 TX/PRO/DX INJ SAME DRUG ADON: CPT

## 2019-07-19 PROCEDURE — 99226 PR SBSQ OBSERVATION CARE/DAY 35 MINUTES: CPT | Performed by: PSYCHIATRY & NEUROLOGY

## 2019-07-19 PROCEDURE — 36415 COLL VENOUS BLD VENIPUNCTURE: CPT

## 2019-07-19 PROCEDURE — 97750 PHYSICAL PERFORMANCE TEST: CPT

## 2019-07-19 RX ORDER — DOXYCYCLINE HYCLATE 100 MG/1
100 CAPSULE ORAL 2 TIMES DAILY
Qty: 10 CAPSULE | Refills: 0 | Status: SHIPPED | OUTPATIENT
Start: 2019-07-19 | End: 2019-07-24

## 2019-07-19 RX ADMIN — SODIUM CHLORIDE: 9 INJECTION, SOLUTION INTRAVENOUS at 05:02

## 2019-07-19 RX ADMIN — THIAMINE HYDROCHLORIDE 100 MG: 100 INJECTION, SOLUTION INTRAMUSCULAR; INTRAVENOUS at 09:22

## 2019-07-19 RX ADMIN — METOPROLOL SUCCINATE 25 MG: 25 TABLET, EXTENDED RELEASE ORAL at 09:22

## 2019-07-19 RX ADMIN — OXYCODONE HYDROCHLORIDE AND ACETAMINOPHEN 1 TABLET: 10; 325 TABLET ORAL at 06:37

## 2019-07-19 RX ADMIN — TAMSULOSIN HYDROCHLORIDE 0.4 MG: 0.4 CAPSULE ORAL at 09:22

## 2019-07-19 RX ADMIN — ASPIRIN 81 MG 81 MG: 81 TABLET ORAL at 09:22

## 2019-07-19 RX ADMIN — FUROSEMIDE 40 MG: 40 TABLET ORAL at 09:22

## 2019-07-19 RX ADMIN — DOXYCYCLINE 100 MG: 100 INJECTION, POWDER, LYOPHILIZED, FOR SOLUTION INTRAVENOUS at 05:02

## 2019-07-19 ASSESSMENT — PAIN SCALES - GENERAL
PAINLEVEL_OUTOF10: 0
PAINLEVEL_OUTOF10: 8

## 2019-07-19 NOTE — DISCHARGE SUMMARY
Physician Discharge Summary     Patient ID:  Vidal Carter  [id number]  46 y.o.  1955    Admit date: 7/16/2019    Discharge date and time: 7/19/2019    Admitting Physician: Asher Braswell MD     Discharge Physician: Asher Braswell MD    Discharge Diagnoses:     · Dizziness with neurological complaints  · Tick bites- start doxy, tick panel pending- follow up as OP   · Foraminal stenosis- follow up as OP     CAD (coronary artery disease)     HTN (hypertension)     Smoker     History of coronary artery stent placement     Hx of CABG    Discharged Condition: good    Indication for Admission: dizziness     Hospital Course:     59 y.o. male admitted with severe dizziness, pleuritic chest pain and numbness in all 4 extremities. Patient has been noting intermittent headaches, ataxia, visual changes over the last few weeks. Is also noting numbness and paresthesias in all 4 extremities. He denies amber weakness. He endorses dizziness more so than vertiginous symptoms. He has no prior history of stroke. His speech has been more slurred as well. He has a questionable history of underlying seizure disorder not currently on antiepileptic medications. He denies any recent amber seizure activity. MRI largely negative. Neurological work up negative. Cardiology evaluated and did not feel its cardiac origin. He complained of tick bites and is started on doxy. Needs follow up with pcp on final test results. Cleared for dc. Consults: cardiology and neurology    Significant Diagnostic Studies:     MRI LUMBAR SPINE WO CONTRAST [[id number]] Resulted: 07/18/19 2033      Order Status: Completed Updated: 07/18/19 2035     Narrative:       MRI LUMBAR SPINE WO CONTRAST 7/18/2019 2:30 PM  HISTORY: Ataxia and numbness.  Low back pain  COMPARISON: CT 2/21/2019   TECHNIQUE: Multiplanar, multisequence MRI of the lumbar spine was  performed without the use of contrast.  FINDINGS:   Alignment: 5 lumbar type vertebrae are

## 2019-07-19 NOTE — PROGRESS NOTES
HOSPITAL MEDICINE  - PROGRESS NOTE    Admit Date: 7/16/2019         CC: AMS    Subjective: feels better but still not himself    Objective: mild to moderate distress    Diet: DIET DENTAL SOFT; Low Sodium (2 GM)  Dietary Nutrition Supplements: Low Calorie High Protein Supplement  Pain is:None  Nausea:None  Bowel Movement/Flatus yes    Data:   Scheduled Meds: Reviewed  Current Facility-Administered Medications   Medication Dose Route Frequency Provider Last Rate Last Dose    oxyCODONE-acetaminophen (PERCOCET)  MG per tablet 1 tablet  1 tablet Oral Q8H PRN Darshan Ramirez MD   1 tablet at 07/18/19 1309    hydrocortisone 1 % cream   Topical BID Florencio Kraft MD        diphenhydrAMINE-zinc acetate (BENADRYL) cream   Topical TID PRN Florencio Kraft MD        diphenhydrAMINE (BENADRYL) injection 25 mg  25 mg Intravenous Once Florencio Kraft MD        atorvastatin (LIPITOR) tablet 40 mg  40 mg Oral Nightly Darshan Ramirez MD   40 mg at 07/17/19 2232    aspirin chewable tablet 81 mg  81 mg Oral Daily Darshan Ramirez MD   81 mg at 07/18/19 0824    furosemide (LASIX) tablet 40 mg  40 mg Oral Daily Darshan Ramirez MD   40 mg at 07/18/19 0824    metoprolol succinate (TOPROL XL) extended release tablet 25 mg  25 mg Oral Daily Darshan Ramirez MD   25 mg at 07/18/19 0824    tamsulosin (FLOMAX) capsule 0.4 mg  0.4 mg Oral Daily Darshan Raimrez MD   0.4 mg at 07/18/19 0824    insulin lispro (HUMALOG) injection vial 0-6 Units  0-6 Units Subcutaneous TID WC Darshan Ramirez MD        insulin lispro (HUMALOG) injection vial 0-3 Units  0-3 Units Subcutaneous Nightly Darshna Ramirze MD        glucose (GLUTOSE) 40 % oral gel 15 g  15 g Oral PRN Darshan Ramirez MD        dextrose 50 % IV solution  12.5 g Intravenous PRN Darshan Ramirez MD        glucagon (rDNA) injection 1 mg  1 mg Intramuscular PRN Darshan Ramirez MD        dextrose 5 %
Walked patient 500 feet
evidence of an enhancing mass lesion is noted. The FLAIR sequence images show mild to moderate T2 signal changes in the centrum semiovale bilaterally particularly in the left occipital lobe in the periventricular region representing chronic ischemia due to small vessel disease. The gradient recalled imaging sequence show normal flow in the utilized intracranial vessels. There is mucosal thickening involving the frontal, ethmoid, maxillary and sphenoid sinuses. The mastoid air cells are normal and clear. No acute intracranial abnormality. Evidence of pansinusitis. Above findings are recorded on a digital voice clip in PACS. Signed by Dr Ricky Lozoya on 7/17/2019 12:00 PM      Lab Results   Component Value Date    WBC 6.4 07/18/2019    HGB 12.8 (L) 07/18/2019    HCT 39.2 (L) 07/18/2019    MCV 92.5 07/18/2019     07/18/2019     Lab Results   Component Value Date     07/18/2019    K 4.0 07/18/2019     07/18/2019    CO2 26 07/18/2019    BUN 16 07/18/2019    CREATININE 0.6 07/18/2019    GLUCOSE 195 (H) 07/18/2019    CALCIUM 8.8 07/18/2019    PROT 6.4 (L) 07/18/2019    LABALBU 3.3 (L) 07/18/2019    BILITOT 0.3 07/18/2019    ALKPHOS 102 07/18/2019    AST 25 07/18/2019    ALT 31 07/18/2019    LABGLOM >60 07/18/2019    GLOB 3.0 10/24/2016     Lab Results   Component Value Date    INR 1.15 07/16/2019    INR 1.11 07/08/2019    INR 1.08 02/08/2018    PROTIME 14.1 07/16/2019    PROTIME 13.7 07/08/2019    PROTIME 13.9 02/08/2018       RECORD REVIEW:   Previous medical records, medications were reviewed at today's visit. Nursing/physician notes, imaging, labs and vitals reviewed. PT,OT and/or speech notes reviewed       ASSESSMENT:  59 y.o. admitted with worsening dizziness, ataxia, shortness of breath, widespread numbness, visual changes, and headache. MRI brain largely negative. Exam stable overnight. Awaiting further workup to try and clarify source for his symptoms.      PLAN:   1.   Follow up MRA
worsening dizziness, ataxia, shortness of breath, widespread numbness, visual changes, and headache. MRI brain largely negative. Exam stable overnight. Awaiting further workup to try and clarify source for his symptoms.      PLAN:   1. MRA head unremarkable  2. MRI C/L spine no pathology to explain patients symptoms  3. EMG with NCS of the legs unremarkable underlying small GBS felt somewhat unlikely. LP felt low yield as well. 4.  + tic bite labs pending-on empiric antibiotics  5. Thiamine   6. On Requip for restless leg syndrome    PT/OT    Please feel free to call with any questions. [phone] (cell phone).       Dina Moore MD  Board Certified Neurology

## 2019-07-20 LAB — LYME, EIA: 0.25 LIV (ref 0–1.2)

## 2019-07-21 LAB
ROCKY MOUNTAIN SPOTTED FEVER AB IGM,: ABNORMAL
ROCKY MOUNTAIN SPOTTED FEVER ANTIBODY IGG: ABNORMAL

## 2019-07-23 LAB
ALBUMIN SERPL-MCNC: 4.2 G/DL (ref 3.5–5.2)
ALP BLD-CCNC: 144 U/L (ref 40–130)
ALT SERPL-CCNC: 40 U/L (ref 5–41)
ANION GAP SERPL CALCULATED.3IONS-SCNC: 9 MMOL/L (ref 7–19)
AST SERPL-CCNC: 39 U/L (ref 5–40)
BILIRUB SERPL-MCNC: 0.4 MG/DL (ref 0.2–1.2)
BUN BLDV-MCNC: 11 MG/DL (ref 8–23)
CALCIUM SERPL-MCNC: 9.6 MG/DL (ref 8.8–10.2)
CHLORIDE BLD-SCNC: 101 MMOL/L (ref 98–111)
CHOLESTEROL, TOTAL: 114 MG/DL (ref 160–199)
CO2: 27 MMOL/L (ref 22–29)
CREAT SERPL-MCNC: 0.7 MG/DL (ref 0.5–1.2)
CREATININE URINE: 73 MG/DL (ref 4.2–622)
EHRLICHIA CHAFFEENSIS AB, IGG: NORMAL
EHRLICHIA CHAFFEENSIS AB, IGM: NORMAL
GFR NON-AFRICAN AMERICAN: >60
GLUCOSE BLD-MCNC: 163 MG/DL (ref 74–109)
HBA1C MFR BLD: 7.3 % (ref 4–6)
HDLC SERPL-MCNC: 40 MG/DL (ref 55–121)
LDL CHOLESTEROL CALCULATED: 55 MG/DL
MICROALBUMIN UR-MCNC: <1.2 MG/DL (ref 0–19)
MICROALBUMIN/CREAT UR-RTO: NORMAL MG/G
POTASSIUM SERPL-SCNC: 4.5 MMOL/L (ref 3.5–5)
SODIUM BLD-SCNC: 137 MMOL/L (ref 136–145)
TOTAL PROTEIN: 7.8 G/DL (ref 6.6–8.7)
TRIGL SERPL-MCNC: 95 MG/DL (ref 0–149)

## 2019-07-23 NOTE — CONSULTS
symptoms began suddenly. 6. Modifying Factors: The chest discomfort was not worse with activity. There was not partial relief with rest.      The shortness of air was not associated with symptoms of chest discomfort, pain with breathing, dizzness, fainting, cough, wheezing, hemoptysis, neck pain, chest injury, fever or sputum production. 7. Associated Signs and Symptoms: There was  associated manifestations such as dizzyness, weakness, or shortness of air. 8. Context: As noted above in the context of the presentation. It  was not pathognomonic of myocardial ischemia. When being examined this afternoon, in #331, there was not symptoms of exertional chest discomfort, unusual or change in shortness of air, presyncope or syncope.          CARDIAC RISK PROFILE:      Risk Factor Yes / No / Unknown       Gender Male   Cigarette Use Yes:    Family History of Cardiovascular Disease Yes: coronary art dis, stroke, diabetes   Diabetes Mellitus yes   Hypercholesteremia no   Hypertension yes          Cardiac Specific Problems:    Specialty Problems        Cardiology Problems    CAD (coronary artery disease)        HTN (hypertension)        ACS (acute coronary syndrome) (HCC)                PRIOR CARDIAC PROBLEM LIST  (IF APPLICABLE):    8547  CABG LIMA-LAD, VG-OM, VG-diag  12/26/2013  Cath  Closed LIMA-LAD, patent VG-diag, patent VG-OM, normal LVFX  6/11/2014  lexiscan negative for myocardial ischemia, EF 59  %  5/19/2015  DSE negative for myocardial ischemia  10/26/16  Cath  Severe NVD, closed LIMA-LAD, stent LAD, stent SVG to diagonal, patent VG-OM, normal LVFX  2/9/2018  Cath  80% mid RCA, 3.0 x 18 BMS  7/27/2018  lexiscan inferior and septal MI without ischemia, EF 62%  7/9/2019  lexiscan negative for myocardial ischemia, EF 48  %         Past Medical History:    Past Medical History:   Diagnosis Date    Gastelum's esophagus     CAD (coronary artery disease)     Cancer ([facility] [address] 2017    esophagus    Chest pain  COPD [facility]chronic obstructive [facility] disease) [facility]     DM2 (diabetes mellitus, type 2) ([facility])     GERD (gastroesophageal reflux disease)     History of cervical fracture     History of drug abuse     HTN (hypertension)     [facility]ocardial infarction ([facility] [address]     Other disorders of kidney and ureter in diseases classified elsewhere     Seizure disorder ([facility] [address]     Tobacco abuse          Past Surgical History:    Past Surgical History:   Procedure Laterality Date    CARDIAC [facility]HETERIZATION  [facility]/13  [facility]    EF over 60%    CORONARY ANGIOPLASTY WITH STENT PLACEMENT      x6 unsure of dates    CORONARY ANGIOPLASTY WITH STENT PLACEMENT  02/2018    BMS to med RCA    CORONARY ARTERY BYPASS GRAFT  2008         Home Medications:   Prior to Admission medications    Medication Sig Start Date End Date Taking?  Authorizing Provider   doxycycline hyclate (VIBRAMYCIN) 100 MG capsule Take 1 capsule by mouth 2 times daily for 5 days 7/19/19 7/24/19 Yes Chloé Adame MD   insulin regular (HUMULIN R;NOVOLIN R) 100 UNIT/ML injection Inject 20 Units into the skin 2 times daily (before meals)   Yes Historical Provider, MD   atorvastatin (LIPITOR) 10 MG tablet Take 1 tablet by mouth daily  Patient taking differently: Take 10 mg by mouth nightly  6/20/19  Yes Sarah Done, APRN   tamsulosin [facility]) 0.4 MG capsule Take 1 capsule by mouth daily 1/10/19  Yes Historical Provider, MD   lisinopril (PRINIVIL;ZESTRIL) 10 MG tablet Take 1 tablet by mouth daily 6/18/19  Yes Sarah Done, APRN   furosemide (LASIX) 40 MG tablet Take 1 tablet by mouth daily 6/18/19  Yes Sarah Done, APRN   metoprolol succinate (TOPROL XL) 25 MG extended release tablet Take 1 tablet by mouth daily 6/18/19  Yes Sarah Done, APRN   aspirin 81 MG chewable tablet Take 1 tablet by mouth daily 2/11/18  Yes Mallory Sanderson MD   insulin glargine (LANTUS) 100 UNIT/ML injection vial Inject 30 Units into the skin every morning   Yes Historical Provider, MD Specialty Problems        Cardiology Problems    CAD (coronary artery disease)        HTN (hypertension)        ACS (acute coronary syndrome) ([facility] [address]               No, the lexiscan was negative on 7/9/2019 for ischemia and the EF was 48%  Continue current medications:     Yes:            2. CAD Initial presentation during this evaluation   Review and summation of old records:    2008  CABG LIMA-LAD, VG-OM, VG-diag  12/26/2013  Cath  Closed LIMA-LAD, patent VG-diag, patent VG-OM, normal LVFX  6/11/2014  lexiscan negative for myocardial ischemia, EF 59  %  5/19/2015  DSE negative for myocardial ischemia  10/26/16  Cath  Severe NVD, closed LIMA-LAD, stent LAD, stent SVG to diagonal, patent VG-OM, normal LVFX  2/9/2018  Cath  80% mid RCA, 3.0 x 18 BMS  7/27/2018  lexiscan inferior and septal MI without ischemia, EF 62%  7/9/2019  lexiscan negative for myocardial ischemia, EF 48  %    No Continue current medications:    Yes:            3. Systemic arterial hypertension Initial presentation during this evaluation 112 / 77 No Continue current medications:       yes         DISCUSSION AND PLAN:      1. I had a detailed discussion with the patient and / or family regarding the historical points, physical examination findings, and any diagnostic results supporting the admission diagnosis. The patient was educated on care and need for admission. questions were invited and answered. Patient and / or family shows understanding of admission information and agrees to follow. 2. Continue present medications except for changes as noted above    3. Continue to monitor rhythm    4. Further orders per clinical course. RECOMMENDATIONS:    1.  Reassurance    2. Risk factor modification    3. Evaluation of etiologies of non cardiac chest discomfort should symptoms persist or progress    4.   Follow  Up with Primary care provider as arranged

## 2019-08-12 ENCOUNTER — TELEPHONE (OUTPATIENT)
Dept: NEUROSURGERY | Age: 64
End: 2019-08-12

## 2019-08-13 ENCOUNTER — TELEPHONE (OUTPATIENT)
Dept: NEUROSURGERY | Age: 64
End: 2019-08-13

## 2019-08-16 ENCOUNTER — OFFICE VISIT (OUTPATIENT)
Dept: NEUROSURGERY | Age: 64
End: 2019-08-16
Payer: MEDICARE

## 2019-08-16 VITALS
SYSTOLIC BLOOD PRESSURE: 180 MMHG | HEIGHT: 66 IN | BODY MASS INDEX: 23.63 KG/M2 | WEIGHT: 147 LBS | HEART RATE: 89 BPM | DIASTOLIC BLOOD PRESSURE: 107 MMHG

## 2019-08-16 DIAGNOSIS — M54.50 BILATERAL LOW BACK PAIN WITHOUT SCIATICA, UNSPECIFIED CHRONICITY: ICD-10-CM

## 2019-08-16 DIAGNOSIS — R20.0 NUMBNESS AND TINGLING OF BOTH LEGS: ICD-10-CM

## 2019-08-16 DIAGNOSIS — R20.2 NUMBNESS AND TINGLING IN BOTH HANDS: ICD-10-CM

## 2019-08-16 DIAGNOSIS — R20.2 NUMBNESS AND TINGLING OF BOTH LEGS: ICD-10-CM

## 2019-08-16 DIAGNOSIS — R42 DIZZINESS: Primary | ICD-10-CM

## 2019-08-16 DIAGNOSIS — R20.0 NUMBNESS AND TINGLING IN BOTH HANDS: ICD-10-CM

## 2019-08-16 PROCEDURE — 3017F COLORECTAL CA SCREEN DOC REV: CPT | Performed by: NURSE PRACTITIONER

## 2019-08-16 PROCEDURE — G8598 ASA/ANTIPLAT THER USED: HCPCS | Performed by: NURSE PRACTITIONER

## 2019-08-16 PROCEDURE — G8420 CALC BMI NORM PARAMETERS: HCPCS | Performed by: NURSE PRACTITIONER

## 2019-08-16 PROCEDURE — 99214 OFFICE O/P EST MOD 30 MIN: CPT | Performed by: NURSE PRACTITIONER

## 2019-08-16 PROCEDURE — G8427 DOCREV CUR MEDS BY ELIG CLIN: HCPCS | Performed by: NURSE PRACTITIONER

## 2019-08-16 PROCEDURE — 4004F PT TOBACCO SCREEN RCVD TLK: CPT | Performed by: NURSE PRACTITIONER

## 2019-08-16 RX ORDER — ZOLPIDEM TARTRATE 10 MG/1
TABLET ORAL NIGHTLY PRN
COMMUNITY

## 2019-08-16 NOTE — PROGRESS NOTES
ANGIOPLASTY WITH STENT PLACEMENT      x6 unsure of dates    CORONARY ANGIOPLASTY WITH STENT PLACEMENT  02/2018    BMS to med RCA    CORONARY ARTERY BYPASS GRAFT  2008       Family History   Problem Relation Age of Onset    Coronary Art Dis Mother     Stroke Mother     Diabetes Mother     Cancer Sister     Coronary Art Dis Sister     Diabetes Sister     Stroke Sister     Stroke Brother     Cancer Brother     Coronary Art Dis Brother     Arthritis Paternal Grandmother        Social History     Socioeconomic History    Marital status:      Spouse name: Not on file    Number of children: 1    Years of education: Not on file    Highest education level: Not on file   Occupational History    Not on file   Social Needs    Financial resource strain: Not on file    Food insecurity:     Worry: Not on file     Inability: Not on file    Transportation needs:     Medical: Not on file     Non-medical: Not on file   Tobacco Use    Smoking status: Current Every Day Smoker     Packs/day: 0.50     Years: 50.00     Pack years: 25.00     Types: Cigarettes    Smokeless tobacco: Never Used    Tobacco comment: 10 cigerrettes aday   Substance and Sexual Activity    Alcohol use: Yes     Comment: monthly    Drug use: No    Sexual activity: Not on file   Lifestyle    Physical activity:     Days per week: Not on file     Minutes per session: Not on file    Stress: Not on file   Relationships    Social connections:     Talks on phone: Not on file     Gets together: Not on file     Attends [employer] service: Not on file     Active member of club or organization: Not on file     Attends meetings of clubs or organizations: Not on file     Relationship status: Not on file    Intimate partner violence:     Fear of current or ex partner: Not on file     Emotionally abused: Not on file     Physically abused: Not on file     Forced sexual activity: Not on file   Other Topics Concern    Not on file   Social History largely normal study of BLE     Reviewed hospital records     ASSESSMENT:    Alee De Los Santos is a 59y.o. year old male here for hospital follow up. Recently admitted with dizziness, ataxia, widespread numbness and shortness of breath. Dizziness, numbness largely unchanged. MRI brain normal, MRA head normal. MRI C/L spine with degenerative changes however no pathology to clearly explain symptoms. Given moderate spinal canal stenosis and moderate-severe NF narrowing, will get neurosurgery opinion. NCS largely unremarkable, GBS felt somewhat unlikely. Prior history of tick borne illness several years ago. RMSF titer positive here, treated empirically but question if this was related to prior history given IgM <1:64. ICD-10-CM    1. Dizziness R42    2. Numbness and tingling of both legs R20.0 Sol Roa MD, Neurosurgery, [place]    R20.2    3. Numbness and tingling in both hands R20.0 Sol Roa MD, Neurosurgery, [place]    R20.2    4. Bilateral low back pain without sciatica, unspecified chronicity M54.5 Sol Roa MD, Neurosurgery, [place]       PLAN:  1. Neurosurgery referral  2. Could consider repeat NCS/EMG with any worsening   3. Return in about 3 months (around 11/16/2019) for follow up, sooner if worsening. DORON Stanton    Note:  A total of >50% (>13 minutes) of 25 minutes was spent discussing the pathophysiology and treatment and/or coordination of care of the above diagnoses. This dictation was generated by voice recognition computer software. Although all attempts are made to edit the dictation for accuracy, there may be errors in the transcription that are not intended.

## 2019-08-19 ENCOUNTER — OFFICE VISIT (OUTPATIENT)
Dept: NEUROSURGERY | Age: 64
End: 2019-08-19
Payer: MEDICARE

## 2019-08-19 VITALS
WEIGHT: 147 LBS | SYSTOLIC BLOOD PRESSURE: 125 MMHG | HEIGHT: 66 IN | DIASTOLIC BLOOD PRESSURE: 82 MMHG | HEART RATE: 84 BPM | BODY MASS INDEX: 23.63 KG/M2

## 2019-08-19 DIAGNOSIS — G89.29 CHRONIC MIDLINE LOW BACK PAIN WITHOUT SCIATICA: ICD-10-CM

## 2019-08-19 DIAGNOSIS — M54.2 NECK PAIN: ICD-10-CM

## 2019-08-19 DIAGNOSIS — R26.2 DIFFICULTY WALKING: ICD-10-CM

## 2019-08-19 DIAGNOSIS — M54.50 CHRONIC MIDLINE LOW BACK PAIN WITHOUT SCIATICA: ICD-10-CM

## 2019-08-19 DIAGNOSIS — R20.2 PARESTHESIAS: ICD-10-CM

## 2019-08-19 PROBLEM — M54.9 BACK PAIN: Status: ACTIVE | Noted: 2019-08-19

## 2019-08-19 PROCEDURE — 99202 OFFICE O/P NEW SF 15 MIN: CPT | Performed by: NEUROLOGICAL SURGERY

## 2019-08-19 NOTE — PROGRESS NOTES
touch diffusely  Right Lower Extremity: Diminished to light touch diffusely  Left Lower Extremity: Diminished to light touch diffusely      REFLEXES:    Biceps: Absent  Patella: Absent      Adkins's: Negative      COORDINATION and GAIT:    Gait: Unsteady and antalgic      DATA:    Lab Results   Component Value Date    WBC 5.9 07/19/2019    HGB 12.9 (L) 07/19/2019    HCT 39.8 (L) 07/19/2019    MCV 93.9 07/19/2019     07/19/2019     Lab Results   Component Value Date     07/23/2019    K 4.5 07/23/2019     07/23/2019    CO2 27 07/23/2019    BUN 11 07/23/2019    CREATININE 0.7 07/23/2019    GLUCOSE 163 (H) 07/23/2019    CALCIUM 9.6 07/23/2019    PROT 7.8 07/23/2019    LABALBU 4.2 07/23/2019    BILITOT 0.4 07/23/2019    ALKPHOS 144 (H) 07/23/2019    AST 39 07/23/2019    ALT 40 07/23/2019    LABGLOM >60 07/23/2019    GLOB 3.0 10/24/2016     Lab Results   Component Value Date    INR 1.15 07/16/2019    INR 1.11 07/08/2019    INR 1.08 02/08/2018    PROTIME 14.1 07/16/2019    PROTIME 13.7 07/08/2019    PROTIME 13.9 02/08/2018       IMAGING:    My interpretation of imaging studies: MRI of the cervical and lumbar spine reviewed. There are disc extrusions at C5/6 and C6/7. There is significant right-sided foraminal stenosis at C5/6 and modest bilateral foraminal stenosis at C6/7. There is no cord compression. In the lumbar spine, there are disc extrusions at L4/5 and L5/S1 that cause moderate bilateral foraminal stenosis at both levels but the central canal remains patent. ASSESSMENT AND PLAN:  This is a 59 y.o. male who presents with multiple neurologic complaints including whole-body paresthesias and generalized weakness as well chronic neck and back pain. I explained to him that I did not feel that his current paresthesias or weakness could be attributed to the abnormal findings on his MRI scan.   As such, I do not feel that surgery is in his best interest.  I recommended that he continue to

## 2019-08-30 ENCOUNTER — HOSPITAL ENCOUNTER (INPATIENT)
Age: 64
LOS: 3 days | Discharge: HOME | DRG: 392 | End: 2019-09-02
Attending: EMERGENCY MEDICINE | Admitting: HOSPITALIST
Payer: MEDICARE

## 2019-08-30 ENCOUNTER — APPOINTMENT (OUTPATIENT)
Dept: GENERAL RADIOLOGY | Age: 64
DRG: 392 | End: 2019-08-30
Payer: MEDICARE

## 2019-08-30 DIAGNOSIS — I20.0 UNSTABLE ANGINA (HCC): Primary | ICD-10-CM

## 2019-08-30 LAB
ALBUMIN SERPL-MCNC: 4.1 G/DL (ref 3.5–5.2)
ALP BLD-CCNC: 142 U/L (ref 40–130)
ALT SERPL-CCNC: 31 U/L (ref 5–41)
ANION GAP SERPL CALCULATED.3IONS-SCNC: 14 MMOL/L (ref 7–19)
APTT: 29.2 SEC (ref 26–36.2)
AST SERPL-CCNC: 30 U/L (ref 5–40)
BASOPHILS ABSOLUTE: 0 K/UL (ref 0–0.2)
BASOPHILS RELATIVE PERCENT: 0.5 % (ref 0–1)
BILIRUB SERPL-MCNC: <0.2 MG/DL (ref 0.2–1.2)
BUN BLDV-MCNC: 14 MG/DL (ref 8–23)
CALCIUM SERPL-MCNC: 9.2 MG/DL (ref 8.8–10.2)
CHLORIDE BLD-SCNC: 105 MMOL/L (ref 98–111)
CO2: 23 MMOL/L (ref 22–29)
CREAT SERPL-MCNC: 0.6 MG/DL (ref 0.5–1.2)
EOSINOPHILS ABSOLUTE: 0.1 K/UL (ref 0–0.6)
EOSINOPHILS RELATIVE PERCENT: 1.4 % (ref 0–5)
GFR NON-AFRICAN AMERICAN: >60
GLUCOSE BLD-MCNC: 173 MG/DL (ref 70–99)
GLUCOSE BLD-MCNC: 193 MG/DL (ref 74–109)
GLUCOSE BLD-MCNC: 93 MG/DL (ref 70–99)
HCT VFR BLD CALC: 40.5 % (ref 42–52)
HEMOGLOBIN: 13.8 G/DL (ref 14–18)
IMMATURE GRANULOCYTES #: 0 K/UL
INR BLD: 1.14 (ref 0.88–1.18)
LYMPHOCYTES ABSOLUTE: 1.1 K/UL (ref 1.1–4.5)
LYMPHOCYTES RELATIVE PERCENT: 14.1 % (ref 20–40)
MCH RBC QN AUTO: 30.6 PG (ref 27–31)
MCHC RBC AUTO-ENTMCNC: 34.1 G/DL (ref 33–37)
MCV RBC AUTO: 89.8 FL (ref 80–94)
MONOCYTES ABSOLUTE: 0.7 K/UL (ref 0–0.9)
MONOCYTES RELATIVE PERCENT: 9.2 % (ref 0–10)
NEUTROPHILS ABSOLUTE: 5.9 K/UL (ref 1.5–7.5)
NEUTROPHILS RELATIVE PERCENT: 74.5 % (ref 50–65)
PDW BLD-RTO: 12.5 % (ref 11.5–14.5)
PERFORMED ON: ABNORMAL
PERFORMED ON: NORMAL
PLATELET # BLD: 218 K/UL (ref 130–400)
PMV BLD AUTO: 10.9 FL (ref 9.4–12.4)
POTASSIUM SERPL-SCNC: 4.3 MMOL/L (ref 3.5–5)
PROTHROMBIN TIME: 14 SEC (ref 12–14.6)
RBC # BLD: 4.51 M/UL (ref 4.7–6.1)
REASON FOR REJECTION: NORMAL
REJECTED TEST: NORMAL
SODIUM BLD-SCNC: 142 MMOL/L (ref 136–145)
TOTAL PROTEIN: 7 G/DL (ref 6.6–8.7)
TROPONIN: <0.01 NG/ML (ref 0–0.03)
WBC # BLD: 7.9 K/UL (ref 4.8–10.8)

## 2019-08-30 PROCEDURE — 82948 REAGENT STRIP/BLOOD GLUCOSE: CPT

## 2019-08-30 PROCEDURE — 2700000000 HC OXYGEN THERAPY PER DAY

## 2019-08-30 PROCEDURE — 2100000000 HC CCU R&B

## 2019-08-30 PROCEDURE — 85730 THROMBOPLASTIN TIME PARTIAL: CPT

## 2019-08-30 PROCEDURE — 80053 COMPREHEN METABOLIC PANEL: CPT

## 2019-08-30 PROCEDURE — 99215 OFFICE O/P EST HI 40 MIN: CPT | Performed by: INTERNAL MEDICINE

## 2019-08-30 PROCEDURE — 96374 THER/PROPH/DIAG INJ IV PUSH: CPT

## 2019-08-30 PROCEDURE — 6360000002 HC RX W HCPCS: Performed by: EMERGENCY MEDICINE

## 2019-08-30 PROCEDURE — 84484 ASSAY OF TROPONIN QUANT: CPT

## 2019-08-30 PROCEDURE — 2500000003 HC RX 250 WO HCPCS: Performed by: EMERGENCY MEDICINE

## 2019-08-30 PROCEDURE — 6360000002 HC RX W HCPCS: Performed by: HOSPITALIST

## 2019-08-30 PROCEDURE — 2580000003 HC RX 258: Performed by: INTERNAL MEDICINE

## 2019-08-30 PROCEDURE — 85610 PROTHROMBIN TIME: CPT

## 2019-08-30 PROCEDURE — 93005 ELECTROCARDIOGRAM TRACING: CPT

## 2019-08-30 PROCEDURE — 99291 CRITICAL CARE FIRST HOUR: CPT

## 2019-08-30 PROCEDURE — 71045 X-RAY EXAM CHEST 1 VIEW: CPT

## 2019-08-30 PROCEDURE — 36415 COLL VENOUS BLD VENIPUNCTURE: CPT

## 2019-08-30 PROCEDURE — 6370000000 HC RX 637 (ALT 250 FOR IP): Performed by: INTERNAL MEDICINE

## 2019-08-30 PROCEDURE — 85025 COMPLETE CBC W/AUTO DIFF WBC: CPT

## 2019-08-30 RX ORDER — METOPROLOL SUCCINATE 25 MG/1
25 TABLET, EXTENDED RELEASE ORAL DAILY
Status: DISCONTINUED | OUTPATIENT
Start: 2019-08-30 | End: 2019-09-02 | Stop reason: HOSPADM

## 2019-08-30 RX ORDER — ATORVASTATIN CALCIUM 10 MG/1
10 TABLET, FILM COATED ORAL DAILY
Status: DISCONTINUED | OUTPATIENT
Start: 2019-08-30 | End: 2019-09-02 | Stop reason: HOSPADM

## 2019-08-30 RX ORDER — DEXTROSE MONOHYDRATE 50 MG/ML
100 INJECTION, SOLUTION INTRAVENOUS PRN
Status: DISCONTINUED | OUTPATIENT
Start: 2019-08-30 | End: 2019-09-02 | Stop reason: HOSPADM

## 2019-08-30 RX ORDER — INSULIN GLARGINE 100 [IU]/ML
10 INJECTION, SOLUTION SUBCUTANEOUS NIGHTLY
Status: DISCONTINUED | OUTPATIENT
Start: 2019-08-30 | End: 2019-09-02 | Stop reason: HOSPADM

## 2019-08-30 RX ORDER — TAMSULOSIN HYDROCHLORIDE 0.4 MG/1
0.4 CAPSULE ORAL DAILY
Status: DISCONTINUED | OUTPATIENT
Start: 2019-08-30 | End: 2019-08-31

## 2019-08-30 RX ORDER — ASPIRIN 81 MG/1
81 TABLET, CHEWABLE ORAL DAILY
Status: DISCONTINUED | OUTPATIENT
Start: 2019-08-30 | End: 2019-09-02 | Stop reason: HOSPADM

## 2019-08-30 RX ORDER — DEXTROSE AND SODIUM CHLORIDE 5; .45 G/100ML; G/100ML
INJECTION, SOLUTION INTRAVENOUS CONTINUOUS
Status: DISCONTINUED | OUTPATIENT
Start: 2019-08-30 | End: 2019-08-30

## 2019-08-30 RX ORDER — SODIUM CHLORIDE 9 MG/ML
INJECTION, SOLUTION INTRAVENOUS CONTINUOUS
Status: DISCONTINUED | OUTPATIENT
Start: 2019-08-30 | End: 2019-08-31

## 2019-08-30 RX ORDER — NITROGLYCERIN 20 MG/100ML
10 INJECTION INTRAVENOUS CONTINUOUS
Status: DISCONTINUED | OUTPATIENT
Start: 2019-08-30 | End: 2019-08-31

## 2019-08-30 RX ORDER — ACETAMINOPHEN 325 MG/1
650 TABLET ORAL EVERY 4 HOURS PRN
Status: DISCONTINUED | OUTPATIENT
Start: 2019-08-30 | End: 2019-09-02 | Stop reason: HOSPADM

## 2019-08-30 RX ORDER — NICOTINE 21 MG/24HR
1 PATCH, TRANSDERMAL 24 HOURS TRANSDERMAL DAILY
Status: DISCONTINUED | OUTPATIENT
Start: 2019-08-30 | End: 2019-09-02 | Stop reason: HOSPADM

## 2019-08-30 RX ORDER — ISOSORBIDE MONONITRATE 60 MG/1
60 TABLET, EXTENDED RELEASE ORAL DAILY
Status: DISCONTINUED | OUTPATIENT
Start: 2019-08-30 | End: 2019-09-02 | Stop reason: HOSPADM

## 2019-08-30 RX ORDER — ASPIRIN 81 MG/1
81 TABLET, CHEWABLE ORAL DAILY
Status: DISCONTINUED | OUTPATIENT
Start: 2019-08-31 | End: 2019-08-30

## 2019-08-30 RX ORDER — OXYCODONE AND ACETAMINOPHEN 10; 325 MG/1; MG/1
1 TABLET ORAL EVERY 8 HOURS PRN
Status: DISCONTINUED | OUTPATIENT
Start: 2019-08-30 | End: 2019-09-02 | Stop reason: HOSPADM

## 2019-08-30 RX ORDER — FENTANYL CITRATE 50 UG/ML
50 INJECTION, SOLUTION INTRAMUSCULAR; INTRAVENOUS ONCE
Status: COMPLETED | OUTPATIENT
Start: 2019-08-30 | End: 2019-08-30

## 2019-08-30 RX ORDER — DEXTROSE MONOHYDRATE 25 G/50ML
12.5 INJECTION, SOLUTION INTRAVENOUS PRN
Status: DISCONTINUED | OUTPATIENT
Start: 2019-08-30 | End: 2019-09-02 | Stop reason: HOSPADM

## 2019-08-30 RX ORDER — LISINOPRIL 5 MG/1
5 TABLET ORAL DAILY
Status: DISCONTINUED | OUTPATIENT
Start: 2019-08-30 | End: 2019-09-02 | Stop reason: HOSPADM

## 2019-08-30 RX ORDER — NICOTINE POLACRILEX 4 MG
15 LOZENGE BUCCAL PRN
Status: DISCONTINUED | OUTPATIENT
Start: 2019-08-30 | End: 2019-09-02 | Stop reason: HOSPADM

## 2019-08-30 RX ORDER — DEXTROSE AND SODIUM CHLORIDE 5; .45 G/100ML; G/100ML
INJECTION, SOLUTION INTRAVENOUS CONTINUOUS
Status: DISCONTINUED | OUTPATIENT
Start: 2019-08-31 | End: 2019-08-31

## 2019-08-30 RX ADMIN — ISOSORBIDE MONONITRATE 60 MG: 60 TABLET, EXTENDED RELEASE ORAL at 20:17

## 2019-08-30 RX ADMIN — ATORVASTATIN CALCIUM 10 MG: 10 TABLET, FILM COATED ORAL at 20:16

## 2019-08-30 RX ADMIN — FENTANYL CITRATE 50 MCG: 50 INJECTION INTRAMUSCULAR; INTRAVENOUS at 12:07

## 2019-08-30 RX ADMIN — LISINOPRIL 5 MG: 5 TABLET ORAL at 20:16

## 2019-08-30 RX ADMIN — HYDROMORPHONE HYDROCHLORIDE 0.5 MG: 1 INJECTION, SOLUTION INTRAMUSCULAR; INTRAVENOUS; SUBCUTANEOUS at 21:54

## 2019-08-30 RX ADMIN — SODIUM CHLORIDE: 9 INJECTION, SOLUTION INTRAVENOUS at 20:16

## 2019-08-30 RX ADMIN — NITROGLYCERIN 10 MCG/MIN: 20 INJECTION INTRAVENOUS at 12:11

## 2019-08-30 RX ADMIN — METOPROLOL SUCCINATE 25 MG: 25 TABLET, EXTENDED RELEASE ORAL at 20:17

## 2019-08-30 RX ADMIN — TAMSULOSIN HYDROCHLORIDE 0.4 MG: 0.4 CAPSULE ORAL at 20:16

## 2019-08-30 RX ADMIN — ASPIRIN 81 MG 81 MG: 81 TABLET ORAL at 20:17

## 2019-08-30 ASSESSMENT — PAIN DESCRIPTION - DESCRIPTORS
DESCRIPTORS: CONSTANT;PRESSURE
DESCRIPTORS: CONSTANT;PRESSURE
DESCRIPTORS: PRESSURE

## 2019-08-30 ASSESSMENT — PAIN DESCRIPTION - LOCATION
LOCATION: CHEST

## 2019-08-30 ASSESSMENT — ENCOUNTER SYMPTOMS
COUGH: 1
DIARRHEA: 0
RHINORRHEA: 0
SHORTNESS OF BREATH: 0
EYES NEGATIVE: 1
VOMITING: 0
GASTROINTESTINAL NEGATIVE: 1
NAUSEA: 0
ABDOMINAL PAIN: 0
SORE THROAT: 0
BACK PAIN: 0
RESPIRATORY NEGATIVE: 1

## 2019-08-30 ASSESSMENT — PAIN SCALES - GENERAL
PAINLEVEL_OUTOF10: 3
PAINLEVEL_OUTOF10: 2
PAINLEVEL_OUTOF10: 1
PAINLEVEL_OUTOF10: 10
PAINLEVEL_OUTOF10: 4
PAINLEVEL_OUTOF10: 4

## 2019-08-30 ASSESSMENT — PAIN - FUNCTIONAL ASSESSMENT: PAIN_FUNCTIONAL_ASSESSMENT: ACTIVITIES ARE NOT PREVENTED

## 2019-08-30 ASSESSMENT — PAIN DESCRIPTION - ORIENTATION
ORIENTATION: MID

## 2019-08-30 ASSESSMENT — PAIN DESCRIPTION - ONSET: ONSET: ON-GOING

## 2019-08-30 ASSESSMENT — PAIN DESCRIPTION - PAIN TYPE
TYPE: ACUTE PAIN

## 2019-08-30 ASSESSMENT — PAIN DESCRIPTION - PROGRESSION: CLINICAL_PROGRESSION: GRADUALLY WORSENING

## 2019-08-30 ASSESSMENT — PAIN DESCRIPTION - FREQUENCY: FREQUENCY: CONTINUOUS

## 2019-08-30 NOTE — CARE COORDINATION
PT has trouble paying for medications. PT does not have part D.  Electronically signed by Demetria Tubbs on 8/30/2019 at 12:29 PM

## 2019-08-30 NOTE — ED PROVIDER NOTES
[facility]  eMERGENCY dEPARTMENT eNCOUnter      Pt Name: Neil King  MRN: [id number]  [place] 1955  Date of evaluation: 8/30/2019  Provider: Jovany Quintero MD    [address] [place] [address]       Chief Complaint   Patient presents with    Chest Pain         HISTORY OF PRESENT ILLNESS   (Location/Symptom, Timing/Onset,Context/Setting, Quality, Duration, Modifying Factors, Severity)  Note limiting factors. Neil King is a 59 y.o. male who presents to the emergency department for concern of chest pain. Told me his pain is central and radiates to his left arm. Patient states he was at a [facility] whenever he began to have chest discomfort. A person that was there gave him 2 nitroglycerin which he tells me did relieve his pain. He had gone to take a TV to a family whenever he had return of his symptoms. He drove quite quickly back to his home to having his dog in his truck and called EMS. He was given additional nitroglycerin and a 324 aspirin. His symptoms had resolved however upon arrival states he has had return of his chest discomfort. He does continue to smoke. He has multiple risk factors and he has an extensive prior cardiac history. He was admitted twice in July and had a negative stress test at that time. His last cardiac catheterization was February 7, 2018 which displayed quite significant coronary artery disease he did have an additional stent placed to the mid right coronary. He denies any lower extremity swelling or prior history of PE or DVT. HPI    NursingNotes were reviewed. REVIEW OF SYSTEMS    (2-9 systems for level 4, 10 or more for level 5)     Review of Systems   Constitutional: Negative for chills and fever. HENT: Negative for rhinorrhea and sore throat. Respiratory: Positive for cough. Negative for shortness of breath. Cardiovascular: Positive for chest pain. Negative for leg swelling.    Gastrointestinal: Negative for abdominal pain, diarrhea, nausea

## 2019-08-30 NOTE — CONSULTS
the tongue every 5 minutes as needed for Chest pain up to max of 3 total doses. If no relief after 1 dose, call 911. 6/18/19   DORON Spencer   aspirin 81 MG chewable tablet Take 1 tablet by mouth daily 2/11/18   Fiorella Douglass MD   insulin glargine (LANTUS) 100 UNIT/ML injection vial Inject 30 Units into the skin every morning    Historical Provider, MD   oxyCODONE-acetaminophen (PERCOCET)  MG per tablet Take 1 tablet by mouth every 8 hours as needed     Historical Provider, MD       Current Meds:      Current Infused Meds:   nitroGLYCERIN 15 mcg/min (08/30/19 1243)       Physical Exam:  Vitals:    08/30/19 1501   BP: 136/77   Pulse: 68   Resp: 15   Temp:    SpO2: 92%     No intake or output data in the 24 hours ending 08/30/19 1716  Estimated body mass index is 22.92 kg/m² as calculated from the following:    Height as of this encounter: 5' 6\" (1.676 m). Weight as of this encounter: 142 lb (64.4 kg). Physical Exam   Constitutional: He appears well-developed and well-nourished. No distress. HENT:   Head: Normocephalic and atraumatic. Eyes: Pupils are equal, round, and reactive to light. EOM are normal.   Neck: No JVD present. Carotid bruit is not present. No tracheal deviation present. No thyromegaly present. No carotid artery bruits auscultated   Cardiovascular: Normal rate, regular rhythm and normal heart sounds. Exam reveals no gallop and no friction rub. No murmur heard. Pulmonary/Chest: Effort normal and breath sounds normal. No stridor. No respiratory distress. He has no wheezes. He has no rales. He exhibits no tenderness. Abdominal: He exhibits distension. He exhibits no mass. There is no tenderness. There is no rebound and no guarding. No hernia. Musculoskeletal: He exhibits no edema. Lymphadenopathy:     He has no cervical adenopathy. Neurological: No cranial nerve deficit or sensory deficit. He exhibits normal muscle tone.  Coordination normal.   Skin: Skin is warm and dry. He is not diaphoretic. Psychiatric: He has a normal mood and affect. His behavior is normal. Judgment and thought content normal.   Vitals reviewed. Labs:  Recent Labs     08/30/19  1145   WBC 7.9   HGB 13.8*          Recent Labs     08/30/19  1145      K 4.3      CO2 23   BUN 14   CREATININE 0.6   LABGLOM >60   CALCIUM 9.2       CK, CKMB, Troponin: @LABRCNT (CKTOTAL:3, CKMB:3, TROPONINI:3)@    Last 3 BNP:  No results for input(s): BNP in the last 72 hours. IMAGING:  Xr Chest Portable    Result Date: 8/30/2019  EXAMINATION: XR CHEST PORTABLE 8/30/2019 12:56 PM HISTORY: XR CHEST PORTABLE 8/30/2019 10:45 AM HISTORY: Chest pain COMPARISON: July 16, 2019. FINDINGS: The lungs are clear. The cardiac silhouette is normal. Wires are present from previous median sternotomy. The osseous structures and surrounding soft tissues demonstrate no acute abnormality. 1. No radiographic evidence of acute cardiopulmonary process. Signed by Dr Vasyl Coe on 8/30/2019 12:57 PM      Assessment:  1. Recurrent bouts atypical chest pain cannot exclude unstable angina  2. Previous CABG remote  3. Multiple previous stents  4. Esophageal carcinoma diagnosed approximately 2 years ago status post chemotherapy and radiation therapy  5. Echo abuse ongoing  6. Hypertension  7. Elevated liver enzymes and alkaline phosphatase  8. MRI of the brain 7/17/2019 pansinusitis otherwise no acute findings  9. Lexiscan stress test 7/9/2019- for ischemia ejection fraction 48%  10. MRA of the head 7/18/2019- study  11. PET studies 4/14/8157 abnormal metabolic activity base of the tongue on the right extending in the right pontine fossil SUV is 8.8 consistent with neoplasm  12.  Artery catheterization 2/7/2018 normal left ventricular function patent stent LAD severe disease proximal first OM and mid right coronary artery atretic but patent LIMA graft LAD and patent vein graft to diagonal and obtuse marginal successful stenting mid right coronary artery with a 3.0 x 18 bare-metal vent placed      Recommendations:  1. Obtain an echocardiogram  2. Obtain serial EKGs and cardiac enzymes  3. Further comments to follow reassess in the a.m.

## 2019-08-31 ENCOUNTER — APPOINTMENT (OUTPATIENT)
Dept: CT IMAGING | Age: 64
DRG: 392 | End: 2019-08-31
Payer: MEDICARE

## 2019-08-31 LAB
ALBUMIN SERPL-MCNC: 3.7 G/DL (ref 3.5–5.2)
ALP BLD-CCNC: 111 U/L (ref 40–130)
ALT SERPL-CCNC: 28 U/L (ref 5–41)
ANION GAP SERPL CALCULATED.3IONS-SCNC: 11 MMOL/L (ref 7–19)
AST SERPL-CCNC: 27 U/L (ref 5–40)
BASOPHILS ABSOLUTE: 0 K/UL (ref 0–0.2)
BASOPHILS RELATIVE PERCENT: 0.5 % (ref 0–1)
BILIRUB SERPL-MCNC: 0.5 MG/DL (ref 0.2–1.2)
BUN BLDV-MCNC: 9 MG/DL (ref 8–23)
CALCIUM SERPL-MCNC: 9 MG/DL (ref 8.8–10.2)
CHLORIDE BLD-SCNC: 105 MMOL/L (ref 98–111)
CHOLESTEROL, TOTAL: 131 MG/DL (ref 160–199)
CO2: 24 MMOL/L (ref 22–29)
CREAT SERPL-MCNC: 0.6 MG/DL (ref 0.5–1.2)
D DIMER: 0.38 UG/ML FEU (ref 0–0.48)
D DIMER: <0.27 UG/ML FEU (ref 0–0.48)
EOSINOPHILS ABSOLUTE: 0.1 K/UL (ref 0–0.6)
EOSINOPHILS RELATIVE PERCENT: 1.6 % (ref 0–5)
GFR NON-AFRICAN AMERICAN: >60
GLUCOSE BLD-MCNC: 135 MG/DL (ref 74–109)
GLUCOSE BLD-MCNC: 142 MG/DL (ref 70–99)
GLUCOSE BLD-MCNC: 159 MG/DL (ref 70–99)
GLUCOSE BLD-MCNC: 199 MG/DL (ref 70–99)
GLUCOSE BLD-MCNC: 228 MG/DL (ref 70–99)
HBA1C MFR BLD: 7 % (ref 4–6)
HCT VFR BLD CALC: 39.3 % (ref 42–52)
HDLC SERPL-MCNC: 33 MG/DL (ref 55–121)
HEMOGLOBIN: 13.4 G/DL (ref 14–18)
IMMATURE GRANULOCYTES #: 0 K/UL
LDL CHOLESTEROL CALCULATED: 77 MG/DL
LYMPHOCYTES ABSOLUTE: 1.2 K/UL (ref 1.1–4.5)
LYMPHOCYTES RELATIVE PERCENT: 16.1 % (ref 20–40)
MAGNESIUM: 1.8 MG/DL (ref 1.6–2.4)
MCH RBC QN AUTO: 30.8 PG (ref 27–31)
MCHC RBC AUTO-ENTMCNC: 34.1 G/DL (ref 33–37)
MCV RBC AUTO: 90.3 FL (ref 80–94)
MONOCYTES ABSOLUTE: 0.9 K/UL (ref 0–0.9)
MONOCYTES RELATIVE PERCENT: 12.1 % (ref 0–10)
NEUTROPHILS ABSOLUTE: 5.3 K/UL (ref 1.5–7.5)
NEUTROPHILS RELATIVE PERCENT: 69.6 % (ref 50–65)
PDW BLD-RTO: 12.9 % (ref 11.5–14.5)
PERFORMED ON: ABNORMAL
PLATELET # BLD: 204 K/UL (ref 130–400)
PMV BLD AUTO: 11.4 FL (ref 9.4–12.4)
POTASSIUM SERPL-SCNC: 4.3 MMOL/L (ref 3.5–5)
PRO-BNP: 343 PG/ML (ref 0–900)
RBC # BLD: 4.35 M/UL (ref 4.7–6.1)
SODIUM BLD-SCNC: 140 MMOL/L (ref 136–145)
TOTAL PROTEIN: 6.9 G/DL (ref 6.6–8.7)
TRIGL SERPL-MCNC: 104 MG/DL (ref 0–149)
TROPONIN: <0.01 NG/ML (ref 0–0.03)
TROPONIN: <0.01 NG/ML (ref 0–0.03)
WBC # BLD: 7.6 K/UL (ref 4.8–10.8)

## 2019-08-31 PROCEDURE — 80053 COMPREHEN METABOLIC PANEL: CPT

## 2019-08-31 PROCEDURE — 2700000000 HC OXYGEN THERAPY PER DAY

## 2019-08-31 PROCEDURE — 6370000000 HC RX 637 (ALT 250 FOR IP): Performed by: INTERNAL MEDICINE

## 2019-08-31 PROCEDURE — 99214 OFFICE O/P EST MOD 30 MIN: CPT | Performed by: INTERNAL MEDICINE

## 2019-08-31 PROCEDURE — 6360000002 HC RX W HCPCS: Performed by: INTERNAL MEDICINE

## 2019-08-31 PROCEDURE — 6360000002 HC RX W HCPCS: Performed by: HOSPITALIST

## 2019-08-31 PROCEDURE — 2140000000 HC CCU INTERMEDIATE R&B

## 2019-08-31 PROCEDURE — 80061 LIPID PANEL: CPT

## 2019-08-31 PROCEDURE — 84484 ASSAY OF TROPONIN QUANT: CPT

## 2019-08-31 PROCEDURE — 83036 HEMOGLOBIN GLYCOSYLATED A1C: CPT

## 2019-08-31 PROCEDURE — 83880 ASSAY OF NATRIURETIC PEPTIDE: CPT

## 2019-08-31 PROCEDURE — 6360000004 HC RX CONTRAST MEDICATION: Performed by: INTERNAL MEDICINE

## 2019-08-31 PROCEDURE — 85379 FIBRIN DEGRADATION QUANT: CPT

## 2019-08-31 PROCEDURE — 82948 REAGENT STRIP/BLOOD GLUCOSE: CPT

## 2019-08-31 PROCEDURE — 71275 CT ANGIOGRAPHY CHEST: CPT

## 2019-08-31 PROCEDURE — 85025 COMPLETE CBC W/AUTO DIFF WBC: CPT

## 2019-08-31 PROCEDURE — 83735 ASSAY OF MAGNESIUM: CPT

## 2019-08-31 PROCEDURE — 36415 COLL VENOUS BLD VENIPUNCTURE: CPT

## 2019-08-31 RX ORDER — ONDANSETRON 2 MG/ML
4 INJECTION INTRAMUSCULAR; INTRAVENOUS EVERY 6 HOURS PRN
Status: DISCONTINUED | OUTPATIENT
Start: 2019-08-31 | End: 2019-09-02 | Stop reason: HOSPADM

## 2019-08-31 RX ADMIN — ATORVASTATIN CALCIUM 10 MG: 10 TABLET, FILM COATED ORAL at 10:21

## 2019-08-31 RX ADMIN — ACETAMINOPHEN 650 MG: 325 TABLET ORAL at 01:43

## 2019-08-31 RX ADMIN — ONDANSETRON 4 MG: 2 INJECTION INTRAMUSCULAR; INTRAVENOUS at 01:43

## 2019-08-31 RX ADMIN — HYDROMORPHONE HYDROCHLORIDE 0.5 MG: 1 INJECTION, SOLUTION INTRAMUSCULAR; INTRAVENOUS; SUBCUTANEOUS at 08:10

## 2019-08-31 RX ADMIN — IOPAMIDOL 90 ML: 755 INJECTION, SOLUTION INTRAVENOUS at 14:32

## 2019-08-31 RX ADMIN — INSULIN LISPRO 2 UNITS: 100 INJECTION, SOLUTION INTRAVENOUS; SUBCUTANEOUS at 19:12

## 2019-08-31 RX ADMIN — METOPROLOL SUCCINATE 25 MG: 25 TABLET, EXTENDED RELEASE ORAL at 10:22

## 2019-08-31 RX ADMIN — ISOSORBIDE MONONITRATE 60 MG: 60 TABLET, EXTENDED RELEASE ORAL at 10:21

## 2019-08-31 RX ADMIN — TAMSULOSIN HYDROCHLORIDE 0.4 MG: 0.4 CAPSULE ORAL at 10:21

## 2019-08-31 RX ADMIN — ONDANSETRON 4 MG: 2 INJECTION INTRAMUSCULAR; INTRAVENOUS at 08:10

## 2019-08-31 RX ADMIN — LISINOPRIL 5 MG: 5 TABLET ORAL at 10:21

## 2019-08-31 RX ADMIN — HYDROMORPHONE HYDROCHLORIDE 0.5 MG: 1 INJECTION, SOLUTION INTRAMUSCULAR; INTRAVENOUS; SUBCUTANEOUS at 01:54

## 2019-08-31 RX ADMIN — ASPIRIN 81 MG 81 MG: 81 TABLET ORAL at 10:22

## 2019-08-31 ASSESSMENT — PAIN DESCRIPTION - ONSET: ONSET: ON-GOING

## 2019-08-31 ASSESSMENT — PAIN SCALES - GENERAL
PAINLEVEL_OUTOF10: 0
PAINLEVEL_OUTOF10: 4
PAINLEVEL_OUTOF10: 0
PAINLEVEL_OUTOF10: 9
PAINLEVEL_OUTOF10: 0
PAINLEVEL_OUTOF10: 4
PAINLEVEL_OUTOF10: 4

## 2019-08-31 ASSESSMENT — PAIN DESCRIPTION - FREQUENCY: FREQUENCY: CONTINUOUS

## 2019-08-31 ASSESSMENT — PAIN DESCRIPTION - ORIENTATION
ORIENTATION: MID

## 2019-08-31 ASSESSMENT — PAIN DESCRIPTION - PAIN TYPE
TYPE: ACUTE PAIN

## 2019-08-31 ASSESSMENT — PAIN DESCRIPTION - LOCATION
LOCATION: CHEST;HEAD
LOCATION: CHEST
LOCATION: CHEST;HEAD

## 2019-08-31 ASSESSMENT — PAIN DESCRIPTION - DESCRIPTORS: DESCRIPTORS: CONSTANT;PRESSURE

## 2019-08-31 NOTE — PROGRESS NOTES
8/31/2019 0600  Gross per 24 hour   Intake 1032.7 ml   Output 600 ml   Net 432.7 ml       Prior to Admission medications    Medication Sig Start Date End Date Taking? Authorizing Provider   zolpidem (AMBIEN) 10 MG tablet Take by mouth nightly as needed for Sleep. Yes Historical Provider, MD   atorvastatin (LIPITOR) 10 MG tablet Take 1 tablet by mouth daily  Patient taking differently: Take 10 mg by mouth nightly  6/20/19  Yes DORON Cool   tamsulosin [facility]) 0.4 MG capsule Take 1 capsule by mouth daily 1/10/19  Yes Historical Provider, MD   lisinopril (PRINIVIL;ZESTRIL) 10 MG tablet Take 1 tablet by mouth daily 6/18/19  Yes DORON Cool   metoprolol succinate (TOPROL XL) 25 MG extended release tablet Take 1 tablet by mouth daily 6/18/19  Yes DORON Cool   nitroGLYCERIN (NITROSTAT) 0.4 MG SL tablet Place 1 tablet under the tongue every 5 minutes as needed for Chest pain up to max of 3 total doses.  If no relief after 1 dose, call 911. 6/18/19  Yes DORON Cool   aspirin 81 MG chewable tablet Take 1 tablet by mouth daily 2/11/18  Yes Jaycee Mcleod MD   insulin glargine (LANTUS) 100 UNIT/ML injection vial Inject 30 Units into the skin every morning   Yes Historical Provider, MD   oxyCODONE-acetaminophen (PERCOCET)  MG per tablet Take 1 tablet by mouth every 8 hours as needed    Yes Historical Provider, MD   insulin regular (HUMULIN R;NOVOLIN R) 100 UNIT/ML injection Inject 20 Units into the skin 2 times daily (before meals)    Historical Provider, MD   furosemide (LASIX) 40 MG tablet Take 1 tablet by mouth daily 6/18/19   DORON Cool        insulin lispro  0-6 Units Subcutaneous TID WC    insulin lispro  0-3 Units Subcutaneous Nightly    isosorbide mononitrate  60 mg Oral Daily    atorvastatin  10 mg Oral Daily    insulin glargine  10 Units Subcutaneous Nightly    lisinopril  5 mg Oral Daily    metoprolol succinate  25 mg Oral Daily    tamsulosin  0.4 mg Oral

## 2019-08-31 NOTE — PROGRESS NOTES
To CT via W/C with cardiac monitor and RN. Tolerated activity well than back to room and settled back in bed. Saved lunch tray given to patient sitting on side of bed. Denies pain or SÃO.

## 2019-08-31 NOTE — PROGRESS NOTES
Nutrition Assessment    Type and Reason for Visit: Initial, Positive Nutrition Screen    Nutrition Recommendations: start ONS-Ensure Hi Protein bid    Nutrition Assessment: false Positive nutrition screen. MST = 0. Diet advanced to Carb Control 2 gm Na+ dysphagia soft & bite size. Starting ONS    Malnutrition Assessment:  · Malnutrition Status: No malnutrition  · Context: Acute illness or injury  · Findings of the 6 clinical characteristics of malnutrition (Minimum of 2 out of 6 clinical characteristics is required to make the diagnosis of moderate or severe Protein Calorie Malnutrition based on AND/ASPEN Guidelines):  1. Energy Intake- , Unable to assess    2. Weight Loss-2% loss or greater, ( 2 months)  3. Fat Loss-No significant subcutaneous fat loss,    4. Muscle Loss-No significant muscle mass loss,    5. Fluid Accumulation-No significant fluid accumulation, Extremities  6.   Strength-Not measured    Nutrition Risk Level: Low    Nutrient Needs:  · Estimated Daily Total Kcal: 0951-8629 kcals (25-30kcals/kg)  · Estimated Daily Protein (g): 63-76g  · Estimated Daily Total Fluid (ml/day): 4865-5226 ml    Nutrition Diagnosis:   · Problem: Unintended weight loss  · Etiology: related to Acute injury/trauma, Insufficient energy/nutrient consumption, Early satiety     Signs and symptoms:  as evidenced by Patient report of, Weight loss    Objective Information:  · Nutrition-Focused Physical Findings:    · Wound Type: None  · Current Nutrition Therapies:  · Oral Diet Orders: Carb Control 4 Carbs/Meal, Dysphagia  Soft and Bite-Sized (Dysphagia 3), 2gm Sodium   · Oral Diet intake: %  · Oral Nutrition Supplement (ONS) Orders: None, Low Calorie High Protein Supplement  · ONS intake: Unable to assess     · Anthropometric Measures:  · Ht: 5' 6\" (167.6 cm)   · Current Body Wt: 139 lb 6 oz (63.2 kg)  · Admission Body Wt: 142 lb (64.4 kg)(stated)  · Usual Body Wt: 144 lb (65.3 kg)(6/2019)  · % Weight Change:  ,

## 2019-09-01 PROBLEM — I20.0 UNSTABLE ANGINA (HCC): Status: ACTIVE | Noted: 2019-09-01

## 2019-09-01 LAB
ALBUMIN SERPL-MCNC: 3.6 G/DL (ref 3.5–5.2)
ALP BLD-CCNC: 121 U/L (ref 40–130)
ALT SERPL-CCNC: 26 U/L (ref 5–41)
ANION GAP SERPL CALCULATED.3IONS-SCNC: 13 MMOL/L (ref 7–19)
AST SERPL-CCNC: 22 U/L (ref 5–40)
BASOPHILS ABSOLUTE: 0 K/UL (ref 0–0.2)
BASOPHILS RELATIVE PERCENT: 0.3 % (ref 0–1)
BILIRUB SERPL-MCNC: 0.4 MG/DL (ref 0.2–1.2)
BUN BLDV-MCNC: 16 MG/DL (ref 8–23)
CALCIUM SERPL-MCNC: 9 MG/DL (ref 8.8–10.2)
CHLORIDE BLD-SCNC: 102 MMOL/L (ref 98–111)
CO2: 24 MMOL/L (ref 22–29)
CREAT SERPL-MCNC: 0.8 MG/DL (ref 0.5–1.2)
EKG P AXIS: 66 DEGREES
EKG P AXIS: 73 DEGREES
EKG P AXIS: 73 DEGREES
EKG P-R INTERVAL: 156 MS
EKG P-R INTERVAL: 158 MS
EKG P-R INTERVAL: 164 MS
EKG Q-T INTERVAL: 382 MS
EKG Q-T INTERVAL: 392 MS
EKG Q-T INTERVAL: 420 MS
EKG QRS DURATION: 100 MS
EKG QRS DURATION: 100 MS
EKG QRS DURATION: 102 MS
EKG QTC CALCULATION (BAZETT): 432 MS
EKG QTC CALCULATION (BAZETT): 434 MS
EKG QTC CALCULATION (BAZETT): 434 MS
EKG T AXIS: 18 DEGREES
EKG T AXIS: 31 DEGREES
EKG T AXIS: 39 DEGREES
EOSINOPHILS ABSOLUTE: 0.2 K/UL (ref 0–0.6)
EOSINOPHILS RELATIVE PERCENT: 2.2 % (ref 0–5)
GFR NON-AFRICAN AMERICAN: >60
GLUCOSE BLD-MCNC: 135 MG/DL (ref 70–99)
GLUCOSE BLD-MCNC: 147 MG/DL (ref 70–99)
GLUCOSE BLD-MCNC: 149 MG/DL (ref 70–99)
GLUCOSE BLD-MCNC: 163 MG/DL (ref 74–109)
GLUCOSE BLD-MCNC: 201 MG/DL (ref 70–99)
HCT VFR BLD CALC: 36.4 % (ref 42–52)
HEMOGLOBIN: 12.4 G/DL (ref 14–18)
IMMATURE GRANULOCYTES #: 0 K/UL
LV EF: 66 %
LVEF MODALITY: NORMAL
LYMPHOCYTES ABSOLUTE: 1.4 K/UL (ref 1.1–4.5)
LYMPHOCYTES RELATIVE PERCENT: 18.9 % (ref 20–40)
MAGNESIUM: 1.8 MG/DL (ref 1.6–2.4)
MCH RBC QN AUTO: 30.6 PG (ref 27–31)
MCHC RBC AUTO-ENTMCNC: 34.1 G/DL (ref 33–37)
MCV RBC AUTO: 89.9 FL (ref 80–94)
MONOCYTES ABSOLUTE: 0.9 K/UL (ref 0–0.9)
MONOCYTES RELATIVE PERCENT: 12 % (ref 0–10)
NEUTROPHILS ABSOLUTE: 4.8 K/UL (ref 1.5–7.5)
NEUTROPHILS RELATIVE PERCENT: 66.3 % (ref 50–65)
PDW BLD-RTO: 12.4 % (ref 11.5–14.5)
PERFORMED ON: ABNORMAL
PLATELET # BLD: 193 K/UL (ref 130–400)
PMV BLD AUTO: 11.5 FL (ref 9.4–12.4)
POTASSIUM SERPL-SCNC: 4.3 MMOL/L (ref 3.5–5)
RBC # BLD: 4.05 M/UL (ref 4.7–6.1)
SODIUM BLD-SCNC: 139 MMOL/L (ref 136–145)
TOTAL PROTEIN: 6.7 G/DL (ref 6.6–8.7)
WBC # BLD: 7.2 K/UL (ref 4.8–10.8)

## 2019-09-01 PROCEDURE — 82948 REAGENT STRIP/BLOOD GLUCOSE: CPT

## 2019-09-01 PROCEDURE — 80053 COMPREHEN METABOLIC PANEL: CPT

## 2019-09-01 PROCEDURE — 36415 COLL VENOUS BLD VENIPUNCTURE: CPT

## 2019-09-01 PROCEDURE — 99214 OFFICE O/P EST MOD 30 MIN: CPT | Performed by: INTERNAL MEDICINE

## 2019-09-01 PROCEDURE — 83735 ASSAY OF MAGNESIUM: CPT

## 2019-09-01 PROCEDURE — 93005 ELECTROCARDIOGRAM TRACING: CPT

## 2019-09-01 PROCEDURE — 2140000000 HC CCU INTERMEDIATE R&B

## 2019-09-01 PROCEDURE — 85025 COMPLETE CBC W/AUTO DIFF WBC: CPT

## 2019-09-01 PROCEDURE — 6370000000 HC RX 637 (ALT 250 FOR IP): Performed by: INTERNAL MEDICINE

## 2019-09-01 PROCEDURE — G0378 HOSPITAL OBSERVATION PER HR: HCPCS

## 2019-09-01 PROCEDURE — 93306 TTE W/DOPPLER COMPLETE: CPT

## 2019-09-01 RX ORDER — SUCRALFATE 1 G/1
1 TABLET ORAL EVERY 6 HOURS SCHEDULED
Status: DISCONTINUED | OUTPATIENT
Start: 2019-09-01 | End: 2019-09-02 | Stop reason: HOSPADM

## 2019-09-01 RX ORDER — PANTOPRAZOLE SODIUM 40 MG/1
40 TABLET, DELAYED RELEASE ORAL
Status: DISCONTINUED | OUTPATIENT
Start: 2019-09-01 | End: 2019-09-02 | Stop reason: HOSPADM

## 2019-09-01 RX ADMIN — INSULIN LISPRO 2 UNITS: 100 INJECTION, SOLUTION INTRAVENOUS; SUBCUTANEOUS at 17:11

## 2019-09-01 RX ADMIN — SUCRALFATE 1 G: 1 TABLET ORAL at 12:40

## 2019-09-01 RX ADMIN — SUCRALFATE 1 G: 1 TABLET ORAL at 17:10

## 2019-09-01 RX ADMIN — METOPROLOL SUCCINATE 25 MG: 25 TABLET, EXTENDED RELEASE ORAL at 08:47

## 2019-09-01 RX ADMIN — ISOSORBIDE MONONITRATE 60 MG: 60 TABLET, EXTENDED RELEASE ORAL at 08:47

## 2019-09-01 RX ADMIN — ATORVASTATIN CALCIUM 10 MG: 10 TABLET, FILM COATED ORAL at 21:40

## 2019-09-01 RX ADMIN — ASPIRIN 81 MG 81 MG: 81 TABLET ORAL at 08:47

## 2019-09-01 RX ADMIN — INSULIN LISPRO 1 UNITS: 100 INJECTION, SOLUTION INTRAVENOUS; SUBCUTANEOUS at 08:48

## 2019-09-01 RX ADMIN — OXYCODONE HYDROCHLORIDE AND ACETAMINOPHEN 1 TABLET: 10; 325 TABLET ORAL at 21:45

## 2019-09-01 RX ADMIN — PANTOPRAZOLE SODIUM 40 MG: 40 TABLET, DELAYED RELEASE ORAL at 17:11

## 2019-09-01 RX ADMIN — LISINOPRIL 5 MG: 5 TABLET ORAL at 08:47

## 2019-09-01 ASSESSMENT — ENCOUNTER SYMPTOMS
VOMITING: 0
DIARRHEA: 0
SHORTNESS OF BREATH: 0
CONSTIPATION: 0
NAUSEA: 0
BACK PAIN: 0

## 2019-09-01 ASSESSMENT — PAIN SCALES - GENERAL
PAINLEVEL_OUTOF10: 6
PAINLEVEL_OUTOF10: 3
PAINLEVEL_OUTOF10: 0

## 2019-09-01 ASSESSMENT — PAIN DESCRIPTION - LOCATION
LOCATION: GENERALIZED
LOCATION: GENERALIZED

## 2019-09-01 NOTE — PROGRESS NOTES
39.3* 36.4*   MCV 89.8 90.3 89.9    204 193     LIVER PROFILE:   Recent Labs     08/30/19  1145 08/31/19  0112 09/01/19  0241   AST 30 27 22   ALT 31 28 26   BILITOT <0.2 0.5 0.4   ALKPHOS 142* 111 121     PT/INR:   Recent Labs     08/30/19  1145   PROTIME 14.0   INR 1.14     APTT:   Recent Labs     08/30/19  1145   APTT 29.2     BNP:  No results for input(s): BNP in the last 72 hours. Ionized Calcium:No results for input(s): IONCA in the last 72 hours. Magnesium:  Recent Labs     08/31/19  0112 09/01/19  0241   MG 1.8 1.8     Phosphorus:No results for input(s): PHOS in the last 72 hours. HgbA1C:   Recent Labs     08/31/19  0112   LABA1C 7.0*     Hepatic:   Recent Labs     08/30/19  1145 08/31/19  0112 09/01/19  0241   ALKPHOS 142* 111 121   ALT 31 28 26   AST 30 27 22   PROT 7.0 6.9 6.7   BILITOT <0.2 0.5 0.4   LABALBU 4.1 3.7 3.6     Lactic Acid: No results for input(s): LACTA in the last 72 hours. Troponin: No results for input(s): CKTOTAL, CKMB, TROPONINT in the last 72 hours. ABGs: No results for input(s): PH, PCO2, PO2, HCO3, O2SAT in the last 72 hours. CRP:  No results for input(s): CRP in the last 72 hours. Sed Rate:  No results for input(s): SEDRATE in the last 72 hours. Cultures:   No results for input(s): CULTURE in the last 72 hours. No results for input(s): BC, Delphine Sheehan in the last 72 hours. No results for input(s): CXSURG in the last 72 hours. Radiology reports as per the Radiologist  Radiology: Xr Chest Portable    Result Date: 8/30/2019  EXAMINATION: XR CHEST PORTABLE 8/30/2019 12:56 PM HISTORY: XR CHEST PORTABLE 8/30/2019 10:45 AM HISTORY: Chest pain COMPARISON: July 16, 2019. FINDINGS: The lungs are clear. The cardiac silhouette is normal. Wires are present from previous median sternotomy. The osseous structures and surrounding soft tissues demonstrate no acute abnormality. 1. No radiographic evidence of acute cardiopulmonary process.  Signed by Dr Shweta Murphy on patchy peripheral groundglass and nodular opacities. Differential diagnosis includes acute or chronic infection/inflammation. Malignancy not excluded, but would be less common. Recommend follow-up after treatment to evaluate for resolution. 2. No evidence of pulmonary embolism. There is focal narrowing at the left lower lobe pulmonary artery, which could represent sequelae of inflammation. The distal subsegmental left lower lobe pulmonary arteries are normally opacified. 3. Coronary artery calcifications and stent. 4. Thickening of the distal esophagus, which can be seen with reflux esophagitis. Correlate with symptoms and consider direct visualization. Signed by Dr Sony Christy on 8/31/2019 3:01 PM       Assessment   Atypical chest pain. Established coronary artery disease. Underlying COPD. Probable GERD. Plan:  Start PPI therapy. Continue observation. Tentative discharge next 24 hours.     Shaggy Castillo DO

## 2019-09-02 VITALS
HEART RATE: 80 BPM | WEIGHT: 143.4 LBS | DIASTOLIC BLOOD PRESSURE: 63 MMHG | OXYGEN SATURATION: 98 % | SYSTOLIC BLOOD PRESSURE: 113 MMHG | HEIGHT: 66 IN | BODY MASS INDEX: 23.05 KG/M2 | TEMPERATURE: 97.6 F | RESPIRATION RATE: 16 BRPM

## 2019-09-02 LAB
ALBUMIN SERPL-MCNC: 3.9 G/DL (ref 3.5–5.2)
ALP BLD-CCNC: 124 U/L (ref 40–130)
ALT SERPL-CCNC: 26 U/L (ref 5–41)
ANION GAP SERPL CALCULATED.3IONS-SCNC: 11 MMOL/L (ref 7–19)
AST SERPL-CCNC: 23 U/L (ref 5–40)
BASOPHILS ABSOLUTE: 0 K/UL (ref 0–0.2)
BASOPHILS RELATIVE PERCENT: 0.6 % (ref 0–1)
BILIRUB SERPL-MCNC: 0.3 MG/DL (ref 0.2–1.2)
BUN BLDV-MCNC: 17 MG/DL (ref 8–23)
CALCIUM SERPL-MCNC: 9.3 MG/DL (ref 8.8–10.2)
CHLORIDE BLD-SCNC: 99 MMOL/L (ref 98–111)
CO2: 28 MMOL/L (ref 22–29)
CREAT SERPL-MCNC: 0.7 MG/DL (ref 0.5–1.2)
EOSINOPHILS ABSOLUTE: 0.2 K/UL (ref 0–0.6)
EOSINOPHILS RELATIVE PERCENT: 3.4 % (ref 0–5)
GFR NON-AFRICAN AMERICAN: >60
GLUCOSE BLD-MCNC: 135 MG/DL (ref 70–99)
GLUCOSE BLD-MCNC: 144 MG/DL (ref 74–109)
GLUCOSE BLD-MCNC: 155 MG/DL (ref 70–99)
HCT VFR BLD CALC: 37.1 % (ref 42–52)
HEMOGLOBIN: 12.5 G/DL (ref 14–18)
IMMATURE GRANULOCYTES #: 0 K/UL
LYMPHOCYTES ABSOLUTE: 1.4 K/UL (ref 1.1–4.5)
LYMPHOCYTES RELATIVE PERCENT: 21.9 % (ref 20–40)
MAGNESIUM: 1.8 MG/DL (ref 1.6–2.4)
MCH RBC QN AUTO: 30.3 PG (ref 27–31)
MCHC RBC AUTO-ENTMCNC: 33.7 G/DL (ref 33–37)
MCV RBC AUTO: 89.8 FL (ref 80–94)
MONOCYTES ABSOLUTE: 1 K/UL (ref 0–0.9)
MONOCYTES RELATIVE PERCENT: 15.1 % (ref 0–10)
NEUTROPHILS ABSOLUTE: 3.8 K/UL (ref 1.5–7.5)
NEUTROPHILS RELATIVE PERCENT: 58.8 % (ref 50–65)
PDW BLD-RTO: 12.5 % (ref 11.5–14.5)
PERFORMED ON: ABNORMAL
PERFORMED ON: ABNORMAL
PLATELET # BLD: 202 K/UL (ref 130–400)
PMV BLD AUTO: 11.2 FL (ref 9.4–12.4)
POTASSIUM SERPL-SCNC: 4.4 MMOL/L (ref 3.5–5)
RBC # BLD: 4.13 M/UL (ref 4.7–6.1)
SODIUM BLD-SCNC: 138 MMOL/L (ref 136–145)
TOTAL PROTEIN: 7.1 G/DL (ref 6.6–8.7)
WBC # BLD: 6.4 K/UL (ref 4.8–10.8)

## 2019-09-02 PROCEDURE — 83735 ASSAY OF MAGNESIUM: CPT

## 2019-09-02 PROCEDURE — 6370000000 HC RX 637 (ALT 250 FOR IP): Performed by: INTERNAL MEDICINE

## 2019-09-02 PROCEDURE — 6360000002 HC RX W HCPCS: Performed by: HOSPITALIST

## 2019-09-02 PROCEDURE — 36415 COLL VENOUS BLD VENIPUNCTURE: CPT

## 2019-09-02 PROCEDURE — 80053 COMPREHEN METABOLIC PANEL: CPT

## 2019-09-02 PROCEDURE — 85025 COMPLETE CBC W/AUTO DIFF WBC: CPT

## 2019-09-02 PROCEDURE — 82948 REAGENT STRIP/BLOOD GLUCOSE: CPT

## 2019-09-02 PROCEDURE — G0378 HOSPITAL OBSERVATION PER HR: HCPCS

## 2019-09-02 RX ORDER — LEVOFLOXACIN 750 MG/1
750 TABLET ORAL DAILY
Qty: 5 TABLET | Refills: 0 | Status: SHIPPED | OUTPATIENT
Start: 2019-09-02 | End: 2019-09-07

## 2019-09-02 RX ORDER — LISINOPRIL 5 MG/1
5 TABLET ORAL DAILY
Qty: 30 TABLET | Refills: 3 | Status: SHIPPED | OUTPATIENT
Start: 2019-09-03 | End: 2019-12-03

## 2019-09-02 RX ORDER — PANTOPRAZOLE SODIUM 40 MG/1
40 TABLET, DELAYED RELEASE ORAL
Qty: 30 TABLET | Refills: 3 | Status: SHIPPED | OUTPATIENT
Start: 2019-09-02

## 2019-09-02 RX ORDER — SUCRALFATE 1 G/1
1 TABLET ORAL 4 TIMES DAILY
Qty: 120 TABLET | Refills: 3 | Status: SHIPPED | OUTPATIENT
Start: 2019-09-02 | End: 2019-09-25

## 2019-09-02 RX ORDER — ISOSORBIDE MONONITRATE 60 MG/1
60 TABLET, EXTENDED RELEASE ORAL DAILY
Qty: 30 TABLET | Refills: 3 | Status: SHIPPED | OUTPATIENT
Start: 2019-09-03 | End: 2019-12-03

## 2019-09-02 RX ADMIN — PANTOPRAZOLE SODIUM 40 MG: 40 TABLET, DELAYED RELEASE ORAL at 07:35

## 2019-09-02 RX ADMIN — SUCRALFATE 1 G: 1 TABLET ORAL at 00:00

## 2019-09-02 RX ADMIN — HYDROMORPHONE HYDROCHLORIDE 0.5 MG: 1 INJECTION, SOLUTION INTRAMUSCULAR; INTRAVENOUS; SUBCUTANEOUS at 03:23

## 2019-09-02 RX ADMIN — SUCRALFATE 1 G: 1 TABLET ORAL at 07:34

## 2019-09-02 RX ADMIN — OXYCODONE HYDROCHLORIDE AND ACETAMINOPHEN 1 TABLET: 10; 325 TABLET ORAL at 08:48

## 2019-09-02 RX ADMIN — SUCRALFATE 1 G: 1 TABLET ORAL at 13:05

## 2019-09-02 RX ADMIN — ASPIRIN 81 MG 81 MG: 81 TABLET ORAL at 08:48

## 2019-09-02 RX ADMIN — ISOSORBIDE MONONITRATE 60 MG: 60 TABLET, EXTENDED RELEASE ORAL at 08:48

## 2019-09-02 RX ADMIN — LISINOPRIL 5 MG: 5 TABLET ORAL at 08:48

## 2019-09-02 RX ADMIN — METOPROLOL SUCCINATE 25 MG: 25 TABLET, EXTENDED RELEASE ORAL at 08:48

## 2019-09-02 ASSESSMENT — PAIN DESCRIPTION - LOCATION
LOCATION: GENERALIZED
LOCATION: HEAD
LOCATION: HEAD

## 2019-09-02 ASSESSMENT — PAIN SCALES - GENERAL
PAINLEVEL_OUTOF10: 8
PAINLEVEL_OUTOF10: 10
PAINLEVEL_OUTOF10: 7
PAINLEVEL_OUTOF10: 3

## 2019-09-02 ASSESSMENT — PAIN DESCRIPTION - PROGRESSION: CLINICAL_PROGRESSION: GRADUALLY IMPROVING

## 2019-09-02 ASSESSMENT — PAIN DESCRIPTION - ONSET: ONSET: ON-GOING

## 2019-09-02 ASSESSMENT — PAIN DESCRIPTION - PAIN TYPE
TYPE: CHRONIC PAIN

## 2019-09-02 ASSESSMENT — PAIN DESCRIPTION - FREQUENCY: FREQUENCY: CONTINUOUS

## 2019-09-25 ENCOUNTER — APPOINTMENT (OUTPATIENT)
Dept: GENERAL RADIOLOGY | Age: 64
End: 2019-09-25
Payer: MEDICARE

## 2019-09-25 ENCOUNTER — HOSPITAL ENCOUNTER (EMERGENCY)
Age: 64
Discharge: HOME | End: 2019-09-26
Payer: MEDICARE

## 2019-09-25 DIAGNOSIS — J18.9 PNEUMONIA DUE TO ORGANISM: ICD-10-CM

## 2019-09-25 DIAGNOSIS — J44.1 COPD EXACERBATION (HCC): Primary | ICD-10-CM

## 2019-09-25 LAB
ALBUMIN SERPL-MCNC: 4 G/DL (ref 3.5–5.2)
ALP BLD-CCNC: 134 U/L (ref 40–130)
ALT SERPL-CCNC: 21 U/L (ref 5–41)
ANION GAP SERPL CALCULATED.3IONS-SCNC: 12 MMOL/L (ref 7–19)
APTT: 28.1 SEC (ref 26–36.2)
AST SERPL-CCNC: 23 U/L (ref 5–40)
BASOPHILS ABSOLUTE: 0.1 K/UL (ref 0–0.2)
BASOPHILS RELATIVE PERCENT: 0.7 % (ref 0–1)
BILIRUB SERPL-MCNC: <0.2 MG/DL (ref 0.2–1.2)
BUN BLDV-MCNC: 9 MG/DL (ref 8–23)
CALCIUM SERPL-MCNC: 9.2 MG/DL (ref 8.8–10.2)
CHLORIDE BLD-SCNC: 102 MMOL/L (ref 98–111)
CO2: 25 MMOL/L (ref 22–29)
CREAT SERPL-MCNC: 0.6 MG/DL (ref 0.5–1.2)
EOSINOPHILS ABSOLUTE: 0.2 K/UL (ref 0–0.6)
EOSINOPHILS RELATIVE PERCENT: 3 % (ref 0–5)
GFR NON-AFRICAN AMERICAN: >60
GLUCOSE BLD-MCNC: 139 MG/DL (ref 74–109)
HCT VFR BLD CALC: 39 % (ref 42–52)
HEMOGLOBIN: 13.4 G/DL (ref 14–18)
IMMATURE GRANULOCYTES #: 0 K/UL
INR BLD: 1.13 (ref 0.88–1.18)
LACTIC ACID: 1.8 MMOL/L (ref 0.5–1.9)
LYMPHOCYTES ABSOLUTE: 1.6 K/UL (ref 1.1–4.5)
LYMPHOCYTES RELATIVE PERCENT: 22.3 % (ref 20–40)
MCH RBC QN AUTO: 30.5 PG (ref 27–31)
MCHC RBC AUTO-ENTMCNC: 34.4 G/DL (ref 33–37)
MCV RBC AUTO: 88.8 FL (ref 80–94)
MONOCYTES ABSOLUTE: 0.7 K/UL (ref 0–0.9)
MONOCYTES RELATIVE PERCENT: 10.5 % (ref 0–10)
NEUTROPHILS ABSOLUTE: 4.5 K/UL (ref 1.5–7.5)
NEUTROPHILS RELATIVE PERCENT: 63.2 % (ref 50–65)
PDW BLD-RTO: 12.7 % (ref 11.5–14.5)
PLATELET # BLD: 201 K/UL (ref 130–400)
PMV BLD AUTO: 11.3 FL (ref 9.4–12.4)
POTASSIUM REFLEX MAGNESIUM: 4.3 MMOL/L (ref 3.5–5)
PRO-BNP: 322 PG/ML (ref 0–900)
PROTHROMBIN TIME: 13.9 SEC (ref 12–14.6)
RAPID INFLUENZA  B AGN: NEGATIVE
RAPID INFLUENZA A AGN: NEGATIVE
RBC # BLD: 4.39 M/UL (ref 4.7–6.1)
SODIUM BLD-SCNC: 139 MMOL/L (ref 136–145)
TOTAL PROTEIN: 6.8 G/DL (ref 6.6–8.7)
TROPONIN: <0.01 NG/ML (ref 0–0.03)
WBC # BLD: 7 K/UL (ref 4.8–10.8)

## 2019-09-25 PROCEDURE — 99285 EMERGENCY DEPT VISIT HI MDM: CPT

## 2019-09-25 PROCEDURE — 94640 AIRWAY INHALATION TREATMENT: CPT

## 2019-09-25 PROCEDURE — 71045 X-RAY EXAM CHEST 1 VIEW: CPT

## 2019-09-25 PROCEDURE — 6360000002 HC RX W HCPCS: Performed by: NURSE PRACTITIONER

## 2019-09-25 PROCEDURE — 6370000000 HC RX 637 (ALT 250 FOR IP): Performed by: NURSE PRACTITIONER

## 2019-09-25 PROCEDURE — 93005 ELECTROCARDIOGRAM TRACING: CPT

## 2019-09-25 PROCEDURE — 96374 THER/PROPH/DIAG INJ IV PUSH: CPT

## 2019-09-25 RX ORDER — IPRATROPIUM BROMIDE AND ALBUTEROL SULFATE 2.5; .5 MG/3ML; MG/3ML
1 SOLUTION RESPIRATORY (INHALATION) ONCE
Status: COMPLETED | OUTPATIENT
Start: 2019-09-25 | End: 2019-09-25

## 2019-09-25 RX ORDER — METHYLPREDNISOLONE SODIUM SUCCINATE 125 MG/2ML
125 INJECTION, POWDER, LYOPHILIZED, FOR SOLUTION INTRAMUSCULAR; INTRAVENOUS ONCE
Status: COMPLETED | OUTPATIENT
Start: 2019-09-25 | End: 2019-09-25

## 2019-09-25 RX ADMIN — METHYLPREDNISOLONE SODIUM SUCCINATE 125 MG: 125 INJECTION, POWDER, FOR SOLUTION INTRAMUSCULAR; INTRAVENOUS at 23:14

## 2019-09-25 RX ADMIN — IPRATROPIUM BROMIDE AND ALBUTEROL SULFATE 1 AMPULE: .5; 3 SOLUTION RESPIRATORY (INHALATION) at 23:14

## 2019-09-25 ASSESSMENT — PAIN SCALES - GENERAL: PAINLEVEL_OUTOF10: 8

## 2019-09-26 VITALS
HEIGHT: 66 IN | HEART RATE: 78 BPM | OXYGEN SATURATION: 95 % | DIASTOLIC BLOOD PRESSURE: 68 MMHG | RESPIRATION RATE: 27 BRPM | WEIGHT: 140 LBS | BODY MASS INDEX: 22.5 KG/M2 | TEMPERATURE: 97.2 F | SYSTOLIC BLOOD PRESSURE: 105 MMHG

## 2019-09-26 PROCEDURE — 83605 ASSAY OF LACTIC ACID: CPT

## 2019-09-26 PROCEDURE — 6370000000 HC RX 637 (ALT 250 FOR IP): Performed by: NURSE PRACTITIONER

## 2019-09-26 PROCEDURE — 84484 ASSAY OF TROPONIN QUANT: CPT

## 2019-09-26 PROCEDURE — 85610 PROTHROMBIN TIME: CPT

## 2019-09-26 PROCEDURE — 80053 COMPREHEN METABOLIC PANEL: CPT

## 2019-09-26 PROCEDURE — 85025 COMPLETE CBC W/AUTO DIFF WBC: CPT

## 2019-09-26 PROCEDURE — 36415 COLL VENOUS BLD VENIPUNCTURE: CPT

## 2019-09-26 PROCEDURE — 85730 THROMBOPLASTIN TIME PARTIAL: CPT

## 2019-09-26 PROCEDURE — 87040 BLOOD CULTURE FOR BACTERIA: CPT

## 2019-09-26 PROCEDURE — 87804 INFLUENZA ASSAY W/OPTIC: CPT

## 2019-09-26 PROCEDURE — 83880 ASSAY OF NATRIURETIC PEPTIDE: CPT

## 2019-09-26 RX ORDER — PREDNISONE 20 MG/1
20 TABLET ORAL 2 TIMES DAILY
Qty: 10 TABLET | Refills: 0 | Status: SHIPPED | OUTPATIENT
Start: 2019-09-26 | End: 2019-10-01

## 2019-09-26 RX ORDER — ALBUTEROL SULFATE 90 UG/1
2 AEROSOL, METERED RESPIRATORY (INHALATION) 4 TIMES DAILY PRN
Qty: 1 INHALER | Refills: 0 | Status: SHIPPED | OUTPATIENT
Start: 2019-09-26

## 2019-09-26 RX ORDER — LEVOFLOXACIN 500 MG/1
500 TABLET, FILM COATED ORAL DAILY
Qty: 10 TABLET | Refills: 0 | Status: SHIPPED | OUTPATIENT
Start: 2019-09-26 | End: 2019-10-06

## 2019-09-26 RX ORDER — ALBUTEROL SULFATE 90 UG/1
2 AEROSOL, METERED RESPIRATORY (INHALATION) EVERY 6 HOURS PRN
Status: DISCONTINUED | OUTPATIENT
Start: 2019-09-26 | End: 2019-09-26 | Stop reason: HOSPADM

## 2019-09-26 RX ADMIN — ALBUTEROL SULFATE 2 PUFF: 90 AEROSOL, METERED RESPIRATORY (INHALATION) at 00:42

## 2019-09-26 ASSESSMENT — ENCOUNTER SYMPTOMS
RECTAL PAIN: 0
STRIDOR: 0
COUGH: 1
SHORTNESS OF BREATH: 1
DIARRHEA: 0
EYE DISCHARGE: 0
SORE THROAT: 0
SINUS PAIN: 0
CONSTIPATION: 0
ABDOMINAL PAIN: 0
COLOR CHANGE: 0
BLOOD IN STOOL: 0
NAUSEA: 0
ABDOMINAL DISTENTION: 0
BACK PAIN: 0
CHEST TIGHTNESS: 0
EYE PAIN: 0
EYE REDNESS: 0
WHEEZING: 0
APNEA: 0
VOMITING: 0
PHOTOPHOBIA: 0

## 2019-09-26 NOTE — ED PROVIDER NOTES
(AMBIEN) 10 MG tablet Take by mouth nightly as needed for Sleep. Historical Med      insulin regular (HUMULIN R;NOVOLIN R) 100 UNIT/ML injection Inject 20 Units into the skin 2 times daily (before meals)Historical Med      atorvastatin (LIPITOR) 10 MG tablet Take 1 tablet by mouth daily, Disp-90 tablet, R-3Normal      tamsulosin (FLOMAX) 0.4 MG capsule Take 1 capsule by mouth dailyHistorical Med      furosemide (LASIX) 40 MG tablet Take 1 tablet by mouth daily, Disp-90 tablet, R-3Normal      metoprolol succinate (TOPROL XL) 25 MG extended release tablet Take 1 tablet by mouth daily, Disp-90 tablet, R-3Normal      nitroGLYCERIN (NITROSTAT) 0.4 MG SL tablet Place 1 tablet under the tongue every 5 minutes as needed for Chest pain up to max of 3 total doses.  If no relief after 1 dose, call 911., Disp-25 tablet, R-3Normal      aspirin 81 MG chewable tablet Take 1 tablet by mouth daily, Disp-30 tablet, R-3Normal      insulin glargine (LANTUS) 100 UNIT/ML injection vial Inject 30 Units into the skin every morningHistorical Med      oxyCODONE-acetaminophen (PERCOCET)  MG per tablet Take 1 tablet by mouth every 8 hours as needed       isosorbide mononitrate (IMDUR) 60 MG extended release tablet Take 1 tablet by mouth daily, Disp-30 tablet, R-3Normal             ALLERGIES     Codeine    FAMILY HISTORY       Family History   Problem Relation Age of Onset    Coronary Art Dis Mother     Stroke Mother     Diabetes Mother     Cancer Sister     Coronary Art Dis Sister     Diabetes Sister     Stroke Sister     Stroke Brother     Cancer Brother     Coronary Art Dis Brother     Arthritis Paternal Grandmother           SOCIAL HISTORY       Social History     Socioeconomic History    Marital status: Legally      Spouse name: None    Number of children: 1    Years of education: None    Highest education level: None   Occupational History    None   Social Needs    Financial resource strain: None    Food Abnormal; Notable for the following components:    Glucose 139 (*)     Alkaline Phosphatase 134 (*)     All other components within normal limits   RAPID INFLUENZA A/B ANTIGENS   CULTURE BLOOD #1   CULTURE BLOOD #2   TROPONIN   BRAIN NATRIURETIC PEPTIDE   PROTIME-INR   APTT   LACTIC ACID, PLASMA       All other labs were within normal range or notreturned as of this dictation. RE-ASSESSMENT      Patient stable and ambulatory out of the department at discharge. EMERGENCY DEPARTMENT COURSE and DIFFERENTIAL DIAGNOSIS/MDM:   Vitals:    Vitals:    09/25/19 2330 09/25/19 2358 09/26/19 0000 09/26/19 0018   BP: 114/72 114/72 107/72 105/68   Pulse: 75 76 76 78   Resp: 24 24 21 27   Temp:    97.2 °F (36.2 °C)   TempSrc:       SpO2: 94% 94% 94% 95%   Weight:       Height:             MDM  Number of Diagnoses or Management Options   COPD exacerbation ([facility] [address]):    Pneumonia due to organism:   Diagnosis management comments: Patient presented to the emergency department for evaluation of cough and shortness of breath. Chest x-ray was obtained and read by ED attending, Dr. Brent Antunez, as possible left lower lobe pneumonia. Lab work was obtained and shows a CBC was normal white blood cell count otherwise unremarkable, CMP was unremarkable. Checked a rapid flu swab which was negative. Lactic acid was normal.  Troponin was negative. EKG: Regular rate and rhythm. Normal P waves and normal ME interval. Normal QRS. No ST elevations or ST depressions. No significant Twave abnormalities. Patient was given a DuoNeb nebulizer treatment patient reported significant improvement of symptoms following administration of these medications. Patient continued to deny chest pain. I discussed this case with ED attending, Dr. Brent Antunez, and he agrees with my plan to discharge the patient home. Will start patient on Levaquin. Patient is not using any inhalers we will start him on an albuterol inhaler to use daily for 6 hours as needed.   We will also discharge patient on steroids. Follow-up with primary care as soon as possible for reevaluation. Return to the ER as needed for any new or worsening symptoms. Patient verbalized understanding and agreement with the plan of care and was stable and ambulatory out of the department at discharge. Patient is well-appearing, stable for discharge and follow-up without fail with his/her medical doctor. I had a detailed discussion with the patient and/or guardian regarding the historical points, exam findings, and any diagnostic results supporting the discharge diagnosis. The patient was educated on care and need for follow-up. Strict return instructions including red flag signs and symptoms were discussed with the patient. Medications for discharge discussed, and adverse effects reviewed. Questions invited and answered. Patient shows understanding of the discharge information and agrees to follow-up. PROCEDURES:    Procedures      FINAL IMPRESSION      1. COPD exacerbation ([address]    2.  Pneumonia due to organism          DISPOSITION/PLAN   DISPOSITION Decision To Discharge 09/26/2019 12:09:49 AM      PATIENT REFERRED TO:  Myron Keane MD  [address]  [phone]    Schedule an appointment as soon as possible for a visit       [facility] EMERGENCY DEPT  [facility] [address]  [phone]    As needed, If symptoms worsen      DISCHARGE MEDICATIONS:  Discharge Medication List as of 9/26/2019 12:16 AM      START taking these medications    Details   levofloxacin (LEVAQUIN) 500 MG tablet Take 1 tablet by mouth daily for 10 days, Disp-10 tablet, R-0Print      predniSONE (DELTASONE) 20 MG tablet Take 1 tablet by mouth 2 times daily for 5 days, Disp-10 tablet, R-0Print      albuterol sulfate  (90 Base) MCG/ACT inhaler Inhale 2 puffs into the lungs 4 times daily as needed for Wheezing, Disp-1 Inhaler, R-0Print             (Please note that portions of this note were completed with a voice recognition program.  Efforts were made to edit the dictations but occasionallywords are mis-transcribed.)    DORON Emery NP, APRN - NP  09/26/19 0155

## 2019-09-30 LAB
EKG P AXIS: 62 DEGREES
EKG P-R INTERVAL: 134 MS
EKG Q-T INTERVAL: 418 MS
EKG QRS DURATION: 104 MS
EKG QTC CALCULATION (BAZETT): 440 MS
EKG T AXIS: 25 DEGREES

## 2019-10-01 LAB
BLOOD CULTURE, ROUTINE: NORMAL
CULTURE, BLOOD 2: NORMAL

## 2019-10-21 ENCOUNTER — HOSPITAL ENCOUNTER (OUTPATIENT)
Dept: GENERAL RADIOLOGY | Facility: HOSPITAL | Age: 64
Discharge: HOME | End: 2019-10-21
Admitting: NURSE PRACTITIONER

## 2019-10-21 ENCOUNTER — HOSPITAL ENCOUNTER (INPATIENT)
Facility: HOSPITAL | Age: 64
LOS: 3 days | Discharge: HOME HEALTH | End: 2019-10-24
Attending: EMERGENCY MEDICINE | Admitting: INTERNAL MEDICINE

## 2019-10-21 DIAGNOSIS — R07.9 CHEST PAIN, UNSPECIFIED TYPE: Primary | ICD-10-CM

## 2019-10-21 DIAGNOSIS — R13.10 DYSPHAGIA, UNSPECIFIED TYPE: ICD-10-CM

## 2019-10-21 DIAGNOSIS — I48.0 PAROXYSMAL ATRIAL FIBRILLATION (HCC): ICD-10-CM

## 2019-10-21 DIAGNOSIS — I48.92 ATRIAL FLUTTER, UNSPECIFIED TYPE (HCC): ICD-10-CM

## 2019-10-21 DIAGNOSIS — J18.9 PNEUMONIA OF BOTH LOWER LOBES DUE TO INFECTIOUS ORGANISM: ICD-10-CM

## 2019-10-21 DIAGNOSIS — I48.91 ATRIAL FIBRILLATION, UNSPECIFIED TYPE (HCC): ICD-10-CM

## 2019-10-21 PROBLEM — E11.9 DIABETES MELLITUS (HCC): Status: ACTIVE | Noted: 2018-06-02

## 2019-10-21 PROBLEM — I25.10 CAD (CORONARY ARTERY DISEASE): Status: ACTIVE | Noted: 2019-10-10

## 2019-10-21 PROBLEM — F17.210 CIGARETTE NICOTINE DEPENDENCE WITHOUT COMPLICATION: Status: ACTIVE | Noted: 2019-06-19

## 2019-10-21 PROBLEM — R13.19 OTHER DYSPHAGIA: Status: ACTIVE | Noted: 2018-11-16

## 2019-10-21 PROBLEM — E11.51 DIABETIC PERIPHERAL ANGIOPATHY (HCC): Status: ACTIVE | Noted: 2018-10-29

## 2019-10-21 PROBLEM — E11.42 DIABETIC PERIPHERAL NEUROPATHY (HCC): Status: ACTIVE | Noted: 2018-10-29

## 2019-10-21 PROBLEM — I50.9 CONGESTIVE HEART FAILURE (HCC): Status: ACTIVE | Noted: 2018-06-02

## 2019-10-21 LAB
A-A DO2: 35.4 MMHG
ALBUMIN SERPL-MCNC: 3.6 G/DL (ref 3.5–5.2)
ALBUMIN/GLOB SERPL: 1.2 G/DL
ALP SERPL-CCNC: 134 U/L (ref 39–117)
ALT SERPL W P-5'-P-CCNC: 15 U/L (ref 1–41)
ANION GAP SERPL CALCULATED.3IONS-SCNC: 13 MMOL/L (ref 5–15)
APTT PPP: 29.2 SECONDS (ref 24.1–35)
ARTERIAL PATENCY WRIST A: ABNORMAL
AST SERPL-CCNC: 20 U/L (ref 1–40)
ATMOSPHERIC PRESS: 744 MMHG
BASE EXCESS BLDA CALC-SCNC: 0.2 MMOL/L (ref 0–2)
BASOPHILS # BLD AUTO: 0.02 10*3/MM3 (ref 0–0.2)
BASOPHILS NFR BLD AUTO: 0.3 % (ref 0–1.5)
BDY SITE: ABNORMAL
BILIRUB SERPL-MCNC: 0.2 MG/DL (ref 0.2–1.2)
BODY TEMPERATURE: 37 C
BUN BLD-MCNC: 8 MG/DL (ref 8–23)
BUN/CREAT SERPL: 13.1 (ref 7–25)
CALCIUM SPEC-SCNC: 8.8 MG/DL (ref 8.6–10.5)
CHLORIDE SERPL-SCNC: 104 MMOL/L (ref 98–107)
CO2 SERPL-SCNC: 25 MMOL/L (ref 22–29)
COHGB MFR BLD: 2.4 % (ref 0–5)
CREAT BLD-MCNC: 0.61 MG/DL (ref 0.76–1.27)
DEPRECATED RDW RBC AUTO: 40.6 FL (ref 37–54)
EOSINOPHIL # BLD AUTO: 0.12 10*3/MM3 (ref 0–0.4)
EOSINOPHIL NFR BLD AUTO: 2 % (ref 0.3–6.2)
ERYTHROCYTE [DISTWIDTH] IN BLOOD BY AUTOMATED COUNT: 12.8 % (ref 12.3–15.4)
GFR SERPL CREATININE-BSD FRML MDRD: 133 ML/MIN/1.73
GLOBULIN UR ELPH-MCNC: 3.1 GM/DL
GLUCOSE BLD-MCNC: 134 MG/DL (ref 65–99)
HCO3 BLDA-SCNC: 24 MMOL/L (ref 20–26)
HCT VFR BLD AUTO: 36.7 % (ref 37.5–51)
HCT VFR BLD CALC: 39.4 % (ref 38–51)
HGB BLD-MCNC: 12.6 G/DL (ref 13–17.7)
HGB BLDA-MCNC: 12.9 G/DL (ref 14–18)
IMM GRANULOCYTES # BLD AUTO: 0.01 10*3/MM3 (ref 0–0.05)
IMM GRANULOCYTES NFR BLD AUTO: 0.2 % (ref 0–0.5)
INR PPP: 1.1 (ref 0.91–1.09)
LYMPHOCYTES # BLD AUTO: 1.16 10*3/MM3 (ref 0.7–3.1)
LYMPHOCYTES NFR BLD AUTO: 19.2 % (ref 19.6–45.3)
Lab: ABNORMAL
MCH RBC QN AUTO: 30 PG (ref 26.6–33)
MCHC RBC AUTO-ENTMCNC: 34.3 G/DL (ref 31.5–35.7)
MCV RBC AUTO: 87.4 FL (ref 79–97)
METHGB BLD QL: 0.7 % (ref 0–3)
MODALITY: ABNORMAL
MONOCYTES # BLD AUTO: 0.73 10*3/MM3 (ref 0.1–0.9)
MONOCYTES NFR BLD AUTO: 12.1 % (ref 5–12)
NEUTROPHILS # BLD AUTO: 4 10*3/MM3 (ref 1.7–7)
NEUTROPHILS NFR BLD AUTO: 66.2 % (ref 42.7–76)
NOTE: ABNORMAL
NRBC BLD AUTO-RTO: 0 /100 WBC (ref 0–0.2)
NT-PROBNP SERPL-MCNC: 881.3 PG/ML (ref 5–900)
OXYHGB MFR BLDV: 93.1 % (ref 94–99)
PCO2 BLDA: 35.1 MM HG (ref 35–45)
PCO2 TEMP ADJ BLD: ABNORMAL MM[HG]
PH BLDA: 7.44 PH UNITS (ref 7.35–7.45)
PH, TEMP CORRECTED: ABNORMAL
PLATELET # BLD AUTO: 257 10*3/MM3 (ref 140–450)
PMV BLD AUTO: 10.1 FL (ref 6–12)
PO2 BLDA: 71.4 MM HG (ref 83–108)
PO2 TEMP ADJ BLD: ABNORMAL MM[HG]
POTASSIUM BLD-SCNC: 4.4 MMOL/L (ref 3.5–5.2)
POTASSIUM BLDA-SCNC: 3.9 MMOL/L (ref 3.5–5.2)
PROT SERPL-MCNC: 6.7 G/DL (ref 6–8.5)
PROTHROMBIN TIME: 14.6 SECONDS (ref 11.9–14.6)
RBC # BLD AUTO: 4.2 10*6/MM3 (ref 4.14–5.8)
SAO2 % BLDCOA: 96.1 % (ref 94–99)
SODIUM BLD-SCNC: 142 MMOL/L (ref 136–145)
SODIUM BLDA-SCNC: 139 MMOL/L (ref 136–145)
TROPONIN T SERPL-MCNC: <0.01 NG/ML (ref 0–0.03)
TROPONIN T SERPL-MCNC: <0.01 NG/ML (ref 0–0.03)
VENTILATOR MODE: ABNORMAL
WBC NRBC COR # BLD: 6.04 10*3/MM3 (ref 3.4–10.8)

## 2019-10-21 PROCEDURE — 82805 BLOOD GASES W/O2 SATURATION: CPT

## 2019-10-21 PROCEDURE — 82375 ASSAY CARBOXYHB QUANT: CPT

## 2019-10-21 PROCEDURE — 71046 X-RAY EXAM CHEST 2 VIEWS: CPT

## 2019-10-21 PROCEDURE — 83735 ASSAY OF MAGNESIUM: CPT | Performed by: INTERNAL MEDICINE

## 2019-10-21 PROCEDURE — 93005 ELECTROCARDIOGRAM TRACING: CPT | Performed by: NURSE PRACTITIONER

## 2019-10-21 PROCEDURE — 87040 BLOOD CULTURE FOR BACTERIA: CPT | Performed by: NURSE PRACTITIONER

## 2019-10-21 PROCEDURE — 85730 THROMBOPLASTIN TIME PARTIAL: CPT | Performed by: NURSE PRACTITIONER

## 2019-10-21 PROCEDURE — 80053 COMPREHEN METABOLIC PANEL: CPT | Performed by: NURSE PRACTITIONER

## 2019-10-21 PROCEDURE — 36600 WITHDRAWAL OF ARTERIAL BLOOD: CPT

## 2019-10-21 PROCEDURE — 85025 COMPLETE CBC W/AUTO DIFF WBC: CPT | Performed by: NURSE PRACTITIONER

## 2019-10-21 PROCEDURE — 25010000002 CEFTRIAXONE PER 250 MG: Performed by: NURSE PRACTITIONER

## 2019-10-21 PROCEDURE — 99284 EMERGENCY DEPT VISIT MOD MDM: CPT

## 2019-10-21 PROCEDURE — 85610 PROTHROMBIN TIME: CPT | Performed by: NURSE PRACTITIONER

## 2019-10-21 PROCEDURE — 94640 AIRWAY INHALATION TREATMENT: CPT

## 2019-10-21 PROCEDURE — 83880 ASSAY OF NATRIURETIC PEPTIDE: CPT | Performed by: NURSE PRACTITIONER

## 2019-10-21 PROCEDURE — 25010000002 METHYLPREDNISOLONE PER 125 MG: Performed by: EMERGENCY MEDICINE

## 2019-10-21 PROCEDURE — 83050 HGB METHEMOGLOBIN QUAN: CPT

## 2019-10-21 PROCEDURE — 84484 ASSAY OF TROPONIN QUANT: CPT | Performed by: NURSE PRACTITIONER

## 2019-10-21 PROCEDURE — 84100 ASSAY OF PHOSPHORUS: CPT | Performed by: INTERNAL MEDICINE

## 2019-10-21 PROCEDURE — 93010 ELECTROCARDIOGRAM REPORT: CPT | Performed by: INTERNAL MEDICINE

## 2019-10-21 RX ORDER — BENZONATATE 100 MG/1
200 CAPSULE ORAL ONCE
Status: DISCONTINUED | OUTPATIENT
Start: 2019-10-21 | End: 2019-10-24 | Stop reason: HOSPADM

## 2019-10-21 RX ORDER — METHYLPREDNISOLONE SODIUM SUCCINATE 125 MG/2ML
125 INJECTION, POWDER, LYOPHILIZED, FOR SOLUTION INTRAMUSCULAR; INTRAVENOUS ONCE
Status: DISCONTINUED | OUTPATIENT
Start: 2019-10-21 | End: 2019-10-21

## 2019-10-21 RX ORDER — IPRATROPIUM BROMIDE AND ALBUTEROL SULFATE 2.5; .5 MG/3ML; MG/3ML
3 SOLUTION RESPIRATORY (INHALATION) ONCE
Status: COMPLETED | OUTPATIENT
Start: 2019-10-21 | End: 2019-10-21

## 2019-10-21 RX ORDER — METHYLPREDNISOLONE SODIUM SUCCINATE 125 MG/2ML
125 INJECTION, POWDER, LYOPHILIZED, FOR SOLUTION INTRAMUSCULAR; INTRAVENOUS ONCE
Status: COMPLETED | OUTPATIENT
Start: 2019-10-21 | End: 2019-10-21

## 2019-10-21 RX ADMIN — IPRATROPIUM BROMIDE AND ALBUTEROL SULFATE 3 ML: 2.5; .5 SOLUTION RESPIRATORY (INHALATION) at 20:00

## 2019-10-21 RX ADMIN — METHYLPREDNISOLONE SODIUM SUCCINATE 125 MG: 125 INJECTION, POWDER, FOR SOLUTION INTRAMUSCULAR; INTRAVENOUS at 18:44

## 2019-10-21 RX ADMIN — CEFTRIAXONE SODIUM 1 G: 1 INJECTION, POWDER, FOR SOLUTION INTRAMUSCULAR; INTRAVENOUS at 18:46

## 2019-10-22 ENCOUNTER — APPOINTMENT (OUTPATIENT)
Dept: CT IMAGING | Facility: HOSPITAL | Age: 64
End: 2019-10-22

## 2019-10-22 ENCOUNTER — APPOINTMENT (OUTPATIENT)
Dept: GENERAL RADIOLOGY | Facility: HOSPITAL | Age: 64
End: 2019-10-22

## 2019-10-22 ENCOUNTER — APPOINTMENT (OUTPATIENT)
Dept: CARDIOLOGY | Facility: HOSPITAL | Age: 64
End: 2019-10-22

## 2019-10-22 PROBLEM — I48.91 ATRIAL FIBRILLATION (HCC): Status: ACTIVE | Noted: 2019-10-22

## 2019-10-22 LAB
BH CV ECHO MEAS - AO MAX PG (FULL): 3.8 MMHG
BH CV ECHO MEAS - AO MAX PG: 11.8 MMHG
BH CV ECHO MEAS - AO MEAN PG (FULL): 4 MMHG
BH CV ECHO MEAS - AO MEAN PG: 9 MMHG
BH CV ECHO MEAS - AO ROOT AREA (BSA CORRECTED): 2.1
BH CV ECHO MEAS - AO ROOT AREA: 10.2 CM^2
BH CV ECHO MEAS - AO ROOT DIAM: 3.6 CM
BH CV ECHO MEAS - AO V2 MAX: 172 CM/SEC
BH CV ECHO MEAS - AO V2 MEAN: 144 CM/SEC
BH CV ECHO MEAS - AO V2 VTI: 33 CM
BH CV ECHO MEAS - AVA(I,A): 2.9 CM^2
BH CV ECHO MEAS - AVA(I,D): 2.9 CM^2
BH CV ECHO MEAS - AVA(V,A): 2.9 CM^2
BH CV ECHO MEAS - AVA(V,D): 2.9 CM^2
BH CV ECHO MEAS - BSA(HAYCOCK): 1.8 M^2
BH CV ECHO MEAS - BSA: 1.8 M^2
BH CV ECHO MEAS - BZI_BMI: 23.7 KILOGRAMS/M^2
BH CV ECHO MEAS - BZI_METRIC_HEIGHT: 167.6 CM
BH CV ECHO MEAS - BZI_METRIC_WEIGHT: 66.7 KG
BH CV ECHO MEAS - EDV(CUBED): 74.1 ML
BH CV ECHO MEAS - EDV(MOD-SP4): 51.1 ML
BH CV ECHO MEAS - EDV(TEICH): 78.6 ML
BH CV ECHO MEAS - EF(CUBED): 90.3 %
BH CV ECHO MEAS - EF(MOD-SP4): 64.4 %
BH CV ECHO MEAS - EF(TEICH): 85.2 %
BH CV ECHO MEAS - ESV(CUBED): 7.2 ML
BH CV ECHO MEAS - ESV(MOD-SP4): 18.2 ML
BH CV ECHO MEAS - ESV(TEICH): 11.6 ML
BH CV ECHO MEAS - FS: 54 %
BH CV ECHO MEAS - IVS/LVPW: 0.74
BH CV ECHO MEAS - IVSD: 0.71 CM
BH CV ECHO MEAS - LA DIMENSION: 4.2 CM
BH CV ECHO MEAS - LA/AO: 1.2
BH CV ECHO MEAS - LAT PEAK E' VEL: 12.4 CM/SEC
BH CV ECHO MEAS - LV DIASTOLIC VOL/BSA (35-75): 29.1 ML/M^2
BH CV ECHO MEAS - LV MASS(C)D: 105.9 GRAMS
BH CV ECHO MEAS - LV MASS(C)DI: 60.3 GRAMS/M^2
BH CV ECHO MEAS - LV MAX PG: 8.1 MMHG
BH CV ECHO MEAS - LV MEAN PG: 5 MMHG
BH CV ECHO MEAS - LV SYSTOLIC VOL/BSA (12-30): 10.4 ML/M^2
BH CV ECHO MEAS - LV V1 MAX: 142 CM/SEC
BH CV ECHO MEAS - LV V1 MEAN: 107 CM/SEC
BH CV ECHO MEAS - LV V1 VTI: 27.6 CM
BH CV ECHO MEAS - LVIDD: 4.2 CM
BH CV ECHO MEAS - LVIDS: 1.9 CM
BH CV ECHO MEAS - LVLD AP4: 6.3 CM
BH CV ECHO MEAS - LVLS AP4: 5.6 CM
BH CV ECHO MEAS - LVOT AREA (M): 3.5 CM^2
BH CV ECHO MEAS - LVOT AREA: 3.5 CM^2
BH CV ECHO MEAS - LVOT DIAM: 2.1 CM
BH CV ECHO MEAS - LVPWD: 0.95 CM
BH CV ECHO MEAS - MED PEAK E' VEL: 6.53 CM/SEC
BH CV ECHO MEAS - MV A MAX VEL: 89.7 CM/SEC
BH CV ECHO MEAS - MV DEC TIME: 0.19 SEC
BH CV ECHO MEAS - MV E MAX VEL: 112 CM/SEC
BH CV ECHO MEAS - MV E/A: 1.2
BH CV ECHO MEAS - RAP SYSTOLE: 5 MMHG
BH CV ECHO MEAS - RVSP: 46 MMHG
BH CV ECHO MEAS - SI(AO): 191.4 ML/M^2
BH CV ECHO MEAS - SI(CUBED): 38.1 ML/M^2
BH CV ECHO MEAS - SI(LVOT): 54.5 ML/M^2
BH CV ECHO MEAS - SI(MOD-SP4): 18.7 ML/M^2
BH CV ECHO MEAS - SI(TEICH): 38.2 ML/M^2
BH CV ECHO MEAS - SV(AO): 335.9 ML
BH CV ECHO MEAS - SV(CUBED): 66.9 ML
BH CV ECHO MEAS - SV(LVOT): 95.6 ML
BH CV ECHO MEAS - SV(MOD-SP4): 32.9 ML
BH CV ECHO MEAS - SV(TEICH): 67 ML
BH CV ECHO MEAS - TR MAX VEL: 320 CM/SEC
BH CV ECHO MEASUREMENTS AVERAGE E/E' RATIO: 11.83
GLUCOSE BLDC GLUCOMTR-MCNC: 174 MG/DL (ref 70–130)
GLUCOSE BLDC GLUCOMTR-MCNC: 270 MG/DL (ref 70–130)
GLUCOSE BLDC GLUCOMTR-MCNC: 306 MG/DL (ref 70–130)
L PNEUMO1 AG UR QL IA: NEGATIVE
MAGNESIUM SERPL-MCNC: 1.7 MG/DL (ref 1.6–2.4)
MAXIMAL PREDICTED HEART RATE: 156 BPM
PHOSPHATE SERPL-MCNC: 2.7 MG/DL (ref 2.5–4.5)
S PNEUM AG SPEC QL LA: NEGATIVE
STRESS TARGET HR: 133 BPM
TROPONIN T SERPL-MCNC: <0.01 NG/ML (ref 0–0.03)
TROPONIN T SERPL-MCNC: <0.01 NG/ML (ref 0–0.03)

## 2019-10-22 PROCEDURE — 93010 ELECTROCARDIOGRAM REPORT: CPT | Performed by: INTERNAL MEDICINE

## 2019-10-22 PROCEDURE — 93306 TTE W/DOPPLER COMPLETE: CPT | Performed by: INTERNAL MEDICINE

## 2019-10-22 PROCEDURE — 92610 EVALUATE SWALLOWING FUNCTION: CPT

## 2019-10-22 PROCEDURE — 92611 MOTION FLUOROSCOPY/SWALLOW: CPT

## 2019-10-22 PROCEDURE — 94760 N-INVAS EAR/PLS OXIMETRY 1: CPT

## 2019-10-22 PROCEDURE — 25010000002 METHYLPREDNISOLONE PER 125 MG: Performed by: INTERNAL MEDICINE

## 2019-10-22 PROCEDURE — 25010000002 ENOXAPARIN PER 10 MG: Performed by: INTERNAL MEDICINE

## 2019-10-22 PROCEDURE — 87899 AGENT NOS ASSAY W/OPTIC: CPT | Performed by: INTERNAL MEDICINE

## 2019-10-22 PROCEDURE — 94799 UNLISTED PULMONARY SVC/PX: CPT

## 2019-10-22 PROCEDURE — 71250 CT THORAX DX C-: CPT

## 2019-10-22 PROCEDURE — 63710000001 INSULIN DETEMIR PER 5 UNITS: Performed by: INTERNAL MEDICINE

## 2019-10-22 PROCEDURE — 84484 ASSAY OF TROPONIN QUANT: CPT | Performed by: INTERNAL MEDICINE

## 2019-10-22 PROCEDURE — 25010000002 FUROSEMIDE PER 20 MG: Performed by: INTERNAL MEDICINE

## 2019-10-22 PROCEDURE — 74230 X-RAY XM SWLNG FUNCJ C+: CPT

## 2019-10-22 PROCEDURE — 82962 GLUCOSE BLOOD TEST: CPT

## 2019-10-22 PROCEDURE — 99222 1ST HOSP IP/OBS MODERATE 55: CPT | Performed by: INTERNAL MEDICINE

## 2019-10-22 PROCEDURE — 63710000001 INSULIN LISPRO (HUMAN) PER 5 UNITS: Performed by: INTERNAL MEDICINE

## 2019-10-22 PROCEDURE — 93306 TTE W/DOPPLER COMPLETE: CPT

## 2019-10-22 PROCEDURE — 93005 ELECTROCARDIOGRAM TRACING: CPT | Performed by: INTERNAL MEDICINE

## 2019-10-22 RX ORDER — OXYCODONE AND ACETAMINOPHEN 10; 325 MG/1; MG/1
1 TABLET ORAL EVERY 4 HOURS PRN
Status: DISCONTINUED | OUTPATIENT
Start: 2019-10-22 | End: 2019-10-22 | Stop reason: SDUPTHER

## 2019-10-22 RX ORDER — SODIUM CHLORIDE 0.9 % (FLUSH) 0.9 %
10 SYRINGE (ML) INJECTION EVERY 12 HOURS SCHEDULED
Status: DISCONTINUED | OUTPATIENT
Start: 2019-10-22 | End: 2019-10-24 | Stop reason: HOSPADM

## 2019-10-22 RX ORDER — BENZONATATE 100 MG/1
200 CAPSULE ORAL 3 TIMES DAILY PRN
Status: DISCONTINUED | OUTPATIENT
Start: 2019-10-22 | End: 2019-10-24 | Stop reason: HOSPADM

## 2019-10-22 RX ORDER — ACETAMINOPHEN 650 MG/1
650 SUPPOSITORY RECTAL EVERY 4 HOURS PRN
Status: DISCONTINUED | OUTPATIENT
Start: 2019-10-22 | End: 2019-10-24 | Stop reason: HOSPADM

## 2019-10-22 RX ORDER — FUROSEMIDE 10 MG/ML
40 INJECTION INTRAMUSCULAR; INTRAVENOUS
Status: DISCONTINUED | OUTPATIENT
Start: 2019-10-22 | End: 2019-10-23

## 2019-10-22 RX ORDER — METHYLPREDNISOLONE SODIUM SUCCINATE 125 MG/2ML
60 INJECTION, POWDER, LYOPHILIZED, FOR SOLUTION INTRAMUSCULAR; INTRAVENOUS EVERY 6 HOURS
Status: DISCONTINUED | OUTPATIENT
Start: 2019-10-22 | End: 2019-10-22

## 2019-10-22 RX ORDER — NITROGLYCERIN 0.4 MG/1
0.4 TABLET SUBLINGUAL
Status: DISCONTINUED | OUTPATIENT
Start: 2019-10-22 | End: 2019-10-24 | Stop reason: HOSPADM

## 2019-10-22 RX ORDER — GUAIFENESIN/DEXTROMETHORPHAN 100-10MG/5
5 SYRUP ORAL EVERY 6 HOURS PRN
Status: DISCONTINUED | OUTPATIENT
Start: 2019-10-22 | End: 2019-10-24 | Stop reason: HOSPADM

## 2019-10-22 RX ORDER — LISINOPRIL 10 MG/1
10 TABLET ORAL DAILY
Status: DISCONTINUED | OUTPATIENT
Start: 2019-10-22 | End: 2019-10-23

## 2019-10-22 RX ORDER — PANTOPRAZOLE SODIUM 40 MG/1
40 TABLET, DELAYED RELEASE ORAL 2 TIMES DAILY
COMMUNITY

## 2019-10-22 RX ORDER — IPRATROPIUM BROMIDE AND ALBUTEROL SULFATE 2.5; .5 MG/3ML; MG/3ML
3 SOLUTION RESPIRATORY (INHALATION)
Status: DISCONTINUED | OUTPATIENT
Start: 2019-10-22 | End: 2019-10-24 | Stop reason: HOSPADM

## 2019-10-22 RX ORDER — GUAIFENESIN 600 MG/1
1200 TABLET, EXTENDED RELEASE ORAL EVERY 12 HOURS SCHEDULED
Status: DISCONTINUED | OUTPATIENT
Start: 2019-10-22 | End: 2019-10-24 | Stop reason: HOSPADM

## 2019-10-22 RX ORDER — FUROSEMIDE 10 MG/ML
40 INJECTION INTRAMUSCULAR; INTRAVENOUS EVERY 12 HOURS
Status: DISCONTINUED | OUTPATIENT
Start: 2019-10-22 | End: 2019-10-22

## 2019-10-22 RX ORDER — OXYCODONE AND ACETAMINOPHEN 10; 325 MG/1; MG/1
1 TABLET ORAL EVERY 6 HOURS PRN
Status: DISCONTINUED | OUTPATIENT
Start: 2019-10-22 | End: 2019-10-24 | Stop reason: HOSPADM

## 2019-10-22 RX ORDER — PANTOPRAZOLE SODIUM 40 MG/1
40 TABLET, DELAYED RELEASE ORAL
Status: DISCONTINUED | OUTPATIENT
Start: 2019-10-22 | End: 2019-10-24 | Stop reason: HOSPADM

## 2019-10-22 RX ORDER — ONDANSETRON 4 MG/1
4 TABLET, FILM COATED ORAL EVERY 6 HOURS PRN
Status: DISCONTINUED | OUTPATIENT
Start: 2019-10-22 | End: 2019-10-24 | Stop reason: HOSPADM

## 2019-10-22 RX ORDER — ISOSORBIDE MONONITRATE 60 MG/1
60 TABLET, EXTENDED RELEASE ORAL DAILY
Status: DISCONTINUED | OUTPATIENT
Start: 2019-10-22 | End: 2019-10-24 | Stop reason: HOSPADM

## 2019-10-22 RX ORDER — TAMSULOSIN HYDROCHLORIDE 0.4 MG/1
0.4 CAPSULE ORAL DAILY
Status: DISCONTINUED | OUTPATIENT
Start: 2019-10-22 | End: 2019-10-24 | Stop reason: HOSPADM

## 2019-10-22 RX ORDER — ZOLPIDEM TARTRATE 5 MG/1
10 TABLET ORAL NIGHTLY PRN
Status: DISCONTINUED | OUTPATIENT
Start: 2019-10-22 | End: 2019-10-24 | Stop reason: HOSPADM

## 2019-10-22 RX ORDER — DEXTROSE MONOHYDRATE 25 G/50ML
25 INJECTION, SOLUTION INTRAVENOUS
Status: DISCONTINUED | OUTPATIENT
Start: 2019-10-22 | End: 2019-10-24 | Stop reason: HOSPADM

## 2019-10-22 RX ORDER — SODIUM CHLORIDE 0.9 % (FLUSH) 0.9 %
10 SYRINGE (ML) INJECTION AS NEEDED
Status: DISCONTINUED | OUTPATIENT
Start: 2019-10-22 | End: 2019-10-24 | Stop reason: HOSPADM

## 2019-10-22 RX ORDER — ACETAMINOPHEN 160 MG/5ML
650 SOLUTION ORAL EVERY 4 HOURS PRN
Status: DISCONTINUED | OUTPATIENT
Start: 2019-10-22 | End: 2019-10-24 | Stop reason: HOSPADM

## 2019-10-22 RX ORDER — ASPIRIN 81 MG/1
81 TABLET, CHEWABLE ORAL DAILY
Status: DISCONTINUED | OUTPATIENT
Start: 2019-10-22 | End: 2019-10-24 | Stop reason: HOSPADM

## 2019-10-22 RX ORDER — PREDNISONE 20 MG/1
40 TABLET ORAL
Status: DISCONTINUED | OUTPATIENT
Start: 2019-10-23 | End: 2019-10-24 | Stop reason: HOSPADM

## 2019-10-22 RX ORDER — NICOTINE POLACRILEX 4 MG
15 LOZENGE BUCCAL
Status: DISCONTINUED | OUTPATIENT
Start: 2019-10-22 | End: 2019-10-24 | Stop reason: HOSPADM

## 2019-10-22 RX ORDER — ACETAMINOPHEN 325 MG/1
650 TABLET ORAL EVERY 4 HOURS PRN
Status: DISCONTINUED | OUTPATIENT
Start: 2019-10-22 | End: 2019-10-24 | Stop reason: HOSPADM

## 2019-10-22 RX ORDER — NICOTINE 21 MG/24HR
1 PATCH, TRANSDERMAL 24 HOURS TRANSDERMAL
Status: DISCONTINUED | OUTPATIENT
Start: 2019-10-22 | End: 2019-10-24 | Stop reason: HOSPADM

## 2019-10-22 RX ORDER — BACLOFEN 10 MG/1
10 TABLET ORAL 3 TIMES DAILY
Status: DISCONTINUED | OUTPATIENT
Start: 2019-10-22 | End: 2019-10-24 | Stop reason: HOSPADM

## 2019-10-22 RX ORDER — ONDANSETRON 2 MG/ML
4 INJECTION INTRAMUSCULAR; INTRAVENOUS EVERY 6 HOURS PRN
Status: DISCONTINUED | OUTPATIENT
Start: 2019-10-22 | End: 2019-10-24 | Stop reason: HOSPADM

## 2019-10-22 RX ADMIN — NICOTINE 1 PATCH: 21 PATCH, EXTENDED RELEASE TRANSDERMAL at 09:13

## 2019-10-22 RX ADMIN — LISINOPRIL 10 MG: 10 TABLET ORAL at 09:12

## 2019-10-22 RX ADMIN — BACLOFEN 10 MG: 10 TABLET ORAL at 18:50

## 2019-10-22 RX ADMIN — FUROSEMIDE 40 MG: 10 INJECTION, SOLUTION INTRAVENOUS at 18:50

## 2019-10-22 RX ADMIN — DOXYCYCLINE 100 MG: 100 INJECTION, POWDER, LYOPHILIZED, FOR SOLUTION INTRAVENOUS at 02:13

## 2019-10-22 RX ADMIN — IPRATROPIUM BROMIDE AND ALBUTEROL SULFATE 3 ML: 2.5; .5 SOLUTION RESPIRATORY (INHALATION) at 03:04

## 2019-10-22 RX ADMIN — PANTOPRAZOLE SODIUM 40 MG: 40 TABLET, DELAYED RELEASE ORAL at 06:17

## 2019-10-22 RX ADMIN — DOXYCYCLINE 100 MG: 100 INJECTION, POWDER, LYOPHILIZED, FOR SOLUTION INTRAVENOUS at 13:23

## 2019-10-22 RX ADMIN — INSULIN DETEMIR 30 UNITS: 100 INJECTION, SOLUTION SUBCUTANEOUS at 09:11

## 2019-10-22 RX ADMIN — APIXABAN 5 MG: 5 TABLET, FILM COATED ORAL at 18:50

## 2019-10-22 RX ADMIN — METOPROLOL TARTRATE 25 MG: 25 TABLET, FILM COATED ORAL at 18:51

## 2019-10-22 RX ADMIN — BACLOFEN 10 MG: 10 TABLET ORAL at 09:12

## 2019-10-22 RX ADMIN — ACETAMINOPHEN 650 MG: 325 TABLET, FILM COATED ORAL at 18:52

## 2019-10-22 RX ADMIN — OXYCODONE HYDROCHLORIDE AND ACETAMINOPHEN 1 TABLET: 10; 325 TABLET ORAL at 02:18

## 2019-10-22 RX ADMIN — IPRATROPIUM BROMIDE AND ALBUTEROL SULFATE 3 ML: 2.5; .5 SOLUTION RESPIRATORY (INHALATION) at 10:42

## 2019-10-22 RX ADMIN — FUROSEMIDE 40 MG: 10 INJECTION, SOLUTION INTRAVENOUS at 02:13

## 2019-10-22 RX ADMIN — METHYLPREDNISOLONE SODIUM SUCCINATE 60 MG: 125 INJECTION, POWDER, FOR SOLUTION INTRAMUSCULAR; INTRAVENOUS at 13:23

## 2019-10-22 RX ADMIN — GUAIFENESIN 1200 MG: 600 TABLET, EXTENDED RELEASE ORAL at 21:28

## 2019-10-22 RX ADMIN — METHYLPREDNISOLONE SODIUM SUCCINATE 60 MG: 125 INJECTION, POWDER, FOR SOLUTION INTRAMUSCULAR; INTRAVENOUS at 09:14

## 2019-10-22 RX ADMIN — GUAIFENESIN AND DEXTROMETHORPHAN 5 ML: 100; 10 SYRUP ORAL at 20:09

## 2019-10-22 RX ADMIN — IPRATROPIUM BROMIDE AND ALBUTEROL SULFATE 3 ML: 2.5; .5 SOLUTION RESPIRATORY (INHALATION) at 06:55

## 2019-10-22 RX ADMIN — INSULIN LISPRO 4 UNITS: 100 INJECTION, SOLUTION INTRAVENOUS; SUBCUTANEOUS at 13:22

## 2019-10-22 RX ADMIN — BARIUM SULFATE 20 ML: 0.81 POWDER, FOR SUSPENSION ORAL at 15:00

## 2019-10-22 RX ADMIN — OXYCODONE HYDROCHLORIDE AND ACETAMINOPHEN 1 TABLET: 10; 325 TABLET ORAL at 20:13

## 2019-10-22 RX ADMIN — METHYLPREDNISOLONE SODIUM SUCCINATE 60 MG: 125 INJECTION, POWDER, FOR SOLUTION INTRAMUSCULAR; INTRAVENOUS at 02:14

## 2019-10-22 RX ADMIN — SODIUM CHLORIDE, PRESERVATIVE FREE 10 ML: 5 INJECTION INTRAVENOUS at 09:14

## 2019-10-22 RX ADMIN — METOPROLOL TARTRATE 25 MG: 25 TABLET, FILM COATED ORAL at 20:34

## 2019-10-22 RX ADMIN — TAMSULOSIN HYDROCHLORIDE 0.4 MG: 0.4 CAPSULE ORAL at 09:13

## 2019-10-22 RX ADMIN — GUAIFENESIN 1200 MG: 600 TABLET, EXTENDED RELEASE ORAL at 09:12

## 2019-10-22 RX ADMIN — SODIUM CHLORIDE, PRESERVATIVE FREE 10 ML: 5 INJECTION INTRAVENOUS at 20:39

## 2019-10-22 RX ADMIN — IPRATROPIUM BROMIDE AND ALBUTEROL SULFATE 3 ML: 2.5; .5 SOLUTION RESPIRATORY (INHALATION) at 19:02

## 2019-10-22 RX ADMIN — BACLOFEN 10 MG: 10 TABLET ORAL at 20:34

## 2019-10-22 RX ADMIN — SODIUM CHLORIDE, PRESERVATIVE FREE 10 ML: 5 INJECTION INTRAVENOUS at 02:25

## 2019-10-22 RX ADMIN — BARIUM SULFATE 20 ML: 400 PASTE ORAL at 15:00

## 2019-10-22 RX ADMIN — INSULIN LISPRO 6 UNITS: 100 INJECTION, SOLUTION INTRAVENOUS; SUBCUTANEOUS at 09:12

## 2019-10-22 RX ADMIN — GUAIFENESIN 1200 MG: 600 TABLET, EXTENDED RELEASE ORAL at 02:14

## 2019-10-22 RX ADMIN — ASPIRIN 81 MG: 81 TABLET, CHEWABLE ORAL at 09:13

## 2019-10-22 RX ADMIN — INSULIN LISPRO 2 UNITS: 100 INJECTION, SOLUTION INTRAVENOUS; SUBCUTANEOUS at 20:38

## 2019-10-22 RX ADMIN — DILTIAZEM HYDROCHLORIDE 30 MG: 30 TABLET, FILM COATED ORAL at 21:29

## 2019-10-22 RX ADMIN — ISOSORBIDE MONONITRATE 60 MG: 60 TABLET, EXTENDED RELEASE ORAL at 09:13

## 2019-10-22 RX ADMIN — IPRATROPIUM BROMIDE AND ALBUTEROL SULFATE 3 ML: 2.5; .5 SOLUTION RESPIRATORY (INHALATION) at 15:36

## 2019-10-22 RX ADMIN — OXYCODONE HYDROCHLORIDE AND ACETAMINOPHEN 1 TABLET: 10; 325 TABLET ORAL at 13:23

## 2019-10-22 RX ADMIN — ENOXAPARIN SODIUM 70 MG: 80 INJECTION SUBCUTANEOUS at 02:14

## 2019-10-22 RX ADMIN — BARIUM SULFATE 20 ML: 400 SUSPENSION ORAL at 15:00

## 2019-10-22 NOTE — PROGRESS NOTES
"Pharmacy Dosing Service  Anticoagulant  Enoxaparin    Assessment/Action/Plan:  Pharmacy to dose enoxaparin for Atrial fibrillation.  Initiated enoxaparin 1 mg/kg (70 mg) subq Q12H based on indication and renal function.  Pharmacy will continue to monitor and adjust dose accordingly.     Subjective:  Sameer Tavarez is a 64 y.o. male on Enoxaparin 70 mg SQ every 12 hours for indication of atrial fibrillation.  Objective:  [Ht: 167.6 cm (66\"); Wt: 66.9 kg (147 lb 7 oz); BMI: Body mass index is 23.8 kg/m².]  Estimated Creatinine Clearance: 115.8 mL/min (A) (by C-G formula based on SCr of 0.61 mg/dL (L)).   Lab Results   Component Value Date    INR 1.10 (H) 10/21/2019    INR 1.13 09/25/2019    INR 1.14 08/30/2019    PROTIME 14.6 10/21/2019    PROTIME 13.9 09/25/2019    PROTIME 14 08/30/2019      Lab Results   Component Value Date    HGB 12.6 (L) 10/21/2019    HGB 13.4 (L) 09/25/2019    HGB 12.5 (L) 09/02/2019      Lab Results   Component Value Date     10/21/2019     09/25/2019     09/02/2019       Siobhan Alcala, PharmD  10/22/19 12:52 AM     "

## 2019-10-22 NOTE — PROGRESS NOTES
[facility] Medicine Services  INPATIENT PROGRESS NOTE    Patient Name: Sameer Tavarez  Date of Admission: 10/21/2019  Today's Date: 10/22/19  Length of Stay: 1  Primary Care Physician: Christian Castellon MD    Subjective   Chief Complaint: shortness of breath  HPI     Mr. Salas presented with worsening shortness of breath.  He has been having this issue on and off again since June, is required multiple hospitalizations at [facility] as well as treated at urgent treatment centers/primary care office.  Patient indicates that every time he comes off antibiotics and is out of the hospital for couple weeks he ends up having shortness of breath with a productive cough.  Patient has not had any fever or chills.  Patient has not had any nausea vomiting or diarrhea.  Patient has no history of heart failure to his knowledge.        Review of Systems   Constitutional: Positive for activity change and fatigue. Negative for chills and fever.   Respiratory: Positive for cough and shortness of breath.    Cardiovascular: Negative for chest pain and palpitations.   Gastrointestinal: Negative for abdominal distention, abdominal pain, constipation, diarrhea, nausea and vomiting.        All pertinent negatives and positives are as above. All other systems have been reviewed and are negative unless otherwise stated.     Objective    Temp:  [96.5 °F (35.8 °C)-98.5 °F (36.9 °C)] 98.1 °F (36.7 °C)  Heart Rate:  [52-86] 61  Resp:  [16-21] 18  BP: (114-171)/(65-96) 122/69  Physical Exam   Constitutional: He is oriented to person, place, and time. No distress.   HENT:   Head: Normocephalic and atraumatic.   Eyes: Conjunctivae are normal. No scleral icterus.   Neck: Neck supple. No JVD present.   Cardiovascular: Normal rate, regular rhythm and intact distal pulses. Exam reveals no friction rub.   No murmur heard.  Pulmonary/Chest: Effort normal. No stridor. No respiratory distress. He has wheezes. He has rales.    Left sided rhonchi   Abdominal: Soft. Bowel sounds are normal. He exhibits no distension and no mass. There is no tenderness. There is no guarding.   Musculoskeletal: He exhibits no edema.   Neurological: He is alert and oriented to person, place, and time.   Skin: Skin is warm and dry. He is not diaphoretic. No erythema.   Psychiatric: He has a normal mood and affect. His behavior is normal.   Nursing note and vitals reviewed.        Results Review:  I have reviewed the labs, radiology results, and diagnostic studies.    Laboratory Data:   Results from last 7 days   Lab Units 10/21/19  1839   WBC 10*3/mm3 6.04   HEMOGLOBIN g/dL 12.6*   HEMATOCRIT % 36.7*   PLATELETS 10*3/mm3 257        Results from last 7 days   Lab Units 10/21/19  1955/19  1839   SODIUM mmol/L  --  142   SODIUM, ARTERIAL mmol/L 139  --    POTASSIUM mmol/L  --  4.4   CHLORIDE mmol/L  --  104   CO2 mmol/L  --  25.0   BUN mg/dL  --  8   CREATININE mg/dL  --  0.61*   CALCIUM mg/dL  --  8.8   BILIRUBIN mg/dL  --  0.2   ALK PHOS U/L  --  134*   ALT (SGPT) U/L  --  15   AST (SGOT) U/L  --  20   GLUCOSE mg/dL  --  134*       Culture Data:        Radiology Data:   Imaging Results (last 24 hours)     ** No results found for the last 24 hours. **          I have reviewed the patient's current medications.     Assessment/Plan     Active Hospital Problems    Diagnosis   • Tobacco dependence   • Atrial fibrillation (CMS/HCC)   • Chest pain   • Atrial flutter (CMS/HCC)   • CAD (coronary artery disease)     Overview:   2008  CABG LIMA-LAD, VG-OM, VG-diag  12/26/2013  Cath  Closed LIMA-LAD, patent VG-diag, patent VG-OM, normal LVFX  6/11/2014  lexiscan negative for myocardial ischemia, EF 59  %  5/19/2015  DSE negative for myocardial ischemia  10/26/16  Cath  Severe NVD, closed LIMA-LAD, stent LAD, stent SVG to diagonal, patent VG-OM, normal LVFX  2/9/2018  Cath  80% mid RCA, 3.0 x 18 BMS  7/27/2018  lexiscan inferior and septal MI without ischemia, EF  62%  7/9/2019  lexiscan negative for myocardial ischemia, EF 48  %   Overview:   2008  CABG LIMA-LAD, VG-OM, VG-diag  12/26/2013  Cath  Closed LIMA-LAD, patent VG-diag, patent VG-OM, normal LVFX  6/11/2014  lexiscan negative for myocardial ischemia, EF 59  %  5/19/2015  DSE negative for myocardial ischemia  10/26/16  Cath  Severe NVD, closed LIMA-LAD, stent LAD, stent SVG to diagonal, patent VG-OM, normal LVFX  2/9/2018  Cath  80% mid RCA, 3.0 x 18 BMS  7/27/2018  lexiscan inferior and septal MI without ischemia, EF 62%  7/9/2019  lexiscan negative for myocardial ischemia, EF 48  %     • Type 2 diabetes mellitus without complication, with long-term current use of insulin (CMS/[facility])   • Dysphagia   • History of radiation therapy   • HTN (hypertension)     Plan:  1.  Metoprolol 25 mg BID  2.  D/C lovenox, start elliquis  3.  Continue doxycycline  4.  Echocardiogram   5.  CT chest   6.  Sputum culture  7.  Duonebs   8.  Flutter valve   9.  30 units qhs with sliding scale qac/qhs with accuchecks   10.  SLP following, dysphagia evaluation pending, discussed with Roseann Garcia SLP  11.  Continue furosemide 40 mg IV   12.  D/C solumedrol, switch to prednisone 40 mg   13.  Counseled on smoking cessation, nicotine patch  14.  Legionella, strep urinary antigens pending  15.  Monitor on telemetry                Discharge Planning: I expect the patient to be discharged to home in ? days.    Alber Zamarripa MD   10/22/19   1:41 PM

## 2019-10-22 NOTE — CONSULTS
"      Patient Care Team:  Christian Castellon MD as PCP - General (Internal Medicine)  Sin Alarcon MD as Referring Physician (General Practice)  Alber Justin MD as Consulting Physician (Otolaryngology)  Kriss Dominguez MD as Referring Physician (Hematology and Oncology)  Hadley Marie III, MD as Consulting Physician (Radiation Oncology)  Chayo Gould PA-C as Physician Assistant (Radiation Oncology)  Ayanna Early MD as Surgeon (General Surgery)  Zion Márquez MD  REASON FOR REFERRAL: KNOWN IHD AND NEW AFIB  Chief complaint SINUS CONGESTION, COUGH, WORSENING SOA,  FEVER AND CHILLS    Subjective     Patient is a 64 y.o. male presents with the above symptoms. He has known IHD and is normally seen at . Has MMP including a hx of Oropharyngeal CA and he has a feeding tube. He has had remote CABG and his last intervention was a MITCHELL to the SVG=>RCA and the LAD by Dr Merino in '16. He relates that he started having cp at rest 3 mo ago but does not take NTG \"because I can't afford it\". He also has chronic esophagitis. He walks daily from 3-5 miles without cp and good stamina. He presented to our ER last night with paroxysms of severe non-productive cough and is in afib with fast HR's while up walking. Cardiac markers are negative. CTA shows moderate pulm edema and bilat pleural effusions. He had good UOP yesterday with not much improvement in his congestion. Not much UOP today. ECHO shows bi-atrial enlargement with good LVEF and mild to moderate pulmonary HTN    Review of Systems   Review of Systems   Constitutional: Negative for fatigue, fever and unexpected weight change.   HENT: Positive for congestion.    Eyes: Negative for visual disturbance.   Respiratory: Positive for chest tightness and shortness of breath. Negative for apnea and wheezing.    Cardiovascular: Positive for chest pain. Negative for palpitations and leg swelling.   Gastrointestinal: Negative for abdominal " pain, blood in stool and vomiting.   Endocrine: Negative for cold intolerance and heat intolerance.   Genitourinary: Negative for difficulty urinating and hematuria.   Musculoskeletal: Negative for myalgias.   Skin: Negative for rash.   Neurological: Negative for syncope.   Hematological: Does not bruise/bleed easily.   Psychiatric/Behavioral: Negative for sleep disturbance.       History  Past Medical History:   Diagnosis Date   • Arthritis    • Coronary artery disease    • Depression    • Diabetes mellitus (CMS/HCC)    • Difficulty urinating    • Enlarged prostate    • GERD (gastroesophageal reflux disease)    • History of BPH    • History of transfusion    • Hyperlipidemia    • Hypertension    • Neuropathy    • Oropharyngeal cancer (CMS/HCC) 08/27/2017   • Seizure (CMS/HCC)     diabetic   • Severe esophageal dysplasia    • Stroke (CMS/[facility])    • TB (pulmonary tuberculosis) 2004     Past Surgical History:   Procedure Laterality Date   • CARDIAC SURGERY      9-10 stents   • CHOLECYSTECTOMY     • CORONARY ARTERY BYPASS GRAFT      2009   • FRACTURE SURGERY     • GASTROSTOMY FEEDING TUBE INSERTION N/A 10/18/2018    Procedure: takedown gastroutaneous fistula with gastrostomy tube plaement;  Surgeon: Ayanna Early MD;  Location: [facility] OR;  Service: General   • GTUBE REPLACEMENT N/A 10/6/2017    Procedure: GASTROSTOMY TUBE REPLACEMENT, port placement;  Surgeon: Jayne Phillips MD;  Location: [facility] OR;  Service:    • HERNIA REPAIR     • LARYNGOSCOPY N/A 10/11/2017    Procedure: DIRECT LARYNGOSCOPY WITH BIOPSY;  Surgeon: Darin Neal MD;  Location: [facility] OR;  Service:    • LARYNGOSCOPY N/A 6/25/2018    Procedure: DIRECT LARYNGOSCOPY WITH BIOPSY;  Surgeon: Alber Justin MD;  Location: [facility] OR;  Service: ENT   • LARYNGOSCOPY N/A 10/18/2018    Procedure: DIAGNOSTIC DIRECT LARYNGOSCOPY WITH BIOPSY;  Surgeon: Alber Justin MD;  Location: [facility] OR;  Service: ENT   • NISSEN FUNDOPLICATION  LAPAROSCOPIC     • NE DENTAL SURGERY PROCEDURE N/A 10/11/2017    Procedure: TOOTH EXTRACTION;  Surgeon: Darin Neal MD;  Location:  [facility] OR;  Service: ENT   • NE INSJ TUNNELED CVC W/O SUBQ PORT/ AGE 5 YR/> Left 10/6/2017    Procedure: INSERTION VENOUS ACCESS DEVICE;  Surgeon: Jayne Phillips MD;  Location:  [facility] OR;  Service: General   • SKIN BIOPSY     • TESTICLE SURGERY      tubes implanted for drainage   • TONSILLECTOMY     • TRACHEOSTOMY N/A 10/5/2017    Procedure: Awake tracheostomy; DIRECT LARYNGOSCOPY WITH BIOPSY;  Surgeon: Alber Justin MD;  Location:  [facility] OR;  Service:    • TRACHEOSTOMY N/A 10/18/2018    Procedure: TRACHEOCUTANEOUS FISTULA CLOSURE;  Surgeon: Alber Justin MD;  Location:  [facility] OR;  Service: ENT     Family History   Problem Relation Age of Onset   • Stroke Mother    • Heart disease Father    • Heart disease Brother    • Cancer Brother      Social History     Tobacco Use   • Smoking status: Current Every Day Smoker     Packs/day: 0.00     Years: 52.00     Pack years: 0.00     Types: Cigarettes   • Smokeless tobacco: Former User     Types: Snuff   • Tobacco comment: 6 cigarettes a day   Substance Use Topics   • Alcohol use: Yes     Alcohol/week: 1.8 oz     Types: 3 Cans of beer per week   • Drug use: No     Medications Prior to Admission   Medication Sig Dispense Refill Last Dose   • aspirin 81 MG chewable tablet Chew 81 mg Daily.   10/22/2019 at Unknown time   • baclofen (LIORESAL) 10 MG tablet Take 20 mg by mouth 2 (Two) Times a Day.   Past Week at Unknown time   • furosemide (LASIX) 40 MG tablet Take 40 mg by mouth Daily.   Past Week at Unknown time   • isosorbide mononitrate (IMDUR) 60 MG 24 hr tablet Take 60 mg by mouth Daily.   Past Week at Unknown time   • lisinopril (PRINIVIL,ZESTRIL) 2.5 MG tablet Take 5 mg by mouth Daily.   Past Week at Unknown time   • oxyCODONE-acetaminophen (PERCOCET)  MG per tablet Take 1 tablet by mouth Every 4 (Four) Hours  As Needed for Moderate Pain  for up to 30 doses. (Patient taking differently: Take 1 tablet by mouth Every 8 (Eight) Hours As Needed for Moderate Pain .) 30 tablet 0 Past Week at Unknown time   • pantoprazole (PROTONIX) 40 MG EC tablet Take 40 mg by mouth 2 (Two) Times a Day.   Past Week at Unknown time   • tamsulosin (FLOMAX) 0.4 MG capsule 24 hr capsule Take 1 capsule by mouth Daily.   Past Week at Unknown time   • zolpidem (AMBIEN) 10 MG tablet Take 10 mg by mouth At Night As Needed for Sleep.   Past Week at Unknown time   • insulin glargine (LANTUS) 100 UNIT/ML injection Inject 25 Units under the skin into the appropriate area as directed Daily.   Unknown at Unknown time   • nitroglycerin (NITROSTAT) 0.4 MG SL tablet Place 0.4 mg under the tongue.   More than a month at Unknown time     Allergies:  Codeine    Objective     Vital Signs  Temp:  [98 °F (36.7 °C)-98.5 °F (36.9 °C)] 98.4 °F (36.9 °C)  Heart Rate:  [52-86] 74  Resp:  [16-20] 16  BP: (114-153)/(65-96) 126/65    Physical Exam:   Physical Exam   Constitutional: He is oriented to person, place, and time. He appears well-developed and well-nourished. No distress.   HENT:   Head: Normocephalic.   Eyes: Pupils are equal, round, and reactive to light.   Neck:   Post surgery for throat CA   Cardiovascular: Normal heart sounds. An irregularly irregular rhythm present. Tachycardia present. Exam reveals no gallop and no friction rub.   No murmur heard.  Pulmonary/Chest: Effort normal. No stridor. No respiratory distress. He has no wheezes. He has no rales.   Diminished bs at bases   Abdominal: Soft. Bowel sounds are normal. He exhibits no distension and no mass. There is no tenderness. There is no guarding.   Musculoskeletal: He exhibits no edema, tenderness or deformity.   Neurological: He is alert and oriented to person, place, and time.   Skin: Skin is warm and dry. He is not diaphoretic.   Psychiatric: He has a normal mood and affect.     Results Review:       Lab Results (last 72 hours)     Procedure Component Value Units Date/Time    POC Glucose Once [[id number]]  (Abnormal) Collected:  10/22/19 1127    Specimen:  Blood Updated:  10/22/19 1148     Glucose 270 mg/dL      Comment: : [id number] Alvaro CartagenaaMeter ID: [id number]       POC Glucose Once [[id number]]  (Abnormal) Collected:  10/22/19 0730    Specimen:  Blood Updated:  10/22/19 0756     Glucose 306 mg/dL      Comment: : [id number] Alvaro CartagenaaMeter ID: [id number]       Troponin [[id number]]  (Normal) Collected:  10/22/19 0616    Specimen:  Blood Updated:  10/22/19 0656     Troponin T <0.010 ng/mL     Narrative:       Troponin T Reference Range:  <= 0.03 ng/mL-   Negative for AMI  >0.03 ng/mL-     Abnormal for myocardial necrosis.  Clinicians would have to utilize clinical acumen, EKG, Troponin and serial changes to determine if it is an Acute Myocardial Infarction or myocardial injury due to an underlying chronic condition.     S. Pneumo Ag Urine or CSF - Urine, Urine, Clean Catch [[id number]]  (Normal) Collected:  10/22/19 0050    Specimen:  Urine, Clean Catch Updated:  10/22/19 0159     Strep Pneumo Ag Negative    Legionella Antigen, Urine - Urine, Urine, Clean Catch [[id number]]  (Normal) Collected:  10/22/19 0050    Specimen:  Urine, Clean Catch Updated:  10/22/19 0159     LEGIONELLA ANTIGEN, URINE Negative    Troponin [[id number]]  (Normal) Collected:  10/22/19 0100    Specimen:  Blood Updated:  10/22/19 0152     Troponin T <0.010 ng/mL     Narrative:       Troponin T Reference Range:  <= 0.03 ng/mL-   Negative for AMI  >0.03 ng/mL-     Abnormal for myocardial necrosis.  Clinicians would have to utilize clinical acumen, EKG, Troponin and serial changes to determine if it is an Acute Myocardial Infarction or myocardial injury due to an underlying chronic condition.     Magnesium [[id number]]  (Normal) Collected:  10/21/19 2114    Specimen:  Blood Updated:  10/22/19 0102     Magnesium 1.7 mg/dL      Phosphorus [[id number]]  (Normal) Collected:  10/21/19 2114    Specimen:  Blood Updated:  10/22/19 0102     Phosphorus 2.7 mg/dL     BNP [[id number]]  (Normal) Collected:  10/21/19 2114    Specimen:  Blood Updated:  10/21/19 2252     proBNP 881.3 pg/mL     Narrative:       Among patients with dyspnea, NT-proBNP is highly sensitive for the detection of acute congestive heart failure. In addition NT-proBNP of <300 pg/ml effectively rules out acute congestive heart failure with 99% negative predictive value.    Troponin [[id number]]  (Normal) Collected:  10/21/19 2114    Specimen:  Blood Updated:  10/21/19 2140     Troponin T <0.010 ng/mL     Narrative:       Troponin T Reference Range:  <= 0.03 ng/mL-   Negative for AMI  >0.03 ng/mL-     Abnormal for myocardial necrosis.  Clinicians would have to utilize clinical acumen, EKG, Troponin and serial changes to determine if it is an Acute Myocardial Infarction or myocardial injury due to an underlying chronic condition.     Blood Gas, Arterial With Co-Ox [[id number]]  (Abnormal) Collected:  10/1955    Specimen:  Arterial Blood Updated:  10/21/19 2007     Site Right Brachial     Jeffrey's Test N/A     pH, Arterial 7.443 pH units      pCO2, Arterial 35.1 mm Hg      pO2, Arterial 71.4 mm Hg      Comment: 84 Value below reference range        HCO3, Arterial 24.0 mmol/L      Base Excess, Arterial 0.2 mmol/L      O2 Saturation, Arterial 96.1 %      Hemoglobin, Blood Gas 12.9 g/dL      Comment: 84 Value below reference range        Hematocrit, Blood Gas 39.4 %      Oxyhemoglobin 93.1 %      Comment: 84 Value below reference range        Methemoglobin 0.70 %      Carboxyhemoglobin 2.4 %      A-a Gradiant 35.4 mmHg      Temperature 37.0 C      Sodium, Arterial 139 mmol/L      Potassium, Arterial 3.9 mmol/L      Barometric Pressure for Blood Gas 744 mmHg      Modality Room Air     Ventilator Mode NA     Note --     Collected by [id number]     Comment: Meter: [id number]      :  [id number]        pH, Temp Corrected --     pCO2, Temperature Corrected --     pO2, Temperature Corrected --    Comprehensive Metabolic Panel [[id number]]  (Abnormal) Collected:  10/21/19 1839    Specimen:  Blood Updated:  10/21/19 1908     Glucose 134 mg/dL      BUN 8 mg/dL      Creatinine 0.61 mg/dL      Sodium 142 mmol/L      Potassium 4.4 mmol/L      Chloride 104 mmol/L      CO2 25.0 mmol/L      Calcium 8.8 mg/dL      Total Protein 6.7 g/dL      Albumin 3.60 g/dL      ALT (SGPT) 15 U/L      AST (SGOT) 20 U/L      Alkaline Phosphatase 134 U/L      Total Bilirubin 0.2 mg/dL      eGFR Non African Amer 133 mL/min/1.73      Globulin 3.1 gm/dL      A/G Ratio 1.2 g/dL      BUN/Creatinine Ratio 13.1     Anion Gap 13.0 mmol/L     Narrative:       GFR Normal >60  Chronic Kidney Disease <60  Kidney Failure <15    Troponin [[id number]]  (Normal) Collected:  10/21/19 1839    Specimen:  Blood Updated:  10/21/19 1908     Troponin T <0.010 ng/mL     Narrative:       Troponin T Reference Range:  <= 0.03 ng/mL-   Negative for AMI  >0.03 ng/mL-     Abnormal for myocardial necrosis.  Clinicians would have to utilize clinical acumen, EKG, Troponin and serial changes to determine if it is an Acute Myocardial Infarction or myocardial injury due to an underlying chronic condition.     Blood Culture - Blood, Arm, Left [[id number]] Collected:  10/21/19 1839    Specimen:  Blood from Arm, Left Updated:  10/21/19 1901    Blood Culture - Blood, Arm, Left [[id number]] Collected:  10/21/19 1835    Specimen:  Blood from Arm, Left Updated:  10/21/19 1901    aPTT [[id number]]  (Normal) Collected:  10/21/19 1839    Specimen:  Blood Updated:  10/21/19 1859     PTT 29.2 seconds     Protime-INR [[id number]]  (Abnormal) Collected:  10/21/19 1839    Specimen:  Blood Updated:  10/21/19 1859     Protime 14.6 Seconds      INR 1.10    CBC & Differential [[id number]] Collected:  10/21/19 1839    Specimen:  Blood Updated:  10/21/19 1851    Narrative:        The following orders were created for panel order CBC & Differential.  Procedure                               Abnormality         Status                     ---------                               -----------         ------                     CBC Auto Differential[[id number]]        Abnormal            Final result                 Please view results for these tests on the individual orders.    CBC Auto Differential [[id number]]  (Abnormal) Collected:  10/21/19 1839    Specimen:  Blood Updated:  10/21/19 1851     WBC 6.04 10*3/mm3      RBC 4.20 10*6/mm3      Hemoglobin 12.6 g/dL      Hematocrit 36.7 %      MCV 87.4 fL      MCH 30.0 pg      MCHC 34.3 g/dL      RDW 12.8 %      RDW-SD 40.6 fl      MPV 10.1 fL      Platelets 257 10*3/mm3      Neutrophil % 66.2 %      Lymphocyte % 19.2 %      Monocyte % 12.1 %      Eosinophil % 2.0 %      Basophil % 0.3 %      Immature Grans % 0.2 %      Neutrophils, Absolute 4.00 10*3/mm3      Lymphocytes, Absolute 1.16 10*3/mm3      Monocytes, Absolute 0.73 10*3/mm3      Eosinophils, Absolute 0.12 10*3/mm3      Basophils, Absolute 0.02 10*3/mm3      Immature Grans, Absolute 0.01 10*3/mm3      nRBC 0.0 /100 WBC           Assessment/Plan    NEW AFIB - needs better HR control, agree with DOAC, add Diltiazem, consider cardioversion later  NON-CARDIOGENIC PULM EDEMA  NON-CARDIAC CP - suspect GI  COPD + TOBACCO USE      HTN (hypertension)    History of radiation therapy    Type 2 diabetes mellitus without complication, with long-term current use of insulin (CMS/HCC)    Chest pain    Dysphagia    CAD (coronary artery disease)    Atrial flutter (CMS/HCC)    Atrial fibrillation (CMS/HCC)      THANKS - WILL FOLLOW    I discussed the patient's findings and my recommendations with patient and family.     Vihsal Ness MD  10/22/19  6:28 PM

## 2019-10-22 NOTE — THERAPY EVALUATION
"Acute Care - Speech Language Pathology   Swallow Initial Evaluation [facility]     Patient Name: Sameer Tavarez  : 1955  MRN: [id number]  Today's Date: 10/22/2019               Admit Date: 10/21/2019  Clinical bedside swallow evaluation completed. Full range of consistencies except regular solids were presented. Pt had extensive multiple swallows with most consistencies. He has a harsh, strained voice at baseline. He exhibited a 1x immediate cough with nectar and 1x delayed cough with nectar and mechanical soft. He had slightly prolonged mastication of the mechanical soft, but was also easily distracted trying to hold conversation while chewing even after being cued to finish chewing before talking. No oral residue was observed. 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 In October of last year pureed foods and nectar thick liquids were recommended following a modified barium swallow study. Unsure if pt will be agreeable to diet modifications this time as, although he is pleasant, when asked if he had any food allergies he stated, \"just thickened liquids.\" He also stated that he hopes to never have a PEG again as he had such a bad experience with one in the past.     Recommend:  1. Continuing current diet of regular solids and thin liquids until modified barium swallow study completed today.   2. Educated pt on trying a chin tuck posture rather than head back posture with swallows.   3. Meds whole or crushed with applesauce.   Roseann Garcia, CCC-SLP 10/22/2019 10:05 AM    Visit Dx:     ICD-10-CM ICD-9-CM   1. Chest pain, unspecified type R07.9 786.50   2. Atrial flutter, unspecified type (CMS/HCC) I48.92 427.32   3. Pneumonia of both lower lobes due to infectious organism (CMS/HCC) J18.1 483.8   4. Atrial fibrillation, unspecified type (CMS/HCC) I48.91 427.31   5. Dysphagia, unspecified type R13.10 787.20     Patient Active Problem List   Diagnosis   • Oropharyngeal " cancer (CMS/HCC)   • Current smoker   • Perineal mass in male   • Recio's esophagus with dysplasia   • Postoperative pain   • S/P percutaneous endoscopic gastrostomy (PEG) tube placement (CMS/HCC)   • Constipation, acute   • Pain, rectal   • Oropharyngeal candidiasis   • ACS (acute coronary syndrome) (CMS/HCC)   • HTN (hypertension)   • History of radiation therapy   • Current every day smoker   • Neoplasm of uncertain behavior of tongue   • Type 2 diabetes mellitus without complication, with long-term current use of insulin (CMS/HCC)   • Essential hypertension   • Chest pain   • Diabetes mellitus (CMS/HCC)   • Diabetic peripheral angiopathy (CMS/HCC)   • Diabetic peripheral neuropathy (CMS/HCC)   • Dysphagia   • CAD (coronary artery disease)   • Cigarette nicotine dependence without complication   • Congestive heart failure (CMS/HCC)   • Atrial fibrillation (CMS/HCC)   • Atrial flutter (CMS/HCC)     Past Medical History:   Diagnosis Date   • Arthritis    • Coronary artery disease    • Depression    • Diabetes mellitus (CMS/HCC)    • Difficulty urinating    • Enlarged prostate    • GERD (gastroesophageal reflux disease)    • History of BPH    • History of transfusion    • Hyperlipidemia    • Hypertension    • Neuropathy    • Oropharyngeal cancer (CMS/HCC) 08/27/2017   • Seizure (CMS/HCC)     diabetic   • Severe esophageal dysplasia    • Stroke (CMS/HCC)    • TB (pulmonary tuberculosis) 2004     Past Surgical History:   Procedure Laterality Date   • CARDIAC SURGERY      9-10 stents   • CHOLECYSTECTOMY     • CORONARY ARTERY BYPASS GRAFT      2009   • FRACTURE SURGERY     • GASTROSTOMY FEEDING TUBE INSERTION N/A 10/18/2018    Procedure: takedown gastroutaneous fistula with gastrostomy tube plaement;  Surgeon: Ayanna Early MD;  Location: [facility] OR;  Service: General   • GTUBE REPLACEMENT N/A 10/6/2017    Procedure: GASTROSTOMY TUBE REPLACEMENT, port placement;  Surgeon: Jayne Phillips MD;  Location: [facility] OR;   Service:    • HERNIA REPAIR     • LARYNGOSCOPY N/A 10/11/2017    Procedure: DIRECT LARYNGOSCOPY WITH BIOPSY;  Surgeon: Darin Neal MD;  Location:  [facility] OR;  Service:    • LARYNGOSCOPY N/A 6/25/2018    Procedure: DIRECT LARYNGOSCOPY WITH BIOPSY;  Surgeon: Alber Justin MD;  Location:  [facility] OR;  Service: ENT   • LARYNGOSCOPY N/A 10/18/2018    Procedure: DIAGNOSTIC DIRECT LARYNGOSCOPY WITH BIOPSY;  Surgeon: Alber Justin MD;  Location:  [facility] OR;  Service: ENT   • NISSEN FUNDOPLICATION LAPAROSCOPIC     • SD DENTAL SURGERY PROCEDURE N/A 10/11/2017    Procedure: TOOTH EXTRACTION;  Surgeon: Darin Neal MD;  Location:  [facility] OR;  Service: ENT   • SD INSJ TUNNELED CVC W/O SUBQ PORT/ AGE 5 YR/> Left 10/6/2017    Procedure: INSERTION VENOUS ACCESS DEVICE;  Surgeon: Jayne Phillips MD;  Location:  [facility] OR;  Service: General   • SKIN BIOPSY     • TESTICLE SURGERY      tubes implanted for drainage   • TONSILLECTOMY     • TRACHEOSTOMY N/A 10/5/2017    Procedure: Awake tracheostomy; DIRECT LARYNGOSCOPY WITH BIOPSY;  Surgeon: Alber Justin MD;  Location:  [facility] OR;  Service:    • TRACHEOSTOMY N/A 10/18/2018    Procedure: TRACHEOCUTANEOUS FISTULA CLOSURE;  Surgeon: Alber Justin MD;  Location:  [facility] OR;  Service: ENT        SWALLOW EVALUATION (last 72 hours)      SLP Adult Swallow Evaluation     Row Name 10/22/19 0812                   Rehab Evaluation    Document Type  evaluation  -MB        Subjective Information  complains of;pain from coughing  -MB        Patient Observations  alert;cooperative  -MB        Patient/Family Observations  No family present  -MB           General Information    Patient Profile Reviewed  yes  -MB        Pertinent History Of Current Problem  Pneumonia. Hx of DM, GERD, oropharyngeal cancer, dysphagia, stroke, trach s/p decannulation September 2018, Recio's esophagus, previous PEG placement and removal.  -MB        Current Method of Nutrition   regular textures;thin liquids  -MB        Precautions/Limitations, Vision  WFL;for purposes of eval  -MB        Precautions/Limitations, Hearing  WFL;for purposes of eval  -MB        Prior Level of Function-Communication  motor speech impairment;other (see comments) poor dentition may be contributing factor  -MB        Prior Level of Function-Swallowing  thin liquids;other (see comments) softer foods d/t being edentulous, hx of dysphagia though  -MB        Plans/Goals Discussed with  patient  -MB        Barriers to Rehab  previous functional deficit  -MB        Patient's Goals for Discharge  -- to resolve coughing and associated pain  -MB           Pain Assessment    Additional Documentation  Pain Scale: FACES Pre/Post-Treatment (Group)  -MB           Pain Scale: FACES Pre/Post-Treatment    Pain: FACES Scale, Pretreatment  2-->hurts little bit  -MB        Pre/Post Treatment Pain Comment  When coughing  -MB           Oral Motor and Function    Dentition Assessment  edentulous, does not have dentures  -MB        Secretion Management  WNL/WFL  -MB        Mucosal Quality  moist, healthy  -MB        Volitional Swallow  weak  -MB           Oral Musculature and Cranial Nerve Assessment    Oral Motor General Assessment  WFL  -MB           General Eating/Swallowing Observations    Respiratory Support Currently in Use  room air  -MB        Eating/Swallowing Skills  fed by SLP  -MB        Positioning During Eating  upright in bed;upright 90 degree  -MB        Utensils Used  spoon;straw;cup  -MB        Consistencies Trialed  soft textures;pureed;thin liquids;nectar/syrup-thick liquids;honey-thick liquids  -MB           Clinical Swallow Eval    Oral Prep Phase  impaired  -MB        Oral Transit  WFL  -MB        Oral Residue  WFL  -MB        Pharyngeal Phase  suspected pharyngeal impairment  -MB        Clinical Swallow Evaluation Summary  Full range of consistencies except regular solids were presented. Pt had extensive multiple  "swallows with most consistencies. He has a harsh, strained voice at baseline. He exhibited a 1x immediate cough with nectar and 1x delayed cough with nectar and mechanical soft. He had slightly prolonged mastication of the mechanical soft, but was also easily distracted trying to hold conversation while chewing even after being cued to finish chewing before talking. No oral residue was observed. 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 In October of last year pureed foods and nectar thick liquids were recommended following a modified barium swallow study. Unsure if pt will be agreeable to diet modifications this time as, although he is pleasant, when asked if he had any food allergies he stated, \"just thickened liquids.\" He also stated that he hopes to never have a PEG again as he had such a bad experience with one in the past.   -MB           Oral Prep Concerns    Oral Prep Concerns  prolonged mastication  -MB        Prolonged Mastication  mechanical soft  -MB           Pharyngeal Phase Concerns    Pharyngeal Phase Concerns  multiple swallows;cough  -MB        Multiple Swallows  all consistencies  -MB        Cough  nectar;mechanical soft  -MB           Clinical Impression    SLP Swallowing Diagnosis  moderate;oral dysfunction;suspected pharyngeal dysfunction  -MB        Functional Impact  risk of aspiration/pneumonia  -MB        Swallow Criteria for Skilled Therapeutic Interventions Met  demonstrates skilled criteria  -MB           Recommendations    SLP Diet Recommendation  regular textures;thin liquids;other (see comments) continue current diet until MBS completed  -MB        Recommended Diagnostics  VFSS (MBS)  -MB        Recommended Precautions and Strategies  upright posture during/after eating;small bites of food and sips of liquid;alternate between small bites of food and sips of liquid  -MB        SLP Rec. for Method of Medication Administration  meds whole;with pudding or " applesauce  -MB        Monitor for Signs of Aspiration  yes;cough;gurgly voice;throat clearing  -MB        Anticipated Dischage Disposition  home  -MB           Swallow Goals (SLP)    Oral Nutrition/Hydration Goal Selection (SLP)  --  -MB          User Key  (r) = Recorded By, (t) = Taken By, (c) = Cosigned By    Initials Name Effective Dates    Roseann Galicia, CCC-SLP 08/02/16 -           EDUCATION  The patient has been educated in the following areas:   Dysphagia (Swallowing Impairment).    SLP Recommendation and Plan  SLP Swallowing Diagnosis: moderate, oral dysfunction, suspected pharyngeal dysfunction  SLP Diet Recommendation: regular textures, thin liquids, other (see comments)(continue current diet until MBS completed)  Recommended Precautions and Strategies: upright posture during/after eating, small bites of food and sips of liquid, alternate between small bites of food and sips of liquid  SLP Rec. for Method of Medication Administration: meds whole, with pudding or applesauce     Monitor for Signs of Aspiration: yes, cough, gurgly voice, throat clearing  Recommended Diagnostics: VFSS ([facility] [place])  Swallow Criteria for Skilled Therapeutic Interventions Met: demonstrates skilled criteria  Anticipated Dischage Disposition: home                Plan of Care Reviewed With: patient  Outcome Summary: Clinical bedside swallow evaluation completed. Full range of consistencies except regular solids were presented. Pt had extensive multiple swallows with most consistencies. He has a harsh, strained voice at baseline. He exhibited a 1x immediate cough with nectar and 1x delayed cough with nectar and mechanical soft. He had slightly prolonged mastication of the mechanical soft, but was also easily distracted trying to hold conversation while chewing even after being cued to finish chewing before talking. No oral residue was observed. 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 In October  "of last year pureed foods and nectar thick liquids were recommended following a modified barium swallow study. Unsure if pt will be agreeable to diet modifications this time as, although he is pleasant, when asked if he had any food allergies he stated, \"just thickened liquids.\" He also stated that he hopes to never have a PEG again as he had such a bad experience with one in the past. Recommend continuing current diet of regular solids and thin liquids until modified barium swallow study completed today. Educated pt on trying a chin tuck posture rather than head back posture with swallows. Meds whole or crushed with applesauce.            Time Calculation:   Time Calculation- SLP     Row Name 10/22/19 1003             Time Calculation- SLP    SLP Start Time  0812  -MB      SLP Stop Time  0928  -MB      SLP Time Calculation (min)  76 min  -MB      SLP Received On  10/22/19  -MB        User Key  (r) = Recorded By, (t) = Taken By, (c) = Cosigned By    Initials Name Provider Type    Roseann Galicia CCC-SLP Speech and Language Pathologist          Therapy Charges for Today     Code Description Service Date Service Provider Modifiers Qty    [id number]  ST EVAL ORAL PHARYNG SWALLOW 5 10/22/2019 Roseann Garcia CCC-SLP GN 1               EARL Kearney  10/22/2019  "

## 2019-10-22 NOTE — MBS/VFSS/FEES
Acute Care - Speech Language Pathology   Swallow VFSS in Radiology [facility]     Patient Name: Sameer Tavarez  : 1955  MRN: [id number]  Today's Date: 10/22/2019               Admit Date: 10/21/2019     SPEECH-LANGUAGE PATHOLOGY EVALUTION - VFSS  Subjective: The patient was seen on this date for a VFSS(Videofluoroscopic Swallowing Study).  Patient was alert and cooperative.    Significant history: Pneumonia. Hx of DM, GERD, oropharyngeal cancer, dysphagia, stroke, trach s/p decannulation 2018, Recio's esophagus, previous PEG placement and removal  Objective: Risks/benefits were reviewed with the patient, and consent was obtained. The study was completed with SLP and Radiologist present. The patient was seen in lateral view(s). Textures given included thin liquid, nectar thick liquid, honey thick liquid, puree consistency and mechanical soft consistency.  Assessment: Pt was presented honey thick, nectar thick, thin, pudding thick, mechanical soft, thin (upright), and thin (chin tuck). Pt was noted to have mildly decreased bolus formation, with functional mastication with the mechanical soft trial, considering edentulous state. Decreased base of tongue strength was noted with pudding thick, honey thick, thin, and mechanical soft, which resulted in premature spillage. Bolus material reached the pyriform sinuses, for this reason, prior to swallow initiation, with all other stated consistencies only observed to spill to the vallecula. A delay in swallow initiation was also noted with nectar thick and thin liquids. Weak laryngeal elevation though functional epiglottic inversion. It must be noted with the initial trial of the study that pt presented with decreased pharyngeal contraction and difficulty moving the bolus beyond the vallecula with the initial swallow, though he spontaneously completed multiple swallows, which were efficient in clearing bolus passage into the esophagus. 2x instance of  laryngeal penetration noted with thin liquids, one of which occured with utilization of the chin tuck maneuver. No definite aspiration was observed throughout the study. Post swallow residue was mild to moderate lingually and in the vallecula, as well as mild on the posterior pharyngeal wall and in the laryngeal vestibule following the instances of penetration with thin.   SLP Findings: Patient presents with mild to moderate oropharyngeal dysphagia.   Comments: The results of this study were reviewed with the pt, as well as with RN, Cathie.   Recommendations: Diet Textures: nectar thick liquid, mechanical soft consistency food. Medications should be taken whole with thickened liquids or puree. May have water and Ice between meals after oral care, under staff or family supervision and with the recommended strategies for safe swallowing.  Recommended Strategies: Upright for PO, small bites and sips and alternate liquids and solids. Oral care before breakfast, after all meals and PRN.   Dysphagia therapy is recommended and will be continued. Rationale: See above. Thanks!   Joann Parrish, CCC-SLP 10/22/2019 4:37 PM    Visit Dx:     ICD-10-CM ICD-9-CM   1. Chest pain, unspecified type R07.9 786.50   2. Atrial flutter, unspecified type (CMS/HCC) I48.92 427.32   3. Pneumonia of both lower lobes due to infectious organism (CMS/HCC) J18.1 483.8   4. Atrial fibrillation, unspecified type (CMS/HCC) I48.91 427.31   5. 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   • Recio's esophagus with dysplasia   • Postoperative pain   • S/P percutaneous endoscopic gastrostomy (PEG) tube placement (CMS/HCC)   • Constipation, acute   • Pain, rectal   • Oropharyngeal candidiasis   • ACS (acute coronary syndrome) (CMS/HCC)   • HTN (hypertension)   • History of radiation therapy   • Current every day smoker   • Neoplasm of  uncertain behavior of tongue   • Type 2 diabetes mellitus without complication, with long-term current use of insulin (CMS/HCC)   • Essential hypertension   • Chest pain   • Diabetes mellitus (CMS/HCC)   • Diabetic peripheral angiopathy (CMS/HCC)   • Diabetic peripheral neuropathy (CMS/HCC)   • Dysphagia   • CAD (coronary artery disease)   • Cigarette nicotine dependence without complication   • Congestive heart failure (CMS/HCC)   • Atrial fibrillation (CMS/HCC)   • Atrial flutter (CMS/HCC)   • Atrial fibrillation (CMS/HCC)     Past Medical History:   Diagnosis Date   • Arthritis    • Coronary artery disease    • Depression    • Diabetes mellitus (CMS/HCC)    • Difficulty urinating    • Enlarged prostate    • GERD (gastroesophageal reflux disease)    • History of BPH    • History of transfusion    • Hyperlipidemia    • Hypertension    • Neuropathy    • Oropharyngeal cancer (CMS/HCC) 08/27/2017   • Seizure (CMS/HCC)     diabetic   • Severe esophageal dysplasia    • Stroke (CMS/HCC)    • TB (pulmonary tuberculosis) 2004     Past Surgical History:   Procedure Laterality Date   • CARDIAC SURGERY      9-10 stents   • CHOLECYSTECTOMY     • CORONARY ARTERY BYPASS GRAFT      2009   • FRACTURE SURGERY     • GASTROSTOMY FEEDING TUBE INSERTION N/A 10/18/2018    Procedure: takedown gastroutaneous fistula with gastrostomy tube plaement;  Surgeon: Ayanna Early MD;  Location: [facility] OR;  Service: General   • GTUBE REPLACEMENT N/A 10/6/2017    Procedure: GASTROSTOMY TUBE REPLACEMENT, port placement;  Surgeon: Jayne Phillips MD;  Location: [facility] OR;  Service:    • HERNIA REPAIR     • LARYNGOSCOPY N/A 10/11/2017    Procedure: DIRECT LARYNGOSCOPY WITH BIOPSY;  Surgeon: Darin Neal MD;  Location: [facility] OR;  Service:    • LARYNGOSCOPY N/A 6/25/2018    Procedure: DIRECT LARYNGOSCOPY WITH BIOPSY;  Surgeon: Alber Justin MD;  Location: [facility] OR;  Service: ENT   • LARYNGOSCOPY N/A 10/18/2018    Procedure: DIAGNOSTIC  DIRECT LARYNGOSCOPY WITH BIOPSY;  Surgeon: Alber Justin MD;  Location:  [facility] OR;  Service: ENT   • NISSEN FUNDOPLICATION LAPAROSCOPIC     • NJ DENTAL SURGERY PROCEDURE N/A 10/11/2017    Procedure: TOOTH EXTRACTION;  Surgeon: Darin Neal MD;  Location:  [facility] OR;  Service: ENT   • NJ INSJ TUNNELED CVC W/O SUBQ PORT/ AGE 5 YR/> Left 10/6/2017    Procedure: INSERTION VENOUS ACCESS DEVICE;  Surgeon: Jayne Phillips MD;  Location:  [facility] OR;  Service: General   • SKIN BIOPSY     • TESTICLE SURGERY      tubes implanted for drainage   • TONSILLECTOMY     • TRACHEOSTOMY N/A 10/5/2017    Procedure: Awake tracheostomy; DIRECT LARYNGOSCOPY WITH BIOPSY;  Surgeon: Alber Justin MD;  Location:  [facility] OR;  Service:    • TRACHEOSTOMY N/A 10/18/2018    Procedure: TRACHEOCUTANEOUS FISTULA CLOSURE;  Surgeon: Alber Justin MD;  Location:  [facility] OR;  Service: ENT        SWALLOW EVALUATION (last 72 hours)      SLP Adult Swallow Evaluation     Row Name 10/22/19 1500 10/22/19 0812                Rehab Evaluation    Document Type  evaluation  -TM  evaluation  -MB       Subjective Information  no complaints  -TM  complains of;pain from coughing  -MB       Patient Observations  alert;cooperative  -TM  alert;cooperative  -MB       Patient/Family Observations  Pt attended the study independently.   -TM  No family present  -MB       Patient Effort  adequate  -TM  --          General Information    Patient Profile Reviewed  yes  -TM  yes  -MB       Pertinent History Of Current Problem  Pneumonia. Hx of DM, GERD, oropharyngeal cancer, dysphagia, stroke, trach s/p decannulation September 2018, Recio's esophagus, previous PEG placement and removal  -TM  Pneumonia. Hx of DM, GERD, oropharyngeal cancer, dysphagia, stroke, trach s/p decannulation September 2018, Recio's esophagus, previous PEG placement and removal.  -MB       Current Method of Nutrition  regular textures;thin liquids  -TM  regular textures;thin  liquids  -MB       Precautions/Limitations, Vision  WFL;for purposes of eval  -TM  WFL;for purposes of eval  -MB       Precautions/Limitations, Hearing  WFL;for purposes of eval  -TM  WFL;for purposes of eval  -MB       Prior Level of Function-Communication  motor speech impairment;other (see comments) Likely impacted by lack of dentition   -TM  motor speech impairment;other (see comments) poor dentition may be contributing factor  -MB       Prior Level of Function-Swallowing  thin liquids;other (see comments) Soft foods  -TM  thin liquids;other (see comments) softer foods d/t being edentulous, hx of dysphagia though  -MB       Plans/Goals Discussed with  patient;other (see comments) Cathie HIGGINBOTHAM  -  patient  -MB       Barriers to Rehab  previous functional deficit  -TM  previous functional deficit  -MB       Patient's Goals for Discharge  -- Safety with PO intake   -TM  -- to resolve coughing and associated pain  -MB          Pain Assessment    Additional Documentation  Pain Scale: FACES Pre/Post-Treatment (Group)  -TM  Pain Scale: FACES Pre/Post-Treatment (Group)  -MB          Pain Scale: FACES Pre/Post-Treatment    Pain: FACES Scale, Pretreatment  0-->no hurt  -TM  2-->hurts little bit  -MB       Pain: FACES Scale, Post-Treatment  0-->no hurt  -TM  --       Pre/Post Treatment Pain Comment  --  When coughing  -MB          Oral Motor and Function    Dentition Assessment  edentulous, does not have dentures  -TM  edentulous, does not have dentures  -MB       Secretion Management  WNL/WFL  -TM  WNL/WFL  -MB       Mucosal Quality  moist, healthy  -TM  moist, healthy  -MB       Volitional Swallow  weak  -TM  weak  -MB       Volitional Cough  reduced respiratory support  -TM  --          Oral Musculature and Cranial Nerve Assessment    Oral Motor General Assessment  lingual impairment  -TM  WFL  -MB       Lingual Impairment, Detail. Cranial Nerves IX, XII (Glossopharyngeal and Hypoglossal)  reduced lingual ROM;reduced  "strength  -  --          General Eating/Swallowing Observations    Respiratory Support Currently in Use  room air  -TM  room air  -MB       Eating/Swallowing Skills  --  fed by SLP  -MB       Positioning During Eating  upright in chair;other (see comments) Dysphagia chair   -TM  upright in bed;upright 90 degree  -MB       Utensils Used  --  spoon;straw;cup  -MB       Consistencies Trialed  --  soft textures;pureed;thin liquids;nectar/syrup-thick liquids;honey-thick liquids  -MB          Respiratory    Respiratory Status  [facility]  -  --          Clinical Swallow Eval    Oral Prep Phase  --  impaired  -MB       Oral Transit  --  WFL  -MB       Oral Residue  --  WFL  -MB       Pharyngeal Phase  --  suspected pharyngeal impairment  -MB       Clinical Swallow Evaluation Summary  --  Full range of consistencies except regular solids were presented. Pt had extensive multiple swallows with most consistencies. He has a harsh, strained voice at baseline. He exhibited a 1x immediate cough with nectar and 1x delayed cough with nectar and mechanical soft. He had slightly prolonged mastication of the mechanical soft, but was also easily distracted trying to hold conversation while chewing even after being cued to finish chewing before talking. No oral residue was observed. 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 In October of last year pureed foods and nectar thick liquids were recommended following a modified barium swallow study. Unsure if pt will be agreeable to diet modifications this time as, although he is pleasant, when asked if he had any food allergies he stated, \"just thickened liquids.\" He also stated that he hopes to never have a PEG again as he had such a bad experience with one in the past.   -MB          Oral Prep Concerns    Oral Prep Concerns  --  prolonged mastication  -MB       Prolonged Mastication  --  mechanical soft  -MB          Pharyngeal Phase Concerns    Pharyngeal " Phase Concerns  --  multiple swallows;cough  -MB       Multiple Swallows  --  all consistencies  -MB       Cough  --  nectar;mechanical soft  -MB          MBS/VFSS    Utensils Used  straw;spoon  -TM  --          MBS/VFSS Interpretation    Oral Prep Phase  WFL  -TM  --       Oral Transit Phase  impaired  -TM  --       Oral Residue  WFL  -TM  --       VFSS Summary  VFSS completed. Pt was presented honey thick, nectar thick, thin, pudding thick, mechanical soft, thin (upright), and thin (chin tuck). Pt was noted to have mildly decreased bolus formation, with functional mastication with the mechanical soft trial, considering edentulous state. Decreased base of tongue strength was noted with pudding thick, honey thick, thin, and mechanical soft, which resulted in premature spillage. Bolus material reached the pyriform sinuses, for this reason, prior to swallow initiation, with all other stated consistencies only observed to spill to the vallecula. A delay in swallow initiation was also noted with nectar thick and thin liquids. Weak laryngeal elevation though functional epiglottic inversion. It must be noted with the initial trial of the study that pt presented with decreased pharyngeal contraction and difficulty moving the bolus beyond the vallecula with the initial swallow, though he spontaneously completed multiple swallows, which were efficient in clearing bolus passage into the esophagus. 2x instance of laryngeal penetration noted with thin liquids, one of which occured with utilization of the chin tuck maneuver. No definite aspiration was observed throughout the study. Post swallow residue was mild to moderate lingually and in the vallecula, as well as mild on the posterior pharyngeal wall and in the laryngeal vestibule following the instances of penetration with thin.   -TM  --          Oral Transit Phase    Impaired Oral Transit Phase  premature spillage of liquids into pharynx  -TM  --       Premature Spillage of  Liquids into Pharynx  honey-thick liquids;nectar-thick liquids;thin liquids;mechanical soft  -TM  --          Initiation of Pharyngeal Swallow    Initiation of Pharyngeal Swallow  bolus in valleculae;bolus in pyriform sinuses  -TM  --       Pharyngeal Phase  impaired pharyngeal phase of swallowing  -TM  --       Penetration During the Swallow  thin liquids  -TM  --       Response to Penetration  no response  -TM  --       Pharyngeal Residue  base of tongue;valleculae;posterior pharyngeal wall;laryngeal vestibule  -TM  --       Attempted Compensatory Maneuvers  multiple swallows;other (see comments) spontaneous   -TM  --          Clinical Impression    SLP Swallowing Diagnosis  mild;oral dysfunction;pharyngeal dysfunction  -TM  moderate;oral dysfunction;suspected pharyngeal dysfunction  -MB       Functional Impact  risk of aspiration/pneumonia  -TM  risk of aspiration/pneumonia  -MB       Rehab Potential/Prognosis, Swallowing  adequate, monitor progress closely  -TM  --       Swallow Criteria for Skilled Therapeutic Interventions Met  demonstrates skilled criteria  -TM  demonstrates skilled criteria  -MB          Recommendations    Therapy Frequency (Swallow)  at least;3 days per week  -TM  --       Predicted Duration Therapy Intervention (Days)  until discharge  -TM  --       SLP Diet Recommendation  soft textures;nectar thick liquids;ice chips between meals after oral care, with supervision;water between meals after oral care, with supervision  -TM  regular textures;thin liquids;other (see comments) continue current diet until MBS completed  -MB       Recommended Diagnostics  --  VFSS (MBS)  -MB       Recommended Precautions and Strategies  upright posture during/after eating;small bites of food and sips of liquid;alternate between small bites of food and sips of liquid;other (see comments) Smaller, more frequent meals; slow down with self feeding   -TM  upright posture during/after eating;small bites of food and  sips of liquid;alternate between small bites of food and sips of liquid  -MB       SLP Rec. for Method of Medication Administration  meds whole;with pudding or applesauce;with thick liquids  -TM  meds whole;with pudding or applesauce  -MB       Monitor for Signs of Aspiration  yes;cough;gurgly voice;throat clearing;pneumonia;right lower lobe infiltrates  -TM  yes;cough;gurgly voice;throat clearing  -MB       Anticipated Dischage Disposition  home  -TM  home  -MB          Swallow Goals (SLP)    Oral Nutrition/Hydration Goal Selection (SLP)  oral nutrition/hydration, SLP goal 1  -TM  --  -MB       Lingual Strengthening Goal Selection (SLP)  lingual strengthening, SLP goal 1  -TM  --       Pharyngeal Strengthening Exercise Goal Selection (SLP)  pharyngeal strengthening exercise, SLP goal 1  -TM  --       Additional Documentation  lingual strengthening goal selection (SLP);pharyngeal strengthening exercise goal selection (SLP)  -TM  --          Oral Nutrition/Hydration Goal 1 (SLP)    Oral Nutrition/Hydration Goal 1, SLP  Pt will tolerate LRD without overt s/s of aspiration.  -TM  --       Time Frame (Oral Nutrition/Hydration Goal 1, SLP)  by discharge  -TM  --       Barriers (Oral Nutrition/Hydration Goal 1, SLP)  Prior deficit  -TM  --       Progress/Outcomes (Oral Nutrition/Hydration Goal 1, SLP)  goal ongoing  -TM  --          Lingual Strengthening Goal 1 (SLP)    Activity (Lingual Strengthening Goal 1, SLP)  increase lingual tone/sensation/control/coordination/movement  -TM  --       Increase Lingual Tone/Sensation/Control/Coordination/Movement  lingual movement exercises  -TM  --       [place]/Accuracy (Lingual Strengthening Goal 1, SLP)  independently (over 90% accuracy)  -TM  --       Time Frame (Lingual Strengthening Goal 1, SLP)  by discharge  -TM  --       Barriers (Lingual Strengthening Goal 1, SLP)  Prior deficit  -TM  --       Progress/Outcomes (Lingual Strengthening Goal 1, SLP)  goal ongoing  -TM   --          Pharyngeal Strengthening Exercise Goal 1 (SLP)    Activity (Pharyngeal Strengthening Goal 1, SLP)  increase timing  -TM  --       Increase Timing  gustatory stimulation (sour/cold)  -TM  --       [place]/Accuracy (Pharyngeal Strengthening Goal 1, SLP)  independently (over 90% accuracy)  -TM  --       Time Frame (Pharyngeal Strengthening Goal 1, SLP)  by discharge  -TM  --       Barriers (Pharyngeal Strengthening Goal 1, SLP)  Prior deficit  -TM  --       Progress/Outcomes (Pharyngeal Strengthening Goal 1, SLP)  goal ongoing  -TM  --         User Key  (r) = Recorded By, (t) = Taken By, (c) = Cosigned By    Initials Name Effective Dates    Roseann Galicia, CCC-SLP 08/02/16 -     TM Joann Parrish CCC-SLP 08/02/16 -           EDUCATION  The patient has been educated in the following areas:   Dysphagia (Swallowing Impairment).    SLP Recommendation and Plan  SLP Swallowing Diagnosis: mild, oral dysfunction, pharyngeal dysfunction  SLP Diet Recommendation: soft textures, nectar thick liquids, ice chips between meals after oral care, with supervision, water between meals after oral care, with supervision  Recommended Precautions and Strategies: upright posture during/after eating, small bites of food and sips of liquid, alternate between small bites of food and sips of liquid, other (see comments)(Smaller, more frequent meals; slow down with self feeding )  SLP Rec. for Method of Medication Administration: meds whole, with pudding or applesauce, with thick liquids     Monitor for Signs of Aspiration: yes, cough, gurgly voice, throat clearing, pneumonia, right lower lobe infiltrates     Swallow Criteria for Skilled Therapeutic Interventions Met: demonstrates skilled criteria  Anticipated Dischage Disposition: home  Rehab Potential/Prognosis, Swallowing: adequate, monitor progress closely  Therapy Frequency (Swallow): at least, 3 days per week  Predicted Duration Therapy Intervention (Days): until  discharge       Plan of Care Reviewed With: patient, other (see comments)(RNCathie)  Progress: (Eval)  Outcome Summary: VFSS completed. Pt was presented honey thick, nectar thick, thin, pudding thick, mechanical soft, thin (upright), and thin (chin tuck). Pt was noted to have mildly decreased bolus formation, with functional mastication with the mechanical soft trial, considering edentulous state. Decreased base of tongue strength was noted with pudding thick, honey thick, thin, and mechanical soft, which resulted in premature spillage. Bolus material reached the pyriform sinuses, for this reason, prior to swallow initiation, with all other stated consistencies only observed to spill to the vallecula. A delay in swallow initiation was also noted with nectar thick and thin liquids. Weak laryngeal elevation though functional epiglottic inversion. It must be noted with the initial trial of the study that pt presented with decreased pharyngeal contraction and difficulty moving the bolus beyond the vallecula with the initial swallow, though he spontaneously completed multiple swallows, which were efficient in clearing bolus passage into the esophagus. 2x instance of laryngeal penetration noted with thin liquids, one of which occured with utilization of the chin tuck maneuver. No definite aspiration was observed throughout the study. Post swallow residue was mild to moderate lingually and in the vallecula, as well as mild on the posterior pharyngeal wall and in the laryngeal vestibule following the instances of penetration with thin. RECOMMENDATIONS: D/C current diet, change to mechanical soft diet consistency with nectar thick liquid consistency; ok for meds in pudding/applesauce or with nectar thick liquids; ok for thin liquid water in between meals, being at least 30 min following any other PO intake, without increased congestion; encourage pt to alternate bites and sips; encourage slowed self feeding; may benefit  from smaller, more frequent meals to decrease impulsivity that pt reported with self feeding. ST to continue to follow and tx for dysphagia. Thanks!     SLP GOALS     Row Name 10/22/19 1500             Oral Nutrition/Hydration Goal 1 (SLP)    Oral Nutrition/Hydration Goal 1, SLP  Pt will tolerate LRD without overt s/s of aspiration.  -TM      Time Frame (Oral Nutrition/Hydration Goal 1, SLP)  by discharge  -TM      Barriers (Oral Nutrition/Hydration Goal 1, SLP)  Prior deficit  -TM      Progress/Outcomes (Oral Nutrition/Hydration Goal 1, SLP)  goal ongoing  -TM         Lingual Strengthening Goal 1 (SLP)    Activity (Lingual Strengthening Goal 1, SLP)  increase lingual tone/sensation/control/coordination/movement  -TM      Increase Lingual Tone/Sensation/Control/Coordination/Movement  lingual movement exercises  -TM      [place]/Accuracy (Lingual Strengthening Goal 1, SLP)  independently (over 90% accuracy)  -TM      Time Frame (Lingual Strengthening Goal 1, SLP)  by discharge  -TM      Barriers (Lingual Strengthening Goal 1, SLP)  Prior deficit  -TM      Progress/Outcomes (Lingual Strengthening Goal 1, SLP)  goal ongoing  -TM         Pharyngeal Strengthening Exercise Goal 1 (SLP)    Activity (Pharyngeal Strengthening Goal 1, SLP)  increase timing  -TM      Increase Timing  gustatory stimulation (sour/cold)  -TM      [place]/Accuracy (Pharyngeal Strengthening Goal 1, SLP)  independently (over 90% accuracy)  -TM      Time Frame (Pharyngeal Strengthening Goal 1, SLP)  by discharge  -TM      Barriers (Pharyngeal Strengthening Goal 1, SLP)  Prior deficit  -TM      Progress/Outcomes (Pharyngeal Strengthening Goal 1, SLP)  goal ongoing  -TM        User Key  (r) = Recorded By, (t) = Taken By, (c) = Cosigned By    Initials Name Provider Type    TM Joann Parrish CCC-SLP Speech and Language Pathologist             Time Calculation:   Time Calculation- SLP     Row Name 10/22/19 1635 10/22/19 1003          Time  Calculation- SLP    SLP Start Time  1500  -TM  0812  -MB     SLP Stop Time  1620  -TM  0928  -MB     SLP Time Calculation (min)  80 min  -TM  76 min  -MB     SLP Received On  10/22/19  -  10/22/19  -MB     SLP Goal Re-Cert Due Date  11/01/19  -  --       User Key  (r) = Recorded By, (t) = Taken By, (c) = Cosigned By    Initials Name Provider Type    Roseann Galicia, CCC-SLP Speech and Language Pathologist     Joann Parrish CCC-SLP Speech and Language Pathologist          Therapy Charges for Today     Code Description Service Date Service Provider Modifiers Qty    [id number] HC ST MOTION FLUORO EVAL SWALLOW 5 10/22/2019 Joann Parrish CCC-SLP GN 1               EARL Masters  10/22/2019

## 2019-10-22 NOTE — PLAN OF CARE
Problem: Patient Care Overview  Goal: Plan of Care Review  Outcome: Ongoing (interventions implemented as appropriate)   10/22/19 3859   Coping/Psychosocial   Plan of Care Reviewed With patient;other (see comments)  (RNCathie)   OTHER   Outcome Summary VFSS completed. Pt was presented honey thick, nectar thick, thin, pudding thick, mechanical soft, thin (upright), and thin (chin tuck). Pt was noted to have mildly decreased bolus formation, with functional mastication with the mechanical soft trial, considering edentulous state. Decreased base of tongue strength was noted with pudding thick, honey thick, thin, and mechanical soft, which resulted in premature spillage. Bolus material reached the pyriform sinuses, for this reason, prior to swallow initiation, with all other stated consistencies only observed to spill to the vallecula. A delay in swallow initiation was also noted with nectar thick and thin liquids. Weak laryngeal elevation though functional epiglottic inversion. It must be noted with the initial trial of the study that pt presented with decreased pharyngeal contraction and difficulty moving the bolus beyond the vallecula with the initial swallow, though he spontaneously completed multiple swallows, which were efficient in clearing bolus passage into the esophagus. 2x instance of laryngeal penetration noted with thin liquids, one of which occured with utilization of the chin tuck maneuver. No definite aspiration was observed throughout the study. Post swallow residue was mild to moderate lingually and in the vallecula, as well as mild on the posterior pharyngeal wall and in the laryngeal vestibule following the instances of penetration with thin. RECOMMENDATIONS: D/C current diet, change to mechanical soft diet consistency with nectar thick liquid consistency; ok for meds in pudding/applesauce or with nectar thick liquids; ok for thin liquid water in between meals, being at least 30 min following any  other PO intake, without increased congestion; encourage pt to alternate bites and sips; encourage slowed self feeding; may benefit from smaller, more frequent meals to decrease impulsivity that pt reported with self feeding. ST to continue to follow and tx for dysphagia. Thanks!    Plan of Care Review   Progress (Eval)

## 2019-10-22 NOTE — PLAN OF CARE
"Problem: Patient Care Overview  Goal: Plan of Care Review  Outcome: Ongoing (interventions implemented as appropriate)   10/22/19 1000   Coping/Psychosocial   Plan of Care Reviewed With patient   OTHER   Outcome Summary Clinical bedside swallow evaluation completed. Full range of consistencies except regular solids were presented. Pt had extensive multiple swallows with most consistencies. He has a harsh, strained voice at baseline. He exhibited a 1x immediate cough with nectar and 1x delayed cough with nectar and mechanical soft. He had slightly prolonged mastication of the mechanical soft, but was also easily distracted trying to hold conversation while chewing even after being cued to finish chewing before talking. No oral residue was observed. 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 In October of last year pureed foods and nectar thick liquids were recommended following a modified barium swallow study. Unsure if pt will be agreeable to diet modifications this time as, although he is pleasant, when asked if he had any food allergies he stated, \"just thickened liquids.\" He also stated that he hopes to never have a PEG again as he had such a bad experience with one in the past. Recommend continuing current diet of regular solids and thin liquids until modified barium swallow study completed today. Educated pt on trying a chin tuck posture rather than head back posture with swallows. Meds whole or crushed with applesauce.          "

## 2019-10-22 NOTE — H&P
[facility]  HISTORY AND PHYSICAL    Date of Admission: 10/21/2019  Primary Care Physician: Christian Castellon MD    Subjective     Chief Complaint: Cough shortness of breath    History of Present Illness  Patient is a 64-year-old white male past medical history oropharyngeal cancer status post chemo and radiation requiring tracheostomy and feeding tube with a history of dysphagia.  He presents with a several week history from what sounds like of episodes of cough congestion shortness of breath.  He is been hospitalized over at [facility] several times for this and chest pain recently.  It sounds like he is gotten rounds of antibiotics he will temporarily get better that he gets worse.  He comes in once again today with this he also has what sounds like fevers he says he is hot and he is cold and sweaty.  He does endorse problems with choking at times and he does have a history of dysphasia although he says he thought he was better.  As stated he has had a feeding tube before because of that.  He also states that he has problems with scar tissue in his throat that gives him difficulty breathing.  He has a chest x-ray that shows bilateral interstitial infiltrates as well as a left-sided pleural effusion.  He sounds more like he has congestive heart failure from his description and his x-ray however his BNP is normal.  He does have a history of diastolic congestive heart failure last echo was about a month ago at [facility] and showed the following:  Mitral valve leaflets are mildly thickened with preserved leaflet   mobility.   Mild mitral regurgitation is present.   Aortic valve leaflets are moderately thickened.   Aortic valve appears to be tricuspid.   No evidence of aortic stenosis or aortic regurgitation.   Mild to moderate tricuspid regurgitation with estimated RVSP of 52 mm Hg.   Mildly dilated left atrium.   Normal left ventricular size and function.   Mild  concentric left ventricular hypertrophy.   Left ventricular ejection fraction is estimated at 66%.   E/A flow reversal noted. Suggestive of diastolic dysfunction.   Dilated aortic root.   IVC dilated.  He does not have an elevated white count or left shift however with a normal BNP and his symptoms of dysphasia I am leaning more towards pneumonia.  He is being admitted for further evaluation and management of what sounds like aspiration pneumonia possibly.  He also was found to be in intermittent a flutter with a 4-1 block.  When I was in the room he did have a couple of episodes of that on the monitor but primarily was in sinus rhythm he is not on anticoagulation.  His rate is in the 70s.  He does have listed the past history of A. fib.      Review of Systems   14 point review of systems negative except for as per HPI    Otherwise complete ROS reviewed and negative except as mentioned in the HPI.    Past Medical History:   Past Medical History:   Diagnosis Date   • Arthritis    • Coronary artery disease    • Depression    • Diabetes mellitus (CMS/HCC)    • Difficulty urinating    • Enlarged prostate    • GERD (gastroesophageal reflux disease)    • History of BPH    • History of transfusion    • Hyperlipidemia    • Hypertension    • Neuropathy    • Oropharyngeal cancer (CMS/HCC) 08/27/2017   • Seizure (CMS/HCC)     diabetic   • Severe esophageal dysplasia    • Stroke (CMS/HCC)    • TB (pulmonary tuberculosis) 2004   • Tracheostomy present (CMS/HCC)     PLUGGED     Past Surgical History:  Past Surgical History:   Procedure Laterality Date   • CARDIAC SURGERY      9-10 stents   • CHOLECYSTECTOMY     • CORONARY ARTERY BYPASS GRAFT      2009   • FRACTURE SURGERY     • GASTROSTOMY FEEDING TUBE INSERTION N/A 10/18/2018    Procedure: takedown gastroutaneous fistula with gastrostomy tube plaement;  Surgeon: Ayanna Early MD;  Location: [facility] OR;  Service: General   • GTUBE REPLACEMENT N/A 10/6/2017    Procedure:  GASTROSTOMY TUBE REPLACEMENT, port placement;  Surgeon: Jayne Phillips MD;  Location:  [facility] OR;  Service:    • HERNIA REPAIR     • LARYNGOSCOPY N/A 10/11/2017    Procedure: DIRECT LARYNGOSCOPY WITH BIOPSY;  Surgeon: Darin Neal MD;  Location:  [facility] OR;  Service:    • LARYNGOSCOPY N/A 6/25/2018    Procedure: DIRECT LARYNGOSCOPY WITH BIOPSY;  Surgeon: Alber Justin MD;  Location:  [facility] OR;  Service: ENT   • LARYNGOSCOPY N/A 10/18/2018    Procedure: DIAGNOSTIC DIRECT LARYNGOSCOPY WITH BIOPSY;  Surgeon: Alber Justin MD;  Location:  [facility] OR;  Service: ENT   • NISSEN FUNDOPLICATION LAPAROSCOPIC     • MT DENTAL SURGERY PROCEDURE N/A 10/11/2017    Procedure: TOOTH EXTRACTION;  Surgeon: Darin Neal MD;  Location:  [facility] OR;  Service: ENT   • MT INSJ TUNNELED CVC W/O SUBQ PORT/ AGE 5 YR/> Left 10/6/2017    Procedure: INSERTION VENOUS ACCESS DEVICE;  Surgeon: Jayne Phillips MD;  Location:  [facility] OR;  Service: General   • SKIN BIOPSY     • TESTICLE SURGERY      tubes implanted for drainage   • TONSILLECTOMY     • TRACHEOSTOMY N/A 10/5/2017    Procedure: Awake tracheostomy; DIRECT LARYNGOSCOPY WITH BIOPSY;  Surgeon: Alber Justin MD;  Location:  [facility] OR;  Service:    • TRACHEOSTOMY N/A 10/18/2018    Procedure: TRACHEOCUTANEOUS FISTULA CLOSURE;  Surgeon: Alber Justin MD;  Location:  [facility] OR;  Service: ENT     Social History:  reports that he has been smoking cigarettes.  He has been smoking about 0.00 packs per day for the past 52.00 years. He has quit using smokeless tobacco. His smokeless tobacco use included snuff. He reports that he drinks about 1.8 oz of alcohol per week. He reports that he does not use drugs.    Family History: family history includes Cancer in his brother; Heart disease in his brother and father; Stroke in his mother.       Allergies:  Allergies   Allergen Reactions   • Codeine Hives     ONLY COUGH SYRUP WITH CODEINE CAUSES HIVES  "    Medications:  Prior to Admission medications    Medication Sig Start Date End Date Taking? Authorizing Provider   aspirin 81 MG chewable tablet Chew 81 mg Daily. 2/11/18   Grace Murillo MD   baclofen (LIORESAL) 10 MG tablet Take 10 mg by mouth 3 (Three) Times a Day.    Emergency, Nurse Epic, RN   furosemide (LASIX) 40 MG tablet Take 40 mg by mouth Daily. 1/10/19   Grace Murillo MD   insulin glargine (LANTUS) 100 UNIT/ML injection Inject  under the skin into the appropriate area as directed.    Grace Murillo MD   isosorbide mononitrate (IMDUR) 60 MG 24 hr tablet Take 60 mg by mouth Daily. 9/3/19   Emergency, Nurse ANAHY Lyle   lisinopril (PRINIVIL,ZESTRIL) 2.5 MG tablet Take 10 mg by mouth Daily.    Emergency, Nurse Valdo RN   nitroglycerin (NITROSTAT) 0.4 MG SL tablet Place 0.4 mg under the tongue. 5/14/18   Grace Murillo MD   omeprazole (priLOSEC) 20 MG capsule Take 20 mg by mouth Daily.    Emergency, Nurse Epic, RN   oxyCODONE-acetaminophen (PERCOCET)  MG per tablet Take 1 tablet by mouth Every 6 (Six) Hours As Needed for Moderate Pain .  Patient taking differently: Take 1 tablet by mouth Every 6 (Six) Hours As Needed for Moderate Pain  (SPINAL PAIN). 1/2/18   Hadley Marie III, MD   oxyCODONE-acetaminophen (PERCOCET)  MG per tablet Take 1 tablet by mouth Every 4 (Four) Hours As Needed for Moderate Pain  for up to 30 doses. 10/19/18   Ayanna Early MD   tamsulosin (FLOMAX) 0.4 MG capsule 24 hr capsule Take 1 capsule by mouth Daily. 1/10/19   Grace Murillo MD   zolpidem (AMBIEN) 10 MG tablet Take 10 mg by mouth.    Grace Murillo MD     Objective     Vital Signs: /77   Pulse 75   Temp 97.8 °F (36.6 °C) (Oral)   Resp 19   Ht 167.6 cm (66\")   Wt 68 kg (150 lb)   SpO2 91%   BMI 24.21 kg/m²   Physical Exam  Gen.:  Well-nourished well-developed white male in no acute distress is is having some problems with breathing but sounds " chronic  HEENT: Atraumatic, normocephalic.  Pupils equal, round, and reactive to light. Extraocular movements intact.  Sclerae anicteric.  TMs negative.  Mucous membranes moist.  Neck is supple without lymphadenopathy.  Positive JVD is noted.  No carotid bruits are auscultated.  Oropharynx is without erythema or exudate.  Evidence of scarring from old tracheostomy site  Chest: Coarse breath sounds bilaterally with lots of upper airway noise some stridor  CV: Regular rate and rhythm.  Occasional ectopy normal S1-S2.  No gallops, murmurs. or rubs.  Abdomen: Soft, nontender, nondistended.  Active bowel sounds,  No hepatosplenomegaly.  No masses.  No hernias.  Extremities: No clubbing, trace edema, no cyanosis.  Capillary refill is normal.  Pulses are 2+ and symmetric at radial and dorsalis pedis.  Posterior tibial pulses are intact and 2+ palpable.  Neuro: Patient is awake, alert, and oriented ×3.  Cranial nerves II through XII are grossly intact.  Motor is 5 out of 5 bilaterally.  DTRs are 2+ and symmetric bilaterally. Sensory exam is nonfocal  Skin: Warm, dry, and intact.  No evidence of breakdown.  Sensorium: Normal thought and affect    Nursing notes and vital signs reviewed        Results Reviewed:  Lab Results (last 24 hours)     Procedure Component Value Units Date/Time    BNP [[id number]]  (Normal) Collected:  10/21/19 2114    Specimen:  Blood Updated:  10/21/19 2252     proBNP 881.3 pg/mL     Narrative:       Among patients with dyspnea, NT-proBNP is highly sensitive for the detection of acute congestive heart failure. In addition NT-proBNP of <300 pg/ml effectively rules out acute congestive heart failure with 99% negative predictive value.    Troponin [[id number]]  (Normal) Collected:  10/21/19 2114    Specimen:  Blood Updated:  10/21/19 2140     Troponin T <0.010 ng/mL     Narrative:       Troponin T Reference Range:  <= 0.03 ng/mL-   Negative for AMI  >0.03 ng/mL-     Abnormal for myocardial necrosis.   Clinicians would have to utilize clinical acumen, EKG, Troponin and serial changes to determine if it is an Acute Myocardial Infarction or myocardial injury due to an underlying chronic condition.     Blood Gas, Arterial With Co-Ox [[id number]]  (Abnormal) Collected:  10/1955    Specimen:  Arterial Blood Updated:  10/21/19 2007     Site Right Brachial     Jeffrey's Test N/A     pH, Arterial 7.443 pH units      pCO2, Arterial 35.1 mm Hg      pO2, Arterial 71.4 mm Hg      Comment: 84 Value below reference range        HCO3, Arterial 24.0 mmol/L      Base Excess, Arterial 0.2 mmol/L      O2 Saturation, Arterial 96.1 %      Hemoglobin, Blood Gas 12.9 g/dL      Comment: 84 Value below reference range        Hematocrit, Blood Gas 39.4 %      Oxyhemoglobin 93.1 %      Comment: 84 Value below reference range        Methemoglobin 0.70 %      Carboxyhemoglobin 2.4 %      A-a Gradiant 35.4 mmHg      Temperature 37.0 C      Sodium, Arterial 139 mmol/L      Potassium, Arterial 3.9 mmol/L      Barometric Pressure for Blood Gas 744 mmHg      Modality Room Air     Ventilator Mode NA     Note --     Collected by [id number]     Comment: Meter: [id number]     :  [id number]        pH, Temp Corrected --     pCO2, Temperature Corrected --     pO2, Temperature Corrected --    Comprehensive Metabolic Panel [[id number]]  (Abnormal) Collected:  10/21/19 1839    Specimen:  Blood Updated:  10/21/19 1908     Glucose 134 mg/dL      BUN 8 mg/dL      Creatinine 0.61 mg/dL      Sodium 142 mmol/L      Potassium 4.4 mmol/L      Chloride 104 mmol/L      CO2 25.0 mmol/L      Calcium 8.8 mg/dL      Total Protein 6.7 g/dL      Albumin 3.60 g/dL      ALT (SGPT) 15 U/L      AST (SGOT) 20 U/L      Alkaline Phosphatase 134 U/L      Total Bilirubin 0.2 mg/dL      eGFR Non African Amer 133 mL/min/1.73      Globulin 3.1 gm/dL      A/G Ratio 1.2 g/dL      BUN/Creatinine Ratio 13.1     Anion Gap 13.0 mmol/L     Narrative:       GFR Normal >60  Chronic  Kidney Disease <60  Kidney Failure <15    Troponin [[id number]]  (Normal) Collected:  10/21/19 1839    Specimen:  Blood Updated:  10/21/19 1908     Troponin T <0.010 ng/mL     Narrative:       Troponin T Reference Range:  <= 0.03 ng/mL-   Negative for AMI  >0.03 ng/mL-     Abnormal for myocardial necrosis.  Clinicians would have to utilize clinical acumen, EKG, Troponin and serial changes to determine if it is an Acute Myocardial Infarction or myocardial injury due to an underlying chronic condition.     Blood Culture - Blood, Arm, Left [[id number]] Collected:  10/21/19 1839    Specimen:  Blood from Arm, Left Updated:  10/21/19 1901    Blood Culture - Blood, Arm, Left [[id number]] Collected:  10/21/19 1835    Specimen:  Blood from Arm, Left Updated:  10/21/19 1901    aPTT [[id number]]  (Normal) Collected:  10/21/19 1839    Specimen:  Blood Updated:  10/21/19 1859     PTT 29.2 seconds     Protime-INR [[id number]]  (Abnormal) Collected:  10/21/19 1839    Specimen:  Blood Updated:  10/21/19 1859     Protime 14.6 Seconds      INR 1.10    CBC & Differential [[id number]] Collected:  10/21/19 1839    Specimen:  Blood Updated:  10/21/19 1851    Narrative:       The following orders were created for panel order CBC & Differential.  Procedure                               Abnormality         Status                     ---------                               -----------         ------                     CBC Auto Differential[[id number]]        Abnormal            Final result                 Please view results for these tests on the individual orders.    CBC Auto Differential [[id number]]  (Abnormal) Collected:  10/21/19 1839    Specimen:  Blood Updated:  10/21/19 1851     WBC 6.04 10*3/mm3      RBC 4.20 10*6/mm3      Hemoglobin 12.6 g/dL      Hematocrit 36.7 %      MCV 87.4 fL      MCH 30.0 pg      MCHC 34.3 g/dL      RDW 12.8 %      RDW-SD 40.6 fl      MPV 10.1 fL      Platelets 257 10*3/mm3      Neutrophil % 66.2 %       Lymphocyte % 19.2 %      Monocyte % 12.1 %      Eosinophil % 2.0 %      Basophil % 0.3 %      Immature Grans % 0.2 %      Neutrophils, Absolute 4.00 10*3/mm3      Lymphocytes, Absolute 1.16 10*3/mm3      Monocytes, Absolute 0.73 10*3/mm3      Eosinophils, Absolute 0.12 10*3/mm3      Basophils, Absolute 0.02 10*3/mm3      Immature Grans, Absolute 0.01 10*3/mm3      nRBC 0.0 /100 WBC         Imaging Results (last 24 hours)     ** No results found for the last 24 hours. **        I have personally reviewed and interpreted the radiology studies and ECG obtained at time of admission.     Assessment / Plan     Assessment:   Active Hospital Problems    Diagnosis   • Chest pain   • Atrial flutter (CMS/[facility])   • CAD (coronary artery disease)     Overview:   2008  CABG LIMA-LAD, VG-OM, VG-diag  12/26/2013  Cath  Closed LIMA-LAD, patent VG-diag, patent VG-OM, normal LVFX  6/11/2014  lexiscan negative for myocardial ischemia, EF 59  %  5/19/2015  DSE negative for myocardial ischemia  10/26/16  Cath  Severe NVD, closed LIMA-LAD, stent LAD, stent SVG to diagonal, patent VG-OM, normal LVFX  2/9/2018  Cath  80% mid RCA, 3.0 x 18 BMS  7/27/2018  lexiscan inferior and septal MI without ischemia, EF 62%  7/9/2019  lexiscan negative for myocardial ischemia, EF 48  %   Overview:   2008  CABG LIMA-LAD, VG-OM, VG-diag  12/26/2013  Cath  Closed LIMA-LAD, patent VG-diag, patent VG-OM, normal LVFX  6/11/2014  lexiscan negative for myocardial ischemia, EF 59  %  5/19/2015  DSE negative for myocardial ischemia  10/26/16  Cath  Severe NVD, closed LIMA-LAD, stent LAD, stent SVG to diagonal, patent VG-OM, normal LVFX  2/9/2018  Cath  80% mid RCA, 3.0 x 18 BMS  7/27/2018  lexiscan inferior and septal MI without ischemia, EF 62%  7/9/2019  lexiscan negative for myocardial ischemia, EF 48  %     • Type 2 diabetes mellitus without complication, with long-term current use of insulin (CMS/[facility])   • Dysphagia   • History of radiation therapy   • HTN  (hypertension)   1.  Bilateral pneumonia possibly aspiration will cover with IV antibiotics cultures of blood and sputum.  Will check urine antigens for Legionella and strep pneumo.  Will also consult speech therapy for evaluation.  Will use Mucinex and DuoNeb's for pulmonary toilet.  We will also give some steroids for the wheezing and stridor.  2.  Systolic congestive heart failure his BNP is normal but I still think he may have a component of this we will hold his p.o. Lasix and give him IV Lasix.  Monitor on telemetry.  3.  A flutter we will anticoagulate with Lovenox he is rate controlled recent 2D echo was done will ask cardiology to see for an opinion.  4.  Type 2 diabetes Accu-Chek sliding scale insulin continue home medications as well.  5.  Status post oropharyngeal cancer depending on what speech says we may need to get ENT to see him he supposed to see them next month to look down there and see what component of scar tissue he has.  6.  Tobacco abuse patient is still smoking 5 cigarettes a day more or less he is using a nicotine patch as well.  We will continue nicotine patch counseled regarding the importance of quitting smoking.  Patient states that he is smoked in one form or another since he was a small child.  His grandparents gave him cigarettes and cigars.    Patient seen prior to midnight         Code Status: Full     I discussed the patient's findings and my recommendations with the patient.  He designates his wife is his surrogate healthcare decision maker should he not be able to speak for himself.    Estimated length of stay to be determined    Zion Márquez MD   10/22/19   12:19 AM

## 2019-10-22 NOTE — ED PROVIDER NOTES
Subjective   Patient is a 64-year-old male presents emergency department with chief complaints of cough with congestion and shortness of breath.  Patient tells this examiner he has had an intermittent cough with congestion since July of this year.  He states he has been on a few rounds of antibiotics as well as steroids without real improvements of overall symptoms.  Patient states that he has a history of throat cancer.  He was followed by Dr. Dominguez with oncology. Patient completed chemo and radiation treatment approximately a year ago and had tracheostomy that was removed last September.  He was followed by Dr. Justin with ENT for this.  Patient continues to have drainage and cough which is believed to be secondary to previous tracheostomy.  He states that his primary care physician told him he likely had scar tissue that has developed causing symptoms described above.  Patient has had intermittent chest discomfort uncertain if related to the cough.  He has had no recorded fevers.  He was sent from urgent care after having an x-ray that was read as possible bibasilar pneumonia.  Patient tells this examiner he has completed a round of antibiotics and steroids approximately a month ago and currently takes daily Mucinex as well as cough drops.  He states symptoms are worse when he lies flat trying to sleep at night.  Due to symptoms described above he came in the emergency department for evaluation and treatment.  Patient has a follow-up ENT appointment scheduled for November 4. He denies any chest pain currently.        Other   Severity:  Moderate  Progression:  Waxing and waning  Chronicity:  Chronic  Context:  Cough with congestion, chest discomfort  Associated symptoms: chest pain, congestion, cough, rhinorrhea, shortness of breath and wheezing    Associated symptoms: no fever and no vomiting        Review of Systems   Constitutional: Negative for fever.   HENT: Positive for congestion and rhinorrhea.    Eyes:  Negative.    Respiratory: Positive for cough, shortness of breath and wheezing.    Cardiovascular: Positive for chest pain.   Gastrointestinal: Negative.  Negative for vomiting.   Genitourinary: Negative.    Musculoskeletal: Negative.    Skin: Negative.    All other systems reviewed and are negative.      Past Medical History:   Diagnosis Date   • Arthritis    • Coronary artery disease    • Depression    • Diabetes mellitus (CMS/HCC)    • Difficulty urinating    • Enlarged prostate    • GERD (gastroesophageal reflux disease)    • History of BPH    • History of transfusion    • Hyperlipidemia    • Hypertension    • Neuropathy    • Oropharyngeal cancer (CMS/HCC) 08/27/2017   • Seizure (CMS/HCC)     diabetic   • Severe esophageal dysplasia    • Stroke (CMS/[facility])    • TB (pulmonary tuberculosis) 2004       Allergies   Allergen Reactions   • Codeine Hives     ONLY COUGH SYRUP WITH CODEINE CAUSES HIVES       Past Surgical History:   Procedure Laterality Date   • CARDIAC SURGERY      9-10 stents   • CHOLECYSTECTOMY     • CORONARY ARTERY BYPASS GRAFT      2009   • FRACTURE SURGERY     • GASTROSTOMY FEEDING TUBE INSERTION N/A 10/18/2018    Procedure: takedown gastroutaneous fistula with gastrostomy tube plaement;  Surgeon: Ayanna Early MD;  Location: [facility] OR;  Service: General   • GTUBE REPLACEMENT N/A 10/6/2017    Procedure: GASTROSTOMY TUBE REPLACEMENT, port placement;  Surgeon: Jayne Phillips MD;  Location: [facility] OR;  Service:    • HERNIA REPAIR     • LARYNGOSCOPY N/A 10/11/2017    Procedure: DIRECT LARYNGOSCOPY WITH BIOPSY;  Surgeon: Darin Neal MD;  Location: [facility] OR;  Service:    • LARYNGOSCOPY N/A 6/25/2018    Procedure: DIRECT LARYNGOSCOPY WITH BIOPSY;  Surgeon: Alber Justin MD;  Location: [facility] OR;  Service: ENT   • LARYNGOSCOPY N/A 10/18/2018    Procedure: DIAGNOSTIC DIRECT LARYNGOSCOPY WITH BIOPSY;  Surgeon: Alber Justin MD;  Location: [facility] OR;  Service: ENT   • NISSEN  FUNDOPLICATION LAPAROSCOPIC     • NJ DENTAL SURGERY PROCEDURE N/A 10/11/2017    Procedure: TOOTH EXTRACTION;  Surgeon: Darin Neal MD;  Location:  [facility] OR;  Service: ENT   • NJ INSJ TUNNELED CVC W/O SUBQ PORT/ AGE 5 YR/> Left 10/6/2017    Procedure: INSERTION VENOUS ACCESS DEVICE;  Surgeon: Jayne Phillips MD;  Location:  [facility] OR;  Service: General   • SKIN BIOPSY     • TESTICLE SURGERY      tubes implanted for drainage   • TONSILLECTOMY     • TRACHEOSTOMY N/A 10/5/2017    Procedure: Awake tracheostomy; DIRECT LARYNGOSCOPY WITH BIOPSY;  Surgeon: Alber Justin MD;  Location:  [facility] OR;  Service:    • TRACHEOSTOMY N/A 10/18/2018    Procedure: TRACHEOCUTANEOUS FISTULA CLOSURE;  Surgeon: Alber Justin MD;  Location:  [facility] OR;  Service: ENT       Family History   Problem Relation Age of Onset   • Stroke Mother    • Heart disease Father    • Heart disease Brother    • Cancer Brother        Social History     Socioeconomic History   • Marital status: Legally      Spouse name: Not on file   • Number of children: Not on file   • Years of education: Not on file   • Highest education level: Not on file   Occupational History   • Occupation:      Comment: Disabled   Tobacco Use   • Smoking status: Current Every Day Smoker     Packs/day: 0.00     Years: 52.00     Pack years: 0.00     Types: Cigarettes   • Smokeless tobacco: Former User     Types: Snuff   • Tobacco comment: 6 cigarettes a day   Substance and Sexual Activity   • Alcohol use: Yes     Alcohol/week: 1.8 oz     Types: 3 Cans of beer per week   • Drug use: No   • Sexual activity: Defer           Objective   Physical Exam   Constitutional: He is oriented to person, place, and time. He appears well-developed and well-nourished. No distress.   HENT:   Head: Normocephalic and atraumatic.   Nose: Nose normal.   Mouth/Throat: Oropharynx is clear and moist.   Eyes: Conjunctivae are normal. Pupils are equal, round, and  reactive to light. No scleral icterus.   Neck: Normal range of motion. Neck supple. No JVD present. No thyromegaly present.   Cardiovascular: Normal rate, regular rhythm, normal heart sounds and intact distal pulses.   No murmur heard.  Pulmonary/Chest: Effort normal. No respiratory distress. He has no wheezes. He has rales. He exhibits no tenderness.   Abdominal: Soft. Bowel sounds are normal. He exhibits no distension and no mass. There is no tenderness. There is no rebound and no guarding.   Musculoskeletal: Normal range of motion. He exhibits no edema.   Lymphadenopathy:     He has no cervical adenopathy.   Neurological: He is alert and oriented to person, place, and time. He has normal reflexes. No cranial nerve deficit. Coordination normal.   Skin: Skin is warm and dry. No rash noted. He is not diaphoretic. No erythema. No pallor.   Psychiatric: He has a normal mood and affect. His behavior is normal. Judgment and thought content normal.   Nursing note and vitals reviewed.      Procedures           ED Course                HEART Score (for prediction of 6-week risk of major adverse cardiac event) reviewed and/or performed as part of the patient evaluation and treatment planning process.  The result associated with this review/performance is: 3       MDM  Number of Diagnoses or Management Options  Atrial fibrillation, unspecified type (CMS/HCC): new and requires workup  Atrial flutter, unspecified type (CMS/HCC): new and requires workup  Chest pain, unspecified type: new and requires workup  Pneumonia of both lower lobes due to infectious organism (CMS/HCC): new and requires workup     Amount and/or Complexity of Data Reviewed  Clinical lab tests: ordered and reviewed  Tests in the radiology section of CPT®: ordered and reviewed  Obtain history from someone other than the patient: yes  Discuss the patient with other providers: yes    Risk of Complications, Morbidity, and/or Mortality  Presenting problems:  moderate  Diagnostic procedures: moderate  Management options: moderate    Patient Progress  Patient progress: improved      Final diagnoses:   Chest pain, unspecified type   Atrial flutter, unspecified type (CMS/HCC)   Pneumonia of both lower lobes due to infectious organism (CMS/HCC)   Atrial fibrillation, unspecified type (CMS/HCC)              Karla Claudio, APRN  10/22/19 0122

## 2019-10-23 PROBLEM — F17.200 TOBACCO DEPENDENCE: Status: ACTIVE | Noted: 2019-10-23

## 2019-10-23 LAB
GLUCOSE BLDC GLUCOMTR-MCNC: 131 MG/DL (ref 70–130)
GLUCOSE BLDC GLUCOMTR-MCNC: 136 MG/DL (ref 70–130)
GLUCOSE BLDC GLUCOMTR-MCNC: 143 MG/DL (ref 70–130)
GLUCOSE BLDC GLUCOMTR-MCNC: 172 MG/DL (ref 70–130)
L PNEUMO1 AG UR QL IA: NEGATIVE
S PNEUM AG SPEC QL LA: NEGATIVE

## 2019-10-23 PROCEDURE — 94799 UNLISTED PULMONARY SVC/PX: CPT

## 2019-10-23 PROCEDURE — 94760 N-INVAS EAR/PLS OXIMETRY 1: CPT

## 2019-10-23 PROCEDURE — 92526 ORAL FUNCTION THERAPY: CPT

## 2019-10-23 PROCEDURE — 63710000001 INSULIN LISPRO (HUMAN) PER 5 UNITS: Performed by: INTERNAL MEDICINE

## 2019-10-23 PROCEDURE — 87205 SMEAR GRAM STAIN: CPT | Performed by: INTERNAL MEDICINE

## 2019-10-23 PROCEDURE — 25010000002 FUROSEMIDE PER 20 MG: Performed by: INTERNAL MEDICINE

## 2019-10-23 PROCEDURE — 99232 SBSQ HOSP IP/OBS MODERATE 35: CPT | Performed by: INTERNAL MEDICINE

## 2019-10-23 PROCEDURE — 82962 GLUCOSE BLOOD TEST: CPT

## 2019-10-23 PROCEDURE — 63710000001 INSULIN DETEMIR PER 5 UNITS: Performed by: INTERNAL MEDICINE

## 2019-10-23 PROCEDURE — 63710000001 PREDNISONE PER 1 MG: Performed by: INTERNAL MEDICINE

## 2019-10-23 PROCEDURE — 87070 CULTURE OTHR SPECIMN AEROBIC: CPT | Performed by: INTERNAL MEDICINE

## 2019-10-23 RX ORDER — DILTIAZEM HYDROCHLORIDE 120 MG/1
120 CAPSULE, COATED, EXTENDED RELEASE ORAL
Status: DISCONTINUED | OUTPATIENT
Start: 2019-10-24 | End: 2019-10-24 | Stop reason: HOSPADM

## 2019-10-23 RX ORDER — LISINOPRIL 5 MG/1
5 TABLET ORAL DAILY
Status: DISCONTINUED | OUTPATIENT
Start: 2019-10-23 | End: 2019-10-24 | Stop reason: HOSPADM

## 2019-10-23 RX ORDER — FUROSEMIDE 40 MG/1
40 TABLET ORAL DAILY
Status: DISCONTINUED | OUTPATIENT
Start: 2019-10-24 | End: 2019-10-24 | Stop reason: HOSPADM

## 2019-10-23 RX ADMIN — TAMSULOSIN HYDROCHLORIDE 0.4 MG: 0.4 CAPSULE ORAL at 08:34

## 2019-10-23 RX ADMIN — BACLOFEN 10 MG: 10 TABLET ORAL at 16:18

## 2019-10-23 RX ADMIN — ISOSORBIDE MONONITRATE 60 MG: 60 TABLET, EXTENDED RELEASE ORAL at 08:34

## 2019-10-23 RX ADMIN — APIXABAN 5 MG: 5 TABLET, FILM COATED ORAL at 06:39

## 2019-10-23 RX ADMIN — GUAIFENESIN AND DEXTROMETHORPHAN 5 ML: 100; 10 SYRUP ORAL at 16:18

## 2019-10-23 RX ADMIN — BACLOFEN 10 MG: 10 TABLET ORAL at 08:34

## 2019-10-23 RX ADMIN — OXYCODONE HYDROCHLORIDE AND ACETAMINOPHEN 1 TABLET: 10; 325 TABLET ORAL at 06:47

## 2019-10-23 RX ADMIN — DOXYCYCLINE 100 MG: 100 INJECTION, POWDER, LYOPHILIZED, FOR SOLUTION INTRAVENOUS at 02:09

## 2019-10-23 RX ADMIN — PREDNISONE 40 MG: 20 TABLET ORAL at 08:34

## 2019-10-23 RX ADMIN — METOPROLOL TARTRATE 25 MG: 25 TABLET, FILM COATED ORAL at 21:23

## 2019-10-23 RX ADMIN — OXYCODONE HYDROCHLORIDE AND ACETAMINOPHEN 1 TABLET: 10; 325 TABLET ORAL at 13:59

## 2019-10-23 RX ADMIN — FUROSEMIDE 40 MG: 10 INJECTION, SOLUTION INTRAVENOUS at 14:59

## 2019-10-23 RX ADMIN — NICOTINE 1 PATCH: 21 PATCH, EXTENDED RELEASE TRANSDERMAL at 08:42

## 2019-10-23 RX ADMIN — SODIUM CHLORIDE, PRESERVATIVE FREE 10 ML: 5 INJECTION INTRAVENOUS at 21:23

## 2019-10-23 RX ADMIN — IPRATROPIUM BROMIDE AND ALBUTEROL SULFATE 3 ML: 2.5; .5 SOLUTION RESPIRATORY (INHALATION) at 14:47

## 2019-10-23 RX ADMIN — ASPIRIN 81 MG: 81 TABLET, CHEWABLE ORAL at 08:34

## 2019-10-23 RX ADMIN — BACLOFEN 10 MG: 10 TABLET ORAL at 21:23

## 2019-10-23 RX ADMIN — ZOLPIDEM TARTRATE 10 MG: 5 TABLET, COATED ORAL at 00:08

## 2019-10-23 RX ADMIN — DILTIAZEM HYDROCHLORIDE 30 MG: 30 TABLET, FILM COATED ORAL at 06:39

## 2019-10-23 RX ADMIN — DOXYCYCLINE 100 MG: 100 INJECTION, POWDER, LYOPHILIZED, FOR SOLUTION INTRAVENOUS at 13:49

## 2019-10-23 RX ADMIN — SODIUM CHLORIDE, PRESERVATIVE FREE 10 ML: 5 INJECTION INTRAVENOUS at 08:38

## 2019-10-23 RX ADMIN — METOPROLOL TARTRATE 25 MG: 25 TABLET, FILM COATED ORAL at 08:34

## 2019-10-23 RX ADMIN — FUROSEMIDE 40 MG: 10 INJECTION, SOLUTION INTRAVENOUS at 08:34

## 2019-10-23 RX ADMIN — DILTIAZEM HYDROCHLORIDE 30 MG: 30 TABLET, FILM COATED ORAL at 17:32

## 2019-10-23 RX ADMIN — BENZONATATE 200 MG: 100 CAPSULE ORAL at 02:09

## 2019-10-23 RX ADMIN — IPRATROPIUM BROMIDE AND ALBUTEROL SULFATE 3 ML: 2.5; .5 SOLUTION RESPIRATORY (INHALATION) at 18:34

## 2019-10-23 RX ADMIN — INSULIN DETEMIR 30 UNITS: 100 INJECTION, SOLUTION SUBCUTANEOUS at 08:33

## 2019-10-23 RX ADMIN — DILTIAZEM HYDROCHLORIDE 30 MG: 30 TABLET, FILM COATED ORAL at 02:08

## 2019-10-23 RX ADMIN — ZOLPIDEM TARTRATE 10 MG: 5 TABLET, COATED ORAL at 21:23

## 2019-10-23 RX ADMIN — INSULIN LISPRO 2 UNITS: 100 INJECTION, SOLUTION INTRAVENOUS; SUBCUTANEOUS at 21:23

## 2019-10-23 RX ADMIN — IPRATROPIUM BROMIDE AND ALBUTEROL SULFATE 3 ML: 2.5; .5 SOLUTION RESPIRATORY (INHALATION) at 11:06

## 2019-10-23 RX ADMIN — LISINOPRIL 5 MG: 5 TABLET ORAL at 08:34

## 2019-10-23 RX ADMIN — OXYCODONE HYDROCHLORIDE AND ACETAMINOPHEN 1 TABLET: 10; 325 TABLET ORAL at 21:23

## 2019-10-23 RX ADMIN — APIXABAN 5 MG: 5 TABLET, FILM COATED ORAL at 17:32

## 2019-10-23 RX ADMIN — PANTOPRAZOLE SODIUM 40 MG: 40 TABLET, DELAYED RELEASE ORAL at 06:39

## 2019-10-23 RX ADMIN — GUAIFENESIN 1200 MG: 600 TABLET, EXTENDED RELEASE ORAL at 08:34

## 2019-10-23 RX ADMIN — GUAIFENESIN 1200 MG: 600 TABLET, EXTENDED RELEASE ORAL at 21:23

## 2019-10-23 RX ADMIN — DILTIAZEM HYDROCHLORIDE 30 MG: 30 TABLET, FILM COATED ORAL at 11:51

## 2019-10-23 NOTE — PLAN OF CARE
Problem: Patient Care Overview  Goal: Plan of Care Review  Outcome: Ongoing (interventions implemented as appropriate)   10/23/19 8127   Coping/Psychosocial   Plan of Care Reviewed With patient   OTHER   Outcome Summary pt co pain, medication given. pt has cough. sputum sent. cont IV ABX. pt walking davalos with cane. cont to monitor.    Plan of Care Review   Progress no change     Goal: Individualization and Mutuality  Outcome: Ongoing (interventions implemented as appropriate)    Goal: Discharge Needs Assessment  Outcome: Ongoing (interventions implemented as appropriate)    Goal: Interprofessional Rounds/Family Conf  Outcome: Ongoing (interventions implemented as appropriate)      Problem: Pain, Chronic (Adult)  Goal: Identify Related Risk Factors and Signs and Symptoms  Outcome: Ongoing (interventions implemented as appropriate)    Goal: Acceptable Pain/Comfort Level and Functional Ability  Outcome: Ongoing (interventions implemented as appropriate)      Problem: Cardiac: ACS (Acute Coronary Syndrome) (Adult)  Goal: Signs and Symptoms of Listed Potential Problems Will be Absent, Minimized or Managed (Cardiac: ACS)  Outcome: Ongoing (interventions implemented as appropriate)      Problem: Fall Risk (Adult)  Goal: Identify Related Risk Factors and Signs and Symptoms  Outcome: Ongoing (interventions implemented as appropriate)    Goal: Absence of Fall  Outcome: Ongoing (interventions implemented as appropriate)

## 2019-10-23 NOTE — THERAPY TREATMENT NOTE
Acute Care - Speech Language Pathology   Swallow Treatment Note [facility]     Patient Name: Sameer Tavarez  : 1955  MRN: [id number]  Today's Date: 10/23/2019               Admit Date: 10/21/2019  Swallow tx completed. Pt took PO trials of mech soft foods and nectar thick liquids. Pt had multiple swallows with the liquid and 3x throat clears. He did not demonstrate any overt s/s of aspiration during mech soft trials. However, he did have a severe coughing spell during conversation about 5 minutes after last PO trial, but this may not be directly related to the PO intake. Pt stated he did not enjoy the thickened drink but tolerated it during treatment. Pt was able to lateralize (10x), elevate (10x), anterior/posterior sweep (5x), and buccal cavity sweep (2x) with tongue. When provided with cold stim to the tongue pt had a 2-3 second delay before intiating a swallow. Pt educated on s/s of aspiration to be mindful of during meal time and compensatory strategies such as using a chin tuck to enhance swallow function. Will continue to follow and treat.  Darshan Jackson, Speech Student  Visit Dx:      ICD-10-CM ICD-9-CM   1. Chest pain, unspecified type R07.9 786.50   2. Atrial flutter, unspecified type (CMS/HCC) I48.92 427.32   3. Pneumonia of both lower lobes due to infectious organism (CMS/HCC) J18.1 483.8   4. Atrial fibrillation, unspecified type (CMS/HCC) I48.91 427.31   5. 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   • Recio's esophagus with dysplasia   • Postoperative pain   • S/P percutaneous endoscopic gastrostomy (PEG) tube placement (CMS/HCC)   • Constipation, acute   • Pain, rectal   • Oropharyngeal candidiasis   • ACS (acute coronary syndrome) (CMS/HCC)   • HTN (hypertension)   • History of radiation therapy   • Current every day smoker   • Neoplasm of uncertain behavior of  tongue   • Type 2 diabetes mellitus without complication, with long-term current use of insulin (CMS/HCC)   • Essential hypertension   • Chest pain   • Diabetes mellitus (CMS/HCC)   • Diabetic peripheral angiopathy (CMS/HCC)   • Diabetic peripheral neuropathy (CMS/HCC)   • Dysphagia   • CAD (coronary artery disease)   • Cigarette nicotine dependence without complication   • Congestive heart failure (CMS/HCC)   • Atrial fibrillation (CMS/HCC)   • Atrial flutter (CMS/HCC)   • Atrial fibrillation (CMS/HCC)   • Tobacco dependence       Therapy Treatment  Rehabilitation Treatment Summary     Row Name 10/23/19 0926             Treatment Time/Intention    Discipline  speech language pathologist  (Pended)   -AS      Document Type  therapy note (daily note)  (Pended)   -AS      Subjective Information  complains of;weakness;pain  (Pended)   -AS      Mode of Treatment  speech-language pathology  (Pended)   -AS      Patient/Family Observations  no family present  (Pended)   -AS      Patient Effort  good  (Pended)   -AS      Recorded by [AS] Darshan Jackson Speech Therapy Student 10/23/19 0951      Row Name 10/23/19 0926             Pain Scale: FACES Pre/Post-Treatment    Pain: FACES Scale, Pretreatment  2-->hurts little bit  (Pended)   -AS      Recorded by [AS] Darshan Jackson Speech Therapy Student 10/23/19 0951      Row Name 10/23/19 0926             Outcome Summary/Treatment Plan (SLP)    Daily Summary of Progress (SLP)  progress toward functional goals is good  (Pended)   -AS      Barriers to Overall Progress (SLP)  previous deficits  (Pended)   -AS      Plan for Continued Treatment (SLP)  continue to follow and treat  (Pended)   -AS      Anticipated Dischage Disposition  home  (Pended)   -AS      Recorded by [AS] Darshan Jackson Speech Therapy Student 10/23/19 0951        User Key  (r) = Recorded By, (t) = Taken By, (c) = Cosigned By    Initials Name Effective Dates Discipline    AS Darshan Jackson Speech Therapy  Student 09/03/19 -  SLP          Outcome Summary  Outcome Summary/Treatment Plan (SLP)  Daily Summary of Progress (SLP): (P) progress toward functional goals is good (10/23/19 0926 : Darshan Jackson, Speech Therapy Student)  Barriers to Overall Progress (SLP): (P) previous deficits (10/23/19 0926 : Darshan Jackson, Speech Therapy Student)  Plan for Continued Treatment (SLP): (P) continue to follow and treat (10/23/19 0926 : Darshan Jackson, Speech Therapy Student)  Anticipated Dischage Disposition: (P) home (10/23/19 0926 : Darshan Jackson, Speech Therapy Student)      SLP GOALS     Row Name 10/23/19 0926 10/22/19 1500          Oral Nutrition/Hydration Goal 1 (SLP)    Oral Nutrition/Hydration Goal 1, SLP  Pt will tolerate LRD without overt s/s of aspiration.  (Pended)   -AS  Pt will tolerate LRD without overt s/s of aspiration.  -TM     Time Frame (Oral Nutrition/Hydration Goal 1, SLP)  by discharge  (Pended)   -AS  by discharge  -TM     Barriers (Oral Nutrition/Hydration Goal 1, SLP)  Prior deficit  (Pended)   -AS  Prior deficit  -TM     Progress/Outcomes (Oral Nutrition/Hydration Goal 1, SLP)  continuing progress toward goal  (Pended)   -AS  goal ongoing  -TM        Lingual Strengthening Goal 1 (SLP)    Activity (Lingual Strengthening Goal 1, SLP)  increase lingual tone/sensation/control/coordination/movement  (Pended)   -AS  increase lingual tone/sensation/control/coordination/movement  -TM     Increase Lingual Tone/Sensation/Control/Coordination/Movement  lingual movement exercises  (Pended)   -AS  lingual movement exercises  -TM     Day/Accuracy (Lingual Strengthening Goal 1, SLP)  independently (over 90% accuracy)  (Pended)   -AS  independently (over 90% accuracy)  -TM     Time Frame (Lingual Strengthening Goal 1, SLP)  by discharge  (Pended)   -AS  by discharge  -TM     Barriers (Lingual Strengthening Goal 1, SLP)  Prior deficit  (Pended)   -AS  Prior deficit  -TM     Progress/Outcomes (Lingual  Strengthening Goal 1, SLP)  continuing progress toward goal  (Pended)   -AS  goal ongoing  -TM        Pharyngeal Strengthening Exercise Goal 1 (SLP)    Activity (Pharyngeal Strengthening Goal 1, SLP)  increase timing  (Pended)   -AS  increase timing  -TM     Increase Timing  gustatory stimulation (sour/cold)  (Pended)   -AS  gustatory stimulation (sour/cold)  -TM     Mears/Accuracy (Pharyngeal Strengthening Goal 1, SLP)  independently (over 90% accuracy)  (Pended)   -AS  independently (over 90% accuracy)  -TM     Time Frame (Pharyngeal Strengthening Goal 1, SLP)  by discharge  (Pended)   -AS  by discharge  -TM     Barriers (Pharyngeal Strengthening Goal 1, SLP)  Prior deficit  (Pended)   -AS  Prior deficit  -TM     Progress/Outcomes (Pharyngeal Strengthening Goal 1, SLP)  continuing progress toward goal  (Pended)   -AS  goal ongoing  -TM       User Key  (r) = Recorded By, (t) = Taken By, (c) = Cosigned By    Initials Name Provider Type    TM Joann Parrish CCC-SLP Speech and Language Pathologist    AS Darshan Jackson, Speech Therapy Student Speech Therapy Student          EDUCATION  The patient has been educated in the following areas:   Dysphagia (Swallowing Impairment).    SLP Recommendation and Plan  Daily Summary of Progress (SLP): (P) progress toward functional goals is good  Barriers to Overall Progress (SLP): (P) previous deficits  Plan for Continued Treatment (SLP): (P) continue to follow and treat  Anticipated Dischage Disposition: (P) home                    Time Calculation:   Time Calculation- SLP     Row Name 10/23/19 0948             Time Calculation- SLP    SLP Start Time  0926  (Pended)   -AS      SLP Stop Time  0945  (Pended)   -AS      SLP Time Calculation (min)  19 min  (Pended)   -AS      SLP Received On  10/23/19  (Pended)   -AS        User Key  (r) = Recorded By, (t) = Taken By, (c) = Cosigned By    Initials Name Provider Type    AS Darshan Jackson, Speech Therapy Student Speech  Therapy Student          Therapy Charges for Today     Code Description Service Date Service Provider Modifiers Qty    [id number]  ST TREATMENT SWALLOW 1 10/23/2019 Darshan Jackson, Speech Therapy Student GN 1                 Darshan Jackson Speech Therapy Student  10/23/2019

## 2019-10-23 NOTE — PLAN OF CARE
Problem: Patient Care Overview  Goal: Plan of Care Review  Outcome: Ongoing (interventions implemented as appropriate)   10/23/19 0923   Coping/Psychosocial   Plan of Care Reviewed With patient   OTHER   Outcome Summary Swallow tx completed. Pt took PO trials of mech soft foods and nectar thick liquids. Pt had multiple swallows with the liquid and 3x throat clears. He did not demonstrate any overt s/s of aspiration during mech soft trials. However, he did have a severe coughing spell during conversation about 5 minutes after last PO trial, but this may not be directly related to the PO intake. Pt stated he did not enjoy the thickened drink but tolerated it during treatment. Pt was able to lateralize (10x), elevate (10x), anterior/posterior sweep (5x), and buccal cavity sweep (2x) with tongue. When provided with cold stim to the tongue pt had a 2-3 second delay before intiating a swallow. Pt educated on s/s of aspiration to be mindful of during meal time and compensatory strategies such as using a chin tuck to enhance swallow function. Will continue to follow and treat.   Plan of Care Review   Progress improving

## 2019-10-23 NOTE — PLAN OF CARE
Problem: Patient Care Overview  Goal: Plan of Care Review  Outcome: Ongoing (interventions implemented as appropriate)   10/23/19 035   Coping/Psychosocial   Plan of Care Reviewed With patient   OTHER   Outcome Summary VSS, prn pain meds given x1 for back pain and headache with relief, IV abx per order, lung sounds improving, frequent dry cough, afib/afl  with frequent pvc, coup, 10 bts of VT   Plan of Care Review   Progress improving       Problem: Pain, Chronic (Adult)  Goal: Identify Related Risk Factors and Signs and Symptoms  Outcome: Ongoing (interventions implemented as appropriate)    Goal: Acceptable Pain/Comfort Level and Functional Ability  Outcome: Ongoing (interventions implemented as appropriate)      Problem: Cardiac: ACS (Acute Coronary Syndrome) (Adult)  Goal: Signs and Symptoms of Listed Potential Problems Will be Absent, Minimized or Managed (Cardiac: ACS)  Outcome: Ongoing (interventions implemented as appropriate)      Problem: Fall Risk (Adult)  Goal: Identify Related Risk Factors and Signs and Symptoms  Outcome: Ongoing (interventions implemented as appropriate)    Goal: Absence of Fall  Outcome: Ongoing (interventions implemented as appropriate)

## 2019-10-24 VITALS
BODY MASS INDEX: 23.11 KG/M2 | TEMPERATURE: 97.1 F | HEIGHT: 66 IN | WEIGHT: 143.8 LBS | OXYGEN SATURATION: 95 % | DIASTOLIC BLOOD PRESSURE: 59 MMHG | SYSTOLIC BLOOD PRESSURE: 99 MMHG | RESPIRATION RATE: 16 BRPM | HEART RATE: 75 BPM

## 2019-10-24 PROBLEM — I27.20 PULMONARY HYPERTENSION (HCC): Status: ACTIVE | Noted: 2019-10-24

## 2019-10-24 PROBLEM — I10 MODERATE HYPERTENSION: Status: ACTIVE | Noted: 2019-10-24

## 2019-10-24 PROBLEM — I50.33 ACUTE ON CHRONIC DIASTOLIC HEART FAILURE (HCC): Status: ACTIVE | Noted: 2019-10-24

## 2019-10-24 LAB
ANION GAP SERPL CALCULATED.3IONS-SCNC: 12 MMOL/L (ref 5–15)
BUN BLD-MCNC: 30 MG/DL (ref 8–23)
BUN/CREAT SERPL: 45.5 (ref 7–25)
CALCIUM SPEC-SCNC: 8.5 MG/DL (ref 8.6–10.5)
CHLORIDE SERPL-SCNC: 99 MMOL/L (ref 98–107)
CO2 SERPL-SCNC: 26 MMOL/L (ref 22–29)
CREAT BLD-MCNC: 0.66 MG/DL (ref 0.76–1.27)
DEPRECATED RDW RBC AUTO: 42.6 FL (ref 37–54)
ERYTHROCYTE [DISTWIDTH] IN BLOOD BY AUTOMATED COUNT: 13.2 % (ref 12.3–15.4)
GFR SERPL CREATININE-BSD FRML MDRD: 122 ML/MIN/1.73
GLUCOSE BLD-MCNC: 133 MG/DL (ref 65–99)
GLUCOSE BLDC GLUCOMTR-MCNC: 167 MG/DL (ref 70–130)
GLUCOSE BLDC GLUCOMTR-MCNC: 193 MG/DL (ref 70–130)
HCT VFR BLD AUTO: 33.3 % (ref 37.5–51)
HGB BLD-MCNC: 11.4 G/DL (ref 13–17.7)
MAGNESIUM SERPL-MCNC: 1.9 MG/DL (ref 1.6–2.4)
MCH RBC QN AUTO: 30.2 PG (ref 26.6–33)
MCHC RBC AUTO-ENTMCNC: 34.2 G/DL (ref 31.5–35.7)
MCV RBC AUTO: 88.1 FL (ref 79–97)
PLATELET # BLD AUTO: 213 10*3/MM3 (ref 140–450)
PMV BLD AUTO: 10.6 FL (ref 6–12)
POTASSIUM BLD-SCNC: 4 MMOL/L (ref 3.5–5.2)
PROCALCITONIN SERPL-MCNC: 0.03 NG/ML (ref 0.1–0.25)
RBC # BLD AUTO: 3.78 10*6/MM3 (ref 4.14–5.8)
SODIUM BLD-SCNC: 137 MMOL/L (ref 136–145)
WBC NRBC COR # BLD: 5.85 10*3/MM3 (ref 3.4–10.8)

## 2019-10-24 PROCEDURE — 94760 N-INVAS EAR/PLS OXIMETRY 1: CPT

## 2019-10-24 PROCEDURE — 83735 ASSAY OF MAGNESIUM: CPT | Performed by: INTERNAL MEDICINE

## 2019-10-24 PROCEDURE — 94799 UNLISTED PULMONARY SVC/PX: CPT

## 2019-10-24 PROCEDURE — 84145 PROCALCITONIN (PCT): CPT | Performed by: INTERNAL MEDICINE

## 2019-10-24 PROCEDURE — 80048 BASIC METABOLIC PNL TOTAL CA: CPT | Performed by: INTERNAL MEDICINE

## 2019-10-24 PROCEDURE — 97535 SELF CARE MNGMENT TRAINING: CPT

## 2019-10-24 PROCEDURE — 85027 COMPLETE CBC AUTOMATED: CPT | Performed by: INTERNAL MEDICINE

## 2019-10-24 PROCEDURE — 63710000001 PREDNISONE PER 1 MG: Performed by: INTERNAL MEDICINE

## 2019-10-24 PROCEDURE — 99232 SBSQ HOSP IP/OBS MODERATE 35: CPT | Performed by: INTERNAL MEDICINE

## 2019-10-24 PROCEDURE — 82962 GLUCOSE BLOOD TEST: CPT

## 2019-10-24 PROCEDURE — 63710000001 INSULIN LISPRO (HUMAN) PER 5 UNITS: Performed by: INTERNAL MEDICINE

## 2019-10-24 PROCEDURE — 63710000001 INSULIN DETEMIR PER 5 UNITS: Performed by: INTERNAL MEDICINE

## 2019-10-24 RX ORDER — DOXYCYCLINE HYCLATE 100 MG/1
100 CAPSULE ORAL 2 TIMES DAILY
Qty: 10 CAPSULE | Refills: 0 | Status: SHIPPED | OUTPATIENT
Start: 2019-10-24

## 2019-10-24 RX ORDER — PREDNISONE 20 MG/1
40 TABLET ORAL
Qty: 8 TABLET | Refills: 0 | OUTPATIENT
Start: 2019-10-25 | End: 2019-10-30 | Stop reason: HOSPADM

## 2019-10-24 RX ORDER — DILTIAZEM HYDROCHLORIDE 120 MG/1
120 CAPSULE, COATED, EXTENDED RELEASE ORAL
Qty: 30 CAPSULE | Refills: 2 | Status: SHIPPED | OUTPATIENT
Start: 2019-10-25

## 2019-10-24 RX ADMIN — DILTIAZEM HYDROCHLORIDE 120 MG: 120 CAPSULE, COATED, EXTENDED RELEASE ORAL at 08:21

## 2019-10-24 RX ADMIN — PANTOPRAZOLE SODIUM 40 MG: 40 TABLET, DELAYED RELEASE ORAL at 06:08

## 2019-10-24 RX ADMIN — ISOSORBIDE MONONITRATE 60 MG: 60 TABLET, EXTENDED RELEASE ORAL at 08:21

## 2019-10-24 RX ADMIN — FUROSEMIDE 40 MG: 40 TABLET ORAL at 08:21

## 2019-10-24 RX ADMIN — GUAIFENESIN 1200 MG: 600 TABLET, EXTENDED RELEASE ORAL at 08:21

## 2019-10-24 RX ADMIN — NICOTINE 1 PATCH: 21 PATCH, EXTENDED RELEASE TRANSDERMAL at 08:23

## 2019-10-24 RX ADMIN — DOXYCYCLINE 100 MG: 100 INJECTION, POWDER, LYOPHILIZED, FOR SOLUTION INTRAVENOUS at 00:50

## 2019-10-24 RX ADMIN — INSULIN DETEMIR 30 UNITS: 100 INJECTION, SOLUTION SUBCUTANEOUS at 08:21

## 2019-10-24 RX ADMIN — INSULIN LISPRO 2 UNITS: 100 INJECTION, SOLUTION INTRAVENOUS; SUBCUTANEOUS at 12:08

## 2019-10-24 RX ADMIN — IPRATROPIUM BROMIDE AND ALBUTEROL SULFATE 3 ML: 2.5; .5 SOLUTION RESPIRATORY (INHALATION) at 10:32

## 2019-10-24 RX ADMIN — METOPROLOL TARTRATE 25 MG: 25 TABLET, FILM COATED ORAL at 08:21

## 2019-10-24 RX ADMIN — GUAIFENESIN AND DEXTROMETHORPHAN 5 ML: 100; 10 SYRUP ORAL at 09:19

## 2019-10-24 RX ADMIN — INSULIN LISPRO 2 UNITS: 100 INJECTION, SOLUTION INTRAVENOUS; SUBCUTANEOUS at 08:20

## 2019-10-24 RX ADMIN — IPRATROPIUM BROMIDE AND ALBUTEROL SULFATE 3 ML: 2.5; .5 SOLUTION RESPIRATORY (INHALATION) at 06:54

## 2019-10-24 RX ADMIN — ASPIRIN 81 MG: 81 TABLET, CHEWABLE ORAL at 08:21

## 2019-10-24 RX ADMIN — DILTIAZEM HYDROCHLORIDE 30 MG: 30 TABLET, FILM COATED ORAL at 00:50

## 2019-10-24 RX ADMIN — PREDNISONE 40 MG: 20 TABLET ORAL at 08:21

## 2019-10-24 RX ADMIN — APIXABAN 5 MG: 5 TABLET, FILM COATED ORAL at 06:08

## 2019-10-24 RX ADMIN — OXYCODONE HYDROCHLORIDE AND ACETAMINOPHEN 1 TABLET: 10; 325 TABLET ORAL at 08:30

## 2019-10-24 RX ADMIN — BACLOFEN 10 MG: 10 TABLET ORAL at 08:21

## 2019-10-24 RX ADMIN — SODIUM CHLORIDE, PRESERVATIVE FREE 10 ML: 5 INJECTION INTRAVENOUS at 08:20

## 2019-10-24 RX ADMIN — TAMSULOSIN HYDROCHLORIDE 0.4 MG: 0.4 CAPSULE ORAL at 08:21

## 2019-10-24 RX ADMIN — LISINOPRIL 5 MG: 5 TABLET ORAL at 08:21

## 2019-10-24 NOTE — DISCHARGE SUMMARY
[facility] Medicine Services  DISCHARGE SUMMARY       Date of Admission: 10/21/2019  Date of Discharge:  10/24/2019  Primary Care Physician: Christian Castellon MD    Presenting Problem/History of Present Illness:  Cough shortness of breath    Final Discharge Diagnoses:  Active Hospital Problems    Diagnosis   • Pulmonary hypertension (CMS/HCC)   • Acute on chronic diastolic heart failure (CMS/HCC), suspected    • Tobacco dependence   • Atrial fibrillation (CMS/HCC)   • Chest pain   • Atrial flutter (CMS/HCC)   • CAD (coronary artery disease)     Overview:   2008  CABG LIMA-LAD, VG-OM, VG-diag  12/26/2013  Cath  Closed LIMA-LAD, patent VG-diag, patent VG-OM, normal LVFX  6/11/2014  lexiscan negative for myocardial ischemia, EF 59  %  5/19/2015  DSE negative for myocardial ischemia  10/26/16  Cath  Severe NVD, closed LIMA-LAD, stent LAD, stent SVG to diagonal, patent VG-OM, normal LVFX  2/9/2018  Cath  80% mid RCA, 3.0 x 18 BMS  7/27/2018  lexiscan inferior and septal MI without ischemia, EF 62%  7/9/2019  lexiscan negative for myocardial ischemia, EF 48  %   Overview:   2008  CABG LIMA-LAD, VG-OM, VG-diag  12/26/2013  Cath  Closed LIMA-LAD, patent VG-diag, patent VG-OM, normal LVFX  6/11/2014  lexiscan negative for myocardial ischemia, EF 59  %  5/19/2015  DSE negative for myocardial ischemia  10/26/16  Cath  Severe NVD, closed LIMA-LAD, stent LAD, stent SVG to diagonal, patent VG-OM, normal LVFX  2/9/2018  Cath  80% mid RCA, 3.0 x 18 BMS  7/27/2018  lexiscan inferior and septal MI without ischemia, EF 62%  7/9/2019  lexiscan negative for myocardial ischemia, EF 48  %     • Type 2 diabetes mellitus without complication, with long-term current use of insulin (CMS/HCC)   • Dysphagia   • History of radiation therapy   • HTN (hypertension)       Consults: Cardiology    Procedures Performed: None    Pertinent Test Results:   Imaging Results (last 7 days)     Procedure Component Value  Units Date/Time    CT Chest Without Contrast [[id number]] Collected:  10/22/19 1655     Updated:  10/22/19 1708    Narrative:       Exam: CT CHEST WO CONTRAST-     Indication: Left lower lobe opacity, concerning for aspiration     Comparison: Chest radiograph 10/21/2019     DOSE LENGTH PRODUCT: 570 mGy cm. Automated exposure control was also  utilized to decrease patient radiation dose.     Findings:     The central airways are clear. Mild/moderate emphysema. Small-moderate  size bilateral pleural effusions are present. Bilateral peribronchial  thickening, perihilar predominate groundglass opacity, and interlobular  septal thickening is present. 4 mm right lower lobe pulmonary nodule on  image 82.      No enlarged thoracic lymph nodes. The main pulmonary trunk and thoracic  aorta are normal in caliber. Mild atherosclerotic calcification aortic  arch and coronary arteries. No pericardial effusion.     Gallbladder is surgically absent. Small ventral abdominal wall hernia.  Median sternotomy wires with chronic sternal nonunion.       Impression:          1.  Moderate pulmonary edema and small-moderate size bilateral pleural  effusions.  2.  4 mm right lower lobe pulmonary nodule. Recommend follow-up chest CT  in one year to evaluate for stability.  This report was finalized on 10/22/2019 17:05 by Dr. Aramis Ness MD.    FL Video Swallow With Speech [[id number]] Collected:  10/22/19 1639     Updated:  10/22/19 1643    Narrative:       EXAMINATION: FL VIDEO SWALLOW W SPEECH- 10/22/2019 4:39 PM CDT     HISTORY: Dysphagia     Fluoroscopy time: 0.9 minutes     Number fluoroscopic spot images: 0       Impression:       FINDINGS AND IMPRESSION:  Multiple consistencies and bolus sizes of barium were administered  orally in conjunction with speech pathology. There is minimal  penetration with thin liquid, which improved with chin to maneuver.  There is no evidence of aspiration. Please see the speech pathology  report for  additional findings and recommendations.  This report was finalized on 10/22/2019 16:40 by Dr. Levi Davis MD.        Lab Results (last 7 days)     Procedure Component Value Units Date/Time    Respiratory Culture - Sputum, Cough [[id number]] Collected:  10/23/19 1622    Specimen:  Sputum from Cough Updated:  10/24/19 1035     Respiratory Culture Growth present, too young to evaluate     Gram Stain Greater than 20 Epithelial cells per low power field      Greater than 25 WBCs per low power field      Rare (1+) Yeast      Rare (1+) Gram positive cocci    POC Glucose Once [[id number]]  (Abnormal) Collected:  10/24/19 0716    Specimen:  Blood Updated:  10/24/19 0736     Glucose 167 mg/dL      Comment: : [id number] Alvaro CartagenaaMeter ID: [id number]       Basic Metabolic Panel [[id number]]  (Abnormal) Collected:  10/24/19 0536    Specimen:  Blood Updated:  10/24/19 0707     Glucose 133 mg/dL      BUN 30 mg/dL      Creatinine 0.66 mg/dL      Sodium 137 mmol/L      Potassium 4.0 mmol/L      Chloride 99 mmol/L      CO2 26.0 mmol/L      Calcium 8.5 mg/dL      eGFR Non African Amer 122 mL/min/1.73      BUN/Creatinine Ratio 45.5     Anion Gap 12.0 mmol/L     Narrative:       GFR Normal >60  Chronic Kidney Disease <60  Kidney Failure <15    Procalcitonin [[id number]]  (Abnormal) Collected:  10/24/19 0536    Specimen:  Blood Updated:  10/24/19 0637     Procalcitonin 0.03 ng/mL     Narrative:       As a Marker for Sepsis (Non-Neonates):   1. <0.5 ng/mL represents a low risk of severe sepsis and/or septic shock.  1. >2 ng/mL represents a high risk of severe sepsis and/or septic shock.    As a Marker for Lower Respiratory Tract Infections that require antibiotic therapy:  PCT on Admission     Antibiotic Therapy             6-12 Hrs later  > 0.5                Strongly Recommended            >0.25 - <0.5         Recommended  0.1 - 0.25           Discouraged                   Remeasure/reassess PCT  <0.1                 Strongly  "Discouraged          Remeasure/reassess PCT      As 28 day mortality risk marker: \"Change in Procalcitonin Result\" (> 80 % or <=80 %) if Day 0 (or Day 1) and Day 4 values are available. Refer to [email or web address]-[email or web address]-[email or web address]/   Change in PCT <=80 %   A decrease of PCT levels below or equal to 80 % defines a positive change in PCT test result representing a higher risk for 28-day all-cause mortality of patients diagnosed with severe sepsis or septic shock.  Change in PCT > 80 %   A decrease of PCT levels of more than 80 % defines a negative change in PCT result representing a lower risk for 28-day all-cause mortality of patients diagnosed with severe sepsis or septic shock.                Magnesium [[id number]]  (Normal) Collected:  10/24/19 0536    Specimen:  Blood Updated:  10/24/19 0633     Magnesium 1.9 mg/dL     CBC (No Diff) [[id number]]  (Abnormal) Collected:  10/24/19 0536    Specimen:  Blood Updated:  10/24/19 0613     WBC 5.85 10*3/mm3      RBC 3.78 10*6/mm3      Hemoglobin 11.4 g/dL      Hematocrit 33.3 %      MCV 88.1 fL      MCH 30.2 pg      MCHC 34.2 g/dL      RDW 13.2 %      RDW-SD 42.6 fl      MPV 10.6 fL      Platelets 213 10*3/mm3     POC Glucose Once [[id number]]  (Abnormal) Collected:  10/23/19 1930    Specimen:  Blood Updated:  10/23/19 2001     Glucose 172 mg/dL      Comment: : [id number] Mitchell EuraisaMeter ID: [id number]       Blood Culture - Blood, Arm, Left [[id number]] Collected:  10/21/19 1835    Specimen:  Blood from Arm, Left Updated:  10/23/19 1924     Blood Culture No growth at 2 days    Blood Culture - Blood, Arm, Left [[id number]] Collected:  10/21/19 1839    Specimen:  Blood from Arm, Left Updated:  10/23/19 1924     Blood Culture No growth at 2 days    POC Glucose Once [[id number]]  (Abnormal) Collected:  10/23/19 1658    Specimen:  Blood Updated:  10/23/19 1726     Glucose 143 mg/dL      Comment: : [id number] Stephen EuraisaMeter ID: [id number]       POC Glucose Once " [[id number]]  (Abnormal) Collected:  10/23/19 1121    Specimen:  Blood Updated:  10/23/19 1151     Glucose 131 mg/dL      Comment: : [id number] Alvaro CartagenaaMeter ID: [id number]       POC Glucose Once [[id number]]  (Abnormal) Collected:  10/23/19 0725    Specimen:  Blood Updated:  10/23/19 0755     Glucose 136 mg/dL      Comment: : [id number] Alvaro PangonnaMeter ID: [id number]       S. Pneumo Ag Urine or CSF - Urine, Urine, Clean Catch [[id number]]  (Normal) Collected:  10/22/19 2348    Specimen:  Urine, Clean Catch Updated:  10/23/19 0015     Strep Pneumo Ag Negative    Legionella Antigen, Urine - Urine, Urine, Clean Catch [[id number]]  (Normal) Collected:  10/22/19 2348    Specimen:  Urine, Clean Catch Updated:  10/23/19 0015     LEGIONELLA ANTIGEN, URINE Negative    POC Glucose Once [[id number]]  (Abnormal) Collected:  10/22/19 2030    Specimen:  Blood Updated:  10/22/19 2042     Glucose 174 mg/dL      Comment: : [id number] Satya CombsMeter ID: [id number]       POC Glucose Once [[id number]]  (Abnormal) Collected:  10/22/19 1127    Specimen:  Blood Updated:  10/22/19 1148     Glucose 270 mg/dL      Comment: : [id number] Alvaro BirdcaylaaMeter ID: [id number]       POC Glucose Once [[id number]]  (Abnormal) Collected:  10/22/19 0730    Specimen:  Blood Updated:  10/22/19 0756     Glucose 306 mg/dL      Comment: : [id number] Alvaro BirdonnaMeter ID: [id number]       Troponin [[id number]]  (Normal) Collected:  10/22/19 0616    Specimen:  Blood Updated:  10/22/19 0656     Troponin T <0.010 ng/mL     Narrative:       Troponin T Reference Range:  <= 0.03 ng/mL-   Negative for AMI  >0.03 ng/mL-     Abnormal for myocardial necrosis.  Clinicians would have to utilize clinical acumen, EKG, Troponin and serial changes to determine if it is an Acute Myocardial Infarction or myocardial injury due to an underlying chronic condition.     S. Pneumo Ag Urine or CSF - Urine, Urine, Clean Catch [[id number]]  (Normal)  Collected:  10/22/19 0050    Specimen:  Urine, Clean Catch Updated:  10/22/19 0159     Strep Pneumo Ag Negative    Legionella Antigen, Urine - Urine, Urine, Clean Catch [[id number]]  (Normal) Collected:  10/22/19 0050    Specimen:  Urine, Clean Catch Updated:  10/22/19 0159     LEGIONELLA ANTIGEN, URINE Negative    Troponin [[id number]]  (Normal) Collected:  10/22/19 0100    Specimen:  Blood Updated:  10/22/19 0152     Troponin T <0.010 ng/mL     Narrative:       Troponin T Reference Range:  <= 0.03 ng/mL-   Negative for AMI  >0.03 ng/mL-     Abnormal for myocardial necrosis.  Clinicians would have to utilize clinical acumen, EKG, Troponin and serial changes to determine if it is an Acute Myocardial Infarction or myocardial injury due to an underlying chronic condition.     Magnesium [[id number]]  (Normal) Collected:  10/21/19 2114    Specimen:  Blood Updated:  10/22/19 0102     Magnesium 1.7 mg/dL     Phosphorus [[id number]]  (Normal) Collected:  10/21/19 2114    Specimen:  Blood Updated:  10/22/19 0102     Phosphorus 2.7 mg/dL     BNP [[id number]]  (Normal) Collected:  10/21/19 2114    Specimen:  Blood Updated:  10/21/19 2252     proBNP 881.3 pg/mL     Narrative:       Among patients with dyspnea, NT-proBNP is highly sensitive for the detection of acute congestive heart failure. In addition NT-proBNP of <300 pg/ml effectively rules out acute congestive heart failure with 99% negative predictive value.    Troponin [[id number]]  (Normal) Collected:  10/21/19 2114    Specimen:  Blood Updated:  10/21/19 2140     Troponin T <0.010 ng/mL     Narrative:       Troponin T Reference Range:  <= 0.03 ng/mL-   Negative for AMI  >0.03 ng/mL-     Abnormal for myocardial necrosis.  Clinicians would have to utilize clinical acumen, EKG, Troponin and serial changes to determine if it is an Acute Myocardial Infarction or myocardial injury due to an underlying chronic condition.     Blood Gas, Arterial With Co-Ox [[id number]]  (Abnormal)  Collected:  10/1955    Specimen:  Arterial Blood Updated:  10/21/19 2007     Site Right Brachial     Jeffrey's Test N/A     pH, Arterial 7.443 pH units      pCO2, Arterial 35.1 mm Hg      pO2, Arterial 71.4 mm Hg      Comment: 84 Value below reference range        HCO3, Arterial 24.0 mmol/L      Base Excess, Arterial 0.2 mmol/L      O2 Saturation, Arterial 96.1 %      Hemoglobin, Blood Gas 12.9 g/dL      Comment: 84 Value below reference range        Hematocrit, Blood Gas 39.4 %      Oxyhemoglobin 93.1 %      Comment: 84 Value below reference range        Methemoglobin 0.70 %      Carboxyhemoglobin 2.4 %      A-a Gradiant 35.4 mmHg      Temperature 37.0 C      Sodium, Arterial 139 mmol/L      Potassium, Arterial 3.9 mmol/L      Barometric Pressure for Blood Gas 744 mmHg      Modality Room Air     Ventilator Mode NA     Note --     Collected by [id number]     Comment: Meter: [id number]     :  [id number]        pH, Temp Corrected --     pCO2, Temperature Corrected --     pO2, Temperature Corrected --    Comprehensive Metabolic Panel [[id number]]  (Abnormal) Collected:  10/21/19 1839    Specimen:  Blood Updated:  10/21/19 1908     Glucose 134 mg/dL      BUN 8 mg/dL      Creatinine 0.61 mg/dL      Sodium 142 mmol/L      Potassium 4.4 mmol/L      Chloride 104 mmol/L      CO2 25.0 mmol/L      Calcium 8.8 mg/dL      Total Protein 6.7 g/dL      Albumin 3.60 g/dL      ALT (SGPT) 15 U/L      AST (SGOT) 20 U/L      Alkaline Phosphatase 134 U/L      Total Bilirubin 0.2 mg/dL      eGFR Non African Amer 133 mL/min/1.73      Globulin 3.1 gm/dL      A/G Ratio 1.2 g/dL      BUN/Creatinine Ratio 13.1     Anion Gap 13.0 mmol/L     Narrative:       GFR Normal >60  Chronic Kidney Disease <60  Kidney Failure <15    Troponin [[id number]]  (Normal) Collected:  10/21/19 1839    Specimen:  Blood Updated:  10/21/19 1908     Troponin T <0.010 ng/mL     Narrative:       Troponin T Reference Range:  <= 0.03 ng/mL-   Negative for  AMI  >0.03 ng/mL-     Abnormal for myocardial necrosis.  Clinicians would have to utilize clinical acumen, EKG, Troponin and serial changes to determine if it is an Acute Myocardial Infarction or myocardial injury due to an underlying chronic condition.     aPTT [[id number]]  (Normal) Collected:  10/21/19 1839    Specimen:  Blood Updated:  10/21/19 1859     PTT 29.2 seconds     Protime-INR [[id number]]  (Abnormal) Collected:  10/21/19 1839    Specimen:  Blood Updated:  10/21/19 1859     Protime 14.6 Seconds      INR 1.10    CBC & Differential [[id number]] Collected:  10/21/19 1839    Specimen:  Blood Updated:  10/21/19 1851    Narrative:       The following orders were created for panel order CBC & Differential.  Procedure                               Abnormality         Status                     ---------                               -----------         ------                     CBC Auto Differential[[id number]]        Abnormal            Final result                 Please view results for these tests on the individual orders.    CBC Auto Differential [[id number]]  (Abnormal) Collected:  10/21/19 1839    Specimen:  Blood Updated:  10/21/19 1851     WBC 6.04 10*3/mm3      RBC 4.20 10*6/mm3      Hemoglobin 12.6 g/dL      Hematocrit 36.7 %      MCV 87.4 fL      MCH 30.0 pg      MCHC 34.3 g/dL      RDW 12.8 %      RDW-SD 40.6 fl      MPV 10.1 fL      Platelets 257 10*3/mm3      Neutrophil % 66.2 %      Lymphocyte % 19.2 %      Monocyte % 12.1 %      Eosinophil % 2.0 %      Basophil % 0.3 %      Immature Grans % 0.2 %      Neutrophils, Absolute 4.00 10*3/mm3      Lymphocytes, Absolute 1.16 10*3/mm3      Monocytes, Absolute 0.73 10*3/mm3      Eosinophils, Absolute 0.12 10*3/mm3      Basophils, Absolute 0.02 10*3/mm3      Immature Grans, Absolute 0.01 10*3/mm3      nRBC 0.0 /100 WBC         Hospital Course:  The patient is a 64 y.o. male who presented to [facility] with cough and shortness of breath.    HPI  "per Dr. 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  He presents with a several week history from what sounds like of episodes of cough congestion shortness of breath.  He is been hospitalized over at [facility] several times for this and chest pain recently.  It sounds like he is gotten rounds of antibiotics he will temporarily get better that he gets worse.  He comes in once again today with this he also has what sounds like fevers he says he is hot and he is cold and sweaty.  He does endorse problems with choking at times and he does have a history of dysphasia although he says he thought he was better.  As stated he has had a feeding tube before because of that.  He also states that he has problems with scar tissue in his throat that gives him difficulty breathing.  He has a chest x-ray that shows bilateral interstitial infiltrates as well as a left-sided pleural effusion.  He sounds more like he has congestive heart failure from his description and his x-ray however his BNP is normal.  He does have a history of diastolic congestive heart failure last echo was about a month ago at [facility] and showed the following:  Mitral valve leaflets are mildly thickened with preserved leaflet  mobility.  Mild mitral regurgitation is present.  Aortic valve leaflets are moderately thickened.  Aortic valve appears to be tricuspid.  No evidence of aortic stenosis or aortic regurgitation.  Mild to moderate tricuspid regurgitation with estimated RVSP of 52 mm Hg.  Mildly dilated left atrium.  Normal left ventricular size and function.  Mild concentric left ventricular hypertrophy.  Left ventricular ejection fraction is estimated at 66%.  E/A flow reversal noted. Suggestive of diastolic dysfunction.  Dilated aortic root.  IVC dilated.  He does not have an elevated white count or left shift " "however with a normal BNP and his symptoms of dysphasia I am leaning more towards pneumonia.  He is being admitted for further evaluation and management of what sounds like aspiration pneumonia possibly.  He also was found to be in intermittent a flutter with a 4-1 block.  When I was in the room he did have a couple of episodes of that on the monitor but primarily was in sinus rhythm he is not on anticoagulation.  His rate is in the 70s.  He does have listed the past history of A. Fib.\"    Hospital Course:   Initially, Dr. Márquez was treating him for bilateral pneumonia.  He was given corticosteroids and broad-spectrum antibiotics.  He was also treated by Dr. Márquez for systolic congestive heart failure with IV Lasix.  And patient had new onset atrial flutter and atrial fibrillation for which she was anticoagulated.    When I took over the patient's care, lower suspicion for pneumonia, de-escalated him to doxycycline, and ordered a CT scan of his chest.  CT scan of his chest showed moderate pulmonary edema and bilateral pleural effusions as well as a 4 mm pulmonary nodule.  Recommend repeat CT scan in approximately 1 year for evaluation of the pulmonary nodule.    Antibiotic's were discontinued, however I did continue the corticosteroids, as he seemed to have some reactive airway disease noted.    Patient was transitioned over to Eliquis.  Rate control was obtained with Cardizem and metoprolol.  Patient was evaluated by cardiology, appreciate their assistance.  Echocardiogram showed   Results for orders placed during the hospital encounter of 10/21/19   Adult Transthoracic Echo Complete W/ Cont if Necessary Per Protocol    Narrative · Mild dilation of the aortic root is present. Mild dilation of the   sinuses of Valsalva is present.  · Left atrial cavity size is moderately dilated.  · Mild to moderate tricuspid valve regurgitation is present.  · Left ventricular systolic function is normal.  · Right ventricular " "cavity is mildly dilated.     RHYTHM IS ATRIAL FIBRILLATION  BI-ATRIAL ENLARGEMENT  NORMAL LV AND RV SYSTOLIC FUNCTION  MILD TO MODERATE PULMONARY HTN  MILD AORTIC ROOT ENLARGMENT            Patient was diuresed and found to be euvolemic, patient will be discharged home with home health.    Suspect that the patient was volume overloaded due to the new onset of atrial fibrillation with rapid ventricular response as well as his moderate pulmonary hypertension.  Patient was noted to have biatrial enlargement, so he likely has some diastolic dysfunction as well.    Physical Exam on Discharge:  /60 (BP Location: Right arm, Patient Position: Lying)   Pulse 70   Temp 97.7 °F (36.5 °C) (Oral)   Resp 14   Ht 167.6 cm (65.98\")   Wt 65.2 kg (143 lb 12.8 oz)   SpO2 92%   BMI 23.22 kg/m²   Physical Exam  Constitutional: He is oriented to person, place, and time. No distress.   HENT:   Head: Normocephalic and atraumatic.   Eyes: Conjunctivae are normal. No scleral icterus.   Neck: Neck supple. No JVD present.   Cardiovascular: Normal rate, irregularly irregular rhythm and intact distal pulses. Exam reveals no friction rub.   No murmur heard.  Pulmonary/Chest: Effort normal. No stridor. No respiratory distress. He is clear to auscultation  Abdominal: Soft. Bowel sounds are normal. He exhibits no distension and no mass. There is no tenderness. There is no guarding.   Musculoskeletal: He exhibits no edema.   Neurological: He is alert and oriented to person, place, and time.   Skin: Skin is warm and dry. He is not diaphoretic. No erythema.   Psychiatric: He has a normal mood and affect. His behavior is normal.   Nursing note and vitals reviewed.       Condition on Discharge: Stable    Discharge Disposition:  [place] [facility] [place]    Discharge Medications:     Discharge Medications      New Medications      Instructions Start Date   apixaban 5 MG tablet tablet  Commonly known as:  ELIQUIS   5 mg, Oral, Every 12 Hours " Scheduled      dilTIAZem  MG 24 hr capsule  Commonly known as:  CARDIZEM CD   120 mg, Oral, Every 24 Hours Scheduled   Start Date:  10/25/2019     doxycycline 100 MG enteric coated tablet  Commonly known as:  DORYX   100 mg, Oral, 2 Times Daily      metoprolol tartrate 25 MG tablet  Commonly known as:  LOPRESSOR   25 mg, Oral, Every 12 Hours Scheduled      predniSONE 20 MG tablet  Commonly known as:  DELTASONE   40 mg, Oral, Daily With Breakfast   Start Date:  10/25/2019        Changes to Medications      Instructions Start Date   oxyCODONE-acetaminophen  MG per tablet  Commonly known as:  PERCOCET  What changed:  when to take this   1 tablet, Oral, Every 4 Hours PRN         Continue These Medications      Instructions Start Date   aspirin 81 MG chewable tablet   81 mg, Oral, Daily      baclofen 10 MG tablet  Commonly known as:  LIORESAL   20 mg, Oral, 2 Times Daily      furosemide 40 MG tablet  Commonly known as:  LASIX   40 mg, Oral, Daily      insulin glargine 100 UNIT/ML injection  Commonly known as:  LANTUS   25 Units, Subcutaneous, Daily      isosorbide mononitrate 60 MG 24 hr tablet  Commonly known as:  IMDUR   60 mg, Oral, Daily      lisinopril 2.5 MG tablet  Commonly known as:  PRINIVIL,ZESTRIL   5 mg, Oral, Daily      nitroglycerin 0.4 MG SL tablet  Commonly known as:  NITROSTAT   0.4 mg, Sublingual      pantoprazole 40 MG EC tablet  Commonly known as:  PROTONIX   40 mg, Oral, 2 Times Daily      tamsulosin 0.4 MG capsule 24 hr capsule  Commonly known as:  FLOMAX   1 capsule, Oral, Daily      zolpidem 10 MG tablet  Commonly known as:  AMBIEN   10 mg, Oral, Nightly PRN           Discharge Diet:   Diet Instructions     Diet: Soft Texture, Consistent Carbohydrate; Nectar / Syrup Thick Liquids; Ground      Discharge Diet:   Soft Texture  Consistent Carbohydrate       Fluid Consistency:  Nectar / Syrup Thick Liquids    Soft Options:  Ground        Activity at Discharge:   Activity Instructions      Activity as Tolerated            Follow-up Appointments:   Future Appointments   Date Time Provider Department Center   11/4/2019 12:45 PM Alber Justin MD [facility] None       Test Results Pending at Discharge: None    Alber Zamarripa MD  10/24/19  11:14 AM    Time: 35 minutes.

## 2019-10-24 NOTE — PROGRESS NOTES
[facility] -  Progress Note     LOS: 3 days   Patient Care Team:  Christian Castellon MD as PCP - General (Internal Medicine)  Sin Alarcon MD as Referring Physician (General Practice)  Alber Justin MD as Consulting Physician (Otolaryngology)  Kriss Dominguez MD as Referring Physician (Hematology and Oncology)  Hadley Marie III, MD as Consulting Physician (Radiation Oncology)  Chayo Gould PA-C as Physician Assistant (Radiation Oncology)  Ayanna Early MD as Surgeon (General Surgery)    Chief Complaint: CONGESTION / COUGH / SOA / CHILLS    Subjective  - NEW AFIB / PULM EDEMA    Interval History: normal cardiac markers, HR now controlled.    Patient Complaints: back to baseline cough and difficulty swallowing and coughing up phlegm.        Review of Systems:  Review of Systems   Constitutional: Negative for fatigue, fever and unexpected weight change.   Respiratory: Positive for cough. Negative for apnea, chest tightness, shortness of breath and wheezing.    Cardiovascular: Negative for chest pain, palpitations and leg swelling.   Gastrointestinal: Negative for abdominal pain.   Genitourinary: Negative for dysuria.   Musculoskeletal: Negative for myalgias.   Neurological: Negative for weakness and light-headedness.   Psychiatric/Behavioral: Negative for sleep disturbance.           Vital Sign Min/Max for last 24 hours  Temp  Min: 97.1 °F (36.2 °C)  Max: 98.1 °F (36.7 °C)   BP  Min: 91/58  Max: 108/60   Pulse  Min: 70  Max: 75   Resp  Min: 14  Max: 18   SpO2  Min: 90 %  Max: 95 %   No Data Recorded   Weight  Min: 65.2 kg (143 lb 12.8 oz)  Max: 65.2 kg (143 lb 12.8 oz)         10/23/19  2150   Weight: 65.2 kg (143 lb 12.8 oz)       Physical Exam:   Physical Exam   Constitutional: No distress.   Eyes: Pupils are equal, round, and reactive to light.   Neck: No JVD present.   Cardiovascular: Normal rate and normal heart sounds. An irregularly irregular  rhythm present. Exam reveals no gallop and no friction rub.   No murmur heard.  Pulmonary/Chest: Effort normal and breath sounds normal. He has no wheezes. He has no rales. He exhibits no tenderness.   Abdominal: Soft. Bowel sounds are normal. He exhibits no distension and no mass. There is no hepatomegaly. There is no tenderness.   Musculoskeletal: He exhibits no edema, tenderness or deformity.   Neurological: He is alert and oriented to person, place, and time.   Skin: Skin is warm and dry.           Results Review:   Lab Results (last 24 hours)     Procedure Component Value Units Date/Time    POC Glucose Once [[id number]]  (Abnormal) Collected:  10/24/19 1109    Specimen:  Blood Updated:  10/24/19 1142     Glucose 193 mg/dL      Comment: : [id number] John LadonnaMeter ID: [id number]       Respiratory Culture - Sputum, Cough [[id number]] Collected:  10/23/19 1622    Specimen:  Sputum from Cough Updated:  10/24/19 1035     Respiratory Culture Growth present, too young to evaluate     Gram Stain Greater than 20 Epithelial cells per low power field      Greater than 25 WBCs per low power field      Rare (1+) Yeast      Rare (1+) Gram positive cocci    POC Glucose Once [[id number]]  (Abnormal) Collected:  10/24/19 0716    Specimen:  Blood Updated:  10/24/19 0736     Glucose 167 mg/dL      Comment: : [id number] John ShrinkTheWebaMeter ID: [id number]       Basic Metabolic Panel [[id number]]  (Abnormal) Collected:  10/24/19 0536    Specimen:  Blood Updated:  10/24/19 0707     Glucose 133 mg/dL      BUN 30 mg/dL      Creatinine 0.66 mg/dL      Sodium 137 mmol/L      Potassium 4.0 mmol/L      Chloride 99 mmol/L      CO2 26.0 mmol/L      Calcium 8.5 mg/dL      eGFR Non African Amer 122 mL/min/1.73      BUN/Creatinine Ratio 45.5     Anion Gap 12.0 mmol/L     Narrative:       GFR Normal >60  Chronic Kidney Disease <60  Kidney Failure <15    Procalcitonin [[id number]]  (Abnormal) Collected:  10/24/19 0536    Specimen:   "Blood Updated:  10/24/19 0637     Procalcitonin 0.03 ng/mL     Narrative:       As a Marker for Sepsis (Non-Neonates):   1. <0.5 ng/mL represents a low risk of severe sepsis and/or septic shock.  1. >2 ng/mL represents a high risk of severe sepsis and/or septic shock.    As a Marker for Lower Respiratory Tract Infections that require antibiotic therapy:  PCT on Admission     Antibiotic Therapy             6-12 Hrs later  > 0.5                Strongly Recommended            >0.25 - <0.5         Recommended  0.1 - 0.25           Discouraged                   Remeasure/reassess PCT  <0.1                 Strongly Discouraged          Remeasure/reassess PCT      As 28 day mortality risk marker: \"Change in Procalcitonin Result\" (> 80 % or <=80 %) if Day 0 (or Day 1) and Day 4 values are available. Refer to http://www.[employer] Biosciencespct-calculator.com/   Change in PCT <=80 %   A decrease of PCT levels below or equal to 80 % defines a positive change in PCT test result representing a higher risk for 28-day all-cause mortality of patients diagnosed with severe sepsis or septic shock.  Change in PCT > 80 %   A decrease of PCT levels of more than 80 % defines a negative change in PCT result representing a lower risk for 28-day all-cause mortality of patients diagnosed with severe sepsis or septic shock.                Magnesium [[id number]]  (Normal) Collected:  10/24/19 0536    Specimen:  Blood Updated:  10/24/19 0633     Magnesium 1.9 mg/dL     CBC (No Diff) [[id number]]  (Abnormal) Collected:  10/24/19 0536    Specimen:  Blood Updated:  10/24/19 0613     WBC 5.85 10*3/mm3      RBC 3.78 10*6/mm3      Hemoglobin 11.4 g/dL      Hematocrit 33.3 %      MCV 88.1 fL      MCH 30.2 pg      MCHC 34.2 g/dL      RDW 13.2 %      RDW-SD 42.6 fl      MPV 10.6 fL      Platelets 213 10*3/mm3     POC Glucose Once [[id number]]  (Abnormal) Collected:  10/23/19 1930    Specimen:  Blood Updated:  10/23/19 2001     Glucose 172 mg/dL      Comment: : " [id number] Stephen EuraisaMeter ID: [id number]       Blood Culture - Blood, Arm, Left [[id number]] Collected:  10/21/19 1835    Specimen:  Blood from Arm, Left Updated:  10/23/19 1924     Blood Culture No growth at 2 days    Blood Culture - Blood, Arm, Left [[id number]] Collected:  10/21/19 1839    Specimen:  Blood from Arm, Left Updated:  10/23/19 1924     Blood Culture No growth at 2 days    POC Glucose Once [[id number]]  (Abnormal) Collected:  10/23/19 1658    Specimen:  Blood Updated:  10/23/19 1726     Glucose 143 mg/dL      Comment: : [id number] Stephen EuraisaMeter ID: [id number]                 Echo EF Estimated  66-70%      Cath Ejection Fraction Quantitative  No results found for: CATHEF        Medication Review: yes  Current Facility-Administered Medications   Medication Dose Route Frequency Provider Last Rate Last Dose   • acetaminophen (TYLENOL) tablet 650 mg  650 mg Oral Q4H PRN Zion Márquez MD   650 mg at 10/22/19 1852    Or   • acetaminophen (TYLENOL) 160 MG/5ML solution 650 mg  650 mg Oral Q4H PRN Zion Márquez MD        Or   • acetaminophen (TYLENOL) suppository 650 mg  650 mg Rectal Q4H PRN Zion Márquez MD       • apixaban (ELIQUIS) tablet 5 mg  5 mg Oral Q12H Alber Zamarripa MD   5 mg at 10/24/19 0608   • aspirin chewable tablet 81 mg  81 mg Oral Daily Zion Márquez MD   81 mg at 10/24/19 0821   • baclofen (LIORESAL) tablet 10 mg  10 mg Oral TID Zion Márquez MD   10 mg at 10/24/19 0821   • benzonatate (TESSALON) capsule 200 mg  200 mg Oral Once Karla Claudio APRN       • benzonatate (TESSALON) capsule 200 mg  200 mg Oral TID PRN Alber Zamarripa MD   200 mg at 10/23/19 0209   • dextrose (D50W) 25 g/ 50mL Intravenous Solution 25 g  25 g Intravenous Q15 Min PRN Zion Márquez MD       • dextrose (GLUTOSE) oral gel 15 g  15 g Oral Q15 Min PRN Zion Márquez MD       • dilTIAZem CD (CARDIZEM CD) 24 hr capsule 120 mg  120 mg Oral Q24H Alber Zamarripa MD    120 mg at 10/24/19 0821   • doxycycline (VIBRAMYCIN) 100 mg/100 mL 0.9% NS MBP  100 mg Intravenous Q12H Zion Márquez MD   Stopped at 10/24/19 0200   • furosemide (LASIX) tablet 40 mg  40 mg Oral Daily Alber Zamarripa MD   40 mg at 10/24/19 0821   • glucagon (human recombinant) (GLUCAGEN DIAGNOSTIC) injection 1 mg  1 mg Subcutaneous Q15 Min PRN Zion Márqeuz MD       • guaiFENesin (MUCINEX) 12 hr tablet 1,200 mg  1,200 mg Oral Q12H Zion Márquez MD   1,200 mg at 10/24/19 0821   • guaiFENesin-dextromethorphan (ROBITUSSIN DM) 100-10 MG/5ML syrup 5 mL  5 mL Oral Q6H PRN Alber Zamarripa MD   5 mL at 10/24/19 0919   • Influenza Vac Subunit Quad (FLUCELVAX) injection 0.5 mL  0.5 mL Intramuscular During Hospitalization Zion Márquez MD       • insulin detemir (LEVEMIR) injection 30 Units  30 Units Subcutaneous Daily Alber Zamarripa MD   30 Units at 10/24/19 0821   • insulin lispro (humaLOG) injection 2-7 Units  2-7 Units Subcutaneous 4x Daily With Meals & Nightly Zion Márquez MD   2 Units at 10/24/19 0820   • ipratropium-albuterol (DUO-NEB) nebulizer solution 3 mL  3 mL Nebulization Q4H - RT Zion Márquez MD   3 mL at 10/24/19 1032   • isosorbide mononitrate (IMDUR) 24 hr tablet 60 mg  60 mg Oral Daily Zion Márquez MD   60 mg at 10/24/19 0821   • lisinopril (PRINIVIL,ZESTRIL) tablet 5 mg  5 mg Oral Daily Alber Zamarripa MD   5 mg at 10/24/19 0821   • metoprolol tartrate (LOPRESSOR) tablet 25 mg  25 mg Oral Q12H Alber Zamarripa MD   25 mg at 10/24/19 0821   • nicotine (NICODERM CQ) 21 MG/24HR patch 1 patch  1 patch Transdermal Q24H Zion Márquez MD   1 patch at 10/24/19 0823   • nitroglycerin (NITROSTAT) SL tablet 0.4 mg  0.4 mg Sublingual Q5 Min PRN Zion Márquez MD       • ondansetron (ZOFRAN) tablet 4 mg  4 mg Oral Q6H PRN Zion Márquez MD        Or   • ondansetron (ZOFRAN) injection 4 mg  4 mg Intravenous Q6H PRN Zion Márquez MD       •  oxyCODONE-acetaminophen (PERCOCET)  MG per tablet 1 tablet  1 tablet Oral Q6H PRN Zion Márquez MD   1 tablet at 10/24/19 0830   • pantoprazole (PROTONIX) EC tablet 40 mg  40 mg Oral Q AM Zion Márquez MD   40 mg at 10/24/19 0608   • Pharmacy Meds to Bed Consult   Does not apply Daily Alber Zamarripa MD   Stopped at 10/22/19 0914   • predniSONE (DELTASONE) tablet 40 mg  40 mg Oral Daily With Breakfast Alber Zamarripa MD   40 mg at 10/24/19 0821   • sodium chloride 0.9 % flush 10 mL  10 mL Intravenous Q12H Zion Márquez MD   10 mL at 10/24/19 0820   • sodium chloride 0.9 % flush 10 mL  10 mL Intravenous PRN Zion Márquez MD       • tamsulosin (FLOMAX) 24 hr capsule 0.4 mg  0.4 mg Oral Daily Zion Márquez MD   0.4 mg at 10/24/19 0821   • zolpidem (AMBIEN) tablet 10 mg  10 mg Oral Nightly PRN Zion Márquez MD   10 mg at 10/23/19 2123         Assessment/Plan    AFIB / AFLUTT - HR ok and on DOAC  DYSPHAGIA - chronic  PAR COUGH - chronic      HTN (hypertension)    History of radiation therapy    Type 2 diabetes mellitus without complication, with long-term current use of insulin (CMS/HCC)    Chest pain    Dysphagia    CAD (coronary artery disease)    Atrial flutter (CMS/HCC)    Atrial fibrillation (CMS/HCC)    Tobacco dependence    Pulmonary hypertension (CMS/HCC)    Acute on chronic diastolic heart failure (CMS/HCC), suspected       OK FOR DISCHARGE FROM CARDIAC STANDPOINT - WILL SCHEDULE FU IN [facility].    THANKS    Vishal Ness MD  10/24/19  12:02 PM

## 2019-10-24 NOTE — PLAN OF CARE
Problem: Patient Care Overview  Goal: Plan of Care Review  Outcome: Ongoing (interventions implemented as appropriate)   10/24/19 8766   Coping/Psychosocial   Plan of Care Reviewed With patient   OTHER   Outcome Summary VSS. C/o of generalized back pain and headache, PRN pain med. given x 1 with relief. IV abx. Administered. Pt. Slept well through night and has been up ambulating davalos this AM. Aflutter 73-74 with frequent PVC's and 7 beats of vtach on tele.    Plan of Care Review   Progress no change       Problem: Pain, Chronic (Adult)  Goal: Acceptable Pain/Comfort Level and Functional Ability  Outcome: Ongoing (interventions implemented as appropriate)   10/24/19 0431   Pain, Chronic (Adult)   Acceptable Pain/Comfort Level and Functional Ability making progress toward outcome       Problem: Cardiac: ACS (Acute Coronary Syndrome) (Adult)  Goal: Signs and Symptoms of Listed Potential Problems Will be Absent, Minimized or Managed (Cardiac: ACS)  Outcome: Ongoing (interventions implemented as appropriate)   10/24/19 0232   Goal/Outcome Evaluation   Problems Assessed (Acute Coronary Syndrome) all   Problems Present (Acute Coronary Syn) dysrhythmia/arrhythmia;situational response       Problem: Fall Risk (Adult)  Goal: Absence of Fall  Outcome: Ongoing (interventions implemented as appropriate)   10/24/19 9283   Fall Risk (Adult)   Absence of Fall achieves outcome

## 2019-10-24 NOTE — PLAN OF CARE
Problem: Patient Care Overview  Goal: Plan of Care Review  Outcome: Ongoing (interventions implemented as appropriate)   10/24/19 0124   Coping/Psychosocial   Plan of Care Reviewed With patient   OTHER   Outcome Summary Pt discharging home today. Provided pt with sample kit of honey thick gel thickener. Educated pt on diet recommendations and how to use thickener at home. Suspect pt may not follow through with recommendations or may at least not strictly follow instructions for thickening liquids appropriately. Would recommend continued speech therapy for swallow function.    Plan of Care Review   Progress no change

## 2019-10-24 NOTE — THERAPY DISCHARGE NOTE
Acute Care - Speech Language Pathology   Swallow Treatment Note/Discharge   [facility]     Patient Name: Sameer Tavarez  : 1955  MRN: [id number]  Today's Date: 10/24/2019               Admit Date: 10/21/2019  Pt discharging home today. Provided pt with sample kit of honey thick gel thickener. Educated pt on diet recommendations and how to use thickener at home. Suspect pt may not follow through with recommendations or may at least not strictly follow instructions for thickening liquids appropriately. Would recommend continued speech therapy for swallow function.   Roseann Garcia, CCC-SLP 10/24/2019 2:56 PM    Visit Dx:      ICD-10-CM ICD-9-CM   1. Chest pain, unspecified type R07.9 786.50   2. Atrial flutter, unspecified type (CMS/HCC) I48.92 427.32   3. Pneumonia of both lower lobes due to infectious organism (CMS/HCC) J18.1 483.8   4. Atrial fibrillation, unspecified type (CMS/HCC) I48.91 427.31   5. Dysphagia, unspecified type R13.10 787.20   6. 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   • Recio's esophagus with dysplasia   • Postoperative pain   • S/P percutaneous endoscopic gastrostomy (PEG) tube placement (CMS/HCC)   • Constipation, acute   • Pain, rectal   • Oropharyngeal candidiasis   • ACS (acute coronary syndrome) (CMS/HCC)   • HTN (hypertension)   • History of radiation therapy   • Current every day smoker   • Neoplasm of uncertain behavior of tongue   • Type 2 diabetes mellitus without complication, with long-term current use of insulin (CMS/HCC)   • Essential hypertension   • Chest pain   • Diabetes mellitus (CMS/HCC)   • Diabetic peripheral angiopathy (CMS/HCC)   • Diabetic peripheral neuropathy (CMS/HCC)   • Dysphagia   • CAD (coronary artery disease)   • Cigarette nicotine dependence without complication   • Congestive heart failure (CMS/HCC)   • Atrial  fibrillation (CMS/HCC)   • Atrial flutter (CMS/HCC)   • Atrial fibrillation (CMS/HCC)   • Tobacco dependence   • Moderate hypertension   • Pulmonary hypertension (CMS/HCC)   • Acute on chronic diastolic heart failure (CMS/HCC), suspected        Therapy Treatment  Rehabilitation Treatment Summary     Row Name 10/24/19 1112             Treatment Time/Intention    Discipline  speech language pathologist  -MB      Document Type  therapy note (daily note)  -MB      Subjective Information  complains of;fatigue  -MB      Mode of Treatment  speech-language pathology  -MB      Patient/Family Observations  Pt discharging home today. Provided pt with sample kit of honey thick gel thickener. Educated pt on diet recommendations and how to use thickener at home. Suspect pt may not follow through with recommendations or may at least not strictly follow instructions for thickening liquids appropriately. Would recommend continued speech therapy for swallow function.   -MB      Patient Effort  good  -MB      Recorded by [MB] Roseann Garcai CCC-SLP 10/24/19 6605      Row Name 10/24/19 111             Pain Scale: FACES Pre/Post-Treatment    Pain: FACES Scale, Pretreatment  2-->hurts little bit  -MB      Recorded by [MB] Roseann Garcia CCC-SLP 10/24/19 9284      Row Name 10/24/19 1055             Outcome Summary/Treatment Plan (SLP)    Barriers to Overall Progress (SLP)  Potential for poor follow-through with recommendations  -MB      Plan for Continued Treatment (SLP)  Continue mechanical soft solids and honey thick liquids.   -MB      Anticipated Dischage Disposition  home  -MB      Recorded by [MB] Roseann Garcia CCC-SLP 10/24/19 9323        User Key  (r) = Recorded By, (t) = Taken By, (c) = Cosigned By    Initials Name Effective Dates Discipline    Roseann Galicia CCC-SLP 08/02/16 -  SLP        Outcome Summary  Outcome Summary/Treatment Plan (SLP)  Barriers to Overall Progress (SLP): Potential for poor  follow-through with recommendations (10/24/19 1112 : Roseann Garcia, CCC-SLP)  Plan for Continued Treatment (SLP): Continue mechanical soft solids and honey thick liquids.  (10/24/19 1112 : Roseann Garcia, CCC-SLP)  Anticipated Dischage Disposition: home (10/24/19 1455 : Roseann Garcia, CCC-SLP)  Reason for Discharge: discharge from this facility (10/24/19 1455 : Roseann Garcia, CCC-SLP)  SLP GOALS     Row Name 10/24/19 1400 10/23/19 0926 10/22/19 1500       Oral Nutrition/Hydration Goal 1 (SLP)    Oral Nutrition/Hydration Goal 1, SLP  Pt will tolerate LRD without overt s/s of aspiration.  -MB  Pt will tolerate LRD without overt s/s of aspiration.  -MB (r) AS (t) MB (c)  Pt will tolerate LRD without overt s/s of aspiration.  -TM    Time Frame (Oral Nutrition/Hydration Goal 1, SLP)  by discharge  -MB  by discharge  -MB (r) AS (t) MB (c)  by discharge  -TM    Barriers (Oral Nutrition/Hydration Goal 1, SLP)  Prior deficit  -MB  Prior deficit  -MB (r) AS (t) MB (c)  Prior deficit  -TM    Progress/Outcomes (Oral Nutrition/Hydration Goal 1, SLP)  goal partially met  -MB  continuing progress toward goal  -MB (r) AS (t) MB (c)  goal ongoing  -TM       Lingual Strengthening Goal 1 (SLP)    Activity (Lingual Strengthening Goal 1, SLP)  increase lingual tone/sensation/control/coordination/movement  -MB  increase lingual tone/sensation/control/coordination/movement  -MB (r) AS (t) MB (c)  increase lingual tone/sensation/control/coordination/movement  -TM    Increase Lingual Tone/Sensation/Control/Coordination/Movement  lingual movement exercises  -MB  lingual movement exercises  -MB (r) AS (t) MB (c)  lingual movement exercises  -TM    [place]/Accuracy (Lingual Strengthening Goal 1, SLP)  independently (over 90% accuracy)  -MB  independently (over 90% accuracy)  -MB (r) AS (t) MB (c)  independently (over 90% accuracy)  -TM    Time Frame (Lingual Strengthening Goal 1, SLP)  by discharge  -MB  by discharge   -MB (r) AS (t) MB (c)  by discharge  -TM    Barriers (Lingual Strengthening Goal 1, SLP)  Prior deficit  -MB  Prior deficit  -MB (r) AS (t) MB (c)  Prior deficit  -TM    Progress/Outcomes (Lingual Strengthening Goal 1, SLP)  goal partially met  -MB  continuing progress toward goal  -MB (r) AS (t) MB (c)  goal ongoing  -TM       Pharyngeal Strengthening Exercise Goal 1 (SLP)    Activity (Pharyngeal Strengthening Goal 1, SLP)  increase timing  -MB  increase timing  -MB (r) AS (t) MB (c)  increase timing  -TM    Increase Timing  gustatory stimulation (sour/cold)  -MB  gustatory stimulation (sour/cold)  -MB (r) AS (t) MB (c)  gustatory stimulation (sour/cold)  -TM    [place]/Accuracy (Pharyngeal Strengthening Goal 1, SLP)  independently (over 90% accuracy)  -MB  independently (over 90% accuracy)  -MB (r) AS (t) MB (c)  independently (over 90% accuracy)  -TM    Time Frame (Pharyngeal Strengthening Goal 1, SLP)  by discharge  -MB  by discharge  -MB (r) AS (t) MB (c)  by discharge  -TM    Barriers (Pharyngeal Strengthening Goal 1, SLP)  Prior deficit  -MB  Prior deficit  -MB (r) AS (t) MB (c)  Prior deficit  -TM    Progress/Outcomes (Pharyngeal Strengthening Goal 1, SLP)  goal partially met  -MB  continuing progress toward goal  -MB (r) AS (t) MB (c)  goal ongoing  -TM      User Key  (r) = Recorded By, (t) = Taken By, (c) = Cosigned By    Initials Name Provider Type    Roseann Galicia, CCC-SLP Speech and Language Pathologist    Joann Walter CCC-SLP Speech and Language Pathologist    AS Darshan Jackson, Speech Therapy Student Speech Therapy Student          EDUCATION  The patient has been educated in the following areas:   Dysphagia (Swallowing Impairment).    SLP Recommendation and Plan     Barriers to Overall Progress (SLP): Potential for poor follow-through with recommendations  Plan for Continued Treatment (SLP): Continue mechanical soft solids and honey thick liquids.   Anticipated Dischage  Disposition: home        Reason for Discharge: discharge from this facility           Time Calculation:   Time Calculation- SLP     Row Name 10/24/19 1455             Time Calculation- SLP    SLP Start Time  1112  -MB      SLP Stop Time  1120  -MB      SLP Time Calculation (min)  8 min  -MB      SLP Received On  10/24/19  -MB        User Key  (r) = Recorded By, (t) = Taken By, (c) = Cosigned By    Initials Name Provider Type    Roseann Galicia CCC-SLP Speech and Language Pathologist          Therapy Charges for Today     Code Description Service Date Service Provider Modifiers Qty    [id number]  ST SELF CARE/MGMT/TRAIN EA 15 MIN 10/24/2019 Roseann Garcia CCC-SLP GN 1               SLP Discharge Summary  Anticipated Dischage Disposition: home  Reason for Discharge: discharge from this facility  Progress Toward Achieving Short/long Term Goals: goals partially met within established timelines  Discharge Destination: home    EARL Kaerney  10/24/2019

## 2019-10-24 NOTE — PROGRESS NOTES
Continued Stay Note  [facility]     Patient Name: Sameer Tavarez  MRN: [id number]  Today's Date: 10/24/2019    Admit Date: 10/21/2019    Discharge Plan     Row Name 10/24/19 1146       Plan    Plan  Home with [facility]    Patient/Family in Agreement with Plan  yes    Final Discharge Disposition Code  06 - home with home health care    Final Note  Referral for hh services.  Pt was provided hh options and chose [facility].  SW has informed Mar[facility] at [facility].  Pt to MD today.  VLADISLAV Reilly.        Discharge Codes    No documentation.       Expected Discharge Date and Time     Expected Discharge Date Expected Discharge Time    Oct 24, 2019             VLADISLAV Man

## 2019-10-24 NOTE — PROGRESS NOTES
[facility]  INPATIENT PROGRESS NOTE    Patient Name: Sameer Tavarez  Date of Admission: 10/21/2019  Today's Date: 10/23/19  Length of Stay: 2  Primary Care Physician: Christian Castellon MD    Subjective   Chief Complaint: shortness of breath  HPI     Patient seen and examined at bedside.  Patient has been ambulating around the hospital all day and feeling better.  Still has a productive cough, has diuresed well.        Review of Systems   Constitutional: Negative for chills and fever.   Respiratory: Positive for cough. Negative for shortness of breath.    Cardiovascular: Negative for chest pain and palpitations.   Gastrointestinal: Negative for abdominal distention, abdominal pain, constipation, diarrhea, nausea and vomiting.        All pertinent negatives and positives are as above. All other systems have been reviewed and are negative unless otherwise stated.     Objective    Temp:  [97.6 °F (36.4 °C)-98.1 °F (36.7 °C)] 97.6 °F (36.4 °C)  Heart Rate:  [65-75] 75  Resp:  [16-18] 18  BP: ()/(49-58) 107/58  Physical Exam  Constitutional: He is oriented to person, place, and time. No distress.   HENT:   Head: Normocephalic and atraumatic.   Eyes: Conjunctivae are normal. No scleral icterus.   Neck: Neck supple. No JVD present.   Cardiovascular: Normal rate, irregularly irregular rhythm and intact distal pulses. Exam reveals no friction rub.   No murmur heard.  Pulmonary/Chest: Effort normal. No stridor. No respiratory distress. He is clear to auscultation  Abdominal: Soft. Bowel sounds are normal. He exhibits no distension and no mass. There is no tenderness. There is no guarding.   Musculoskeletal: He exhibits no edema.   Neurological: He is alert and oriented to person, place, and time.   Skin: Skin is warm and dry. He is not diaphoretic. No erythema.   Psychiatric: He has a normal mood and affect. His behavior is normal.   Nursing note and vitals  reviewed.      Results Review:  I have reviewed the labs, radiology results, and diagnostic studies.    Laboratory Data:   Results from last 7 days   Lab Units 10/21/19  1839   WBC 10*3/mm3 6.04   HEMOGLOBIN g/dL 12.6*   HEMATOCRIT % 36.7*   PLATELETS 10*3/mm3 257        Results from last 7 days   Lab Units 10/21/19  1955/19  1839   SODIUM mmol/L  --  142   SODIUM, ARTERIAL mmol/L 139  --    POTASSIUM mmol/L  --  4.4   CHLORIDE mmol/L  --  104   CO2 mmol/L  --  25.0   BUN mg/dL  --  8   CREATININE mg/dL  --  0.61*   CALCIUM mg/dL  --  8.8   BILIRUBIN mg/dL  --  0.2   ALK PHOS U/L  --  134*   ALT (SGPT) U/L  --  15   AST (SGOT) U/L  --  20   GLUCOSE mg/dL  --  134*       Culture Data:   Blood Culture   Date Value Ref Range Status   10/21/2019 No growth at 2 days  Preliminary   10/21/2019 No growth at 2 days  Preliminary       Radiology Data:   Imaging Results (last 24 hours)     ** No results found for the last 24 hours. **          I have reviewed the patient's current medications.     Assessment/Plan     Active Hospital Problems    Diagnosis   • Tobacco dependence   • Atrial fibrillation (CMS/HCC)   • Chest pain   • Atrial flutter (CMS/HCC)   • CAD (coronary artery disease)     Overview:   2008  CABG LIMA-LAD, VG-OM, VG-diag  12/26/2013  Cath  Closed LIMA-LAD, patent VG-diag, patent VG-OM, normal LVFX  6/11/2014  lexiscan negative for myocardial ischemia, EF 59  %  5/19/2015  DSE negative for myocardial ischemia  10/26/16  Cath  Severe NVD, closed LIMA-LAD, stent LAD, stent SVG to diagonal, patent VG-OM, normal LVFX  2/9/2018  Cath  80% mid RCA, 3.0 x 18 BMS  7/27/2018  lexiscan inferior and septal MI without ischemia, EF 62%  7/9/2019  lexiscan negative for myocardial ischemia, EF 48  %   Overview:   2008  CABG LIMA-LAD, VG-OM, VG-diag  12/26/2013  Cath  Closed LIMA-LAD, patent VG-diag, patent VG-OM, normal LVFX  6/11/2014  lexiscan negative for myocardial ischemia, EF 59  %  5/19/2015  DSE negative for  myocardial ischemia  10/26/16  Cath  Severe NVD, closed LIMA-LAD, stent LAD, stent SVG to diagonal, patent VG-OM, normal LVFX  2/9/2018  Cath  80% mid RCA, 3.0 x 18 BMS  7/27/2018  lexiscan inferior and septal MI without ischemia, EF 62%  7/9/2019  lexiscan negative for myocardial ischemia, EF 48  %     • Type 2 diabetes mellitus without complication, with long-term current use of insulin (CMS/HCC)   • Dysphagia   • History of radiation therapy   • HTN (hypertension)       Plan:  1.  Continue metorpolol 25 mg BID; Agree with cardiology on diltiazem 30 mg q6h, rate better controlled will switch to long acting in AM   2.  Apixaban 5 mg BID   3.  Continue lisinopril 5 mg   4.  Continue imdur 60 mg PO   5.  Levemir 30 units qhs and lispro sliding scale qac/qhs with accuchecks  6.  Pantoprazole 40 mg PO  7.  Continue duo nebs   8.  Continue prednisone  9.  Continue flutter valve  10.  Furosemide 40 mg IV BID today, switch to 40 mg PO daily                Discharge Planning: I expect the patient to be discharged to home in 1-2 days.    Alber Zamarripa MD   10/23/19   8:37 PM

## 2019-10-25 ENCOUNTER — READMISSION MANAGEMENT (OUTPATIENT)
Dept: CALL CENTER | Facility: HOSPITAL | Age: 64
End: 2019-10-25

## 2019-10-25 LAB
BACTERIA SPEC RESP CULT: NORMAL
GRAM STN SPEC: NORMAL

## 2019-10-25 NOTE — OUTREACH NOTE
Prep Survey      Responses   Facility patient discharged from?  [place]   Is patient eligible?  Yes   Discharge diagnosis  PNA   Does the patient have one of the following disease processes/diagnoses(primary or secondary)?  COPD/Pneumonia   Does the patient have Home health ordered?  Yes   What is the Home health agency?   [facility]   Is there a DME ordered?  No   Comments regarding appointments  see AVS   Medication alerts for this patient  apixaban, diltiazem, doxycycline, metoprolol tartrate, prednisone, aspirin, lasix,    Prep survey completed?  Yes          She Carr RN

## 2019-10-26 LAB
BACTERIA SPEC AEROBE CULT: NORMAL
BACTERIA SPEC AEROBE CULT: NORMAL

## 2019-10-28 ENCOUNTER — READMISSION MANAGEMENT (OUTPATIENT)
Dept: CALL CENTER | Facility: HOSPITAL | Age: 64
End: 2019-10-28

## 2019-10-28 NOTE — OUTREACH NOTE
COPD/PN Week 1 Survey      Responses   Facility patient discharged from?  [place]   Does the patient have one of the following disease processes/diagnoses(primary or secondary)?  COPD/Pneumonia   Is there a successful TCM telephone encounter documented?  No   Was the primary reason for admission:  Pneumonia   Week 1 attempt successful?  No   Unsuccessful attempts  Attempt 1          Renee Neal RN

## 2019-10-30 ENCOUNTER — HOSPITAL ENCOUNTER (EMERGENCY)
Facility: HOSPITAL | Age: 64
Discharge: HOME | End: 2019-10-30
Admitting: EMERGENCY MEDICINE

## 2019-10-30 ENCOUNTER — READMISSION MANAGEMENT (OUTPATIENT)
Dept: CALL CENTER | Facility: HOSPITAL | Age: 64
End: 2019-10-30

## 2019-10-30 ENCOUNTER — APPOINTMENT (OUTPATIENT)
Dept: GENERAL RADIOLOGY | Facility: HOSPITAL | Age: 64
End: 2019-10-30

## 2019-10-30 VITALS
TEMPERATURE: 98.2 F | BODY MASS INDEX: 21.69 KG/M2 | WEIGHT: 135 LBS | HEART RATE: 53 BPM | RESPIRATION RATE: 12 BRPM | SYSTOLIC BLOOD PRESSURE: 94 MMHG | DIASTOLIC BLOOD PRESSURE: 62 MMHG | OXYGEN SATURATION: 95 % | HEIGHT: 66 IN

## 2019-10-30 DIAGNOSIS — R05.9 COUGH: Primary | ICD-10-CM

## 2019-10-30 LAB
ALBUMIN SERPL-MCNC: 3.5 G/DL (ref 3.5–5.2)
ALBUMIN/GLOB SERPL: 1.2 G/DL
ALP SERPL-CCNC: 131 U/L (ref 39–117)
ALT SERPL W P-5'-P-CCNC: 25 U/L (ref 1–41)
ANION GAP SERPL CALCULATED.3IONS-SCNC: 16 MMOL/L (ref 5–15)
AST SERPL-CCNC: 17 U/L (ref 1–40)
BASOPHILS # BLD AUTO: 0.04 10*3/MM3 (ref 0–0.2)
BASOPHILS NFR BLD AUTO: 0.4 % (ref 0–1.5)
BILIRUB SERPL-MCNC: 0.4 MG/DL (ref 0.2–1.2)
BUN BLD-MCNC: 16 MG/DL (ref 8–23)
BUN/CREAT SERPL: 21.3 (ref 7–25)
CALCIUM SPEC-SCNC: 8.2 MG/DL (ref 8.6–10.5)
CHLORIDE SERPL-SCNC: 93 MMOL/L (ref 98–107)
CO2 SERPL-SCNC: 27 MMOL/L (ref 22–29)
CREAT BLD-MCNC: 0.75 MG/DL (ref 0.76–1.27)
DEPRECATED RDW RBC AUTO: 39.3 FL (ref 37–54)
EOSINOPHIL # BLD AUTO: 0.04 10*3/MM3 (ref 0–0.4)
EOSINOPHIL NFR BLD AUTO: 0.4 % (ref 0.3–6.2)
ERYTHROCYTE [DISTWIDTH] IN BLOOD BY AUTOMATED COUNT: 12.7 % (ref 12.3–15.4)
FLUAV AG NPH QL: NEGATIVE
FLUBV AG NPH QL IA: NEGATIVE
GFR SERPL CREATININE-BSD FRML MDRD: 105 ML/MIN/1.73
GLOBULIN UR ELPH-MCNC: 2.9 GM/DL
GLUCOSE BLD-MCNC: 253 MG/DL (ref 65–99)
HCT VFR BLD AUTO: 39.5 % (ref 37.5–51)
HGB BLD-MCNC: 13.9 G/DL (ref 13–17.7)
IMM GRANULOCYTES # BLD AUTO: 0.14 10*3/MM3 (ref 0–0.05)
IMM GRANULOCYTES NFR BLD AUTO: 1.4 % (ref 0–0.5)
LYMPHOCYTES # BLD AUTO: 1.21 10*3/MM3 (ref 0.7–3.1)
LYMPHOCYTES NFR BLD AUTO: 11.8 % (ref 19.6–45.3)
MCH RBC QN AUTO: 30 PG (ref 26.6–33)
MCHC RBC AUTO-ENTMCNC: 35.2 G/DL (ref 31.5–35.7)
MCV RBC AUTO: 85.3 FL (ref 79–97)
MONOCYTES # BLD AUTO: 1.41 10*3/MM3 (ref 0.1–0.9)
MONOCYTES NFR BLD AUTO: 13.8 % (ref 5–12)
NEUTROPHILS # BLD AUTO: 7.41 10*3/MM3 (ref 1.7–7)
NEUTROPHILS NFR BLD AUTO: 72.2 % (ref 42.7–76)
NRBC BLD AUTO-RTO: 0 /100 WBC (ref 0–0.2)
PLATELET # BLD AUTO: 249 10*3/MM3 (ref 140–450)
PMV BLD AUTO: 10.8 FL (ref 6–12)
POTASSIUM BLD-SCNC: 3.6 MMOL/L (ref 3.5–5.2)
PROCALCITONIN SERPL-MCNC: 0.02 NG/ML (ref 0.1–0.25)
PROT SERPL-MCNC: 6.4 G/DL (ref 6–8.5)
RBC # BLD AUTO: 4.63 10*6/MM3 (ref 4.14–5.8)
SODIUM BLD-SCNC: 136 MMOL/L (ref 136–145)
WBC NRBC COR # BLD: 10.25 10*3/MM3 (ref 3.4–10.8)

## 2019-10-30 PROCEDURE — 84145 PROCALCITONIN (PCT): CPT | Performed by: PHYSICIAN ASSISTANT

## 2019-10-30 PROCEDURE — 85025 COMPLETE CBC W/AUTO DIFF WBC: CPT | Performed by: PHYSICIAN ASSISTANT

## 2019-10-30 PROCEDURE — 80053 COMPREHEN METABOLIC PANEL: CPT | Performed by: PHYSICIAN ASSISTANT

## 2019-10-30 PROCEDURE — 93005 ELECTROCARDIOGRAM TRACING: CPT | Performed by: PHYSICIAN ASSISTANT

## 2019-10-30 PROCEDURE — 99284 EMERGENCY DEPT VISIT MOD MDM: CPT

## 2019-10-30 PROCEDURE — 94760 N-INVAS EAR/PLS OXIMETRY 1: CPT

## 2019-10-30 PROCEDURE — 93010 ELECTROCARDIOGRAM REPORT: CPT | Performed by: INTERNAL MEDICINE

## 2019-10-30 PROCEDURE — 71046 X-RAY EXAM CHEST 2 VIEWS: CPT

## 2019-10-30 PROCEDURE — 94799 UNLISTED PULMONARY SVC/PX: CPT

## 2019-10-30 PROCEDURE — 87804 INFLUENZA ASSAY W/OPTIC: CPT | Performed by: PHYSICIAN ASSISTANT

## 2019-10-30 PROCEDURE — 94640 AIRWAY INHALATION TREATMENT: CPT

## 2019-10-30 RX ORDER — IPRATROPIUM BROMIDE AND ALBUTEROL SULFATE 2.5; .5 MG/3ML; MG/3ML
3 SOLUTION RESPIRATORY (INHALATION) ONCE
Status: COMPLETED | OUTPATIENT
Start: 2019-10-30 | End: 2019-10-30

## 2019-10-30 RX ORDER — SODIUM CHLORIDE 0.9 % (FLUSH) 0.9 %
10 SYRINGE (ML) INJECTION AS NEEDED
Status: DISCONTINUED | OUTPATIENT
Start: 2019-10-30 | End: 2019-10-30 | Stop reason: HOSPADM

## 2019-10-30 RX ADMIN — IPRATROPIUM BROMIDE AND ALBUTEROL SULFATE 3 ML: 2.5; .5 SOLUTION RESPIRATORY (INHALATION) at 18:34

## 2019-10-30 RX ADMIN — SODIUM CHLORIDE 1000 ML: 9 INJECTION, SOLUTION INTRAVENOUS at 17:10

## 2019-10-30 NOTE — OUTREACH NOTE
COPD/PN Week 1 Survey      Responses   Facility patient discharged from?  [place]   Does the patient have one of the following disease processes/diagnoses(primary or secondary)?  COPD/Pneumonia   Is there a successful TCM telephone encounter documented?  No   Was the primary reason for admission:  Pneumonia   Week 1 attempt successful?  No   Unsuccessful attempts  Attempt 2          Liz Martin RN

## 2019-10-31 ENCOUNTER — READMISSION MANAGEMENT (OUTPATIENT)
Dept: CALL CENTER | Facility: HOSPITAL | Age: 64
End: 2019-10-31

## 2019-11-04 ENCOUNTER — READMISSION MANAGEMENT (OUTPATIENT)
Dept: CALL CENTER | Facility: HOSPITAL | Age: 64
End: 2019-11-04

## 2019-11-04 ENCOUNTER — OFFICE VISIT (OUTPATIENT)
Dept: OTOLARYNGOLOGY | Facility: CLINIC | Age: 64
End: 2019-11-04

## 2019-11-04 VITALS
HEART RATE: 146 BPM | TEMPERATURE: 98.6 F | SYSTOLIC BLOOD PRESSURE: 128 MMHG | BODY MASS INDEX: 23.91 KG/M2 | DIASTOLIC BLOOD PRESSURE: 68 MMHG | HEIGHT: 66 IN | WEIGHT: 148.8 LBS

## 2019-11-04 DIAGNOSIS — C10.9 OROPHARYNGEAL CANCER (HCC): Primary | ICD-10-CM

## 2019-11-04 DIAGNOSIS — Z92.3 HISTORY OF RADIATION THERAPY: ICD-10-CM

## 2019-11-04 PROCEDURE — 99213 OFFICE O/P EST LOW 20 MIN: CPT | Performed by: OTOLARYNGOLOGY

## 2019-11-04 RX ORDER — FLUTICASONE FUROATE 100 UG/1
POWDER RESPIRATORY (INHALATION)
COMMUNITY
Start: 2019-10-31

## 2019-11-04 RX ORDER — FLUCONAZOLE 200 MG/1
TABLET ORAL
COMMUNITY
Start: 2019-10-31

## 2019-11-04 NOTE — PROGRESS NOTES
Eda Teixeira MA   Patient Intake Note    Review of Systems  Review of Systems   Constitutional: Positive for chills. Negative for fatigue and fever.   HENT:        See HPI   Respiratory: Positive for cough, shortness of breath and wheezing. Negative for choking.    Gastrointestinal: Positive for nausea. Negative for diarrhea and vomiting.   Neurological: Positive for dizziness, weakness, light-headedness and headaches.   Psychiatric/Behavioral: Positive for sleep disturbance.       QUALITY MEASURES    Tobacco Use: Screening and Cessation Intervention  Social History    Tobacco Use      Smoking status: Current Every Day Smoker        Packs/day: 0.00        Years: 52.00        Pack years: 0        Types: Cigarettes      Smokeless tobacco: Former User        Types: Snuff      Tobacco comment: 6 cigarettes a day        Eda Teixeira MA  11/4/2019  12:53 PM

## 2019-11-04 NOTE — PATIENT INSTRUCTIONS
Patient Instructions   Be aware of the signs and symptoms of head and neck cancer including neck mass, persistent sore throat, ear pain, hemoptysis, weight loss and hoarseness. If any of these symptoms occur, call for evaluation.

## 2019-11-04 NOTE — OUTREACH NOTE
COPD/PN Week 2 Survey      Responses   Facility patient discharged from?  [place]   Does the patient have one of the following disease processes/diagnoses(primary or secondary)?  COPD/Pneumonia   Was the primary reason for admission:  Pneumonia   Week 2 attempt successful?  No   Unsuccessful attempts  Attempt 2          Delaney Mai, RN

## 2019-11-04 NOTE — PROGRESS NOTES
" Alber Justin MD   CC:  follow up head and neck cancer   History of Present Illness:  The patient presents for routine cancer follow up with no acute complaints.  His tracheostomy is out well-healed and he is no longer reliant on G-tube feedings.    Oncology/Hematology History    Sameer Tavarez is a 62 y.o. male with oropharyngeal cancer. He had an EGD on 2/6/17 by Dr Patel with biopsies showing Barretts esophagus and reflux esophagitis. He had continued dysphagia and underwent another EGD and an oropharyngeal biopsy on 8/27/17 with the oropharyngeal biosies showing \"at least squamous cell carcinoma in situ\". CT scanning showed \"oropharyngeal asymmety without overt enhancing mass\" on 8/28/17. He was referred to Dr. Alber Justin MD and diagnosed with a large ulcerative lesion that was in the oral pharynx.  It extended from the right lateral aspect of the uvula and extended to the posterior rim of the soft palate extending to the tonsillar fossa and deeply penetrating into it.  It involved the right base of tongue and extended at least to the midline of the base of tongue.  It extended forward into the lingual tongue muscular approximately 2 cm. He was having difficulty with his secretions and his airway at night, so an awake tracheostomy with direct laryngoscopy and biopsy was performed on 10/5/17. At the time of admission, g tube placement and dental extraction was performed.            Oropharyngeal cancer (CMS/[facility])    8/28/2017 Initial Diagnosis     Oropharyngeal cancer         8/28/2017 Biopsy     Dr Patel ([facility]) - EGD with oropharynx biopsy:    Clinical Information       Pre-Op: Gerd/Hoarsness      Post-Op Esophageal ulcer,barretts oral lesion    Final Diagnosis   1.  Esophagus, endoscopic biopsy:  A.  Fragments of benign squamocolumnar junction mucosa and benign glandular mucosa demonstrating intestinal metaplasia (Recio's esophagus)  B.  Chronic active inflammation, " moderate.  C.  No dysplasia identified.     2.  Oropharynx, biopsy: At least squamous cell carcinoma in situ.     Comment: Status of invasion is indeterminate due to tangential sectioning of several of the tissue fragments.  Immunohistochemical stain for p16 is positive.     AJCC stage:pTX pNX                 8/28/2017 Imaging     [facility]  CT Soft tissue neck  No discrete enhancing mass  Soft tissue thickening right oropharynx and peritonsillar soft tissues compared to left.  Small lymph nodes may be inflammatory in nature         9/13/2017 Imaging     [facility]    CT Chest  Small hiatal hernia  1.1 cm lymph node adjacent to the distal esophagus.  Two small lung nodules.  For multiple solid noncalcified nodules smaller than 6 mm in diameter, no routine follow-up is recommended. (grade 2B; weak recommendation, moderate-quality evidence)  CT Abd Pelvis  Right perineal mass of unknown significance. Please correlate with physical exam.  Dilated small bowel with multiple air-fluid levels. Differential considerations include partial small bowel obstruction vs adynamic ileus.  Mild diverticulosis. Mild distended urinary bladder.         9/29/2017 Imaging     [facility]  MR Orbit Face Neck  1. Large right oropharyngeal mass compatible with the history of  carcinoma.  2. Tongue base and right submandibular gland involvement.    PET  1. Abnormal metabolic activity in the base of the tongue on the right  extending in the right palatine tonsil. SUV is 8.8. This is consistent  with neoplasm. No other regions of abnormal metabolic activity are  identified..           10/5/2017 Procedure     Tracheostomy with Direct Laryngoscopy and biopsy -JUANITA Justin MD     Path    Final Diagnosis   1.  Right superior tonsillar fossa, biopsy:  A.  Moderately differentiated squamous cell carcinoma, invasive.  B.  Immunohistochemical stain for p16 is positive.      AJCC stage: pTX pNX     2.  Right base of tongue,  biopsy:  A.  Moderately differentiated squamous cell carcinoma, invasive.  B.  Immunohistochemical stain for p16 is positive.              10/6/2017 Procedure     Port placement  Gastrostomy tube placement         10/11/2017 Procedure     Teeth extraction- Les Goddard MD, DMD         11/3/2017 Cancer Staged     Oropharyngeal cancer    Staging form: Pharynx - HPV-Mediated Oropharynx, AJCC 8th Edition    - Clinical stage from 11/3/2017: Stage III (cT4, cN0, cM0, p16: Positive) - Signed by Alber Justin MD on 1/1/2018    - Pathologic: No stage assigned - Unsigned         11/9/2017 - 1/16/2018 Chemotherapy/Radiation     Carboplatin and Taxol- [facility]    Radiation OncologyTreatment Course:  Sameer Tavarez received 7000 cGy in 35 fractions to the oral pharynx and neck via External Beam Radiation - EBRT.- [place]         6/25/2018 Biopsy     direct laryngoscopy with biopsy- benign          7/10/2018 -  Other Event     Tracheostomy removal         10/18/2018 Surgery     Tracheocutaneous fistula closure ([facility])  Gastrostomy tube replacement ([facility])         10/18/2018 Biopsy     Direct laryngoscopy with biopsy of right base of tongue and right tonsil            Review of Systems  Reviewed as per patient intake note.    Past History:  Past medical and surgical history, family history and social history reviewed and updated when appropriate.  Current medications and allergies reviewed and updated when appropriate.  Allergies:  Codeine        Vital Signs:  Temp:  [98.6 °F (37 °C)] 98.6 °F (37 °C)  Heart Rate:  [146] 146  BP: (128)/(68) 128/68    Physical Exam:    CONSTITUTIONAL: well nourished, well-developed, alert, oriented, in no acute distress   COMMUNICATION AND VOICE: able to communicate normally, normal voice quality  HEAD: normocephalic, no lesions, atraumatic, no tenderness, no masses   FACE: appearance normal, no lesions, no tenderness, no deformities, facial motion symmetric  SALIVARY GLANDS: parotid glands  with no tenderness, no swelling, no masses, submandibular glands with normal size, nontender  EYES: ocular motility normal, eyelids normal, orbits normal, no proptosis, conjunctiva normal , pupils equal, round  HEARING: response to conversational voice normal bilaterally   EXTERNAL EARS: auricles without lesions  EXTERNAL EAR CANALS: normal ear canals without stenosis or significant cerumen  TYMPANIC MEMBRANES: tympanic membrane appearance normal, no lesions, no perforation, normal mobility, no fluid  EXTERNAL NOSE: structure normal, no tenderness on palpation, no nasal discharge, no lesions, no evidence of trauma, nostrils patent  INTRANASAL EXAM: nasal mucosa normal, vestibule within normal limits, inferior turbinate normal, nasal septum without overtly obstructing anterior deviation  LIPS: structure normal, no tenderness on palpation, no lesions, no evidence of trauma  TEETH: dentition within normal limits for age  GUMS: gingivae healthy  ORAL MUCOSA: oral mucosa with diffuse dryness, no mucosal lesions   FLOOR OF MOUTH: Warthin's duct patent, mucosa normal  TONGUE:  lingual mucosa normal without lesions, normal tongue mobility  Soft to palpation,  post radiation changes present, no evidence of recurrent disease present,  PALATE: soft and hard palates with normal mucosa and structure  OROPHARYNX: oropharyngeal mucosa normal, tonsil fossa with normal in appearance  NECK: neck appearance normal, no masses or tenderness  THYROID: no overt thyromegaly, no tenderness, nodules or mass present on palpation, position midline   LYMPH NODES: no lymphadenopathy  CHEST/RESPIRATORY: respiratory effort normal  CARDIOVASCULAR: extremities without cyanosis or edema, no overt jugulovenous distension present  NEUROLOGIC/PSYCHIATRIC: oriented appropriately for age, mood normal, affect appropriate, cranial nerves intact grossly unless specifically mentioned above     RESULTS REVIEW:    Fl Video Swallow With Speech    Result Date:  10/22/2019  Narrative: EXAMINATION: FL VIDEO SWALLOW W SPEECH- 10/22/2019 4:39 PM CDT  HISTORY: Dysphagia  Fluoroscopy time: 0.9 minutes  Number fluoroscopic spot images: 0      Impression: FINDINGS AND IMPRESSION: Multiple consistencies and bolus sizes of barium were administered orally in conjunction with speech pathology. There is minimal penetration with thin liquid, which improved with chin to maneuver. There is no evidence of aspiration. Please see the speech pathology report for additional findings and recommendations. This report was finalized on 10/22/2019 16:40 by Dr. Levi Davis MD.       Assessment   1. Oropharyngeal cancer (CMS/HCC)    2. History of radiation therapy        Plan   Continue current management plan.         Patient Instructions   Be aware of the signs and symptoms of head and neck cancer including neck mass, persistent sore throat, ear pain, hemoptysis, weight loss and hoarseness. If any of these symptoms occur, call for evaluation.      -----FOLLOW UP-----  Return in about 4 months (around 3/4/2020).      Alber Justin MD  11/04/19  1:11 PM

## 2019-11-18 ENCOUNTER — OFFICE VISIT (OUTPATIENT)
Dept: NEUROSURGERY | Age: 64
End: 2019-11-18
Payer: MEDICARE

## 2019-11-18 VITALS
BODY MASS INDEX: 23.01 KG/M2 | HEIGHT: 66 IN | SYSTOLIC BLOOD PRESSURE: 154 MMHG | HEART RATE: 55 BPM | DIASTOLIC BLOOD PRESSURE: 77 MMHG | WEIGHT: 143.2 LBS | OXYGEN SATURATION: 94 %

## 2019-11-18 DIAGNOSIS — R42 DIZZINESS: ICD-10-CM

## 2019-11-18 DIAGNOSIS — R20.2 PARESTHESIAS: Primary | ICD-10-CM

## 2019-11-18 PROCEDURE — G8420 CALC BMI NORM PARAMETERS: HCPCS | Performed by: NURSE PRACTITIONER

## 2019-11-18 PROCEDURE — G8427 DOCREV CUR MEDS BY ELIG CLIN: HCPCS | Performed by: NURSE PRACTITIONER

## 2019-11-18 PROCEDURE — G8598 ASA/ANTIPLAT THER USED: HCPCS | Performed by: NURSE PRACTITIONER

## 2019-11-18 PROCEDURE — G8484 FLU IMMUNIZE NO ADMIN: HCPCS | Performed by: NURSE PRACTITIONER

## 2019-11-18 PROCEDURE — 4004F PT TOBACCO SCREEN RCVD TLK: CPT | Performed by: NURSE PRACTITIONER

## 2019-11-18 PROCEDURE — 3017F COLORECTAL CA SCREEN DOC REV: CPT | Performed by: NURSE PRACTITIONER

## 2019-11-18 PROCEDURE — 99213 OFFICE O/P EST LOW 20 MIN: CPT | Performed by: NURSE PRACTITIONER

## 2019-11-18 RX ORDER — PREGABALIN 50 MG/1
50 CAPSULE ORAL 2 TIMES DAILY
Qty: 90 CAPSULE | Refills: 2 | Status: SHIPPED | OUTPATIENT
Start: 2019-11-18 | End: 2020-04-01

## 2019-12-03 ENCOUNTER — OFFICE VISIT (OUTPATIENT)
Dept: CARDIOLOGY | Age: 64
End: 2019-12-03
Payer: MEDICARE

## 2019-12-03 VITALS
SYSTOLIC BLOOD PRESSURE: 120 MMHG | WEIGHT: 147 LBS | DIASTOLIC BLOOD PRESSURE: 78 MMHG | HEIGHT: 66 IN | HEART RATE: 85 BPM | BODY MASS INDEX: 23.63 KG/M2

## 2019-12-03 DIAGNOSIS — I25.10 CORONARY ARTERY DISEASE INVOLVING NATIVE CORONARY ARTERY OF NATIVE HEART WITHOUT ANGINA PECTORIS: ICD-10-CM

## 2019-12-03 DIAGNOSIS — I10 ESSENTIAL HYPERTENSION: ICD-10-CM

## 2019-12-03 DIAGNOSIS — Z95.1 HX OF CABG: ICD-10-CM

## 2019-12-03 DIAGNOSIS — C34.90 MALIGNANT NEOPLASM OF LUNG, UNSPECIFIED LATERALITY, UNSPECIFIED PART OF LUNG (HCC): ICD-10-CM

## 2019-12-03 DIAGNOSIS — R13.19 OTHER DYSPHAGIA: ICD-10-CM

## 2019-12-03 DIAGNOSIS — I48.3 TYPICAL ATRIAL FLUTTER (HCC): Primary | ICD-10-CM

## 2019-12-03 DIAGNOSIS — E78.2 MIXED HYPERLIPIDEMIA: ICD-10-CM

## 2019-12-03 DIAGNOSIS — F17.210 CIGARETTE NICOTINE DEPENDENCE WITHOUT COMPLICATION: ICD-10-CM

## 2019-12-03 PROBLEM — I48.92 ATRIAL FLUTTER (HCC): Status: ACTIVE | Noted: 2019-12-03

## 2019-12-03 PROCEDURE — 93000 ELECTROCARDIOGRAM COMPLETE: CPT | Performed by: CLINICAL NURSE SPECIALIST

## 2019-12-03 PROCEDURE — 1036F TOBACCO NON-USER: CPT | Performed by: CLINICAL NURSE SPECIALIST

## 2019-12-03 PROCEDURE — 99213 OFFICE O/P EST LOW 20 MIN: CPT | Performed by: CLINICAL NURSE SPECIALIST

## 2019-12-03 PROCEDURE — 3017F COLORECTAL CA SCREEN DOC REV: CPT | Performed by: CLINICAL NURSE SPECIALIST

## 2019-12-03 PROCEDURE — G8598 ASA/ANTIPLAT THER USED: HCPCS | Performed by: CLINICAL NURSE SPECIALIST

## 2019-12-03 PROCEDURE — G8427 DOCREV CUR MEDS BY ELIG CLIN: HCPCS | Performed by: CLINICAL NURSE SPECIALIST

## 2019-12-03 PROCEDURE — G8484 FLU IMMUNIZE NO ADMIN: HCPCS | Performed by: CLINICAL NURSE SPECIALIST

## 2019-12-03 PROCEDURE — G8420 CALC BMI NORM PARAMETERS: HCPCS | Performed by: CLINICAL NURSE SPECIALIST

## 2019-12-03 RX ORDER — FLUTICASONE FUROATE AND VILANTEROL TRIFENATATE 100; 25 UG/1; UG/1
1 POWDER RESPIRATORY (INHALATION) DAILY
COMMUNITY
Start: 2019-11-29

## 2019-12-03 RX ORDER — BENZONATATE 100 MG/1
100 CAPSULE ORAL DAILY
COMMUNITY
Start: 2019-11-29 | End: 2019-12-15

## 2019-12-03 ASSESSMENT — ENCOUNTER SYMPTOMS
WHEEZING: 0
FACIAL SWELLING: 0
CHEST TIGHTNESS: 0
VOMITING: 0
TROUBLE SWALLOWING: 1
SHORTNESS OF BREATH: 1
COUGH: 0
ABDOMINAL PAIN: 0
EYE REDNESS: 0
NAUSEA: 0

## 2019-12-16 ENCOUNTER — OFFICE VISIT (OUTPATIENT)
Dept: HEMATOLOGY | Age: 64
End: 2019-12-16
Payer: MEDICARE

## 2019-12-16 ENCOUNTER — HOSPITAL ENCOUNTER (OUTPATIENT)
Dept: INFUSION THERAPY | Age: 64
Discharge: HOME | End: 2019-12-16
Payer: MEDICARE

## 2019-12-16 VITALS
WEIGHT: 140.7 LBS | HEART RATE: 86 BPM | SYSTOLIC BLOOD PRESSURE: 118 MMHG | HEIGHT: 66 IN | OXYGEN SATURATION: 95 % | DIASTOLIC BLOOD PRESSURE: 60 MMHG | BODY MASS INDEX: 22.61 KG/M2

## 2019-12-16 DIAGNOSIS — Z71.89 COUNSELING REGARDING ADVANCED CARE PLANNING AND GOALS OF CARE: ICD-10-CM

## 2019-12-16 DIAGNOSIS — C34.90 MALIGNANT NEOPLASM OF LUNG, UNSPECIFIED LATERALITY, UNSPECIFIED PART OF LUNG (HCC): Primary | ICD-10-CM

## 2019-12-16 DIAGNOSIS — G89.3 CANCER-RELATED PAIN: ICD-10-CM

## 2019-12-16 DIAGNOSIS — Z71.89 COORDINATION OF COMPLEX CARE: ICD-10-CM

## 2019-12-16 DIAGNOSIS — Z78.9 POOR PROGNOSIS: ICD-10-CM

## 2019-12-16 DIAGNOSIS — C34.90 MALIGNANT NEOPLASM OF LUNG, UNSPECIFIED LATERALITY, UNSPECIFIED PART OF LUNG (HCC): ICD-10-CM

## 2019-12-16 PROCEDURE — 85025 COMPLETE CBC W/AUTO DIFF WBC: CPT

## 2019-12-16 PROCEDURE — 3017F COLORECTAL CA SCREEN DOC REV: CPT | Performed by: INTERNAL MEDICINE

## 2019-12-16 PROCEDURE — G8420 CALC BMI NORM PARAMETERS: HCPCS | Performed by: INTERNAL MEDICINE

## 2019-12-16 PROCEDURE — G8427 DOCREV CUR MEDS BY ELIG CLIN: HCPCS | Performed by: INTERNAL MEDICINE

## 2019-12-16 PROCEDURE — G8598 ASA/ANTIPLAT THER USED: HCPCS | Performed by: INTERNAL MEDICINE

## 2019-12-16 PROCEDURE — G8484 FLU IMMUNIZE NO ADMIN: HCPCS | Performed by: INTERNAL MEDICINE

## 2019-12-16 PROCEDURE — 1036F TOBACCO NON-USER: CPT | Performed by: INTERNAL MEDICINE

## 2019-12-16 PROCEDURE — 99215 OFFICE O/P EST HI 40 MIN: CPT | Performed by: INTERNAL MEDICINE

## 2019-12-17 ENCOUNTER — HOSPITAL ENCOUNTER (OUTPATIENT)
Dept: INFUSION THERAPY | Age: 64
Discharge: HOME | End: 2019-12-17
Payer: MEDICARE

## 2019-12-17 DIAGNOSIS — G89.3 CANCER ASSOCIATED PAIN: ICD-10-CM

## 2019-12-17 DIAGNOSIS — C34.90 MALIGNANT NEOPLASM OF LUNG, UNSPECIFIED LATERALITY, UNSPECIFIED PART OF LUNG (HCC): Primary | ICD-10-CM

## 2019-12-17 PROCEDURE — 99211 OFF/OP EST MAY X REQ PHY/QHP: CPT

## 2019-12-18 RX ORDER — ONDANSETRON HYDROCHLORIDE 8 MG/1
8 TABLET, FILM COATED ORAL EVERY 8 HOURS PRN
Qty: 30 TABLET | Refills: 3 | Status: SHIPPED | OUTPATIENT
Start: 2019-12-18

## 2019-12-19 DIAGNOSIS — C10.9 OROPHARYNGEAL CANCER (HCC): ICD-10-CM

## 2019-12-19 DIAGNOSIS — C10.9 OROPHARYNGEAL CARCINOMA (HCC): Primary | ICD-10-CM

## 2019-12-21 RX ORDER — PROMETHAZINE HYDROCHLORIDE 6.25 MG/5ML
12.5 SYRUP ORAL 4 TIMES DAILY PRN
Qty: 600 ML | Refills: 0 | Status: SHIPPED | OUTPATIENT
Start: 2019-12-21 | End: 2020-01-05

## 2019-12-23 RX ORDER — PROMETHAZINE HYDROCHLORIDE 25 MG/1
25 TABLET ORAL EVERY 6 HOURS PRN
Qty: 60 TABLET | Refills: 1 | Status: SHIPPED | OUTPATIENT
Start: 2019-12-23 | End: 2020-01-22

## 2020-01-08 VITALS
HEIGHT: 66 IN | HEART RATE: 94 BPM | DIASTOLIC BLOOD PRESSURE: 78 MMHG | WEIGHT: 140 LBS | BODY MASS INDEX: 22.5 KG/M2 | SYSTOLIC BLOOD PRESSURE: 120 MMHG

## 2020-01-08 PROBLEM — C01 MALIGNANT NEOPLASM OF BASE OF TONGUE (HCC): Status: ACTIVE | Noted: 2017-01-01

## 2020-01-08 NOTE — PROGRESS NOTES
adjacent to the distal esophagus. 2 small pulmonary nodules measuring 4 mm and 4.2 mm in diameter located in the right lower lobe and at right middle lobe respectively. Multiple solid noncalcified nodules smaller than 6 mm in diameter. A 4.9 x 2.5 x 3.3 cm abnormal soft tissue appearance in the right perineum of unknown significance. In addition, 1 x 1.5 cm sclerotic lesion in the left iliac wing laterally to the sacroiliac joints. 7 mm sclerosis the left iliac lateral to the left SI joint. Sclerosis on the medial side of the right sacroiliac joints. 9/19/2017- initial oncology consultation. 9/29/2017 - PET CT scan revealed abnormal metabolic activity in the base the tongue on the right extending in the right palatine tonsil with an SUV of 8.8, this is consistent with neoplasm. No other regions of abnormal metabolic activity are identified. 9/29/2017- MRI of the orbit, face and neck protocol revealed large right oropharyngeal soft mass measuring 6.76 x 4.6 cm with involvement of the right side of the base of the tongue. Invasion of the superior margin of the right submandibular gland is noted. No bony involvement is noted. Scattered left jugular node measures up to 1.2 x 0.6 cm.   10/5/2017- Biopsy of the right superior tonsil and right base of tongue by Dr. Shelbi Arias. Pathology of the tonsil and tongue revealed invasive moderate differentiated squamous cell carcinoma positive for p16 by IHC. No lymph nodes were submitted. 10/5/2017- tracheostomy placement by Dr. Aliyah Marie. 10/6/2017- Dr. Ever Hernández placed a left subclavian Port-A-Cath, and a PEG tube. 10/11/2017- Full dental extraction and direct laryngoscopy by Dr. Antonio Spain and Dr. Alpa Bhat. 10/16/2017- initiation of systemic chemotherapy with carboplatin AUC of 2 and Taxol 80 mg/m² weekly, anticipating dose reduction of Taxol to 40 mg/m² once radiation is added to treatment plan. 10/23/2017- appointment with Dr. Swapnil Barriga. pharyngeal lesions. External inspection of ears and nose are normal.   NECK: Supple, no masses. No palpable thyroid mass    CHEST/LUNGS: CTA bilaterally, normal respiratory effort   CARDIOVASCULAR: RRR, no murmurs. No lower extremity edema   ABDOMEN: soft non-tender, active bowel sounds, no hepatosplenomegaly. No palpable masses. EXTREMITIES: warm, Full ROM of all fours extremities. No focal weakness. SKIN: warm, dry with no rashes or lesions  LYMPH: No cervical, clavicular, axillary, or inguinal lymphadenopathy  NEUROLOGIC: follows commands, non focal.   PSYCH: mood and affect appropriate. Alert and oriented to time and place and person. Relevant Lab findings/reviewed by me:  BQZ:34/74/3350  WBC-10.63  HGB-11.9  PLT-361,000  Neut-8.29    Relevant Imaging studies/reviewed by me:    No results found. ASSESSMENT    Orders Placed This Encounter   Procedures    CBC Auto Differential     5 part     Standing Status:   Future     Standing Expiration Date:   1/9/2021      Bruce Archuleta was seen today for other. Diagnoses and all orders for this visit:    Oropharyngeal carcinoma ([facility] [address]  -     CBC Auto Differential; Future    Care plan discussed with patient    Cancer-related pain       Recurrent squamous cell carcinoma head and neck, P 16+  The patient has been contemplating hospital versus treatment. After a lengthy discussion after the patient to check his PDL 1 CPS score. Essentially, biopsy-proven metastatic pulmonary nodule compatible with squamous cell carcinoma P 16+. PD-L1 CPS score. Single agent Keytruda if TPS score > 1. Addendum 12/18/2019: PD-L1- TPS score showed no expression 0%    I discussed this results with the patient. We both agree to proceed with best supportive care/hospice. This was [facility] at [facility] as well. Goals of care- the patient is contemplating hospice if not a candidate for immunotherapy. Cancer related pain-  management.-Continue Percocet.     Continue with Hospice      Follow Up:     Return if symptoms worsen or fail to improve. Data [place] Deaazam Heller am scribing for Cipriano Aiken MD. Electronically signed by Leslie Heller on 1/9/2020 at 11:24 AM.     I, Dr Ju Edgar, personally performed the services described in this documentation as scribed by Leslie Heller MA in my presence and is both accurate and complete. Over 50% of the total visit time of 15 minutes in face to face encounter with the patient, out of which more than 50% of the time was spent in counseling patient or family and coordination of care. Counseling included but was not limited to time spent reviewing labs, imaging studies/ treatment plan and answering questions.

## 2020-01-09 ENCOUNTER — OFFICE VISIT (OUTPATIENT)
Dept: HEMATOLOGY | Age: 65
End: 2020-01-09
Payer: MEDICARE

## 2020-01-09 ENCOUNTER — HOSPITAL ENCOUNTER (OUTPATIENT)
Dept: INFUSION THERAPY | Age: 65
Discharge: HOME | End: 2020-01-09
Payer: MEDICARE

## 2020-01-09 VITALS
WEIGHT: 131 LBS | HEIGHT: 66 IN | OXYGEN SATURATION: 95 % | BODY MASS INDEX: 21.05 KG/M2 | SYSTOLIC BLOOD PRESSURE: 124 MMHG | HEART RATE: 55 BPM | DIASTOLIC BLOOD PRESSURE: 60 MMHG

## 2020-01-09 PROCEDURE — G8420 CALC BMI NORM PARAMETERS: HCPCS | Performed by: INTERNAL MEDICINE

## 2020-01-09 PROCEDURE — 3017F COLORECTAL CA SCREEN DOC REV: CPT | Performed by: INTERNAL MEDICINE

## 2020-01-09 PROCEDURE — 99211 OFF/OP EST MAY X REQ PHY/QHP: CPT

## 2020-01-09 PROCEDURE — G8427 DOCREV CUR MEDS BY ELIG CLIN: HCPCS | Performed by: INTERNAL MEDICINE

## 2020-01-09 PROCEDURE — 4004F PT TOBACCO SCREEN RCVD TLK: CPT | Performed by: INTERNAL MEDICINE

## 2020-01-09 PROCEDURE — 99213 OFFICE O/P EST LOW 20 MIN: CPT | Performed by: INTERNAL MEDICINE

## 2020-01-09 PROCEDURE — G8484 FLU IMMUNIZE NO ADMIN: HCPCS | Performed by: INTERNAL MEDICINE

## 2020-01-09 PROCEDURE — 85025 COMPLETE CBC W/AUTO DIFF WBC: CPT

## 2020-11-03 PROBLEM — R07.9 CHEST PAIN: Status: RESOLVED | Noted: 2020-11-03 | Resolved: 2020-11-03

## 2020-11-03 PROBLEM — R42 DIZZY SPELLS: Status: RESOLVED | Noted: 2019-07-16 | Resolved: 2020-11-03

## 2022-01-30 NOTE — ANESTHESIA PREPROCEDURE EVALUATION
Anesthesia Evaluation     Patient summary reviewed   no history of anesthetic complications:  NPO Solid Status: > 8 hours  NPO Liquid Status: > 8 hours           Airway   Mallampati: III  TM distance: >3 FB  Neck ROM: limited  Comment: trach  Dental    (+) poor dentition    Comment: Trach in place      Pulmonary    (+) a smoker Current Abstained day of surgery,   (-) COPD, asthma, sleep apnea    ROS comment: Trach in place- size 6 uncuffed    Cardiovascular   Exercise tolerance: good (4-7 METS)    ECG reviewed  Patient on routine beta blocker and Beta blocker not taken-may be given intraoperatively    (+) hypertension, CAD, CABG (2009), cardiac stents ( 9 stents 10/2016. most recent 02/2018 mid right coronary artery employing a 3.0 x 18 mm bare metal ) Bare metal stent within the past 12 months   (-) pacemaker, angina      Neuro/Psych  (-) seizures, TIA, CVA  GI/Hepatic/Renal/Endo    (+)  GERD,  diabetes mellitus using insulin,   (-) liver disease, no renal disease    ROS Comment: Gastrostomy tube- nonfunctional    Musculoskeletal     Abdominal    Substance History      OB/GYN          Other      history of cancer (OP cancer, SCC)                      Anesthesia Plan    ASA 3     general   (ETT to trach stoma)  intravenous induction   Anesthetic plan and risks discussed with patient.       no

## 2022-11-22 NOTE — ANESTHESIA POSTPROCEDURE EVALUATION
2022       Malinda Sam MD  [address] [facility]  [facility] [address]  [facility] [address]  Via In Basket      Patient: Michael Hemmerling   YOB: 1942   Date of Visit: 2022       Dear Dr. Sam:    Thank you for referring Michael Hemmerling to me for evaluation. Below are my notes for this visit with him.    If you have questions, please do not hesitate to call me. I look forward to following your patient along with you.      Sincerely,        Ryan Krueger MD        CC: No Recipients  Ryan Krueger MD  2022  9:50 AM  Signed    Office Note    Michael Hemmerling  : 1942  PCP: Malinda Sam MD    Reason for Visit:  Chief Complaint   Patient presents with   • Follow-up       History of Present Illness:  Michael Hemmerling is a 80 year old man with PMHx of lung cancer (L lung resection) who had persistent aflutter and underwent a successful aflutter ablation 3 months ago.   He feels the same after the ablation as he did before it.   He remains on eliquis.   EKG today shows sinus rhythm at 75 bpm.  He has chronic SKINNER which is unchanged from his baseline.      He denies CP, angina, presyncope, syncope or dizziness.  He denies GI or  bleeding.   His functional status is good, he goes to the gym 4-5 times per week.       PMHx:  Past Medical History:   Diagnosis Date   • Arthritis    • Fernando's esophagus     low-grade dysplasia   • Borderline hyperglycemia 05/15/2018   • Chronic right shoulder pain 2018   • Chronic venous hypertension with inflammation involving both sides    • Gastroesophageal reflux disease     Fernando's   • Hemorrhage of gastrointestinal tract 2020   • High cholesterol    • Hypertension, essential 2022   • Lung cancer (CMS/HCC)    • Open-angle glaucoma    • Urinary incontinence     frequency       PSHx:  Past Surgical History:   Procedure Laterality Date   • Ablation atrial flutter - cv  08/10/2022   • Cataract extrac w/ intraocular  Patient: Sameer Tavarez    Procedure Summary     Date:  06/25/18 Room / Location:  [facility] OR  /  [facility] OR    Anesthesia Start:  0920 Anesthesia Stop:  0955    Procedure:  DIRECT LARYNGOSCOPY WITH BIOPSY (N/A Mouth) Diagnosis:       Oropharyngeal cancer      Tracheostomy dependence      (Oropharyngeal cancer [C10.9])      (Tracheostomy dependence [Z93.0])    Surgeon:  Alber Justin MD Provider:  Josue Page CRNA    Anesthesia Type:  general ASA Status:  3          Anesthesia Type: general  Last vitals  BP   131/76 (06/25/18 0614)   Temp   97.8 °F (36.6 °C) (06/25/18 0614)   Pulse   73 (06/25/18 0802)   Resp   18 (06/25/18 0802)     SpO2   100 % (06/25/18 0802)     Post Anesthesia Care and Evaluation    Patient location during evaluation: PACU  Patient participation: complete - patient participated  Level of consciousness: awake and alert  Pain management: adequate  Airway patency: patent  Anesthetic complications: No anesthetic complications    Cardiovascular status: acceptable  Respiratory status: acceptable  Hydration status: acceptable    Comments: Blood pressure 131/76, pulse 73, temperature 97.8 °F (36.6 °C), temperature source Temporal Artery , resp. rate 18, SpO2 100 %.    Pt discharged from PACU based on mike score >8       lens imp&ant vit,bilaterl     • Colonoscopy  05/2020    tubular adenoma on pathology of polypectomy   • Ep study/atrial flutter ablation - cv  08/2022   • Esophagogastroduodenoscopy transoral flex w/bx single or mult  08/11/2020    light microscopy along with biopsy and ablation   • Hernia repair      Paraesophageal 11/11   • Joint replacement      L hip 2004   • Lung cancer surgery Left 01/10/2000   • Lung removal, partial     • Removal gallbladder     • Upper gastrointestinal endoscopy  05/2020       Family Hx:  Family History   Problem Relation Age of Onset   • Diabetes Mother    • Myocardial Infarction Father        Social Hx:   reports that he quit smoking about 37 years ago. His smoking use included cigarettes. He started smoking about 67 years ago. He smoked 2.00 packs per day for 0.00 years. He has never used smokeless tobacco. He reports that he does not drink alcohol and does not use drugs.    Allergies:  ALLERGIES:  No Known Allergies    Medications:  Current Outpatient Medications   Medication Sig Dispense Refill   • triamterene-hydroCHLOROthiazide (MAXZIDE-25) 37.5-25 MG per tablet TAKE 1 TABLET BY MOUTH DAILY AS NEEDED FOR LEG SWELLING 30 tablet 5   • Ferrous Sulfate (Iron) 325 (65 Fe) MG Tab Take 65 mg by mouth daily.     • ketoconazole (NIZORAL) 2 % cream Apply 1 application topically as needed. To feet     • dilTIAZem (CARDIZEM CD) 120 MG 24 hr capsule Take 1 capsule by mouth daily. 90 capsule 3   • apixaBAN (ELIQUIS) 5 MG Tab Take 1 tablet by mouth every 12 hours. 60 tablet 5   • albuterol (ProAir RespiClick) 108 (90 Base) MCG/ACT inhaler Inhale 2 puffs into the lungs every 4 hours as needed for Shortness of Breath, Wheezing or Other (and before exercise). 3 each 3   • fluticasone-vilanterol (Breo Ellipta) 100-25 MCG/INH inhaler Inhale 1 puff into the lungs daily. 3 each 3   • simvastatin (ZOCOR) 40 MG tablet Take 1 tablet by mouth daily. 90 tablet 3   • tamsulosin (FLOMAX) 0.4 MG Cap Take 1  capsule by mouth daily. 90 capsule 3   • omeprazole (PriLOSEC) 40 MG capsule Take 1 capsule by mouth daily. 90 capsule 3   • amoxicillin (AMOXIL) 500 MG capsule Take 2,000 mg by mouth. 4 capsules prior to dental procedures     • triamcinolone (KENALOG) 0.5 % ointment Apply topically 2 times daily. APPLY 2-3 TIMES DAILY TO AFFECTED AREA(S) (Patient taking differently: Apply 1 g topically as needed.) 30 g 11   • Wheat Dextrin (Benefiber On The GO) Pack Take 1 packet by mouth daily. In 4 oz of water or juice. (Patient taking differently: Take 1 packet by mouth daily as needed. In 4 oz of water or juice.) 100 each 3   • polyethylene glycol (MiraLax) 17 g packet Take 17 g by mouth daily. If no BM in 2 days while taking benefiber daily. Stir and dissolve powder in any 4 to 8 ounces of beverage, then drink. (Patient taking differently: Take 17 g by mouth daily as needed. If no BM in 2 days while taking benefiber daily. Stir and dissolve powder in any 4 to 8 ounces of beverage, then drink.) 30 packet 3   • Cyanocobalamin (B-12) 1000 MCG Cap Take 1,000 mcg by mouth daily.     • Multiple Vitamins-Minerals (CENTRUM SILVER ADULT 50+) Tab Take 1 tablet by mouth daily.     • latanoprost (XALATAN) 0.005 % ophthalmic solution Place 1 drop into both eyes nightly.       No current facility-administered medications for this visit.       Review of Systems:  Review of Systems   Constitutional: Negative for chills, fever, weight gain and weight loss.   HENT: Negative for hearing loss.    Eyes: Negative for visual disturbance.        Patient denies significant visual changes   Cardiovascular: Positive for dyspnea on exertion. Negative for chest pain, claudication, near-syncope, palpitations and syncope.        Negative except for what's indicated in HPI   Respiratory: Positive for shortness of breath. Negative for cough and hemoptysis.    Hematologic/Lymphatic: Negative for bleeding problem. Does not bruise/bleed easily.   Skin: Negative  for rash and suspicious lesions.   Musculoskeletal: Negative for arthritis and falls.   Gastrointestinal: Negative for hematochezia and melena.   Genitourinary: Negative for hematuria.   Neurological: Negative for focal weakness, light-headedness and weakness.        No localized deficits   Psychiatric/Behavioral: Negative for depression and hallucinations.   Allergic/Immunologic: Negative for environmental allergies.        No new food allergies     All other systems were reviewed and were negative.       Physical Exam:  Visit Vitals  /57 (BP Location: LUE - Left upper extremity, Patient Position: Sitting, Cuff Size: Large Adult)   Pulse 75   Ht 6' (1.829 m)   Wt 103.5 kg (228 lb 4.6 oz)   BMI 30.96 kg/m²       Gen: no acute distress  Neuro: alert and oriented x 3  HEENT: symmetric  Mouth:  mask in place  Neck: no jvd  CV: regular s1s2  Pulm: clear  GI: soft, non-tender, no rebound or guarding  :  no cva tenderness  Gait:  normal  Ext: no sig edema  Skin: no rashes      Data:     New referral from Dr Malinda Sam for aflutter    ECHO 4/22:  EF 58%, grade 2 diastolic dysfunction.   PA 40 mmHg.  Possible aflutter    Labs 3/22: LDL 76;  Hgb 14;  Creat 1.05;  LFTs normal    HOLTER 5/22:  afib / aflutter throughout, average HR was 98 bpm, range 37 to 149 bpm.    Longest pause 3.3 seconds.       Successful aflutter ablation 8/22      Assessment/Plan:  Michael Hemmerling is a 80 year old man with PMHx of lung cancer (L lung resection) who had persistent aflutter and underwent a successful aflutter ablation 3 months ago.   He feels the same after the ablation as he did before it.   He remains on eliquis.   EKG today shows sinus rhythm at 75 bpm.  He has chronic SKINNER which is unchanged from his baseline.         Diagnosis:  Patient Active Problem List   Diagnosis   • Anemia, iron deficiency   • Balance disorder   • Fernando's esophagus without dysplasia   • Chronic obstructive pulmonary disease (CMS/HCC)   • Mild  cognitive disorder   • Diastolic dysfunction   • Enlarged prostate with lower urinary tract symptoms (LUTS)   • Gastroesophageal reflux disease   • Glaucoma   • Primary osteoarthritis of right knee   • Peripheral neuropathy   • Ptosis, bilateral   • Vitamin B 12 deficiency   • Hip joint replacement by other means   • Primary open angle glaucoma (POAG) of both eyes   • Bilateral leg edema   • Dysplasia of toenail   • Typical atrial flutter (CMS/HCC)   • Hypertension, essential   • Hyperlipidemia, mixed   • Long term current use of anticoagulant   • Status post ablation of atrial flutter   • Dyspnea on exertion         IMPRESSION:  s/p aflutter ablation 8/22  Eliquis  Hyperlipidemia, LDL at goal  HTN  Diastolic dysfunction  Dyspnea on exertion  s/p L lung resection (cancer)        PLAN:    EKG today shows sinus at 75 bpm    Labs today:  CMP, lipids, CBC    Holter 1/23    Stress thallium 1/23    FU LOWELL 2/23.  If he remains aflutter free at that time will change him from eliquis to fortunato Krueger MD  11/22/22

## (undated) DEVICE — ELECTRD BLD EDGE/INSUL1P 2.4X5.1MM STRL

## (undated) DEVICE — GLV SURG TRIUMPH ORTHO W/ALOE PF LTX 8 STRL

## (undated) DEVICE — SUT PDS LP 1 TP1 96IN VIO PDP880GA

## (undated) DEVICE — SUT SILK 3/0 SUTUPAK TIES 24IN SA74H

## (undated) DEVICE — PK TURNOVER RM ADV

## (undated) DEVICE — GAUZE,SPONGE,4"X4",16PLY,XRAY,STRL,LF: Brand: MEDLINE

## (undated) DEVICE — NDL HYPO PRECISIONGLIDE/REG 18G 11/2 PNK

## (undated) DEVICE — 2.1MM CROSS-CUT FISSURE CARBIDE BUR

## (undated) DEVICE — INTENDED FOR TISSUE SEPARATION, AND OTHER PROCEDURES THAT REQUIRE A SHARP SURGICAL BLADE TO PUNCTURE OR CUT.: Brand: BARD-PARKER ® STAINLESS STEEL BLADES

## (undated) DEVICE — SUT PDS 0 CT 36IN VIO PDP358T

## (undated) DEVICE — SUT VIC 3/0 RB1 27IN UD VCP215H

## (undated) DEVICE — CODMAN® SURGICAL PATTIES 1/2" X1 1/2" (1.27CM X 3.81CM): Brand: CODMAN®

## (undated) DEVICE — SPNG GZ 2S 2X2 8PLY STRL PK/2

## (undated) DEVICE — UTILITY MARKER W/MED LABELS: Brand: MEDLINE

## (undated) DEVICE — 3M™ WARMING BLANKET, UPPER BODY, 10 PER CASE, 42268: Brand: BAIR HUGGER™

## (undated) DEVICE — TOOTHBRSH XSFT STD A/ WHT

## (undated) DEVICE — PK ENT HD AND NK 30

## (undated) DEVICE — SYR TB PRECISIONGLIDE 1CC 26G 3/8IN LF

## (undated) DEVICE — GOWN,NON-REINFORCED,SIRUS,SET IN SLV,XXL: Brand: MEDLINE

## (undated) DEVICE — Device

## (undated) DEVICE — PROTECT TEETH A/

## (undated) DEVICE — ANTIBACTERIAL UNDYED BRAIDED (POLYGLACTIN 910), SYNTHETIC ABSORBABLE SUTURE: Brand: COATED VICRYL

## (undated) DEVICE — YANKAUER,BULB TIP WITH VENT: Brand: ARGYLE

## (undated) DEVICE — 3M™ STERI-STRIP™ REINFORCED ADHESIVE SKIN CLOSURES, R1547, 1/2 IN X 4 IN (12 MM X 100 MM), 6 STRIPS/ENVELOPE: Brand: 3M™ STERI-STRIP™

## (undated) DEVICE — APPL CHLORAPREP W/TINT 26ML ORNG

## (undated) DEVICE — CVR UNIV C/ARM

## (undated) DEVICE — CONN FLX BREATHE CIRCT

## (undated) DEVICE — DRSNG TELFA PAD NONADH STR 1S 3X8IN

## (undated) DEVICE — PAD MINOR UNIVERSAL: Brand: MEDLINE INDUSTRIES, INC.

## (undated) DEVICE — ADHS LIQ MASTISOL 2/3ML

## (undated) DEVICE — TUBING, SUCTION, 1/4" X 12', STRAIGHT: Brand: MEDLINE

## (undated) DEVICE — NDL HYPO PRECISIONGLIDE REG 25G 1 1/2

## (undated) DEVICE — SUT SILK 2/0 FS BLK 18IN 685G

## (undated) DEVICE — TOWEL,OR,DSP,ST,BLUE,DLX,10/PK,8PK/CS: Brand: MEDLINE

## (undated) DEVICE — CONTAINER,SPECIMEN,OR STERILE,4OZ: Brand: MEDLINE

## (undated) DEVICE — DEFOGGER!" ANTI FOG KIT: Brand: DEROYAL

## (undated) DEVICE — DISPOSABLE BIPOLAR CABLE 12FT. (3.6M): Brand: KIRWAN

## (undated) DEVICE — SUT PROLN 1 CT1 30IN 8425H

## (undated) DEVICE — SUT VIC 0 MO4 CR8 18IN VCP701D

## (undated) DEVICE — TB FEED GASTRO MIC 22F7TO10ML

## (undated) DEVICE — PACK,SET UP,NO DRAPES: Brand: MEDLINE

## (undated) DEVICE — GLV SURG SIGN GRIP LTX PF SZ7.5 STRL

## (undated) DEVICE — GLV SURG TRIUMPH MICRO PF LTX 7.5 STRL

## (undated) DEVICE — SPONGE,NEURO,0.5"X3",XR,STRL,LF,10/PK: Brand: MEDLINE

## (undated) DEVICE — CODMAN® SURGICAL PATTIES 1/2" X 3" (1.27CM X 7.62CM): Brand: CODMAN®

## (undated) DEVICE — TRY PREP SCRB VAG PVP

## (undated) DEVICE — SUT SILK 2/0 SH CR8 18IN CR8 C012D

## (undated) DEVICE — DRSNG TRACH POLYMEM FEN 3.5X3.5IN

## (undated) DEVICE — DRSNG SURESITE123 4X10IN

## (undated) DEVICE — SYR SLP TP 10ML DISP

## (undated) DEVICE — GLV SURG BIOGEL M LTX PF 7 1/2

## (undated) DEVICE — GLV SURG BIOGEL LTX PF 6 1/2

## (undated) DEVICE — HEMOST ABS GELFOAM GELATIN SPNG SZ100

## (undated) DEVICE — SUT ETHLN 2/0 FSLX 30IN 1674H

## (undated) DEVICE — DURAHOOK 1/4HOOK PK/6

## (undated) DEVICE — SPNG GZ WOVN 4X4IN 12PLY 10/BX STRL

## (undated) DEVICE — SYR SLPTP 20CC

## (undated) DEVICE — DRSNG SURESITE WNDW 4X4.5

## (undated) DEVICE — SUT PROLN 2/0 SH 36IN 8523H

## (undated) DEVICE — SUT SILK 2/0 SH 30IN K833H

## (undated) DEVICE — CLTH CLENS READYCLEANSE PERI CARE PK/5